# Patient Record
Sex: FEMALE | Race: WHITE | NOT HISPANIC OR LATINO | URBAN - METROPOLITAN AREA
[De-identification: names, ages, dates, MRNs, and addresses within clinical notes are randomized per-mention and may not be internally consistent; named-entity substitution may affect disease eponyms.]

---

## 2017-08-17 PROBLEM — Z00.00 ENCOUNTER FOR PREVENTIVE HEALTH EXAMINATION: Noted: 2017-08-17

## 2017-08-23 ENCOUNTER — OUTPATIENT (OUTPATIENT)
Dept: OUTPATIENT SERVICES | Facility: HOSPITAL | Age: 82
LOS: 1 days | Discharge: HOME | End: 2017-08-23

## 2017-08-23 DIAGNOSIS — D75.81 MYELOFIBROSIS: ICD-10-CM

## 2017-08-29 ENCOUNTER — APPOINTMENT (OUTPATIENT)
Dept: HEMATOLOGY ONCOLOGY | Facility: CLINIC | Age: 82
End: 2017-08-29

## 2017-09-05 ENCOUNTER — APPOINTMENT (OUTPATIENT)
Dept: HEMATOLOGY ONCOLOGY | Facility: CLINIC | Age: 82
End: 2017-09-05
Payer: MEDICARE

## 2017-09-05 ENCOUNTER — OUTPATIENT (OUTPATIENT)
Dept: OUTPATIENT SERVICES | Facility: HOSPITAL | Age: 82
LOS: 1 days | End: 2017-09-05
Payer: MEDICARE

## 2017-09-05 ENCOUNTER — RESULT REVIEW (OUTPATIENT)
Age: 82
End: 2017-09-05

## 2017-09-05 VITALS
OXYGEN SATURATION: 99 % | BODY MASS INDEX: 20.33 KG/M2 | HEART RATE: 110 BPM | RESPIRATION RATE: 14 BRPM | WEIGHT: 114.75 LBS | DIASTOLIC BLOOD PRESSURE: 77 MMHG | HEIGHT: 62.8 IN | SYSTOLIC BLOOD PRESSURE: 152 MMHG | TEMPERATURE: 99.1 F

## 2017-09-05 DIAGNOSIS — D64.9 ANEMIA, UNSPECIFIED: ICD-10-CM

## 2017-09-05 DIAGNOSIS — K58.9 IRRITABLE BOWEL SYNDROME W/OUT DIARRHEA: ICD-10-CM

## 2017-09-05 DIAGNOSIS — Z87.891 PERSONAL HISTORY OF NICOTINE DEPENDENCE: ICD-10-CM

## 2017-09-05 DIAGNOSIS — E03.9 HYPOTHYROIDISM, UNSPECIFIED: ICD-10-CM

## 2017-09-05 LAB
ALBUMIN SERPL ELPH-MCNC: 3 G/DL
ALP BLD-CCNC: 234 U/L
ALT SERPL-CCNC: 32 U/L
ANION GAP SERPL CALC-SCNC: 17 MMOL/L
ANISOCYTOSIS BLD QL: SLIGHT — SIGNIFICANT CHANGE UP
AST SERPL-CCNC: 19 U/L
BASO STIPL BLD QL SMEAR: PRESENT — SIGNIFICANT CHANGE UP
BASOPHILS # BLD AUTO: 0.2 K/UL — SIGNIFICANT CHANGE UP (ref 0–0.2)
BILIRUB SERPL-MCNC: 0.7 MG/DL
BLD GP AB SCN SERPL QL: SIGNIFICANT CHANGE UP
BUN SERPL-MCNC: 16 MG/DL
CALCIUM SERPL-MCNC: 8.4 MG/DL
CHLORIDE SERPL-SCNC: 91 MMOL/L
CO2 SERPL-SCNC: 24 MMOL/L
CREAT SERPL-MCNC: 0.6 MG/DL
ELLIPTOCYTES BLD QL SMEAR: SLIGHT — SIGNIFICANT CHANGE UP
EOSINOPHIL # BLD AUTO: 0 K/UL — SIGNIFICANT CHANGE UP (ref 0–0.5)
GLUCOSE SERPL-MCNC: 154 MG/DL
HCT VFR BLD CALC: 23 % — LOW (ref 34.5–45)
HGB BLD-MCNC: 8 G/DL — LOW (ref 11.5–15.5)
HYPOCHROMIA BLD QL: SLIGHT — SIGNIFICANT CHANGE UP
LDH SERPL-CCNC: 225 U/L
LYMPHOCYTES # BLD AUTO: 1.5 K/UL — SIGNIFICANT CHANGE UP (ref 1–3.3)
LYMPHOCYTES # BLD AUTO: 20 % — SIGNIFICANT CHANGE UP (ref 13–44)
MCHC RBC-ENTMCNC: 33.6 PG — SIGNIFICANT CHANGE UP (ref 27–34)
MCHC RBC-ENTMCNC: 35 GM/DL — SIGNIFICANT CHANGE UP (ref 32–36)
MCV RBC AUTO: 96.1 FL — SIGNIFICANT CHANGE UP (ref 80–100)
MICROCYTES BLD QL: SLIGHT — SIGNIFICANT CHANGE UP
MONOCYTES # BLD AUTO: 7.8 K/UL — HIGH (ref 0–0.9)
MONOCYTES NFR BLD AUTO: 37 % — HIGH (ref 2–14)
NEUTROPHILS # BLD AUTO: 8.4 K/UL — HIGH (ref 1.8–7.4)
NEUTROPHILS NFR BLD AUTO: 43 % — SIGNIFICANT CHANGE UP (ref 43–77)
PLAT MORPH BLD: NORMAL — SIGNIFICANT CHANGE UP
PLATELET # BLD AUTO: 368 K/UL — SIGNIFICANT CHANGE UP (ref 150–400)
POIKILOCYTOSIS BLD QL AUTO: SLIGHT — SIGNIFICANT CHANGE UP
POLYCHROMASIA BLD QL SMEAR: SLIGHT — SIGNIFICANT CHANGE UP
POTASSIUM SERPL-SCNC: 3.4 MMOL/L
PROT SERPL-MCNC: 6.5 G/DL
RBC # BLD: 2.39 M/UL — LOW (ref 3.8–5.2)
RBC # FLD: 18.2 % — HIGH (ref 10.3–14.5)
RBC BLD AUTO: ABNORMAL
SODIUM SERPL-SCNC: 132 MMOL/L
STOMATOCYTES BLD QL SMEAR: PRESENT — SIGNIFICANT CHANGE UP
TYPE + AB SCN PNL BLD: SIGNIFICANT CHANGE UP
WBC # BLD: 17.6 K/UL — HIGH (ref 3.8–10.5)
WBC # FLD AUTO: 17.6 K/UL — HIGH (ref 3.8–10.5)

## 2017-09-05 PROCEDURE — 99204 OFFICE O/P NEW MOD 45 MIN: CPT

## 2017-09-05 RX ORDER — ACETAMINOPHEN 500 MG
650 TABLET ORAL ONCE
Qty: 0 | Refills: 0 | Status: COMPLETED | OUTPATIENT
Start: 2017-09-06 | End: 2017-09-06

## 2017-09-05 RX ORDER — DIPHENHYDRAMINE HCL 50 MG
25 CAPSULE ORAL ONCE
Qty: 0 | Refills: 0 | Status: COMPLETED | OUTPATIENT
Start: 2017-09-06 | End: 2017-09-06

## 2017-09-06 ENCOUNTER — OUTPATIENT (OUTPATIENT)
Dept: OUTPATIENT SERVICES | Facility: HOSPITAL | Age: 82
LOS: 1 days | End: 2017-09-06
Payer: MEDICARE

## 2017-09-06 ENCOUNTER — APPOINTMENT (OUTPATIENT)
Dept: HEMATOLOGY ONCOLOGY | Facility: CLINIC | Age: 82
End: 2017-09-06

## 2017-09-06 VITALS
TEMPERATURE: 100 F | OXYGEN SATURATION: 96 % | RESPIRATION RATE: 16 BRPM | SYSTOLIC BLOOD PRESSURE: 135 MMHG | HEART RATE: 96 BPM | DIASTOLIC BLOOD PRESSURE: 60 MMHG

## 2017-09-06 VITALS
HEART RATE: 87 BPM | SYSTOLIC BLOOD PRESSURE: 147 MMHG | RESPIRATION RATE: 16 BRPM | OXYGEN SATURATION: 98 % | DIASTOLIC BLOOD PRESSURE: 69 MMHG | TEMPERATURE: 99 F

## 2017-09-06 DIAGNOSIS — D64.9 ANEMIA, UNSPECIFIED: ICD-10-CM

## 2017-09-06 PROBLEM — K58.9 IRRITABLE BOWEL SYNDROME: Status: ACTIVE | Noted: 2017-09-06

## 2017-09-06 PROBLEM — Z87.891 FORMER SMOKER: Status: ACTIVE | Noted: 2017-09-06

## 2017-09-06 PROBLEM — E03.9 HYPOTHYROIDISM: Status: ACTIVE | Noted: 2017-09-06

## 2017-09-06 LAB — ABO RH CONFIRMATION: SIGNIFICANT CHANGE UP

## 2017-09-06 PROCEDURE — 86901 BLOOD TYPING SEROLOGIC RH(D): CPT

## 2017-09-06 PROCEDURE — 96374 THER/PROPH/DIAG INJ IV PUSH: CPT

## 2017-09-06 PROCEDURE — P9040: CPT

## 2017-09-06 PROCEDURE — 86850 RBC ANTIBODY SCREEN: CPT

## 2017-09-06 PROCEDURE — 36415 COLL VENOUS BLD VENIPUNCTURE: CPT

## 2017-09-06 PROCEDURE — 36430 TRANSFUSION BLD/BLD COMPNT: CPT

## 2017-09-06 PROCEDURE — 86920 COMPATIBILITY TEST SPIN: CPT

## 2017-09-06 PROCEDURE — 86900 BLOOD TYPING SEROLOGIC ABO: CPT

## 2017-09-06 PROCEDURE — P9016: CPT

## 2017-09-06 RX ORDER — KETOROLAC TROMETHAMINE 30 MG/ML
15 SYRINGE (ML) INJECTION ONCE
Qty: 0 | Refills: 0 | Status: DISCONTINUED | OUTPATIENT
Start: 2017-09-06 | End: 2017-09-06

## 2017-09-06 RX ORDER — LEVOTHYROXINE SODIUM 0.17 MG/1
TABLET ORAL
Refills: 0 | Status: ACTIVE | COMMUNITY

## 2017-09-06 RX ADMIN — Medication 15 MILLIGRAM(S): at 17:11

## 2017-09-06 RX ADMIN — Medication 650 MILLIGRAM(S): at 10:46

## 2017-09-06 RX ADMIN — Medication 25 MILLIGRAM(S): at 10:46

## 2017-09-06 NOTE — CONSULT NOTE ADULT - SUBJECTIVE AND OBJECTIVE BOX
KEVIN JENN  --------------------------------------------------  HPI:  87F referred to the hospital by her hematologist for an elective blood transfusion. The patient is reported to have myelodysplastic syndrome and was being transfused for symptomatic anemia. The patient was noted to have complaints of right hand pain and medical evaluation was requested. The patient first developed the pain in her hand last night and the symptoms have been continuous until the time of evaluation. The pain is located on the dorsum of her wrist and is worsened with movement and palpation. There is no radiation. There was associated redness but no evidence of injury to the area. She denied any recent or past trauma to the hand or wrist. There were no insect bites or instrumentation to the area. Currently, the blood transfusion had continued without any significant worsening of the pain. Her only other recent complaint was that of neck and shoulder pain for which she had seen an Orthopedic Surgeon and treated with a Medrol DosePak. The last dose was yesterday. She is unaware of any relieving factors except for the acetaminophen and Benadryl which was administered prior to the transfusion which offered mild relief.    PAST MEDICAL & SURGICAL HISTORY:  MDS  Hypothyroidism    FAMILY HISTORY:  Non-contributory    REVIEW OF SYSTEMS:  ----------------------------------  General: (-)Weight loss, (-)Weight gain, (+)Fatigue/Malaise/Lethargy, (-)Fever/Chills  Eyes: (-)Change in vision, (-)Blurriness, (-)Diplopia, (-)Pain  Cardiovascular: (-)Chest pain, (-)Palpitations, (-)Dyspnea on exertion, (-)Claudication, (-)Orthopnoea, (-)Paroxysmal nocturnal dyspnea, (-)Edema, (-)Syncope  Respiratory: (-)Dyspnea, (-)Wheezing, (-)Cough, (-)Hemoptysis  Gastrointestinal: (-)Abdominal pain, (-)Nausea/Vomiting, (-)Diarrhea/Constipation  Musculoskeletal: (+)Shoulder Pain, (-)Stiffness, (-)Joint swelling  Integumentary: (-)Pruritus, (-)Rash, (-)Discoloration  Neurological: (-)Headache, (-)Numbness, (-)Seizure, (-)Paralysis  Psychiatric: (-)Anxiety, (-)Depression, (-)Sleep disturbance  Endocrine: (-)Polyuria, (-)Polydypsia, (-)Hot/Cold intolerance  Hematologic: (+)Anemia, (-)Excessive bleeding, (-)Bruising    MEDICATIONS:  Synthroid    Allergies    No Known Allergies    Vital Signs Last 24 Hrs  T(C): 37.9 (06 Sep 2017 15:15), Max: 37.9 (06 Sep 2017 15:15)  T(F): 100.2 (06 Sep 2017 15:15), Max: 100.2 (06 Sep 2017 15:15)  HR: 88 (06 Sep 2017 15:15) (87 - 96)  BP: 138/55 (06 Sep 2017 15:15) (135/60 - 148/73)  BP(mean): --  RR: 16 (06 Sep 2017 15:15) (14 - 16)  SpO2: 98% (06 Sep 2017 15:15) (96% - 98%)    PHYSICAL EXAMINATION:  ---------------------------------------  General appearance: NAD, Awake, Alert  HEENT: NCAT, Conjunctiva clear, EOMI  Neck: Supple, No JVD, No tenderness  Lungs: Clear to auscultation, Breath sound equal bilaterally, No wheezes, No rales  Cardiovascular: S1S2, Regular rhythm  Abdomen: Soft, Nontender, Nondistended, No guarding/rebound, Positive bowel sounds  Extremities: No clubbing, No cyanosis, No edema, No calf tenderness, Right dorsum of the with an area of tenderness and mild erythema  Neuro: Strength equal bilaterally, No tremors  Psychiatric: Appropriate mood, Normal affect    LABS:  --------             8.0    17.6  )-----------( 368      ( 05 Sep 2017 08:36 )             23.0     ASSESSMENT:  ------------------------  Right hand pain - Unclear etiology. Physical examination was without evidence of obvious injury to the area. The symptoms were noted to be present prior to her presentation and are not related to the transfusion. Appears localized and without evidence of finger involvement or similar findings in other joints. Ketoralac requested for pain relief. To complete the transfusion of packed red blood cells as previously ordered. No significant signs to suggest cellulitis. The patient is afebrile and non-toxic appearing. The laboratory results from yesterday is notable for leukocytosis without bandemia in the setting of steroid use. This was discussed with the patient's daughter at the bedside with recommendations for symptomatic treatment with acetaminophen and cool compress as needed. She was also to seek further evaluation with her primary care physician or orthopedic surgeon if the symptoms persisted or worsened.

## 2017-09-07 DIAGNOSIS — D47.3 ESSENTIAL (HEMORRHAGIC) THROMBOCYTHEMIA: ICD-10-CM

## 2017-09-08 ENCOUNTER — EMERGENCY (EMERGENCY)
Facility: HOSPITAL | Age: 82
LOS: 1 days | Discharge: HOME | End: 2017-09-08
Attending: EMERGENCY MEDICINE
Payer: MEDICARE

## 2017-09-08 VITALS
HEIGHT: 63 IN | SYSTOLIC BLOOD PRESSURE: 130 MMHG | RESPIRATION RATE: 20 BRPM | DIASTOLIC BLOOD PRESSURE: 73 MMHG | TEMPERATURE: 98 F | OXYGEN SATURATION: 98 % | WEIGHT: 113.98 LBS | HEART RATE: 99 BPM

## 2017-09-08 VITALS
DIASTOLIC BLOOD PRESSURE: 71 MMHG | HEART RATE: 86 BPM | RESPIRATION RATE: 18 BRPM | SYSTOLIC BLOOD PRESSURE: 126 MMHG | OXYGEN SATURATION: 98 % | TEMPERATURE: 100 F

## 2017-09-08 LAB
ALBUMIN SERPL ELPH-MCNC: 2.7 G/DL — LOW (ref 3.3–5.2)
ALP SERPL-CCNC: 263 U/L — HIGH (ref 40–120)
ALT FLD-CCNC: 28 U/L — SIGNIFICANT CHANGE UP
ANION GAP SERPL CALC-SCNC: 14 MMOL/L — SIGNIFICANT CHANGE UP (ref 5–17)
ANISOCYTOSIS BLD QL: SLIGHT — SIGNIFICANT CHANGE UP
APPEARANCE UR: CLEAR — SIGNIFICANT CHANGE UP
AST SERPL-CCNC: 22 U/L — SIGNIFICANT CHANGE UP
BACTERIA # UR AUTO: ABNORMAL
BILIRUB SERPL-MCNC: 1.1 MG/DL — SIGNIFICANT CHANGE UP (ref 0.4–2)
BILIRUB UR-MCNC: NEGATIVE — SIGNIFICANT CHANGE UP
BUN SERPL-MCNC: 16 MG/DL — SIGNIFICANT CHANGE UP (ref 8–20)
CALCIUM SERPL-MCNC: 8.8 MG/DL — SIGNIFICANT CHANGE UP (ref 8.6–10.2)
CHLORIDE SERPL-SCNC: 92 MMOL/L — LOW (ref 98–107)
CO2 SERPL-SCNC: 25 MMOL/L — SIGNIFICANT CHANGE UP (ref 22–29)
COLOR SPEC: YELLOW — SIGNIFICANT CHANGE UP
CREAT SERPL-MCNC: 0.43 MG/DL — LOW (ref 0.5–1.3)
CRP SERPL-MCNC: 19.1 MG/DL — HIGH (ref 0–0.4)
DIFF PNL FLD: ABNORMAL
EPI CELLS # UR: SIGNIFICANT CHANGE UP
ERYTHROCYTE [SEDIMENTATION RATE] IN BLOOD: 61 MM/HR — HIGH (ref 0–20)
GLUCOSE SERPL-MCNC: 154 MG/DL — HIGH (ref 70–115)
GLUCOSE UR QL: NEGATIVE MG/DL — SIGNIFICANT CHANGE UP
HCT VFR BLD CALC: 27 % — LOW (ref 37–47)
HGB BLD-MCNC: 9.5 G/DL — LOW (ref 12–16)
KETONES UR-MCNC: NEGATIVE — SIGNIFICANT CHANGE UP
LEUKOCYTE ESTERASE UR-ACNC: ABNORMAL
LYMPHOCYTES # BLD AUTO: 11 % — LOW (ref 20–55)
LYMPHOCYTES # BLD AUTO: 2.3 K/UL — SIGNIFICANT CHANGE UP (ref 1–4.8)
MCHC RBC-ENTMCNC: 31.6 PG — HIGH (ref 27–31)
MCHC RBC-ENTMCNC: 35.2 G/DL — SIGNIFICANT CHANGE UP (ref 32–36)
MCV RBC AUTO: 89.7 FL — SIGNIFICANT CHANGE UP (ref 81–99)
METAMYELOCYTES # FLD: 1 % — HIGH (ref 0–0)
MONOCYTES # BLD AUTO: 6.9 K/UL — HIGH (ref 0–0.8)
MONOCYTES NFR BLD AUTO: 36 % — HIGH (ref 3–10)
MYELOCYTES NFR BLD: 3 % — HIGH (ref 0–0)
NEUTROPHILS # BLD AUTO: 8.8 K/UL — HIGH (ref 1.8–8)
NEUTROPHILS NFR BLD AUTO: 42 % — SIGNIFICANT CHANGE UP (ref 37–73)
NEUTS BAND # BLD: 6 % — SIGNIFICANT CHANGE UP (ref 0–8)
NITRITE UR-MCNC: NEGATIVE — SIGNIFICANT CHANGE UP
PH UR: 7 — SIGNIFICANT CHANGE UP (ref 5–8)
PLAT MORPH BLD: NORMAL — SIGNIFICANT CHANGE UP
PLATELET # BLD AUTO: 330 K/UL — SIGNIFICANT CHANGE UP (ref 150–400)
POIKILOCYTOSIS BLD QL AUTO: SLIGHT — SIGNIFICANT CHANGE UP
POTASSIUM SERPL-MCNC: 3.5 MMOL/L — SIGNIFICANT CHANGE UP (ref 3.5–5.3)
POTASSIUM SERPL-SCNC: 3.5 MMOL/L — SIGNIFICANT CHANGE UP (ref 3.5–5.3)
PROT SERPL-MCNC: 7.3 G/DL — SIGNIFICANT CHANGE UP (ref 6.6–8.7)
PROT UR-MCNC: 30 MG/DL
RBC # BLD: 3.01 M/UL — LOW (ref 4.4–5.2)
RBC # FLD: 19.7 % — HIGH (ref 11–15.6)
RBC BLD AUTO: ABNORMAL
RBC CASTS # UR COMP ASSIST: ABNORMAL /HPF (ref 0–4)
SODIUM SERPL-SCNC: 131 MMOL/L — LOW (ref 135–145)
SP GR SPEC: 1.01 — SIGNIFICANT CHANGE UP (ref 1.01–1.02)
URATE SERPL-MCNC: 2.9 MG/DL — SIGNIFICANT CHANGE UP (ref 2.4–5.7)
UROBILINOGEN FLD QL: NEGATIVE MG/DL — SIGNIFICANT CHANGE UP
VARIANT LYMPHS # BLD: 1 % — SIGNIFICANT CHANGE UP (ref 0–6)
WBC # BLD: 19.3 K/UL — HIGH (ref 4.8–10.8)
WBC # FLD AUTO: 19.3 K/UL — HIGH (ref 4.8–10.8)
WBC UR QL: SIGNIFICANT CHANGE UP

## 2017-09-08 PROCEDURE — 93971 EXTREMITY STUDY: CPT | Mod: 26,RT

## 2017-09-08 PROCEDURE — 73130 X-RAY EXAM OF HAND: CPT | Mod: 26,RT

## 2017-09-08 PROCEDURE — 99284 EMERGENCY DEPT VISIT MOD MDM: CPT

## 2017-09-08 RX ORDER — ACETAMINOPHEN 500 MG
1000 TABLET ORAL ONCE
Qty: 0 | Refills: 0 | Status: COMPLETED | OUTPATIENT
Start: 2017-09-08 | End: 2017-09-08

## 2017-09-08 RX ORDER — IBUPROFEN 200 MG
200 TABLET ORAL ONCE
Qty: 0 | Refills: 0 | Status: COMPLETED | OUTPATIENT
Start: 2017-09-08 | End: 2017-09-08

## 2017-09-08 RX ADMIN — Medication 1000 MILLIGRAM(S): at 09:30

## 2017-09-08 RX ADMIN — Medication 5 MILLIGRAM(S): at 13:52

## 2017-09-08 RX ADMIN — Medication 200 MILLIGRAM(S): at 11:47

## 2017-09-08 RX ADMIN — Medication 400 MILLIGRAM(S): at 08:35

## 2017-09-08 NOTE — ED PROVIDER NOTE - MEDICAL DECISION MAKING DETAILS
PT WITH MYELODYSPLASTIC SYNDROME PRESENTS WITH SWELLING R HAND AND RLE. LABS ORDERED, US ORDERED TO R/O DVT. XRAY  NO FX. WILL TREAT PAIN AND FOLLOW

## 2017-09-08 NOTE — PHYSICAL THERAPY INITIAL EVALUATION ADULT - GAIT DEVIATIONS NOTED, PT EVAL
increased time in double stance/decreased zenon/decreased step length/decreased stride length/decreased weight-shifting ability

## 2017-09-08 NOTE — ED ADULT NURSE REASSESSMENT NOTE - NS ED NURSE REASSESS COMMENT FT1
LAte entry : Pt was seen and eval by PT and SW , pt was provided with the quad cane, discharge instructions given and understood , all questions answered , family at the bedside to take pt home.

## 2017-09-08 NOTE — PROGRESS NOTE ADULT - SUBJECTIVE AND OBJECTIVE BOX
REASON FOR CONSULTATION: myelofibrosis, joint pain    HPI:  Mrs. Chen is a 86 yo WF with a history of Reji 2+ thrombocytosis, dating back to . She had been on Hydrea since Olya 10, 2013. Most recently she was found to be pancytopenic and the Hydrea was held as of 2017. A bone marrow was done on 2017, which a normocellular to mildly hypercellular marrow with mild megakaryocytic hyperplasia and moderate reticulin fibrosis and focal areas of increased blasts (5% ).     She recently established care with Dr. Montes.  Now presents with bilateral ankle pain and swelling as well as wrist pain and swelling.  She recently completed a medrol dose pack.  She presented to ED with worsening pain of ankles/wrist.  She's s/p 2 units RBC transfusion on 17.    REVIEW OF SYSTEMS:  Constitutional, Eyes, ENT, Cardiovascular, Respiratory, Gastrointestinal, Genitourinary, Musculoskeletal, Integumentary, Neurological, Psychiatric, Endocrine, Heme/Lymph, and Allergic/Immunologic review of systems are otherwise negative except as noted in the HPI.    PAST MEDICAL & SURGICAL HISTORY:  Hypothyroidism (acquired)  Myelodysplastic syndrome      FAMILY HISTORY:  No pertinent family history in first degree relatives      SOCIAL HISTORY:    Allergies    No Known Allergies    Intolerances        MEDICATIONS  (STANDING):    MEDICATIONS  (PRN):      Vital Signs Last 24 Hrs  T(C): 37.7 (08 Sep 2017 11:20), Max: 37.7 (08 Sep 2017 11:20)  T(F): 99.9 (08 Sep 2017 11:20), Max: 99.9 (08 Sep 2017 11:20)  HR: 86 (08 Sep 2017 11:20) (86 - 99)  BP: 126/71 (08 Sep 2017 11:20) (126/71 - 130/73)  BP(mean): --  RR: 18 (08 Sep 2017 11:20) (18 - 20)  SpO2: 98% (08 Sep 2017 11:20) (98% - 98%)    PHYSICAL EXAM:    GENERAL:elderly female, comfortable  NERVOUS SYSTEM:  Alert & Oriented X3,  EXTREMITIES: bilateral ankle swelling, and wrist swelling Rt>left  SKIN: No rashes or lesions      LABS:                        9.5    19.3  )-----------( 330      ( 08 Sep 2017 08:30 )             27.0     09-08    131<L>  |  92<L>  |  16.0  ----------------------------<  154<H>  3.5   |  25.0  |  0.43<L>    Ca    8.8      08 Sep 2017 08:30    TPro  7.3  /  Alb  2.7<L>  /  TBili  1.1  /  DBili  x   /  AST  22  /  ALT  28  /  AlkPhos  263<H>        Urinalysis Basic - ( 08 Sep 2017 13:00 )    Color: Yellow / Appearance: Clear / S.010 / pH: x  Gluc: x / Ketone: Negative  / Bili: Negative / Urobili: Negative mg/dL   Blood: x / Protein: 30 mg/dL / Nitrite: Negative   Leuk Esterase: Trace / RBC: 6-10 /HPF / WBC 3-5   Sq Epi: x / Non Sq Epi: Few / Bacteria: Few

## 2017-09-08 NOTE — ED ADULT NURSE NOTE - OBJECTIVE STATEMENT
LAte entry : Pt presented to ED c/o R upper hand and lower extrmity pain/ swelling for few weeks , generalized weakness and low energy   denies any injury , HX of MDS , recent blood transfusion on Wednesday .

## 2017-09-08 NOTE — ED ADULT TRIAGE NOTE - CHIEF COMPLAINT QUOTE
on wed she was admitted received a blood transfusion she had hand pain prior now it is worse and her feet hurt yesterday saw pmd got mediction but worse

## 2017-09-08 NOTE — PROGRESS NOTE ADULT - ASSESSMENT
88 yo WF with a known history of Reji 2+ thrombocytosis, treated since 2013 with Hydrea, which was discontinued recently due to pancytopenia. recent bone marrow showed 5% blasts withmoderate reticulin fibrosis. Now with myelofibrosis awaiting to begin Jakafi. She's in ED for joint pain and swelling of ankles and wrists.  Recommend rheumatology evaluation and pain control for inflammatory athropathy.  follow up with Dr. Montes as an outpatient.

## 2017-09-08 NOTE — ED STATDOCS - PROGRESS NOTE DETAILS
88 y/o F pt with hx of MDS presents to ED c/o worsening burning sensation and swelling to right hand since Monday. Pt had blood transfusion on Tuesday. Per family last week she had neck and shoulder spams; placed on medrol dose pack.   PMD: Earl Montes -Hematologist 88 y/o F pt with hx of MDS presents to ED c/o neck swelling and shoulder spasms for the past 4-5 weeks; initially treated with a medrol dose pack.  developed burning sensation and swelling to right hand on Monday. Pt had blood transfusion on Tuesday. denies fever, sob, chest pain.  PMD: Earl Montes -Hematologist

## 2017-09-08 NOTE — ED PROVIDER NOTE - OBJECTIVE STATEMENT
86 YO FEMALE WITH MYELODYSPLASIA . FOLLOWED INITIALLY BY DR SALGADO, NOW DR BLANKENSHIP AT [facility]. PMD ZAHIRA. RECENT TRANSFUSION FOR ANEMIA. OVER PAST 4 WEEKS GETTING MUSCULOSKELETAL PAIN TO NECK, BACK. SAW ORTHO AND DX WITH OSTEOARTHRITIS. GIVEN MEDROL DOSE PACK. JUST FINISHED IT. PRIOR TO TRANSFUSION HAD SOME SWELLING R HAND BUT DURING TRANSFUSION SWELLING  INCREASED. RECEIVED TORADOL. SAW PMD YESTERDAY ALSO GIVEN ANTIINFLAMMATORY SHOT. RLE NOW SWOLLEN. NO FEVER OR SOB. PAIN DESCRIBED AS BURNING

## 2017-09-08 NOTE — PHYSICAL THERAPY INITIAL EVALUATION ADULT - RANGE OF MOTION EXAMINATION, REHAB EVAL
bilateral lower extremity ROM was WFL (within functional limits)/bilateral upper extremity ROM was WFL (within functional limits)/difficulty moving the right hand due to swelling and pain

## 2017-09-10 ENCOUNTER — INPATIENT (INPATIENT)
Facility: HOSPITAL | Age: 82
LOS: 4 days | Discharge: NURSING, REHAB OR LONG-TERM CARE | DRG: 558 | End: 2017-09-15
Attending: HOSPITALIST | Admitting: HOSPITALIST
Payer: MEDICARE

## 2017-09-10 VITALS — WEIGHT: 113.98 LBS | HEIGHT: 63 IN

## 2017-09-10 DIAGNOSIS — M13.0 POLYARTHRITIS, UNSPECIFIED: ICD-10-CM

## 2017-09-10 LAB
ALBUMIN SERPL ELPH-MCNC: 2.8 G/DL — LOW (ref 3.3–5.2)
ALP SERPL-CCNC: 280 U/L — HIGH (ref 40–120)
ALT FLD-CCNC: 52 U/L — HIGH
ANION GAP SERPL CALC-SCNC: 14 MMOL/L — SIGNIFICANT CHANGE UP (ref 5–17)
ANISOCYTOSIS BLD QL: SLIGHT — SIGNIFICANT CHANGE UP
APTT BLD: 26.6 SEC — LOW (ref 27.5–37.4)
AST SERPL-CCNC: 36 U/L — HIGH
BASOPHILS # BLD AUTO: 0 K/UL — SIGNIFICANT CHANGE UP (ref 0–0.2)
BASOPHILS NFR BLD AUTO: 0 % — SIGNIFICANT CHANGE UP (ref 0–2)
BILIRUB SERPL-MCNC: 1 MG/DL — SIGNIFICANT CHANGE UP (ref 0.4–2)
BLASTS # FLD: 1 % — HIGH (ref 0–0)
BUN SERPL-MCNC: 20 MG/DL — SIGNIFICANT CHANGE UP (ref 8–20)
CALCIUM SERPL-MCNC: 8.6 MG/DL — SIGNIFICANT CHANGE UP (ref 8.6–10.2)
CHLORIDE SERPL-SCNC: 92 MMOL/L — LOW (ref 98–107)
CO2 SERPL-SCNC: 27 MMOL/L — SIGNIFICANT CHANGE UP (ref 22–29)
CREAT SERPL-MCNC: 0.45 MG/DL — LOW (ref 0.5–1.3)
CRP SERPL-MCNC: 15.8 MG/DL — HIGH (ref 0–0.4)
EOSINOPHIL # BLD AUTO: 0 K/UL — SIGNIFICANT CHANGE UP (ref 0–0.5)
EOSINOPHIL NFR BLD AUTO: 0 % — SIGNIFICANT CHANGE UP (ref 0–6)
ERYTHROCYTE [SEDIMENTATION RATE] IN BLOOD: 59 MM/HR — HIGH (ref 0–20)
GLUCOSE SERPL-MCNC: 117 MG/DL — HIGH (ref 70–115)
HCT VFR BLD CALC: 27.4 % — LOW (ref 37–47)
HGB BLD-MCNC: 9.4 G/DL — LOW (ref 12–16)
HYPOCHROMIA BLD QL: SLIGHT — SIGNIFICANT CHANGE UP
INR BLD: 1.38 RATIO — HIGH (ref 0.88–1.16)
LYMPHOCYTES # BLD AUTO: 7 % — LOW (ref 20–55)
MACROCYTES BLD QL: SLIGHT — SIGNIFICANT CHANGE UP
MANUAL DIF COMMENT BLD-IMP: SIGNIFICANT CHANGE UP
MCHC RBC-ENTMCNC: 31.5 PG — HIGH (ref 27–31)
MCHC RBC-ENTMCNC: 34.3 G/DL — SIGNIFICANT CHANGE UP (ref 32–36)
MCV RBC AUTO: 91.9 FL — SIGNIFICANT CHANGE UP (ref 81–99)
METAMYELOCYTES # FLD: 2 % — HIGH (ref 0–0)
MONOCYTES NFR BLD AUTO: 44 % — HIGH (ref 3–10)
MYELOCYTES NFR BLD: 1 % — HIGH (ref 0–0)
NEUTROPHILS NFR BLD AUTO: 38 % — SIGNIFICANT CHANGE UP (ref 37–73)
NEUTS BAND # BLD: 4 % — SIGNIFICANT CHANGE UP (ref 0–8)
NT-PROBNP SERPL-SCNC: 1090 PG/ML — HIGH (ref 0–300)
PLAT MORPH BLD: NORMAL — SIGNIFICANT CHANGE UP
PLATELET # BLD AUTO: 257 K/UL — SIGNIFICANT CHANGE UP (ref 150–400)
POTASSIUM SERPL-MCNC: 3.4 MMOL/L — LOW (ref 3.5–5.3)
POTASSIUM SERPL-SCNC: 3.4 MMOL/L — LOW (ref 3.5–5.3)
PROT SERPL-MCNC: 7 G/DL — SIGNIFICANT CHANGE UP (ref 6.6–8.7)
PROTHROM AB SERPL-ACNC: 15.3 SEC — HIGH (ref 9.8–12.7)
RBC # BLD: 2.98 M/UL — LOW (ref 4.4–5.2)
RBC # FLD: 18.8 % — HIGH (ref 11–15.6)
RBC BLD AUTO: ABNORMAL
SODIUM SERPL-SCNC: 133 MMOL/L — LOW (ref 135–145)
VARIANT LYMPHS # BLD: 3 % — SIGNIFICANT CHANGE UP (ref 0–6)
WBC # BLD: 14.5 K/UL — HIGH (ref 4.8–10.8)
WBC # FLD AUTO: 14.5 K/UL — HIGH (ref 4.8–10.8)

## 2017-09-10 PROCEDURE — 73110 X-RAY EXAM OF WRIST: CPT | Mod: 26,50

## 2017-09-10 PROCEDURE — 73610 X-RAY EXAM OF ANKLE: CPT | Mod: 26,RT

## 2017-09-10 PROCEDURE — 99285 EMERGENCY DEPT VISIT HI MDM: CPT

## 2017-09-10 PROCEDURE — 93970 EXTREMITY STUDY: CPT | Mod: 26

## 2017-09-10 PROCEDURE — 99223 1ST HOSP IP/OBS HIGH 75: CPT

## 2017-09-10 RX ORDER — PANTOPRAZOLE SODIUM 20 MG/1
40 TABLET, DELAYED RELEASE ORAL
Qty: 0 | Refills: 0 | Status: DISCONTINUED | OUTPATIENT
Start: 2017-09-10 | End: 2017-09-15

## 2017-09-10 RX ORDER — KETOROLAC TROMETHAMINE 30 MG/ML
15 SYRINGE (ML) INJECTION ONCE
Qty: 0 | Refills: 0 | Status: DISCONTINUED | OUTPATIENT
Start: 2017-09-10 | End: 2017-09-10

## 2017-09-10 RX ORDER — INSULIN LISPRO 100/ML
VIAL (ML) SUBCUTANEOUS
Qty: 0 | Refills: 0 | Status: DISCONTINUED | OUTPATIENT
Start: 2017-09-10 | End: 2017-09-14

## 2017-09-10 RX ORDER — HEPARIN SODIUM 5000 [USP'U]/ML
5000 INJECTION INTRAVENOUS; SUBCUTANEOUS EVERY 12 HOURS
Qty: 0 | Refills: 0 | Status: DISCONTINUED | OUTPATIENT
Start: 2017-09-10 | End: 2017-09-15

## 2017-09-10 RX ORDER — LEVOTHYROXINE SODIUM 125 MCG
75 TABLET ORAL DAILY
Qty: 0 | Refills: 0 | Status: DISCONTINUED | OUTPATIENT
Start: 2017-09-10 | End: 2017-09-15

## 2017-09-10 RX ORDER — DEXTROSE 50 % IN WATER 50 %
25 SYRINGE (ML) INTRAVENOUS ONCE
Qty: 0 | Refills: 0 | Status: DISCONTINUED | OUTPATIENT
Start: 2017-09-10 | End: 2017-09-15

## 2017-09-10 RX ORDER — DEXTROSE 50 % IN WATER 50 %
12.5 SYRINGE (ML) INTRAVENOUS ONCE
Qty: 0 | Refills: 0 | Status: DISCONTINUED | OUTPATIENT
Start: 2017-09-10 | End: 2017-09-15

## 2017-09-10 RX ORDER — DEXTROSE 50 % IN WATER 50 %
1 SYRINGE (ML) INTRAVENOUS ONCE
Qty: 0 | Refills: 0 | Status: DISCONTINUED | OUTPATIENT
Start: 2017-09-10 | End: 2017-09-15

## 2017-09-10 RX ORDER — SODIUM CHLORIDE 9 MG/ML
1000 INJECTION, SOLUTION INTRAVENOUS
Qty: 0 | Refills: 0 | Status: DISCONTINUED | OUTPATIENT
Start: 2017-09-10 | End: 2017-09-15

## 2017-09-10 RX ORDER — GLUCAGON INJECTION, SOLUTION 0.5 MG/.1ML
1 INJECTION, SOLUTION SUBCUTANEOUS ONCE
Qty: 0 | Refills: 0 | Status: DISCONTINUED | OUTPATIENT
Start: 2017-09-10 | End: 2017-09-15

## 2017-09-10 RX ORDER — IBUPROFEN 200 MG
400 TABLET ORAL EVERY 6 HOURS
Qty: 0 | Refills: 0 | Status: COMPLETED | OUTPATIENT
Start: 2017-09-10 | End: 2017-09-11

## 2017-09-10 RX ORDER — ACETAMINOPHEN 500 MG
1000 TABLET ORAL ONCE
Qty: 0 | Refills: 0 | Status: COMPLETED | OUTPATIENT
Start: 2017-09-10 | End: 2017-09-10

## 2017-09-10 RX ORDER — POTASSIUM CHLORIDE 20 MEQ
40 PACKET (EA) ORAL ONCE
Qty: 0 | Refills: 0 | Status: COMPLETED | OUTPATIENT
Start: 2017-09-10 | End: 2017-09-10

## 2017-09-10 RX ADMIN — Medication 15 MILLIGRAM(S): at 12:14

## 2017-09-10 RX ADMIN — Medication 40 MILLIEQUIVALENT(S): at 20:11

## 2017-09-10 RX ADMIN — Medication 15 MILLIGRAM(S): at 12:23

## 2017-09-10 RX ADMIN — Medication 400 MILLIGRAM(S): at 15:17

## 2017-09-10 RX ADMIN — Medication 1000 MILLIGRAM(S): at 18:06

## 2017-09-10 RX ADMIN — Medication 400 MILLIGRAM(S): at 22:47

## 2017-09-10 RX ADMIN — Medication 400 MILLIGRAM(S): at 23:47

## 2017-09-10 RX ADMIN — Medication 50 MILLIGRAM(S): at 18:12

## 2017-09-10 RX ADMIN — Medication 1: at 22:00

## 2017-09-10 NOTE — ED PROVIDER NOTE - EXTREMITY EXAM
right lower extremity findings/right upper extremity findings/left lower extremity findings/left upper extremity findings

## 2017-09-10 NOTE — ED PROVIDER NOTE - OBJECTIVE STATEMENT
87 year old female with PMH myelodysplasia and hypothyroid presents with joint swelling. Pt states that Sx started 5 days ago shortly after receiving a blood transfusion. She has swelling and ache sin her b/l ankles and wrists. Sx constant, no radiation, no alleviating/exacerbation. No fever, chills, cough, chest pain, SOB, trauma.

## 2017-09-10 NOTE — ED ADULT TRIAGE NOTE - CHIEF COMPLAINT QUOTE
patient reports return from ER visit on Friday for "same thing." Patient reports pain and swelling to right lower leg and right arm. Patient took Prednisone, advil and Tylenol this AM.  Unable to get respite care. Requesting rheumatologist.

## 2017-09-10 NOTE — PROVIDER CONTACT NOTE (OTHER) - SITUATION
Dr. Olson requesting PT eval in ED. PT spoke with Dr. Olson re: pending bilateral UE and LE dopplers. MD reporting to r/o DVT. PT held pending doppler results.

## 2017-09-10 NOTE — PROVIDER CONTACT NOTE (OTHER) - SITUATION
Chart reviewed, contents noted. MD order for PT consult received. Pt. still awaiting b/l UE and LE dopplers to be performed to r/o DVT.

## 2017-09-10 NOTE — H&P ADULT - ASSESSMENT
87 years old female with PMH of Reji 2 thrombocytosis, Myelofibrosis and Hypothyroidism comes with pain and swelling in hands and ankles. As per patient, her symptoms started on Tuesday and are getting worse. She was seen by Ortho who prescribed Medrol Dose Pack which she finished last week. She was also seen here 2 days ago and was evaluated by Hem/Onc as well. She was recommended to continue steroids and follow up with Rheumatologist which she still has to get an appointment.   Pain is dull and aching 9-10/10 in intensity in both hands and feet. Swelling is getting worse and now she is unable to ambulate and do her activities of daily living. This morning she also noticed redness in her feet. Denies any rash anywhere in body. Denies stiffness in joints. Denies history of immunologic disorder in past. She has chronic pain in her right shoulder but there is no change in it.  Denies fever, night sweats, weight change, abdominal/chest pain, nausea, vomiting, cough, shortness of breath or headache. No recent travel, sick contacts or tick bites.   She was transfused on 9/6/17 for symptomatic anemia. Her symptoms started prior to transfusion.    1) Polyarthralgia  - No signs of infection  - Continue Prednisone 40 mg daily  - Motrin 400 mg q 6 hours  - Rheumatology Consult  - PT Eval  - GI Prophylaxis  - Accu checks   2) Hypokalemia  - KCl 40 mEq  3) Myelofibrosis  - Patient is waiting to be started on Jakafi  - Outpatient follow up with Dr. Montes  4) Hypothyroidism  - TSH  - Levothyroxine 75 mcg  DVT Prophylaxis -- IPC 87 years old female with PMH of Reji 2 thrombocytosis, Myelofibrosis and Hypothyroidism comes with pain and swelling in hands and ankles. As per patient, her symptoms started on Tuesday and are getting worse. She was seen by Ortho who prescribed Medrol Dose Pack which she finished last week. She was also seen here 2 days ago and was evaluated by Hem/Onc as well. She was recommended to continue steroids and follow up with Rheumatologist which she still has to get an appointment.   Pain is dull and aching 9-10/10 in intensity in both hands and feet. Swelling is getting worse and now she is unable to ambulate and do her activities of daily living. This morning she also noticed redness in her feet. Denies any rash anywhere in body. Denies stiffness in joints. Denies history of immunologic disorder in past. She has chronic pain in her right shoulder but there is no change in it.  Denies fever, night sweats, weight change, abdominal/chest pain, nausea, vomiting, cough, shortness of breath or headache. No recent travel, sick contacts or tick bites.   She was transfused on 9/6/17 for symptomatic anemia. Her symptoms started prior to transfusion.    1) Polyarthralgia  - No signs of infection  - Continue Prednisone 40 mg daily  - Motrin 400 mg q 6 hours  - Rheumatology Consult  - PT Eval  - GI Prophylaxis  - Accu checks   2) Hypokalemia  - KCl 40 mEq  3) Myelofibrosis  - Patient is waiting to be started on Jakafi  - Outpatient follow up with Dr. Montes  4) Hypothyroidism  - TSH  - Levothyroxine 75 mcg  DVT Prophylaxis -- Heparin 5000 Units 87 years old female with PMH of Reji 2 thrombocytosis, Myelofibrosis and Hypothyroidism comes with pain and swelling in hands and ankles. As per patient, her symptoms started on Tuesday and are getting worse. She was seen by Ortho who prescribed Medrol Dose Pack which she finished last week. She was also seen here 2 days ago and was evaluated by Hem/Onc as well. She was recommended to continue steroids and follow up with Rheumatologist which she still has to get an appointment.   Pain is dull and aching 9-10/10 in intensity in both hands and feet. Swelling is getting worse and now she is unable to ambulate and do her activities of daily living. This morning she also noticed redness in her feet. Denies any rash anywhere in body. Denies stiffness in joints. Denies history of immunologic disorder in past. She has chronic pain in her right shoulder but there is no change in it.  Denies fever, night sweats, weight change, abdominal/chest pain, nausea, vomiting, cough, shortness of breath or headache. No recent travel, sick contacts or tick bites.   She was transfused on 9/6/17 for symptomatic anemia. Her symptoms started prior to transfusion.    1) Polyarthralgia  - No signs of infection  - Continue Prednisone 40 mg daily  - Motrin 400 mg q 6 hours  - Hepatitis Panel, Serology for Lyme and Parvovirus  - Rheumatology Consult  - PT Eval  - GI Prophylaxis  - Accu checks   2) Hypokalemia  - KCl 40 mEq  3) Myelofibrosis  - Patient is waiting to be started on Jakafi  - Outpatient follow up with Dr. Montes  4) Hypothyroidism  - TSH  - Levothyroxine 75 mcg  DVT Prophylaxis -- Heparin 5000 Units 87 years old female with PMH of Reji 2 thrombocytosis, Myelofibrosis and Hypothyroidism comes with pain and swelling in hands and ankles. As per patient, her symptoms started on Tuesday and are getting worse. She was seen by Ortho who prescribed Medrol Dose Pack which she finished last week. She was also seen here 2 days ago and was evaluated by Hem/Onc as well. She was recommended to continue steroids and follow up with Rheumatologist which she still has to get an appointment.   Pain is dull and aching 9-10/10 in intensity in both hands and feet. Swelling is getting worse and now she is unable to ambulate and do her activities of daily living. This morning she also noticed redness in her feet. Denies any rash anywhere in body. Denies stiffness in joints. Denies history of immunologic disorder in past. She has chronic pain in her right shoulder but there is no change in it.  Denies fever, night sweats, weight change, abdominal/chest pain, nausea, vomiting, cough, shortness of breath or headache. No recent travel, sick contacts or tick bites.   She was transfused on 9/6/17 for symptomatic anemia. Her symptoms started prior to transfusion.    1) Polyarthralgia  - No signs of infection  - Continue Prednisone 40 mg daily  - Motrin 400 mg q 6 hours  - Hepatitis Panel, Serology for Lyme and Parvovirus  - Rheumatology Consult  - PT Eval  - GI Prophylaxis  - Accu checks   2) Hypokalemia  - KCl 40 mEq  3) Myelofibrosis  - Patient is waiting to be started on Jakafi  - Outpatient follow up with Dr. Montes  4) Hypothyroidism  - TSH  - Levothyroxine 75 mcg  5) Elevated BP without history of HTN  - Monitor BP. If persistently elevated will start antihypertensive.   DVT Prophylaxis -- Heparin 5000 Units

## 2017-09-10 NOTE — ED ADULT NURSE NOTE - OBJECTIVE STATEMENT
Assumed patient care at 1130.  Pt reports increased swelling of right lower leg and ankle as well as swelling to left ankle and both hands.  discharged with cane with friday, but she cannot use it because of the hand swelling and pain.  Had blood transfusion on Wednesday.  She denies any recent trauma.  Pitting edema present to bilateral ankles and hands. Mild redness present to rop of left foot and right lower leg above ankle.

## 2017-09-10 NOTE — H&P ADULT - PMH
Hypothyroidism (acquired)    Myelodysplastic syndrome    Myelofibrosis    Thrombocytosis  ANNMARIE 2 thrombocytosis

## 2017-09-10 NOTE — PROVIDER CONTACT NOTE (OTHER) - BACKGROUND
PT spoke with Dr. Olson - Pt to be held today due to pending dopplers. PT will follow up tomorrow. MD aware and in agreement.

## 2017-09-10 NOTE — ED PROVIDER NOTE - MEDICAL DECISION MAKING DETAILS
Pt with polyarthropathy, likely rheumatologic, however failing outpatient PO steroid therapy, and now in severe pain and inability to ambulate. Will require admission for pain control, steroids/nsaids, rheum work up. Mild leukocytosis, but pt has been on steroids and joints e not clinically infected.

## 2017-09-10 NOTE — H&P ADULT - NSHPPHYSICALEXAM_GEN_ALL_CORE
Vital Signs   T(C): 36.9 (10 Sep 2017 21:26), Max: 37.1 (10 Sep 2017 10:08)  T(F): 98.5 (10 Sep 2017 21:26), Max: 98.7 (10 Sep 2017 10:08)  HR: 99 (10 Sep 2017 21:26) (88 - 100)  BP: 154/84 (10 Sep 2017 21:26) (131/79 - 160/80)  RR: 18 (10 Sep 2017 21:26) (18 - 18)  SpO2: 97% (10 Sep 2017 21:26) (97% - 99%)  General: Elderly female sitting in bed comfortably. No acute distress. Looks weak.   HEENT: PERRLA. EOMI. Clear conjunctivae. Moist mucus membrane  Neck: Supple. No JVD. No Thyromegaly   Chest: CTA bilaterally - no wheezing, rales or rhonchi.   Heart: Normal S1 & S2 with RRR. No murmur.   Abdomen: Soft. Non-tender. Non-distended. + BS  Ext: 2 + pedal edema on right side and 1+ on left side. No calf tenderness. Hands swollen and warm. Decreased ROM. Feet/Ankles swollen, warm with areas of erythema on dorsum. Decreased ROM due to pain and swelling.  Neuro: AAO x 3, No focal deficit, CN II-XII grossly WNL and no speech disorder  Skin: Warm and Dry  Psychiatry: Normal mood and affect

## 2017-09-10 NOTE — ED STATDOCS - MEDICAL DECISION MAKING DETAILS
Protocol orders have been entered following a focused evaluation in intake. Pt to be placed in the main ED for further evaluation by another provider.

## 2017-09-10 NOTE — ED STATDOCS - PROGRESS NOTE DETAILS
86 y/o female with PMHx Myelodysplastic syndrome presents to the ED with c/o swelling to the bilateral hands, onset 2 days ago. Pt had blood transfusion 5 days ago at [facility]. Pt was evaluated 2 days ago for similar complaint, presents today with UE and LE pain, swelling, and redness. Protocol orders have been entered following a focused evaluation in intake. Pt to be placed in the main ED for further evaluation by another provider.

## 2017-09-10 NOTE — PROVIDER CONTACT NOTE (OTHER) - BACKGROUND
MD in agreement, reporting they will be performed urgently. PT to follow up later today pending results. MD aware and in agreement.

## 2017-09-10 NOTE — H&P ADULT - HISTORY OF PRESENT ILLNESS
87 years old female with PMH of Reji 2 thrombocytosis, Myelofibrosis and Hypothyroidism comes with pain and swelling in hands and ankles. As per patient, her symptoms started on Tuesday and are getting worse. She was seen by Ortho who prescribed Medrol Dose Pack which she finished last week. She was also seen here 2 days ago and was evaluated by Hem/Onc as well. She was recommended to continue steroids and follow up with Rheumatologist which she still has to get an appointment.   Pain is dull and aching 9-10/10 in intensity in both hands and feet. Swelling is getting worse and now she is unable to ambulate and do her activities of daily living. This morning she also noticed redness in her feet. Denies any rash anywhere in body. Denies stiffness in joints. Denies history of immunologic disorder in past. She has chronic pain in her right shoulder but there is no change in it.  Denies fever, night sweats, weight change, abdominal/chest pain, nausea, vomiting, cough, shortness of breath or headache. No recent travel, sick contacts or tick bites.   She was transfused on 9/6/17 for symptomatic anemia. Her symptoms started prior to transfusion.

## 2017-09-11 ENCOUNTER — APPOINTMENT (OUTPATIENT)
Dept: HEMATOLOGY ONCOLOGY | Facility: CLINIC | Age: 82
End: 2017-09-11

## 2017-09-11 LAB
ANION GAP SERPL CALC-SCNC: 12 MMOL/L — SIGNIFICANT CHANGE UP (ref 5–17)
ANISOCYTOSIS BLD QL: SLIGHT — SIGNIFICANT CHANGE UP
BUN SERPL-MCNC: 19 MG/DL — SIGNIFICANT CHANGE UP (ref 8–20)
CALCIUM SERPL-MCNC: 8.5 MG/DL — LOW (ref 8.6–10.2)
CHLORIDE SERPL-SCNC: 98 MMOL/L — SIGNIFICANT CHANGE UP (ref 98–107)
CO2 SERPL-SCNC: 27 MMOL/L — SIGNIFICANT CHANGE UP (ref 22–29)
CREAT SERPL-MCNC: 0.45 MG/DL — LOW (ref 0.5–1.3)
ELLIPTOCYTES BLD QL SMEAR: SLIGHT — SIGNIFICANT CHANGE UP
GLUCOSE SERPL-MCNC: 139 MG/DL — HIGH (ref 70–115)
HAV IGM SER-ACNC: SIGNIFICANT CHANGE UP
HBV CORE IGM SER-ACNC: SIGNIFICANT CHANGE UP
HBV SURFACE AG SER-ACNC: SIGNIFICANT CHANGE UP
HCT VFR BLD CALC: 26.8 % — LOW (ref 37–47)
HCV AB S/CO SERPL IA: 0.15 S/CO — SIGNIFICANT CHANGE UP
HCV AB SERPL-IMP: SIGNIFICANT CHANGE UP
HGB BLD-MCNC: 9 G/DL — LOW (ref 12–16)
HYPOCHROMIA BLD QL: SLIGHT — SIGNIFICANT CHANGE UP
LYMPHOCYTES # BLD AUTO: 10 % — LOW (ref 20–55)
MACROCYTES BLD QL: SLIGHT — SIGNIFICANT CHANGE UP
MAGNESIUM SERPL-MCNC: 2 MG/DL — SIGNIFICANT CHANGE UP (ref 1.6–2.6)
MCHC RBC-ENTMCNC: 31.6 PG — HIGH (ref 27–31)
MCHC RBC-ENTMCNC: 33.6 G/DL — SIGNIFICANT CHANGE UP (ref 32–36)
MCV RBC AUTO: 94 FL — SIGNIFICANT CHANGE UP (ref 81–99)
MONOCYTES NFR BLD AUTO: 45 % — HIGH (ref 3–10)
MYELOCYTES NFR BLD: 2 % — HIGH (ref 0–0)
NEUTROPHILS NFR BLD AUTO: 43 % — SIGNIFICANT CHANGE UP (ref 37–73)
PHOSPHATE SERPL-MCNC: 3.5 MG/DL — SIGNIFICANT CHANGE UP (ref 2.4–4.7)
PLAT MORPH BLD: NORMAL — SIGNIFICANT CHANGE UP
PLATELET # BLD AUTO: 270 K/UL — SIGNIFICANT CHANGE UP (ref 150–400)
POIKILOCYTOSIS BLD QL AUTO: SLIGHT — SIGNIFICANT CHANGE UP
POLYCHROMASIA BLD QL SMEAR: SLIGHT — SIGNIFICANT CHANGE UP
POTASSIUM SERPL-MCNC: 4.3 MMOL/L — SIGNIFICANT CHANGE UP (ref 3.5–5.3)
POTASSIUM SERPL-SCNC: 4.3 MMOL/L — SIGNIFICANT CHANGE UP (ref 3.5–5.3)
RBC # BLD: 2.85 M/UL — LOW (ref 4.4–5.2)
RBC # FLD: 18.8 % — HIGH (ref 11–15.6)
RBC BLD AUTO: ABNORMAL
SODIUM SERPL-SCNC: 137 MMOL/L — SIGNIFICANT CHANGE UP (ref 135–145)
TSH SERPL-MCNC: 2.45 UIU/ML — SIGNIFICANT CHANGE UP (ref 0.27–4.2)
WBC # BLD: 10.6 K/UL — SIGNIFICANT CHANGE UP (ref 4.8–10.8)
WBC # FLD AUTO: 10.6 K/UL — SIGNIFICANT CHANGE UP (ref 4.8–10.8)

## 2017-09-11 PROCEDURE — 99223 1ST HOSP IP/OBS HIGH 75: CPT

## 2017-09-11 PROCEDURE — 99233 SBSQ HOSP IP/OBS HIGH 50: CPT

## 2017-09-11 RX ORDER — OXYCODONE AND ACETAMINOPHEN 5; 325 MG/1; MG/1
1 TABLET ORAL EVERY 6 HOURS
Qty: 0 | Refills: 0 | Status: DISCONTINUED | OUTPATIENT
Start: 2017-09-11 | End: 2017-09-15

## 2017-09-11 RX ORDER — LISINOPRIL 2.5 MG/1
5 TABLET ORAL DAILY
Qty: 0 | Refills: 0 | Status: DISCONTINUED | OUTPATIENT
Start: 2017-09-11 | End: 2017-09-15

## 2017-09-11 RX ADMIN — Medication 400 MILLIGRAM(S): at 13:34

## 2017-09-11 RX ADMIN — LISINOPRIL 5 MILLIGRAM(S): 2.5 TABLET ORAL at 06:25

## 2017-09-11 RX ADMIN — OXYCODONE AND ACETAMINOPHEN 1 TABLET(S): 5; 325 TABLET ORAL at 18:05

## 2017-09-11 RX ADMIN — Medication 400 MILLIGRAM(S): at 12:34

## 2017-09-11 RX ADMIN — PANTOPRAZOLE SODIUM 40 MILLIGRAM(S): 20 TABLET, DELAYED RELEASE ORAL at 06:25

## 2017-09-11 RX ADMIN — HEPARIN SODIUM 5000 UNIT(S): 5000 INJECTION INTRAVENOUS; SUBCUTANEOUS at 06:24

## 2017-09-11 RX ADMIN — Medication 75 MICROGRAM(S): at 06:25

## 2017-09-11 RX ADMIN — Medication 400 MILLIGRAM(S): at 06:25

## 2017-09-11 RX ADMIN — OXYCODONE AND ACETAMINOPHEN 1 TABLET(S): 5; 325 TABLET ORAL at 19:05

## 2017-09-11 RX ADMIN — HEPARIN SODIUM 5000 UNIT(S): 5000 INJECTION INTRAVENOUS; SUBCUTANEOUS at 18:03

## 2017-09-11 NOTE — PHYSICAL THERAPY INITIAL EVALUATION ADULT - PERTINENT HX OF CURRENT PROBLEM, REHAB EVAL
Pt is an 86 y/o female who presented from home with c/o pain and swelling in hands and feet, inability to ambulate.

## 2017-09-11 NOTE — PROGRESS NOTE ADULT - PROBLEM SELECTOR PLAN 2
-acute inflammation and pain of joints in the hands and feet.  -Recommend rheumatology evaluation and pain control for inflammatory athropathy. Elevated CRP  -check uric acid level

## 2017-09-11 NOTE — PHYSICAL THERAPY INITIAL EVALUATION ADULT - ADDITIONAL COMMENTS
Pt lives in a house with 2 steps to enter, no railing, 5 steps to main level and 5 steps to bedroom with HR.

## 2017-09-11 NOTE — CONSULT NOTE ADULT - ASSESSMENT
87 year old female presents with severe pain and swelling in her B/L hands and feet.  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      - Agree w/ prednisone 40mg daily for now.  Will plan to taper once symptoms improve further.    - Check RF, CCP, FLAVIO.

## 2017-09-11 NOTE — PROGRESS NOTE ADULT - ASSESSMENT
87 years old female with PMH of Reji 2 thrombocytosis, Myelofibrosis and Hypothyroidism comes with pain and swelling in hands and ankles. As per patient, her symptoms started on Tuesday and are getting worse. She was seen by Ortho who prescribed Medrol Dose Pack which she finished last week. She was also seen here 2 days ago and was evaluated by Hem/Onc as well. She was recommended to continue steroids and follow up with Rheumatologist which she still has to get an appointment.   Pain is dull and aching 9-10/10 in intensity in both hands and feet. Swelling is getting worse and now she is unable to ambulate and do her activities of daily living. This morning she also noticed redness in her feet. Denies any rash anywhere in body. Denies stiffness in joints. Denies history of immunologic disorder in past. She has chronic pain in her right shoulder but there is no change in it.  Denies fever, night sweats, weight change, abdominal/chest pain, nausea, vomiting, cough, shortness of breath or headache. No recent travel, sick contacts or tick bites.   She was transfused on 9/6/17 for symptomatic anemia. Her symptoms started prior to transfusion.    1) Polyarthralgia  - No signs of infection  - Continue Prednisone 40 mg daily  - Motrin 400 mg q 6 hours  - Hepatitis Panel, Serology for Lyme and Parvovirus pending collection: lab called  - Rheumatology Consult called: Dr. Kleiner  - PT Carina-recommend RONAK  - GI Prophylaxis  - Accu checks   2) Hypokalemia  - replaced, monitor lytes  3) Myelofibrosis  - Patient is waiting to be started on Jakafi  - Outpatient follow up with Dr. Montes  4) Hypothyroidism  - Levothyroxine 75 mcg  5) Elevated BP without history of HTN  - Monitor BP.  started on Lisinopril  DVT Prophylaxis -- Heparin 5000 Units 87 years old female with PMH of Reji 2 thrombocytosis, Myelofibrosis and Hypothyroidism comes with pain and swelling in hands and ankles. As per patient, her symptoms started on Tuesday and are getting worse. She was seen by Ortho who prescribed Medrol Dose Pack which she finished last week. She was also seen here 2 days ago and was evaluated by Hem/Onc as well. She was recommended to continue steroids and follow up with Rheumatologist which she still has to get an appointment.   Pain is dull and aching 9-10/10 in intensity in both hands and feet. Swelling is getting worse and now she is unable to ambulate and do her activities of daily living. This morning she also noticed redness in her feet. Denies any rash anywhere in body. Denies stiffness in joints. Denies history of immunologic disorder in past. She has chronic pain in her right shoulder but there is no change in it.  Denies fever, night sweats, weight change, abdominal/chest pain, nausea, vomiting, cough, shortness of breath or headache. No recent travel, sick contacts or tick bites.   She was transfused on 9/6/17 for symptomatic anemia. Her symptoms started prior to transfusion.    1) Polyarthralgia  - No signs of infection  - Continue Prednisone 40 mg daily  - Motrin 400 mg q 6 hours  - Hepatitis Panel, Serology for Lyme and Parvovirus pending collection: lab called  - Rheumatology Consult called: Dr. Kleiner  - ARMANDO Lim-recommend RONAK  - GI Prophylaxis  - Accu checks   2) Hypokalemia  - replaced, monitor lytes  3) Myelofibrosis  - s/p transfusion   - monitor labs  - Patient is waiting to be started on Jakafi  - Outpatient follow up with Dr. Montes  4) Hypothyroidism  - Levothyroxine 75 mcg  5) Elevated BP without history of HTN  - Monitor BP.  started on Lisinopril  DVT Prophylaxis -- Heparin 5000 Units

## 2017-09-11 NOTE — PHYSICAL THERAPY INITIAL EVALUATION ADULT - ACTIVE RANGE OF MOTION EXAMINATION, REHAB EVAL
bilateral  lower extremity Active ROM was WFL (within functional limits)/deficits as listed below/unable to flex/extend R shoulder to 45deg flexion

## 2017-09-11 NOTE — CONSULT NOTE ADULT - SUBJECTIVE AND OBJECTIVE BOX
HISTORY OF PRESENT ILLNESS:    Patient is a 87y Female    HPI:  87 year old female with PMHx as listed below was in her USOH until about 1 week ago, when she began to experience pain in her neck and shoulders.  She saw ortho, who started her on a Medrol Dose pack, upon which her symptoms resolved.  However, upon completing the pack, she began to experience pan and swelling in her right hand.  She then began to experience similar symptoms in her other hand and in both feet.  The pain continued to worsen, so that she became unable to ambulate on her own.  She currently c/o pain and swelling diffusely throughout both of her hands and feet.  The pain is constant, though worse with activity.  She describes the pain as achy, 9 or 10 out of 10.  (+)AM stiffness, lasting several hours.  Pt was started on prednisone 40mg daily, now s/p first dose.  She reports she is beginning to feel better - she was able to ambulate with PT earlier today.    PAST MEDICAL & SURGICAL HISTORY:  Myelofibrosis  Thrombocytosis: ANNMARIE 2 thrombocytosis  Hypothyroidism (acquired)  Myelodysplastic syndrome  No significant past surgical history      ROS: No fever/chills, wt loss, night sweats, chest pain/dyspnea/cough, oral ulcers, rashes, alopecia, photosensitivity, dry eyes/dry mouth, Raynaud's, dysphagia, focal weakness, or eye symptoms.    MEDICATIONS  (STANDING):  pantoprazole    Tablet 40 milliGRAM(s) Oral before breakfast  levothyroxine 75 MICROGram(s) Oral daily  predniSONE   Tablet 40 milliGRAM(s) Oral daily  insulin lispro (HumaLOG) corrective regimen sliding scale   SubCutaneous Before meals and at bedtime  dextrose 5%. 1000 milliLiter(s) (50 mL/Hr) IV Continuous <Continuous>  dextrose 50% Injectable 12.5 Gram(s) IV Push once  dextrose 50% Injectable 25 Gram(s) IV Push once  dextrose 50% Injectable 25 Gram(s) IV Push once  heparin  Injectable 5000 Unit(s) SubCutaneous every 12 hours  lisinopril 5 milliGRAM(s) Oral daily    MEDICATIONS  (PRN):  dextrose Gel 1 Dose(s) Oral once PRN Blood Glucose LESS THAN 70 milliGRAM(s)/deciLiter  glucagon  Injectable 1 milliGRAM(s) IntraMuscular once PRN Glucose <70 milliGRAM(s)/deciLiter      Allergies    No Known Allergies      FAMILY HISTORY:  No pertinent family history in first degree relatives      SOCIAL HISTORY:  Tobacco--   none            Vital Signs Last 24 Hrs  T(C): 36.8 (11 Sep 2017 08:18), Max: 37 (10 Sep 2017 18:10)  T(F): 98.3 (11 Sep 2017 08:18), Max: 98.6 (10 Sep 2017 18:10)  HR: 99 (11 Sep 2017 08:18) (88 - 100)  BP: 171/97 (11 Sep 2017 08:18) (154/84 - 171/97)  BP(mean): --  RR: 18 (11 Sep 2017 08:18) (17 - 18)  SpO2: 98% (11 Sep 2017 08:18) (97% - 99%)    PHYSICAL EXAM:  General :  NAD  HEENT--  no oral ulcers  Nodes--   LAD  Lungs--  CTA B/L  Heart--  RRR, nlS1 &S2 normal;   Abdomen--  soft, NT, ND +BS  Skin:  no rashes  Musculoskeletal exam:  B/L hands and feet w/ diffuse pitting edema, warmth and areas of erythema                                     B/L shoulders w/ decreased ROM, marcelina abduction      LABS:                        9.0    10.6  )-----------( 270      ( 11 Sep 2017 06:09 )             26.8     09-11    137  |  98  |  19.0  ----------------------------<  139<H>  4.3   |  27.0  |  0.45<L>    Ca    8.5<L>      11 Sep 2017 06:07  Phos  3.5     09-11  Mg     2.0     09-11    TPro  7.0  /  Alb  2.8<L>  /  TBili  1.0  /  DBili  x   /  AST  36<H>  /  ALT  52<H>  /  AlkPhos  280<H>  09-10    PT/INR - ( 10 Sep 2017 12:21 )   PT: 15.3 sec;   INR: 1.38 ratio         PTT - ( 10 Sep 2017 12:21 )  PTT:26.6 sec    Sedimentation Rate, Erythrocyte (09.10.17 @ 12:21)    Sedimentation Rate, Erythrocyte: 59 mm/hr    C-Reactive Protein, Serum (09.10.17 @ 12:21)    C-Reactive Protein, Serum: 15.80 mg/dL          RADIOLOGY & ADDITIONAL STUDIES:  < from: Xray Wrist 3 Views, Bilateral (09.10.17 @ 12:09) >  EXAM:  WRIST-BILATERAL                          PROCEDURE DATE:  09/10/2017          INTERPRETATION:  Radiographs of the right wrist     CPT 72499    CLINICAL INFORMATION:        Right wrist pain of unknown severity x1 week.    TECHNIQUE:  Frontal, oblique and lateral views of the wrist were obtained.    FINDINGS:   No prior examinations are available for review.    No gross fracture or dislocation is seen. Diffuse osteopenia of the   visualized osseous structures is noted. There is no evidence for carpal   fusion. Ligamentous calcifications are seen in the carpus.      No opaque foreign body is seen.     IMPRESSION:   Osteopenia. No gross fracture or dislocation is seen.                Radiographs of the left wrist     CPT 62679    CLINICAL INFORMATION:        Left wrist pain of unknown severity x1 week.    TECHNIQUE:  Frontal, oblique and lateral views of the wrist were obtained.    FINDINGS:   No prior examinations are available for review.    No acute fracture or dislocation is seen. Diffuse osteopenia of the   visualized osseous structures is noted. There is no evidence for carpal   fusion. Ligamentous calcifications are seen within the carpus. No   radiopaque foreign body is seen.      IMPRESSION:   No acute fracture or dislocation is seen. Osteopenia.     < end of copied text >    < from: Xray Ankle Complete 3 Views, Right (09.10.17 @ 12:09) >  EXAM:  ANKLE-RIGHT                          PROCEDURE DATE:  09/10/2017          INTERPRETATION:  Radiographs of the right ankle     CPT 33375    CLINICAL INFORMATION:  Right ankle pain of unknown severity x1 week      TECHNIQUE:   Frontal, oblique and lateral views of the ankle were   obtained.    FINDINGS:   No prior examinations are available for review.    No gross fracture or dislocation is seen. Well-corticated 8 mm calcific   density is seen medial to and slightly inferior to the medialmalleolus   which may represent old avulsion fracture versus ligamentous   calcification. Mild bimalleolar soft tissue swelling is noted. The ankle   mortise appears intact. Superior calcaneal spurring is noted.        IMPRESSION:   No gross fractureor dislocation is seen. Mild bimalleolar   soft tissue swelling noted. Calcaneal spurring noted.       < end of copied text >    < from: Xray Hand 3 Views, Right (09.08.17 @ 07:50) >  EXAM:  HAND-RIGHT                          PROCEDURE DATE:  09/08/2017          INTERPRETATION:  Radiographs of the right hand         CLINICAL INFORMATION: H matter can swelling. TECHNIQUE:  Frontal, oblique   and lateral views of the hand were obtained.    FINDINGS:   No prior examinations are available for review.  There is osteopenia with a mild osteophytic narrowing of the distal   interphalangeal joints and the middle phalangeal joints without acute   fracture. There are periarticular calcifications of the second and third   metacarpophalangeal joints which may indicate CPPD disease. Carpal bones   and distal radius ulnar intact. There is calcification of the triangle   fibrocartilage. I there is diffuse soft tissue swelling of the right   hand.     IMPRESSION:   Osteoarthritis versus CPPD disease. Diffuse soft tissue   swelling. No fracture. No radiopaque foreign body.     < end of copied text >

## 2017-09-12 DIAGNOSIS — M13.0 POLYARTHRITIS, UNSPECIFIED: ICD-10-CM

## 2017-09-12 DIAGNOSIS — D75.81 MYELOFIBROSIS: ICD-10-CM

## 2017-09-12 LAB
ANION GAP SERPL CALC-SCNC: 13 MMOL/L — SIGNIFICANT CHANGE UP (ref 5–17)
BUN SERPL-MCNC: 20 MG/DL — SIGNIFICANT CHANGE UP (ref 8–20)
CALCIUM SERPL-MCNC: 8.2 MG/DL — LOW (ref 8.6–10.2)
CHLORIDE SERPL-SCNC: 92 MMOL/L — LOW (ref 98–107)
CO2 SERPL-SCNC: 26 MMOL/L — SIGNIFICANT CHANGE UP (ref 22–29)
CREAT SERPL-MCNC: 0.48 MG/DL — LOW (ref 0.5–1.3)
GLUCOSE SERPL-MCNC: 99 MG/DL — SIGNIFICANT CHANGE UP (ref 70–115)
HCT VFR BLD CALC: 25.4 % — LOW (ref 37–47)
HGB BLD-MCNC: 8.4 G/DL — LOW (ref 12–16)
INR BLD: 1.22 RATIO — HIGH (ref 0.88–1.16)
MAGNESIUM SERPL-MCNC: 1.8 MG/DL — SIGNIFICANT CHANGE UP (ref 1.6–2.6)
MCHC RBC-ENTMCNC: 31.9 PG — HIGH (ref 27–31)
MCHC RBC-ENTMCNC: 33.1 G/DL — SIGNIFICANT CHANGE UP (ref 32–36)
MCV RBC AUTO: 96.6 FL — SIGNIFICANT CHANGE UP (ref 81–99)
PHOSPHATE SERPL-MCNC: 3.1 MG/DL — SIGNIFICANT CHANGE UP (ref 2.4–4.7)
PLATELET # BLD AUTO: 303 K/UL — SIGNIFICANT CHANGE UP (ref 150–400)
POTASSIUM SERPL-MCNC: 3.8 MMOL/L — SIGNIFICANT CHANGE UP (ref 3.5–5.3)
POTASSIUM SERPL-SCNC: 3.8 MMOL/L — SIGNIFICANT CHANGE UP (ref 3.5–5.3)
PROTHROM AB SERPL-ACNC: 13.5 SEC — HIGH (ref 9.8–12.7)
RBC # BLD: 2.63 M/UL — LOW (ref 4.4–5.2)
RBC # FLD: 18.6 % — HIGH (ref 11–15.6)
RHEUMATOID FACT SERPL-ACNC: 18 IU/ML — HIGH (ref 0–13.9)
SODIUM SERPL-SCNC: 131 MMOL/L — LOW (ref 135–145)
WBC # BLD: 14 K/UL — HIGH (ref 4.8–10.8)
WBC # FLD AUTO: 14 K/UL — HIGH (ref 4.8–10.8)

## 2017-09-12 PROCEDURE — 99232 SBSQ HOSP IP/OBS MODERATE 35: CPT

## 2017-09-12 PROCEDURE — 99233 SBSQ HOSP IP/OBS HIGH 50: CPT

## 2017-09-12 RX ORDER — POLYETHYLENE GLYCOL 3350 17 G/17G
17 POWDER, FOR SOLUTION ORAL AT BEDTIME
Qty: 0 | Refills: 0 | Status: DISCONTINUED | OUTPATIENT
Start: 2017-09-12 | End: 2017-09-15

## 2017-09-12 RX ORDER — KETOROLAC TROMETHAMINE 30 MG/ML
15 SYRINGE (ML) INJECTION EVERY 6 HOURS
Qty: 0 | Refills: 0 | Status: DISCONTINUED | OUTPATIENT
Start: 2017-09-12 | End: 2017-09-13

## 2017-09-12 RX ORDER — MAGNESIUM HYDROXIDE 400 MG/1
30 TABLET, CHEWABLE ORAL DAILY
Qty: 0 | Refills: 0 | Status: DISCONTINUED | OUTPATIENT
Start: 2017-09-12 | End: 2017-09-15

## 2017-09-12 RX ORDER — DOCUSATE SODIUM 100 MG
100 CAPSULE ORAL THREE TIMES A DAY
Qty: 0 | Refills: 0 | Status: DISCONTINUED | OUTPATIENT
Start: 2017-09-12 | End: 2017-09-15

## 2017-09-12 RX ADMIN — LISINOPRIL 5 MILLIGRAM(S): 2.5 TABLET ORAL at 07:01

## 2017-09-12 RX ADMIN — Medication 15 MILLIGRAM(S): at 12:40

## 2017-09-12 RX ADMIN — Medication 100 MILLIGRAM(S): at 13:16

## 2017-09-12 RX ADMIN — OXYCODONE AND ACETAMINOPHEN 1 TABLET(S): 5; 325 TABLET ORAL at 02:57

## 2017-09-12 RX ADMIN — Medication 40 MILLIGRAM(S): at 05:35

## 2017-09-12 RX ADMIN — Medication 15 MILLIGRAM(S): at 11:49

## 2017-09-12 RX ADMIN — Medication 100 MILLIGRAM(S): at 21:51

## 2017-09-12 RX ADMIN — Medication 1: at 11:45

## 2017-09-12 RX ADMIN — Medication 15 MILLIGRAM(S): at 17:51

## 2017-09-12 RX ADMIN — POLYETHYLENE GLYCOL 3350 17 GRAM(S): 17 POWDER, FOR SOLUTION ORAL at 21:52

## 2017-09-12 RX ADMIN — HEPARIN SODIUM 5000 UNIT(S): 5000 INJECTION INTRAVENOUS; SUBCUTANEOUS at 05:36

## 2017-09-12 RX ADMIN — Medication 15 MILLIGRAM(S): at 08:58

## 2017-09-12 RX ADMIN — Medication 15 MILLIGRAM(S): at 18:06

## 2017-09-12 RX ADMIN — Medication 1: at 17:45

## 2017-09-12 RX ADMIN — Medication 15 MILLIGRAM(S): at 23:43

## 2017-09-12 RX ADMIN — HEPARIN SODIUM 5000 UNIT(S): 5000 INJECTION INTRAVENOUS; SUBCUTANEOUS at 17:46

## 2017-09-12 RX ADMIN — Medication 15 MILLIGRAM(S): at 09:46

## 2017-09-12 RX ADMIN — PANTOPRAZOLE SODIUM 40 MILLIGRAM(S): 20 TABLET, DELAYED RELEASE ORAL at 07:01

## 2017-09-12 RX ADMIN — OXYCODONE AND ACETAMINOPHEN 1 TABLET(S): 5; 325 TABLET ORAL at 03:57

## 2017-09-12 RX ADMIN — Medication 75 MICROGRAM(S): at 05:35

## 2017-09-12 NOTE — PROGRESS NOTE ADULT - ASSESSMENT
87 years old female with PMH of Reji 2 thrombocytosis, Myelofibrosis and Hypothyroidism comes with pain and swelling in hands and ankles. As per patient, her symptoms started on Tuesday and are getting worse. She was seen by Ortho who prescribed Medrol Dose Pack which she finished last week. She was also seen here 2 days ago and was evaluated by Hem/Onc as well. She was recommended to continue steroids and follow up with Rheumatologist which she still has to get an appointment.   Pain is dull and aching 9-10/10 in intensity in both hands and feet. Swelling is getting worse and now she is unable to ambulate and do her activities of daily living. This morning she also noticed redness in her feet. Denies any rash anywhere in body. Denies stiffness in joints. Denies history of immunologic disorder in past. She has chronic pain in her right shoulder but there is no change in it.  Denies fever, night sweats, weight change, abdominal/chest pain, nausea, vomiting, cough, shortness of breath or headache. No recent travel, sick contacts or tick bites.   She was transfused on 9/6/17 for symptomatic anemia. Her symptoms started prior to transfusion.    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 Kleiner, appreciate input, continue steroids  - PT Eval-recommend RONAK  - GI Prophylaxis  - Accu checks   2) Hypokalemia  - replaced, monitor lytes  3) Myelofibrosis  - s/p transfusion   - monitor labs  - Patient is waiting to be started on Jakafi  - Outpatient follow up with Dr. Montes  4) Hypothyroidism  - Levothyroxine 75 mcg  5) Elevated BP without history of HTN  - Monitor BP.  started on Lisinopril. BP improved  DVT Prophylaxis -- Heparin 5000 Units  6) Constipation- Bowel RX

## 2017-09-13 LAB
ANION GAP SERPL CALC-SCNC: 12 MMOL/L — SIGNIFICANT CHANGE UP (ref 5–17)
B BURGDOR C6 AB SER-ACNC: NEGATIVE — SIGNIFICANT CHANGE UP
B BURGDOR IGG+IGM SER-ACNC: 0.12 INDEX — SIGNIFICANT CHANGE UP (ref 0.01–0.89)
B19V IGG SER-ACNC: 7.3 INDEX — HIGH (ref 0–0.8)
B19V IGG+IGM SER-IMP: POSITIVE
B19V IGG+IGM SER-IMP: SIGNIFICANT CHANGE UP
B19V IGM FLD-ACNC: 0.3 INDEX — SIGNIFICANT CHANGE UP (ref 0–0.8)
B19V IGM SER-ACNC: NEGATIVE — SIGNIFICANT CHANGE UP
BUN SERPL-MCNC: 24 MG/DL — HIGH (ref 8–20)
CALCIUM SERPL-MCNC: 8.3 MG/DL — LOW (ref 8.6–10.2)
CCP IGG SERPL-ACNC: <8 UNITS — SIGNIFICANT CHANGE UP (ref 0–19)
CHLORIDE SERPL-SCNC: 96 MMOL/L — LOW (ref 98–107)
CO2 SERPL-SCNC: 27 MMOL/L — SIGNIFICANT CHANGE UP (ref 22–29)
CREAT SERPL-MCNC: 0.52 MG/DL — SIGNIFICANT CHANGE UP (ref 0.5–1.3)
GLUCOSE SERPL-MCNC: 95 MG/DL — SIGNIFICANT CHANGE UP (ref 70–115)
HCT VFR BLD CALC: 24.6 % — LOW (ref 37–47)
HGB BLD-MCNC: 8.1 G/DL — LOW (ref 12–16)
MAGNESIUM SERPL-MCNC: 1.9 MG/DL — SIGNIFICANT CHANGE UP (ref 1.6–2.6)
MCHC RBC-ENTMCNC: 31.5 PG — HIGH (ref 27–31)
MCHC RBC-ENTMCNC: 32.9 G/DL — SIGNIFICANT CHANGE UP (ref 32–36)
MCV RBC AUTO: 95.7 FL — SIGNIFICANT CHANGE UP (ref 81–99)
PHOSPHATE SERPL-MCNC: 3.4 MG/DL — SIGNIFICANT CHANGE UP (ref 2.4–4.7)
PLATELET # BLD AUTO: 302 K/UL — SIGNIFICANT CHANGE UP (ref 150–400)
POTASSIUM SERPL-MCNC: 3.8 MMOL/L — SIGNIFICANT CHANGE UP (ref 3.5–5.3)
POTASSIUM SERPL-SCNC: 3.8 MMOL/L — SIGNIFICANT CHANGE UP (ref 3.5–5.3)
RBC # BLD: 2.57 M/UL — LOW (ref 4.4–5.2)
RBC # FLD: 18.4 % — HIGH (ref 11–15.6)
RF+CCP IGG SER-IMP: NEGATIVE — SIGNIFICANT CHANGE UP
SODIUM SERPL-SCNC: 135 MMOL/L — SIGNIFICANT CHANGE UP (ref 135–145)
WBC # BLD: 11.1 K/UL — HIGH (ref 4.8–10.8)
WBC # FLD AUTO: 11.1 K/UL — HIGH (ref 4.8–10.8)

## 2017-09-13 PROCEDURE — 99233 SBSQ HOSP IP/OBS HIGH 50: CPT

## 2017-09-13 RX ORDER — WARFARIN SODIUM 2.5 MG/1
15 TABLET ORAL ONCE
Qty: 0 | Refills: 0 | Status: DISCONTINUED | OUTPATIENT
Start: 2017-09-13 | End: 2017-09-13

## 2017-09-13 RX ORDER — HYDROCORTISONE 1 %
1 OINTMENT (GRAM) TOPICAL DAILY
Qty: 0 | Refills: 0 | Status: DISCONTINUED | OUTPATIENT
Start: 2017-09-13 | End: 2017-09-15

## 2017-09-13 RX ADMIN — HEPARIN SODIUM 5000 UNIT(S): 5000 INJECTION INTRAVENOUS; SUBCUTANEOUS at 05:45

## 2017-09-13 RX ADMIN — Medication 1: at 11:31

## 2017-09-13 RX ADMIN — Medication 1 APPLICATION(S): at 17:35

## 2017-09-13 RX ADMIN — Medication 15 MILLIGRAM(S): at 05:45

## 2017-09-13 RX ADMIN — Medication 75 MICROGRAM(S): at 05:45

## 2017-09-13 RX ADMIN — Medication 100 MILLIGRAM(S): at 05:45

## 2017-09-13 RX ADMIN — Medication 40 MILLIGRAM(S): at 05:45

## 2017-09-13 RX ADMIN — LISINOPRIL 5 MILLIGRAM(S): 2.5 TABLET ORAL at 05:45

## 2017-09-13 RX ADMIN — PANTOPRAZOLE SODIUM 40 MILLIGRAM(S): 20 TABLET, DELAYED RELEASE ORAL at 05:45

## 2017-09-13 RX ADMIN — HEPARIN SODIUM 5000 UNIT(S): 5000 INJECTION INTRAVENOUS; SUBCUTANEOUS at 17:35

## 2017-09-13 NOTE — PROGRESS NOTE ADULT - ASSESSMENT
87 years old female with PMH of Reji 2 thrombocytosis, Myelofibrosis and Hypothyroidism comes with pain and swelling in hands and ankles. As per patient, her symptoms started on Tuesday and are getting worse. She was seen by Ortho who prescribed Medrol Dose Pack which she finished last week. She was also seen here 2 days ago and was evaluated by Hem/Onc as well. She was recommended to continue steroids and follow up with Rheumatologist which she still has to get an appointment.   Pain is dull and aching 9-10/10 in intensity in both hands and feet. Swelling is getting worse and now she is unable to ambulate and do her activities of daily living. This morning she also noticed redness in her feet. Denies any rash anywhere in body. Denies stiffness in joints. Denies history of immunologic disorder in past. She has chronic pain in her right shoulder but there is no change in it.  Denies fever, night sweats, weight change, abdominal/chest pain, nausea, vomiting, cough, shortness of breath or headache. No recent travel, sick contacts or tick bites.   She was transfused on 9/6/17 for symptomatic anemia. Her symptoms started prior to transfusion.    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 Rod,  appreciate input, continue steroids  - PT daily, patient ambulating in [place], wants to go home  - GI Prophylaxis  - Accu checks   2) Hypokalemia  - replaced, monitor lytes  3) Myelofibrosis  - Heme/Onc consult Dr. Mosqueda, input appreciated  - s/p transfusion   - monitor labs  - Patient is waiting to be started on Jakafi  - Outpatient follow up with Dr. Montes  4) Hypothyroidism  - Levothyroxine 75 mcg  5) Elevated BP without history of HTN  - Monitor BP.  started on Lisinopril. BP improved  DVT Prophylaxis -- Heparin 5000 Units  6) Constipation- Bowel RX, had BM yesterday

## 2017-09-14 ENCOUNTER — TRANSCRIPTION ENCOUNTER (OUTPATIENT)
Age: 82
End: 2017-09-14

## 2017-09-14 LAB
ANA PAT FLD IF-IMP: ABNORMAL
ANA TITR SER: ABNORMAL

## 2017-09-14 PROCEDURE — 99232 SBSQ HOSP IP/OBS MODERATE 35: CPT

## 2017-09-14 PROCEDURE — 99233 SBSQ HOSP IP/OBS HIGH 50: CPT

## 2017-09-14 PROCEDURE — 99222 1ST HOSP IP/OBS MODERATE 55: CPT | Mod: GC

## 2017-09-14 RX ORDER — LISINOPRIL 2.5 MG/1
1 TABLET ORAL
Qty: 0 | Refills: 0 | COMMUNITY
Start: 2017-09-14

## 2017-09-14 RX ORDER — LEVOTHYROXINE SODIUM 125 MCG
0.5 TABLET ORAL
Qty: 0 | Refills: 0 | COMMUNITY

## 2017-09-14 RX ORDER — HEPARIN SODIUM 5000 [USP'U]/ML
5000 INJECTION INTRAVENOUS; SUBCUTANEOUS
Qty: 0 | Refills: 0 | COMMUNITY
Start: 2017-09-14

## 2017-09-14 RX ORDER — LEVOTHYROXINE SODIUM 125 MCG
1 TABLET ORAL
Qty: 0 | Refills: 0 | COMMUNITY
Start: 2017-09-14

## 2017-09-14 RX ORDER — GABAPENTIN 400 MG/1
100 CAPSULE ORAL THREE TIMES A DAY
Qty: 0 | Refills: 0 | Status: DISCONTINUED | OUTPATIENT
Start: 2017-09-14 | End: 2017-09-14

## 2017-09-14 RX ORDER — IBUPROFEN 200 MG
1 TABLET ORAL
Qty: 0 | Refills: 0 | COMMUNITY

## 2017-09-14 RX ORDER — LEVOTHYROXINE SODIUM 125 MCG
1 TABLET ORAL
Qty: 15 | Refills: 0 | OUTPATIENT
Start: 2017-09-14 | End: 2017-10-14

## 2017-09-14 RX ORDER — HYDROCORTISONE 1 %
1 OINTMENT (GRAM) TOPICAL
Qty: 0 | Refills: 0 | COMMUNITY
Start: 2017-09-14

## 2017-09-14 RX ORDER — ACETAMINOPHEN 500 MG
2 TABLET ORAL
Qty: 0 | Refills: 0 | COMMUNITY
Start: 2017-09-14

## 2017-09-14 RX ORDER — LEVOTHYROXINE SODIUM 125 MCG
0.75 TABLET ORAL
Qty: 0 | Refills: 0 | COMMUNITY

## 2017-09-14 RX ORDER — ACETAMINOPHEN 500 MG
2 TABLET ORAL
Qty: 0 | Refills: 0 | COMMUNITY

## 2017-09-14 RX ORDER — GABAPENTIN 400 MG/1
100 CAPSULE ORAL ONCE
Qty: 0 | Refills: 0 | Status: COMPLETED | OUTPATIENT
Start: 2017-09-14 | End: 2017-09-14

## 2017-09-14 RX ORDER — PANTOPRAZOLE SODIUM 20 MG/1
1 TABLET, DELAYED RELEASE ORAL
Qty: 0 | Refills: 0 | COMMUNITY
Start: 2017-09-14

## 2017-09-14 RX ORDER — GABAPENTIN 400 MG/1
1 CAPSULE ORAL
Qty: 0 | Refills: 0 | COMMUNITY
Start: 2017-09-14

## 2017-09-14 RX ORDER — MAGNESIUM HYDROXIDE 400 MG/1
30 TABLET, CHEWABLE ORAL
Qty: 0 | Refills: 0 | COMMUNITY
Start: 2017-09-14

## 2017-09-14 RX ORDER — ACETAMINOPHEN 500 MG
650 TABLET ORAL EVERY 6 HOURS
Qty: 0 | Refills: 0 | Status: DISCONTINUED | OUTPATIENT
Start: 2017-09-14 | End: 2017-09-15

## 2017-09-14 RX ORDER — GABAPENTIN 400 MG/1
100 CAPSULE ORAL DAILY
Qty: 0 | Refills: 0 | Status: DISCONTINUED | OUTPATIENT
Start: 2017-09-15 | End: 2017-09-15

## 2017-09-14 RX ORDER — DOCUSATE SODIUM 100 MG
1 CAPSULE ORAL
Qty: 0 | Refills: 0 | COMMUNITY
Start: 2017-09-14

## 2017-09-14 RX ORDER — POLYETHYLENE GLYCOL 3350 17 G/17G
17 POWDER, FOR SOLUTION ORAL
Qty: 0 | Refills: 0 | COMMUNITY
Start: 2017-09-14

## 2017-09-14 RX ADMIN — LISINOPRIL 5 MILLIGRAM(S): 2.5 TABLET ORAL at 04:23

## 2017-09-14 RX ADMIN — Medication 650 MILLIGRAM(S): at 04:22

## 2017-09-14 RX ADMIN — HEPARIN SODIUM 5000 UNIT(S): 5000 INJECTION INTRAVENOUS; SUBCUTANEOUS at 17:37

## 2017-09-14 RX ADMIN — Medication 100 MILLIGRAM(S): at 22:14

## 2017-09-14 RX ADMIN — Medication 650 MILLIGRAM(S): at 22:15

## 2017-09-14 RX ADMIN — Medication 100 MILLIGRAM(S): at 15:24

## 2017-09-14 RX ADMIN — GABAPENTIN 100 MILLIGRAM(S): 400 CAPSULE ORAL at 09:13

## 2017-09-14 RX ADMIN — POLYETHYLENE GLYCOL 3350 17 GRAM(S): 17 POWDER, FOR SOLUTION ORAL at 22:14

## 2017-09-14 RX ADMIN — Medication 650 MILLIGRAM(S): at 05:00

## 2017-09-14 RX ADMIN — HEPARIN SODIUM 5000 UNIT(S): 5000 INJECTION INTRAVENOUS; SUBCUTANEOUS at 06:16

## 2017-09-14 RX ADMIN — PANTOPRAZOLE SODIUM 40 MILLIGRAM(S): 20 TABLET, DELAYED RELEASE ORAL at 06:16

## 2017-09-14 RX ADMIN — Medication 40 MILLIGRAM(S): at 04:26

## 2017-09-14 RX ADMIN — Medication 75 MICROGRAM(S): at 04:24

## 2017-09-14 RX ADMIN — Medication 100 MILLIGRAM(S): at 04:24

## 2017-09-14 RX ADMIN — Medication 650 MILLIGRAM(S): at 22:16

## 2017-09-14 NOTE — PROGRESS NOTE ADULT - ASSESSMENT
86 yo WF with a known history of Reji 2+ thrombocytosis, treated since 2013 with Hydrea, which was discontinued recently due to pancytopenia. recent bone marrow showed 5% blasts withmoderate reticulin fibrosis. Now with myelofibrosis awaiting to begin Jakafi. She's in ED for joint pain and swelling of ankles and wrists.  Recommend rheumatology evaluation and pain control for inflammatory athropathy.  follow up with Dr. Montes as an outpatient.

## 2017-09-14 NOTE — DISCHARGE NOTE ADULT - PROVIDER TOKENS
TOKEN:'[id number]' TOKEN:'[id number]',FREE:[LAST:[Dr. Elliott PMD],PHONE:[(   )    -],FAX:[(   )    -]],TOKEN:'[id number]'

## 2017-09-14 NOTE — PROGRESS NOTE ADULT - ASSESSMENT
87 years old female with PMH of Reji 2 thrombocytosis, Myelofibrosis and Hypothyroidism comes with pain and swelling in hands and ankles. As per patient, her symptoms started on Tuesday and are getting worse. She was seen by Ortho who prescribed Medrol Dose Pack which she finished last week. She was also seen here 2 days ago and was evaluated by Hem/Onc as well. She was recommended to continue steroids and follow up with Rheumatologist which she still has to get an appointment.   Pain is dull and aching 9-10/10 in intensity in both hands and feet. Swelling is getting worse and now she is unable to ambulate and do her activities of daily living. This morning she also noticed redness in her feet. Denies any rash anywhere in body. Denies stiffness in joints. Denies history of immunologic disorder in past. She has chronic pain in her right shoulder but there is no change in it.  Denies fever, night sweats, weight change, abdominal/chest pain, nausea, vomiting, cough, shortness of breath or headache. No recent travel, sick contacts or tick bites.   She was transfused on 9/6/17 for symptomatic anemia. Her symptoms started prior to transfusion.    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 Rod,  appreciate input, continue steroids  - PT daily, PM&R eval requested  - GI Prophylaxis  - Accu checks dc, patient BG <150  2) Hypokalemia  - replaced, monitor lytes  3) Myelofibrosis  - Heme/Onc consult Dr. Mosqeuda, input appreciated  - s/p transfusion   - monitor labs  - Patient is waiting to be started on Jakafi  - Outpatient follow up with Dr. Montes  4) Hypothyroidism  - Levothyroxine 75 mcg  5) Elevated BP without history of HTN  - Monitor BP.  started on Lisinopril. BP improved  DVT Prophylaxis -- Heparin 5000 Units  6) Constipation- Bowel RX

## 2017-09-14 NOTE — CONSULT NOTE ADULT - SUBJECTIVE AND OBJECTIVE BOX
Patient is a 87y old  Female who presents with a chief complaint of pain and swelling in wrists and ankles. Patient was seen and examined at bedside. Pt c/o nerve pain B/L hands last night causing problem sleeping. Walked well yesterday with PT, however is hampered by pain this am.     HPI: 88 y/o F with PMH of Reji 2 thrombocytosis, Myelofibrosis, Seronegative Symmetrical Synovitis and Hypothyroidism presented to [facility] with pain and swelling in hands and ankles. As per patient, her symptoms started on Tuesday and were getting worse. She was seen by Ortho who prescribed a medrol dose pack which she finished last week. She was also seen and evaluated by Hem/Onc as well this week. Pt was to f/u with Rheum.   On hospital admission, her pain is dull and aching 9-10/10 in intensity in both hands and feet. Denies any rash anywhere in body. Denies stiffness in joints. Denies history of immunologic disorder in past. She has chronic pain in her right shoulder. She was transfused on 9/6/17 for symptomatic anemia. Her symptoms started prior to transfusion. Pt is currently on steroids and PMR was consulted.      REVIEW OF SYSTEMS  Constitutional - No fever, No weight loss, +fatigue  HEENT - No eye pain, No visual disturbances, No difficulty hearing  Respiratory - No cough, No wheezing, No shortness of breath  Cardiovascular - No chest pain, No palpitations  Gastrointestinal - No abdominal pain, No nausea, No vomiting, No diarrhea, No constipation  Genitourinary - No dysuria, No frequency, No hematuria, No incontinence  Neurological - No headaches, No memory loss, No loss of strength, No numbness, No tremors  Skin - No itching, No rashes, No lesions   Musculoskeletal - +joint pain, +joint swelling, +muscle pain  Psychiatric - No depression, No anxiety    PAST MEDICAL & SURGICAL HISTORY  Myelofibrosis  Thrombocytosis  Hypothyroidism  Myelodysplastic syndrome    SOCIAL HISTORY  Smoking - Former  EtOH - Rarely wine  Drugs - Denied    FUNCTIONAL HISTORY  Lives alone in a house with 2 steps to enter, no railing, 5 steps to main level and 5 steps to bedroom with HR,   Independent    CURRENT FUNCTIONAL STATUS  Min assist, RW 20ft (9/11)     FAMILY HISTORY   No pertinent family history in first degree relatives      RECENT LABS/IMAGING  CBC Full  -  ( 13 Sep 2017 06:42 )  WBC Count : 11.1 K/uL  Hemoglobin : 8.1 g/dL  Hematocrit : 24.6 %  Platelet Count - Automated : 302 K/uL  Mean Cell Volume : 95.7 fl  Mean Cell Hemoglobin : 31.5 pg  Mean Cell Hemoglobin Concentration : 32.9 g/dL  Auto Neutrophil # : x  Auto Lymphocyte # : x  Auto Monocyte # : x  Auto Eosinophil # : x  Auto Basophil # : x  Auto Neutrophil % : x  Auto Lymphocyte % : x  Auto Monocyte % : x  Auto Eosinophil % : x  Auto Basophil % : x    09-13    135  |  96<L>  |  24.0<H>  ----------------------------<  95  3.8   |  27.0  |  0.52    Ca    8.3<L>      13 Sep 2017 06:42  Phos  3.4     09-13  Mg     1.9     09-13          VITALS  T(C): 37.1 (09-14-17 @ 07:48), Max: 37.3 (09-13-17 @ 23:59)  HR: 97 (09-14-17 @ 07:48) (97 - 106)  BP: 148/84 (09-14-17 @ 07:48) (109/57 - 157/83)  RR: 18 (09-14-17 @ 07:48) (18 - 18)  SpO2: 97% (09-14-17 @ 07:48) (96% - 97%)  Wt(kg): --    ALLERGIES  No Known Allergies      MEDICATIONS   pantoprazole    Tablet 40 milliGRAM(s) Oral before breakfast  levothyroxine 75 MICROGram(s) Oral daily  predniSONE   Tablet 40 milliGRAM(s) Oral daily  dextrose 5%. 1000 milliLiter(s) IV Continuous <Continuous>  dextrose Gel 1 Dose(s) Oral once PRN  dextrose 50% Injectable 12.5 Gram(s) IV Push once  dextrose 50% Injectable 25 Gram(s) IV Push once  glucagon  Injectable 1 milliGRAM(s) IntraMuscular once PRN  heparin  Injectable 5000 Unit(s) SubCutaneous every 12 hours  lisinopril 5 milliGRAM(s) Oral daily  oxyCODONE    5 mG/acetaminophen 325 mG 1 Tablet(s) Oral every 6 hours PRN  docusate sodium 100 milliGRAM(s) Oral three times a day  polyethylene glycol 3350 17 Gram(s) Oral at bedtime  magnesium hydroxide Suspension 30 milliLiter(s) Oral daily PRN  hydrocortisone 2.5% Rectal Cream 1 Application(s) Rectal daily  acetaminophen   Tablet. 650 milliGRAM(s) Oral every 6 hours PRN Patient is a 87y old  Female who presents with a chief complaint of pain and swelling in wrists and ankles. Patient was seen and examined at bedside. Pt c/o nerve pain  in b/l hands last night causing pt to not sleep much. Pt walked well yesterday with PT, however declined PT today due to tiredness and lack of sleep. Currently, pt has no pain in her hands at this moment. Pt hemodynamically stable and has no other complaints. Pt would like to be discharged to a rehab facility because "she does not feel safe home alone at her current condition."    HPI: 88 y/o F with PMH of Reji 2 thrombocytosis, Myelofibrosis, Seronegative Symmetrical Synovitis and Hypothyroidism presented to [facility] with pain and swelling in hands and ankles. As per patient, her symptoms started on Tuesday and were getting worse. She was seen by Ortho who prescribed a medrol dose pack which she finished last week. She was also seen and evaluated by Hem/Onc as well this week. Pt was to f/u with Rheum.   On hospital admission, her pain is dull and aching 9-10/10 in intensity in both hands and feet. Denies any rash anywhere in body. Denies stiffness in joints. Denies history of immunologic disorder in past. She has chronic pain in her right shoulder. She was transfused on 9/6/17 for symptomatic anemia. Her symptoms started prior to transfusion. BP was elevated and patient was started on Lisinopril. Pt is currently on steroids and PMR was consulted.      REVIEW OF SYSTEMS  Constitutional - No fever, No weight loss, +fatigue  HEENT - No eye pain, No visual disturbances, No difficulty hearing  Respiratory - No cough, No wheezing, No shortness of breath  Cardiovascular - No chest pain, No palpitations  Gastrointestinal - No abdominal pain, No nausea, No vomiting, No diarrhea, No constipation  Genitourinary - No dysuria, No frequency, No hematuria, No incontinence  Neurological - No headaches, No memory loss, No loss of strength, No numbness, No tremors  Skin - No itching, No rashes, No lesions   Musculoskeletal - +joint pain, +joint swelling, +muscle pain  Psychiatric - No depression, No anxiety    PAST MEDICAL & SURGICAL HISTORY  Myelofibrosis  Thrombocytosis  Hypothyroidism  Myelodysplastic syndrome    SOCIAL HISTORY  Smoking - Former  EtOH - Rarely wine  Drugs - Denied    FUNCTIONAL HISTORY  Lives alone in a house with 2 steps to enter, no railing, 5 steps to main level and 5 steps to bedroom with HR,   Independent    CURRENT FUNCTIONAL STATUS  Min assist, RW 20ft (9/11)     FAMILY HISTORY   No pertinent family history in first degree relatives      RECENT LABS/IMAGING  CBC Full  -  ( 13 Sep 2017 06:42 )  WBC Count : 11.1 K/uL  Hemoglobin : 8.1 g/dL  Hematocrit : 24.6 %  Platelet Count - Automated : 302 K/uL  Mean Cell Volume : 95.7 fl  Mean Cell Hemoglobin : 31.5 pg  Mean Cell Hemoglobin Concentration : 32.9 g/dL  Auto Neutrophil # : x  Auto Lymphocyte # : x  Auto Monocyte # : x  Auto Eosinophil # : x  Auto Basophil # : x  Auto Neutrophil % : x  Auto Lymphocyte % : x  Auto Monocyte % : x  Auto Eosinophil % : x  Auto Basophil % : x    09-13    135  |  96<L>  |  24.0<H>  ----------------------------<  95  3.8   |  27.0  |  0.52    Ca    8.3<L>      13 Sep 2017 06:42  Phos  3.4     09-13  Mg     1.9     09-13          VITALS  T(C): 37.1 (09-14-17 @ 07:48), Max: 37.3 (09-13-17 @ 23:59)  HR: 97 (09-14-17 @ 07:48) (97 - 106)  BP: 148/84 (09-14-17 @ 07:48) (109/57 - 157/83)  RR: 18 (09-14-17 @ 07:48) (18 - 18)  SpO2: 97% (09-14-17 @ 07:48) (96% - 97%)  Wt(kg): --    ALLERGIES  No Known Allergies      MEDICATIONS   pantoprazole    Tablet 40 milliGRAM(s) Oral before breakfast  levothyroxine 75 MICROGram(s) Oral daily  predniSONE   Tablet 40 milliGRAM(s) Oral daily  dextrose 5%. 1000 milliLiter(s) IV Continuous <Continuous>  dextrose Gel 1 Dose(s) Oral once PRN  dextrose 50% Injectable 12.5 Gram(s) IV Push once  dextrose 50% Injectable 25 Gram(s) IV Push once  glucagon  Injectable 1 milliGRAM(s) IntraMuscular once PRN  heparin  Injectable 5000 Unit(s) SubCutaneous every 12 hours  lisinopril 5 milliGRAM(s) Oral daily  oxyCODONE    5 mG/acetaminophen 325 mG 1 Tablet(s) Oral every 6 hours PRN  docusate sodium 100 milliGRAM(s) Oral three times a day  polyethylene glycol 3350 17 Gram(s) Oral at bedtime  magnesium hydroxide Suspension 30 milliLiter(s) Oral daily PRN  hydrocortisone 2.5% Rectal Cream 1 Application(s) Rectal daily  acetaminophen   Tablet. 650 milliGRAM(s) Oral every 6 hours PRN  ----------------------------------------------------------------------------------------  PHYSICAL EXAM  Constitutional - NAD, Comfortable, lethargic  HEENT - NCAT, EOMI  Neck - Supple, No limited ROM  Chest - CTA bilaterally, No wheeze, No rhonchi, No crackles  Cardiovascular - RRR, S1S2, No murmurs  Abdomen - BS+, Soft, NTND  Extremities - No C/C/E, No calf tenderness, trace edema b/l hands   Neurologic Exam -                    Cognitive - Awake, Alert, AAO to self, place, date, year, situation     Communication - Fluent, No dysarthria     Cranial Nerves - CN 2-12 intact     Motor - No focal deficits                    LEFT    UE - ShAB 4/5, EF 5/5, EE 5/5, WE 4/5,  3/5                    RIGHT UE - ShAB 4/5, EF 5/5, EE 5/5, WE 4/5,  3/5                    LEFT    LE - HF 5/5, KE 5/5, DF 5/5, PF 5/5                    RIGHT LE - HF 5/5, KE 5/5, DF 5/5, PF 5/5        Sensory - Intact to LT     Reflexes - DTR Intact, No primitive reflexive     OculoVestibular - No saccades, No nystagmus  Psychiatric - Mood stable, Affect WNL  ----------------------------------------------------------------------------------------  ASSESSMENT/PLAN  87 years old female with PMHx of Reji 2 thrombocytosis, Myelofibrosis and Hypothyroidism admitted to [facility] for Polyarthralgia 2/2 to Remitting Seronegative Symmetrical Synovitis.  Rehab -Pt would benefit from ACUTE inpatient rehabilitation vs [facility] for the functional deficits consisting of 3 hours of therapy/day & 24 hour RN/daily PMR physician for comorbid medical management. Pt would like to be d/c to a rehab facility once medically stable because pt does not feel safe at home alone in her current medical condition. Pt continues to have intermittent severe pain in b/l hands/ Will continue to follow for ongoing rehab needs and recommendations. Patient is a 87y old  Female who presents with a chief complaint of pain and swelling in wrists and ankles. Patient was seen and examined at bedside. Pt c/o nerve pain  in b/l hands last night causing pt to not sleep much. Pt walked well yesterday with PT, however declined PT today due to tiredness and lack of sleep. Currently, pt has no pain in her hands at this moment. Pt hemodynamically stable and has no other complaints. Pt would like to be discharged to a rehab facility because "she does not feel safe home alone at her current condition."    HPI: 88 y/o F with PMH of Reji 2 thrombocytosis, Myelofibrosis, Seronegative Symmetrical Synovitis and Hypothyroidism presented to [facility] with pain and swelling in hands and ankles. As per patient, her symptoms started on Tuesday and were getting worse. She was seen by Ortho who prescribed a medrol dose pack which she finished last week. She was also seen and evaluated by Hem/Onc as well this week. Pt was to f/u with Rheum. On hospital admission, her pain is dull and aching 9-10/10 in intensity in both hands and feet. Denies any rash anywhere in body. Denies stiffness in joints. Denies history of immunologic disorder in past. She has chronic pain in her right shoulder that was examined by a private ortho prior to hospital admission. She was transfused on 9/6/17 for symptomatic anemia. Her symptoms started prior to transfusion. BP was elevated and patient was started on Lisinopril. Pt is currently on steroids and PMR was consulted.      REVIEW OF SYSTEMS  Constitutional - No fever, No weight loss, +fatigue  HEENT - No eye pain, No visual disturbances, No difficulty hearing  Respiratory - No cough, No wheezing, No shortness of breath  Cardiovascular - No chest pain, No palpitations  Gastrointestinal - No abdominal pain, No nausea, No vomiting, No diarrhea, No constipation  Genitourinary - No dysuria, No frequency, No hematuria, No incontinence  Neurological - No headaches, No memory loss, No loss of strength, No numbness, No tremors  Skin - No itching, No rashes, No lesions   Musculoskeletal - +joint pain, +joint swelling, +muscle pain  Psychiatric - + depression, No anxiety    PAST MEDICAL & SURGICAL HISTORY  Myelofibrosis  Thrombocytosis  Hypothyroidism  Myelodysplastic syndrome    SOCIAL HISTORY  Smoking - Former  EtOH - Rarely wine  Drugs - Denied    FUNCTIONAL HISTORY  Lives alone in a house with 2 steps to enter, no railing, 5 steps to main level and 5 steps to bedroom with HR,   Independent    CURRENT FUNCTIONAL STATUS  Min assist, RW 20ft (9/11)     FAMILY HISTORY   No pertinent family history in first degree relatives      RECENT LABS/IMAGING  CBC Full  -  ( 13 Sep 2017 06:42 )  WBC Count : 11.1 K/uL  Hemoglobin : 8.1 g/dL  Hematocrit : 24.6 %  Platelet Count - Automated : 302 K/uL  Mean Cell Volume : 95.7 fl  Mean Cell Hemoglobin : 31.5 pg  Mean Cell Hemoglobin Concentration : 32.9 g/dL  Auto Neutrophil # : x  Auto Lymphocyte # : x  Auto Monocyte # : x  Auto Eosinophil # : x  Auto Basophil # : x  Auto Neutrophil % : x  Auto Lymphocyte % : x  Auto Monocyte % : x  Auto Eosinophil % : x  Auto Basophil % : x    09-13    135  |  96<L>  |  24.0<H>  ----------------------------<  95  3.8   |  27.0  |  0.52    Ca    8.3<L>      13 Sep 2017 06:42  Phos  3.4     09-13  Mg     1.9     09-13          VITALS  T(C): 37.1 (09-14-17 @ 07:48), Max: 37.3 (09-13-17 @ 23:59)  HR: 97 (09-14-17 @ 07:48) (97 - 106)  BP: 148/84 (09-14-17 @ 07:48) (109/57 - 157/83)  RR: 18 (09-14-17 @ 07:48) (18 - 18)  SpO2: 97% (09-14-17 @ 07:48) (96% - 97%)  Wt(kg): --    ALLERGIES  No Known Allergies      MEDICATIONS   pantoprazole    Tablet 40 milliGRAM(s) Oral before breakfast  levothyroxine 75 MICROGram(s) Oral daily  predniSONE   Tablet 40 milliGRAM(s) Oral daily  dextrose 5%. 1000 milliLiter(s) IV Continuous <Continuous>  dextrose Gel 1 Dose(s) Oral once PRN  dextrose 50% Injectable 12.5 Gram(s) IV Push once  dextrose 50% Injectable 25 Gram(s) IV Push once  glucagon  Injectable 1 milliGRAM(s) IntraMuscular once PRN  heparin  Injectable 5000 Unit(s) SubCutaneous every 12 hours  lisinopril 5 milliGRAM(s) Oral daily  oxyCODONE    5 mG/acetaminophen 325 mG 1 Tablet(s) Oral every 6 hours PRN  docusate sodium 100 milliGRAM(s) Oral three times a day  polyethylene glycol 3350 17 Gram(s) Oral at bedtime  magnesium hydroxide Suspension 30 milliLiter(s) Oral daily PRN  hydrocortisone 2.5% Rectal Cream 1 Application(s) Rectal daily  acetaminophen   Tablet. 650 milliGRAM(s) Oral every 6 hours PRN  ----------------------------------------------------------------------------------------  PHYSICAL EXAM  Constitutional - NAD, Comfortable, lethargic  HEENT - NCAT, EOMI  Neck - Supple, No limited ROM  Chest - CTA bilaterally, No wheeze, No rhonchi, No crackles  Cardiovascular - RRR, S1S2, No murmurs  Abdomen - BS+, Soft, NTND  Extremities - No C/C/E, No calf tenderness, trace edema b/l hands   Neurologic Exam -                    Cognitive - Awake, Alert, AAO to self, place, date, year, situation     Communication - Fluent, No dysarthria     Cranial Nerves - CN 2-12 intact     Motor - No focal deficits                    LEFT    UE - ShAB 3/5 (delayed shoulder movement), EF 4/5, EE 4/5, WE 4/5,  3/5                    RIGHT UE - ShAB 3/5  (delayed shoulder movement), EF 4/5, EE 4/5, WE 4/5,  3/5                    LEFT    LE - HF 5/5, KE 5/5, DF 5/5, PF 5/5                    RIGHT LE - HF 5/5, KE 5/5, DF 5/5, PF 5/5        Sensory - Intact to LT     Reflexes - DTR Intact, No primitive reflexive     OculoVestibular - No saccades, No nystagmus  Psychiatric - Mood stable  ----------------------------------------------------------------------------------------  ASSESSMENT/PLAN  87 years old female with PMHx of Reji 2 thrombocytosis, Myelofibrosis and Hypothyroidism admitted to [facility] for Polyarthralgia 2/2 to Remitting Seronegative Symmetrical Synovitis vs Polymyalgia rheumatica.  Rehab -Pt would benefit from ACUTE inpatient rehabilitation once medically stable for functional deficits consisting of 3 hours of therapy/day & 24 hour RN/daily PMR physician for comorbid medical management. Pt would like to be d/c to a rehab facility once medically stable because pt does not feel safe at home alone in her current medical condition. Pt continues to have intermittent severe pain in b/l hands. Will continue to follow for ongoing rehab needs due to polyarthralgia flare-up 2/2 rheumatological etiology. CC: Rehab evaluation for this 87 year old female with arthralgia limiting mobility    Patient is a 87y old  Female who presents with a chief complaint of pain and swelling in wrists and ankles. Patient was seen and examined at bedside. Pt c/o nerve pain  in b/l hands last night causing pt to not sleep much. Pt walked well yesterday with PT, however declined PT today due to tiredness and lack of sleep. Currently, pt has no pain in her hands at this moment. Pt hemodynamically stable and has no other complaints.    HPI: 88 y/o F with PMH of Reji 2 thrombocytosis, Myelofibrosis, and Hypothyroidism presented to [facility] with pain and swelling in hands and ankles. As per patient, her symptoms started on Tuesday and were getting worse. She was seen by Ortho who prescribed a medrol dose pack which she finished last week. She was also seen and evaluated by Hem/Onc as well this week. Pt was to f/u with Rheum. She has chronic pain in her right shoulder that was examined by a private ortho prior to hospital admission. She was transfused on 9/6/17 for symptomatic anemia.  Presented to the hospital with c/o acute joint pain in her hands and feet and swelling and redness. Her symptoms started after transfusion. BP was elevated and patient was started on Lisinopril. Pt is currently on steroids and PMR was consulted.      REVIEW OF SYSTEMS  Constitutional - , +fatigue  HEENT - No eye pain,  Respiratory - No cough,   Cardiovascular - No chest pain, No palpitations  Gastrointestinal - No abdominal pain,   Genitourinary - No dysuria,  Neurological - No headaches,   Skin - No itching,   Musculoskeletal - +joint pain, +joint swelling, +muscle pain  Psychiatric -no anxiety    PAST MEDICAL & SURGICAL HISTORY  Myelofibrosis  Thrombocytosis  Hypothyroidism  Myelodysplastic syndrome    SOCIAL HISTORY  Smoking - Former  EtOH - Rarely wine  Drugs - Denied    FUNCTIONAL HISTORY  Lives alone in a house with 2 steps to enter, no railing, 5 steps to main level and 5 steps to bedroom with HR,   Independent    CURRENT FUNCTIONAL STATUS  Min assist, RW 20ft (9/11)     FAMILY HISTORY   No pertinent family history in first degree relatives      RECENT LABS/IMAGING  CBC Full  -  ( 13 Sep 2017 06:42 )  WBC Count : 11.1 K/uL  Hemoglobin : 8.1 g/dL  Hematocrit : 24.6 %  Platelet Count - Automated : 302 K/uL  Mean Cell Volume : 95.7 fl  Mean Cell Hemoglobin : 31.5 pg  Mean Cell Hemoglobin Concentration : 32.9 g/dL  Auto Neutrophil # : x  Auto Lymphocyte # : x  Auto Monocyte # : x  Auto Eosinophil # : x  Auto Basophil # : x  Auto Neutrophil % : x  Auto Lymphocyte % : x  Auto Monocyte % : x  Auto Eosinophil % : x  Auto Basophil % : x    09-13    135  |  96<L>  |  24.0<H>  ----------------------------<  95  3.8   |  27.0  |  0.52    Ca    8.3<L>      13 Sep 2017 06:42  Phos  3.4     09-13  Mg     1.9     09-13      ALLERGIES  No Known Allergies      MEDICATIONS   pantoprazole    Tablet 40 milliGRAM(s) Oral before breakfast  levothyroxine 75 MICROGram(s) Oral daily  predniSONE   Tablet 40 milliGRAM(s) Oral daily  dextrose 5%. 1000 milliLiter(s) IV Continuous <Continuous>  dextrose Gel 1 Dose(s) Oral once PRN  dextrose 50% Injectable 12.5 Gram(s) IV Push once  dextrose 50% Injectable 25 Gram(s) IV Push once  glucagon  Injectable 1 milliGRAM(s) IntraMuscular once PRN  heparin  Injectable 5000 Unit(s) SubCutaneous every 12 hours  lisinopril 5 milliGRAM(s) Oral daily  oxyCODONE    5 mG/acetaminophen 325 mG 1 Tablet(s) Oral every 6 hours PRN  docusate sodium 100 milliGRAM(s) Oral three times a day  polyethylene glycol 3350 17 Gram(s) Oral at bedtime  magnesium hydroxide Suspension 30 milliLiter(s) Oral daily PRN  hydrocortisone 2.5% Rectal Cream 1 Application(s) Rectal daily  acetaminophen   Tablet. 650 milliGRAM(s) Oral every 6 hours PRN  ----------------------------------------------------------------------------------------  Vital Signs Last 24 Hrs  T(C): 37.1 (14 Sep 2017 07:48), Max: 37.3 (13 Sep 2017 23:59)  T(F): 98.8 (14 Sep 2017 07:48), Max: 99.1 (13 Sep 2017 23:59)  HR: 97 (14 Sep 2017 07:48) (97 - 106)  BP: 148/84 (14 Sep 2017 07:48) (109/57 - 157/83)  BP(mean): --  RR: 18 (14 Sep 2017 07:48) (18 - 18)  SpO2: 97% (14 Sep 2017 07:48) (96% - 97%)    PHYSICAL EXAM  Constitutional - NAD, Comfortable, lethargic  HEENT - NCAT, EOMI  Chest - CTA bilaterally,  Cardiovascular - RRR, S1S2, No murmurs  Abdomen - BS+, Soft, NTND  Extremities - No C/C/E, No calf tenderness, trace edema b/l hands   Neurologic Exam -                    Cognitive - Awake, Alert, AAO to self, place, date, year, situation     Communication - Fluent, No dysarthria     Cranial Nerves - CN 2-12 intact     Motor - No focal deficits                    LEFT    UE - ShAB 3/5 (delayed shoulder movement), EF 4/5, EE 4/5, WE 4/5,  3/5                    RIGHT UE - ShAB 3/5  (delayed shoulder movement), EF 4/5, EE 4/5, WE 4/5,  3/5                    LEFT    LE - HF 5/5, KE 5/5, DF 5/5, PF 5/5                    RIGHT LE - HF 5/5, KE 5/5, DF 5/5, PF 5/5        Sensory - Intact to LT     Psychiatric - anxious    < from: Xray Wrist 3 Views, Bilateral (09.10.17 @ 12:09) >  XAM:  WRIST-BILATERAL                          PROCEDURE DATE:  09/10/2017          INTERPRETATION:  Radiographs of the right wrist     CPT 07854    CLINICAL INFORMATION:        Right wrist pain of unknown severity x1 week.    TECHNIQUE:  Frontal, oblique and lateral views of the wrist were obtained.    FINDINGS:   No prior examinations are available for review.    No gross fracture or dislocation is seen. Diffuse osteopenia of the   visualized osseous structures is noted. There is no evidence for carpal   fusion. Ligamentous calcifications are seen in the carpus.      No opaque foreign body is seen.     IMPRESSION:   Osteopenia. No gross fracture or dislocation is seen.            < from: Xray Ankle Complete 3 Views, Right (09.10.17 @ 12:09) >  XAM:  ANKLE-RIGHT                          PROCEDURE DATE:  09/10/2017          INTERPRETATION:  Radiographs of the right ankle     CPT 05726    CLINICAL INFORMATION:  Right ankle pain of unknown severity x1 week      TECHNIQUE:   Frontal, oblique and lateral views of the ankle were   obtained.    FINDINGS:   No prior examinations are available for review.    No gross fracture or dislocation is seen. Well-corticated 8 mm calcific   density is seen medial to and slightly inferior to the medialmalleolus   which may represent old avulsion fracture versus ligamentous   calcification. Mild bimalleolar soft tissue swelling is noted. The ankle   mortise appears intact. Superior calcaneal spurring is noted.        IMPRESSION:   No gross fractureor dislocation is seen. Mild bimalleolar   soft tissue swelling noted. Calcaneal spurring noted.          ----------------------------------------------------------------------------------------  ASSESSMENT/PLAN  87 years old female with PMHx of Reji 2 thrombocytosis, Myelofibrosis and Hypothyroidism admitted to [facility] for Polyarthralgia 2/2 to Remitting Seronegative Symmetrical Synovitis vs/and Polymyalgia rheumatica.   Rehab -Pt would benefit from ACUTE inpatient rehabilitation once medically stable for functional deficits consisting of 3 hours of therapy/day & 24 hour RN/daily PMR physician for comorbid medical management. May be transferred to rehab when medically stable and bed available. Pt continues to have intermittent severe pain in b/l hands.   Thank you.

## 2017-09-14 NOTE — DISCHARGE NOTE ADULT - MEDICATION SUMMARY - MEDICATIONS TO CHANGE
I will SWITCH the dose or number of times a day I take the medications listed below when I get home from the hospital:    predniSONE 5 mg oral tablet  -- 1 tab(s) by mouth once a day MDD:1  -- It is very important that you take or use this exactly as directed.  Do not skip doses or discontinue unless directed by your doctor.  Obtain medical advice before taking any non-prescription drugs as some may affect the action of this medication.  Take with food or milk.

## 2017-09-14 NOTE — DISCHARGE NOTE ADULT - PATIENT PORTAL LINK FT
“You can access the Follow[facility] Patient Portal, offered by [facility], by registering with the following website: [email or web address]”

## 2017-09-14 NOTE — DISCHARGE NOTE ADULT - MEDICATION SUMMARY - MEDICATIONS TO TAKE
I will START or STAY ON the medications listed below when I get home from the hospital:    predniSONE 20 mg oral tablet  -- 2 tab(s) by mouth once a day  -- Indication: For Polyarthritis    acetaminophen 325 mg oral tablet  -- 2 tab(s) by mouth every 6 hours, As needed, Mild Pain (1 - 3)  -- Indication: For Pain    oxyCODONE-acetaminophen 5 mg-325 mg oral tablet  -- 1 tab(s) by mouth every 6 hours, As needed, Moderate Pain (4 - 6)  -- Indication: For Pain    lisinopril 5 mg oral tablet  -- 1 tab(s) by mouth once a day  -- Indication: For HTN    magnesium hydroxide 8% oral suspension  -- 30 milliliter(s) by mouth once a day, As needed, Constipation  -- Indication: For Constipation    heparin  -- 5000 unit(s) subcutaneous 2 times a day  -- Indication: For DVT ppx    gabapentin 100 mg oral capsule  -- 1 cap(s) by mouth once a day  -- Indication: For Pain    hydrocortisone 2.5% rectal cream with applicator  -- 1 application rectally once a day  -- Indication: For Hemorrhoids    docusate sodium 100 mg oral capsule  -- 1 cap(s) by mouth 3 times a day  -- Indication: For Constipation    polyethylene glycol 3350 oral powder for reconstitution  -- 17 gram(s) by mouth once a day (at bedtime)  -- Indication: For Constipation    pantoprazole 40 mg oral delayed release tablet  -- 1 tab(s) by mouth once a day (before a meal)  -- Indication: For GI ppx    levothyroxine 50 mcg (0.05 mg) oral capsule  -- 1 cap(s) by mouth every other day  -- Indication: For Hypothyroid    levothyroxine 75 mcg (0.075 mg) oral capsule  -- 1 cap(s) by mouth every other day   -- Indication: For Hypothyroid I will START or STAY ON the medications listed below when I get home from the hospital:    predniSONE 10 mg oral tablet  -- take 4 tabs today then 3 tabs x 5 days, then 2 tabs x 5 days then 1 tab daily  -- Indication: For Polyarthritis    acetaminophen 325 mg oral tablet  -- 2 tab(s) by mouth every 6 hours  -- Indication: For Pain    acetaminophen 325 mg oral tablet  -- 2 tab(s) by mouth every 6 hours, As needed, Mild Pain (1 - 3)  -- Indication: For duplicate    lisinopril 5 mg oral tablet  -- 1 tab(s) by mouth once a day  -- Indication: For HTN    magnesium hydroxide 8% oral suspension  -- 30 milliliter(s) by mouth once a day, As needed, Constipation  -- Indication: For Constipation    heparin  -- 5000 unit(s) subcutaneous 2 times a day  -- Indication: For DVT ppx    gabapentin 100 mg oral capsule  -- 1 cap(s) by mouth once a day  -- Indication: For Pain    hydrocortisone 2.5% rectal cream with applicator  -- 1 application rectally once a day  -- Indication: For Hemorrhoids    docusate sodium 100 mg oral capsule  -- 1 cap(s) by mouth 3 times a day  -- Indication: For Constipation    polyethylene glycol 3350 oral powder for reconstitution  -- 17 gram(s) by mouth once a day (at bedtime)  -- Indication: For Constipation    pantoprazole 40 mg oral delayed release tablet  -- 1 tab(s) by mouth once a day (before a meal)  -- Indication: For GI ppx    levothyroxine 50 mcg (0.05 mg) oral capsule  -- 1 cap(s) by mouth every other day  -- Indication: For Hypothyroid    levothyroxine 75 mcg (0.075 mg) oral capsule  -- 1 cap(s) by mouth every other day   -- Indication: For Hypothyroid

## 2017-09-14 NOTE — DISCHARGE NOTE ADULT - PLAN OF CARE
resolution of symptoms Continue steroid as per Rheum  Follow up with Dr. 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 recent bone marrow showed 5% blasts with moderate reticulin fibrosis. Now with myelofibrosis awaiting to begin Jakafi outpatient with Dr. Montes. continue home RX bowel RX Lisinopril  low sodium diet

## 2017-09-14 NOTE — PROGRESS NOTE ADULT - ASSESSMENT
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  DDx also includes RA (pt w/ mildly elevated RF), though acute onset is atypical.  Also w/ borderline FLAVIO (1:80), though no other obvious signs/symptoms of connective tissue disease.    - Cont prednisone 40mg daily x 1 more day, then decrease to 30mg daily, then decrease by 10mg/day q5 days until 10mg daily.    - Pt should f/u with me after D/C for further tapering.    - Will order further serologies.

## 2017-09-14 NOTE — PROGRESS NOTE ADULT - PROBLEM SELECTOR PLAN 2
-acute inflammation and pain of joints in the hands and feet.  -rheumatology following.  improved pain control for inflammatory athropathy. Elevated CRP  -check uric acid level

## 2017-09-14 NOTE — DISCHARGE NOTE ADULT - CARE PLAN
Principal Discharge DX:	Polyarthropathy  Goal:	resolution of symptoms  Instructions for follow-up, activity and diet:	Continue steroid as per Rheum  Follow up with Dr. 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 recent bone marrow showed 5% blasts with moderate reticulin fibrosis. Now with myelofibrosis awaiting to begin Jakafi outpatient with Dr. Montes.  Secondary Diagnosis:	Hypothyroidism (acquired)  Instructions for follow-up, activity and diet:	continue home RX  Secondary Diagnosis:	Constipation  Instructions for follow-up, activity and diet:	bowel RX  Secondary Diagnosis:	HTN (hypertension)  Instructions for follow-up, activity and diet:	Lisinopril  low sodium diet

## 2017-09-14 NOTE — DISCHARGE NOTE ADULT - HOSPITAL COURSE
87 year old female with PMHx  Reji 2+ thrombocytosis, dating back to 2013. She had been on Hydrea since Loya 10, 2013. Most recently she was found to be pancytopenic and the Hydrea was held as of July 12, 2017. she was pn her USOH until about 1 week ago, when she began to experience pain in her neck and shoulders.  She saw ortho, who started her on a Medrol Dose pack, upon which her symptoms resolved.  However, upon completing the pack, she began to experience pain and swelling in her right hand.  She then began to experience similar symptoms in her other hand and in both feet.  The pain continued to worsen, so that she became unable to ambulate on her own.  (+)AM stiffness, lasting several hours. Patient came to the ER.  Pt was started on prednisone 40mg daily. Seen by Rheum and Heme/Onc, Slowly progressing. Seen by PT and recommend rehab. BP was elevated , patient started on Lisinopril. On GI and DVT ppx. Stable for discharge. 87 year old female with PMHx  Reji 2+ thrombocytosis, dating back to 2013. She had been on Hydrea since Olya 10, 2013. Most recently she was found to be pancytopenic and the Hydrea was held as of July 12, 2017. she was in her USOH until about 1 week ago, when she began to experience pain in her neck and shoulders.  She saw ortho, who started her on a Medrol Dose pack, upon which her symptoms resolved.  However, upon completing the pack, she began to experience pain and swelling in her right hand.  She then began to experience similar symptoms in her other hand and in both feet.  The pain continued to worsen, so that she became unable to ambulate on her own.  (+)AM stiffness, lasting several hours. Patient came to the ER. 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 Pt was started on prednisone 40mg daily. followed  by Rheum and Heme/Onc, Slowly progressing. Seen by PT and recommend rehab. BP was elevated , patient started on Lisinopril. On GI and DVT ppx. Stable for discharge. 87 year old female with PMHx  Reji 2+ thrombocytosis, dating back to 2013. She had been on Hydrea since Olya 10, 2013. Most recently she was found to be pancytopenic and the Hydrea was held as of July 12, 2017. she was in her USOH until about 1 week ago, when she began to experience pain in her neck and shoulders.  She saw ortho, who started her on a Medrol Dose pack, upon which her symptoms resolved.  However, upon completing the pack, she began to experience pain and swelling in her right hand.  She then began to experience similar symptoms in her other hand and in both feet.  The pain continued to worsen, so that she became unable to ambulate on her own.  (+)AM stiffness, lasting several hours. Patient came to the ER. 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 Pt was started on prednisone 40mg daily. Followed  by Rheum and Heme/Onc, Slowly progressing. As per Rheum will taper steroids to 10 mg daily, will maintain until follow up as outpatient.  Seen by PT and recommend rehab. BP was elevated , patient started on Lisinopril. On GI and DVT ppx. Stable for discharge.    Vital Signs Last 24 Hrs  T(C): 37.1 (15 Sep 2017 07:49), Max: 37.1 (15 Sep 2017 07:49)  T(F): 98.8 (15 Sep 2017 07:49), Max: 98.8 (15 Sep 2017 07:49)  HR: 102 (15 Sep 2017 07:49) (87 - 102)  BP: 120/76 (15 Sep 2017 07:49) (120/76 - 152/85)  BP(mean): --  RR: 18 (15 Sep 2017 07:49) (18 - 18)  SpO2: 95% (15 Sep 2017 07:49) (95% - 98%)                            8.3    12.4  )-----------( 312      ( 15 Sep 2017 06:28 )             25.5     15 Sep 2017 06:28    131    |  95     |  21.0   ----------------------------<  115    3.7     |  25.0   |  0.42     Ca    8.5        15 Sep 2017 06:28  Mg     1.9       15 Sep 2017 06:28        CAPILLARY BLOOD GLUCOSE        PHYSICAL EXAM:  General:  No acute distress.   	HEENT: PERRLA. EOMI. Clear conjunctivae. Moist mucus membrane  	Neck: Supple. No JVD.   	Chest: CTA bilaterally - no wheezing, rales or rhonchi.   	Heart: Normal S1 & S2 with RRR. No murmur.   	Abdomen: Soft. Non-tender. Non-distended. + BS  	Ext: trace edema B/L hands /feet  	Neuro: AAO x 3, No focal deficit  	Skin: Warm and Dry

## 2017-09-14 NOTE — CONSULT NOTE ADULT - ATTENDING COMMENTS
87 year old female with prior h/o thrombocytosis( on hydrea, but dc recently due to pancytopenia) admitted to the hospital several days ago after acute episode of hand/wrist and feet pain along with swelling that started after blood transfusion- per patient's report.   Prior to this patient had seen orthopedics for shoulder pain. 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  Seen by rheumatology and symptoms thought to be due to Remitting symmetrical seronegative synovitis versus RA.  Patient on steroids and with some improvement in symptoms. However overall function and mobility affected.  Recommend course of acute rehab:PT/OT 3 hrs/day 5-6 days/week. She will be able to tolerate acute rehab program and may be transferred to rehab when medically stable and bed available. D/W NPLeticia- Requested rheum fu in view positive tests.  Thank you

## 2017-09-14 NOTE — DISCHARGE NOTE ADULT - CARE PROVIDER_API CALL
Oliver Montes), Hematology; Internal Medicine; Medical Oncology  [facility]  Dept of Medicine  [address] [address] [place] [address]  Phone: [phone]  Fax: [phone] Oliver Montes), Hematology; Internal Medicine; Medical Oncology  [facility]  Dept of Medicine  [address] [place] [address]  Phone: [phone]  Fax: [phone]    Dr. Elliott PMD,   Phone: (   )    -  Fax: (   )    -    Kleiner, Myron I (MD), Internal Medicine; Rheumatology  [address] [place] [id number]  Phone: [phone]  Fax: [phone]

## 2017-09-14 NOTE — DISCHARGE NOTE ADULT - CARE PROVIDERS DIRECT ADDRESSES
[email or web address]

## 2017-09-15 ENCOUNTER — INPATIENT (INPATIENT)
Facility: HOSPITAL | Age: 82
LOS: 17 days | Discharge: HOME | DRG: 557 | End: 2017-10-03
Attending: PHYSICAL MEDICINE & REHABILITATION | Admitting: PHYSICAL MEDICINE & REHABILITATION
Payer: MEDICARE

## 2017-09-15 VITALS
RESPIRATION RATE: 18 BRPM | SYSTOLIC BLOOD PRESSURE: 112 MMHG | OXYGEN SATURATION: 99 % | HEART RATE: 96 BPM | DIASTOLIC BLOOD PRESSURE: 63 MMHG | TEMPERATURE: 98 F

## 2017-09-15 VITALS
OXYGEN SATURATION: 97 % | WEIGHT: 111.55 LBS | DIASTOLIC BLOOD PRESSURE: 71 MMHG | HEART RATE: 98 BPM | SYSTOLIC BLOOD PRESSURE: 129 MMHG | TEMPERATURE: 98 F | RESPIRATION RATE: 18 BRPM | HEIGHT: 63 IN

## 2017-09-15 DIAGNOSIS — Z51.89 ENCOUNTER FOR OTHER SPECIFIED AFTERCARE: ICD-10-CM

## 2017-09-15 LAB
ANION GAP SERPL CALC-SCNC: 11 MMOL/L — SIGNIFICANT CHANGE UP (ref 5–17)
BUN SERPL-MCNC: 21 MG/DL — HIGH (ref 8–20)
CALCIUM SERPL-MCNC: 8.5 MG/DL — LOW (ref 8.6–10.2)
CHLORIDE SERPL-SCNC: 95 MMOL/L — LOW (ref 98–107)
CO2 SERPL-SCNC: 25 MMOL/L — SIGNIFICANT CHANGE UP (ref 22–29)
CREAT SERPL-MCNC: 0.42 MG/DL — LOW (ref 0.5–1.3)
CULTURE RESULTS: SIGNIFICANT CHANGE UP
CULTURE RESULTS: SIGNIFICANT CHANGE UP
GLUCOSE SERPL-MCNC: 115 MG/DL — SIGNIFICANT CHANGE UP (ref 70–115)
HCT VFR BLD CALC: 25.5 % — LOW (ref 37–47)
HGB BLD-MCNC: 8.3 G/DL — LOW (ref 12–16)
MAGNESIUM SERPL-MCNC: 1.9 MG/DL — SIGNIFICANT CHANGE UP (ref 1.6–2.6)
MCHC RBC-ENTMCNC: 31.4 PG — HIGH (ref 27–31)
MCHC RBC-ENTMCNC: 32.5 G/DL — SIGNIFICANT CHANGE UP (ref 32–36)
MCV RBC AUTO: 96.6 FL — SIGNIFICANT CHANGE UP (ref 81–99)
PLATELET # BLD AUTO: 312 K/UL — SIGNIFICANT CHANGE UP (ref 150–400)
POTASSIUM SERPL-MCNC: 3.7 MMOL/L — SIGNIFICANT CHANGE UP (ref 3.5–5.3)
POTASSIUM SERPL-SCNC: 3.7 MMOL/L — SIGNIFICANT CHANGE UP (ref 3.5–5.3)
RBC # BLD: 2.64 M/UL — LOW (ref 4.4–5.2)
RBC # FLD: 18.4 % — HIGH (ref 11–15.6)
SODIUM SERPL-SCNC: 131 MMOL/L — LOW (ref 135–145)
SPECIMEN SOURCE: SIGNIFICANT CHANGE UP
SPECIMEN SOURCE: SIGNIFICANT CHANGE UP
WBC # BLD: 12.4 K/UL — HIGH (ref 4.8–10.8)
WBC # FLD AUTO: 12.4 K/UL — HIGH (ref 4.8–10.8)

## 2017-09-15 PROCEDURE — 99232 SBSQ HOSP IP/OBS MODERATE 35: CPT

## 2017-09-15 PROCEDURE — 99239 HOSP IP/OBS DSCHRG MGMT >30: CPT

## 2017-09-15 RX ORDER — POLYETHYLENE GLYCOL 3350 17 G/17G
17 POWDER, FOR SOLUTION ORAL AT BEDTIME
Qty: 0 | Refills: 0 | Status: DISCONTINUED | OUTPATIENT
Start: 2017-09-15 | End: 2017-09-26

## 2017-09-15 RX ORDER — ACETAMINOPHEN 500 MG
650 TABLET ORAL EVERY 6 HOURS
Qty: 0 | Refills: 0 | Status: DISCONTINUED | OUTPATIENT
Start: 2017-09-15 | End: 2017-09-17

## 2017-09-15 RX ORDER — MAGNESIUM HYDROXIDE 400 MG/1
30 TABLET, CHEWABLE ORAL DAILY
Qty: 0 | Refills: 0 | Status: DISCONTINUED | OUTPATIENT
Start: 2017-09-15 | End: 2017-10-03

## 2017-09-15 RX ORDER — DOCUSATE SODIUM 100 MG
100 CAPSULE ORAL THREE TIMES A DAY
Qty: 0 | Refills: 0 | Status: DISCONTINUED | OUTPATIENT
Start: 2017-09-15 | End: 2017-09-26

## 2017-09-15 RX ORDER — HYDROCORTISONE 1 %
1 OINTMENT (GRAM) TOPICAL DAILY
Qty: 0 | Refills: 0 | Status: DISCONTINUED | OUTPATIENT
Start: 2017-09-15 | End: 2017-09-18

## 2017-09-15 RX ORDER — LISINOPRIL 2.5 MG/1
5 TABLET ORAL DAILY
Qty: 0 | Refills: 0 | Status: DISCONTINUED | OUTPATIENT
Start: 2017-09-15 | End: 2017-10-03

## 2017-09-15 RX ORDER — ACETAMINOPHEN 500 MG
650 TABLET ORAL EVERY 6 HOURS
Qty: 0 | Refills: 0 | Status: DISCONTINUED | OUTPATIENT
Start: 2017-09-15 | End: 2017-09-15

## 2017-09-15 RX ORDER — GABAPENTIN 400 MG/1
100 CAPSULE ORAL DAILY
Qty: 0 | Refills: 0 | Status: DISCONTINUED | OUTPATIENT
Start: 2017-09-15 | End: 2017-09-17

## 2017-09-15 RX ORDER — HEPARIN SODIUM 5000 [USP'U]/ML
5000 INJECTION INTRAVENOUS; SUBCUTANEOUS EVERY 12 HOURS
Qty: 0 | Refills: 0 | Status: DISCONTINUED | OUTPATIENT
Start: 2017-09-15 | End: 2017-10-03

## 2017-09-15 RX ORDER — ACETAMINOPHEN 500 MG
2 TABLET ORAL
Qty: 0 | Refills: 0 | COMMUNITY
Start: 2017-09-15

## 2017-09-15 RX ORDER — LEVOTHYROXINE SODIUM 125 MCG
75 TABLET ORAL DAILY
Qty: 0 | Refills: 0 | Status: DISCONTINUED | OUTPATIENT
Start: 2017-09-15 | End: 2017-10-03

## 2017-09-15 RX ORDER — PANTOPRAZOLE SODIUM 20 MG/1
40 TABLET, DELAYED RELEASE ORAL
Qty: 0 | Refills: 0 | Status: DISCONTINUED | OUTPATIENT
Start: 2017-09-15 | End: 2017-10-03

## 2017-09-15 RX ADMIN — HEPARIN SODIUM 5000 UNIT(S): 5000 INJECTION INTRAVENOUS; SUBCUTANEOUS at 18:27

## 2017-09-15 RX ADMIN — Medication 75 MICROGRAM(S): at 05:19

## 2017-09-15 RX ADMIN — Medication 40 MILLIGRAM(S): at 05:19

## 2017-09-15 RX ADMIN — HEPARIN SODIUM 5000 UNIT(S): 5000 INJECTION INTRAVENOUS; SUBCUTANEOUS at 05:19

## 2017-09-15 RX ADMIN — LISINOPRIL 5 MILLIGRAM(S): 2.5 TABLET ORAL at 05:19

## 2017-09-15 RX ADMIN — PANTOPRAZOLE SODIUM 40 MILLIGRAM(S): 20 TABLET, DELAYED RELEASE ORAL at 05:19

## 2017-09-15 RX ADMIN — Medication 650 MILLIGRAM(S): at 22:36

## 2017-09-15 RX ADMIN — Medication 650 MILLIGRAM(S): at 18:27

## 2017-09-15 RX ADMIN — Medication 100 MILLIGRAM(S): at 05:19

## 2017-09-15 RX ADMIN — GABAPENTIN 100 MILLIGRAM(S): 400 CAPSULE ORAL at 17:19

## 2017-09-15 NOTE — PROGRESS NOTE ADULT - ASSESSMENT
87 years old female with PMH of Reji 2 thrombocytosis, Myelofibrosis and Hypothyroidism comes with pain and swelling in hands and ankles. As per patient, her symptoms started on Tuesday and are getting worse. She was seen by Ortho who prescribed Medrol Dose Pack which she finished last week. She was also seen here 2 days ago and was evaluated by Hem/Onc as well. She was recommended to continue steroids and follow up with Rheumatologist which she still has to get an appointment.   Pain is dull and aching 9-10/10 in intensity in both hands and feet. Swelling is getting worse and now she is unable to ambulate and do her activities of daily living. This morning she also noticed redness in her feet. Denies any rash anywhere in body. Denies stiffness in joints. Denies history of immunologic disorder in past. She has chronic pain in her right shoulder but there is no change in it.  Denies fever, night sweats, weight change, abdominal/chest pain, nausea, vomiting, cough, shortness of breath or headache. No recent travel, sick contacts or tick bites.   She was transfused on 9/6/17 for symptomatic anemia. Her symptoms started prior to transfusion.    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 Rod,  appreciate input, continue steroids  - PT daily, PM&R eval requested  - GI Prophylaxis  - Accu checks dc, patient BG <150  2) Hypokalemia  - replaced, monitor lytes  3) Myelofibrosis  - Heme/Onc consult Dr. Mosqueda, input appreciated  - s/p transfusion   - monitor labs  - Patient is waiting to be started on Jakafi  - Outpatient follow up with Dr. Montes  4) Hypothyroidism  - Levothyroxine 75 mcg  5) Elevated BP without history of HTN  - Monitor BP.  started on Lisinopril. BP improved  DVT Prophylaxis -- Heparin 5000 Units  6) Constipation- Bowel RX

## 2017-09-15 NOTE — PROGRESS NOTE ADULT - PROBLEM SELECTOR PLAN 2
-acute inflammation and pain of joints in the hands and feet. suspect Remitting Seronegative Symmetrical Synovitis as per Rheum  -rheumatology following.  improved pain control for inflammatory athropathy. Elevated CRP  -check uric acid level -acute inflammation and pain of joints in the hands and feet. Suspect Remitting Seronegative Symmetrical Synovitis as per Rheum.  On prednisone and gabapentin with improved pain control for inflammatory athropathy. Elevated CRP  -uric acid level NL.

## 2017-09-15 NOTE — PROGRESS NOTE ADULT - PROBLEM SELECTOR PLAN 1
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 recent bone marrow showed 5% blasts with moderate reticulin fibrosis. Now with myelofibrosis awaiting to begin Jakafi outpatient with Dr. Montes.
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 recent bone marrow showed 5% blasts withmoderate reticulin fibrosis. Now with myelofibrosis awaiting to begin Jakafi outpatient with Dr. Montes.

## 2017-09-15 NOTE — PROGRESS NOTE ADULT - SUBJECTIVE AND OBJECTIVE BOX
CC: Pain and swelling in wrists and ankles (10 Sep 2017 19:11)    HPI: 87 years old female with PMH of Reji 2 thrombocytosis, Myelofibrosis and Hypothyroidism comes with pain and swelling in hands and ankles. As per patient, her symptoms started on Tuesday and are getting worse. She was seen by Ortho who prescribed Medrol Dose Pack which she finished last week. She was also seen here 2 days ago and was evaluated by Hem/Onc as well. She was recommended to continue steroids and follow up with Rheumatologist which she still has to get an appointment. returned to ER with worsening pain/swelling. She was transfused on 9/6/17 for symptomatic anemia. Her symptoms started prior to transfusion.    INTERVAL HPI/OVERNIGHT EVENTS: Swelling somewhat improved, able to feed self, ambulate with PT    Vital Signs Last 24 Hrs  T(C): 36.8 (11 Sep 2017 08:18), Max: 37 (10 Sep 2017 18:10)  T(F): 98.3 (11 Sep 2017 08:18), Max: 98.6 (10 Sep 2017 18:10)  HR: 99 (11 Sep 2017 08:18) (88 - 100)  BP: 171/97 (11 Sep 2017 08:18) (154/84 - 171/97)  BP(mean): --  RR: 18 (11 Sep 2017 08:18) (17 - 18)  SpO2: 98% (11 Sep 2017 08:18) (97% - 99%)  I&O's Detail                                9.0    10.6  )-----------( 270      ( 11 Sep 2017 06:09 )             26.8     11 Sep 2017 06:07    137    |  98     |  19.0   ----------------------------<  139    4.3     |  27.0   |  0.45     Ca    8.5        11 Sep 2017 06:07  Phos  3.5       11 Sep 2017 06:07  Mg     2.0       11 Sep 2017 06:07    TPro  7.0    /  Alb  2.8    /  TBili  1.0    /  DBili  x      /  AST  36     /  ALT  52     /  AlkPhos  280    10 Sep 2017 12:21    PT/INR - ( 10 Sep 2017 12:21 )   PT: 15.3 sec;   INR: 1.38 ratio         PTT - ( 10 Sep 2017 12:21 )  PTT:26.6 sec  CAPILLARY BLOOD GLUCOSE  195 (10 Sep 2017 22:20)        LIVER FUNCTIONS - ( 10 Sep 2017 12:21 )  Alb: 2.8 g/dL / Pro: 7.0 g/dL / ALK PHOS: 280 U/L / ALT: 52 U/L / AST: 36 U/L / GGT: x               MEDICATIONS  (STANDING):  pantoprazole    Tablet 40 milliGRAM(s) Oral before breakfast  levothyroxine 75 MICROGram(s) Oral daily  predniSONE   Tablet 40 milliGRAM(s) Oral daily  insulin lispro (HumaLOG) corrective regimen sliding scale   SubCutaneous Before meals and at bedtime  dextrose 5%. 1000 milliLiter(s) (50 mL/Hr) IV Continuous <Continuous>  dextrose 50% Injectable 12.5 Gram(s) IV Push once  dextrose 50% Injectable 25 Gram(s) IV Push once  dextrose 50% Injectable 25 Gram(s) IV Push once  heparin  Injectable 5000 Unit(s) SubCutaneous every 12 hours  lisinopril 5 milliGRAM(s) Oral daily    MEDICATIONS  (PRN):  dextrose Gel 1 Dose(s) Oral once PRN Blood Glucose LESS THAN 70 milliGRAM(s)/deciLiter  glucagon  Injectable 1 milliGRAM(s) IntraMuscular once PRN Glucose <70 milliGRAM(s)/deciLiter      RADIOLOGY & ADDITIONAL TESTS:    ROS: Denies chest pain, SOB, dizziness, lightheadedness, nausea, vomiting, fever, chills    PHYSICAL EXAM:  General: Elderly female sitting in bed comfortably. No acute distress.   	HEENT: PERRLA. EOMI. Clear conjunctivae. Moist mucus membrane  	Neck: Supple. No JVD.   	Chest: CTA bilaterally - no wheezing, rales or rhonchi.   	Heart: Normal S1 & S2 with RRR. No murmur.   	Abdomen: Soft. Non-tender. Non-distended. + BS  	Ext: 2 + pedal edema on right side and 1+ on left side. No calf tenderness. Hands swollen and warm. Decreased ROM. Feet/Ankles swollen, warm with areas of erythema on dorsum. Decreased ROM due to pain and swelling.  	Neuro: AAO x 3, No focal deficit  	Skin: Warm and Dry  Psychiatry: Normal mood and affect
CC: Pain and swelling in wrists and ankles (10 Sep 2017 19:11)    INTERVAL HPI/OVERNIGHT EVENTS: Swelling improved, pain less, able to ambulate in [place] with PT. Had BM yesterday.     Vital Signs Last 24 Hrs  T(C): 36.8 (13 Sep 2017 01:37), Max: 36.8 (13 Sep 2017 01:37)  T(F): 98.2 (13 Sep 2017 01:37), Max: 98.2 (13 Sep 2017 01:37)  HR: 96 (13 Sep 2017 05:42) (94 - 96)  BP: 142/79 (13 Sep 2017 05:42) (124/68 - 148/83)  BP(mean): --  RR: 18 (13 Sep 2017 01:37) (17 - 18)  SpO2: 91% (13 Sep 2017 05:42) (91% - 97%)  I&O's Detail    12 Sep 2017 07:01  -  13 Sep 2017 07:00  --------------------------------------------------------  IN:  Total IN: 0 mL    OUT:    Voided: 1050 mL  Total OUT: 1050 mL    Total NET: -1050 mL                                    8.1    11.1  )-----------( 302      ( 13 Sep 2017 06:42 )             24.6     13 Sep 2017 06:42    135    |  96     |  24.0   ----------------------------<  95     3.8     |  27.0   |  0.52     Ca    8.3        13 Sep 2017 06:42  Phos  3.4       13 Sep 2017 06:42  Mg     1.9       13 Sep 2017 06:42      PT/INR - ( 12 Sep 2017 07:19 )   PT: 13.5 sec;   INR: 1.22 ratio           CAPILLARY BLOOD GLUCOSE              MEDICATIONS  (STANDING):  pantoprazole    Tablet 40 milliGRAM(s) Oral before breakfast  levothyroxine 75 MICROGram(s) Oral daily  predniSONE   Tablet 40 milliGRAM(s) Oral daily  insulin lispro (HumaLOG) corrective regimen sliding scale   SubCutaneous Before meals and at bedtime  dextrose 5%. 1000 milliLiter(s) (50 mL/Hr) IV Continuous <Continuous>  dextrose 50% Injectable 12.5 Gram(s) IV Push once  dextrose 50% Injectable 25 Gram(s) IV Push once  dextrose 50% Injectable 25 Gram(s) IV Push once  heparin  Injectable 5000 Unit(s) SubCutaneous every 12 hours  lisinopril 5 milliGRAM(s) Oral daily  docusate sodium 100 milliGRAM(s) Oral three times a day  polyethylene glycol 3350 17 Gram(s) Oral at bedtime    MEDICATIONS  (PRN):  dextrose Gel 1 Dose(s) Oral once PRN Blood Glucose LESS THAN 70 milliGRAM(s)/deciLiter  glucagon  Injectable 1 milliGRAM(s) IntraMuscular once PRN Glucose <70 milliGRAM(s)/deciLiter  oxyCODONE    5 mG/acetaminophen 325 mG 1 Tablet(s) Oral every 6 hours PRN Moderate Pain (4 - 6)  magnesium hydroxide Suspension 30 milliLiter(s) Oral daily PRN Constipation      RADIOLOGY & ADDITIONAL TESTS:    ROS:  Constitutional, Eyes, ENT, Cardiovascular, Respiratory,  Gastrointestinal, Genitourinary, Musculoskeletal, Neurological,  Integumentary, Psychiatric, Endocrine, Heme/Lymph are otherwise negative.      PHYSICAL EXAM:  General:  No acute distress.   	HEENT: PERRLA. EOMI. Clear conjunctivae. Moist mucus membrane  	Neck: Supple. No JVD.   	Chest: CTA bilaterally - no wheezing, rales or rhonchi.   	Heart: Normal S1 & S2 with RRR. No murmur.   	Abdomen: Soft. Non-tender. Non-distended. + BS  	Ext: trace edema B/L hands /feet  	Neuro: AAO x 3, No focal deficit  	Skin: Warm and Dry  Psychiatry: Normal mood and affect
REASON FOR CONSULTATION: myelofibrosis, joint pain    HPI:  Mrs. Chen is a 86 yo WF with a history of Reji 2+ thrombocytosis, dating back to . She had been on Hydrea since Olya 10, 2013. Most recently she was found to be pancytopenic and the Hydrea was held as of 2017. A bone marrow was done on 2017, which a normocellular to mildly hypercellular marrow with mild megakaryocytic hyperplasia and moderate reticulin fibrosis and focal areas of increased blasts (5% ).     She recently established care with Dr. Montes.  Now presents with bilateral ankle pain and swelling as well as wrist pain and swelling.  She   recently completed a medrol dose pack.  She presented to ED with worsening pain of ankles/wrist.  She's s/p 2 units RBC transfusion on 17.  Presents with acute inflammation and pain of joints in the hands and feet.    REVIEW OF SYSTEMS:  Constitutional, Eyes, ENT, Cardiovascular, Respiratory, Gastrointestinal, Genitourinary, Musculoskeletal, Integumentary, Neurological, Psychiatric, Endocrine, Heme/Lymph, and Allergic/Immunologic review of systems are otherwise negative except as noted in the HPI.    PAST MEDICAL & SURGICAL HISTORY:  Hypothyroidism (acquired)  Myelodysplastic syndrome      FAMILY HISTORY:  No pertinent family history in first degree relatives      SOCIAL HISTORY:    Allergies    No Known Allergies    Intolerances        MEDICATIONS  (STANDING):    MEDICATIONS  (PRN):      Vital Signs Last 24 Hrs  T(C): 37.7 (08 Sep 2017 11:20), Max: 37.7 (08 Sep 2017 11:20)  T(F): 99.9 (08 Sep 2017 11:20), Max: 99.9 (08 Sep 2017 11:20)  HR: 86 (08 Sep 2017 11:20) (86 - 99)  BP: 126/71 (08 Sep 2017 11:20) (126/71 - 130/73)  BP(mean): --  RR: 18 (08 Sep 2017 11:20) (18 - 20)  SpO2: 98% (08 Sep 2017 11:20) (98% - 98%)    PHYSICAL EXAM:    GENERAL:elderly female, comfortable  NERVOUS SYSTEM:  Alert & Oriented X3,  EXTREMITIES: bilateral ankle swelling, and wrist swelling Rt>left  SKIN: No rashes or lesions      LABS:                        9.5    19.3  )-----------( 330      ( 08 Sep 2017 08:30 )             27.0         131<L>  |  92<L>  |  16.0  ----------------------------<  154<H>  3.5   |  25.0  |  0.43<L>    Ca    8.8      08 Sep 2017 08:30    TPro  7.3  /  Alb  2.7<L>  /  TBili  1.1  /  DBili  x   /  AST  22  /  ALT  28  /  AlkPhos  263<H>        Urinalysis Basic - ( 08 Sep 2017 13:00 )    Color: Yellow / Appearance: Clear / S.010 / pH: x  Gluc: x / Ketone: Negative  / Bili: Negative / Urobili: Negative mg/dL   Blood: x / Protein: 30 mg/dL / Nitrite: Negative   Leuk Esterase: Trace / RBC: 6-10 /HPF / WBC 3-5   Sq Epi: x / Non Sq Epi: Few / Bacteria: Few
CC: Pain and swelling in wrists and ankles     INTERVAL HPI/OVERNIGHT EVENTS: Still having pain, improved with Tylenol and gabapentin. Denies chest pain, SOB, dizziness, lightheadedness, nausea, vomiting, fever, chills    Vital Signs Last 24 Hrs  T(C): 37.1 (15 Sep 2017 07:49), Max: 37.1 (15 Sep 2017 07:49)  T(F): 98.8 (15 Sep 2017 07:49), Max: 98.8 (15 Sep 2017 07:49)  HR: 102 (15 Sep 2017 07:49) (87 - 102)  BP: 120/76 (15 Sep 2017 07:49) (120/76 - 152/85)  BP(mean): --  RR: 18 (15 Sep 2017 07:49) (18 - 18)  SpO2: 95% (15 Sep 2017 07:49) (95% - 98%)  I&O's Detail                                8.3    12.4  )-----------( 312      ( 15 Sep 2017 06:28 )             25.5     15 Sep 2017 06:28    131    |  95     |  21.0   ----------------------------<  115    3.7     |  25.0   |  0.42     Ca    8.5        15 Sep 2017 06:28  Mg     1.9       15 Sep 2017 06:28        CAPILLARY BLOOD GLUCOSE              MEDICATIONS  (STANDING):  pantoprazole    Tablet 40 milliGRAM(s) Oral before breakfast  levothyroxine 75 MICROGram(s) Oral daily  predniSONE   Tablet 40 milliGRAM(s) Oral daily  dextrose 5%. 1000 milliLiter(s) (50 mL/Hr) IV Continuous <Continuous>  dextrose 50% Injectable 12.5 Gram(s) IV Push once  dextrose 50% Injectable 25 Gram(s) IV Push once  dextrose 50% Injectable 25 Gram(s) IV Push once  heparin  Injectable 5000 Unit(s) SubCutaneous every 12 hours  lisinopril 5 milliGRAM(s) Oral daily  docusate sodium 100 milliGRAM(s) Oral three times a day  polyethylene glycol 3350 17 Gram(s) Oral at bedtime  hydrocortisone 2.5% Rectal Cream 1 Application(s) Rectal daily  gabapentin 100 milliGRAM(s) Oral daily  acetaminophen   Tablet. 650 milliGRAM(s) Oral every 6 hours    MEDICATIONS  (PRN):  dextrose Gel 1 Dose(s) Oral once PRN Blood Glucose LESS THAN 70 milliGRAM(s)/deciLiter  glucagon  Injectable 1 milliGRAM(s) IntraMuscular once PRN Glucose <70 milliGRAM(s)/deciLiter  magnesium hydroxide Suspension 30 milliLiter(s) Oral daily PRN Constipation      RADIOLOGY & ADDITIONAL TESTS:    PHYSICAL EXAM:  General:  No acute distress.   	HEENT: PERRLA. EOMI. Clear conjunctivae. Moist mucus membrane  	Neck: Supple. No JVD.   	Chest: CTA bilaterally - no wheezing, rales or rhonchi.   	Heart: Normal S1 & S2 with RRR. No murmur.   	Abdomen: Soft. Non-tender. Non-distended. + BS  	Ext: trace edema B/L hands /feet  	Neuro: AAO x 3, No focal deficit  	Skin: Warm and Dry
CC: Pain and swelling in wrists and ankles (10 Sep 2017 19:11)    INTERVAL HPI/OVERNIGHT EVENTS: C/O nerve pain B/L hands last night causing problem sleeping. Walked well yesterday with PT, however is hampered by pain this am.     Vital Signs Last 24 Hrs  T(C): 37.1 (14 Sep 2017 07:48), Max: 37.3 (13 Sep 2017 23:59)  T(F): 98.8 (14 Sep 2017 07:48), Max: 99.1 (13 Sep 2017 23:59)  HR: 97 (14 Sep 2017 07:48) (97 - 106)  BP: 148/84 (14 Sep 2017 07:48) (109/57 - 157/83)  BP(mean): --  RR: 18 (14 Sep 2017 07:48) (18 - 18)  SpO2: 97% (14 Sep 2017 07:48) (96% - 97%)  I&O's Detail                                8.1    11.1  )-----------( 302      ( 13 Sep 2017 06:42 )             24.6     13 Sep 2017 06:42    135    |  96     |  24.0   ----------------------------<  95     3.8     |  27.0   |  0.52     Ca    8.3        13 Sep 2017 06:42  Phos  3.4       13 Sep 2017 06:42  Mg     1.9       13 Sep 2017 06:42        CAPILLARY BLOOD GLUCOSE  123 (13 Sep 2017 23:20)  136 (13 Sep 2017 17:47)  151 (13 Sep 2017 11:55)              MEDICATIONS  (STANDING):  pantoprazole    Tablet 40 milliGRAM(s) Oral before breakfast  levothyroxine 75 MICROGram(s) Oral daily  predniSONE   Tablet 40 milliGRAM(s) Oral daily  dextrose 5%. 1000 milliLiter(s) (50 mL/Hr) IV Continuous <Continuous>  dextrose 50% Injectable 12.5 Gram(s) IV Push once  dextrose 50% Injectable 25 Gram(s) IV Push once  dextrose 50% Injectable 25 Gram(s) IV Push once  heparin  Injectable 5000 Unit(s) SubCutaneous every 12 hours  lisinopril 5 milliGRAM(s) Oral daily  docusate sodium 100 milliGRAM(s) Oral three times a day  polyethylene glycol 3350 17 Gram(s) Oral at bedtime  hydrocortisone 2.5% Rectal Cream 1 Application(s) Rectal daily    MEDICATIONS  (PRN):  dextrose Gel 1 Dose(s) Oral once PRN Blood Glucose LESS THAN 70 milliGRAM(s)/deciLiter  glucagon  Injectable 1 milliGRAM(s) IntraMuscular once PRN Glucose <70 milliGRAM(s)/deciLiter  oxyCODONE    5 mG/acetaminophen 325 mG 1 Tablet(s) Oral every 6 hours PRN Moderate Pain (4 - 6)  magnesium hydroxide Suspension 30 milliLiter(s) Oral daily PRN Constipation  acetaminophen   Tablet. 650 milliGRAM(s) Oral every 6 hours PRN Mild Pain (1 - 3)      RADIOLOGY & ADDITIONAL TESTS:    ROS:  Constitutional, Eyes, ENT, Cardiovascular, Respiratory,  Gastrointestinal, Genitourinary, Musculoskeletal,  Integumentary, Psychiatric, Endocrine, Heme/Lymph are otherwise negative.    PHYSICAL EXAM:  General:  No acute distress.   	HEENT: PERRLA. EOMI. Clear conjunctivae. Moist mucus membrane  	Neck: Supple. No JVD.   	Chest: CTA bilaterally - no wheezing, rales or rhonchi.   	Heart: Normal S1 & S2 with RRR. No murmur.   	Abdomen: Soft. Non-tender. Non-distended. + BS  	Ext: trace edema B/L hands /feet  	Neuro: AAO x 3, No focal deficit  	Skin: Warm and Dry  Psychiatry: Normal mood and
INTERVAL HPI/OVERNIGHT EVENTS:  Feeling better overall.  Swelling in hands and feet much improved.  Pt now c/o burning pain in hands.    MEDICATIONS  (STANDING):  pantoprazole    Tablet 40 milliGRAM(s) Oral before breakfast  levothyroxine 75 MICROGram(s) Oral daily  predniSONE   Tablet 40 milliGRAM(s) Oral daily  dextrose 5%. 1000 milliLiter(s) (50 mL/Hr) IV Continuous <Continuous>  dextrose 50% Injectable 12.5 Gram(s) IV Push once  dextrose 50% Injectable 25 Gram(s) IV Push once  dextrose 50% Injectable 25 Gram(s) IV Push once  heparin  Injectable 5000 Unit(s) SubCutaneous every 12 hours  lisinopril 5 milliGRAM(s) Oral daily  docusate sodium 100 milliGRAM(s) Oral three times a day  polyethylene glycol 3350 17 Gram(s) Oral at bedtime  hydrocortisone 2.5% Rectal Cream 1 Application(s) Rectal daily    MEDICATIONS  (PRN):  dextrose Gel 1 Dose(s) Oral once PRN Blood Glucose LESS THAN 70 milliGRAM(s)/deciLiter  glucagon  Injectable 1 milliGRAM(s) IntraMuscular once PRN Glucose <70 milliGRAM(s)/deciLiter  oxyCODONE    5 mG/acetaminophen 325 mG 1 Tablet(s) Oral every 6 hours PRN Moderate Pain (4 - 6)  magnesium hydroxide Suspension 30 milliLiter(s) Oral daily PRN Constipation  acetaminophen   Tablet. 650 milliGRAM(s) Oral every 6 hours PRN Mild Pain (1 - 3)      Allergies    No Known Allergies    Vital Signs Last 24 Hrs  T(C): 36.4 (14 Sep 2017 15:37), Max: 37.3 (13 Sep 2017 23:59)  T(F): 97.6 (14 Sep 2017 15:37), Max: 99.1 (13 Sep 2017 23:59)  HR: 87 (14 Sep 2017 15:37) (87 - 97)  BP: 152/85 (14 Sep 2017 15:37) (148/84 - 157/83)  BP(mean): --  RR: 18 (14 Sep 2017 15:37) (18 - 18)  SpO2: 98% (14 Sep 2017 15:37) (97% - 98%)    PHYSICAL EXAM:      general:  NAD  skin: no rash  MSK:  swelling in hands and feet much improved;  no joint tenderness        LABS:                        8.1    11.1  )-----------( 302      ( 13 Sep 2017 06:42 )             24.6     09-13    135  |  96<L>  |  24.0<H>  ----------------------------<  95  3.8   |  27.0  |  0.52    Ca    8.3<L>      13 Sep 2017 06:42  Phos  3.4     09-13  Mg     1.9     09-13    Anti-Nuclear Antibody (09.12.17 @ 18:57)    Anti Nuclear Factor Titer: 1:80    FLAVIO Pattern: Homogeneous    Rheumatoid Factor Quant, Serum or Plasma (09.12.17 @ 18:57)    Rheumatoid Factor Quant, Serum or Plasma: 18.0 IU/mL    Cyclic Citrullinated Peptide AB (09.12.17 @ 18:57)    Cyclic Cit Pep Result: <8 Units    CCP Antibody IgG Interpretation: Negative: Method:  EIA  Cyclic Citrullinated Peptide Ab, IGG Reference Range:                 <20U          Negative                  20 - 39U  Weak Positive                   40 - 59U  Moderate Positive                  >60U         Strong Positive
REASON FOR CONSULTATION: myelofibrosis, joint pain    HPI:  Mrs. Chen is a 86 yo WF with a history of Reji 2+ thrombocytosis, dating back to . She had been on Hydrea since Olya 10, 2013. Most recently she was found to be pancytopenic and the Hydrea was held as of 2017. A bone marrow was done on 2017, which a normocellular to mildly hypercellular marrow with mild megakaryocytic hyperplasia and moderate reticulin fibrosis and focal areas of increased blasts (5% ).     She recently established care with Dr. Montes.  Now presents with bilateral ankle pain and swelling as well as wrist pain and swelling.  She   recently completed a medrol dose pack.  She presented to ED with worsening pain of ankles/wrist.  She's s/p 2 units RBC transfusion on 17.  Presents with acute inflammation and pain of joints in the hands and feet.    REVIEW OF SYSTEMS:  Constitutional, Eyes, ENT, Cardiovascular, Respiratory, Gastrointestinal, Genitourinary, Musculoskeletal, Integumentary, Neurological, Psychiatric, Endocrine, Heme/Lymph, and Allergic/Immunologic review of systems are otherwise negative except as noted in the HPI.    PAST MEDICAL & SURGICAL HISTORY:  Hypothyroidism (acquired)  Myelodysplastic syndrome      FAMILY HISTORY:  No pertinent family history in first degree relatives      SOCIAL HISTORY:    Allergies    No Known Allergies    Intolerances        MEDICATIONS  (STANDING):    MEDICATIONS  (PRN):      Vital Signs Last 24 Hrs  T(C): 37.7 (08 Sep 2017 11:20), Max: 37.7 (08 Sep 2017 11:20)  T(F): 99.9 (08 Sep 2017 11:20), Max: 99.9 (08 Sep 2017 11:20)  HR: 86 (08 Sep 2017 11:20) (86 - 99)  BP: 126/71 (08 Sep 2017 11:20) (126/71 - 130/73)  BP(mean): --  RR: 18 (08 Sep 2017 11:20) (18 - 20)  SpO2: 98% (08 Sep 2017 11:20) (98% - 98%)    PHYSICAL EXAM:    GENERAL:elderly female, comfortable  NERVOUS SYSTEM:  Alert & Oriented X3,  EXTREMITIES: bilateral ankle swelling, and wrist swelling Rt>left  SKIN: No rashes or lesions      LABS:                        9.5    19.3  )-----------( 330      ( 08 Sep 2017 08:30 )             27.0         131<L>  |  92<L>  |  16.0  ----------------------------<  154<H>  3.5   |  25.0  |  0.43<L>    Ca    8.8      08 Sep 2017 08:30    TPro  7.3  /  Alb  2.7<L>  /  TBili  1.1  /  DBili  x   /  AST  22  /  ALT  28  /  AlkPhos  263<H>        Urinalysis Basic - ( 08 Sep 2017 13:00 )    Color: Yellow / Appearance: Clear / S.010 / pH: x  Gluc: x / Ketone: Negative  / Bili: Negative / Urobili: Negative mg/dL   Blood: x / Protein: 30 mg/dL / Nitrite: Negative   Leuk Esterase: Trace / RBC: 6-10 /HPF / WBC 3-5   Sq Epi: x / Non Sq Epi: Few / Bacteria: Few
REASON FOR CONSULTATION: myelofibrosis, joint pain    HPI:  Mrs. Chen is a 88 yo WF with a history of Reji 2+ thrombocytosis, dating back to . She had been on Hydrea since Olya 10, 2013. Most recently she was found to be pancytopenic and the Hydrea was held as of 2017. A bone marrow was done on 2017, which a normocellular to mildly hypercellular marrow with mild megakaryocytic hyperplasia and moderate reticulin fibrosis and focal areas of increased blasts (5% ).     She recently established care with Dr. Montes.  Now presents with bilateral ankle pain and swelling as well as wrist pain and swelling.  She   recently completed a medrol dose pack.  She presented to ED with worsening pain of ankles/wrist.  She's s/p 2 units RBC transfusion on 17.  Presents with acute inflammation and pain of joints in the hands and feet.    REVIEW OF SYSTEMS:  Constitutional, Eyes, ENT, Cardiovascular, Respiratory, Gastrointestinal, Genitourinary, Musculoskeletal, Integumentary, Neurological, Psychiatric, Endocrine, Heme/Lymph, and Allergic/Immunologic review of systems are otherwise negative except as noted in the HPI.    PAST MEDICAL & SURGICAL HISTORY:  Hypothyroidism (acquired)  Myelodysplastic syndrome      FAMILY HISTORY:  No pertinent family history in first degree relatives      SOCIAL HISTORY:    Allergies    No Known Allergies    Intolerances        MEDICATIONS  (STANDING):    MEDICATIONS  (PRN):      Vital Signs Last 24 Hrs  T(C): 37.7 (08 Sep 2017 11:20), Max: 37.7 (08 Sep 2017 11:20)  T(F): 99.9 (08 Sep 2017 11:20), Max: 99.9 (08 Sep 2017 11:20)  HR: 86 (08 Sep 2017 11:20) (86 - 99)  BP: 126/71 (08 Sep 2017 11:20) (126/71 - 130/73)  BP(mean): --  RR: 18 (08 Sep 2017 11:20) (18 - 20)  SpO2: 98% (08 Sep 2017 11:20) (98% - 98%)    PHYSICAL EXAM:    GENERAL:elderly female, comfortable  NERVOUS SYSTEM:  Alert & Oriented X3,  EXTREMITIES: bilateral ankle swelling, and wrist swelling Rt>left  SKIN: No rashes or lesions      LABS:                        9.5    19.3  )-----------( 330      ( 08 Sep 2017 08:30 )             27.0         131<L>  |  92<L>  |  16.0  ----------------------------<  154<H>  3.5   |  25.0  |  0.43<L>    Ca    8.8      08 Sep 2017 08:30    TPro  7.3  /  Alb  2.7<L>  /  TBili  1.1  /  DBili  x   /  AST  22  /  ALT  28  /  AlkPhos  263<H>        Urinalysis Basic - ( 08 Sep 2017 13:00 )    Color: Yellow / Appearance: Clear / S.010 / pH: x  Gluc: x / Ketone: Negative  / Bili: Negative / Urobili: Negative mg/dL   Blood: x / Protein: 30 mg/dL / Nitrite: Negative   Leuk Esterase: Trace / RBC: 6-10 /HPF / WBC 3-5   Sq Epi: x / Non Sq Epi: Few / Bacteria: Few
REASON FOR CONSULTATION: myelofibrosis, joint pain    HPI:  Mrs. Chen is a 88 yo WF with a history of Reji 2+ thrombocytosis, dating back to . She had been on Hydrea since Olya 10, 2013. Most recently she was found to be pancytopenic and the Hydrea was held as of 2017. A bone marrow was done on 2017, which a normocellular to mildly hypercellular marrow with mild megakaryocytic hyperplasia and moderate reticulin fibrosis and focal areas of increased blasts (5% ).     She recently established care with Dr. Montes.  Now presents with bilateral ankle pain and swelling as well as wrist pain and swelling.  She   recently completed a medrol dose pack.  She presented to ED with worsening pain of ankles/wrist.  She's s/p 2 units RBC transfusion on 17.  Presents with acute inflammation and pain of joints in the hands and feet.    REVIEW OF SYSTEMS:  Constitutional, Eyes, ENT, Cardiovascular, Respiratory, Gastrointestinal, Genitourinary, Musculoskeletal, Integumentary, Neurological, Psychiatric, Endocrine, Heme/Lymph, and Allergic/Immunologic review of systems are otherwise negative except as noted in the HPI.    PAST MEDICAL & SURGICAL HISTORY:  Hypothyroidism (acquired)  Myelodysplastic syndrome      FAMILY HISTORY:  No pertinent family history in first degree relatives      SOCIAL HISTORY:    Allergies    No Known Allergies    Intolerances        MEDICATIONS  (STANDING):    MEDICATIONS  (PRN):      Vital Signs Last 24 Hrs  T(C): 37.7 (08 Sep 2017 11:20), Max: 37.7 (08 Sep 2017 11:20)  T(F): 99.9 (08 Sep 2017 11:20), Max: 99.9 (08 Sep 2017 11:20)  HR: 86 (08 Sep 2017 11:20) (86 - 99)  BP: 126/71 (08 Sep 2017 11:20) (126/71 - 130/73)  BP(mean): --  RR: 18 (08 Sep 2017 11:20) (18 - 20)  SpO2: 98% (08 Sep 2017 11:20) (98% - 98%)    PHYSICAL EXAM:    GENERAL:elderly female, comfortable  NERVOUS SYSTEM:  Alert & Oriented X3,  EXTREMITIES: bilateral ankle swelling, and wrist swelling Rt>left  SKIN: No rashes or lesions      LABS:                        9.5    19.3  )-----------( 330      ( 08 Sep 2017 08:30 )             27.0         131<L>  |  92<L>  |  16.0  ----------------------------<  154<H>  3.5   |  25.0  |  0.43<L>    Ca    8.8      08 Sep 2017 08:30    TPro  7.3  /  Alb  2.7<L>  /  TBili  1.1  /  DBili  x   /  AST  22  /  ALT  28  /  AlkPhos  263<H>        Urinalysis Basic - ( 08 Sep 2017 13:00 )    Color: Yellow / Appearance: Clear / S.010 / pH: x  Gluc: x / Ketone: Negative  / Bili: Negative / Urobili: Negative mg/dL   Blood: x / Protein: 30 mg/dL / Nitrite: Negative   Leuk Esterase: Trace / RBC: 6-10 /HPF / WBC 3-5   Sq Epi: x / Non Sq Epi: Few / Bacteria: Few
CC: Pain and swelling in wrists and ankles (10 Sep 2017 19:11)    HPI:  87 years old female with PMH of Reji 2 thrombocytosis, Myelofibrosis and Hypothyroidism comes with pain and swelling in hands and ankles. As per patient, her symptoms started on Tuesday and are getting worse. She was seen by Ortho who prescribed Medrol Dose Pack which she finished last week. She was also seen here 2 days ago and was evaluated by Hem/Onc as well. She was recommended to continue steroids and follow up with Rheumatologist which she still has to get an appointment.   Pain is dull and aching 9-10/10 in intensity in both hands and feet. Swelling is getting worse and now she is unable to ambulate and do her activities of daily living. This morning she also noticed redness in her feet. Denies any rash anywhere in body. Denies stiffness in joints. Denies history of immunologic disorder in past. She has chronic pain in her right shoulder but there is no change in it.  Denies fever, night sweats, weight change, abdominal/chest pain, nausea, vomiting, cough, shortness of breath or headache. No recent travel, sick contacts or tick bites.   She was transfused on 9/6/17 for symptomatic anemia. Her symptoms started prior to transfusion. (10 Sep 2017 19:11)    INTERVAL HPI/OVERNIGHT EVENTS: Swelling improved but still has a lot  of pain. Difficulty standing. Also has not had BM since Sunday    Vital Signs Last 24 Hrs  T(C): 36.7 (12 Sep 2017 08:20), Max: 36.9 (11 Sep 2017 23:35)  T(F): 98.1 (12 Sep 2017 08:20), Max: 98.4 (11 Sep 2017 23:35)  HR: 100 (12 Sep 2017 08:20) (89 - 100)  BP: 141/85 (12 Sep 2017 08:20) (131/79 - 141/85)  BP(mean): --  RR: 18 (12 Sep 2017 08:20) (17 - 18)  SpO2: 94% (12 Sep 2017 08:20) (94% - 97%)  I&O's Detail                                8.4    14.0  )-----------( 303      ( 12 Sep 2017 07:19 )             25.4     12 Sep 2017 07:19    131    |  92     |  20.0   ----------------------------<  99     3.8     |  26.0   |  0.48     Ca    8.2        12 Sep 2017 07:19  Phos  3.1       12 Sep 2017 07:19  Mg     1.8       12 Sep 2017 07:19      PT/INR - ( 12 Sep 2017 07:19 )   PT: 13.5 sec;   INR: 1.22 ratio           CAPILLARY BLOOD GLUCOSE  117 (12 Sep 2017 08:07)  130 (11 Sep 2017 22:00)  111 (11 Sep 2017 17:45)              MEDICATIONS  (STANDING):  pantoprazole    Tablet 40 milliGRAM(s) Oral before breakfast  levothyroxine 75 MICROGram(s) Oral daily  predniSONE   Tablet 40 milliGRAM(s) Oral daily  insulin lispro (HumaLOG) corrective regimen sliding scale   SubCutaneous Before meals and at bedtime  dextrose 5%. 1000 milliLiter(s) (50 mL/Hr) IV Continuous <Continuous>  dextrose 50% Injectable 12.5 Gram(s) IV Push once  dextrose 50% Injectable 25 Gram(s) IV Push once  dextrose 50% Injectable 25 Gram(s) IV Push once  heparin  Injectable 5000 Unit(s) SubCutaneous every 12 hours  lisinopril 5 milliGRAM(s) Oral daily  ketorolac   Injectable 15 milliGRAM(s) IV Push every 6 hours  docusate sodium 100 milliGRAM(s) Oral three times a day  polyethylene glycol 3350 17 Gram(s) Oral at bedtime    MEDICATIONS  (PRN):  dextrose Gel 1 Dose(s) Oral once PRN Blood Glucose LESS THAN 70 milliGRAM(s)/deciLiter  glucagon  Injectable 1 milliGRAM(s) IntraMuscular once PRN Glucose <70 milliGRAM(s)/deciLiter  oxyCODONE    5 mG/acetaminophen 325 mG 1 Tablet(s) Oral every 6 hours PRN Moderate Pain (4 - 6)  magnesium hydroxide Suspension 30 milliLiter(s) Oral daily PRN Constipation      RADIOLOGY & ADDITIONAL TESTS:    PHYSICAL EXAM:  General:  No acute distress.   	HEENT: PERRLA. EOMI. Clear conjunctivae. Moist mucus membrane  	Neck: Supple. No JVD.   	Chest: CTA bilaterally - no wheezing, rales or rhonchi.   	Heart: Normal S1 & S2 with RRR. No murmur.   	Abdomen: Soft. Non-tender. Non-distended. + BS  	Ext: 1+ pedal edema on right side and 1+ on left side. No calf tenderness. Hands swollen and warm. Decreased ROM. Feet/Ankles swollen, warm with areas of erythema on dorsum. Decreased ROM due to pain and swelling.  	Neuro: AAO x 3, No focal deficit  	Skin: Warm and Dry  Psychiatry: Normal mood and affect

## 2017-09-15 NOTE — PROGRESS NOTE ADULT - PROVIDER SPECIALTY LIST ADULT
Heme/Onc
Hospitalist
Hospitalist
Rheumatology
Hospitalist
Heme/Onc

## 2017-09-16 LAB
ALBUMIN SERPL ELPH-MCNC: 2.5 G/DL — LOW (ref 3.3–5.2)
ALP SERPL-CCNC: 200 U/L — HIGH (ref 40–120)
ALT FLD-CCNC: 29 U/L — SIGNIFICANT CHANGE UP
ANION GAP SERPL CALC-SCNC: 11 MMOL/L — SIGNIFICANT CHANGE UP (ref 5–17)
ANTI-RIBONUCLEAR PROTEIN: <0.2 AI — SIGNIFICANT CHANGE UP
AST SERPL-CCNC: 15 U/L — SIGNIFICANT CHANGE UP
BILIRUB SERPL-MCNC: 0.5 MG/DL — SIGNIFICANT CHANGE UP (ref 0.4–2)
BUN SERPL-MCNC: 19 MG/DL — SIGNIFICANT CHANGE UP (ref 8–20)
C3 SERPL-MCNC: 149 MG/DL — SIGNIFICANT CHANGE UP (ref 80–180)
C4 SERPL-MCNC: 29 MG/DL — SIGNIFICANT CHANGE UP (ref 10–45)
CALCIUM SERPL-MCNC: 8.3 MG/DL — LOW (ref 8.6–10.2)
CHLORIDE SERPL-SCNC: 96 MMOL/L — LOW (ref 98–107)
CO2 SERPL-SCNC: 26 MMOL/L — SIGNIFICANT CHANGE UP (ref 22–29)
CREAT SERPL-MCNC: 0.47 MG/DL — LOW (ref 0.5–1.3)
DSDNA AB FLD-ACNC: <0.2 AI — SIGNIFICANT CHANGE UP
DSDNA AB SER-ACNC: <12 IU/ML — SIGNIFICANT CHANGE UP
ENA SM AB FLD QL: <0.2 AI — SIGNIFICANT CHANGE UP
ENA SS-A AB FLD IA-ACNC: <0.2 AI — SIGNIFICANT CHANGE UP
GLUCOSE SERPL-MCNC: 111 MG/DL — SIGNIFICANT CHANGE UP (ref 70–115)
HCT VFR BLD CALC: 22.9 % — LOW (ref 37–47)
HGB BLD-MCNC: 7.8 G/DL — LOW (ref 12–16)
LYMPHOCYTES # BLD AUTO: 18 % — LOW (ref 20–55)
MCHC RBC-ENTMCNC: 31.3 PG — HIGH (ref 27–31)
MCHC RBC-ENTMCNC: 34.1 G/DL — SIGNIFICANT CHANGE UP (ref 32–36)
MCV RBC AUTO: 92 FL — SIGNIFICANT CHANGE UP (ref 81–99)
MONOCYTES NFR BLD AUTO: 51 % — HIGH (ref 3–10)
NEUTROPHILS NFR BLD AUTO: 28 % — LOW (ref 37–73)
PLATELET # BLD AUTO: 271 K/UL — SIGNIFICANT CHANGE UP (ref 150–400)
POTASSIUM SERPL-MCNC: 3.9 MMOL/L — SIGNIFICANT CHANGE UP (ref 3.5–5.3)
POTASSIUM SERPL-SCNC: 3.9 MMOL/L — SIGNIFICANT CHANGE UP (ref 3.5–5.3)
PREALB SERPL-MCNC: 15 MG/DL — LOW (ref 18–38)
PROT SERPL-MCNC: 6.1 G/DL — LOW (ref 6.6–8.7)
RBC # BLD: 2.49 M/UL — LOW (ref 4.4–5.2)
RBC # FLD: 18.3 % — HIGH (ref 11–15.6)
SODIUM SERPL-SCNC: 133 MMOL/L — LOW (ref 135–145)
THYROGLOB AB FLD IA-ACNC: <20 IU/ML — SIGNIFICANT CHANGE UP (ref 0–40)
THYROGLOB AB SERPL-ACNC: <20 IU/ML — SIGNIFICANT CHANGE UP (ref 0–40)
THYROPEROXIDASE AB SERPL-ACNC: <10 IU/ML — SIGNIFICANT CHANGE UP (ref 0–34)
WBC # BLD: 11.9 K/UL — HIGH (ref 4.8–10.8)
WBC # FLD AUTO: 11.9 K/UL — HIGH (ref 4.8–10.8)

## 2017-09-16 PROCEDURE — 99222 1ST HOSP IP/OBS MODERATE 55: CPT | Mod: AI,GC

## 2017-09-16 RX ORDER — MENTHOL AND METHYL SALICYLATE 10; 30 G/100G; G/100G
1 STICK TOPICAL
Qty: 0 | Refills: 0 | Status: DISCONTINUED | OUTPATIENT
Start: 2017-09-16 | End: 2017-09-16

## 2017-09-16 RX ORDER — MENTHOL AND METHYL SALICYLATE 10; 30 G/100G; G/100G
1 STICK TOPICAL
Qty: 0 | Refills: 0 | Status: DISCONTINUED | OUTPATIENT
Start: 2017-09-16 | End: 2017-09-17

## 2017-09-16 RX ADMIN — MENTHOL AND METHYL SALICYLATE 1 APPLICATION(S): 10; 30 STICK TOPICAL at 23:13

## 2017-09-16 RX ADMIN — POLYETHYLENE GLYCOL 3350 17 GRAM(S): 17 POWDER, FOR SOLUTION ORAL at 23:13

## 2017-09-16 RX ADMIN — Medication 30 MILLIGRAM(S): at 05:01

## 2017-09-16 RX ADMIN — HEPARIN SODIUM 5000 UNIT(S): 5000 INJECTION INTRAVENOUS; SUBCUTANEOUS at 18:01

## 2017-09-16 RX ADMIN — Medication 650 MILLIGRAM(S): at 10:34

## 2017-09-16 RX ADMIN — GABAPENTIN 100 MILLIGRAM(S): 400 CAPSULE ORAL at 14:31

## 2017-09-16 RX ADMIN — MENTHOL AND METHYL SALICYLATE 1 APPLICATION(S): 10; 30 STICK TOPICAL at 18:01

## 2017-09-16 RX ADMIN — LISINOPRIL 5 MILLIGRAM(S): 2.5 TABLET ORAL at 05:01

## 2017-09-16 RX ADMIN — Medication 100 MILLIGRAM(S): at 14:32

## 2017-09-16 RX ADMIN — Medication 650 MILLIGRAM(S): at 23:50

## 2017-09-16 RX ADMIN — HEPARIN SODIUM 5000 UNIT(S): 5000 INJECTION INTRAVENOUS; SUBCUTANEOUS at 05:01

## 2017-09-16 RX ADMIN — Medication 75 MICROGRAM(S): at 05:01

## 2017-09-16 RX ADMIN — Medication 650 MILLIGRAM(S): at 04:47

## 2017-09-16 NOTE — PROGRESS NOTE ADULT - SUBJECTIVE AND OBJECTIVE BOX
HPI:    87 y.o. F w/ PMHx Reji 2 thrombocytosis and Myelofibrosis presented to [facility] on 09/10/17 with diffuse joint pains.  Pt reports pain started in her neck and shoulders the week before admission.  Pt seen by outpt orthopedics, and started on Medrol Dose pack.  Symptoms initially improved, but then pt began to experience pain and swelling in both hands and feet.  Pain worsened to the point that pt was having difficulty with walking.  On admission, Right hand Xray revealed periarticular changes consistent with Osteoarthritis versus CPPD disease; diffuse soft tissue; no fracture. Bilateral wrist Xray showed no fracture or dislocation.  Right ankle Xray showed soft tissue swelling, no fracture or dislocation.  Rheumatology consulted.  Serological studies performed.  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 less likely RA vs. less likely polymyalgia rheumatica. Pt was started on prednisone 40mg daily. As per Rheum will taper steroids by 10mg every 5 days to 10 mg daily, will maintain until follow up as outpatient.  Pt seen by hematology, transfused on 09/06 for symptomatic anemia.  Hospital course complicated by elevated BP, pt started by lisinopril.  Pt also had marked extremities swelling.  Doppler for both UEs and LEs performed, which were negative for DVTs.    INTERVAL SUBJECTIVE & REVIEW OF SYMPTOMS: Patient seen at bedside. Feels ok, except pain in hands still + and affecting sleep and activity      REVIEW OF SYSTEMS  [   ] Constitutional WNL  [ x  ] Cardio WNL  [  x ] Resp WNL  [  x ] GI WNL  [  x ]  WNL  [   ] Heme WNL  [   ] Endo WNL  [   ] Skin WNL  [   ] MSK WNL  [  x ] Neuro WNL  [  x ] Cognitive WNL  [   ] Psych WNL    VITAL SIGNS  Vital Signs Last 24 Hrs  T(C): 36.6 (16 Sep 2017 11:08), Max: 36.7 (15 Sep 2017 15:39)  T(F): 97.8 (16 Sep 2017 11:08), Max: 98 (15 Sep 2017 15:39)  HR: 96 (16 Sep 2017 11:08) (96 - 98)  BP: 120/66 (16 Sep 2017 11:08) (112/63 - 129/71)  BP(mean): --  RR: 18 (16 Sep 2017 11:08) (18 - 18)  SpO2: 98% (16 Sep 2017 11:08) (97% - 99%)    PHYSICAL EXAM  General: NAD  Cardio: S1S2+  Resp: clear  Abdomen: soft, NT  Extrem: No edema, no calf tenderness. Motor 4/5   Hands: no edema or redness.      Functional Exam: Eval today    RECENT LABS:                        7.8    11.9  )-----------( 271      ( 16 Sep 2017 05:54 )             22.9     09-16    133<L>  |  96<L>  |  19.0  ----------------------------<  111  3.9   |  26.0  |  0.47<L>    Ca    8.3<L>      16 Sep 2017 05:54  Mg     1.9     09-15    TPro  6.1<L>  /  Alb  2.5<L>  /  TBili  0.5  /  DBili  x   /  AST  15  /  ALT  29  /  AlkPhos  200<H>  09-16    MEDICATIONS  (STANDING):  heparin  Injectable 5000 Unit(s) SubCutaneous every 12 hours  docusate sodium 100 milliGRAM(s) Oral three times a day  polyethylene glycol 3350 17 Gram(s) Oral at bedtime  pantoprazole    Tablet 40 milliGRAM(s) Oral before breakfast  hydrocortisone 2.5% Rectal Cream 1 Application(s) Rectal daily  gabapentin 100 milliGRAM(s) Oral daily  lisinopril 5 milliGRAM(s) Oral daily  levothyroxine 75 MICROGram(s) Oral daily  predniSONE   Tablet 30 milliGRAM(s) Oral daily    MEDICATIONS  (PRN):  magnesium hydroxide Suspension 30 milliLiter(s) Oral daily PRN Constipation  acetaminophen   Tablet 650 milliGRAM(s) Oral every 6 hours PRN Mild Pain (1 - 3)  methyl salicylate 14%/menthol 6% Topical Ointment 1 Application(s) Topical two times a day PRN b/l hand pain      A/P:    87 year old female with functional deficits from 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 Tylenol prn, neurontin    h/o Myelofibrosis/MDS with thrombocytosis: Hem onc fu as needed    DVT prophylaxis: on heparin sq    HTN: on lisinopril    Hypothyroism: on synthroid    GI prophylaxis: on protonix    Diet: regular withthin    Bowel program: Toilet schedule prn    Bowel program: colace, miralax HPI:    87 y.o. F w/ PMHx Reji 2 thrombocytosis and Myelofibrosis presented to [facility] on 09/10/17 with diffuse joint pains.  Pt reports pain started in her neck and shoulders the week before admission.  Pt seen by outpt orthopedics, and started on Medrol Dose pack.  Symptoms initially improved, but then pt began to experience pain and swelling in both hands and feet.  Pain worsened to the point that pt was having difficulty with walking.  On admission, Right hand Xray revealed periarticular changes consistent with Osteoarthritis versus CPPD disease; diffuse soft tissue; no fracture. Bilateral wrist Xray showed no fracture or dislocation.  Right ankle Xray showed soft tissue swelling, no fracture or dislocation.  Rheumatology consulted.  Serological studies performed.  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 less likely RA vs. less likely polymyalgia rheumatica. Pt was started on prednisone 40mg daily. As per Rheum will taper steroids by 10mg every 5 days to 10 mg daily, will maintain until follow up as outpatient.  Pt seen by hematology, transfused on 09/06 for symptomatic anemia.  Hospital course complicated by elevated BP, pt started by lisinopril.  Pt also had marked extremities swelling.  Doppler for both UEs and LEs performed, which were negative for DVTs.    INTERVAL SUBJECTIVE & REVIEW OF SYMPTOMS: Patient seen at bedside. Feels ok, except pain in hands still + and affecting sleep and activity      REVIEW OF SYSTEMS  [   ] Constitutional WNL  [ x  ] Cardio WNL  [  x ] Resp WNL  [  x ] GI WNL  [  x ]  WNL  [   ] Heme WNL  [   ] Endo WNL  [   ] Skin WNL  [   ] MSK WNL  [  x ] Neuro WNL  [  x ] Cognitive WNL  [   ] Psych WNL    VITAL SIGNS  Vital Signs Last 24 Hrs  T(C): 36.6 (16 Sep 2017 11:08), Max: 36.7 (15 Sep 2017 15:39)  T(F): 97.8 (16 Sep 2017 11:08), Max: 98 (15 Sep 2017 15:39)  HR: 96 (16 Sep 2017 11:08) (96 - 98)  BP: 120/66 (16 Sep 2017 11:08) (112/63 - 129/71)  BP(mean): --  RR: 18 (16 Sep 2017 11:08) (18 - 18)  SpO2: 98% (16 Sep 2017 11:08) (97% - 99%)    PHYSICAL EXAM  General: NAD  Cardio: S1S2+  Resp: clear  Abdomen: soft, NT  Extrem: No edema, no calf tenderness. Motor 4/5   Hands: no edema or redness.      Functional Exam: Eval today    RECENT LABS:                        7.8    11.9  )-----------( 271      ( 16 Sep 2017 05:54 )             22.9     09-16    133<L>  |  96<L>  |  19.0  ----------------------------<  111  3.9   |  26.0  |  0.47<L>    Ca    8.3<L>      16 Sep 2017 05:54  Mg     1.9     09-15    TPro  6.1<L>  /  Alb  2.5<L>  /  TBili  0.5  /  DBili  x   /  AST  15  /  ALT  29  /  AlkPhos  200<H>  09-16    MEDICATIONS  (STANDING):  heparin  Injectable 5000 Unit(s) SubCutaneous every 12 hours  docusate sodium 100 milliGRAM(s) Oral three times a day  polyethylene glycol 3350 17 Gram(s) Oral at bedtime  pantoprazole    Tablet 40 milliGRAM(s) Oral before breakfast  hydrocortisone 2.5% Rectal Cream 1 Application(s) Rectal daily  gabapentin 100 milliGRAM(s) Oral daily  lisinopril 5 milliGRAM(s) Oral daily  levothyroxine 75 MICROGram(s) Oral daily  predniSONE   Tablet 30 milliGRAM(s) Oral daily    MEDICATIONS  (PRN):  magnesium hydroxide Suspension 30 milliLiter(s) Oral daily PRN Constipation  acetaminophen   Tablet 650 milliGRAM(s) Oral every 6 hours PRN Mild Pain (1 - 3)  methyl salicylate 14%/menthol 6% Topical Ointment 1 Application(s) Topical two times a day PRN b/l hand pain      A/P:    87 year old female with functional deficits from 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 Tylenol prn, neurontin    h/o Myelofibrosis/MDS with thrombocytosis: Hem onc fu as needed. f/u CBC in am as serial decline in H/Hct noted    DVT prophylaxis: on heparin sq    HTN: on lisinopril    Hypothyroism: on synthroid    GI prophylaxis: on protonix    Diet: regular withthin    Bowel program: Toilet schedule prn    Bowel program: colace, miralax

## 2017-09-17 LAB
HCT VFR BLD CALC: 22.1 % — LOW (ref 37–47)
HGB BLD-MCNC: 7.6 G/DL — LOW (ref 12–16)
MCHC RBC-ENTMCNC: 31.8 PG — HIGH (ref 27–31)
MCHC RBC-ENTMCNC: 34.4 G/DL — SIGNIFICANT CHANGE UP (ref 32–36)
MCV RBC AUTO: 92.5 FL — SIGNIFICANT CHANGE UP (ref 81–99)
PLATELET # BLD AUTO: 284 K/UL — SIGNIFICANT CHANGE UP (ref 150–400)
RBC # BLD: 2.39 M/UL — LOW (ref 4.4–5.2)
RBC # FLD: 18.5 % — HIGH (ref 11–15.6)
WBC # BLD: 11.6 K/UL — HIGH (ref 4.8–10.8)
WBC # FLD AUTO: 11.6 K/UL — HIGH (ref 4.8–10.8)

## 2017-09-17 PROCEDURE — 99232 SBSQ HOSP IP/OBS MODERATE 35: CPT | Mod: GC

## 2017-09-17 PROCEDURE — 99233 SBSQ HOSP IP/OBS HIGH 50: CPT

## 2017-09-17 RX ORDER — ACETAMINOPHEN 500 MG
650 TABLET ORAL EVERY 6 HOURS
Qty: 0 | Refills: 0 | Status: COMPLETED | OUTPATIENT
Start: 2017-09-17 | End: 2017-09-19

## 2017-09-17 RX ORDER — GABAPENTIN 400 MG/1
100 CAPSULE ORAL
Qty: 0 | Refills: 0 | Status: DISCONTINUED | OUTPATIENT
Start: 2017-09-17 | End: 2017-09-18

## 2017-09-17 RX ORDER — MENTHOL AND METHYL SALICYLATE 10; 30 G/100G; G/100G
1 STICK TOPICAL
Qty: 0 | Refills: 0 | Status: DISCONTINUED | OUTPATIENT
Start: 2017-09-17 | End: 2017-09-29

## 2017-09-17 RX ORDER — GABAPENTIN 400 MG/1
100 CAPSULE ORAL ONCE
Qty: 0 | Refills: 0 | Status: COMPLETED | OUTPATIENT
Start: 2017-09-17 | End: 2017-09-17

## 2017-09-17 RX ADMIN — Medication 75 MICROGRAM(S): at 05:25

## 2017-09-17 RX ADMIN — PANTOPRAZOLE SODIUM 40 MILLIGRAM(S): 20 TABLET, DELAYED RELEASE ORAL at 05:25

## 2017-09-17 RX ADMIN — Medication 650 MILLIGRAM(S): at 17:35

## 2017-09-17 RX ADMIN — GABAPENTIN 100 MILLIGRAM(S): 400 CAPSULE ORAL at 17:36

## 2017-09-17 RX ADMIN — Medication 650 MILLIGRAM(S): at 23:44

## 2017-09-17 RX ADMIN — HEPARIN SODIUM 5000 UNIT(S): 5000 INJECTION INTRAVENOUS; SUBCUTANEOUS at 05:25

## 2017-09-17 RX ADMIN — MENTHOL AND METHYL SALICYLATE 1 APPLICATION(S): 10; 30 STICK TOPICAL at 05:26

## 2017-09-17 RX ADMIN — Medication 30 MILLIGRAM(S): at 05:25

## 2017-09-17 RX ADMIN — Medication 650 MILLIGRAM(S): at 11:18

## 2017-09-17 RX ADMIN — LISINOPRIL 5 MILLIGRAM(S): 2.5 TABLET ORAL at 05:25

## 2017-09-17 RX ADMIN — MENTHOL AND METHYL SALICYLATE 1 APPLICATION(S): 10; 30 STICK TOPICAL at 23:22

## 2017-09-17 RX ADMIN — Medication 100 MILLIGRAM(S): at 05:25

## 2017-09-17 RX ADMIN — Medication 650 MILLIGRAM(S): at 05:34

## 2017-09-17 RX ADMIN — HEPARIN SODIUM 5000 UNIT(S): 5000 INJECTION INTRAVENOUS; SUBCUTANEOUS at 17:36

## 2017-09-17 RX ADMIN — GABAPENTIN 100 MILLIGRAM(S): 400 CAPSULE ORAL at 11:18

## 2017-09-17 NOTE — PROGRESS NOTE ADULT - ASSESSMENT
87 years old female with PMH of Reji 2 thrombocytosis, Myelofibrosis and Hypothyroidism comes with pain and swelling in hands and ankles. As per patient, her symptoms started on Tuesday and are getting worse. She was seen by Ortho who prescribed Medrol Dose Pack which she finished last week. She was also seen here 2 days ago and was evaluated by Hem/Onc as well. She was recommended to continue steroids and follow up with Rheumatologist which she still has to get an appointment.   Pain is dull and aching 9-10/10 in intensity in both hands and feet. Swelling is getting worse and now she is unable to ambulate and do her activities of daily living. This morning she also noticed redness in her feet. Denies any rash anywhere in body. Denies stiffness in joints. Denies history of immunologic disorder in past. She has chronic pain in her right shoulder but there is no change in it.  Denies fever, night sweats, weight change, abdominal/chest pain, nausea, vomiting, cough, shortness of breath or headache. No recent travel, sick contacts or tick bites.   She was transfused on 9/6/17 for symptomatic anemia. Her symptoms started prior to transfusion.    1) Polyarthralgia- suspect Remitting Seronegative Symmetrical Synovitis as per Rheum  - No signs of infection  - Continue Prednisone 40 mg daily  - trial of Gabapentin , agree with increased dose   - Hepatitis Panel negative, Lyme  serology negative ,  Parvovirus IgM - negative , IgG- elevated - past exposure to parvovirus +  - Rheumatology following Dr. Rod,  appreciate input, continue steroids  - PT daily, PM&R eval requested  - GI Prophylaxis  - Accu checks dc, patient BG <150  2) Hypokalemia  - replaced, monitor lytes  3) Myelofibrosis  - Heme/Onc consult Dr. Mosqueda, input appreciated  - s/p transfusion   - monitor labs  - Patient is waiting to be started on Jakafi  - Outpatient follow up with Dr. Montes  4) Hypothyroidism  - Levothyroxine 75 mcg  5) Elevated BP without history of HTN  - Monitor BP.  started on Lisinopril. BP improved  DVT Prophylaxis -- Heparin 5000 Units  6) Constipation- Bowel RX    7) Problem / Plan - Anemia - chronic - s/p blood transfusion . Patient is asymptomatic , follow CBC in am and transfuse PRN . 87 years old female with PMH of Reji 2 thrombocytosis, Myelofibrosis and Hypothyroidism comes with pain and swelling in hands and ankles. As per patient, her symptoms started on Tuesday and are getting worse. She was seen by Ortho who prescribed Medrol Dose Pack which she finished last week. She was also seen here 2 days ago and was evaluated by Hem/Onc as well. She was recommended to continue steroids and follow up with Rheumatologist which she still has to get an appointment.   Pain is dull and aching 9-10/10 in intensity in both hands and feet. Swelling is getting worse and now she is unable to ambulate and do her activities of daily living. This morning she also noticed redness in her feet. Denies any rash anywhere in body. Denies stiffness in joints. Denies history of immunologic disorder in past. She has chronic pain in her right shoulder but there is no change in it.  Denies fever, night sweats, weight change, abdominal/chest pain, nausea, vomiting, cough, shortness of breath or headache. No recent travel, sick contacts or tick bites.   She was transfused on 9/6/17 for symptomatic anemia. Her symptoms started prior to transfusion.    1) Polyarthralgia- suspect Remitting Seronegative Symmetrical Synovitis as per Rheum  - No signs of infection  - Continue Prednisone 40 mg daily  - trial of Gabapentin , agree with increased dose   - Hepatitis Panel negative, Lyme  serology negative ,  Parvovirus IgM - negative , IgG- elevated - past exposure to parvovirus +  - Rheumatology  input appreciated , continue steroids . If pain persist re call reumathology  - PT daily, PM&R eval requested  - GI Prophylaxis  - Accu checks dc, patient BG <150  2) Hypokalemia  - replaced, monitor lytes  3) Myelofibrosis  - Heme/Onc consult Dr. Mosqueda, input appreciated  - s/p transfusion   - monitor labs  - Patient is waiting to be started on Jakafi  - Outpatient follow up with Dr. Montes  4) Hypothyroidism  - Levothyroxine 75 mcg  5) Elevated BP without history of HTN  - Monitor BP.  started on Lisinopril. BP improved  DVT Prophylaxis -- Heparin 5000 Units  6) Constipation- Bowel RX    7) Problem / Plan - Anemia - chronic - s/p blood transfusion . Patient is asymptomatic , follow CBC in am and transfuse PRN .

## 2017-09-17 NOTE — PROGRESS NOTE ADULT - SUBJECTIVE AND OBJECTIVE BOX
HPI:    87 y.o. F w/ PMHx Reji 2 thrombocytosis and Myelofibrosis presented to [facility] on 09/10/17 with diffuse joint pains.  Pt reports pain started in her neck and shoulders the week before admission.  Pt seen by outpt orthopedics, and started on Medrol Dose pack.  Symptoms initially improved, but then pt began to experience pain and swelling in both hands and feet.  Pain worsened to the point that pt was having difficulty with walking.  On admission, Right hand Xray revealed periarticular changes consistent with Osteoarthritis versus CPPD disease; diffuse soft tissue; no fracture. Bilateral wrist Xray showed no fracture or dislocation.  Right ankle Xray showed soft tissue swelling, no fracture or dislocation.  Rheumatology consulted.  Serological studies performed.  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 less likely RA vs. less likely polymyalgia rheumatica. Pt was started on prednisone 40mg daily. As per Rheum will taper steroids by 10mg every 5 days to 10 mg daily, will maintain until follow up as outpatient.  Pt seen by hematology, transfused on 09/06 for symptomatic anemia.  Hospital course complicated by elevated BP, pt started by lisinopril.  Pt also had marked extremities swelling.  Doppler for both UEs and LEs performed, which were negative for DVTs.    INTERVAL SUBJECTIVE & REVIEW OF SYMPTOMS: Continues to have pain in hands and this am states that her bones hurt and " everything hurts". Somewhat vague with complaints of pain. States that the analgesic cream helped yesterday, but was burning this am when nurse reapplied yet.    Vital Signs Last 24 Hrs  T(C): 37 (17 Sep 2017 04:55), Max: 37.1 (16 Sep 2017 20:35)  T(F): 98.6 (17 Sep 2017 04:55), Max: 98.8 (16 Sep 2017 20:35)  HR: 103 (17 Sep 2017 04:55) (100 - 103)  BP: 133/70 (17 Sep 2017 04:55) (112/70 - 133/70)  BP(mean): --  RR: 16 (17 Sep 2017 04:55) (16 - 18)  SpO2: 97% (17 Sep 2017 04:55) (95% - 97%)      REVIEW OF SYSTEMS  [   ] Constitutional WNL  [ x  ] Cardio WNL  [  x ] Resp WNL  [  x ] GI WNL  [  x ]  WNL  [   ] Heme WNL  [   ] Endo WNL  [   ] Skin WNL  [   ] MSK WNL  [  x ] Neuro WNL  [  x ] Cognitive WNL  [   ] Psych WNL    Vital Signs Last 24 Hrs  T(C): 37 (17 Sep 2017 04:55), Max: 37.1 (16 Sep 2017 20:35)  T(F): 98.6 (17 Sep 2017 04:55), Max: 98.8 (16 Sep 2017 20:35)  HR: 103 (17 Sep 2017 04:55) (100 - 103)  BP: 133/70 (17 Sep 2017 04:55) (112/70 - 133/70)  BP(mean): --  RR: 16 (17 Sep 2017 04:55) (16 - 18)  SpO2: 97% (17 Sep 2017 04:55) (95% - 97%)      PHYSICAL EXAM  General: NAD  Cardio: S1S2+  Resp: clear  Abdomen: soft, NT  Extrem: No edema, no calf tenderness. Motor 4/5   Hands: left hand fingers - some swelling noted today. no edema or redness.  No tenderness on palpation of digits    Functional Exam: Total to mod assist with ADLs.     RECENT LABS:                          7.6    11.6  )-----------( 284      ( 17 Sep 2017 07:28 )             22.1                         7.8    11.9  )-----------( 271      ( 16 Sep 2017 05:54 )             22.9     09-16    133<L>  |  96<L>  |  19.0  ----------------------------<  111  3.9   |  26.0  |  0.47<L>    Ca    8.3<L>      16 Sep 2017 05:54  Mg     1.9     09-15    TPro  6.1<L>  /  Alb  2.5<L>  /  TBili  0.5  /  DBili  x   /  AST  15  /  ALT  29  /  AlkPhos  200<H>  09-16    MEDICATIONS  (STANDING):  heparin  Injectable 5000 Unit(s) SubCutaneous every 12 hours  docusate sodium 100 milliGRAM(s) Oral three times a day  polyethylene glycol 3350 17 Gram(s) Oral at bedtime  pantoprazole    Tablet 40 milliGRAM(s) Oral before breakfast  hydrocortisone 2.5% Rectal Cream 1 Application(s) Rectal daily  lisinopril 5 milliGRAM(s) Oral daily  levothyroxine 75 MICROGram(s) Oral daily  predniSONE   Tablet 30 milliGRAM(s) Oral daily  acetaminophen   Tablet 650 milliGRAM(s) Oral every 6 hours  gabapentin 100 milliGRAM(s) Oral two times a day  gabapentin 100 milliGRAM(s) Oral once    MEDICATIONS  (PRN):  magnesium hydroxide Suspension 30 milliLiter(s) Oral daily PRN Constipation  methyl salicylate 14%/menthol 6% Topical Ointment 1 Application(s) Topical four times a day PRN pain      A/P:    87 year old female with functional deficits from 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 Tylenol changed to q6h for 2 days. Neurontin increased to BID. If pain/symptoms persists recall rheumatology.     h/o Myelofibrosis/MDS with thrombocytosis: Hem onc fu as needed. s serial decline in H/Hct noted. Will     DVT prophylaxis: on heparin sq    HTN: on lisinopril    Hypothyrodism: on synthroid    GI prophylaxis: on protonix    Diet: regular with thin    Bowel program: Toilet schedule prn    Bowel program: colace, miralax    Medical fu today

## 2017-09-17 NOTE — PROGRESS NOTE ADULT - SUBJECTIVE AND OBJECTIVE BOX
CC: Pain and swelling in wrists and ankles     INTERVAL HPI/OVERNIGHT EVENTS: Still having pain, improved with Tylenol and gabapentin. Denies chest pain, SOB, dizziness, lightheadedness, nausea, vomiting, fever, chills    PAST MEDICAL & SURGICAL HISTORY:  Myelofibrosis  Thrombocytosis: ANNMARIE 2 thrombocytosis  Hypothyroidism (acquired)  Myelodysplastic syndrome  No significant past surgical history      MEDICATIONS  (STANDING):  heparin  Injectable 5000 Unit(s) SubCutaneous every 12 hours  docusate sodium 100 milliGRAM(s) Oral three times a day  polyethylene glycol 3350 17 Gram(s) Oral at bedtime  pantoprazole    Tablet 40 milliGRAM(s) Oral before breakfast  hydrocortisone 2.5% Rectal Cream 1 Application(s) Rectal daily  lisinopril 5 milliGRAM(s) Oral daily  levothyroxine 75 MICROGram(s) Oral daily  predniSONE   Tablet 30 milliGRAM(s) Oral daily  acetaminophen   Tablet 650 milliGRAM(s) Oral every 6 hours  gabapentin 100 milliGRAM(s) Oral two times a day    MEDICATIONS  (PRN):  magnesium hydroxide Suspension 30 milliLiter(s) Oral daily PRN Constipation  methyl salicylate 14%/menthol 6% Topical Ointment 1 Application(s) Topical four times a day PRN pain      LABS:                          7.6    11.6  )-----------( 284      ( 17 Sep 2017 07:28 )             22.1     09-16    133<L>  |  96<L>  |  19.0  ----------------------------<  111  3.9   |  26.0  |  0.47<L>    Ca    8.3<L>      16 Sep 2017 05:54    TPro  6.1<L>  /  Alb  2.5<L>  /  TBili  0.5  /  DBili  x   /  AST  15  /  ALT  29  /  AlkPhos  200<H>  09-16          RADIOLOGY & ADDITIONAL TESTS:      REVIEW OF SYSTEMS:    CONSTITUTIONAL: No fever, weight loss, or fatigue  EYES: No eye pain, visual disturbances, or discharge  ENMT:  No difficulty hearing, tinnitus, vertigo; No sinus or throat pain  NECK: No pain or stiffness  RESPIRATORY: No cough, wheezing, chills or hemoptysis; No shortness of breath  CARDIOVASCULAR: No chest pain, palpitations, dizziness, or leg swelling  GASTROINTESTINAL: No abdominal or epigastric pain. No nausea, vomiting, or hematemesis; No diarrhea or constipation. No melena or hematochezia.  GENITOURINARY: No dysuria, frequency, hematuria, or incontinence  NEUROLOGICAL: No headaches, memory loss, loss of strength, numbness, or tremors  SKIN: No itching, burning, rashes, or lesions   LYMPH NODES: No enlarged glands  ENDOCRINE: No heat or cold intolerance; No hair loss  MUSCULOSKELETAL:   PSYCHIATRIC: No depression, anxiety, mood swings, or difficulty sleeping  HEME/LYMPH: No easy bruising, or bleeding gums  ALLERGY AND IMMUNOLOGIC: No hives or eczema    Vital Signs Last 24 Hrs  T(C): 37 (17 Sep 2017 04:55), Max: 37.1 (16 Sep 2017 20:35)  T(F): 98.6 (17 Sep 2017 04:55), Max: 98.8 (16 Sep 2017 20:35)  HR: 103 (17 Sep 2017 04:55) (100 - 103)  BP: 133/70 (17 Sep 2017 04:55) (112/70 - 133/70)  BP(mean): --  RR: 16 (17 Sep 2017 04:55) (16 - 18)  SpO2: 97% (17 Sep 2017 04:55) (95% - 97%)  PHYSICAL EXAM:    GENERAL: NAD, well-groomed, well-developed  HEAD:  Atraumatic, Normocephalic  EYES: EOMI, PERRLA, conjunctiva and sclera clear  NECK: Supple, No JVD, Normal thyroid  NERVOUS SYSTEM:  Alert & Oriented X3, no focal deficit  CHEST/LUNG: CTA b/l ,  no  rales, rhonchi, wheezing, or rubs  HEART: Regular rate and rhythm; No murmurs, rubs, or gallops  ABDOMEN: Soft, Nontender, Nondistended; Bowel sounds present  EXTREMITIES:  2+ Peripheral Pulses, No clubbing, cyanosis, or edema  LYMPH: No lymphadenopathy noted  SKIN: No rashes or lesions CC: Pain and swelling in wrists and ankles     INTERVAL HPI/OVERNIGHT EVENTS: Still having pain in hands and feet, body pain . Denies chest pain, SOB, dizziness, lightheadedness, nausea, vomiting, fever, chills    PAST MEDICAL & SURGICAL HISTORY:  Myelofibrosis  Thrombocytosis: ANNMARIE 2 thrombocytosis  Hypothyroidism (acquired)  Myelodysplastic syndrome  No significant past surgical history      MEDICATIONS  (STANDING):  heparin  Injectable 5000 Unit(s) SubCutaneous every 12 hours  docusate sodium 100 milliGRAM(s) Oral three times a day  polyethylene glycol 3350 17 Gram(s) Oral at bedtime  pantoprazole    Tablet 40 milliGRAM(s) Oral before breakfast  hydrocortisone 2.5% Rectal Cream 1 Application(s) Rectal daily  lisinopril 5 milliGRAM(s) Oral daily  levothyroxine 75 MICROGram(s) Oral daily  predniSONE   Tablet 30 milliGRAM(s) Oral daily  acetaminophen   Tablet 650 milliGRAM(s) Oral every 6 hours  gabapentin 100 milliGRAM(s) Oral two times a day    MEDICATIONS  (PRN):  magnesium hydroxide Suspension 30 milliLiter(s) Oral daily PRN Constipation  methyl salicylate 14%/menthol 6% Topical Ointment 1 Application(s) Topical four times a day PRN pain      LABS:                          7.6    11.6  )-----------( 284      ( 17 Sep 2017 07:28 )             22.1     09-16    133<L>  |  96<L>  |  19.0  ----------------------------<  111  3.9   |  26.0  |  0.47<L>    Ca    8.3<L>      16 Sep 2017 05:54    TPro  6.1<L>  /  Alb  2.5<L>  /  TBili  0.5  /  DBili  x   /  AST  15  /  ALT  29  /  AlkPhos  200<H>  09-16          RADIOLOGY & ADDITIONAL TESTS:      REVIEW OF SYSTEMS:    b/l hand and foot pain , body pain , all other systems are reviewed and are negative     Vital Signs Last 24 Hrs  T(C): 37 (17 Sep 2017 04:55), Max: 37.1 (16 Sep 2017 20:35)  T(F): 98.6 (17 Sep 2017 04:55), Max: 98.8 (16 Sep 2017 20:35)  HR: 103 (17 Sep 2017 04:55) (100 - 103)  BP: 133/70 (17 Sep 2017 04:55) (112/70 - 133/70)  BP(mean): --  RR: 16 (17 Sep 2017 04:55) (16 - 18)  SpO2: 97% (17 Sep 2017 04:55) (95% - 97%)    PHYSICAL EXAM:    GENERAL: NAD, well-groomed, well-developed  HEAD:  Atraumatic, Normocephalic  EYES: EOMI, PERRLA, conjunctiva and sclera clear  NECK: Supple, No JVD, Normal thyroid  NERVOUS SYSTEM:  Alert & Oriented X3, no focal deficit  CHEST/LUNG: CTA b/l ,  no  rales, rhonchi, wheezing, or rubs  HEART: Regular rate and rhythm; No murmurs, rubs, or gallops  ABDOMEN: Soft, Nontender, Nondistended; Bowel sounds present  EXTREMITIES:  2+ Peripheral Pulses, No clubbing, cyanosis, or edema  LYMPH: No lymphadenopathy noted  SKIN: No rashes or lesions

## 2017-09-18 ENCOUNTER — APPOINTMENT (OUTPATIENT)
Dept: HEMATOLOGY ONCOLOGY | Facility: CLINIC | Age: 82
End: 2017-09-18

## 2017-09-18 PROCEDURE — 99233 SBSQ HOSP IP/OBS HIGH 50: CPT

## 2017-09-18 PROCEDURE — 99232 SBSQ HOSP IP/OBS MODERATE 35: CPT | Mod: AI

## 2017-09-18 RX ORDER — ASCORBIC ACID 60 MG
500 TABLET,CHEWABLE ORAL DAILY
Qty: 0 | Refills: 0 | Status: DISCONTINUED | OUTPATIENT
Start: 2017-09-18 | End: 2017-10-03

## 2017-09-18 RX ORDER — GABAPENTIN 400 MG/1
100 CAPSULE ORAL THREE TIMES A DAY
Qty: 0 | Refills: 0 | Status: DISCONTINUED | OUTPATIENT
Start: 2017-09-18 | End: 2017-09-27

## 2017-09-18 RX ORDER — LIDOCAINE AND PRILOCAINE CREAM 25; 25 MG/G; MG/G
1 CREAM TOPICAL EVERY 4 HOURS
Qty: 0 | Refills: 0 | Status: DISCONTINUED | OUTPATIENT
Start: 2017-09-18 | End: 2017-09-29

## 2017-09-18 RX ORDER — TRAMADOL HYDROCHLORIDE 50 MG/1
25 TABLET ORAL EVERY 4 HOURS
Qty: 0 | Refills: 0 | Status: DISCONTINUED | OUTPATIENT
Start: 2017-09-18 | End: 2017-09-24

## 2017-09-18 RX ORDER — TRAMADOL HYDROCHLORIDE 50 MG/1
50 TABLET ORAL EVERY 4 HOURS
Qty: 0 | Refills: 0 | Status: DISCONTINUED | OUTPATIENT
Start: 2017-09-18 | End: 2017-09-19

## 2017-09-18 RX ORDER — HYDROCORTISONE 1 %
1 OINTMENT (GRAM) TOPICAL DAILY
Qty: 0 | Refills: 0 | Status: DISCONTINUED | OUTPATIENT
Start: 2017-09-18 | End: 2017-10-03

## 2017-09-18 RX ADMIN — HEPARIN SODIUM 5000 UNIT(S): 5000 INJECTION INTRAVENOUS; SUBCUTANEOUS at 05:31

## 2017-09-18 RX ADMIN — Medication 650 MILLIGRAM(S): at 12:37

## 2017-09-18 RX ADMIN — Medication 500 MILLIGRAM(S): at 12:38

## 2017-09-18 RX ADMIN — Medication 100 MILLIGRAM(S): at 22:12

## 2017-09-18 RX ADMIN — Medication 75 MICROGRAM(S): at 05:30

## 2017-09-18 RX ADMIN — GABAPENTIN 100 MILLIGRAM(S): 400 CAPSULE ORAL at 13:09

## 2017-09-18 RX ADMIN — Medication 100 MILLIGRAM(S): at 05:30

## 2017-09-18 RX ADMIN — LISINOPRIL 5 MILLIGRAM(S): 2.5 TABLET ORAL at 05:30

## 2017-09-18 RX ADMIN — POLYETHYLENE GLYCOL 3350 17 GRAM(S): 17 POWDER, FOR SOLUTION ORAL at 22:12

## 2017-09-18 RX ADMIN — HEPARIN SODIUM 5000 UNIT(S): 5000 INJECTION INTRAVENOUS; SUBCUTANEOUS at 18:15

## 2017-09-18 RX ADMIN — PANTOPRAZOLE SODIUM 40 MILLIGRAM(S): 20 TABLET, DELAYED RELEASE ORAL at 05:30

## 2017-09-18 RX ADMIN — GABAPENTIN 100 MILLIGRAM(S): 400 CAPSULE ORAL at 22:12

## 2017-09-18 RX ADMIN — Medication 30 MILLIGRAM(S): at 05:30

## 2017-09-18 RX ADMIN — Medication 650 MILLIGRAM(S): at 18:15

## 2017-09-18 RX ADMIN — MENTHOL AND METHYL SALICYLATE 1 APPLICATION(S): 10; 30 STICK TOPICAL at 10:39

## 2017-09-18 RX ADMIN — Medication 650 MILLIGRAM(S): at 05:31

## 2017-09-18 RX ADMIN — Medication 1 TABLET(S): at 12:38

## 2017-09-18 RX ADMIN — GABAPENTIN 100 MILLIGRAM(S): 400 CAPSULE ORAL at 05:30

## 2017-09-18 NOTE — PROGRESS NOTE ADULT - SUBJECTIVE AND OBJECTIVE BOX
HPI:    87 y.o. F w/ PMHx Reji 2 thrombocytosis and Myelofibrosis presented to [facility] on 09/10/17 with diffuse joint pains.  Pt reports pain started in her neck and shoulders the week before admission.  Pt seen by outpt orthopedics, and started on Medrol Dose pack.  Symptoms initially improved, but then pt began to experience pain and swelling in both hands and feet.  Pain worsened to the point that pt was having difficulty with walking.  On admission, Right hand Xray revealed periarticular changes consistent with Osteoarthritis versus CPPD disease; diffuse soft tissue; no fracture. Bilateral wrist Xray showed no fracture or dislocation.  Right ankle Xray showed soft tissue swelling, no fracture or dislocation.  Rheumatology consulted.  Serological studies performed.  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 less likely RA vs. less likely polymyalgia rheumatica. Pt was started on prednisone 40mg daily. As per Rheum will taper steroids by 10mg every 5 days to 10 mg daily, will maintain until follow up as outpatient.  Pt seen by hematology, transfused on 09/06 for symptomatic anemia.  Hospital course complicated by elevated BP, pt started by lisinopril.  Pt also had marked extremities swelling.  Doppler for both UEs and LEs performed, which were negative for DVTs.    INTERVAL SUBJECTIVE & REVIEW OF SYMPTOMS:   Pt presents with a c/c bilateral hand pain more so than in other joints and swelling with difficulty using them.  The pain is mildly improved. Pt offered no other complaints       REVIEW OF SYSTEMS  [   ] Constitutional WNL  [ x  ] Cardio WNL  [  x ] Resp WNL  [  x ] GI WNL  [  x ]  WNL  [   ] Heme WNL  [   ] Endo WNL  [ x  ] Skin WNL  [   ] MSK WNL  [  x ] Neuro WNL  [  x ] Cognitive WNL  [   ] Psych WNL    ICU Vital Signs Last 24 Hrs  T(C): 36.8 (18 Sep 2017 04:56), Max: 37.1 (17 Sep 2017 20:15)  T(F): 98.2 (18 Sep 2017 04:56), Max: 98.8 (17 Sep 2017 20:15)  HR: 93 (18 Sep 2017 04:56) (91 - 93)  BP: 136/78 (18 Sep 2017 04:56) (106/61 - 136/78)  BP(mean): --  ABP: --  ABP(mean): --  RR: 16 (18 Sep 2017 04:56) (16 - 18)  SpO2: 97% (18 Sep 2017 04:56) (96% - 98%)    PHYSICAL EXAM  General: NAD  Cardio: S1S2+  Resp: clear  Abdomen: soft, NTND  Extrem: + edema digits, no calf tenderness. No erythema  Motor 4/5     Functional Exam:   Bed mobility: Min assist  Transfers Min assist  Gait: Pt ambulated 20' RW min assist  ADLs:  Total to mod assist    RECENT LABS:                          7.6    11.6  )-----------( 284      ( 17 Sep 2017 07:28 )             22.1                         7.8    11.9  )-----------( 271      ( 16 Sep 2017 05:54 )             22.9     09-16    133<L>  |  96<L>  |  19.0  ----------------------------<  111  3.9   |  26.0  |  0.47<L>    Ca    8.3<L>      16 Sep 2017 05:54  Mg     1.9     09-15    TPro  6.1<L>  /  Alb  2.5<L>  /  TBili  0.5  /  DBili  x   /  AST  15  /  ALT  29  /  AlkPhos  200<H>  09-16    MEDICATIONS  (STANDING):  heparin  Injectable 5000 Unit(s) SubCutaneous every 12 hours  docusate sodium 100 milliGRAM(s) Oral three times a day  polyethylene glycol 3350 17 Gram(s) Oral at bedtime  pantoprazole    Tablet 40 milliGRAM(s) Oral before breakfast  hydrocortisone 2.5% Rectal Cream 1 Application(s) Rectal daily  lisinopril 5 milliGRAM(s) Oral daily  levothyroxine 75 MICROGram(s) Oral daily  predniSONE   Tablet 30 milliGRAM(s) Oral daily  acetaminophen   Tablet 650 milliGRAM(s) Oral every 6 hours  gabapentin 100 milliGRAM(s) Oral two times a day  gabapentin 100 milliGRAM(s) Oral once    MEDICATIONS  (PRN):  magnesium hydroxide Suspension 30 milliLiter(s) Oral daily PRN Constipation  methyl salicylate 14%/menthol 6% Topical Ointment 1 Application(s) Topical four times a day PRN pain      A/P:    87 year old female with functional deficits from 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  Pt to f/u with rheumatology after d/c for further tapering.  Serologies were ordered.    Continue local analgesic cream.  Lidocaine cream prn   Tylenol changed to q6h for 2 days. Increase neurontin to TID.  Add Tramadol 25-50mg q4hrs prn.  If pain/symptoms persists recall rheumatology.     h/o Myelofibrosis/MDS with thrombocytosis: Hem onc fu as needed. 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 recent bone marrow showed 5% blasts with moderate reticulin fibrosis. Now with myelofibrosis awaiting to begin Jakafi outpatient with Dr. Montes.     DVT prophylaxis: on heparin sq    Elevated blood pressure without h/o HTN: on lisinopril    Hypothyrodism: on synthroid    GI prophylaxis: on protonix    Diet: regular with thin    Bowel program: Toilet schedule prn    Bowel program: colace, miralax    Medical fu

## 2017-09-18 NOTE — PROGRESS NOTE ADULT - ASSESSMENT
87 year old female with PMHx  Reji 2+ thrombocytosis, dating back to 2013. She had been on Hydrea since Olya 10, 2013. Most recently she was found to be pancytopenic and the Hydrea was held as of July 12, 2017. She was in her USOH until about 1 week prior to admission, when she began to experience pain in her neck and shoulders.  She saw ortho, who started her on a Medrol Dose pack, upon which her symptoms resolved.  However, upon completing the pack, she began to experience pain and swelling in her right hand.  She then began to experience similar symptoms in her other hand and in both feet.  The pain continued to worsen, so that she became unable to ambulate on her own.  (+)AM stiffness, lasting several hours. Patient came to the ER. 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 Pt was started on prednisone 40mg daily. Followed  by Rheum and Heme/Onc, Slowly progressing. As per Rheum will taper steroids to 10 mg daily, will maintain until follow up as outpatient.  Seen by PT and recommend rehab. BP was elevated , patient started on Lisinopril. Patient was transferred to acute rehab.    1) Polyarthralgia- suspect Remitting Seronegative Symmetrical Synovitis as per Rheum  - No signs of infection  - Continue Prednisone taper.  - Continue Gabapentin - changed to TID   - Hepatitis Panel negative, Lyme  serology negative ,  Parvovirus IgM - negative , IgG- elevated - past exposure to parvovirus +  - Rheumatology  input appreciated , continue steroids . If pain persist re call rheumatology.  - Tramadol prn added.  - Continue rehab.  - GI Prophylaxis  - Accu checks dc, patient BG <150    2) Myelofibrosis  - Heme/Onc consult Dr. Mosqueda, input appreciated  - s/p transfusion   - monitor labs  - Patient is waiting to be started on Jakafi  - Outpatient follow up with Dr. Montes    3) Hypothyroidism  - Levothyroxine 75 mcg    4) Elevated BP without history of HTN  - BP improved with Lisinopril    5) Constipation- Bowel RX    7) Problem / Plan - Anemia - chronic - s/p blood transfusion . Patient is asymptomatic , follow CBC in am and transfuse PRN . 87 year old female with PMHx  Reji 2+ thrombocytosis, dating back to 2013. She had been on Hydrea since Olya 10, 2013. Most recently she was found to be pancytopenic and the Hydrea was held as of July 12, 2017. She was in her USOH until about 1 week prior to admission, when she began to experience pain in her neck and shoulders.  She saw ortho, who started her on a Medrol Dose pack, upon which her symptoms resolved.  However, upon completing the pack, she began to experience pain and swelling in her right hand.  She then began to experience similar symptoms in her other hand and in both feet.  The pain continued to worsen, so that she became unable to ambulate on her own.  (+)AM stiffness, lasting several hours. Patient came to the ER. 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 Pt was started on prednisone 40mg daily. Followed  by Rheum and Heme/Onc, Slowly progressing. As per Rheum will taper steroids to 10 mg daily, will maintain until follow up as outpatient.  Seen by PT and recommend rehab. BP was elevated , patient started on Lisinopril. Patient was transferred to acute rehab.    1) Polyarthralgia- suspect Remitting Seronegative Symmetrical Synovitis as per Rheum  - No signs of infection  - Continue Prednisone taper.  - Continue Gabapentin - changed to TID   - Hepatitis Panel negative, Lyme  serology negative ,  Parvovirus IgM - negative , IgG- elevated - past exposure to parvovirus +  - Rheumatology  input appreciated , continue steroids . If pain persist re call rheumatology.  - Tramadol prn added.  - Continue rehab.  - GI Prophylaxis  - Accu checks dc, patient BG <150    2) Myelofibrosis  - Heme/Onc consult Dr. Mosqueda, input appreciated  - s/p transfusion   - monitor labs   - Patient is waiting to be started on Jakafi  - Outpatient follow up with Dr. Montes    3) Hypothyroidism  - Levothyroxine 75 mcg    4) Elevated BP without history of HTN  - BP improved with Lisinopril    5) Constipation- Bowel RX    7) Problem / Plan - Anemia - chronic - s/p blood transfusion . Patient is asymptomatic , follow CBC in am and transfuse PRN. discussed with pt, lance need transfusion in am if Hgb <7.5

## 2017-09-18 NOTE — PROGRESS NOTE ADULT - SUBJECTIVE AND OBJECTIVE BOX
CC:  Pain and swelling in hands and feet    HPI: 87 year old female with PMHx  Reji 2+ thrombocytosis, dating back to 2013. She had been on Hydrea since Olya 10, 2013. Most recently she was found to be pancytopenic and the Hydrea was held as of July 12, 2017. She was in her USOH until about 1 week prior to admission, when she began to experience pain in her neck and shoulders.  She saw ortho, who started her on a Medrol Dose pack, upon which her symptoms resolved.  However, upon completing the pack, she began to experience pain and swelling in her right hand.  She then began to experience similar symptoms in her other hand and in both feet.  The pain continued to worsen, so that she became unable to ambulate on her own.  (+)AM stiffness, lasting several hours. Patient came to the ER. 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 Pt was started on prednisone 40mg daily. Followed  by Rheum and Heme/Onc, Slowly progressing. As per Rheum will taper steroids to 10 mg daily, will maintain until follow up as outpatient.  Seen by PT and recommend rehab. BP was elevated , patient started on Lisinopril. Patient was transferred to acute rehab.      INTERVAL HPI/OVERNIGHT EVENTS: Patient seen and examined during PT.  Patient complaining of worsening pain in hands and mild pain in feet.  Patient denies any headache, dizziness, SOB, CP, abdominal pain, nausea, vomiting, dysuria.  Other ROS reviewed and are negative.    Vital Signs Last 24 Hrs  T(C): 36.8 (18 Sep 2017 04:56), Max: 37.1 (17 Sep 2017 20:15)  T(F): 98.2 (18 Sep 2017 04:56), Max: 98.8 (17 Sep 2017 20:15)  HR: 93 (18 Sep 2017 04:56) (91 - 93)  BP: 136/78 (18 Sep 2017 04:56) (106/61 - 136/78)  BP(mean): --  RR: 16 (18 Sep 2017 04:56) (16 - 18)  SpO2: 97% (18 Sep 2017 04:56) (96% - 98%)  I&O's Detail        PHYSICAL EXAM:  GENERAL: NAD, well-groomed, well-developed  NERVOUS SYSTEM:  Alert & Oriented X3, Good concentration; Motor Strength 5/5 B/L upper and lower extremities  CHEST/LUNG: Clear to auscultation bilaterally; No rales, rhonchi, wheezing, or rubs  HEART: Regular rate and rhythm; No murmurs, rubs, or gallops  ABDOMEN: Soft, Nontender, Nondistended; Bowel sounds present  EXTREMITIES:  2+ Peripheral Pulses, No clubbing or cyanosis, Right hand edema and bilateral hand tenderness                          7.6    11.6  )-----------( 284      ( 17 Sep 2017 07:28 )             22.1         MEDICATIONS  (STANDING):  heparin  Injectable 5000 Unit(s) SubCutaneous every 12 hours  docusate sodium 100 milliGRAM(s) Oral three times a day  polyethylene glycol 3350 17 Gram(s) Oral at bedtime  pantoprazole    Tablet 40 milliGRAM(s) Oral before breakfast  lisinopril 5 milliGRAM(s) Oral daily  levothyroxine 75 MICROGram(s) Oral daily  predniSONE   Tablet 30 milliGRAM(s) Oral daily  acetaminophen   Tablet 650 milliGRAM(s) Oral every 6 hours  gabapentin 100 milliGRAM(s) Oral three times a day  multivitamin 1 Tablet(s) Oral daily  ascorbic acid 500 milliGRAM(s) Oral daily    MEDICATIONS  (PRN):  magnesium hydroxide Suspension 30 milliLiter(s) Oral daily PRN Constipation  methyl salicylate 14%/menthol 6% Topical Ointment 1 Application(s) Topical four times a day PRN pain  hydrocortisone 2.5% Rectal Cream 1 Application(s) Rectal daily PRN pain  traMADol 25 milliGRAM(s) Oral every 4 hours PRN Moderate Pain (4 - 6)  traMADol 50 milliGRAM(s) Oral every 4 hours PRN Severe Pain (7 - 10)  lidocaine/prilocaine Cream 1 Application(s) Topical every 4 hours PRN pain CC:  Pain and swelling in hands and feet    HPI: 87 year old female with PMHx  Reji 2+ thrombocytosis, dating back to 2013. She had been on Hydrea since Olya 10, 2013. Most recently she was found to be pancytopenic and the Hydrea was held as of July 12, 2017. She was in her USOH until about 1 week prior to admission, when she began to experience pain in her neck and shoulders.  She saw ortho, who started her on a Medrol Dose pack, upon which her symptoms resolved.  However, upon completing the pack, she began to experience pain and swelling in her right hand.  She then began to experience similar symptoms in her other hand and in both feet.  The pain continued to worsen, so that she became unable to ambulate on her own.  (+)AM stiffness, lasting several hours. Patient came to the ER. 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 Pt was started on prednisone 40mg daily. Followed  by Rheum and Heme/Onc, Slowly progressing. As per Rheum will taper steroids to 10 mg daily, will maintain until follow up as outpatient.  Seen by PT and recommend rehab. BP was elevated , patient started on Lisinopril. Patient was transferred to acute rehab.      INTERVAL HPI/OVERNIGHT EVENTS: Patient seen and examined during PT.  Patient complaining of worsening pain in hands and mild pain in feet.  Patient denies any headache, dizziness, SOB, CP, abdominal pain, nausea, vomiting, dysuria.  Other ROS reviewed and are negative.    Vital Signs Last 24 Hrs  T(C): 36.8 (18 Sep 2017 04:56), Max: 37.1 (17 Sep 2017 20:15)  T(F): 98.2 (18 Sep 2017 04:56), Max: 98.8 (17 Sep 2017 20:15)  HR: 93 (18 Sep 2017 04:56) (91 - 93)  BP: 136/78 (18 Sep 2017 04:56) (106/61 - 136/78)  BP(mean): --  RR: 16 (18 Sep 2017 04:56) (16 - 18)  SpO2: 97% (18 Sep 2017 04:56) (96% - 98%)  I&O's Detail        PHYSICAL EXAM:  GENERAL: NAD, well-groomed, well-developed  NERVOUS SYSTEM:  Alert & Oriented X3, Good concentration; Motor Strength 5/5 B/L upper and lower extremities  CHEST/LUNG: Clear to auscultation bilaterally; No rales, rhonchi, wheezing, or rubs  HEART: Regular rate and rhythm; No murmurs, rubs, or gallops  ABDOMEN: Soft, Nontender, Nondistended; Bowel sounds present  EXTREMITIES:  2+ Peripheral Pulses, No clubbing or cyanosis, Right hand edema and bilateral hand tenderness, b/l ankle swelling+                          7.6    11.6  )-----------( 284      ( 17 Sep 2017 07:28 )             22.1         MEDICATIONS  (STANDING):  heparin  Injectable 5000 Unit(s) SubCutaneous every 12 hours  docusate sodium 100 milliGRAM(s) Oral three times a day  polyethylene glycol 3350 17 Gram(s) Oral at bedtime  pantoprazole    Tablet 40 milliGRAM(s) Oral before breakfast  lisinopril 5 milliGRAM(s) Oral daily  levothyroxine 75 MICROGram(s) Oral daily  predniSONE   Tablet 30 milliGRAM(s) Oral daily  acetaminophen   Tablet 650 milliGRAM(s) Oral every 6 hours  gabapentin 100 milliGRAM(s) Oral three times a day  multivitamin 1 Tablet(s) Oral daily  ascorbic acid 500 milliGRAM(s) Oral daily    MEDICATIONS  (PRN):  magnesium hydroxide Suspension 30 milliLiter(s) Oral daily PRN Constipation  methyl salicylate 14%/menthol 6% Topical Ointment 1 Application(s) Topical four times a day PRN pain  hydrocortisone 2.5% Rectal Cream 1 Application(s) Rectal daily PRN pain  traMADol 25 milliGRAM(s) Oral every 4 hours PRN Moderate Pain (4 - 6)  traMADol 50 milliGRAM(s) Oral every 4 hours PRN Severe Pain (7 - 10)  lidocaine/prilocaine Cream 1 Application(s) Topical every 4 hours PRN pain

## 2017-09-19 LAB
ANION GAP SERPL CALC-SCNC: 11 MMOL/L — SIGNIFICANT CHANGE UP (ref 5–17)
BLD GP AB SCN SERPL QL: SIGNIFICANT CHANGE UP
BUN SERPL-MCNC: 23 MG/DL — HIGH (ref 8–20)
CALCIUM SERPL-MCNC: 8.3 MG/DL — LOW (ref 8.6–10.2)
CHLORIDE SERPL-SCNC: 95 MMOL/L — LOW (ref 98–107)
CO2 SERPL-SCNC: 25 MMOL/L — SIGNIFICANT CHANGE UP (ref 22–29)
CREAT SERPL-MCNC: 0.45 MG/DL — LOW (ref 0.5–1.3)
GLUCOSE SERPL-MCNC: 117 MG/DL — HIGH (ref 70–115)
HCT VFR BLD CALC: 22.1 % — LOW (ref 37–47)
HCT VFR BLD CALC: 23.6 % — LOW (ref 37–47)
HGB BLD-MCNC: 7.1 G/DL — LOW (ref 12–16)
HGB BLD-MCNC: 8.1 G/DL — LOW (ref 12–16)
IRON SATN MFR SERPL: 26 % — SIGNIFICANT CHANGE UP (ref 14–50)
IRON SATN MFR SERPL: 54 UG/DL — SIGNIFICANT CHANGE UP (ref 37–145)
LACTATE BLDV-MCNC: 1 MMOL/L — SIGNIFICANT CHANGE UP (ref 0.5–2)
MCHC RBC-ENTMCNC: 30.9 PG — SIGNIFICANT CHANGE UP (ref 27–31)
MCHC RBC-ENTMCNC: 31 PG — SIGNIFICANT CHANGE UP (ref 27–31)
MCHC RBC-ENTMCNC: 32.1 G/DL — SIGNIFICANT CHANGE UP (ref 32–36)
MCHC RBC-ENTMCNC: 34.3 G/DL — SIGNIFICANT CHANGE UP (ref 32–36)
MCV RBC AUTO: 90.4 FL — SIGNIFICANT CHANGE UP (ref 81–99)
MCV RBC AUTO: 96.1 FL — SIGNIFICANT CHANGE UP (ref 81–99)
PLATELET # BLD AUTO: 265 K/UL — SIGNIFICANT CHANGE UP (ref 150–400)
PLATELET # BLD AUTO: 330 K/UL — SIGNIFICANT CHANGE UP (ref 150–400)
POTASSIUM SERPL-MCNC: 4.2 MMOL/L — SIGNIFICANT CHANGE UP (ref 3.5–5.3)
POTASSIUM SERPL-SCNC: 4.2 MMOL/L — SIGNIFICANT CHANGE UP (ref 3.5–5.3)
RBC # BLD: 2.3 M/UL — LOW (ref 4.4–5.2)
RBC # BLD: 2.61 M/UL — LOW (ref 4.4–5.2)
RBC # FLD: 18.2 % — HIGH (ref 11–15.6)
RBC # FLD: 18.7 % — HIGH (ref 11–15.6)
SODIUM SERPL-SCNC: 131 MMOL/L — LOW (ref 135–145)
TIBC SERPL-MCNC: 206 UG/DL — LOW (ref 220–430)
TRANSFERRIN SERPL-MCNC: 144 MG/DL — LOW (ref 192–382)
TYPE + AB SCN PNL BLD: SIGNIFICANT CHANGE UP
WBC # BLD: 11.7 K/UL — HIGH (ref 4.8–10.8)
WBC # BLD: 12.4 K/UL — HIGH (ref 4.8–10.8)
WBC # FLD AUTO: 11.7 K/UL — HIGH (ref 4.8–10.8)
WBC # FLD AUTO: 12.4 K/UL — HIGH (ref 4.8–10.8)

## 2017-09-19 PROCEDURE — 99233 SBSQ HOSP IP/OBS HIGH 50: CPT

## 2017-09-19 PROCEDURE — 71010: CPT | Mod: 26

## 2017-09-19 PROCEDURE — 99232 SBSQ HOSP IP/OBS MODERATE 35: CPT

## 2017-09-19 RX ORDER — ACETAMINOPHEN 500 MG
650 TABLET ORAL EVERY 6 HOURS
Qty: 0 | Refills: 0 | Status: DISCONTINUED | OUTPATIENT
Start: 2017-09-19 | End: 2017-10-03

## 2017-09-19 RX ORDER — TRAMADOL HYDROCHLORIDE 50 MG/1
50 TABLET ORAL
Qty: 0 | Refills: 0 | Status: DISCONTINUED | OUTPATIENT
Start: 2017-09-19 | End: 2017-09-19

## 2017-09-19 RX ORDER — TRAMADOL HYDROCHLORIDE 50 MG/1
25 TABLET ORAL
Qty: 0 | Refills: 0 | Status: DISCONTINUED | OUTPATIENT
Start: 2017-09-19 | End: 2017-09-24

## 2017-09-19 RX ADMIN — Medication 75 MICROGRAM(S): at 06:22

## 2017-09-19 RX ADMIN — TRAMADOL HYDROCHLORIDE 25 MILLIGRAM(S): 50 TABLET ORAL at 10:14

## 2017-09-19 RX ADMIN — GABAPENTIN 100 MILLIGRAM(S): 400 CAPSULE ORAL at 21:04

## 2017-09-19 RX ADMIN — Medication 650 MILLIGRAM(S): at 06:23

## 2017-09-19 RX ADMIN — PANTOPRAZOLE SODIUM 40 MILLIGRAM(S): 20 TABLET, DELAYED RELEASE ORAL at 06:25

## 2017-09-19 RX ADMIN — TRAMADOL HYDROCHLORIDE 25 MILLIGRAM(S): 50 TABLET ORAL at 14:15

## 2017-09-19 RX ADMIN — Medication 500 MILLIGRAM(S): at 11:36

## 2017-09-19 RX ADMIN — TRAMADOL HYDROCHLORIDE 25 MILLIGRAM(S): 50 TABLET ORAL at 09:33

## 2017-09-19 RX ADMIN — Medication 1 TABLET(S): at 11:36

## 2017-09-19 RX ADMIN — Medication 100 MILLIGRAM(S): at 13:22

## 2017-09-19 RX ADMIN — HEPARIN SODIUM 5000 UNIT(S): 5000 INJECTION INTRAVENOUS; SUBCUTANEOUS at 17:00

## 2017-09-19 RX ADMIN — Medication 100 MILLIGRAM(S): at 21:04

## 2017-09-19 RX ADMIN — LISINOPRIL 5 MILLIGRAM(S): 2.5 TABLET ORAL at 06:22

## 2017-09-19 RX ADMIN — TRAMADOL HYDROCHLORIDE 25 MILLIGRAM(S): 50 TABLET ORAL at 13:22

## 2017-09-19 RX ADMIN — TRAMADOL HYDROCHLORIDE 25 MILLIGRAM(S): 50 TABLET ORAL at 21:04

## 2017-09-19 RX ADMIN — TRAMADOL HYDROCHLORIDE 25 MILLIGRAM(S): 50 TABLET ORAL at 22:02

## 2017-09-19 RX ADMIN — Medication 30 MILLIGRAM(S): at 06:23

## 2017-09-19 RX ADMIN — Medication 100 MILLIGRAM(S): at 06:22

## 2017-09-19 RX ADMIN — HEPARIN SODIUM 5000 UNIT(S): 5000 INJECTION INTRAVENOUS; SUBCUTANEOUS at 06:23

## 2017-09-19 RX ADMIN — GABAPENTIN 100 MILLIGRAM(S): 400 CAPSULE ORAL at 13:21

## 2017-09-19 RX ADMIN — Medication 650 MILLIGRAM(S): at 00:00

## 2017-09-19 RX ADMIN — GABAPENTIN 100 MILLIGRAM(S): 400 CAPSULE ORAL at 06:22

## 2017-09-19 RX ADMIN — Medication 650 MILLIGRAM(S): at 22:50

## 2017-09-19 NOTE — PROGRESS NOTE ADULT - SUBJECTIVE AND OBJECTIVE BOX
INTERVAL HPI/OVERNIGHT EVENTS:  Still w/ pain in hands - slightly improved from yesterday.  Now described as "regular pain" rather than burning.  She also c/o pain in her right groin.    MEDICATIONS  (STANDING):  heparin  Injectable 5000 Unit(s) SubCutaneous every 12 hours  docusate sodium 100 milliGRAM(s) Oral three times a day  polyethylene glycol 3350 17 Gram(s) Oral at bedtime  pantoprazole    Tablet 40 milliGRAM(s) Oral before breakfast  lisinopril 5 milliGRAM(s) Oral daily  levothyroxine 75 MICROGram(s) Oral daily  predniSONE   Tablet 30 milliGRAM(s) Oral daily  gabapentin 100 milliGRAM(s) Oral three times a day  multivitamin 1 Tablet(s) Oral daily  ascorbic acid 500 milliGRAM(s) Oral daily  traMADol 25 milliGRAM(s) Oral <User Schedule>    MEDICATIONS  (PRN):  magnesium hydroxide Suspension 30 milliLiter(s) Oral daily PRN Constipation  methyl salicylate 14%/menthol 6% Topical Ointment 1 Application(s) Topical four times a day PRN pain  hydrocortisone 2.5% Rectal Cream 1 Application(s) Rectal daily PRN pain  traMADol 25 milliGRAM(s) Oral every 4 hours PRN Moderate Pain (4 - 6)  lidocaine/prilocaine Cream 1 Application(s) Topical every 4 hours PRN pain      Allergies    No Known Allergies      Vital Signs Last 24 Hrs  T(C): 37.7 (19 Sep 2017 18:47), Max: 37.7 (19 Sep 2017 18:47)  T(F): 99.9 (19 Sep 2017 18:47), Max: 99.9 (19 Sep 2017 18:47)  HR: 100 (19 Sep 2017 18:47) (90 - 103)  BP: 111/60 (19 Sep 2017 18:47) (111/60 - 128/70)  BP(mean): --  RR: 16 (19 Sep 2017 18:47) (16 - 17)  SpO2: 95% (19 Sep 2017 14:07) (95% - 97%)    PHYSICAL EXAM:      Constitutional: NAD, A&O x 3    ENMT: no oral ulcers    Neck: no LAD    Respiratory: CTA B/L    Cardiovascular: RRR, nl S1, S2    Gastrointestinal: soft, NT, ND, +BS    Skin: no rash    Musculoskeletal: (+)tenderness in B/L hands between wrists and MCP's          LABS:                        7.1    11.7  )-----------( 330      ( 19 Sep 2017 08:00 )             22.1       Double Stranded DNA Antibody (09.15.17 @ 20:38)    Double Stranded DNA Antibody: <12: Method: EIA            Reference Ranges            Interpretation            < 30      IU/mL     Negative            30 - 75  IU/mL     Borderline            > 75      IU/mL     Positive IU/mL    C3 Complement, Serum (09.15.17 @ 20:34)    C3 Complement, Serum: 149 mg/dL    C4 Complement, Serum (09.15.17 @ 20:34)    C4 Complement, Serum: 29 mg/dL    JOSTIN Antibody Screening Test (09.15.17 @ 20:38)    SM ([place]) Ab FBIA: <0.2 AI    JOSTIN Antibody Screening Test (09.15.17 @ 20:38)    Anti-Ribonuclear Protein: <0.2: Fluorescent Bead Immunoassy                      Reference Ranges for RNP and SM:                      <1.0 AI (negative)                      > or =1.0 AI (positive)                      Reference values apply to all ages AI    Sjogren&#x27;s Syndrome Antibodies (09.15.17 @ 20:38)    Anti SS-A Antibody: <0.2 AI    Anti SS-B Antibody: <0.2: Fluorescent Bead Immunoassay   Reference Ranges for SS-A AND SS-B:   <1.0 AI (negative)   > or =1.0 AI (positive)   Reference values apply  to all ages. AI

## 2017-09-19 NOTE — PROGRESS NOTE ADULT - SUBJECTIVE AND OBJECTIVE BOX
HPI:    87 y.o. F w/ PMHx Reji 2 thrombocytosis and Myelofibrosis presented to [facility] on 09/10/17 with diffuse joint pains.  Pt reports pain started in her neck and shoulders the week before admission.  Pt seen by outpt orthopedics, and started on Medrol Dose pack.  Symptoms initially improved, but then pt began to experience pain and swelling in both hands and feet.  Pain worsened to the point that pt was having difficulty with walking.  On admission, Right hand Xray revealed periarticular changes consistent with Osteoarthritis versus CPPD disease; diffuse soft tissue; no fracture. Bilateral wrist Xray showed no fracture or dislocation.  Right ankle Xray showed soft tissue swelling, no fracture or dislocation.  Rheumatology consulted.  Serological studies performed.  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 less likely RA vs. less likely polymyalgia rheumatica. Pt was started on prednisone 40mg daily. As per Rheum will taper steroids by 10mg every 5 days to 10 mg daily, will maintain until follow up as outpatient.  Pt seen by hematology, transfused on 09/06 for symptomatic anemia.  Hospital course complicated by elevated BP, pt started by lisinopril.  Pt also had marked extremities swelling.  Doppler for both UEs and LEs performed, which were negative for DVTs.    INTERVAL SUBJECTIVE & REVIEW OF SYMPTOMS:   Pt presents with a c/c diffuse pain.  Pt c/o bilateral hand pain and now right groin more so than in other joints and swelling with difficulty performing ADLs.   Pt and her daughter requesting rheumatology f/o.  Pt c/o lack of appetite due to the pain.   Pt offered no other complaints       REVIEW OF SYSTEMS  [   ] Constitutional WNL  [ x  ] Cardio WNL  [  x ] Resp WNL  [  x ] GI WNL  [  x ]  WNL  [   ] Heme WNL  [   ] Endo WNL  [ x  ] Skin WNL  [   ] MSK WNL  [  x ] Neuro WNL  [  x ] Cognitive WNL  [   ] Psych WNL    Vital Signs Last 24 Hrs  T(C): 36.8 (19 Sep 2017 05:22), Max: 36.8 (18 Sep 2017 10:00)  T(F): 98.3 (19 Sep 2017 05:22), Max: 98.3 (18 Sep 2017 20:45)  HR: 95 (19 Sep 2017 05:22) (95 - 103)  BP: 119/68 (19 Sep 2017 05:22) (105/56 - 125/69)  BP(mean): --  RR: 17 (19 Sep 2017 05:22) (16 - 17)  SpO2: 97% (19 Sep 2017 05:22) (96% - 98%)    PHYSICAL EXAM  General:  Mild distress due to pain  Cardio: S1S2+  Resp: CTAB  Abdomen: soft, NTND  Extrem: + edema digits, no calf tenderness. No erythema  Motor 4/5     Functional Exam:   Bed mobility: Min assist  Transfers Min assist  Gait: Pt ambulated 20' RW min assist  ADLs:  Total to mod assist    RECENT LABS:                              7.1    11.7  )-----------( 330      ( 19 Sep 2017 08:00 )             22.1   Iron 54                    7.6    11.6  )-----------( 284      ( 17 Sep 2017 07:28 )             22.1                     09-16    133<L>  |  96<L>  |  19.0  ----------------------------<  111  3.9   |  26.0  |  0.47<L>    Ca    8.3<L>      16 Sep 2017 05:54  Mg     1.9     09-15    TPro  6.1<L>  /  Alb  2.5<L>  /  TBili  0.5  /  DBili  x   /  AST  15  /  ALT  29  /  AlkPhos  200<H>  09-16    MEDICATIONS  (STANDING):  heparin  Injectable 5000 Unit(s) SubCutaneous every 12 hours  docusate sodium 100 milliGRAM(s) Oral three times a day  polyethylene glycol 3350 17 Gram(s) Oral at bedtime  pantoprazole    Tablet 40 milliGRAM(s) Oral before breakfast  hydrocortisone 2.5% Rectal Cream 1 Application(s) Rectal daily  lisinopril 5 milliGRAM(s) Oral daily  levothyroxine 75 MICROGram(s) Oral daily  predniSONE   Tablet 30 milliGRAM(s) Oral daily  acetaminophen   Tablet 650 milliGRAM(s) Oral every 6 hours  gabapentin 100 milliGRAM(s) Oral two times a day  gabapentin 100 milliGRAM(s) Oral once    MEDICATIONS  (PRN):  magnesium hydroxide Suspension 30 milliLiter(s) Oral daily PRN Constipation  methyl salicylate 14%/menthol 6% Topical Ointment 1 Application(s) Topical four times a day PRN pain      A/P:  Pt is an 87 year old female with functional deficits from 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  Pt to f/u with rheumatology after d/c for further tapering.  Serologies were ordered.    Continue local analgesic cream.  Lidocaine cream prn   Tylenol changed to q6h for 2 days. Increased neurontin to TID.  Added Tramadol 25-50mg q4hrs prn-changed to 25mg standing bid prior to rehab sessions.   Recalled rheumatology due to persistent significant pain; will f/u later today .     h/o Myelofibrosis/MDS with thrombocytosis: Hem onc fu as needed. 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 recent bone marrow showed 5% blasts with moderate reticulin fibrosis. Now with myelofibrosis awaiting to begin Jakafi outpatient with Dr. Montes.     DVT prophylaxis: on heparin sq    Elevated blood pressure without h/o HTN: Improved on lisinopril    Hypothyroidism: on synthroid    GI prophylaxis: on protonix    Diet: regular with thin    Bowel program: Toilet schedule prn    Bowel program: colace, miralax    Medical fu HPI:    87 y.o. F w/ PMHx Reji 2 thrombocytosis and Myelofibrosis presented to [facility] on 09/10/17 with diffuse joint pains.  Pt reports pain started in her neck and shoulders the week before admission.  Pt seen by outpt orthopedics, and started on Medrol Dose pack.  Symptoms initially improved, but then pt began to experience pain and swelling in both hands and feet.  Pain worsened to the point that pt was having difficulty with walking.  On admission, Right hand Xray revealed periarticular changes consistent with Osteoarthritis versus CPPD disease; diffuse soft tissue; no fracture. Bilateral wrist Xray showed no fracture or dislocation.  Right ankle Xray showed soft tissue swelling, no fracture or dislocation.  Rheumatology consulted.  Serological studies performed.  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 less likely RA vs. less likely polymyalgia rheumatica. Pt was started on prednisone 40mg daily. As per Rheum will taper steroids by 10mg every 5 days to 10 mg daily, will maintain until follow up as outpatient.  Pt seen by hematology, transfused on 09/06 for symptomatic anemia.  Hospital course complicated by elevated BP, pt started by lisinopril.  Pt also had marked extremities swelling.  Doppler for both UEs and LEs performed, which were negative for DVTs.    INTERVAL SUBJECTIVE & REVIEW OF SYMPTOMS:   Pt presents with a c/c diffuse pain.  Pt c/o bilateral hand pain and now right groin more so than in other joints and swelling with difficulty performing ADLs.   Pt and her daughter requesting rheumatology f/o.  Pt c/o lack of appetite due to the pain.   Pt offered no other complaints       REVIEW OF SYSTEMS  [   ] Constitutional WNL  [ x  ] Cardio WNL  [  x ] Resp WNL  [  x ] GI WNL  [  x ]  WNL  [   ] Heme WNL  [   ] Endo WNL  [ x  ] Skin WNL  [   ] MSK WNL  [  x ] Neuro WNL  [  x ] Cognitive WNL  [   ] Psych WNL    Vital Signs Last 24 Hrs  T(C): 36.8 (19 Sep 2017 05:22), Max: 36.8 (18 Sep 2017 10:00)  T(F): 98.3 (19 Sep 2017 05:22), Max: 98.3 (18 Sep 2017 20:45)  HR: 95 (19 Sep 2017 05:22) (95 - 103)  BP: 119/68 (19 Sep 2017 05:22) (105/56 - 125/69)  BP(mean): --  RR: 17 (19 Sep 2017 05:22) (16 - 17)  SpO2: 97% (19 Sep 2017 05:22) (96% - 98%)    PHYSICAL EXAM  General:  Mild distress due to pain  Cardio: S1S2+  Resp: CTAB  Abdomen: soft, NTND  Extrem: + edema digits, no calf tenderness. No erythema  Motor 4/5     Functional Exam:   Bed mobility: Min assist  Transfers Min assist  Gait: Pt ambulated 20' RW min assist  ADLs:  Total to mod assist    RECENT LABS:                              7.1    11.7  )-----------( 330      ( 19 Sep 2017 08:00 )             22.1   Iron 54                    7.6    11.6  )-----------( 284      ( 17 Sep 2017 07:28 )             22.1                     09-16    133<L>  |  96<L>  |  19.0  ----------------------------<  111  3.9   |  26.0  |  0.47<L>    Ca    8.3<L>      16 Sep 2017 05:54  Mg     1.9     09-15    TPro  6.1<L>  /  Alb  2.5<L>  /  TBili  0.5  /  DBili  x   /  AST  15  /  ALT  29  /  AlkPhos  200<H>  09-16    MEDICATIONS  (STANDING):  heparin  Injectable 5000 Unit(s) SubCutaneous every 12 hours  docusate sodium 100 milliGRAM(s) Oral three times a day  polyethylene glycol 3350 17 Gram(s) Oral at bedtime  pantoprazole    Tablet 40 milliGRAM(s) Oral before breakfast  hydrocortisone 2.5% Rectal Cream 1 Application(s) Rectal daily  lisinopril 5 milliGRAM(s) Oral daily  levothyroxine 75 MICROGram(s) Oral daily  predniSONE   Tablet 30 milliGRAM(s) Oral daily  acetaminophen   Tablet 650 milliGRAM(s) Oral every 6 hours  gabapentin 100 milliGRAM(s) Oral two times a day  gabapentin 100 milliGRAM(s) Oral once    MEDICATIONS  (PRN):  magnesium hydroxide Suspension 30 milliLiter(s) Oral daily PRN Constipation  methyl salicylate 14%/menthol 6% Topical Ointment 1 Application(s) Topical four times a day PRN pain      A/P:  Pt is an 87 year old female with functional deficits from 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  Pt to f/u with rheumatology after d/c for further tapering.  Serologies were ordered.    Continue local analgesic cream.  Lidocaine cream prn   Tylenol changed to q6h for 2 days. Increased neurontin to TID.  Added Tramadol 25-50mg q4hrs prn-changed to 25mg standing bid prior to rehab sessions.   Recalled rheumatology due to persistent significant pain; will f/u later today .     h/o Myelofibrosis/MDS with thrombocytosis: Hem onc fu as needed. 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 recent bone marrow showed 5% blasts with moderate reticulin fibrosis. Now with myelofibrosis awaiting to begin Jakafi outpatient with Dr. Montes.   HB 7.1.  Iron WNL.  May need transfusion in am if Hgb <7.5  Repeat CBC and order T&S    DVT prophylaxis: on heparin sq    Elevated blood pressure without h/o HTN: Improved on lisinopril    Hypothyroidism: on synthroid    GI prophylaxis: on protonix    Diet: regular with thin    Bowel program: Toilet schedule prn    Bowel program: colace, miralax    Medical fu HPI:    87 y.o. F w/ PMHx Reji 2 thrombocytosis and Myelofibrosis presented to [facility] on 09/10/17 with diffuse joint pains.  Pt reports pain started in her neck and shoulders the week before admission.  Pt seen by outpt orthopedics, and started on Medrol Dose pack.  Symptoms initially improved, but then pt began to experience pain and swelling in both hands and feet.  Pain worsened to the point that pt was having difficulty with walking.  On admission, Right hand Xray revealed periarticular changes consistent with Osteoarthritis versus CPPD disease; diffuse soft tissue; no fracture. Bilateral wrist Xray showed no fracture or dislocation.  Right ankle Xray showed soft tissue swelling, no fracture or dislocation.  Rheumatology consulted.  Serological studies performed.  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 less likely RA vs. less likely polymyalgia rheumatica. Pt was started on prednisone 40mg daily. As per Rheum will taper steroids by 10mg every 5 days to 10 mg daily, will maintain until follow up as outpatient.  Pt seen by hematology, transfused on 09/06 for symptomatic anemia.  Hospital course complicated by elevated BP, pt started by lisinopril.  Pt also had marked extremities swelling.  Doppler for both UEs and LEs performed, which were negative for DVTs.    INTERVAL SUBJECTIVE & REVIEW OF SYMPTOMS:   Pt presents with a c/c diffuse pain.  Pt c/o bilateral hand pain and now right groin more so than in other joints and swelling with difficulty performing ADLs.   Pt and her daughter requesting rheumatology f/o.  Pt c/o lack of appetite due to the pain.   Pt offered no other complaints       REVIEW OF SYSTEMS  [   ] Constitutional WNL  [ x  ] Cardio WNL  [  x ] Resp WNL  [  x ] GI WNL  [  x ]  WNL  [   ] Heme WNL  [   ] Endo WNL  [ x  ] Skin WNL  [   ] MSK WNL  [  x ] Neuro WNL  [  x ] Cognitive WNL  [   ] Psych WNL    Vital Signs Last 24 Hrs  T(C): 36.8 (19 Sep 2017 05:22), Max: 36.8 (18 Sep 2017 10:00)  T(F): 98.3 (19 Sep 2017 05:22), Max: 98.3 (18 Sep 2017 20:45)  HR: 95 (19 Sep 2017 05:22) (95 - 103)  BP: 119/68 (19 Sep 2017 05:22) (105/56 - 125/69)  BP(mean): --  RR: 17 (19 Sep 2017 05:22) (16 - 17)  SpO2: 97% (19 Sep 2017 05:22) (96% - 98%)    PHYSICAL EXAM  General:  Mild distress due to pain  Cardio: S1S2+  Resp: CTAB  Abdomen: soft, NTND  Extrem: + edema digits, no calf tenderness. No erythema  Motor 4/5     Functional Exam:   Bed mobility: Min assist  Transfers Min assist  Gait: Pt ambulated 20' RW min assist  ADLs:  Max assist toileting and LE dressing, Sup grooming and eating    RECENT LABS:                              7.1    11.7  )-----------( 330      ( 19 Sep 2017 08:00 )             22.1   Iron 54                    7.6    11.6  )-----------( 284      ( 17 Sep 2017 07:28 )             22.1                     09-16    133<L>  |  96<L>  |  19.0  ----------------------------<  111  3.9   |  26.0  |  0.47<L>    Ca    8.3<L>      16 Sep 2017 05:54  Mg     1.9     09-15    TPro  6.1<L>  /  Alb  2.5<L>  /  TBili  0.5  /  DBili  x   /  AST  15  /  ALT  29  /  AlkPhos  200<H>  09-16    MEDICATIONS  (STANDING):  heparin  Injectable 5000 Unit(s) SubCutaneous every 12 hours  docusate sodium 100 milliGRAM(s) Oral three times a day  polyethylene glycol 3350 17 Gram(s) Oral at bedtime  pantoprazole    Tablet 40 milliGRAM(s) Oral before breakfast  hydrocortisone 2.5% Rectal Cream 1 Application(s) Rectal daily  lisinopril 5 milliGRAM(s) Oral daily  levothyroxine 75 MICROGram(s) Oral daily  predniSONE   Tablet 30 milliGRAM(s) Oral daily  acetaminophen   Tablet 650 milliGRAM(s) Oral every 6 hours  gabapentin 100 milliGRAM(s) Oral two times a day  gabapentin 100 milliGRAM(s) Oral once    MEDICATIONS  (PRN):  magnesium hydroxide Suspension 30 milliLiter(s) Oral daily PRN Constipation  methyl salicylate 14%/menthol 6% Topical Ointment 1 Application(s) Topical four times a day PRN pain      A/P:  Pt is an 87 year old female with functional deficits from 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  Pt to f/u with rheumatology after d/c for further tapering.  Serologies were ordered.    Continue local analgesic cream.  Lidocaine cream prn   Tylenol changed to q6h for 2 days. Increased neurontin to TID.  Added Tramadol 25-50mg q4hrs prn-changed to 25mg standing bid prior to rehab sessions.   Recalled rheumatology due to persistent significant pain; will f/u later today .     h/o Myelofibrosis/MDS with thrombocytosis: Hem onc fu as needed. 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 recent bone marrow showed 5% blasts with moderate reticulin fibrosis. Now with myelofibrosis awaiting to begin Jakafi outpatient with Dr. Montes.   HB 7.1.  Iron WNL.  May need transfusion in am if Hgb <7.5  Repeat CBC and order T&S    DVT prophylaxis: on heparin sq    Elevated blood pressure without h/o HTN: Improved on lisinopril    Hypothyroidism: on synthroid    GI prophylaxis: on protonix    Diet: regular with thin    Bowel program: Toilet schedule prn    Bowel program: colace, miralax    Medical fu

## 2017-09-19 NOTE — PROGRESS NOTE ADULT - ASSESSMENT
86 y/o F w/ persistent pain in B/L hands of unclear etiology.  RS3PE much improved, as evidenced by resolution of swelling, though still w/ significant pain in B/L hands.  DDx includes neurologic etiology vs. bone pain from myelofibrosis vs. CRPS (Complex Regional Pain Syndrome - less likely)    - Will try switching from oral prednisone to IV Medrol.    - If no further improvement in next 1-2 days, would consider bone scan.

## 2017-09-19 NOTE — PROGRESS NOTE ADULT - SUBJECTIVE AND OBJECTIVE BOX
CC: Pain and swelling in hands and feet    HPI: 87 year old female with PMHx  Reji 2+ thrombocytosis, dating back to 2013. She had been on Hydrea since Olya 10, 2013. Most recently she was found to be pancytopenic and the Hydrea was held as of July 12, 2017. She was in her USOH until about 1 week prior to admission, when she began to experience pain in her neck and shoulders.  She saw ortho, who started her on a Medrol Dose pack, upon which her symptoms resolved.  However, upon completing the pack, she began to experience pain and swelling in her right hand.  She then began to experience similar symptoms in her other hand and in both feet.  The pain continued to worsen, so that she became unable to ambulate on her own.  (+)AM stiffness, lasting several hours. Patient came to the ER. 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 Pt was started on prednisone 40mg daily. Followed  by Rheum and Heme/Onc, Slowly progressing. As per Rheum will taper steroids to 10 mg daily, will maintain until follow up as outpatient.  Seen by PT and recommend rehab. BP was elevated , patient started on Lisinopril. Patient was transferred to acute rehab.    INTERVAL HPI/OVERNIGHT EVENTS: Patient seen and examined lying in bed.  Patient complaining of fatigue and bilateral hand pain.  Patient denies any headache, dizziness, SOB, CP, abdominal pain, nausea, vomiting, dysuria.  Other ROS reviewed and are negative.    Vital Signs Last 24 Hrs  T(C): 36.8 (19 Sep 2017 14:07), Max: 36.8 (18 Sep 2017 18:00)  T(F): 98.2 (19 Sep 2017 14:07), Max: 98.3 (18 Sep 2017 20:45)  HR: 90 (19 Sep 2017 14:07) (90 - 103)  BP: 128/70 (19 Sep 2017 14:07) (119/68 - 128/70)  BP(mean): --  RR: 16 (19 Sep 2017 14:07) (16 - 17)  SpO2: 95% (19 Sep 2017 14:07) (95% - 97%)  I&O's Detail    19 Sep 2017 07:01  -  19 Sep 2017 14:34  --------------------------------------------------------  IN:  Total IN: 0 mL    OUT:    Stool: 1 mL  Total OUT: 1 mL    Total NET: -1 mL        PHYSICAL EXAM:  GENERAL: NAD, well-groomed, well-developed  NERVOUS SYSTEM:  Alert & Oriented X3, Good concentration; Motor Strength 5/5 B/L upper and lower extremities  CHEST/LUNG: Clear to auscultation bilaterally; No rales, rhonchi, wheezing, or rubs  HEART: Regular rate and rhythm; No murmurs, rubs, or gallops  ABDOMEN: Soft, Nontender, Nondistended; Bowel sounds present  EXTREMITIES:  2+ Peripheral Pulses, No clubbing or cyanosis; Hand swelling improving.                            7.1    11.7  )-----------( 330      ( 19 Sep 2017 08:00 )             22.1         MEDICATIONS  (STANDING):  heparin  Injectable 5000 Unit(s) SubCutaneous every 12 hours  docusate sodium 100 milliGRAM(s) Oral three times a day  polyethylene glycol 3350 17 Gram(s) Oral at bedtime  pantoprazole    Tablet 40 milliGRAM(s) Oral before breakfast  lisinopril 5 milliGRAM(s) Oral daily  levothyroxine 75 MICROGram(s) Oral daily  predniSONE   Tablet 30 milliGRAM(s) Oral daily  gabapentin 100 milliGRAM(s) Oral three times a day  multivitamin 1 Tablet(s) Oral daily  ascorbic acid 500 milliGRAM(s) Oral daily  traMADol 25 milliGRAM(s) Oral <User Schedule>    MEDICATIONS  (PRN):  magnesium hydroxide Suspension 30 milliLiter(s) Oral daily PRN Constipation  methyl salicylate 14%/menthol 6% Topical Ointment 1 Application(s) Topical four times a day PRN pain  hydrocortisone 2.5% Rectal Cream 1 Application(s) Rectal daily PRN pain  traMADol 25 milliGRAM(s) Oral every 4 hours PRN Moderate Pain (4 - 6)  lidocaine/prilocaine Cream 1 Application(s) Topical every 4 hours PRN pain

## 2017-09-19 NOTE — PROGRESS NOTE ADULT - ASSESSMENT
87 year old female with PMHx  Reji 2+ thrombocytosis, dating back to 2013. She had been on Hydrea since Olya 10, 2013. Most recently she was found to be pancytopenic and the Hydrea was held as of July 12, 2017. She was in her USOH until about 1 week prior to admission, when she began to experience pain in her neck and shoulders.  She saw ortho, who started her on a Medrol Dose pack, upon which her symptoms resolved.  However, upon completing the pack, she began to experience pain and swelling in her right hand.  She then began to experience similar symptoms in her other hand and in both feet.  The pain continued to worsen, so that she became unable to ambulate on her own.  (+)AM stiffness, lasting several hours. Patient came to the ER. 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 Pt was started on prednisone 40mg daily. Followed  by Rheum and Heme/Onc, Slowly progressing. As per Rheum will taper steroids to 10 mg daily, will maintain until follow up as outpatient.  Seen by PT and recommend rehab. BP was elevated , patient started on Lisinopril. Patient was transferred to acute rehab.    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  - Continue Gabapentin and Tramadol.   - Hepatitis Panel negative, Lyme  serology negative ,  Parvovirus IgM - negative , IgG- elevated - past exposure to parvovirus +  - Rheumatology  input appreciated , continue steroids .  Rheumatology re-called secondary to persistent bilateral hand pain.  - Continue rehab.  - GI Prophylaxis  - Accu checks dc, patient BG <150    2) Myelofibrosis  - Heme/Onc consult Dr. Mosqueda, input appreciated  - s/p transfusion; Hgb 7.1 today - will transfuse 2units PRBC  - monitor labs   - Patient is waiting to be started on Jakafi  - Outpatient follow up with Dr. Montes    3) Hypothyroidism  - Levothyroxine 75 mcg    4) Elevated BP without history of HTN  - BP improved with Lisinopril    5) Constipation- Bowel RX    7) Problem / Plan - Anemia - chronic - s/p blood transfusion . Now fatigued - Hgb 7.1 today - will transfuse 2units PRBC. 87 year old female with PMHx  Reji 2+ thrombocytosis, dating back to 2013. She had been on Hydrea since Olya 10, 2013. Most recently she was found to be pancytopenic and the Hydrea was held as of July 12, 2017. She was in her USOH until about 1 week prior to admission, when she began to experience pain in her neck and shoulders.  She saw ortho, who started her on a Medrol Dose pack, upon which her symptoms resolved.  However, upon completing the pack, she began to experience pain and swelling in her right hand.  She then began to experience similar symptoms in her other hand and in both feet.  The pain continued to worsen, so that she became unable to ambulate on her own.  (+)AM stiffness, lasting several hours. Patient came to the ER. 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 Pt was started on prednisone 40mg daily. Followed  by Rheum and Heme/Onc, Slowly progressing. As per Rheum will taper steroids to 10 mg daily, will maintain until follow up as outpatient.  Seen by PT and recommend rehab. BP was elevated , patient started on Lisinopril. Patient was transferred to acute rehab.    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  - Continue Gabapentin and Tramadol.   - Hepatitis Panel negative, Lyme  serology negative ,  Parvovirus IgM - negative , IgG- elevated - past exposure to parvovirus +  - Rheumatology  input appreciated , continue steroids .  Rheumatology re-called secondary to persistent bilateral hand pain.  - Continue rehab.  - GI Prophylaxis  - Accu checks dc, patient BG <150    2) Myelofibrosis  - Heme/Onc consult Dr. Mosqueda, input appreciated  - s/p transfusion; Hgb 7.1 today - will transfuse 2units PRBC  - monitor labs in am   - Patient is waiting to be started on Jakafi  - Outpatient follow up with Dr. Montes    3) Hypothyroidism  - Levothyroxine 75 mcg    4) Elevated BP without history of HTN  - BP improved with Lisinopril    5) Constipation- Bowel RX    7) Problem / Plan - Anemia - chronic - s/p blood transfusion . Now fatigued - Hgb 7.1 today - will transfuse 2units PRBC.

## 2017-09-20 LAB
APPEARANCE UR: CLEAR — SIGNIFICANT CHANGE UP
BACTERIA # UR AUTO: ABNORMAL
BILIRUB UR-MCNC: NEGATIVE — SIGNIFICANT CHANGE UP
COLOR SPEC: YELLOW — SIGNIFICANT CHANGE UP
DIFF PNL FLD: ABNORMAL
EPI CELLS # UR: SIGNIFICANT CHANGE UP
GLUCOSE UR QL: NEGATIVE MG/DL — SIGNIFICANT CHANGE UP
KETONES UR-MCNC: NEGATIVE — SIGNIFICANT CHANGE UP
LEUKOCYTE ESTERASE UR-ACNC: ABNORMAL
NITRITE UR-MCNC: NEGATIVE — SIGNIFICANT CHANGE UP
PH UR: 7 — SIGNIFICANT CHANGE UP (ref 5–8)
PROT UR-MCNC: 15 MG/DL
RBC CASTS # UR COMP ASSIST: SIGNIFICANT CHANGE UP /HPF (ref 0–4)
SP GR SPEC: 1.01 — SIGNIFICANT CHANGE UP (ref 1.01–1.02)
UROBILINOGEN FLD QL: NEGATIVE MG/DL — SIGNIFICANT CHANGE UP
WBC UR QL: SIGNIFICANT CHANGE UP

## 2017-09-20 PROCEDURE — 99233 SBSQ HOSP IP/OBS HIGH 50: CPT

## 2017-09-20 RX ORDER — ESCITALOPRAM OXALATE 10 MG/1
10 TABLET, FILM COATED ORAL DAILY
Qty: 0 | Refills: 0 | Status: DISCONTINUED | OUTPATIENT
Start: 2017-09-20 | End: 2017-10-03

## 2017-09-20 RX ADMIN — Medication 500 MILLIGRAM(S): at 11:31

## 2017-09-20 RX ADMIN — HEPARIN SODIUM 5000 UNIT(S): 5000 INJECTION INTRAVENOUS; SUBCUTANEOUS at 17:09

## 2017-09-20 RX ADMIN — Medication 1 TABLET(S): at 11:31

## 2017-09-20 RX ADMIN — Medication 100 MILLIGRAM(S): at 13:27

## 2017-09-20 RX ADMIN — LISINOPRIL 5 MILLIGRAM(S): 2.5 TABLET ORAL at 05:58

## 2017-09-20 RX ADMIN — HEPARIN SODIUM 5000 UNIT(S): 5000 INJECTION INTRAVENOUS; SUBCUTANEOUS at 05:58

## 2017-09-20 RX ADMIN — TRAMADOL HYDROCHLORIDE 25 MILLIGRAM(S): 50 TABLET ORAL at 13:00

## 2017-09-20 RX ADMIN — GABAPENTIN 100 MILLIGRAM(S): 400 CAPSULE ORAL at 13:27

## 2017-09-20 RX ADMIN — TRAMADOL HYDROCHLORIDE 25 MILLIGRAM(S): 50 TABLET ORAL at 06:00

## 2017-09-20 RX ADMIN — TRAMADOL HYDROCHLORIDE 25 MILLIGRAM(S): 50 TABLET ORAL at 06:51

## 2017-09-20 RX ADMIN — GABAPENTIN 100 MILLIGRAM(S): 400 CAPSULE ORAL at 21:33

## 2017-09-20 RX ADMIN — TRAMADOL HYDROCHLORIDE 25 MILLIGRAM(S): 50 TABLET ORAL at 11:31

## 2017-09-20 RX ADMIN — Medication 30 MILLIGRAM(S): at 11:32

## 2017-09-20 RX ADMIN — PANTOPRAZOLE SODIUM 40 MILLIGRAM(S): 20 TABLET, DELAYED RELEASE ORAL at 05:57

## 2017-09-20 RX ADMIN — Medication 75 MICROGRAM(S): at 05:58

## 2017-09-20 RX ADMIN — Medication 100 MILLIGRAM(S): at 21:33

## 2017-09-20 RX ADMIN — Medication 100 MILLIGRAM(S): at 05:57

## 2017-09-20 RX ADMIN — GABAPENTIN 100 MILLIGRAM(S): 400 CAPSULE ORAL at 05:57

## 2017-09-20 RX ADMIN — ESCITALOPRAM OXALATE 10 MILLIGRAM(S): 10 TABLET, FILM COATED ORAL at 17:53

## 2017-09-20 NOTE — PROGRESS NOTE ADULT - ASSESSMENT
87 year old female with PMHx  Reji 2+ thrombocytosis, dating back to 2013. She had been on Hydrea since Olya 10, 2013. Most recently she was found to be pancytopenic and the Hydrea was held as of July 12, 2017. She was in her USOH until about 1 week prior to admission, when she began to experience pain in her neck and shoulders.  She saw ortho, who started her on a Medrol Dose pack, upon which her symptoms resolved.  However, upon completing the pack, she began to experience pain and swelling in her right hand.  She then began to experience similar symptoms in her other hand and in both feet.  The pain continued to worsen, so that she became unable to ambulate on her own.  (+)AM stiffness, lasting several hours. Patient came to the ER. 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 Pt was started on prednisone 40mg daily. Followed  by Rheum and Heme/Onc, Slowly progressing. As per Rheum will taper steroids to 10 mg daily, will maintain until follow up as outpatient.  Seen by PT and recommend rehab. BP was elevated , patient started on Lisinopril. Patient was transferred to acute rehab.    1) Polyarthralgia- neurologic etiology vs bone pain from myelofibrosis vs Complex regional pain syndrome  - No signs of infection  - Prednisone changed to IV Solumedrol.  - Continue Gabapentin and Tramadol.   - Hepatitis Panel negative, Lyme  serology negative ,  Parvovirus IgM - negative , IgG- elevated - past exposure to parvovirus +  - Rheumatology  input appreciated - recommend bone scan.  - Continue rehab.  - GI Prophylaxis  - Accu checks dc, patient BG <150    2) Myelofibrosis  - Heme/Onc consult Dr. Mosqueda, input appreciated  - 1unit PRBC received last night (2nd unit held last night secondary to fever)  - monitor labs in am   - Patient is waiting to be started on Jakafi  - Outpatient follow up with Dr. Montes    3) Hypothyroidism  - Levothyroxine 75 mcg    4) Elevated BP without history of HTN  - BP improved with Lisinopril    5) Constipation- Bowel RX    6) Anemia - chronic - 1unit PRBC received last night (2nd unit held last night secondary to fever)    7) Fever - unclear etiology.  CXR with LLL infiltrate - patient asymptomatic (no cough, SOB, CP); UA negative.  Follow up Urine culture and Blood cultures.  Lactate 1.  Will monitor off antibiotics. 87 year old female with PMHx  Reji 2+ thrombocytosis, dating back to 2013. She had been on Hydrea since Olya 10, 2013. Most recently she was found to be pancytopenic and the Hydrea was held as of July 12, 2017. She was in her USOH until about 1 week prior to admission, when she began to experience pain in her neck and shoulders.  She saw ortho, who started her on a Medrol Dose pack, upon which her symptoms resolved.  However, upon completing the pack, she began to experience pain and swelling in her right hand.  She then began to experience similar symptoms in her other hand and in both feet.  The pain continued to worsen, so that she became unable to ambulate on her own.  (+)AM stiffness, lasting several hours. Patient came to the ER. 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 Pt was started on prednisone 40mg daily. Followed  by Rheum and Heme/Onc, Slowly progressing. As per Rheum will taper steroids to 10 mg daily, will maintain until follow up as outpatient.  Seen by PT and recommend rehab. BP was elevated , patient started on Lisinopril. Patient was transferred to acute rehab.    1) Polyarthralgia- neurologic etiology vs bone pain from myelofibrosis vs Complex regional pain syndrome  - No signs of infection  - Prednisone changed to IV Solumedrol needs q8 or Q6 solumedrol dosing. await rheum recs.   - Continue Gabapentin and Tramadol.   - Hepatitis Panel negative, Lyme  serology negative ,  Parvovirus IgM - negative , IgG- elevated - past exposure to parvovirus +  - Rheumatology  input appreciated - recommend bone scan.  - Continue rehab.  - GI Prophylaxis  - Accu checks dc, patient BG <150    2) Myelofibrosis  - Heme/Onc consult Dr. Mosqueda, input appreciated  - 1unit PRBC received last night (2nd unit held last night secondary to fever)  - monitor labs in am   - Patient is waiting to be started on Jakafi  - Outpatient follow up with Dr. Montes    3) Hypothyroidism  - Levothyroxine 75 mcg    4) Elevated BP without history of HTN  - BP improved with Lisinopril    5) Constipation- Bowel RX    6) Anemia - chronic - 1unit PRBC received last night (2nd unit held last night secondary to fever)    7) Fever - unclear etiology.  CXR with LLL infiltrate - patient asymptomatic (no cough, SOB, CP); UA negative.  Follow up Urine culture and Blood cultures.  Lactate 1.  Will monitor off antibiotics. 87 year old female with PMHx  Reji 2+ thrombocytosis, dating back to 2013. She had been on Hydrea since Olya 10, 2013. Most recently she was found to be pancytopenic and the Hydrea was held as of July 12, 2017. She was in her USOH until about 1 week prior to admission, when she began to experience pain in her neck and shoulders.  She saw ortho, who started her on a Medrol Dose pack, upon which her symptoms resolved.  However, upon completing the pack, she began to experience pain and swelling in her right hand.  She then began to experience similar symptoms in her other hand and in both feet.  The pain continued to worsen, so that she became unable to ambulate on her own.  (+)AM stiffness, lasting several hours. Patient came to the ER. 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 Pt was started on prednisone 40mg daily. Followed  by Rheum and Heme/Onc, Slowly progressing. As per Rheum will taper steroids to 10 mg daily, will maintain until follow up as outpatient.  Seen by PT and recommend rehab. BP was elevated , patient started on Lisinopril. Patient was transferred to acute rehab.    1) Polyarthralgia- neurologic etiology vs bone pain from myelofibrosis vs Complex regional pain syndrome  - No signs of infection  - Prednisone changed to IV Solumedrol per rheum recs.   - Continue Gabapentin and Tramadol.   - Hepatitis Panel negative, Lyme  serology negative ,  Parvovirus IgM - negative , IgG- elevated - past exposure to parvovirus +  - Rheumatology  input appreciated - recommend bone scan.  - Continue rehab.  - GI Prophylaxis  - Accu checks dc, patient BG <150    2) Myelofibrosis  - Heme/Onc consult Dr. Mosqueda, input appreciated  - 1unit PRBC received last night (2nd unit held last night secondary to fever)  - monitor labs in am   - Patient is waiting to be started on Jakafi  - Outpatient follow up with Dr. Montes    3) Hypothyroidism  - Levothyroxine 75 mcg    4) Elevated BP without history of HTN  - BP improved with Lisinopril    5) Constipation- Bowel RX    6) Anemia - chronic - 1unit PRBC received last night (2nd unit held last night secondary to fever)    7) Fever - unclear etiology.  CXR with LLL infiltrate - patient asymptomatic (no cough, SOB, CP); UA negative.  Follow up Urine culture and Blood cultures.  Lactate 1.  Will monitor off antibiotics.

## 2017-09-20 NOTE — PROGRESS NOTE ADULT - SUBJECTIVE AND OBJECTIVE BOX
CC: Pain and swelling in hands and feet    HPI: 87 year old female with PMHx  Reji 2+ thrombocytosis, dating back to . She had been on Hydrea since Olya 10, 2013. Most recently she was found to be pancytopenic and the Hydrea was held as of 2017. She was in her USOH until about 1 week prior to admission, when she began to experience pain in her neck and shoulders.  She saw ortho, who started her on a Medrol Dose pack, upon which her symptoms resolved.  However, upon completing the pack, she began to experience pain and swelling in her right hand.  She then began to experience similar symptoms in her other hand and in both feet.  The pain continued to worsen, so that she became unable to ambulate on her own.  (+)AM stiffness, lasting several hours. Patient came to the ER. 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 Pt was started on prednisone 40mg daily. Followed  by Rheum and Heme/Onc, Slowly progressing. As per Rheum will taper steroids to 10 mg daily, will maintain until follow up as outpatient.  Seen by PT and recommend rehab. BP was elevated , patient started on Lisinopril. Patient was transferred to acute rehab.    INTERVAL HPI/OVERNIGHT EVENTS: Patient seen and examined lying in bed.  Patient states that the pain in her hands is a little better.  Patient complaining of bone pain in bilateral legs.  Patient also complaining of fatigue, unable to get out of bed.  Patient denies any headache, dizziness, SOB, cough, CP, abdominal pain, nausea, vomiting, dysuria, diarrhea.  Other ROS reviewed and are negative.    Vital Signs Last 24 Hrs  T(C): 36.2 (20 Sep 2017 05:50), Max: 38.1 (19 Sep 2017 22:07)  T(F): 97.1 (20 Sep 2017 05:50), Max: 100.5 (19 Sep 2017 22:07)  HR: 96 (20 Sep 2017 05:50) (90 - 100)  BP: 144/78 (20 Sep 2017 05:50) (111/60 - 144/78)  BP(mean): --  RR: 18 (20 Sep 2017 05:50) (16 - 18)  SpO2: 97% (20 Sep 2017 05:50) (95% - 99%)  I&O's Detail    19 Sep 2017 07:01  -  20 Sep 2017 07:00  --------------------------------------------------------  IN:  Total IN: 0 mL    OUT:    Stool: 1 mL  Total OUT: 1 mL    Total NET: -1 mL      PHYSICAL EXAM:  GENERAL: NAD, fatigued  NERVOUS SYSTEM:  Alert & Oriented X3, Good concentration; generalized weakness  CHEST/LUNG: Clear to auscultation bilaterally; No rales, rhonchi, wheezing, or rubs  HEART: Regular rate and rhythm; No murmurs, rubs, or gallops  ABDOMEN: Soft, Nontender, Nondistended; Bowel sounds present  EXTREMITIES:  2+ Peripheral Pulses, No clubbing, cyanosis, or edema                                8.1    12.4  )-----------( 265      ( 19 Sep 2017 22:52 )             23.6     19 Sep 2017 22:52    131    |  95     |  23.0   ----------------------------<  117    4.2     |  25.0   |  0.45     Ca    8.3        19 Sep 2017 22:52        Urinalysis Basic - ( 20 Sep 2017 06:19 )    Color: Yellow / Appearance: Clear / S.010 / pH: x  Gluc: x / Ketone: Negative  / Bili: Negative / Urobili: Negative mg/dL   Blood: x / Protein: 15 mg/dL / Nitrite: Negative   Leuk Esterase: Trace / RBC: 0-2 /HPF / WBC 0-2   Sq Epi: x / Non Sq Epi: x / Bacteria: x        MEDICATIONS  (STANDING):  heparin  Injectable 5000 Unit(s) SubCutaneous every 12 hours  docusate sodium 100 milliGRAM(s) Oral three times a day  polyethylene glycol 3350 17 Gram(s) Oral at bedtime  pantoprazole    Tablet 40 milliGRAM(s) Oral before breakfast  lisinopril 5 milliGRAM(s) Oral daily  levothyroxine 75 MICROGram(s) Oral daily  gabapentin 100 milliGRAM(s) Oral three times a day  multivitamin 1 Tablet(s) Oral daily  ascorbic acid 500 milliGRAM(s) Oral daily  traMADol 25 milliGRAM(s) Oral <User Schedule>  methylPREDNISolone sodium succinate Injectable 30 milliGRAM(s) IV Push daily    MEDICATIONS  (PRN):  magnesium hydroxide Suspension 30 milliLiter(s) Oral daily PRN Constipation  methyl salicylate 14%/menthol 6% Topical Ointment 1 Application(s) Topical four times a day PRN pain  hydrocortisone 2.5% Rectal Cream 1 Application(s) Rectal daily PRN pain  traMADol 25 milliGRAM(s) Oral every 4 hours PRN Moderate Pain (4 - 6)  lidocaine/prilocaine Cream 1 Application(s) Topical every 4 hours PRN pain  acetaminophen   Tablet 650 milliGRAM(s) Oral every 6 hours PRN For Temp greater than 38 C (100.4 F)      RADIOLOGY & ADDITIONAL TESTS:  < from: Xray Chest 1 View AP/PA. (17 @ 22:50) >   EXAM:  CHEST SINGLE VIEW FRONTAL                          PROCEDURE DATE:  2017          INTERPRETATION:  HISTORY: Fever.  TECHNIQUE: Portable frontal view of the chest, 1 view.  COMPARISON: none.  FINDINGS:     HEART: normal in size   LUNGS: There is an infiltrate in the left lower lobe. The right lung is   clear. Lungs are hyperlucent compatible with COPD    OSSEOUS STRUCTURES:: degenerative changes. The bones are osteopenic with   a bell shaped thorax, compatible with osteomalacia.        Calcifications in the patient's left upper quadrant may be within the   spleen or related to vascular structures.    IMPRESSION:   Left lower lobe infiltrate.                  ALICJA GUEVARA M.D., ATTENDING RADIOLOGIST  This document has been electronically signed. Sep 20 2017 10:04AM        < end of copied text >

## 2017-09-21 ENCOUNTER — OUTPATIENT (OUTPATIENT)
Dept: OUTPATIENT SERVICES | Facility: HOSPITAL | Age: 82
LOS: 1 days | Discharge: HOME | End: 2017-09-21

## 2017-09-21 DIAGNOSIS — D47.3 ESSENTIAL (HEMORRHAGIC) THROMBOCYTHEMIA: ICD-10-CM

## 2017-09-21 LAB
ALBUMIN SERPL ELPH-MCNC: 2.6 G/DL — LOW (ref 3.3–5.2)
ALP SERPL-CCNC: 294 U/L — HIGH (ref 40–120)
ALT FLD-CCNC: 31 U/L — SIGNIFICANT CHANGE UP
ANION GAP SERPL CALC-SCNC: 14 MMOL/L — SIGNIFICANT CHANGE UP (ref 5–17)
AST SERPL-CCNC: 18 U/L — SIGNIFICANT CHANGE UP
BILIRUB SERPL-MCNC: 0.8 MG/DL — SIGNIFICANT CHANGE UP (ref 0.4–2)
BUN SERPL-MCNC: 23 MG/DL — HIGH (ref 8–20)
CALCIUM SERPL-MCNC: 8.3 MG/DL — LOW (ref 8.6–10.2)
CHLORIDE SERPL-SCNC: 90 MMOL/L — LOW (ref 98–107)
CO2 SERPL-SCNC: 26 MMOL/L — SIGNIFICANT CHANGE UP (ref 22–29)
CREAT SERPL-MCNC: 0.35 MG/DL — LOW (ref 0.5–1.3)
CRP SERPL-MCNC: 17.8 MG/DL — HIGH (ref 0–0.4)
CULTURE RESULTS: SIGNIFICANT CHANGE UP
ERYTHROCYTE [SEDIMENTATION RATE] IN BLOOD: 67 MM/HR — HIGH (ref 0–20)
GLUCOSE SERPL-MCNC: 118 MG/DL — HIGH (ref 70–115)
HCT VFR BLD CALC: 22.8 % — LOW (ref 37–47)
HGB BLD-MCNC: 7.8 G/DL — LOW (ref 12–16)
MCHC RBC-ENTMCNC: 31.2 PG — HIGH (ref 27–31)
MCHC RBC-ENTMCNC: 34.2 G/DL — SIGNIFICANT CHANGE UP (ref 32–36)
MCV RBC AUTO: 91.2 FL — SIGNIFICANT CHANGE UP (ref 81–99)
PLATELET # BLD AUTO: 274 K/UL — SIGNIFICANT CHANGE UP (ref 150–400)
POTASSIUM SERPL-MCNC: 4 MMOL/L — SIGNIFICANT CHANGE UP (ref 3.5–5.3)
POTASSIUM SERPL-SCNC: 4 MMOL/L — SIGNIFICANT CHANGE UP (ref 3.5–5.3)
PROCALCITONIN SERPL-MCNC: 0.2 NG/ML — HIGH (ref 0–0.04)
PROT SERPL-MCNC: 6.2 G/DL — LOW (ref 6.6–8.7)
RBC # BLD: 2.5 M/UL — LOW (ref 4.4–5.2)
RBC # FLD: 18.3 % — HIGH (ref 11–15.6)
SODIUM SERPL-SCNC: 130 MMOL/L — LOW (ref 135–145)
SPECIMEN SOURCE: SIGNIFICANT CHANGE UP
WBC # BLD: 12 K/UL — HIGH (ref 4.8–10.8)
WBC # FLD AUTO: 12 K/UL — HIGH (ref 4.8–10.8)

## 2017-09-21 PROCEDURE — 99233 SBSQ HOSP IP/OBS HIGH 50: CPT

## 2017-09-21 PROCEDURE — 78306 BONE IMAGING WHOLE BODY: CPT | Mod: 26

## 2017-09-21 PROCEDURE — 99232 SBSQ HOSP IP/OBS MODERATE 35: CPT

## 2017-09-21 RX ORDER — FENTANYL CITRATE 50 UG/ML
1 INJECTION INTRAVENOUS
Qty: 0 | Refills: 0 | Status: DISCONTINUED | OUTPATIENT
Start: 2017-09-21 | End: 2017-09-27

## 2017-09-21 RX ADMIN — TRAMADOL HYDROCHLORIDE 25 MILLIGRAM(S): 50 TABLET ORAL at 14:00

## 2017-09-21 RX ADMIN — Medication 1 TABLET(S): at 13:36

## 2017-09-21 RX ADMIN — TRAMADOL HYDROCHLORIDE 25 MILLIGRAM(S): 50 TABLET ORAL at 08:22

## 2017-09-21 RX ADMIN — Medication 500 MILLIGRAM(S): at 13:36

## 2017-09-21 RX ADMIN — HEPARIN SODIUM 5000 UNIT(S): 5000 INJECTION INTRAVENOUS; SUBCUTANEOUS at 06:31

## 2017-09-21 RX ADMIN — FENTANYL CITRATE 1 PATCH: 50 INJECTION INTRAVENOUS at 13:36

## 2017-09-21 RX ADMIN — TRAMADOL HYDROCHLORIDE 25 MILLIGRAM(S): 50 TABLET ORAL at 13:36

## 2017-09-21 RX ADMIN — GABAPENTIN 100 MILLIGRAM(S): 400 CAPSULE ORAL at 06:31

## 2017-09-21 RX ADMIN — GABAPENTIN 100 MILLIGRAM(S): 400 CAPSULE ORAL at 21:12

## 2017-09-21 RX ADMIN — HEPARIN SODIUM 5000 UNIT(S): 5000 INJECTION INTRAVENOUS; SUBCUTANEOUS at 17:13

## 2017-09-21 RX ADMIN — Medication 100 MILLIGRAM(S): at 21:12

## 2017-09-21 RX ADMIN — Medication 30 MILLIGRAM(S): at 06:31

## 2017-09-21 RX ADMIN — Medication 100 MILLIGRAM(S): at 06:30

## 2017-09-21 RX ADMIN — POLYETHYLENE GLYCOL 3350 17 GRAM(S): 17 POWDER, FOR SOLUTION ORAL at 21:12

## 2017-09-21 RX ADMIN — GABAPENTIN 100 MILLIGRAM(S): 400 CAPSULE ORAL at 13:37

## 2017-09-21 RX ADMIN — LISINOPRIL 5 MILLIGRAM(S): 2.5 TABLET ORAL at 06:31

## 2017-09-21 RX ADMIN — PANTOPRAZOLE SODIUM 40 MILLIGRAM(S): 20 TABLET, DELAYED RELEASE ORAL at 06:31

## 2017-09-21 RX ADMIN — Medication 75 MICROGRAM(S): at 06:31

## 2017-09-21 RX ADMIN — TRAMADOL HYDROCHLORIDE 25 MILLIGRAM(S): 50 TABLET ORAL at 09:30

## 2017-09-21 RX ADMIN — Medication 100 MILLIGRAM(S): at 13:37

## 2017-09-21 RX ADMIN — TRAMADOL HYDROCHLORIDE 25 MILLIGRAM(S): 50 TABLET ORAL at 06:32

## 2017-09-21 RX ADMIN — ESCITALOPRAM OXALATE 10 MILLIGRAM(S): 10 TABLET, FILM COATED ORAL at 13:37

## 2017-09-21 NOTE — PROGRESS NOTE ADULT - SUBJECTIVE AND OBJECTIVE BOX
CC: Swelling of hands/feet/joint pain    INTERVAL HPI/OVERNIGHT EVENTS: Patient seen and examined lying in bed.  Patient states that the pain in her hands is  better.  Patient complaining of bone pain in right hip. Patient states has had various  joint pain which changes each day Patient also complaining of fatigue, unable to get out of bed.  Patient denies any headache, dizziness, SOB, cough, CP, abdominal pain, nausea, vomiting, dysuria, diarrhea.  Other ROS reviewed and are negative.    Vital Signs Last 24 Hrs  T(C): 36.4 (21 Sep 2017 05:21), Max: 37.9 (20 Sep 2017 20:40)  T(F): 97.6 (21 Sep 2017 05:21), Max: 100.3 (20 Sep 2017 20:40)  HR: 93 (21 Sep 2017 05:21) (93 - 96)  BP: 126/63 (21 Sep 2017 05:21) (116/67 - 126/63)  BP(mean): --  RR: 17 (21 Sep 2017 05:21) (16 - 17)  SpO2: 96% (21 Sep 2017 05:21) (94% - 96%)  I&O's Detail                                7.8    12.0  )-----------( 274      ( 21 Sep 2017 08:06 )             22.8     21 Sep 2017 08:08    130    |  90     |  23.0   ----------------------------<  118    4.0     |  26.0   |  0.35     Ca    8.3        21 Sep 2017 08:08    TPro  6.2    /  Alb  2.6    /  TBili  0.8    /  DBili  x      /  AST  18     /  ALT  31     /  AlkPhos  294    21 Sep 2017 08:08      CAPILLARY BLOOD GLUCOSE        LIVER FUNCTIONS - ( 21 Sep 2017 08:08 )  Alb: 2.6 g/dL / Pro: 6.2 g/dL / ALK PHOS: 294 U/L / ALT: 31 U/L / AST: 18 U/L / GGT: x           Urinalysis Basic - ( 20 Sep 2017 06:19 )    Color: Yellow / Appearance: Clear / S.010 / pH: x  Gluc: x / Ketone: Negative  / Bili: Negative / Urobili: Negative mg/dL   Blood: x / Protein: 15 mg/dL / Nitrite: Negative   Leuk Esterase: Trace / RBC: 0-2 /HPF / WBC 0-2   Sq Epi: x / Non Sq Epi: x / Bacteria: x        MEDICATIONS  (STANDING):  heparin  Injectable 5000 Unit(s) SubCutaneous every 12 hours  docusate sodium 100 milliGRAM(s) Oral three times a day  polyethylene glycol 3350 17 Gram(s) Oral at bedtime  pantoprazole    Tablet 40 milliGRAM(s) Oral before breakfast  lisinopril 5 milliGRAM(s) Oral daily  levothyroxine 75 MICROGram(s) Oral daily  gabapentin 100 milliGRAM(s) Oral three times a day  multivitamin 1 Tablet(s) Oral daily  ascorbic acid 500 milliGRAM(s) Oral daily  traMADol 25 milliGRAM(s) Oral <User Schedule>  methylPREDNISolone sodium succinate Injectable 30 milliGRAM(s) IV Push daily  escitalopram 10 milliGRAM(s) Oral daily  fentaNYL   Patch  12 MICROgram(s)/Hr 1 Patch Transdermal every 72 hours    MEDICATIONS  (PRN):  magnesium hydroxide Suspension 30 milliLiter(s) Oral daily PRN Constipation  methyl salicylate 14%/menthol 6% Topical Ointment 1 Application(s) Topical four times a day PRN pain  hydrocortisone 2.5% Rectal Cream 1 Application(s) Rectal daily PRN pain  traMADol 25 milliGRAM(s) Oral every 4 hours PRN Moderate Pain (4 - 6)  lidocaine/prilocaine Cream 1 Application(s) Topical every 4 hours PRN pain  acetaminophen   Tablet 650 milliGRAM(s) Oral every 6 hours PRN For Temp greater than 38 C (100.4 F)      RADIOLOGY & ADDITIONAL TESTS:       EXAM:  NM BONE IMG WHOLE BODY                          PROCEDURE DATE:  2017          INTERPRETATION:  RADIOPHARMACEUTICAL: 21.5 mCi Tc-99m HDP, I.V.     CLINICAL INFORMATION: 87-year-old female with bone pain for 2 weeks.   Evaluate for bony abnormality.    TECHNIQUE:  Whole-body and static images of the pelvis were obtained in   the anterior and posterior projections approximately 2-3 hours following   administration of radiotracer. Static images of the chest were also   obtained in all 4 oblique projections.    COMPARISON: No prior bone scan available for comparison.     FINDINGS: There are several round foci of increased radiotracer   accumulation of similar intensity in 2 contiguous left mid ribs,   approximately fifth and sixthribs anterolaterally. Round foci of   minimally increased activity are also noted in a few right anterior ribs.   These are probably due to trauma. There is diffuse, asymmetrically   increased activity in the right shoulder which may be due to trauma  and/or degenerative change. Degenerative changes are also noted in the   left shoulder, left sternoclavicular joint region, bilateral knees and   bilateral ankles/feet. There is physiologic distribution of radiotracer   in the remainder of the visualized osseous structures. Evaluation of the   pelvic bones is limited due to physiologic activity in the distended   urinary bladder.    Both kidneys are visualized.    IMPRESSION: Bone scan demonstrates:    1. Multiple round bilateral rib foci, which are probably related to   trauma. Please correlate clinically.  2. Diffuse asymmetrically increased right shoulder activity, which may be   related to trauma and/or degenerative change. Please correlate clinically   and with x-ray/CT.  3. Degenerative changes in the left shoulder, left sternoclavicular joint   region, bilateral knees and ankles/feet.  4. Limited evaluation of pelvic bones due to distended urinary bladder.    PHYSICAL EXAM:  GENERAL: NAD  NERVOUS SYSTEM:  Alert & Oriented X3, Good concentration; generalized weakness  CHEST/LUNG: Clear to auscultation bilaterally; No rales, rhonchi, wheezing, or rubs  HEART: Regular rate and rhythm; No murmurs, rubs, or gallops  ABDOMEN: Soft, Nontender, Nondistended; Bowel sounds present  EXTREMITIES:  2+ Peripheral Pulses, No clubbing, cyanosis, or edema

## 2017-09-21 NOTE — PROGRESS NOTE ADULT - SUBJECTIVE AND OBJECTIVE BOX
HPI:  Pt is an 87 y.o. F w/ PMHx Reji 2 thrombocytosis and Myelofibrosis presented to [facility] on 09/10/17 with diffuse joint pains.  Pt reports pain started in her neck and shoulders the week before admission.  Pt seen by outpt orthopedics, and started on Medrol Dose pack.  Symptoms initially improved, but then pt began to experience pain and swelling in both hands and feet.  Pain worsened to the point that pt was having difficulty with walking.  On admission, Right hand Xray revealed periarticular changes consistent with Osteoarthritis versus CPPD disease; diffuse soft tissue; no fracture. Bilateral wrist Xray showed no fracture or dislocation.  Right ankle Xray showed soft tissue swelling, no fracture or dislocation.  Rheumatology consulted.  Serological studies performed.  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 less likely RA vs. less likely polymyalgia rheumatica. Pt was started on prednisone 40mg daily. As per Rheum will taper steroids by 10mg every 5 days to 10 mg daily, will maintain until follow up as outpatient.  Pt seen by hematology, transfused on 09/06 for symptomatic anemia.  Hospital course complicated by elevated BP, pt started by lisinopril.  Pt also had marked extremities swelling.  Doppler for both UEs and LEs performed, which were negative for DVTs.    INTERVAL SUBJECTIVE & REVIEW OF SYMPTOMS:   Pt presents with a persistent diffuse severe pain and fatigue with difficulty performing ADLs.  Pt c/o lack of appetite as well as depression due to the pain.   Pt offered no other complaints; denies SOB, dyspnea, cough      REVIEW OF SYSTEMS  [   ] Constitutional WNL  [ x  ] Cardio WNL  [  x ] Resp WNL  [  x ] GI WNL  [  x ]  WNL  [   ] Heme WNL  [   ] Endo WNL  [ x  ] Skin WNL  [   ] MSK WNL  [  x ] Neuro WNL  [  x ] Cognitive WNL  [   ] Psych WNL    Vital Signs Last 24 Hrs  T(C): 36.4 (21 Sep 2017 05:21), Max: 37.9 (20 Sep 2017 20:40)  T(F): 97.6 (21 Sep 2017 05:21), Max: 100.3 (20 Sep 2017 20:40)  HR: 93 (21 Sep 2017 05:21) (90 - 96)  BP: 126/63 (21 Sep 2017 05:21) (116/67 - 126/63)  BP(mean): --  RR: 17 (21 Sep 2017 05:21) (16 - 17)  SpO2: 96% (21 Sep 2017 05:21) (94% - 98%)    PHYSICAL EXAM  General:  Mild distress due to pain  Cardio: RRR  Resp: CTAB  Abdomen: soft, NTND  Extrem: + mild edema digits, no calf tenderness. No erythema  Motor 4/5     Functional Exam:   Bed mobility: Min assist  Transfers Min assist  Gait: Pt ambulated 20' RW min assist  ADLs:  Max assist toileting and LE dressing, Sup grooming and eating    RECENT LABS:                            8.1    12.4  )-----------( 265      ( 19 Sep 2017 22:52 )             23.6                           7.1    11.7  )-----------( 330      ( 19 Sep 2017 08:00 )             22.1   Iron 54                    7.6    11.6  )-----------( 284      ( 17 Sep 2017 07:28 )             22.1              09-21    130<L>  |  90<L>  |  23.0<H>  ----------------------------<  118<H>  4.0   |  26.0  |  0.35<L>    Ca    8.3<L>      21 Sep 2017 08:08    TPro  6.2<L>  /  Alb  2.6<L>  /  TBili  0.8  /  DBili  x   /  AST  18  /  ALT  31  /  AlkPhos  294<H>  09-21             MEDICATIONS  (STANDING):  heparin  Injectable 5000 Unit(s) SubCutaneous every 12 hours  docusate sodium 100 milliGRAM(s) Oral three times a day  polyethylene glycol 3350 17 Gram(s) Oral at bedtime  pantoprazole    Tablet 40 milliGRAM(s) Oral before breakfast  hydrocortisone 2.5% Rectal Cream 1 Application(s) Rectal daily  lisinopril 5 milliGRAM(s) Oral daily  levothyroxine 75 MICROGram(s) Oral daily  predniSONE   Tablet 30 milliGRAM(s) Oral daily  acetaminophen   Tablet 650 milliGRAM(s) Oral every 6 hours  gabapentin 100 milliGRAM(s) Oral two times a day  gabapentin 100 milliGRAM(s) Oral once    MEDICATIONS  (PRN):  magnesium hydroxide Suspension 30 milliLiter(s) Oral daily PRN Constipation  methyl salicylate 14%/menthol 6% Topical Ointment 1 Application(s) Topical four times a day PRN pain      A/P:  Pt is an 87 year old female with functional deficits from 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  Pt to f/u with rheumatology after d/c for further tapering.  Serologies were ordered.    Continue local analgesic cream.  Lidocaine cream prn   Tylenol changed to q6h for 2 days. Increased neurontin to TID.  Added Tramadol 25-50mg q4hrs prn-changed to 25mg standing bid prior to rehab sessions.   Recalled rheumatology due to persistent significant pain; f/u 9/19 appreciated; Prednisone changed to Solumedrol daily.  Consider bone scan if not improved; ordered for today-d/w radiology.      h/o Myelofibrosis/MDS with thrombocytosis: Hem onc fu as needed. 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 recent bone marrow showed 5% blasts with moderate reticulin fibrosis. Now with myelofibrosis awaiting to begin Jakafi outpatient with Dr. Montes.   HB 7.1.  Iron WNL.  Pt transfused 1U PRBCs 9/19/17; developed temp of 100.5  Discussed above with Dr Montes; pre-medicate with Tylenol if another transfusion warranted    Tmax 100.5 9/19  Afebrile now  CXR with LLL.  WBC 12.4; elevated probably due to steroids  Appreciate hospitalist f/u; will follow off abx.  Repeat cbc today stable Add Procalcitonin 9/21 elevated at 0.2    DVT prophylaxis: on heparin sq    Elevated blood pressure without h/o HTN: Improved on lisinopril    Hypothyroidism: on synthroid    Hyponatremia:  Monitor BMP.      GI prophylaxis: on protonix    Diet: regular with thin    Bowel program: Toilet schedule prn    Bowel program: colace, miralax    Medical fu     Discussed above extensively with patients daughters-pt noted to be depressed and requesting antidepressant  Added Lexapro 10mg daily 9/20

## 2017-09-21 NOTE — CHART NOTE - NSCHARTNOTEFT_GEN_A_CORE
Upon Nutritional Assessment by the Registered Dietitian your patient was determined to meet criteria / has evidence of the following diagnosis/diagnoses:          [ ]  Mild Protein Calorie Malnutrition        [ ]  Moderate Protein Calorie Malnutrition        [X ] Severe Protein Calorie Malnutrition        [ ] Unspecified Protein Calorie Malnutrition        [ ] Underweight / BMI <19        [ ] Morbid Obesity / BMI > 40      Findings as based on:  •  Comprehensive nutrition assessment and consultation  •  Calorie counts (nutrient intake analysis)  •  Food acceptance and intake status from observations by staff  •  Follow up  •  Patient education  •  Intervention secondary to interdisciplinary rounds  •   concerns      Treatment:    The following diet has been recommended: Continue regular diet with Ensure Enlive TID.    Pt has lost 13lb (10%) x 2 week since hospital admission, PO intake suboptimal.       PROVIDER Section:     By signing this assessment you are acknowledging and agree with the diagnosis/diagnoses assigned by the Registered Dietitian    Comments:

## 2017-09-21 NOTE — PROGRESS NOTE ADULT - ASSESSMENT
87 year old female with PMHx  Reji 2+ thrombocytosis, dating back to 2013. She had been on Hydrea since Olya 10, 2013. Most recently she was found to be pancytopenic and the Hydrea was held as of July 12, 2017. She was in her USOH until about 1 week prior to admission, when she began to experience pain in her neck and shoulders.  She saw ortho, who started her on a Medrol Dose pack, upon which her symptoms resolved.  However, upon completing the pack, she began to experience pain and swelling in her right hand.  She then began to experience similar symptoms in her other hand and in both feet.  The pain continued to worsen, so that she became unable to ambulate on her own.  (+)AM stiffness, lasting several hours. Patient came to the ER. 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 Pt was started on prednisone 40mg daily. Followed  by Rheum and Heme/Onc, Slowly progressing. As per Rheum will taper steroids to 10 mg daily, will maintain until follow up as outpatient.  Seen by PT and recommend rehab. BP was elevated , patient started on Lisinopril. Patient was transferred to acute rehab.    1) Polyarthralgia- neurologic etiology vs bone pain from myelofibrosis vs Complex regional pain syndrome  - No signs of infection  - Prednisone changed to IV Solumedrol per rheum recs.   - Continue Gabapentin and Tramadol.   - Hepatitis Panel negative, Lyme  serology negative ,  Parvovirus IgM - negative , IgG- elevated - past exposure to parvovirus +  - Suggest calling Rheum for further input as patient symptoms do not seem to be improving   - Continue rehab.  - GI Prophylaxis    2) Myelofibrosis  - Seen by Dr. Salinas on consult while inpatient medicine, however given patient's continued complaints and symptoms would recall Dr. Montes for further input  - 1unit PRBC received, continue to monitor H&H  - monitor labs in am   - Patient is waiting to be started on Jakafi    3) Hypothyroidism  - Levothyroxine     4) Elevated BP without history of HTN  - BP improved with Lisinopril    5) Constipation- Bowel RX    6) Anemia - chronic - Would recall Heme/Onc for input    7) Fever - unclear etiology.  CXR with LLL infiltrate - patient asymptomatic (no cough, SOB, CP); UA negative.  Follow up Urine culture (probable contaminant) and Blood cultures (pending results).   Will monitor off antibiotics.

## 2017-09-22 LAB
ANION GAP SERPL CALC-SCNC: 12 MMOL/L — SIGNIFICANT CHANGE UP (ref 5–17)
BUN SERPL-MCNC: 18 MG/DL — SIGNIFICANT CHANGE UP (ref 8–20)
CALCIUM SERPL-MCNC: 8.1 MG/DL — LOW (ref 8.6–10.2)
CHLORIDE SERPL-SCNC: 87 MMOL/L — LOW (ref 98–107)
CO2 SERPL-SCNC: 26 MMOL/L — SIGNIFICANT CHANGE UP (ref 22–29)
CREAT SERPL-MCNC: 0.32 MG/DL — LOW (ref 0.5–1.3)
GLUCOSE SERPL-MCNC: 114 MG/DL — SIGNIFICANT CHANGE UP (ref 70–115)
HCT VFR BLD CALC: 22 % — LOW (ref 37–47)
HGB BLD-MCNC: 7.6 G/DL — LOW (ref 12–16)
MCHC RBC-ENTMCNC: 31.3 PG — HIGH (ref 27–31)
MCHC RBC-ENTMCNC: 34.5 G/DL — SIGNIFICANT CHANGE UP (ref 32–36)
MCV RBC AUTO: 90.5 FL — SIGNIFICANT CHANGE UP (ref 81–99)
PLATELET # BLD AUTO: 267 K/UL — SIGNIFICANT CHANGE UP (ref 150–400)
POTASSIUM SERPL-MCNC: 3.7 MMOL/L — SIGNIFICANT CHANGE UP (ref 3.5–5.3)
POTASSIUM SERPL-SCNC: 3.7 MMOL/L — SIGNIFICANT CHANGE UP (ref 3.5–5.3)
RBC # BLD: 2.43 M/UL — LOW (ref 4.4–5.2)
RBC # FLD: 17.7 % — HIGH (ref 11–15.6)
SODIUM SERPL-SCNC: 125 MMOL/L — LOW (ref 135–145)
WBC # BLD: 11.2 K/UL — HIGH (ref 4.8–10.8)
WBC # FLD AUTO: 11.2 K/UL — HIGH (ref 4.8–10.8)

## 2017-09-22 PROCEDURE — 99232 SBSQ HOSP IP/OBS MODERATE 35: CPT

## 2017-09-22 PROCEDURE — 99233 SBSQ HOSP IP/OBS HIGH 50: CPT

## 2017-09-22 RX ADMIN — LISINOPRIL 5 MILLIGRAM(S): 2.5 TABLET ORAL at 06:06

## 2017-09-22 RX ADMIN — GABAPENTIN 100 MILLIGRAM(S): 400 CAPSULE ORAL at 15:53

## 2017-09-22 RX ADMIN — TRAMADOL HYDROCHLORIDE 25 MILLIGRAM(S): 50 TABLET ORAL at 12:00

## 2017-09-22 RX ADMIN — TRAMADOL HYDROCHLORIDE 25 MILLIGRAM(S): 50 TABLET ORAL at 11:34

## 2017-09-22 RX ADMIN — Medication 100 MILLIGRAM(S): at 15:23

## 2017-09-22 RX ADMIN — GABAPENTIN 100 MILLIGRAM(S): 400 CAPSULE ORAL at 06:06

## 2017-09-22 RX ADMIN — Medication 1 TABLET(S): at 11:34

## 2017-09-22 RX ADMIN — Medication 75 MICROGRAM(S): at 06:06

## 2017-09-22 RX ADMIN — ESCITALOPRAM OXALATE 10 MILLIGRAM(S): 10 TABLET, FILM COATED ORAL at 11:34

## 2017-09-22 RX ADMIN — Medication 30 MILLIGRAM(S): at 06:06

## 2017-09-22 RX ADMIN — Medication 500 MILLIGRAM(S): at 11:34

## 2017-09-22 RX ADMIN — GABAPENTIN 100 MILLIGRAM(S): 400 CAPSULE ORAL at 21:24

## 2017-09-22 RX ADMIN — TRAMADOL HYDROCHLORIDE 25 MILLIGRAM(S): 50 TABLET ORAL at 06:06

## 2017-09-22 RX ADMIN — HEPARIN SODIUM 5000 UNIT(S): 5000 INJECTION INTRAVENOUS; SUBCUTANEOUS at 06:05

## 2017-09-22 RX ADMIN — HEPARIN SODIUM 5000 UNIT(S): 5000 INJECTION INTRAVENOUS; SUBCUTANEOUS at 17:06

## 2017-09-22 RX ADMIN — Medication 650 MILLIGRAM(S): at 15:53

## 2017-09-22 RX ADMIN — Medication 20 MILLIGRAM(S): at 17:06

## 2017-09-22 RX ADMIN — PANTOPRAZOLE SODIUM 40 MILLIGRAM(S): 20 TABLET, DELAYED RELEASE ORAL at 06:06

## 2017-09-22 RX ADMIN — Medication 100 MILLIGRAM(S): at 21:24

## 2017-09-22 RX ADMIN — TRAMADOL HYDROCHLORIDE 25 MILLIGRAM(S): 50 TABLET ORAL at 06:54

## 2017-09-22 RX ADMIN — Medication 100 MILLIGRAM(S): at 06:06

## 2017-09-22 NOTE — PROGRESS NOTE ADULT - SUBJECTIVE AND OBJECTIVE BOX
HPI:  Pt is an 87 y.o. F w/ PMHx Reji 2 thrombocytosis and Myelofibrosis presented to [facility] on 09/10/17 with diffuse joint pains.  Pt reports pain started in her neck and shoulders the week before admission.  Pt seen by outpt orthopedics, and started on Medrol Dose pack.  Symptoms initially improved, but then pt began to experience pain and swelling in both hands and feet.  Pain worsened to the point that pt was having difficulty with walking.  On admission, Right hand Xray revealed periarticular changes consistent with Osteoarthritis versus CPPD disease; diffuse soft tissue; no fracture. Bilateral wrist Xray showed no fracture or dislocation.  Right ankle Xray showed soft tissue swelling, no fracture or dislocation.  Rheumatology consulted.  Serological studies performed.  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 less likely RA vs. less likely polymyalgia rheumatica. Pt was started on prednisone 40mg daily. As per Rheum will taper steroids by 10mg every 5 days to 10 mg daily, will maintain until follow up as outpatient.  Pt seen by hematology, transfused on 09/06 for symptomatic anemia.  Hospital course complicated by elevated BP, pt started by lisinopril.  Pt also had marked extremities swelling.  Doppler for both UEs and LEs performed, which were negative for DVTs.    INTERVAL SUBJECTIVE & REVIEW OF SYMPTOMS:   Pt presents with a persistent diffuse severe pain and fatigue with difficulty performing ADLs although slightly improved with Fentanyl patch.  Pt c/o increased swelling of her right knee.  Pt c/o lack of appetite as well as depression due to the pain.   Pt offered no other complaints; denies SOB, dyspnea, cough      REVIEW OF SYSTEMS  [   ] Constitutional WNL  [ x  ] Cardio WNL  [  x ] Resp WNL  [  x ] GI WNL  [  x ]  WNL  [   ] Heme WNL  [   ] Endo WNL  [ x  ] Skin WNL  [   ] MSK WNL  [  x ] Neuro WNL  [  x ] Cognitive WNL  [   ] Psych WNL    Vital Signs Last 24 Hrs  T(C): 36.1 (22 Sep 2017 05:18), Max: 37.2 (21 Sep 2017 20:12)  T(F): 97 (22 Sep 2017 05:18), Max: 99 (21 Sep 2017 20:12)  HR: 97 (22 Sep 2017 05:18) (97 - 100)  BP: 124/68 (22 Sep 2017 05:18) (124/68 - 131/69)  BP(mean): --  RR: 17 (22 Sep 2017 05:18) (17 - 18)  SpO2: 95% (22 Sep 2017 05:18) (95% - 96%)  	  PHYSICAL EXAM  General:  Mild distress due to pain  Cardio: RRR  Resp: CTAB  Abdomen: soft, NTND  Extrem: + mild edema digits and right knee with calor, no calf tenderness. No erythema  Motor 4/5     Functional Exam:   Bed mobility: Min assist  Transfers Mod assist  Gait: Pt ambulated 20' RW mod assist  ADLs:  Max assist toileting and UE/LE dressing, Sup grooming and eating    RECENT LABS:                           7.6    11.2  )-----------( 267      ( 22 Sep 2017 07:48 )             22.0                          8.1    12.4  )-----------( 265      ( 19 Sep 2017 22:52 )             23.6        09-22    125<L>  |  87<L>  |  18.0  ----------------------------<  114  3.7   |  26.0  |  0.32<L>    Ca    8.1<L>      22 Sep 2017 07:48    TPro  6.2<L>  /  Alb  2.6<L>  /  TBili  0.8  /  DBili  x   /  AST  18  /  ALT  31  /  AlkPhos  294<H>  09-21              MEDICATIONS  (STANDING):  heparin  Injectable 5000 Unit(s) SubCutaneous every 12 hours  docusate sodium 100 milliGRAM(s) Oral three times a day  polyethylene glycol 3350 17 Gram(s) Oral at bedtime  pantoprazole    Tablet 40 milliGRAM(s) Oral before breakfast  hydrocortisone 2.5% Rectal Cream 1 Application(s) Rectal daily  lisinopril 5 milliGRAM(s) Oral daily  levothyroxine 75 MICROGram(s) Oral daily  predniSONE   Tablet 30 milliGRAM(s) Oral daily  acetaminophen   Tablet 650 milliGRAM(s) Oral every 6 hours  gabapentin 100 milliGRAM(s) Oral two times a day  gabapentin 100 milliGRAM(s) Oral once    MEDICATIONS  (PRN):  magnesium hydroxide Suspension 30 milliLiter(s) Oral daily PRN Constipation  methyl salicylate 14%/menthol 6% Topical Ointment 1 Application(s) Topical four times a day PRN pain      A/P:  Pt is an 87 year old female with functional deficits from 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  Pt to f/u with rheumatology after d/c for further tapering.  Serologies were ordered.    Continue local analgesic cream.  Lidocaine cream prn   Tylenol changed to q6h for 2 days. Increased neurontin to TID.  Added Tramadol 25-50mg q4hrs prn-changed to 25mg standing bid prior to rehab sessions.   Recalled rheumatology due to persistent significant pain; f/u 9/19 appreciated; Prednisone changed to Solumedrol daily.  Consider bone scan if not improved; performed 9/21/17:  Multi bilateral rib foci, right shoulder activity possibly related to trauma, degenerative changes left shoulder, left SC joint, bilateral knees, ankle and feet.  Contacted rheum and heme to eval  Added Fentanyl patch 12mcg 9/21 with slight improvement.  Increase solumedrol to 20 bid for now due to persistent pain limited progress in rehab.  consider right knee aspiration/cortisone injection    h/o Myelofibrosis/MDS with thrombocytosis: Hem onc fu as needed. 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 recent bone marrow showed 5% blasts with moderate reticulin fibrosis. Now with myelofibrosis awaiting to begin Jakafi outpatient with Dr. Montes.   HB 7.1.  Iron WNL.  Pt transfused 1U PRBCs 9/19/17; developed temp of 100.5  Discussed above with Dr Montes; pre-medicate with Tylenol if another transfusion warranted    Tmax 100.5 9/19  Afebrile now  CXR with LLL.  WBC 12.4; elevated probably due to steroids  Appreciate hospitalist f/u; will follow off abx.  Repeat cbc today stable Add Procalcitonin 9/21 elevated at 0.2    DVT prophylaxis: on heparin sq    Elevated blood pressure without h/o HTN: Improved on lisinopril    Hypothyroidism: on synthroid    Hyponatremia:  Monitor BMP.      GI prophylaxis: on protonix    Diet: regular with thin    Bowel program: Toilet schedule prn    Bowel program: colace, miralax    Medical fu     Discussed above extensively with patients daughters-pt noted to be depressed and requesting antidepressant  Added Lexapro 10mg daily 9/20 HPI:  Pt is an 87 y.o. F w/ PMHx Reji 2 thrombocytosis and Myelofibrosis presented to [facility] on 09/10/17 with diffuse joint pains.  Pt reports pain started in her neck and shoulders the week before admission.  Pt seen by outpt orthopedics, and started on Medrol Dose pack.  Symptoms initially improved, but then pt began to experience pain and swelling in both hands and feet.  Pain worsened to the point that pt was having difficulty with walking.  On admission, Right hand Xray revealed periarticular changes consistent with Osteoarthritis versus CPPD disease; diffuse soft tissue; no fracture. Bilateral wrist Xray showed no fracture or dislocation.  Right ankle Xray showed soft tissue swelling, no fracture or dislocation.  Rheumatology consulted.  Serological studies performed.  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 less likely RA vs. less likely polymyalgia rheumatica. Pt was started on prednisone 40mg daily. As per Rheum will taper steroids by 10mg every 5 days to 10 mg daily, will maintain until follow up as outpatient.  Pt seen by hematology, transfused on 09/06 for symptomatic anemia.  Hospital course complicated by elevated BP, pt started by lisinopril.  Pt also had marked extremities swelling.  Doppler for both UEs and LEs performed, which were negative for DVTs.    INTERVAL SUBJECTIVE & REVIEW OF SYMPTOMS:   Pt presents with a persistent diffuse severe pain and fatigue with difficulty performing ADLs although slightly improved with Fentanyl patch.  Pt c/o increased swelling of her right knee.  Pt c/o lack of appetite as well as depression due to the pain.   Pt offered no other complaints; denies SOB, dyspnea, cough      REVIEW OF SYSTEMS  [   ] Constitutional WNL  [ x  ] Cardio WNL  [  x ] Resp WNL  [  x ] GI WNL  [  x ]  WNL  [   ] Heme WNL  [   ] Endo WNL  [ x  ] Skin WNL  [   ] MSK WNL  [  x ] Neuro WNL  [  x ] Cognitive WNL  [   ] Psych WNL    Vital Signs Last 24 Hrs  T(C): 36.1 (22 Sep 2017 05:18), Max: 37.2 (21 Sep 2017 20:12)  T(F): 97 (22 Sep 2017 05:18), Max: 99 (21 Sep 2017 20:12)  HR: 97 (22 Sep 2017 05:18) (97 - 100)  BP: 124/68 (22 Sep 2017 05:18) (124/68 - 131/69)  BP(mean): --  RR: 17 (22 Sep 2017 05:18) (17 - 18)  SpO2: 95% (22 Sep 2017 05:18) (95% - 96%)  	  PHYSICAL EXAM  General:  Mild distress due to pain  Cardio: RRR  Resp: CTAB  Abdomen: soft, NTND  Extrem: + mild edema digits and right knee with calor, no calf tenderness. No erythema  Motor 4/5     Functional Exam:   Bed mobility: Min assist  Transfers Mod assist  Gait: Pt ambulated 20' RW mod assist  ADLs:  Max assist toileting and UE/LE dressing, Sup grooming and eating    RECENT LABS:                           7.6    11.2  )-----------( 267      ( 22 Sep 2017 07:48 )             22.0                          8.1    12.4  )-----------( 265      ( 19 Sep 2017 22:52 )             23.6        09-22    125<L>  |  87<L>  |  18.0  ----------------------------<  114  3.7   |  26.0  |  0.32<L>    Ca    8.1<L>      22 Sep 2017 07:48    TPro  6.2<L>  /  Alb  2.6<L>  /  TBili  0.8  /  DBili  x   /  AST  18  /  ALT  31  /  AlkPhos  294<H>  09-21              MEDICATIONS  (STANDING):  heparin  Injectable 5000 Unit(s) SubCutaneous every 12 hours  docusate sodium 100 milliGRAM(s) Oral three times a day  polyethylene glycol 3350 17 Gram(s) Oral at bedtime  pantoprazole    Tablet 40 milliGRAM(s) Oral before breakfast  hydrocortisone 2.5% Rectal Cream 1 Application(s) Rectal daily  lisinopril 5 milliGRAM(s) Oral daily  levothyroxine 75 MICROGram(s) Oral daily  predniSONE   Tablet 30 milliGRAM(s) Oral daily  acetaminophen   Tablet 650 milliGRAM(s) Oral every 6 hours  gabapentin 100 milliGRAM(s) Oral two times a day  gabapentin 100 milliGRAM(s) Oral once    MEDICATIONS  (PRN):  magnesium hydroxide Suspension 30 milliLiter(s) Oral daily PRN Constipation  methyl salicylate 14%/menthol 6% Topical Ointment 1 Application(s) Topical four times a day PRN pain      A/P:  Pt is an 87 year old female with functional deficits from 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  Pt to f/u with rheumatology after d/c for further tapering.  Serologies were ordered.    Continue local analgesic cream.  Lidocaine cream prn   Tylenol changed to q6h for 2 days. Increased neurontin to TID.  Added Tramadol 25-50mg q4hrs prn-changed to 25mg standing bid prior to rehab sessions.   Recalled rheumatology due to persistent significant pain; f/u 9/19 appreciated; Prednisone changed to Solumedrol daily.  Consider bone scan if not improved; performed 9/21/17:  Multi bilateral rib foci, right shoulder activity possibly related to trauma, degenerative changes left shoulder, left SC joint, bilateral knees, ankle and feet.  Contacted rheum and heme to eval  Added Fentanyl patch 12mcg 9/21 with slight improvement.  Increase solumedrol to 20 bid for now due to persistent pain limited progress in rehab.  consider right knee aspiration/cortisone injection    h/o Myelofibrosis/MDS with thrombocytosis: Hem onc fu as needed. 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 recent bone marrow showed 5% blasts with moderate reticulin fibrosis. Now with myelofibrosis awaiting to begin Jakafi outpatient with Dr. Montes.   HB 7.1.  Iron WNL.  Pt transfused 1U PRBCs 9/19/17; developed temp of 100.5  Discussed above with Dr Montes; pre-medicate with Tylenol if another transfusion warranted  Addendum:  Discussed case with Dr Coronado office-tranfuse PRBCs Hb<8.  Pt agrees to transfusion 1U due to c/o fatigue  F/U CBC on Monday    Tmax 100.5 9/19  Afebrile now  CXR with LLL.  WBC 12.4; elevated probably due to steroids  Appreciate hospitalist f/u; will follow off abx.  Repeat cbc today stable Add Procalcitonin 9/21 elevated at 0.2    DVT prophylaxis: on heparin sq    Elevated blood pressure without h/o HTN: Improved on lisinopril    Hypothyroidism: on synthroid    Hyponatremia:  Monitor BMP.      GI prophylaxis: on protonix    Diet: regular with thin    Bowel program: Toilet schedule prn    Bowel program: colace, miralax    Medical fu     Discussed above extensively with patients daughters-pt noted to be depressed and requesting antidepressant  Added Lexapro 10mg daily 9/20

## 2017-09-22 NOTE — PROGRESS NOTE ADULT - ASSESSMENT
87 year old female with PMHx  Reji 2+ thrombocytosis, dating back to 2013. She had been on Hydrea since Olya 10, 2013. Most recently she was found to be pancytopenic and the Hydrea was held as of July 12, 2017. She was in her USOH until about 1 week prior to admission, when she began to experience pain in her neck and shoulders.  She saw ortho, who started her on a Medrol Dose pack, upon which her symptoms resolved.  However, upon completing the pack, she began to experience pain and swelling in her right hand.  She then began to experience similar symptoms in her other hand and in both feet.  The pain continued to worsen, so that she became unable to ambulate on her own.  (+)AM stiffness, lasting several hours. Patient came to the ER. 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 Pt was started on prednisone 40mg daily. Followed  by Rheum and Heme/Onc, Slowly progressing. As per Rheum will taper steroids to 10 mg daily, will maintain until follow up as outpatient.  Seen by PT and recommend rehab. BP was elevated , patient started on Lisinopril. Patient was transferred to acute rehab.    1) Polyarthralgia- neurologic etiology vs bone pain from myelofibrosis vs Complex regional pain syndrome  - No signs of infection  - Prednisone changed to IV Solumedrol per rheum recs. needs increased doses - may need to consider steroid sparing agents.   - Continue Gabapentin and Tramadol.   - Hepatitis Panel negative, Lyme  serology negative ,  Parvovirus IgM - negative , IgG- elevated - past exposure to parvovirus +  - Suggest calling Rheum for further input as patient symptoms do not seem to be improving   - Continue rehab.  - GI Prophylaxis    2) Myelofibrosis  - 1unit PRBC received, continue to monitor H&H - Advise 2 units PRBC.   - Patient is waiting to be started on Jakafi    3) Hypothyroidism  - Levothyroxine     4) Elevated BP without history of HTN  - BP improved with Lisinopril    5) Constipation- Bowel RX    6) Anemia - chronic - Would recall Heme/Onc for input.     7) Fever - unclear etiology.  CXR with LLL infiltrate - patient asymptomatic (no cough, SOB, CP); UA negative.  Follow up Urine culture (probable contaminant) and Blood cultures- NG  Will monitor off antibiotics.

## 2017-09-23 LAB
HCT VFR BLD CALC: 28.4 % — LOW (ref 37–47)
HGB BLD-MCNC: 9.7 G/DL — LOW (ref 12–16)
MCHC RBC-ENTMCNC: 30.6 PG — SIGNIFICANT CHANGE UP (ref 27–31)
MCHC RBC-ENTMCNC: 34.2 G/DL — SIGNIFICANT CHANGE UP (ref 32–36)
MCV RBC AUTO: 89.6 FL — SIGNIFICANT CHANGE UP (ref 81–99)
PLATELET # BLD AUTO: 292 K/UL — SIGNIFICANT CHANGE UP (ref 150–400)
RBC # BLD: 3.17 M/UL — LOW (ref 4.4–5.2)
RBC # FLD: 18 % — HIGH (ref 11–15.6)
WBC # BLD: 8.4 K/UL — SIGNIFICANT CHANGE UP (ref 4.8–10.8)
WBC # FLD AUTO: 8.4 K/UL — SIGNIFICANT CHANGE UP (ref 4.8–10.8)

## 2017-09-23 PROCEDURE — 99232 SBSQ HOSP IP/OBS MODERATE 35: CPT | Mod: GC

## 2017-09-23 RX ADMIN — TRAMADOL HYDROCHLORIDE 25 MILLIGRAM(S): 50 TABLET ORAL at 13:40

## 2017-09-23 RX ADMIN — Medication 75 MICROGRAM(S): at 05:55

## 2017-09-23 RX ADMIN — GABAPENTIN 100 MILLIGRAM(S): 400 CAPSULE ORAL at 21:02

## 2017-09-23 RX ADMIN — TRAMADOL HYDROCHLORIDE 25 MILLIGRAM(S): 50 TABLET ORAL at 18:47

## 2017-09-23 RX ADMIN — GABAPENTIN 100 MILLIGRAM(S): 400 CAPSULE ORAL at 13:29

## 2017-09-23 RX ADMIN — Medication 20 MILLIGRAM(S): at 05:55

## 2017-09-23 RX ADMIN — LISINOPRIL 5 MILLIGRAM(S): 2.5 TABLET ORAL at 05:55

## 2017-09-23 RX ADMIN — Medication 1 APPLICATION(S): at 18:43

## 2017-09-23 RX ADMIN — TRAMADOL HYDROCHLORIDE 25 MILLIGRAM(S): 50 TABLET ORAL at 06:02

## 2017-09-23 RX ADMIN — TRAMADOL HYDROCHLORIDE 25 MILLIGRAM(S): 50 TABLET ORAL at 13:39

## 2017-09-23 RX ADMIN — Medication 20 MILLIGRAM(S): at 17:35

## 2017-09-23 RX ADMIN — Medication 500 MILLIGRAM(S): at 13:29

## 2017-09-23 RX ADMIN — ESCITALOPRAM OXALATE 10 MILLIGRAM(S): 10 TABLET, FILM COATED ORAL at 13:29

## 2017-09-23 RX ADMIN — HEPARIN SODIUM 5000 UNIT(S): 5000 INJECTION INTRAVENOUS; SUBCUTANEOUS at 05:55

## 2017-09-23 RX ADMIN — TRAMADOL HYDROCHLORIDE 25 MILLIGRAM(S): 50 TABLET ORAL at 06:33

## 2017-09-23 RX ADMIN — Medication 100 MILLIGRAM(S): at 13:29

## 2017-09-23 RX ADMIN — Medication 1 TABLET(S): at 13:30

## 2017-09-23 RX ADMIN — PANTOPRAZOLE SODIUM 40 MILLIGRAM(S): 20 TABLET, DELAYED RELEASE ORAL at 05:55

## 2017-09-23 RX ADMIN — TRAMADOL HYDROCHLORIDE 25 MILLIGRAM(S): 50 TABLET ORAL at 17:48

## 2017-09-23 RX ADMIN — GABAPENTIN 100 MILLIGRAM(S): 400 CAPSULE ORAL at 05:55

## 2017-09-23 RX ADMIN — Medication 100 MILLIGRAM(S): at 05:55

## 2017-09-23 RX ADMIN — HEPARIN SODIUM 5000 UNIT(S): 5000 INJECTION INTRAVENOUS; SUBCUTANEOUS at 17:36

## 2017-09-23 RX ADMIN — Medication 100 MILLIGRAM(S): at 21:02

## 2017-09-23 NOTE — PROGRESS NOTE ADULT - SUBJECTIVE AND OBJECTIVE BOX
HPI:  Pt is an 87 y.o. F w/ PMHx Reji 2 thrombocytosis and Myelofibrosis presented to [facility] on 09/10/17 with diffuse joint pains.  Pt reports pain started in her neck and shoulders the week before admission.  Pt seen by outpt orthopedics, and started on Medrol Dose pack.  Symptoms initially improved, but then pt began to experience pain and swelling in both hands and feet.  Pain worsened to the point that pt was having difficulty with walking.  On admission, Right hand Xray revealed periarticular changes consistent with Osteoarthritis versus CPPD disease; diffuse soft tissue; no fracture. Bilateral wrist Xray showed no fracture or dislocation.  Right ankle Xray showed soft tissue swelling, no fracture or dislocation.  Rheumatology consulted.  Serological studies performed.  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 less likely RA vs. less likely polymyalgia rheumatica. Pt was started on prednisone 40mg daily. As per Rheum will taper steroids by 10mg every 5 days to 10 mg daily, will maintain until follow up as outpatient.  Pt seen by hematology, transfused on 09/06 for symptomatic anemia.  Hospital course complicated by elevated BP, pt started by lisinopril.  Pt also had marked extremities swelling.  Doppler for both UEs and LEs performed, which were negative for DVTs.    INTERVAL SUBJECTIVE & REVIEW OF SYMPTOMS:  Patient seen and examined. Had transfusion last night. Less fatigued. No CP, No SOB.       REVIEW OF SYSTEMS  [   ] Constitutional WNL  [ x  ] Cardio WNL  [  x ] Resp WNL  [  x ] GI WNL  [  x ]  WNL  [   ] Heme WNL  [   ] Endo WNL  [ x  ] Skin WNL  [   ] MSK WNL  [  x ] Neuro WNL  [  x ] Cognitive WNL  [   ] Psych WNL    Vital Signs Last 24 Hrs  T(C): 36.3 (23 Sep 2017 05:00), Max: 37.2 (22 Sep 2017 16:10)  T(F): 97.4 (23 Sep 2017 05:00), Max: 979 (22 Sep 2017 20:53)  HR: 95 (23 Sep 2017 05:00) (86 - 97)  BP: 128/71 (23 Sep 2017 05:00) (118/72 - 128/71)  BP(mean): --  RR: 16 (23 Sep 2017 05:00) (16 - 18)  SpO2: 98% (23 Sep 2017 05:00) (98% - 99%)  	  PHYSICAL EXAM  General:  NAD  Cardio: RRR  Resp: CTAB  Abdomen: soft, NTND  Extrem: + mild edema digits and right knee with calor, no calf tenderness. No erythema  Motor 4/5     RECENT LABS:                           7.6    11.2  )-----------( 267      ( 22 Sep 2017 07:48 )             22.0                          8.1    12.4  )-----------( 265      ( 19 Sep 2017 22:52 )             23.6        09-22    125<L>  |  87<L>  |  18.0  ----------------------------<  114  3.7   |  26.0  |  0.32<L>    Ca    8.1<L>      22 Sep 2017 07:48    TPro  6.2<L>  /  Alb  2.6<L>  /  TBili  0.8  /  DBili  x   /  AST  18  /  ALT  31  /  AlkPhos  294<H>  09-21            MEDICATIONS  (STANDING):  heparin  Injectable 5000 Unit(s) SubCutaneous every 12 hours  docusate sodium 100 milliGRAM(s) Oral three times a day  polyethylene glycol 3350 17 Gram(s) Oral at bedtime  pantoprazole    Tablet 40 milliGRAM(s) Oral before breakfast  lisinopril 5 milliGRAM(s) Oral daily  levothyroxine 75 MICROGram(s) Oral daily  gabapentin 100 milliGRAM(s) Oral three times a day  multivitamin 1 Tablet(s) Oral daily  ascorbic acid 500 milliGRAM(s) Oral daily  traMADol 25 milliGRAM(s) Oral <User Schedule>  escitalopram 10 milliGRAM(s) Oral daily  fentaNYL   Patch  12 MICROgram(s)/Hr 1 Patch Transdermal every 72 hours  methylPREDNISolone sodium succinate Injectable 20 milliGRAM(s) IV Push every 12 hours    MEDICATIONS  (PRN):  magnesium hydroxide Suspension 30 milliLiter(s) Oral daily PRN Constipation  methyl salicylate 14%/menthol 6% Topical Ointment 1 Application(s) Topical four times a day PRN pain  hydrocortisone 2.5% Rectal Cream 1 Application(s) Rectal daily PRN pain  traMADol 25 milliGRAM(s) Oral every 4 hours PRN Moderate Pain (4 - 6)  lidocaine/prilocaine Cream 1 Application(s) Topical every 4 hours PRN pain  acetaminophen   Tablet 650 milliGRAM(s) Oral every 6 hours PRN For Temp greater than 38 C (100.4 F)    A/P:  Pt is an 87 year old female with functional deficits from 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  Pt to f/u with rheumatology after d/c for further tapering.  Serologies were ordered.    Continue local analgesic cream.  Lidocaine cream prn   Tylenol changed to q6h for 2 days. Increased neurontin to TID.  Added Tramadol 25-50mg q4hrs prn-changed to 25mg standing bid prior to rehab sessions.   Recalled rheumatology due to persistent significant pain; f/u 9/19 appreciated; Prednisone changed to Solumedrol daily.  Consider bone scan if not improved; performed 9/21/17:  Multi bilateral rib foci, right shoulder activity possibly related to trauma, degenerative changes left shoulder, left SC joint, bilateral knees, ankle and feet.  Contacted rheum and heme to eval  Added Fentanyl patch 12mcg 9/21 with slight improvement.  Increase solumedrol to 20 bid for now due to persistent pain limited progress in rehab.  consider right knee aspiration/cortisone injection    h/o Myelofibrosis/MDS with thrombocytosis: Hem onc fu as needed. 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 recent bone marrow showed 5% blasts with moderate reticulin fibrosis. Now with myelofibrosis awaiting to begin Jakafi outpatient with Dr. Montes.   HB 7.1.  Iron WNL.  Pt transfused 1U PRBCs 9/19/17; developed temp of 100.5  Discussed above with Dr Montes; pre-medicate with Tylenol if another transfusion warranted  Patient transfused yesterday. Will f/u CBC this morning.       Tmax 100.5 9/19  Afebrile now  CXR with LLL.  WBC 11.2.  Appreciate hospitalist f/u; will follow off abx.  Repeat cbc today stable Add Procalcitonin 9/21 elevated at 0.2    DVT prophylaxis: on heparin sq    Elevated blood pressure without h/o HTN: Improved on lisinopril    Hypothyroidism: on synthroid    Hyponatremia:  Monitor BMP.      GI prophylaxis: on protonix    Diet: regular with thin    Bowel program: Toilet schedule prn    Bowel program: colace, miralax    Medical fu     Discussed above extensively with patients daughters-pt noted to be depressed and requesting antidepressant  Added Lexapro 10mg daily 9/20

## 2017-09-24 LAB
CULTURE RESULTS: SIGNIFICANT CHANGE UP
CULTURE RESULTS: SIGNIFICANT CHANGE UP
SPECIMEN SOURCE: SIGNIFICANT CHANGE UP
SPECIMEN SOURCE: SIGNIFICANT CHANGE UP

## 2017-09-24 PROCEDURE — 99232 SBSQ HOSP IP/OBS MODERATE 35: CPT

## 2017-09-24 PROCEDURE — 99232 SBSQ HOSP IP/OBS MODERATE 35: CPT | Mod: GC

## 2017-09-24 RX ORDER — TRAMADOL HYDROCHLORIDE 50 MG/1
25 TABLET ORAL EVERY 4 HOURS
Qty: 0 | Refills: 0 | Status: DISCONTINUED | OUTPATIENT
Start: 2017-09-24 | End: 2017-09-28

## 2017-09-24 RX ADMIN — Medication 500 MILLIGRAM(S): at 11:50

## 2017-09-24 RX ADMIN — TRAMADOL HYDROCHLORIDE 25 MILLIGRAM(S): 50 TABLET ORAL at 06:00

## 2017-09-24 RX ADMIN — POLYETHYLENE GLYCOL 3350 17 GRAM(S): 17 POWDER, FOR SOLUTION ORAL at 21:27

## 2017-09-24 RX ADMIN — FENTANYL CITRATE 1 PATCH: 50 INJECTION INTRAVENOUS at 13:58

## 2017-09-24 RX ADMIN — Medication 100 MILLIGRAM(S): at 13:31

## 2017-09-24 RX ADMIN — Medication 20 MILLIGRAM(S): at 17:15

## 2017-09-24 RX ADMIN — TRAMADOL HYDROCHLORIDE 25 MILLIGRAM(S): 50 TABLET ORAL at 13:00

## 2017-09-24 RX ADMIN — LISINOPRIL 5 MILLIGRAM(S): 2.5 TABLET ORAL at 05:40

## 2017-09-24 RX ADMIN — GABAPENTIN 100 MILLIGRAM(S): 400 CAPSULE ORAL at 13:31

## 2017-09-24 RX ADMIN — Medication 100 MILLIGRAM(S): at 21:27

## 2017-09-24 RX ADMIN — HEPARIN SODIUM 5000 UNIT(S): 5000 INJECTION INTRAVENOUS; SUBCUTANEOUS at 05:40

## 2017-09-24 RX ADMIN — Medication 1 TABLET(S): at 11:50

## 2017-09-24 RX ADMIN — Medication 20 MILLIGRAM(S): at 05:40

## 2017-09-24 RX ADMIN — TRAMADOL HYDROCHLORIDE 25 MILLIGRAM(S): 50 TABLET ORAL at 06:34

## 2017-09-24 RX ADMIN — HEPARIN SODIUM 5000 UNIT(S): 5000 INJECTION INTRAVENOUS; SUBCUTANEOUS at 17:16

## 2017-09-24 RX ADMIN — PANTOPRAZOLE SODIUM 40 MILLIGRAM(S): 20 TABLET, DELAYED RELEASE ORAL at 05:40

## 2017-09-24 RX ADMIN — FENTANYL CITRATE 1 PATCH: 50 INJECTION INTRAVENOUS at 13:30

## 2017-09-24 RX ADMIN — LIDOCAINE AND PRILOCAINE CREAM 1 APPLICATION(S): 25; 25 CREAM TOPICAL at 16:22

## 2017-09-24 RX ADMIN — Medication 75 MICROGRAM(S): at 05:40

## 2017-09-24 RX ADMIN — Medication 100 MILLIGRAM(S): at 05:40

## 2017-09-24 RX ADMIN — ESCITALOPRAM OXALATE 10 MILLIGRAM(S): 10 TABLET, FILM COATED ORAL at 11:50

## 2017-09-24 RX ADMIN — GABAPENTIN 100 MILLIGRAM(S): 400 CAPSULE ORAL at 05:40

## 2017-09-24 RX ADMIN — GABAPENTIN 100 MILLIGRAM(S): 400 CAPSULE ORAL at 21:27

## 2017-09-24 RX ADMIN — TRAMADOL HYDROCHLORIDE 25 MILLIGRAM(S): 50 TABLET ORAL at 11:57

## 2017-09-24 NOTE — PROGRESS NOTE ADULT - SUBJECTIVE AND OBJECTIVE BOX
KEVIN RIVERAJORGITO    [id number]    87y      Female      CC: Pain and swelling in hands and feet    HPI: 87 year old female with PMHx  Reji 2+ thrombocytosis, dating back to 2013. She had been on Hydrea since Olya 10, 2013. Most recently she was found to be pancytopenic and the Hydrea was held as of July 12, 2017. She was in her USOH until about 1 week prior to admission, when she began to experience pain in her neck and shoulders.  She saw ortho, who started her on a Medrol Dose pack, upon which her symptoms resolved.  However, upon completing the pack, she began to experience pain and swelling in her right hand.  She then began to experience similar symptoms in her other hand and in both feet.  The pain continued to worsen, so that she became unable to ambulate on her own.  (+)AM stiffness, lasting several hours. Patient came to the ER. 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 Pt was started on prednisone 40mg daily. Followed  by Rheum and Heme/Onc, Slowly progressing. As per Rheum will taper steroids to 10 mg daily, will maintain until follow up as outpatient.  Seen by PT and recommend rehab. BP was elevated , patient started on Lisinopril. Patient was transferred to acute rehab.      INTERVAL HPI/OVERNIGHT EVENTS: Patient seen and examined lying in bed.    feels some better, hands still hurt      Vital Signs Last 24 Hrs  T(C): 36.3 (24 Sep 2017 12:08), Max: 37.4 (23 Sep 2017 20:48)  T(F): 97.3 (24 Sep 2017 12:08), Max: 99.3 (23 Sep 2017 20:48)  HR: 84 (24 Sep 2017 12:08) (69 - 100)  BP: 124/- (24 Sep 2017 12:08) (122/70 - 128/69)  BP(mean): 71 (24 Sep 2017 12:08) (71 - 71)  RR: 18 (24 Sep 2017 12:08) (16 - 18)  SpO2: 96% (24 Sep 2017 12:08) (95% - 97%)      PHYSICAL EXAM:  GENERAL: NAD, fatigued  EXTREMITIES: mild fullness on Lt hand, No clubbing, cyanosis, or edema                    LABS:                        9.7    8.4   )-----------( 292      ( 23 Sep 2017 10:05 )             28.4         MEDICATIONS  (STANDING):  heparin  Injectable 5000 Unit(s) SubCutaneous every 12 hours  docusate sodium 100 milliGRAM(s) Oral three times a day  polyethylene glycol 3350 17 Gram(s) Oral at bedtime  pantoprazole    Tablet 40 milliGRAM(s) Oral before breakfast  lisinopril 5 milliGRAM(s) Oral daily  levothyroxine 75 MICROGram(s) Oral daily  gabapentin 100 milliGRAM(s) Oral three times a day  multivitamin 1 Tablet(s) Oral daily  ascorbic acid 500 milliGRAM(s) Oral daily  traMADol 25 milliGRAM(s) Oral <User Schedule>  escitalopram 10 milliGRAM(s) Oral daily  fentaNYL   Patch  12 MICROgram(s)/Hr 1 Patch Transdermal every 72 hours  methylPREDNISolone sodium succinate Injectable 20 milliGRAM(s) IV Push every 12 hours    MEDICATIONS  (PRN):  magnesium hydroxide Suspension 30 milliLiter(s) Oral daily PRN Constipation  methyl salicylate 14%/menthol 6% Topical Ointment 1 Application(s) Topical four times a day PRN pain  hydrocortisone 2.5% Rectal Cream 1 Application(s) Rectal daily PRN pain  traMADol 25 milliGRAM(s) Oral every 4 hours PRN Moderate Pain (4 - 6)  lidocaine/prilocaine Cream 1 Application(s) Topical every 4 hours PRN pain  acetaminophen   Tablet 650 milliGRAM(s) Oral every 6 hours PRN For Temp greater than 38 C (100.4 F)      RADIOLOGY & ADDITIONAL TESTS:

## 2017-09-24 NOTE — PROGRESS NOTE ADULT - ASSESSMENT
87 year old female with PMHx  Reji 2+ thrombocytosis, dating back to 2013. She had been on Hydrea since Olya 10, 2013. Most recently she was found to be pancytopenic and the Hydrea was held as of July 12, 2017. NOw presenting with multiple joint pain and swelling. diagnosed with Seronegative Symmetrical Synovitis . Pt was started on prednisone 40mg daily. BP was elevated , patient started on Lisinopril. Patient was transferred to acute rehab. SHe still continues to have joint pain and swelling. received 2units PRBCs in reb for anemia.     1) Polyarthralgia- neurologic etiology vs bone pain from myelofibrosis vs Complex regional pain syndrome  - No signs of infection  - Prednisone changed to IV Solumedrol per rheum recs. needs increased doses - may need to consider steroid sparing agents.   - Continue Gabapentin and Tramadol.   - Hepatitis Panel negative, Lyme  serology negative ,  Parvovirus IgM - negative , IgG- elevated - past exposure to parvovirus +  - need Rheum input as patient symptoms do not seem to be improving   - Continue rehab.  - GI Prophylaxis    2) Myelofibrosis  - 1unit PRBC received, continue to monitor H&H - Advise 2 units PRBC.   - Patient is waiting to be started on Jakafi    3) Hyponatremia - suspect poor po intake  repeat bmp in am     4) Hypothyroidism  - Levothyroxine     5) Elevated BP without history of HTN  - BP improved with Lisinopril    6) Anemia - chronic - Would recall Heme/Onc for input.     7) Fever - 2/2 post transfusion - resolved. w/u was negative for any infection.

## 2017-09-24 NOTE — PROGRESS NOTE ADULT - SUBJECTIVE AND OBJECTIVE BOX
HPI:  Pt is an 87 y.o. F w/ PMHx Reji 2 thrombocytosis and Myelofibrosis presented to [facility] on 09/10/17 with diffuse joint pains.  Pt reports pain started in her neck and shoulders the week before admission.  Pt seen by outpt orthopedics, and started on Medrol Dose pack.  Symptoms initially improved, but then pt began to experience pain and swelling in both hands and feet.  Pain worsened to the point that pt was having difficulty with walking.  On admission, Right hand Xray revealed periarticular changes consistent with Osteoarthritis versus CPPD disease; diffuse soft tissue; no fracture. Bilateral wrist Xray showed no fracture or dislocation.  Right ankle Xray showed soft tissue swelling, no fracture or dislocation.  Rheumatology consulted.  Serological studies performed.  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 less likely RA vs. less likely polymyalgia rheumatica. Pt was started on prednisone 40mg daily. As per Rheum will taper steroids by 10mg every 5 days to 10 mg daily, will maintain until follow up as outpatient.  Pt seen by hematology, transfused on 09/06 for symptomatic anemia.  Hospital course complicated by elevated BP, pt started by lisinopril.  Pt also had marked extremities swelling.  Doppler for both UEs and LEs performed, which were negative for DVTs.    INTERVAL SUBJECTIVE & REVIEW OF SYMPTOMS:  Patient feeling well. Less fatigue. Hand pain controlled with medications. H/H improved post-transfusion.    REVIEW OF SYSTEMS  [   ] Constitutional WNL  [ x  ] Cardio WNL  [  x ] Resp WNL  [  x ] GI WNL  [  x ]  WNL  [   ] Heme WNL  [   ] Endo WNL  [ x  ] Skin WNL  [   ] MSK WNL  [  x ] Neuro WNL  [  x ] Cognitive WNL  [   ] Psych WNL    Vital Signs Last 24 Hrs  T(C): 36.7 (24 Sep 2017 04:58), Max: 37.4 (23 Sep 2017 20:48)  T(F): 98.1 (24 Sep 2017 04:58), Max: 99.3 (23 Sep 2017 20:48)  HR: 69 (24 Sep 2017 04:58) (69 - 100)  BP: 128/69 (24 Sep 2017 04:58) (122/70 - 128/69)  BP(mean): --  RR: 16 (24 Sep 2017 04:58) (16 - 18)  SpO2: 97% (24 Sep 2017 04:58) (95% - 97%)  	  PHYSICAL EXAM  General:  NAD  Cardio: RRR  Resp: CTAB  Abdomen: soft, NTND  Extrem: + mild edema digits and right knee with calor, no calf tenderness. No erythema  Motor 4/5     RECENT LABS:                            9.7    8.4   )-----------( 292      ( 23 Sep 2017 10:05 )             28.4                        7.6    11.2  )-----------( 267      ( 22 Sep 2017 07:48 )             22.0                          8.1    12.4  )-----------( 265      ( 19 Sep 2017 22:52 )             23.6        09-22    125<L>  |  87<L>  |  18.0  ----------------------------<  114  3.7   |  26.0  |  0.32<L>    Ca    8.1<L>      22 Sep 2017 07:48    TPro  6.2<L>  /  Alb  2.6<L>  /  TBili  0.8  /  DBili  x   /  AST  18  /  ALT  31  /  AlkPhos  294<H>  09-21            MEDICATIONS  (STANDING):  heparin  Injectable 5000 Unit(s) SubCutaneous every 12 hours  docusate sodium 100 milliGRAM(s) Oral three times a day  polyethylene glycol 3350 17 Gram(s) Oral at bedtime  pantoprazole    Tablet 40 milliGRAM(s) Oral before breakfast  lisinopril 5 milliGRAM(s) Oral daily  levothyroxine 75 MICROGram(s) Oral daily  gabapentin 100 milliGRAM(s) Oral three times a day  multivitamin 1 Tablet(s) Oral daily  ascorbic acid 500 milliGRAM(s) Oral daily  traMADol 25 milliGRAM(s) Oral <User Schedule>  escitalopram 10 milliGRAM(s) Oral daily  fentaNYL   Patch  12 MICROgram(s)/Hr 1 Patch Transdermal every 72 hours  methylPREDNISolone sodium succinate Injectable 20 milliGRAM(s) IV Push every 12 hours    MEDICATIONS  (PRN):  magnesium hydroxide Suspension 30 milliLiter(s) Oral daily PRN Constipation  methyl salicylate 14%/menthol 6% Topical Ointment 1 Application(s) Topical four times a day PRN pain  hydrocortisone 2.5% Rectal Cream 1 Application(s) Rectal daily PRN pain  traMADol 25 milliGRAM(s) Oral every 4 hours PRN Moderate Pain (4 - 6)  lidocaine/prilocaine Cream 1 Application(s) Topical every 4 hours PRN pain  acetaminophen   Tablet 650 milliGRAM(s) Oral every 6 hours PRN For Temp greater than 38 C (100.4 F)        A/P:  Pt is an 87 year old female with functional deficits from 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  Pt to f/u with rheumatology after d/c for further tapering.  Serologies were ordered.    Continue local analgesic cream.  Lidocaine cream prn   Tylenol changed to q6h for 2 days. Increased neurontin to TID.  Added Tramadol 25-50mg q4hrs prn-changed to 25mg standing bid prior to rehab sessions.   Recalled rheumatology due to persistent significant pain; f/u 9/19 appreciated; Prednisone changed to Solumedrol daily.  Consider bone scan if not improved; performed 9/21/17:  Multi bilateral rib foci, right shoulder activity possibly related to trauma, degenerative changes left shoulder, left SC joint, bilateral knees, ankle and feet.  Contacted rheum and heme to eval  Added Fentanyl patch 12mcg 9/21 with slight improvement.  Increase solumedrol to 20 bid for now due to persistent pain limited progress in rehab.  consider right knee aspiration/cortisone injection    h/o Myelofibrosis/MDS with thrombocytosis: Hem onc fu as needed. 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 recent bone marrow showed 5% blasts with moderate reticulin fibrosis. Now with myelofibrosis awaiting to begin Jakafi outpatient with Dr. Montes.   HB 7.1.  Iron WNL.  Pt transfused 1U PRBCs 9/19/17; developed temp of 100.5  Discussed above with Dr Montes; pre-medicate with Tylenol if another transfusion warranted  Patient transfused yesterday. CBC improved on 9/23.       DVT prophylaxis: on heparin sq    Elevated blood pressure without h/o HTN: Improved on lisinopril    Hypothyroidism: on synthroid    Hyponatremia:  Monitor BMP.      GI prophylaxis: on protonix    Diet: regular with thin    Bowel program: Toilet schedule prn    Bowel program: colace, miralax    Medical fu     Discussed above extensively with patients daughters-pt noted to be depressed and requesting antidepressant  Added Lexapro 10mg daily 9/20

## 2017-09-25 ENCOUNTER — APPOINTMENT (OUTPATIENT)
Dept: HEMATOLOGY ONCOLOGY | Facility: CLINIC | Age: 82
End: 2017-09-25

## 2017-09-25 LAB
ALBUMIN SERPL ELPH-MCNC: 2.6 G/DL — LOW (ref 3.3–5.2)
ALP SERPL-CCNC: 240 U/L — HIGH (ref 40–120)
ALT FLD-CCNC: 27 U/L — SIGNIFICANT CHANGE UP
ANION GAP SERPL CALC-SCNC: 12 MMOL/L — SIGNIFICANT CHANGE UP (ref 5–17)
AST SERPL-CCNC: 16 U/L — SIGNIFICANT CHANGE UP
BILIRUB SERPL-MCNC: 0.7 MG/DL — SIGNIFICANT CHANGE UP (ref 0.4–2)
BLD GP AB SCN SERPL QL: SIGNIFICANT CHANGE UP
BUN SERPL-MCNC: 18 MG/DL — SIGNIFICANT CHANGE UP (ref 8–20)
CALCIUM SERPL-MCNC: 8.5 MG/DL — LOW (ref 8.6–10.2)
CHLORIDE SERPL-SCNC: 92 MMOL/L — LOW (ref 98–107)
CO2 SERPL-SCNC: 27 MMOL/L — SIGNIFICANT CHANGE UP (ref 22–29)
CREAT SERPL-MCNC: 0.34 MG/DL — LOW (ref 0.5–1.3)
GLUCOSE SERPL-MCNC: 87 MG/DL — SIGNIFICANT CHANGE UP (ref 70–115)
HCT VFR BLD CALC: 27.2 % — LOW (ref 37–47)
HGB BLD-MCNC: 9 G/DL — LOW (ref 12–16)
MCHC RBC-ENTMCNC: 29.9 PG — SIGNIFICANT CHANGE UP (ref 27–31)
MCHC RBC-ENTMCNC: 33.1 G/DL — SIGNIFICANT CHANGE UP (ref 32–36)
MCV RBC AUTO: 90.4 FL — SIGNIFICANT CHANGE UP (ref 81–99)
OSMOLALITY SERPL: 286 MOS/KG — SIGNIFICANT CHANGE UP (ref 275–300)
PLATELET # BLD AUTO: 255 K/UL — SIGNIFICANT CHANGE UP (ref 150–400)
POTASSIUM SERPL-MCNC: 4.3 MMOL/L — SIGNIFICANT CHANGE UP (ref 3.5–5.3)
POTASSIUM SERPL-SCNC: 4.3 MMOL/L — SIGNIFICANT CHANGE UP (ref 3.5–5.3)
PREALB SERPL-MCNC: 15 MG/DL — LOW (ref 18–38)
PROT SERPL-MCNC: 6.4 G/DL — LOW (ref 6.6–8.7)
RBC # BLD: 3.01 M/UL — LOW (ref 4.4–5.2)
RBC # FLD: 17.6 % — HIGH (ref 11–15.6)
SODIUM SERPL-SCNC: 131 MMOL/L — LOW (ref 135–145)
TYPE + AB SCN PNL BLD: SIGNIFICANT CHANGE UP
WBC # BLD: 8.3 K/UL — SIGNIFICANT CHANGE UP (ref 4.8–10.8)
WBC # FLD AUTO: 8.3 K/UL — SIGNIFICANT CHANGE UP (ref 4.8–10.8)

## 2017-09-25 PROCEDURE — 99232 SBSQ HOSP IP/OBS MODERATE 35: CPT

## 2017-09-25 RX ORDER — LACTOBACILLUS ACIDOPHILUS 100MM CELL
1 CAPSULE ORAL
Qty: 0 | Refills: 0 | Status: DISCONTINUED | OUTPATIENT
Start: 2017-09-25 | End: 2017-09-29

## 2017-09-25 RX ORDER — ACETAMINOPHEN 500 MG
650 TABLET ORAL EVERY 6 HOURS
Qty: 0 | Refills: 0 | Status: DISCONTINUED | OUTPATIENT
Start: 2017-09-25 | End: 2017-10-03

## 2017-09-25 RX ADMIN — Medication 500 MILLIGRAM(S): at 11:58

## 2017-09-25 RX ADMIN — ESCITALOPRAM OXALATE 10 MILLIGRAM(S): 10 TABLET, FILM COATED ORAL at 11:58

## 2017-09-25 RX ADMIN — HEPARIN SODIUM 5000 UNIT(S): 5000 INJECTION INTRAVENOUS; SUBCUTANEOUS at 17:23

## 2017-09-25 RX ADMIN — Medication 75 MICROGRAM(S): at 05:44

## 2017-09-25 RX ADMIN — Medication 1 TABLET(S): at 17:22

## 2017-09-25 RX ADMIN — HEPARIN SODIUM 5000 UNIT(S): 5000 INJECTION INTRAVENOUS; SUBCUTANEOUS at 05:43

## 2017-09-25 RX ADMIN — Medication 20 MILLIGRAM(S): at 17:22

## 2017-09-25 RX ADMIN — PANTOPRAZOLE SODIUM 40 MILLIGRAM(S): 20 TABLET, DELAYED RELEASE ORAL at 05:44

## 2017-09-25 RX ADMIN — Medication 100 MILLIGRAM(S): at 22:02

## 2017-09-25 RX ADMIN — POLYETHYLENE GLYCOL 3350 17 GRAM(S): 17 POWDER, FOR SOLUTION ORAL at 22:01

## 2017-09-25 RX ADMIN — Medication 1 TABLET(S): at 11:59

## 2017-09-25 RX ADMIN — Medication 20 MILLIGRAM(S): at 05:43

## 2017-09-25 RX ADMIN — GABAPENTIN 100 MILLIGRAM(S): 400 CAPSULE ORAL at 05:44

## 2017-09-25 RX ADMIN — GABAPENTIN 100 MILLIGRAM(S): 400 CAPSULE ORAL at 22:02

## 2017-09-25 RX ADMIN — Medication 650 MILLIGRAM(S): at 11:59

## 2017-09-25 RX ADMIN — Medication 100 MILLIGRAM(S): at 05:44

## 2017-09-25 RX ADMIN — LISINOPRIL 5 MILLIGRAM(S): 2.5 TABLET ORAL at 05:44

## 2017-09-25 RX ADMIN — GABAPENTIN 100 MILLIGRAM(S): 400 CAPSULE ORAL at 13:08

## 2017-09-25 NOTE — PROGRESS NOTE ADULT - SUBJECTIVE AND OBJECTIVE BOX
CC: Pain and swelling in hands and feet    HPI: 87 year old female with PMHx  Reji 2+ thrombocytosis, dating back to 2013. She had been on Hydrea since Olya 10, 2013. Most recently she was found to be pancytopenic and the Hydrea was held as of July 12, 2017. She was in her USOH until about 1 week prior to admission, when she began to experience pain in her neck and shoulders.  She saw ortho, who started her on a Medrol Dose pack, upon which her symptoms resolved.  However, upon completing the pack, she began to experience pain and swelling in her right hand.  She then began to experience similar symptoms in her other hand and in both feet.  The pain continued to worsen, so that she became unable to ambulate on her own.  (+)AM stiffness, lasting several hours. Patient came to the ER. 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 Pt was started on prednisone 40mg daily. Followed  by Rheum and Heme/Onc, Slowly progressing. As per Rheum will taper steroids to 10 mg daily, will maintain until follow up as outpatient.  Seen by PT and recommend rehab. BP was elevated , patient started on Lisinopril. Patient was transferred to acute rehab.      INTERVAL HPI/OVERNIGHT EVENTS: Patient seen and examined lying in bed.    feels some better, hands still hurt      PAST MEDICAL & SURGICAL HISTORY:  Myelofibrosis  Thrombocytosis: REJI 2 thrombocytosis  Hypothyroidism (acquired)  Myelodysplastic syndrome  No significant past surgical history      MEDICATIONS  (STANDING):  heparin  Injectable 5000 Unit(s) SubCutaneous every 12 hours  docusate sodium 100 milliGRAM(s) Oral three times a day  polyethylene glycol 3350 17 Gram(s) Oral at bedtime  pantoprazole    Tablet 40 milliGRAM(s) Oral before breakfast  lisinopril 5 milliGRAM(s) Oral daily  levothyroxine 75 MICROGram(s) Oral daily  gabapentin 100 milliGRAM(s) Oral three times a day  multivitamin 1 Tablet(s) Oral daily  ascorbic acid 500 milliGRAM(s) Oral daily  escitalopram 10 milliGRAM(s) Oral daily  fentaNYL   Patch  12 MICROgram(s)/Hr 1 Patch Transdermal every 72 hours  lactobacillus acidophilus 1 Tablet(s) Oral two times a day with meals  methylPREDNISolone sodium succinate Injectable 20 milliGRAM(s) IV Push every 12 hours    MEDICATIONS  (PRN):  magnesium hydroxide Suspension 30 milliLiter(s) Oral daily PRN Constipation  methyl salicylate 14%/menthol 6% Topical Ointment 1 Application(s) Topical four times a day PRN pain  hydrocortisone 2.5% Rectal Cream 1 Application(s) Rectal daily PRN pain  lidocaine/prilocaine Cream 1 Application(s) Topical every 4 hours PRN pain  acetaminophen   Tablet 650 milliGRAM(s) Oral every 6 hours PRN For Temp greater than 38 C (100.4 F)  traMADol 25 milliGRAM(s) Oral every 4 hours PRN Moderate Pain (4 - 6)  acetaminophen   Tablet. 650 milliGRAM(s) Oral every 6 hours PRN Mild Pain to moderate pain      LABS:                          9.0    8.3   )-----------( 255      ( 25 Sep 2017 07:11 )             27.2     09-25    131<L>  |  92<L>  |  18.0  ----------------------------<  87  4.3   |  27.0  |  0.34<L>    Ca    8.5<L>      25 Sep 2017 07:11    TPro  6.4<L>  /  Alb  2.6<L>  /  TBili  0.7  /  DBili  x   /  AST  16  /  ALT  27  /  AlkPhos  240<H>  09-25          RADIOLOGY & ADDITIONAL TESTS:      REVIEW OF SYSTEMS:    CONSTITUTIONAL: No fever, weight loss, or fatigue  EYES: No eye pain, visual disturbances, or discharge  ENMT:  No difficulty hearing, tinnitus, vertigo; No sinus or throat pain  NECK: No pain or stiffness  RESPIRATORY: No cough, wheezing, chills or hemoptysis; No shortness of breath  CARDIOVASCULAR: No chest pain, palpitations, dizziness, or leg swelling  GASTROINTESTINAL: No abdominal or epigastric pain. No nausea, vomiting, or hematemesis; No diarrhea or constipation. No melena or hematochezia.  GENITOURINARY: No dysuria, frequency, hematuria, or incontinence  NEUROLOGICAL: No headaches, memory loss, loss of strength, numbness, or tremors  SKIN: No itching, burning, rashes, or lesions   LYMPH NODES: No enlarged glands  ENDOCRINE: No heat or cold intolerance; No hair loss  MUSCULOSKELETAL:   PSYCHIATRIC: No depression, anxiety, mood swings, or difficulty sleeping  HEME/LYMPH: No easy bruising, or bleeding gums  ALLERGY AND IMMUNOLOGIC: No hives or eczema    Vital Signs Last 24 Hrs  T(C): 37.1 (25 Sep 2017 12:01), Max: 37.8 (24 Sep 2017 20:06)  T(F): 98.8 (25 Sep 2017 12:01), Max: 100.1 (24 Sep 2017 20:06)  HR: 78 (25 Sep 2017 12:01) (78 - 95)  BP: 110/68 (25 Sep 2017 12:01) (110/68 - 136/72)  BP(mean): --  RR: 16 (25 Sep 2017 12:01) (16 - 18)  SpO2: 95% (25 Sep 2017 12:01) (94% - 96%)  PHYSICAL EXAM:    GENERAL: NAD, well-groomed, well-developed  HEAD:  Atraumatic, Normocephalic  EYES: EOMI, PERRLA, conjunctiva and sclera clear  NECK: Supple, No JVD, Normal thyroid  NERVOUS SYSTEM:  Alert & Oriented X3, no focal deficit  CHEST/LUNG: CTA b/l ,  no  rales, rhonchi, wheezing, or rubs  HEART: Regular rate and rhythm; No murmurs, rubs, or gallops  ABDOMEN: Soft, Nontender, Nondistended; Bowel sounds present  EXTREMITIES:  2+ Peripheral Pulses, No clubbing, cyanosis, or edema  LYMPH: No lymphadenopathy noted  SKIN: No rashes or lesions Patient see and examined . NAD . Pain of b/l foot resolved . Pain in both hands still present but much better , no other complaints .  CC: Pain and swelling in hands  better controlled , feet pain resolved     HPI: 87 year old female with PMHx  Reji 2+ thrombocytosis, dating back to 2013. She had been on Hydrea since Olya 10, 2013. Most recently she was found to be pancytopenic and the Hydrea was held as of July 12, 2017. She was in her USOH until about 1 week prior to admission, when she began to experience pain in her neck and shoulders.  She saw ortho, who started her on a Medrol Dose pack, upon which her symptoms resolved.  However, upon completing the pack, she began to experience pain and swelling in her right hand.  She then began to experience similar symptoms in her other hand and in both feet.  The pain continued to worsen, so that she became unable to ambulate on her own.  (+)AM stiffness, lasting several hours. Patient came to the ER. 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 Pt was started on prednisone 40mg daily. Followed  by Rheum and Heme/Onc, Slowly progressing. As per Rheum will taper steroids to 10 mg daily, will maintain until follow up as outpatient.  Seen by PT and recommend rehab. BP was elevated , patient started on Lisinopril. Patient was transferred to acute rehab.      INTERVAL HPI/OVERNIGHT EVENTS: Patient seen and examined lying in bed.    feels some better, hands still hurt      PAST MEDICAL & SURGICAL HISTORY:  Myelofibrosis  Thrombocytosis: REJI 2 thrombocytosis  Hypothyroidism (acquired)  Myelodysplastic syndrome  No significant past surgical history      MEDICATIONS  (STANDING):  heparin  Injectable 5000 Unit(s) SubCutaneous every 12 hours  docusate sodium 100 milliGRAM(s) Oral three times a day  polyethylene glycol 3350 17 Gram(s) Oral at bedtime  pantoprazole    Tablet 40 milliGRAM(s) Oral before breakfast  lisinopril 5 milliGRAM(s) Oral daily  levothyroxine 75 MICROGram(s) Oral daily  gabapentin 100 milliGRAM(s) Oral three times a day  multivitamin 1 Tablet(s) Oral daily  ascorbic acid 500 milliGRAM(s) Oral daily  escitalopram 10 milliGRAM(s) Oral daily  fentaNYL   Patch  12 MICROgram(s)/Hr 1 Patch Transdermal every 72 hours  lactobacillus acidophilus 1 Tablet(s) Oral two times a day with meals  methylPREDNISolone sodium succinate Injectable 20 milliGRAM(s) IV Push every 12 hours    MEDICATIONS  (PRN):  magnesium hydroxide Suspension 30 milliLiter(s) Oral daily PRN Constipation  methyl salicylate 14%/menthol 6% Topical Ointment 1 Application(s) Topical four times a day PRN pain  hydrocortisone 2.5% Rectal Cream 1 Application(s) Rectal daily PRN pain  lidocaine/prilocaine Cream 1 Application(s) Topical every 4 hours PRN pain  acetaminophen   Tablet 650 milliGRAM(s) Oral every 6 hours PRN For Temp greater than 38 C (100.4 F)  traMADol 25 milliGRAM(s) Oral every 4 hours PRN Moderate Pain (4 - 6)  acetaminophen   Tablet. 650 milliGRAM(s) Oral every 6 hours PRN Mild Pain to moderate pain      LABS:                          9.0    8.3   )-----------( 255      ( 25 Sep 2017 07:11 )             27.2     09-25    131<L>  |  92<L>  |  18.0  ----------------------------<  87  4.3   |  27.0  |  0.34<L>    Ca    8.5<L>      25 Sep 2017 07:11    TPro  6.4<L>  /  Alb  2.6<L>  /  TBili  0.7  /  DBili  x   /  AST  16  /  ALT  27  /  AlkPhos  240<H>  09-25          RADIOLOGY & ADDITIONAL TESTS:      REVIEW OF SYSTEMS:    b/l hand pain - better , all other systems reviewed and are negative .    Vital Signs Last 24 Hrs  T(C): 37.1 (25 Sep 2017 12:01), Max: 37.8 (24 Sep 2017 20:06)  T(F): 98.8 (25 Sep 2017 12:01), Max: 100.1 (24 Sep 2017 20:06)  HR: 78 (25 Sep 2017 12:01) (78 - 95)  BP: 110/68 (25 Sep 2017 12:01) (110/68 - 136/72)  BP(mean): --  RR: 16 (25 Sep 2017 12:01) (16 - 18)  SpO2: 95% (25 Sep 2017 12:01) (94% - 96%)    PHYSICAL EXAM:    GENERAL: NAD, well-groomed, well-developed  HEAD:  Atraumatic, Normocephalic  EYES: EOMI, PERRLA, conjunctiva and sclera clear  NECK: Supple, No JVD, Normal thyroid  NERVOUS SYSTEM:  Alert & Oriented X3, no focal deficit  CHEST/LUNG: CTA b/l ,  no  rales, rhonchi, wheezing, or rubs  HEART: Regular rate and rhythm; No murmurs, rubs, or gallops  ABDOMEN: Soft, Nontender, Nondistended; Bowel sounds present  EXTREMITIES:  2+ Peripheral Pulses, No clubbing, cyanosis, or edema  LYMPH: No lymphadenopathy noted  SKIN: No rashes or lesions

## 2017-09-25 NOTE — PROGRESS NOTE ADULT - SUBJECTIVE AND OBJECTIVE BOX
HPI:  Pt is an 87 y.o. F w/ PMHx Reji 2 thrombocytosis and Myelofibrosis presented to [facility] on 09/10/17 with diffuse joint pains.  Pt reports pain started in her neck and shoulders the week before admission.  Pt seen by outpt orthopedics, and started on Medrol Dose pack.  Symptoms initially improved, but then pt began to experience pain and swelling in both hands and feet.  Pain worsened to the point that pt was having difficulty with walking.  On admission, Right hand Xray revealed periarticular changes consistent with Osteoarthritis versus CPPD disease; diffuse soft tissue; no fracture. Bilateral wrist Xray showed no fracture or dislocation.  Right ankle Xray showed soft tissue swelling, no fracture or dislocation.  Rheumatology consulted.  Serological studies performed.  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 less likely RA vs. less likely polymyalgia rheumatica. Pt was started on prednisone 40mg daily. As per Rheum will taper steroids by 10mg every 5 days to 10 mg daily, will maintain until follow up as outpatient.  Pt seen by hematology, transfused on 09/06 for symptomatic anemia.  Hospital course complicated by elevated BP, pt started by lisinopril.  Pt also had marked extremities swelling.  Doppler for both UEs and LEs performed, which were negative for DVTs.    INTERVAL SUBJECTIVE & REVIEW OF SYMPTOMS:  Patient feeling better.  Pt reports mild improvement in pain.  Denies fatigue, SOB, B&B complaints.    REVIEW OF SYSTEMS  [   ] Constitutional WNL  [ x  ] Cardio WNL  [  x ] Resp WNL  [  x ] GI WNL  [  x ]  WNL  [   ] Heme WNL  [   ] Endo WNL  [ x  ] Skin WNL  [   ] MSK WNL  [  x ] Neuro WNL  [  x ] Cognitive WNL  [   ] Psych WNL    Vital Signs Last 24 Hrs  T(C): 35.8 (25 Sep 2017 05:51), Max: 37.8 (24 Sep 2017 20:06)  T(F): 96.5 (25 Sep 2017 05:51), Max: 100.1 (24 Sep 2017 20:06)  HR: 88 (25 Sep 2017 05:51) (84 - 95)  BP: 136/72 (25 Sep 2017 05:51) (114/69 - 136/72)  BP(mean): 71 (24 Sep 2017 12:08) (71 - 71)  RR: 18 (25 Sep 2017 05:51) (18 - 18)  SpO2: 94% (25 Sep 2017 05:51) (94% - 96%)    PHYSICAL EXAM  General:  NAD  Cardio: RRR  Resp: CTAB  Abdomen: soft, NTND  Extrem: + mild edema digits and right knee with calor, no calf tenderness. No erythema  Motor 4/5     Functional status:  Bed mobility: Max assist  Transfers: Min/mod assist  Gait: Pt ambulated 10'RW mod assist  ADLs: Dressing mod assist, toileting total assist    RECENT LABS:                        9.0    8.3   )-----------( 255      ( 25 Sep 2017 07:11 )             27.2                             7.6    11.2  )-----------( 267      ( 22 Sep 2017 07:48 )             22.0      09-25    131<L>  |  92<L>  |  18.0  ----------------------------<  87  4.3   |  27.0  |  0.34<L>    Ca    8.5<L>      25 Sep 2017 07:11    TPro  6.4<L>  /  Alb  2.6<L>  /  TBili  0.7  /  DBili  x   /  AST  16  /  ALT  27  /  AlkPhos  240<H>  09-25     MEDICATIONS  (STANDING):  heparin  Injectable 5000 Unit(s) SubCutaneous every 12 hours  docusate sodium 100 milliGRAM(s) Oral three times a day  polyethylene glycol 3350 17 Gram(s) Oral at bedtime  pantoprazole    Tablet 40 milliGRAM(s) Oral before breakfast  lisinopril 5 milliGRAM(s) Oral daily  levothyroxine 75 MICROGram(s) Oral daily  gabapentin 100 milliGRAM(s) Oral three times a day  multivitamin 1 Tablet(s) Oral daily  ascorbic acid 500 milliGRAM(s) Oral daily  traMADol 25 milliGRAM(s) Oral <User Schedule>  escitalopram 10 milliGRAM(s) Oral daily  fentaNYL   Patch  12 MICROgram(s)/Hr 1 Patch Transdermal every 72 hours  methylPREDNISolone sodium succinate Injectable 20 milliGRAM(s) IV Push every 12 hours    MEDICATIONS  (PRN):  magnesium hydroxide Suspension 30 milliLiter(s) Oral daily PRN Constipation  methyl salicylate 14%/menthol 6% Topical Ointment 1 Application(s) Topical four times a day PRN pain  hydrocortisone 2.5% Rectal Cream 1 Application(s) Rectal daily PRN pain  traMADol 25 milliGRAM(s) Oral every 4 hours PRN Moderate Pain (4 - 6)  lidocaine/prilocaine Cream 1 Application(s) Topical every 4 hours PRN pain  acetaminophen   Tablet 650 milliGRAM(s) Oral every 6 hours PRN For Temp greater than 38 C (100.4 F)        A/P:  Pt is an 87 year old female with functional deficits from 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  Pt to f/u with rheumatology after d/c for further tapering.  Serologies were ordered.    Continue local analgesic cream.  Lidocaine cream prn   Tylenol changed to q6h for 2 days. Increased neurontin to TID.  Added Tramadol 25-50mg q4hrs prn-changed to 25mg standing bid prior to rehab sessions.   Recalled rheumatology due to persistent significant pain; f/u 9/19 appreciated; Prednisone changed to Solumedrol daily.  Consider bone scan if not improved; performed 9/21/17:  Multi bilateral rib foci, right shoulder activity possibly related to trauma, degenerative changes left shoulder, left SC joint, bilateral knees, ankle and feet.  Contacted rheum and heme to eval  Added Fentanyl patch 12mcg 9/21 with slight improvement.  Increased solumedrol to 20 bid due to persistent pain limited progress in rehab; mildly improved-decrease to 30mg daily beginning 9/26.  Consider right knee aspiration/cortisone injection; pt declines at this time    h/o Myelofibrosis/MDS with thrombocytosis: Hem onc fu as needed. 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 recent bone marrow showed 5% blasts with moderate reticulin fibrosis. Now with myelofibrosis awaiting to begin Jakafi outpatient with Dr. Montes.   HB 7.1.  Iron WNL.  Pt transfused 1U PRBCs 9/19/17; developed temp of 100.5  Discussed above with Dr Montes; pre-medicate with Tylenol if another transfusion warranted  Patient transfused 9/23 with improvement in H/H    DVT prophylaxis: on heparin sq    Elevated blood pressure without h/o HTN: Improved on lisinopril    Hypothyroidism: on synthroid    Hyponatremia:  Monitor BMP.      GI prophylaxis: on protonix    Diet: regular with thin    Bowel program: Toilet schedule prn    Bowel program: colace, miralax    Medical fu     Discussed above extensively with patients daughters-pt noted to be depressed and requesting antidepressant  Added Lexapro 10mg daily 9/20

## 2017-09-25 NOTE — PROGRESS NOTE ADULT - SUBJECTIVE AND OBJECTIVE BOX
INTERVAL HPI/OVERNIGHT EVENTS:  Pt still with persistent b/l hand synovitis/swelling, although improved after switch back to IV steroids.  Has had some improvement of burning pain of her hands.  Limited ROM of upper shoulders/girdle have improved but persistent.  Otherwise currently denying ankle, knee involvement.  No new rash, fever/chills.  Recent bone scan not revealing of bony involvement    MEDICATIONS  (STANDING):  heparin  Injectable 5000 Unit(s) SubCutaneous every 12 hours  docusate sodium 100 milliGRAM(s) Oral three times a day  polyethylene glycol 3350 17 Gram(s) Oral at bedtime  pantoprazole    Tablet 40 milliGRAM(s) Oral before breakfast  lisinopril 5 milliGRAM(s) Oral daily  levothyroxine 75 MICROGram(s) Oral daily  gabapentin 100 milliGRAM(s) Oral three times a day  multivitamin 1 Tablet(s) Oral daily  ascorbic acid 500 milliGRAM(s) Oral daily  escitalopram 10 milliGRAM(s) Oral daily  fentaNYL   Patch  12 MICROgram(s)/Hr 1 Patch Transdermal every 72 hours  lactobacillus acidophilus 1 Tablet(s) Oral two times a day with meals    MEDICATIONS  (PRN):  magnesium hydroxide Suspension 30 milliLiter(s) Oral daily PRN Constipation  methyl salicylate 14%/menthol 6% Topical Ointment 1 Application(s) Topical four times a day PRN pain  hydrocortisone 2.5% Rectal Cream 1 Application(s) Rectal daily PRN pain  lidocaine/prilocaine Cream 1 Application(s) Topical every 4 hours PRN pain  acetaminophen   Tablet 650 milliGRAM(s) Oral every 6 hours PRN For Temp greater than 38 C (100.4 F)  traMADol 25 milliGRAM(s) Oral every 4 hours PRN Moderate Pain (4 - 6)  acetaminophen   Tablet. 650 milliGRAM(s) Oral every 6 hours PRN Mild Pain to moderate pain      Allergies    No Known Allergies    Intolerances        REVIEW OF SYSTEMS      General:	no fever/chills, no wt loss    Skin/Breast: no rash    ENMT:	no oral ulcers/nasal ulcers    Respiratory and Thorax: No SOB, cough, PAUL  	  Cardiovascular:	no CP, positional CP    Gastrointestinal:	no N/V/D, dysphagia    Genitourinary:	no dysuria, hematuria    Musculoskeletal:	 as above, persistent hand pain/swelling, reduced shoulder ROM/strength    Neurological:	no FND    Hematology/Lymphatics:	 no new bony pain    Endocrine:	negative    Allergic/Immunologic:	negative    Vital Signs Last 24 Hrs  T(C): 37.1 (25 Sep 2017 12:01), Max: 37.8 (24 Sep 2017 20:06)  T(F): 98.8 (25 Sep 2017 12:), Max: 100.1 (24 Sep 2017 20:06)  HR: 78 (25 Sep 2017 12:) (78 - 95)  BP: 110/68 (25 Sep 2017 12:) (110/68 - 136/72)  BP(mean): --  RR: 16 (25 Sep 2017 12:01) (16 - 18)  SpO2: 95% (25 Sep 2017 12:) (94% - 96%)    PHYSICAL EXAM:      Constitutional: NAD    Eyes: EOMI    ENMT: no oral/nasal ulcers,    Neck: supple    Respiratory: CTA    Cardiovascular: nlS1S2 RRR    Gastrointestinal: soft, NT +BS    Vascular: intact distal pulses, +radial pulses b/l.  trace LE edema    Neurological: no FND    Skin: no acute rash    Lymph Nodes: no cervical LAD, no supraclav LAD    Musculoskeletal:  diffuse hand synovitis, swelling. Reduced wrist flex/extension to 50 degreees.  [place] ROM restricted FF, abduction to <90 degrees, reduced ext/int rotation.  Mild tenderness in shoulders.      Psychiatric: appropriate        LABS:                        9.0    8.3   )-----------( 255      ( 25 Sep 2017 07:11 )             27.2     -    131<L>  |  92<L>  |  18.0  ----------------------------<  87  4.3   |  27.0  |  0.34<L>    Ca    8.5<L>      25 Sep 2017 07:11    TPro  6.4<L>  /  Alb  2.6<L>  /  TBili  0.7  /  DBili  x   /  AST  16  /  ALT  27  /  AlkPhos  240<H>      C-Reactive Protein, Serum: 17.80 mg/dL <H> [0.00 - 0.40] (17 @ 08:08)  Sedimentation Rate, Erythrocyte: 67 mm/hr <H> [0 - 20] (17 @ 08:06)          RADIOLOGY & ADDITIONAL TESTS:  NM Bone imagin. Multiple round bilateral rib foci, which are probably related to   trauma. Please correlate clinically.  2. Diffuse asymmetrically increased right shoulder activity, which may be   related to trauma and/or degenerative change. Please correlate clinically   and with x-ray/CT.  3. Degenerative changes in the left shoulder, left sternoclavicular joint   region, bilateral knees and ankles/feet.  4. Limited evaluation of pelvic bones due to distended urinary bladder.

## 2017-09-25 NOTE — PROGRESS NOTE ADULT - ASSESSMENT
87 year old female with PMHx  Reji 2+ thrombocytosis, dating back to 2013. She had been on Hydrea since Olya 10, 2013. Most recently she was found to be pancytopenic and the Hydrea was held as of July 12, 2017. NOw presenting with multiple joint pain and swelling. diagnosed with Seronegative Symmetrical Synovitis . Pt was started on prednisone 40mg daily. BP was elevated , patient started on Lisinopril. Patient was transferred to acute rehab. SHe still continues to have joint pain and swelling. received 2units PRBCs in reb for anemia.     1) Polyarthralgia- neurologic etiology vs bone pain from myelofibrosis vs Complex regional pain syndrome  - No signs of infection  - Prednisone changed to IV Solumedrol per rheum recs. needs increased doses - may need to consider steroid sparing agents.   - Continue Gabapentin and Tramadol.   - Hepatitis Panel negative, Lyme  serology negative ,  Parvovirus IgM - negative , IgG- elevated - past exposure to parvovirus +  - need Rheum input as patient symptoms do not seem to be improving   - Continue rehab.  - GI Prophylaxis    2) Myelofibrosis  - 1unit PRBC received, continue to monitor H&H - Advise 2 units PRBC.   - Patient is waiting to be started on Jakafi    3) Hyponatremia - suspect poor po intake  repeat bmp in am     4) Hypothyroidism  - Levothyroxine     5) Elevated BP without history of HTN  - BP improved with Lisinopril    6) Anemia - chronic - Would recall Heme/Onc for input.     7) Fever - 2/2 post transfusion - resolved. w/u was negative for any infection. 87 year old female with PMHx  Reji 2+ thrombocytosis, dating back to 2013. She had been on Hydrea since Olya 10, 2013. Most recently she was found to be pancytopenic and the Hydrea was held as of July 12, 2017. NOw presenting with multiple joint pain and swelling. diagnosed with Seronegative Symmetrical Synovitis . Pt was started on prednisone 40mg daily. BP was elevated , patient started on Lisinopril. Patient was transferred to acute rehab. SHe still continues to have joint pain and swelling. received 2units [facility]BCs in [facility] for anemia.     1) Polyarthralgia- neurologic etiology vs bone pain from myelofibrosis vs Complex regional pain syndrome  - No signs of infection  - Prednisone changed to IV Solumedrol per rheum recs. needs. On protonix for GI prophylaxis .  - Continue Gabapentin and Tramadol.   - Hepatitis Panel negative, Lyme  serology negative ,  Parvovirus IgM - negative , IgG- elevated - past exposure to parvovirus +  - Continue rehab    2) Myelofibrosis  - 1unit PRBC received, continue to monitor H&H  - Patient is waiting to be started on Jakafi    3) Hyponatremia - will check UA / urine osm/ lytes , serum osm / bmp in am     4) Hypothyroidism  - Levothyroxine     5) Elevated BP without history of HTN- likely secondary to steroids   - BP improved with Lisinopril    6) Anemia - chronic -s/p PRBC , follow CBC     7) Fever - 2/2 post transfusion - resolved. w/u was negative for any infection. Will observe .

## 2017-09-25 NOTE — PROGRESS NOTE ADULT - ASSESSMENT
87y F with acute onset b/l hand synovitis, tenosynovitis and swelling in the absence of positive serologies (RF, Lyme, Parvovirus, hepatitis) seemingly c/w PMR subset RS3PE initially improved on steroids with worsening last week requiring return to IV steroid.  No evidence of myelofibrotic involvement on bone scan.  However, RS3PE can be seen in some cases of hematologic malignancy. Uncertain etiology to worsening of her symptoms, however no improvemnt.  Burning pain in hands may be neuropathic in nature and due to her previous diffuse swelling of her distal upper extremities.  Persistent shoulder ROM remains reduced.    - would continue with solumedrol 30mg/daily for now and reassess for taper by tomorrow (prednisone 30mg x 5 days, then taper 5mg q5 days as tolerated)  - uptitrate gabapentin as tolerate.  Would increase evening dose gabapentin 200mg QHS and attempt to 200mg TID as tolerate  - will follow and reassess.  Will f/u outpt Dr. Rod at [facility] when improved.  Please call [phone] for any questions. 87y F with acute onset b/l hand synovitis, tenosynovitis and swelling in the absence of positive serologies (RF, Lyme, Parvovirus, hepatitis) seemingly c/w PMR subset RS3PE initially improved on steroids with worsening last week requiring return to IV steroid.  No evidence of myelofibrotic involvement on bone scan.  However, RS3PE can be seen in some cases of hematologic malignancy. Uncertain etiology to worsening of her symptoms, however no improvemnt.  Burning pain in hands may be neuropathic in nature and due to her previous diffuse swelling of her distal upper extremities.  Persistent shoulder ROM remains reduced.    - would continue with solumedrol 30mg/daily for now and reassess for taper by tomorrow (prednisone 30mg x 5 days, then taper 5mg q5 days as tolerated)  - uptitrate gabapentin as tolerate.  Would increase evening dose gabapentin 200mg QHS and attempt to 200mg TID as tolerate  - will follow and reassess.  Will f/u outpt Dr. Rod at [facility] when improved.  Please call [phone] for any questions.  - recheck ESR/CRP tomorrow with labs

## 2017-09-26 LAB
APPEARANCE UR: CLEAR — SIGNIFICANT CHANGE UP
BILIRUB UR-MCNC: NEGATIVE — SIGNIFICANT CHANGE UP
CHLORIDE UR-SCNC: 32 MMOL/L — SIGNIFICANT CHANGE UP
COLOR SPEC: YELLOW — SIGNIFICANT CHANGE UP
CRP SERPL-MCNC: 6.2 MG/DL — HIGH (ref 0–0.4)
DIFF PNL FLD: ABNORMAL
EPI CELLS # UR: SIGNIFICANT CHANGE UP
ERYTHROCYTE [SEDIMENTATION RATE] IN BLOOD: 62 MM/HR — HIGH (ref 0–20)
GLUCOSE UR QL: NEGATIVE MG/DL — SIGNIFICANT CHANGE UP
KETONES UR-MCNC: NEGATIVE — SIGNIFICANT CHANGE UP
LEUKOCYTE ESTERASE UR-ACNC: ABNORMAL
NITRITE UR-MCNC: NEGATIVE — SIGNIFICANT CHANGE UP
OSMOLALITY UR: 359 MOSM/KG — SIGNIFICANT CHANGE UP (ref 300–1000)
PH UR: 7 — SIGNIFICANT CHANGE UP (ref 5–8)
PROT UR-MCNC: NEGATIVE MG/DL — SIGNIFICANT CHANGE UP
RBC CASTS # UR COMP ASSIST: SIGNIFICANT CHANGE UP /HPF (ref 0–4)
SODIUM UR-SCNC: 42 MMOL/L — SIGNIFICANT CHANGE UP
SP GR SPEC: 1 — LOW (ref 1.01–1.02)
UROBILINOGEN FLD QL: NEGATIVE MG/DL — SIGNIFICANT CHANGE UP
WBC UR QL: SIGNIFICANT CHANGE UP

## 2017-09-26 PROCEDURE — 99232 SBSQ HOSP IP/OBS MODERATE 35: CPT

## 2017-09-26 PROCEDURE — 99233 SBSQ HOSP IP/OBS HIGH 50: CPT

## 2017-09-26 RX ORDER — CALCIUM CARBONATE 500(1250)
2 TABLET ORAL DAILY
Qty: 0 | Refills: 0 | Status: DISCONTINUED | OUTPATIENT
Start: 2017-09-26 | End: 2017-10-03

## 2017-09-26 RX ORDER — CHOLECALCIFEROL (VITAMIN D3) 125 MCG
2000 CAPSULE ORAL DAILY
Qty: 0 | Refills: 0 | Status: DISCONTINUED | OUTPATIENT
Start: 2017-09-26 | End: 2017-10-03

## 2017-09-26 RX ORDER — DOCUSATE SODIUM 100 MG
100 CAPSULE ORAL THREE TIMES A DAY
Qty: 0 | Refills: 0 | Status: DISCONTINUED | OUTPATIENT
Start: 2017-09-26 | End: 2017-09-26

## 2017-09-26 RX ADMIN — GABAPENTIN 100 MILLIGRAM(S): 400 CAPSULE ORAL at 21:27

## 2017-09-26 RX ADMIN — Medication 1 TABLET(S): at 12:25

## 2017-09-26 RX ADMIN — PANTOPRAZOLE SODIUM 40 MILLIGRAM(S): 20 TABLET, DELAYED RELEASE ORAL at 06:17

## 2017-09-26 RX ADMIN — HEPARIN SODIUM 5000 UNIT(S): 5000 INJECTION INTRAVENOUS; SUBCUTANEOUS at 06:17

## 2017-09-26 RX ADMIN — Medication 650 MILLIGRAM(S): at 08:17

## 2017-09-26 RX ADMIN — Medication 75 MICROGRAM(S): at 06:17

## 2017-09-26 RX ADMIN — Medication 1 TABLET(S): at 17:22

## 2017-09-26 RX ADMIN — Medication 30 MILLIGRAM(S): at 06:18

## 2017-09-26 RX ADMIN — Medication 650 MILLIGRAM(S): at 09:17

## 2017-09-26 RX ADMIN — Medication 100 MILLIGRAM(S): at 06:16

## 2017-09-26 RX ADMIN — HEPARIN SODIUM 5000 UNIT(S): 5000 INJECTION INTRAVENOUS; SUBCUTANEOUS at 17:23

## 2017-09-26 RX ADMIN — GABAPENTIN 100 MILLIGRAM(S): 400 CAPSULE ORAL at 16:06

## 2017-09-26 RX ADMIN — Medication 1 TABLET(S): at 08:18

## 2017-09-26 RX ADMIN — ESCITALOPRAM OXALATE 10 MILLIGRAM(S): 10 TABLET, FILM COATED ORAL at 12:25

## 2017-09-26 RX ADMIN — GABAPENTIN 100 MILLIGRAM(S): 400 CAPSULE ORAL at 06:17

## 2017-09-26 RX ADMIN — Medication 500 MILLIGRAM(S): at 12:25

## 2017-09-26 RX ADMIN — LISINOPRIL 5 MILLIGRAM(S): 2.5 TABLET ORAL at 06:16

## 2017-09-26 NOTE — PROGRESS NOTE ADULT - SUBJECTIVE AND OBJECTIVE BOX
INTERVAL HPI/OVERNIGHT EVENTS:  Pt with improving b/l hand pain/swelling since restarted back on IV solumedrol.  Shoulder ROM improving as well, participating with PT actively. Otherwise no new joint complaints, no fever/chills. +episode of loose BM/diarrhea today as per pt 2/2 laxatives.    MEDICATIONS  (STANDING):  heparin  Injectable 5000 Unit(s) SubCutaneous every 12 hours  pantoprazole    Tablet 40 milliGRAM(s) Oral before breakfast  lisinopril 5 milliGRAM(s) Oral daily  levothyroxine 75 MICROGram(s) Oral daily  gabapentin 100 milliGRAM(s) Oral three times a day  multivitamin 1 Tablet(s) Oral daily  ascorbic acid 500 milliGRAM(s) Oral daily  escitalopram 10 milliGRAM(s) Oral daily  fentaNYL   Patch  12 MICROgram(s)/Hr 1 Patch Transdermal every 72 hours  methylPREDNISolone sodium succinate Injectable 30 milliGRAM(s) IV Push daily  lactobacillus acidophilus 1 Tablet(s) Oral two times a day with meals    MEDICATIONS  (PRN):  magnesium hydroxide Suspension 30 milliLiter(s) Oral daily PRN Constipation  methyl salicylate 14%/menthol 6% Topical Ointment 1 Application(s) Topical four times a day PRN pain  hydrocortisone 2.5% Rectal Cream 1 Application(s) Rectal daily PRN pain  lidocaine/prilocaine Cream 1 Application(s) Topical every 4 hours PRN pain  acetaminophen   Tablet 650 milliGRAM(s) Oral every 6 hours PRN For Temp greater than 38 C (100.4 F)  traMADol 25 milliGRAM(s) Oral every 4 hours PRN Moderate Pain (4 - 6)  acetaminophen   Tablet. 650 milliGRAM(s) Oral every 6 hours PRN Mild Pain to moderate pain      Allergies    No Known Allergies    Intolerances        REVIEW OF SYSTEMS      General:	no fever/chills, +weakness    Skin/Breast: no rash  	  Ophthalmologic: neg  	  ENMT:	neg    Respiratory and Thorax: neg  	  Cardiovascular:	neg    Gastrointestinal:	neg    Genitourinary:	neg    Musculoskeletal:	 as above    Neurological:	neg    Psychiatric:	neg    Hematology/Lymphatics:	neg    Endocrine:neg    Allergic/Immunologic:	neg    Vital Signs Last 24 Hrs  T(C): 36.7 (26 Sep 2017 12:41), Max: 37.4 (25 Sep 2017 20:40)  T(F): 98 (26 Sep 2017 12:41), Max: 99.4 (25 Sep 2017 20:40)  HR: 80 (26 Sep 2017 12:41) (80 - 96)  BP: 114/74 (26 Sep 2017 12:41) (114/74 - 125/70)  BP(mean): --  RR: 16 (26 Sep 2017 12:41) (16 - 18)  SpO2: 96% (26 Sep 2017 12:41) (95% - 97%)    PHYSICAL EXAM:      Constitutional:  NAD    Eyes: EOMI    ENMT: no ulcers    Neck: supple    Respiratory: CTABL    Cardiovascular: NL s1S2    Gastrointestinal: soft NT    Extremities:  WWP    Vascular: intact radial pulses b/l    Neurological: no FND    Skin: no rash    Lymph Nodes: no cervical LAD    Musculoskeletal:  diffuse hand synovitis, swelling. Reduced wrist flex/extension to 50 degreees.  [place] ROM restricted FF, abduction to <90 degrees, reduced ext/int rotation.  Mild tenderness in shoulders.      Psychiatric: appropriate        LABS:                        9.0    8.3   )-----------( 255      ( 25 Sep 2017 07:11 )             27.2         131<L>  |  92<L>  |  18.0  ----------------------------<  87  4.3   |  27.0  |  0.34<L>    Ca    8.5<L>      25 Sep 2017 07:11    TPro  6.4<L>  /  Alb  2.6<L>  /  TBili  0.7  /  DBili  x   /  AST  16  /  ALT  27  /  AlkPhos  240<H>        Urinalysis Basic - ( 26 Sep 2017 00:22 )    Color: Yellow / Appearance: Clear / S.005 / pH: x  Gluc: x / Ketone: Negative  / Bili: Negative / Urobili: Negative mg/dL   Blood: x / Protein: Negative mg/dL / Nitrite: Negative   Leuk Esterase: Small / RBC: 0-2 /HPF / WBC 3-5   Sq Epi: x / Non Sq Epi: Few / Bacteria: x    Sedimentation Rate, Erythrocyte: 62 mm/hr <H> [0 - 20] (17 @ 06:36)  C-Reactive Protein, Serum: 6.20 mg/dL <H> [0.00 - 0.40] (17 @ 06:36)  Protein, Urine: Negative mg/dL (17 @ 00:22)        RADIOLOGY & ADDITIONAL TESTS:

## 2017-09-26 NOTE — PROGRESS NOTE ADULT - SUBJECTIVE AND OBJECTIVE BOX
HPI:  Pt is an 87 y.o. F w/ PMHx Reji 2 thrombocytosis and Myelofibrosis presented to [facility] on 09/10/17 with diffuse joint pains.  Pt reports pain started in her neck and shoulders the week before admission.  Pt seen by outpt orthopedics, and started on Medrol Dose pack.  Symptoms initially improved, but then pt began to experience pain and swelling in both hands and feet.  Pain worsened to the point that pt was having difficulty with walking.  On admission, Right hand Xray revealed periarticular changes consistent with Osteoarthritis versus CPPD disease; diffuse soft tissue; no fracture. Bilateral wrist Xray showed no fracture or dislocation.  Right ankle Xray showed soft tissue swelling, no fracture or dislocation.  Rheumatology consulted.  Serological studies performed.  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 less likely RA vs. less likely polymyalgia rheumatica. Pt was started on prednisone 40mg daily. As per Rheum will taper steroids by 10mg every 5 days to 10 mg daily, will maintain until follow up as outpatient.  Pt seen by hematology, transfused on 09/06 for symptomatic anemia.  Hospital course complicated by elevated BP, pt started by lisinopril.  Pt also had marked extremities swelling.  Doppler for both UEs and LEs performed, which were negative for DVTs.    INTERVAL SUBJECTIVE & REVIEW OF SYMPTOMS:  Patient reports that pain has lessened.  Pt no longer c/o significant pain in her hands and right knee.    Pt reports mild pain in her back.  Denies fatigue, SOB, bladder complaints.  Pt reports having had one episode of diarrhea this morning    REVIEW OF SYSTEMS  [   ] Constitutional WNL  [ x  ] Cardio WNL  [  x ] Resp WNL  [  x ] GI WNL  [  x ]  WNL  [   ] Heme WNL  [   ] Endo WNL  [ x  ] Skin WNL  [   ] MSK WNL  [  x ] Neuro WNL  [  x ] Cognitive WNL  [   ] Psych WNL    Vital Signs Last 24 Hrs  T(C): 37.3 (26 Sep 2017 05:26), Max: 37.4 (25 Sep 2017 20:40)  T(F): 99.2 (26 Sep 2017 05:26), Max: 99.4 (25 Sep 2017 20:40)  HR: 96 (26 Sep 2017 05:26) (78 - 96)  BP: 125/70 (26 Sep 2017 05:26) (110/68 - 125/70)  BP(mean): --  RR: 18 (26 Sep 2017 05:26) (16 - 18)  SpO2: 95% (26 Sep 2017 05:26) (95% - 97%)    PHYSICAL EXAM  General:  NAD  Cardio: RRR  Resp: CTAB  Abdomen: soft, NTND  Extrem: + mild edema digits and right knee with calor, no calf tenderness. No erythema  Motor 4/5     Functional status:  Bed mobility: Max assist  Transfers: Min/mod assist  Gait: Pt ambulated 10'RW mod assist  ADLs: Dressing mod assist, toileting total assist    RECENT LABS:                        9.0    8.3   )-----------( 255      ( 25 Sep 2017 07:11 )             27.2                             7.6    11.2  )-----------( 267      ( 22 Sep 2017 07:48 )             22.0      09-25    131<L>  |  92<L>  |  18.0  ----------------------------<  87  4.3   |  27.0  |  0.34<L>    Ca    8.5<L>      25 Sep 2017 07:11    TPro  6.4<L>  /  Alb  2.6<L>  /  TBili  0.7  /  DBili  x   /  AST  16  /  ALT  27  /  AlkPhos  240<H>  09-25     MEDICATIONS  (STANDING):  heparin  Injectable 5000 Unit(s) SubCutaneous every 12 hours  docusate sodium 100 milliGRAM(s) Oral three times a day  polyethylene glycol 3350 17 Gram(s) Oral at bedtime  pantoprazole    Tablet 40 milliGRAM(s) Oral before breakfast  lisinopril 5 milliGRAM(s) Oral daily  levothyroxine 75 MICROGram(s) Oral daily  gabapentin 100 milliGRAM(s) Oral three times a day  multivitamin 1 Tablet(s) Oral daily  ascorbic acid 500 milliGRAM(s) Oral daily  traMADol 25 milliGRAM(s) Oral <User Schedule>  escitalopram 10 milliGRAM(s) Oral daily  fentaNYL   Patch  12 MICROgram(s)/Hr 1 Patch Transdermal every 72 hours  methylPREDNISolone sodium succinate Injectable 20 milliGRAM(s) IV Push every 12 hours    MEDICATIONS  (PRN):  magnesium hydroxide Suspension 30 milliLiter(s) Oral daily PRN Constipation  methyl salicylate 14%/menthol 6% Topical Ointment 1 Application(s) Topical four times a day PRN pain  hydrocortisone 2.5% Rectal Cream 1 Application(s) Rectal daily PRN pain  traMADol 25 milliGRAM(s) Oral every 4 hours PRN Moderate Pain (4 - 6)  lidocaine/prilocaine Cream 1 Application(s) Topical every 4 hours PRN pain  acetaminophen   Tablet 650 milliGRAM(s) Oral every 6 hours PRN For Temp greater than 38 C (100.4 F)        A/P:  Pt is an 87 year old female with functional deficits from 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  Pt to f/u with rheumatology after d/c for further tapering.  Serologies were ordered.    Continue local analgesic cream.  Lidocaine cream prn   Tylenol changed to q6h for 2 days. Increased neurontin to TID.  Added Tramadol 25-50mg q4hrs prn-changed to 25mg standing bid prior to rehab sessions.   Recalled rheumatology due to persistent significant pain; f/u 9/19 appreciated; Prednisone changed to Solumedrol daily.  Consider bone scan if not improved; performed 9/21/17:  Multi bilateral rib foci, right shoulder activity possibly related to trauma, degenerative changes left shoulder, left SC joint, bilateral knees, ankle and feet.  Contacted rheum and heme to eval  Added Fentanyl patch 12mcg 9/21 with improvement.    Increased solumedrol to 20 bid due to persistent pain limited progress in rehab; mildly improved-decreased to 30mg daily beginning 9/26.  Appreciate rheum f/u.  CRP and ESR improved to 6.2 and 62 respectively. Pt overall much improved; D/C standing Tramadol.      h/o Myelofibrosis/MDS with thrombocytosis: Hem onc fu as needed. 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 recent bone marrow showed 5% blasts with moderate reticulin fibrosis. Now with myelofibrosis awaiting to begin Jakafi outpatient with Dr. Montes.   HB 7.1.  Iron WNL.  Pt transfused 1U PRBCs 9/19/17; developed temp of 100.5  Discussed above with Dr Montes; pre-medicate with Tylenol if another transfusion warranted  Patient transfused 9/23 with improvement in H/H    DVT prophylaxis: on heparin sq    Elevated blood pressure without h/o HTN: Improved on lisinopril    Hypothyroidism: on synthroid    Hyponatremia:  Monitor BMP.      GI prophylaxis: on protonix    Diet: regular with thin.  Ensure TID due to decreased appetite and protein calorie malnutrtion    Bowel program: Toilet schedule prn  D/C miralax daily and change colace to prn due to loose BM today    Medical fu     Depression:  Added Lexapro 10mg daily 9/20 with improved mood HPI:  Pt is an 87 y.o. F w/ PMHx Reji 2 thrombocytosis and Myelofibrosis presented to [facility] on 09/10/17 with diffuse joint pains.  Pt reports pain started in her neck and shoulders the week before admission.  Pt seen by outpt orthopedics, and started on Medrol Dose pack.  Symptoms initially improved, but then pt began to experience pain and swelling in both hands and feet.  Pain worsened to the point that pt was having difficulty with walking.  On admission, Right hand Xray revealed periarticular changes consistent with Osteoarthritis versus CPPD disease; diffuse soft tissue; no fracture. Bilateral wrist Xray showed no fracture or dislocation.  Right ankle Xray showed soft tissue swelling, no fracture or dislocation.  Rheumatology consulted.  Serological studies performed.  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 less likely RA vs. less likely polymyalgia rheumatica. Pt was started on prednisone 40mg daily. As per Rheum will taper steroids by 10mg every 5 days to 10 mg daily, will maintain until follow up as outpatient.  Pt seen by hematology, transfused on 09/06 for symptomatic anemia.  Hospital course complicated by elevated BP, pt started by lisinopril.  Pt also had marked extremities swelling.  Doppler for both UEs and LEs performed, which were negative for DVTs.    INTERVAL SUBJECTIVE & REVIEW OF SYMPTOMS:  Patient reports that pain has lessened.  Pt no longer c/o significant pain in her hands and right knee.    Pt reports mild pain in her back.  Denies fatigue, SOB, bladder complaints.  Pt reports having had one episode of diarrhea this morning    REVIEW OF SYSTEMS  [   ] Constitutional WNL  [ x  ] Cardio WNL  [  x ] Resp WNL  [  x ] GI WNL  [  x ]  WNL  [   ] Heme WNL  [   ] Endo WNL  [ x  ] Skin WNL  [   ] MSK WNL  [  x ] Neuro WNL  [  x ] Cognitive WNL  [   ] Psych WNL    Vital Signs Last 24 Hrs  T(C): 37.3 (26 Sep 2017 05:26), Max: 37.4 (25 Sep 2017 20:40)  T(F): 99.2 (26 Sep 2017 05:26), Max: 99.4 (25 Sep 2017 20:40)  HR: 96 (26 Sep 2017 05:26) (78 - 96)  BP: 125/70 (26 Sep 2017 05:26) (110/68 - 125/70)  BP(mean): --  RR: 18 (26 Sep 2017 05:26) (16 - 18)  SpO2: 95% (26 Sep 2017 05:26) (95% - 97%)    PHYSICAL EXAM  General:  NAD  Cardio: RRR  Resp: CTAB  Abdomen: soft, NTND  Extrem: + trace edema digits and right knee with calor, no calf tenderness. No erythema  Motor 4/5     Functional status:  Bed mobility: Max assist  Transfers: Min/mod assist  Gait: Pt ambulated 10'RW mod assist  ADLs: Dressing mod assist, toileting total assist    RECENT LABS:                        9.0    8.3   )-----------( 255      ( 25 Sep 2017 07:11 )             27.2                             7.6    11.2  )-----------( 267      ( 22 Sep 2017 07:48 )             22.0      09-25    131<L>  |  92<L>  |  18.0  ----------------------------<  87  4.3   |  27.0  |  0.34<L>    Ca    8.5<L>      25 Sep 2017 07:11    TPro  6.4<L>  /  Alb  2.6<L>  /  TBili  0.7  /  DBili  x   /  AST  16  /  ALT  27  /  AlkPhos  240<H>  09-25     MEDICATIONS  (STANDING):  heparin  Injectable 5000 Unit(s) SubCutaneous every 12 hours  docusate sodium 100 milliGRAM(s) Oral three times a day  polyethylene glycol 3350 17 Gram(s) Oral at bedtime  pantoprazole    Tablet 40 milliGRAM(s) Oral before breakfast  lisinopril 5 milliGRAM(s) Oral daily  levothyroxine 75 MICROGram(s) Oral daily  gabapentin 100 milliGRAM(s) Oral three times a day  multivitamin 1 Tablet(s) Oral daily  ascorbic acid 500 milliGRAM(s) Oral daily  traMADol 25 milliGRAM(s) Oral <User Schedule>  escitalopram 10 milliGRAM(s) Oral daily  fentaNYL   Patch  12 MICROgram(s)/Hr 1 Patch Transdermal every 72 hours  methylPREDNISolone sodium succinate Injectable 20 milliGRAM(s) IV Push every 12 hours    MEDICATIONS  (PRN):  magnesium hydroxide Suspension 30 milliLiter(s) Oral daily PRN Constipation  methyl salicylate 14%/menthol 6% Topical Ointment 1 Application(s) Topical four times a day PRN pain  hydrocortisone 2.5% Rectal Cream 1 Application(s) Rectal daily PRN pain  traMADol 25 milliGRAM(s) Oral every 4 hours PRN Moderate Pain (4 - 6)  lidocaine/prilocaine Cream 1 Application(s) Topical every 4 hours PRN pain  acetaminophen   Tablet 650 milliGRAM(s) Oral every 6 hours PRN For Temp greater than 38 C (100.4 F)        A/P:  Pt is an 87 year old female with functional deficits from 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  Pt to f/u with rheumatology after d/c for further tapering.  Serologies were ordered.    Continue local analgesic cream.  Lidocaine cream prn   Tylenol changed to q6h for 2 days. Increased neurontin to TID.  Added Tramadol 25-50mg q4hrs prn-changed to 25mg standing bid prior to rehab sessions.   Recalled rheumatology due to persistent significant pain; f/u 9/19 appreciated; Prednisone changed to Solumedrol daily.  Consider bone scan if not improved; performed 9/21/17:  Multi bilateral rib foci, right shoulder activity possibly related to trauma, degenerative changes left shoulder, left SC joint, bilateral knees, ankle and feet.  Contacted rheum and heme to eval  Added Fentanyl patch 12mcg 9/21 with improvement.    Increased solumedrol to 20 bid due to persistent pain limited progress in rehab; mildly improved-decreased to 30mg daily beginning 9/26.  Appreciate rheum f/u.  CRP and ESR improved to 6.2 and 62 respectively. Pt overall much improved; D/C standing Tramadol.      h/o Myelofibrosis/MDS with thrombocytosis: Hem onc fu as needed. 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 recent bone marrow showed 5% blasts with moderate reticulin fibrosis. Now with myelofibrosis awaiting to begin Jakafi outpatient with Dr. Montes.   HB 7.1.  Iron WNL.  Pt transfused 1U PRBCs 9/19/17; developed temp of 100.5  Discussed above with Dr Montes; pre-medicate with Tylenol if another transfusion warranted  Patient transfused 9/23 with improvement in H/H    DVT prophylaxis: on heparin sq    Elevated blood pressure without h/o HTN: Improved on lisinopril    Hypothyroidism: on synthroid    Hyponatremia:  Monitor BMP.      GI prophylaxis: on protonix    Diet: regular with thin.  Ensure TID due to decreased appetite and protein calorie malnutrtion    Bowel program: Toilet schedule prn  D/C miralax daily and change colace to prn due to loose BM today    Medical fu     Depression:  Added Lexapro 10mg daily 9/20 with improved mood

## 2017-09-26 NOTE — PROGRESS NOTE ADULT - ASSESSMENT
87y F with acute onset b/l hand synovitis, tenosynovitis and swelling in the absence of positive serologies (RF, Lyme, Parvovirus, hepatitis) seemingly c/w PMR subset RS3PE initially improved on steroids with worsening last week requiring return to IV steroid.  Now currently improving and more active with PT.  Likely needs slower steroid taper.  RS3PE can be seen in some cases of hematologic malignancy.    - change to prednisone 30mg/d x5 days then taper 5mg q5 days as tolerated  - c/w gabapentin as tolerated.  Would increase evening dose gabapentin 200mg QHS and attempt to 200mg TID as tolerated  - c/w PT  - will f/u outpt Dr. Rod at [facility] - [address] [place] [address]. Phone: [phone]  - Please call [phone] for any questions if needed 87y F with acute onset b/l hand synovitis, tenosynovitis and swelling in the absence of positive serologies (RF, Lyme, Parvovirus, hepatitis) seemingly c/w PMR subset RS3PE initially improved on steroids with worsening last week requiring return to IV steroid.  Now currently improving and more active with PT.  Likely needs slower steroid taper.  RS3PE can be seen in some cases of hematologic malignancy.    - change to prednisone 30mg/d x5 days then taper 5mg q5 days as tolerated  - start Calcium 1000mg/d and vitamin D 2000 IU daily  - c/w gabapentin as tolerated.  Would increase evening dose gabapentin 200mg QHS and attempt to 200mg TID as tolerated  - c/w PT  - will f/u outpt Dr. Rod at [facility] - [address] [place] [address]. Phone: [phone]  - Please call [phone] for any questions if needed

## 2017-09-27 PROCEDURE — 99232 SBSQ HOSP IP/OBS MODERATE 35: CPT

## 2017-09-27 PROCEDURE — 99233 SBSQ HOSP IP/OBS HIGH 50: CPT

## 2017-09-27 RX ORDER — GABAPENTIN 400 MG/1
200 CAPSULE ORAL AT BEDTIME
Qty: 0 | Refills: 0 | Status: DISCONTINUED | OUTPATIENT
Start: 2017-09-27 | End: 2017-09-29

## 2017-09-27 RX ORDER — GABAPENTIN 400 MG/1
100 CAPSULE ORAL
Qty: 0 | Refills: 0 | Status: DISCONTINUED | OUTPATIENT
Start: 2017-09-27 | End: 2017-09-29

## 2017-09-27 RX ADMIN — GABAPENTIN 200 MILLIGRAM(S): 400 CAPSULE ORAL at 21:13

## 2017-09-27 RX ADMIN — PANTOPRAZOLE SODIUM 40 MILLIGRAM(S): 20 TABLET, DELAYED RELEASE ORAL at 05:36

## 2017-09-27 RX ADMIN — FENTANYL CITRATE 1 PATCH: 50 INJECTION INTRAVENOUS at 14:12

## 2017-09-27 RX ADMIN — Medication 650 MILLIGRAM(S): at 10:43

## 2017-09-27 RX ADMIN — Medication 75 MICROGRAM(S): at 05:36

## 2017-09-27 RX ADMIN — GABAPENTIN 100 MILLIGRAM(S): 400 CAPSULE ORAL at 17:14

## 2017-09-27 RX ADMIN — Medication 2 TABLET(S): at 12:21

## 2017-09-27 RX ADMIN — HEPARIN SODIUM 5000 UNIT(S): 5000 INJECTION INTRAVENOUS; SUBCUTANEOUS at 05:36

## 2017-09-27 RX ADMIN — LISINOPRIL 5 MILLIGRAM(S): 2.5 TABLET ORAL at 05:36

## 2017-09-27 RX ADMIN — Medication 1 TABLET(S): at 12:18

## 2017-09-27 RX ADMIN — Medication 1 TABLET(S): at 17:14

## 2017-09-27 RX ADMIN — Medication 650 MILLIGRAM(S): at 17:26

## 2017-09-27 RX ADMIN — ESCITALOPRAM OXALATE 10 MILLIGRAM(S): 10 TABLET, FILM COATED ORAL at 12:18

## 2017-09-27 RX ADMIN — FENTANYL CITRATE 1 PATCH: 50 INJECTION INTRAVENOUS at 13:08

## 2017-09-27 RX ADMIN — GABAPENTIN 100 MILLIGRAM(S): 400 CAPSULE ORAL at 05:36

## 2017-09-27 RX ADMIN — Medication 650 MILLIGRAM(S): at 00:00

## 2017-09-27 RX ADMIN — Medication 30 MILLIGRAM(S): at 05:36

## 2017-09-27 RX ADMIN — Medication 2000 UNIT(S): at 12:18

## 2017-09-27 RX ADMIN — Medication 650 MILLIGRAM(S): at 18:03

## 2017-09-27 RX ADMIN — Medication 500 MILLIGRAM(S): at 12:17

## 2017-09-27 RX ADMIN — HEPARIN SODIUM 5000 UNIT(S): 5000 INJECTION INTRAVENOUS; SUBCUTANEOUS at 17:14

## 2017-09-27 RX ADMIN — Medication 1 TABLET(S): at 08:42

## 2017-09-27 NOTE — PROGRESS NOTE ADULT - ATTENDING COMMENTS
agree with transfusion for acute on chronic symptomatic anemia. discussed with pt and daughter at length
D/W Dr Epstein .  Dr Robles will take over in am .
Dr Robles will take over on 9/ 29

## 2017-09-27 NOTE — PROGRESS NOTE ADULT - ASSESSMENT
87y F with acute onset b/l hand synovitis, tenosynovitis and swelling in the absence of positive serologies (RF, Lyme, Parvovirus, hepatitis) seemingly c/w PMR subset RS3PE initially improved on steroids with worsening last week requiring return to IV steroid.  Now currently improving and more active with PT.  Likely needs slower steroid taper.  RS3PE can be seen in some cases of hematologic malignancy.      87 year old female with PMHx  Reji 2+ thrombocytosis, dating back to 2013. She had been on Hydrea since Olya 10, 2013. Most recently she was found to be pancytopenic and the Hydrea was held as of July 12, 2017. NOw presenting with multiple joint pain and swelling. diagnosed with Seronegative Symmetrical Synovitis . Pt was started on prednisone 40mg daily. BP was elevated , patient started on Lisinopril. Patient was transferred to acute rehab. SHe still continues to have joint pain and swelling. received 2units PRBCs in [place] for anemia.     1) Polyarthralgia- rheumatology noted -   b/l hand synovitis, tenosynovitis and swelling in the absence of positive serologies (RF, Lyme, Parvovirus, hepatitis) seemingly c/w PMR subset RS3PE initially improved on steroids with worsening last week requiring return to IV steroid.  Now currently improving and more active with PT.  Likely needs slower steroid taper.  RS3PE can be seen in some cases of hematologic malignancy. Continue prednisone as per rheumatology , follow up as out patient - continue Protonix / vit D / calcium    2) Myelofibrosis  - 1unit PRBC received, continue to monitor H&H  - Patient is waiting to be started on Jakafi    3) Hyponatremia - will check UA / urine osm/ lytes , serum osm / bmp in am     4) Hypothyroidism  - Levothyroxine     5) Elevated BP without history of HTN- likely secondary to steroids   - BP improved with Lisinopril    6) Anemia - chronic -s/p PRBC , follow CBC     7) Fever - 2/2 post transfusion - resolved. w/u was negative for any infection. Will observe . 87 yrs old female with PMHx  Reji 2+ thrombocytosis, dating back to 2013. She had been on Hydrea since Olya 10, 2013. Most recently she was found to be pancytopenic and the Hydrea was held as of July 12, 2017. Now presenting with multiple joint pain and swelling. diagnosed with Seronegative Symmetrical Synovitis . Pt was started on prednisone 40mg daily. BP was elevated , patient started on Lisinopril. Patient was transferred to acute rehab.     1) Polyarthralgia- rheumatology noted -   b/l hand synovitis, tenosynovitis and swelling in the absence of positive serologies (RF, Lyme, Parvovirus, hepatitis) seemingly c/w PMR subset RS3PE initially improved on steroids with worsening last week requiring return to IV steroid.  Now currently improving and more active with PT.  Likely needs slower steroid taper.  RS3PE can be seen in some cases of hematologic malignancy. Continue prednisone as per rheumatology , follow up as out patient - continue Protonix / vit D / calcium      2) Myelofibrosis  - 1unit PRBC received, continue to monitor H&H  - Patient is waiting to be started on Jakafi    3) Hyponatremia - likely SIDH , will add serum osmolality , fluid restriction , repeat BMP in am     4) Hypothyroidism  - Levothyroxine     5) Elevated BP without history of HTN- likely secondary to steroids   - BP improved with Lisinopril    6) Anemia - chronic -s/p PRBC , follow CBC     7) Fever - resolved . CXR( 9/21 )  reviewed - reported as LLL infiltrate - patient asymptomatic - will get 2 views XR   8) Osteopenic bones noted on CXR - will continue vit D / Calcium -  check vit D kathy need bone density as outpatient .

## 2017-09-27 NOTE — PROGRESS NOTE ADULT - SUBJECTIVE AND OBJECTIVE BOX
HPI:  Pt is an 87 y.o. F w/ PMHx Reij 2 thrombocytosis and Myelofibrosis presented to [facility] on 09/10/17 with diffuse joint pains.  Pt reports pain started in her neck and shoulders the week before admission.  Pt seen by outpt orthopedics, and started on Medrol Dose pack.  Symptoms initially improved, but then pt began to experience pain and swelling in both hands and feet.  Pain worsened to the point that pt was having difficulty with walking.  On admission, Right hand Xray revealed periarticular changes consistent with Osteoarthritis versus CPPD disease; diffuse soft tissue; no fracture. Bilateral wrist Xray showed no fracture or dislocation.  Right ankle Xray showed soft tissue swelling, no fracture or dislocation.  Rheumatology consulted.  Serological studies performed.  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 less likely RA vs. less likely polymyalgia rheumatica. Pt was started on prednisone 40mg daily. As per Rheum will taper steroids by 10mg every 5 days to 10 mg daily, will maintain until follow up as outpatient.  Pt seen by hematology, transfused on 09/06 for symptomatic anemia.  Hospital course complicated by elevated BP, pt started by lisinopril.  Pt also had marked extremities swelling.  Doppler for both UEs and LEs performed, which were negative for DVTs.    INTERVAL SUBJECTIVE & REVIEW OF SYMPTOMS:  Patient reports that she is much improved and no longer has any significant pain or swelling.  Pt c/o diarrhea yesterday which has resolved with discontinuance of bowel meds.  Denies fatigue, SOB, bladder complaints.      REVIEW OF SYSTEMS  [   ] Constitutional WNL  [ x  ] Cardio WNL  [  x ] Resp WNL  [  x ] GI WNL  [  x ]  WNL  [   ] Heme WNL  [   ] Endo WNL  [ x  ] Skin WNL  [   ] MSK WNL  [  x ] Neuro WNL  [  x ] Cognitive WNL  [   ] Psych WNL    Vital Signs Last 24 Hrs  T(C): 36.3 (27 Sep 2017 04:50), Max: 37.4 (26 Sep 2017 20:25)  T(F): 97.4 (27 Sep 2017 04:50), Max: 99.3 (26 Sep 2017 20:25)  HR: 91 (27 Sep 2017 04:50) (80 - 99)  BP: 123/69 (27 Sep 2017 04:50) (114/74 - 138/71)  BP(mean): --  RR: 16 (27 Sep 2017 04:50) (16 - 18)  SpO2: 95% (27 Sep 2017 04:50) (94% - 96%)    PHYSICAL EXAM  General:  NAD  Cardio: RRR  Resp: CTAB  Abdomen: soft, NTND  Extrem: + trace edema digits and right knee, no calf tenderness. No erythema  Motor 4/5     Functional status:  Bed mobility: Supervision  Transfers: Min assist  Gait: Pt ambulated 60'RW min assist  ADLs: UE dressing min assist, LE dressing supervision, toileting total assist    RECENT LABS:                        9.0    8.3   )-----------( 255      ( 25 Sep 2017 07:11 )             27.2                             7.6    11.2  )-----------( 267      ( 22 Sep 2017 07:48 )             22.0      09-25    131<L>  |  92<L>  |  18.0  ----------------------------<  87  4.3   |  27.0  |  0.34<L>    Ca    8.5<L>      25 Sep 2017 07:11    TPro  6.4<L>  /  Alb  2.6<L>  /  TBili  0.7  /  DBili  x   /  AST  16  /  ALT  27  /  AlkPhos  240<H>  09-25     MEDICATIONS  (STANDING):  heparin  Injectable 5000 Unit(s) SubCutaneous every 12 hours  docusate sodium 100 milliGRAM(s) Oral three times a day  polyethylene glycol 3350 17 Gram(s) Oral at bedtime  pantoprazole    Tablet 40 milliGRAM(s) Oral before breakfast  lisinopril 5 milliGRAM(s) Oral daily  levothyroxine 75 MICROGram(s) Oral daily  gabapentin 100 milliGRAM(s) Oral three times a day  multivitamin 1 Tablet(s) Oral daily  ascorbic acid 500 milliGRAM(s) Oral daily  traMADol 25 milliGRAM(s) Oral <User Schedule>  escitalopram 10 milliGRAM(s) Oral daily  fentaNYL   Patch  12 MICROgram(s)/Hr 1 Patch Transdermal every 72 hours  methylPREDNISolone sodium succinate Injectable 20 milliGRAM(s) IV Push every 12 hours    MEDICATIONS  (PRN):  magnesium hydroxide Suspension 30 milliLiter(s) Oral daily PRN Constipation  methyl salicylate 14%/menthol 6% Topical Ointment 1 Application(s) Topical four times a day PRN pain  hydrocortisone 2.5% Rectal Cream 1 Application(s) Rectal daily PRN pain  traMADol 25 milliGRAM(s) Oral every 4 hours PRN Moderate Pain (4 - 6)  lidocaine/prilocaine Cream 1 Application(s) Topical every 4 hours PRN pain  acetaminophen   Tablet 650 milliGRAM(s) Oral every 6 hours PRN For Temp greater than 38 C (100.4 F)        A/P:  Pt is an 87 year old female with functional deficits from 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  Improved functional status noted    Pain: on prednisone daily, - Was on prednisone 30mg daily, then decrease by 10mg/day q5 days until 10mg daily.  Pt to f/u with rheumatology after d/c for further tapering.  Serologies were ordered.    Continue local analgesic cream.  Lidocaine cream prn   Tylenol changed to q6h for 2 days. Increased neurontin to TID.  Added Tramadol 25-50mg q4hrs prn-changed to 25mg standing bid prior to rehab sessions.   Recalled rheumatology due to persistent significant pain; f/u 9/19 appreciated; Prednisone changed to Solumedrol daily.  Consider bone scan if not improved; performed 9/21/17:  Multi bilateral rib foci, right shoulder activity possibly related to trauma, degenerative changes left shoulder, left SC joint, bilateral knees, ankle and feet.  Contacted rheum and heme to eval  Added Fentanyl patch 12mcg 9/21 with improvement.    Increased solumedrol to 20 bid due to persistent pain limited progress in rehab; mildly improved-decreased to 30mg daily beginning 9/26.  CRP and ESR improved to 6.2 and 62 respectively. Pt overall much improved; D/C standing Tramadol.  Appreciate rheum f/u; changed to prednisone 30mg/d x5 days then taper 5mg q5 days as tolerated.  Calcium 1000mg/d and vitamin D 2000 IU daily prescribed.  Cont gabapentin as tolerated.  Would increase evening dose gabapentin 200mg QHS and attempt to 200mg TID as tolerated      h/o Myelofibrosis/MDS with thrombocytosis: Hem onc fu as needed. 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 recent bone marrow showed 5% blasts with moderate reticulin fibrosis. Now with myelofibrosis awaiting to begin Jakafi outpatient with Dr. Montes.   HB 7.1.  Iron WNL.  Pt transfused 1U PRBCs 9/19/17; developed temp of 100.5  Discussed above with Dr Montes; pre-medicate with Tylenol if another transfusion warranted  Patient transfused 9/23 with improvement in H/H    DVT prophylaxis: on heparin sq    Elevated blood pressure without h/o HTN: Improved on lisinopril    Hypothyroidism: on synthroid    Hyponatremia:  Monitor BMP.      GI prophylaxis: on protonix    Diet: regular with thin.  Ensure TID due to decreased appetite and protein calorie malnutrition    Bowel program: Toilet schedule prn  D/Cd miralax daily and changed colace to prn due to loose BM 9/26    Depression:  Added Lexapro 10mg daily 9/20 with improved mood

## 2017-09-27 NOTE — PROGRESS NOTE ADULT - SUBJECTIVE AND OBJECTIVE BOX
Patient seen and examined . S/p  , POD #     CC :     HPI:      PAST MEDICAL & SURGICAL HISTORY:  Myelofibrosis  Thrombocytosis: ANNMARIE 2 thrombocytosis  Hypothyroidism (acquired)  Myelodysplastic syndrome  No significant past surgical history      MEDICATIONS  (STANDING):  heparin  Injectable 5000 Unit(s) SubCutaneous every 12 hours  pantoprazole    Tablet 40 milliGRAM(s) Oral before breakfast  lisinopril 5 milliGRAM(s) Oral daily  levothyroxine 75 MICROGram(s) Oral daily  multivitamin 1 Tablet(s) Oral daily  ascorbic acid 500 milliGRAM(s) Oral daily  escitalopram 10 milliGRAM(s) Oral daily  lactobacillus acidophilus 1 Tablet(s) Oral two times a day with meals  predniSONE   Tablet 30 milliGRAM(s) Oral daily  predniSONE   Tablet   Oral   calcium carbonate 500 mG (Tums) Chewable 2 Tablet(s) Chew daily  cholecalciferol 2000 Unit(s) Oral daily  gabapentin 100 milliGRAM(s) Oral two times a day  gabapentin 200 milliGRAM(s) Oral at bedtime    MEDICATIONS  (PRN):  magnesium hydroxide Suspension 30 milliLiter(s) Oral daily PRN Constipation  methyl salicylate 14%/menthol 6% Topical Ointment 1 Application(s) Topical four times a day PRN pain  hydrocortisone 2.5% Rectal Cream 1 Application(s) Rectal daily PRN pain  lidocaine/prilocaine Cream 1 Application(s) Topical every 4 hours PRN pain  acetaminophen   Tablet 650 milliGRAM(s) Oral every 6 hours PRN For Temp greater than 38 C (100.4 F)  traMADol 25 milliGRAM(s) Oral every 4 hours PRN Moderate Pain (4 - 6)  acetaminophen   Tablet. 650 milliGRAM(s) Oral every 6 hours PRN Mild Pain to moderate pain      LABS:              Urinalysis Basic - ( 26 Sep 2017 00:22 )    Color: Yellow / Appearance: Clear / S.005 / pH: x  Gluc: x / Ketone: Negative  / Bili: Negative / Urobili: Negative mg/dL   Blood: x / Protein: Negative mg/dL / Nitrite: Negative   Leuk Esterase: Small / RBC: 0-2 /HPF / WBC 3-5   Sq Epi: x / Non Sq Epi: Few / Bacteria: x        RADIOLOGY & ADDITIONAL TESTS:      REVIEW OF SYSTEMS:    CONSTITUTIONAL: No fever, weight loss, or fatigue  EYES: No eye pain, visual disturbances, or discharge  ENMT:  No difficulty hearing, tinnitus, vertigo; No sinus or throat pain  NECK: No pain or stiffness  RESPIRATORY: No cough, wheezing, chills or hemoptysis; No shortness of breath  CARDIOVASCULAR: No chest pain, palpitations, dizziness, or leg swelling  GASTROINTESTINAL: No abdominal or epigastric pain. No nausea, vomiting, or hematemesis; No diarrhea or constipation. No melena or hematochezia.  GENITOURINARY: No dysuria, frequency, hematuria, or incontinence  NEUROLOGICAL: No headaches, memory loss, loss of strength, numbness, or tremors  SKIN: No itching, burning, rashes, or lesions   LYMPH NODES: No enlarged glands  ENDOCRINE: No heat or cold intolerance; No hair loss  MUSCULOSKELETAL:   PSYCHIATRIC: No depression, anxiety, mood swings, or difficulty sleeping  HEME/LYMPH: No easy bruising, or bleeding gums  ALLERGY AND IMMUNOLOGIC: No hives or eczema    Vital Signs Last 24 Hrs  T(C): 36.7 (27 Sep 2017 12:00), Max: 37.4 (26 Sep 2017 20:25)  T(F): 98 (27 Sep 2017 12:00), Max: 99.3 (26 Sep 2017 20:25)  HR: 95 (27 Sep 2017 12:00) (91 - 99)  BP: 103/65 (27 Sep 2017 12:00) (103/65 - 138/71)  BP(mean): --  RR: 18 (27 Sep 2017 12:00) (16 - 18)  SpO2: 96% (27 Sep 2017 12:00) (94% - 96%)  PHYSICAL EXAM:    GENERAL: NAD, well-groomed, well-developed  HEAD:  Atraumatic, Normocephalic  EYES: EOMI, PERRLA, conjunctiva and sclera clear  NECK: Supple, No JVD, Normal thyroid  NERVOUS SYSTEM:  Alert & Oriented X3, no focal deficit  CHEST/LUNG: CTA b/l ,  no  rales, rhonchi, wheezing, or rubs  HEART: Regular rate and rhythm; No murmurs, rubs, or gallops  ABDOMEN: Soft, Nontender, Nondistended; Bowel sounds present  EXTREMITIES:  2+ Peripheral Pulses, No clubbing, cyanosis, or edema  LYMPH: No lymphadenopathy noted  SKIN: No rashes or lesions Patient see and examined . NAD . Pain of b/l foot resolved . Pain in both hands still present but much better , no other complaints .  CC: Pain and swelling in hands  better controlled , feet pain resolved     HPI: 87 year old female with PMHx  Reji 2+ thrombocytosis, dating back to . She had been on Hydrea since Olya 10, 2013. Most recently she was found to be pancytopenic and the Hydrea was held as of 2017. She was in her USOH until about 1 week prior to admission, when she began to experience pain in her neck and shoulders.  She saw ortho, who started her on a Medrol Dose pack, upon which her symptoms resolved.  However, upon completing the pack, she began to experience pain and swelling in her right hand.  She then began to experience similar symptoms in her other hand and in both feet.  The pain continued to worsen, so that she became unable to ambulate on her own.  (+)AM stiffness, lasting several hours. Patient came to the ER. 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 Pt was started on prednisone 40mg daily. Followed  by Rheum and Heme/Onc, Slowly progressing. As per Rheum will taper steroids to 10 mg daily, will maintain until follow up as outpatient.  Seen by PT and recommend rehab. BP was elevated , patient started on Lisinopril. Patient was transferred to acute rehab.    PAST MEDICAL & SURGICAL HISTORY:  Myelofibrosis  Thrombocytosis: REJI 2 thrombocytosis  Hypothyroidism (acquired)  Myelodysplastic syndrome  No significant past surgical history      MEDICATIONS  (STANDING):  heparin  Injectable 5000 Unit(s) SubCutaneous every 12 hours  pantoprazole    Tablet 40 milliGRAM(s) Oral before breakfast  lisinopril 5 milliGRAM(s) Oral daily  levothyroxine 75 MICROGram(s) Oral daily  multivitamin 1 Tablet(s) Oral daily  ascorbic acid 500 milliGRAM(s) Oral daily  escitalopram 10 milliGRAM(s) Oral daily  lactobacillus acidophilus 1 Tablet(s) Oral two times a day with meals  predniSONE   Tablet 30 milliGRAM(s) Oral daily  predniSONE   Tablet   Oral   calcium carbonate 500 mG (Tums) Chewable 2 Tablet(s) Chew daily  cholecalciferol 2000 Unit(s) Oral daily  gabapentin 100 milliGRAM(s) Oral two times a day  gabapentin 200 milliGRAM(s) Oral at bedtime    MEDICATIONS  (PRN):  magnesium hydroxide Suspension 30 milliLiter(s) Oral daily PRN Constipation  methyl salicylate 14%/menthol 6% Topical Ointment 1 Application(s) Topical four times a day PRN pain  hydrocortisone 2.5% Rectal Cream 1 Application(s) Rectal daily PRN pain  lidocaine/prilocaine Cream 1 Application(s) Topical every 4 hours PRN pain  acetaminophen   Tablet 650 milliGRAM(s) Oral every 6 hours PRN For Temp greater than 38 C (100.4 F)  traMADol 25 milliGRAM(s) Oral every 4 hours PRN Moderate Pain (4 - 6)  acetaminophen   Tablet. 650 milliGRAM(s) Oral every 6 hours PRN Mild Pain to moderate pain        Urinalysis Basic - ( 26 Sep 2017 00:22 )    Color: Yellow / Appearance: Clear / S.005 / pH: x  Gluc: x / Ketone: Negative  / Bili: Negative / Urobili: Negative mg/dL   Blood: x / Protein: Negative mg/dL / Nitrite: Negative   Leuk Esterase: Small / RBC: 0-2 /HPF / WBC 3-5   Sq Epi: x / Non Sq Epi: Few / Bacteria: x      RADIOLOGY & ADDITIONAL TESTS:    < from: Xray Chest 1 View AP/PA. (17 @ 22:50) >     EXAM:  CHEST SINGLE VIEW FRONTAL                          PROCEDURE DATE:  2017          INTERPRETATION:  HISTORY: Fever.  TECHNIQUE: Portable frontal view of the chest, 1 view.  COMPARISON: none.  FINDINGS:     HEART: normal in size   LUNGS: There is an infiltrate in the left lower lobe. The right lung is   clear. Lungs are hyperlucent compatible with COPD    OSSEOUS STRUCTURES:: degenerative changes. The bones are osteopenic with   a bell shaped thorax, compatible with osteomalacia.        Calcifications in the patient's left upper quadrant may be within the   spleen or related to vascular structures.    IMPRESSION:   Left lower lobe infiltrate.    < end of copied text >  < from: NM Bone Imaging Total (17 @ 12:04) >     EXAM:  NM BONE IMG WHOLE BODY                          PROCEDURE DATE:  2017          INTERPRETATION:  RADIOPHARMACEUTICAL: 21.5 mCi Tc-99m HDP, I.V.     CLINICAL INFORMATION: 87-year-old female with bone pain for 2 weeks.   Evaluate for bony abnormality.    TECHNIQUE:  Whole-body and static images of the pelvis were obtained in   the anterior and posterior projections approximately 2-3 hours following   administration of radiotracer. Static images of the chest were also   obtained in all 4 oblique projections.    COMPARISON: No prior bone scan available for comparison.     FINDINGS: There are several round foci of increased radiotracer   accumulation of similar intensity in 2 contiguous left mid ribs,   approximately fifth and sixthribs anterolaterally. Round foci of   minimally increased activity are also noted in a few right anterior ribs.   These are probably due to trauma. There is diffuse, asymmetrically   increased activity in the right shoulder which may be due to trauma  and/or degenerative change. Degenerative changes are also noted in the   left shoulder, left sternoclavicular joint region, bilateral knees and   bilateral ankles/feet. There is physiologic distribution of radiotracer   in the remainder of the visualized osseous structures. Evaluation of the   pelvic bones is limited due to physiologic activity in the distended   urinary bladder.    Both kidneys are visualized.    IMPRESSION: Bone scan demonstrates:    1. Multiple round bilateral rib foci, which are probably related to   trauma. Please correlate clinically.  2. Diffuse asymmetrically increased right shoulder activity, which may be   related to trauma and/or degenerative change. Please correlate clinically   and with x-ray/CT.  3. Degenerative changes in the left shoulder, left sternoclavicular joint   region, bilateral knees and ankles/feet.  4. Limited evaluation of pelvic bones due to distended urinary bladder.      ANTOLIN PERAZA M.D., NUCLEAR MEDICINE ATTENDING  This document has been electronically signed. Sep 21 2017 12:36PM    < end of copied text >        REVIEW OF SYSTEMS:    CONSTITUTIONAL: No fever, weight loss, or fatigue  EYES: No eye pain, visual disturbances, or discharge  ENMT:  No difficulty hearing, tinnitus, vertigo; No sinus or throat pain  NECK: No pain or stiffness  RESPIRATORY: No cough, wheezing, chills or hemoptysis; No shortness of breath  CARDIOVASCULAR: No chest pain, palpitations, dizziness, or leg swelling  GASTROINTESTINAL: No abdominal or epigastric pain. No nausea, vomiting, or hematemesis; No diarrhea or constipation. No melena or hematochezia.  GENITOURINARY: No dysuria, frequency, hematuria, or incontinence  NEUROLOGICAL: No headaches, memory loss, loss of strength, numbness, or tremors  SKIN: No itching, burning, rashes, or lesions   LYMPH NODES: No enlarged glands  ENDOCRINE: No heat or cold intolerance; No hair loss  MUSCULOSKELETAL: b/l hand pain - resolving   PSYCHIATRIC: No depression, anxiety, mood swings, or difficulty sleeping  HEME/LYMPH: No easy bruising, or bleeding gums  ALLERGY AND IMMUNOLOGIC: No hives or eczema    Vital Signs Last 24 Hrs  T(C): 36.7 (27 Sep 2017 12:00), Max: 37.4 (26 Sep 2017 20:25)  T(F): 98 (27 Sep 2017 12:00), Max: 99.3 (26 Sep 2017 20:25)  HR: 95 (27 Sep 2017 12:00) (91 - 99)  BP: 103/65 (27 Sep 2017 12:00) (103/65 - 138/71)  BP(mean): --  RR: 18 (27 Sep 2017 12:00) (16 - 18)  SpO2: 96% (27 Sep 2017 12:00) (94% - 96%)  PHYSICAL EXAM:    GENERAL: NAD, well-groomed, well-developed  HEAD:  Atraumatic, Normocephalic  EYES: EOMI, PERRLA, conjunctiva and sclera clear  NECK: Supple, No JVD, Normal thyroid  NERVOUS SYSTEM:  Alert & Oriented X3, no focal deficit  CHEST/LUNG: CTA b/l ,  no  rales, rhonchi, wheezing, or rubs  HEART: Regular rate and rhythm; No murmurs, rubs, or gallops  ABDOMEN: Soft, Nontender, Nondistended; Bowel sounds present  EXTREMITIES:  2+ Peripheral Pulses, No clubbing, cyanosis, or edema , mild tenderness of both hands   LYMPH: No lymphadenopathy noted  SKIN: No rashes or lesions

## 2017-09-28 LAB
ANION GAP SERPL CALC-SCNC: 12 MMOL/L — SIGNIFICANT CHANGE UP (ref 5–17)
BUN SERPL-MCNC: 21 MG/DL — HIGH (ref 8–20)
CALCIUM SERPL-MCNC: 8.3 MG/DL — LOW (ref 8.6–10.2)
CHLORIDE SERPL-SCNC: 90 MMOL/L — LOW (ref 98–107)
CO2 SERPL-SCNC: 27 MMOL/L — SIGNIFICANT CHANGE UP (ref 22–29)
CREAT SERPL-MCNC: 0.36 MG/DL — LOW (ref 0.5–1.3)
GLUCOSE SERPL-MCNC: 108 MG/DL — SIGNIFICANT CHANGE UP (ref 70–115)
HCT VFR BLD CALC: 25.3 % — LOW (ref 37–47)
HGB BLD-MCNC: 8.4 G/DL — LOW (ref 12–16)
MCHC RBC-ENTMCNC: 30.1 PG — SIGNIFICANT CHANGE UP (ref 27–31)
MCHC RBC-ENTMCNC: 33.2 G/DL — SIGNIFICANT CHANGE UP (ref 32–36)
MCV RBC AUTO: 90.7 FL — SIGNIFICANT CHANGE UP (ref 81–99)
OSMOLALITY SERPL: 285 MOS/KG — SIGNIFICANT CHANGE UP (ref 275–300)
PLATELET # BLD AUTO: 241 K/UL — SIGNIFICANT CHANGE UP (ref 150–400)
POTASSIUM SERPL-MCNC: 4.1 MMOL/L — SIGNIFICANT CHANGE UP (ref 3.5–5.3)
POTASSIUM SERPL-SCNC: 4.1 MMOL/L — SIGNIFICANT CHANGE UP (ref 3.5–5.3)
RBC # BLD: 2.79 M/UL — LOW (ref 4.4–5.2)
RBC # FLD: 17.9 % — HIGH (ref 11–15.6)
SODIUM SERPL-SCNC: 129 MMOL/L — LOW (ref 135–145)
WBC # BLD: 7.7 K/UL — SIGNIFICANT CHANGE UP (ref 4.8–10.8)
WBC # FLD AUTO: 7.7 K/UL — SIGNIFICANT CHANGE UP (ref 4.8–10.8)

## 2017-09-28 PROCEDURE — 99233 SBSQ HOSP IP/OBS HIGH 50: CPT

## 2017-09-28 PROCEDURE — 99232 SBSQ HOSP IP/OBS MODERATE 35: CPT

## 2017-09-28 PROCEDURE — 71010: CPT | Mod: 26

## 2017-09-28 RX ORDER — TUBERCULIN PURIFIED PROTEIN DERIVATIVE 5 [IU]/.1ML
5 INJECTION, SOLUTION INTRADERMAL ONCE
Qty: 0 | Refills: 0 | Status: COMPLETED | OUTPATIENT
Start: 2017-09-29 | End: 2017-09-29

## 2017-09-28 RX ADMIN — HEPARIN SODIUM 5000 UNIT(S): 5000 INJECTION INTRAVENOUS; SUBCUTANEOUS at 05:10

## 2017-09-28 RX ADMIN — Medication 650 MILLIGRAM(S): at 09:20

## 2017-09-28 RX ADMIN — GABAPENTIN 200 MILLIGRAM(S): 400 CAPSULE ORAL at 21:47

## 2017-09-28 RX ADMIN — Medication 650 MILLIGRAM(S): at 08:47

## 2017-09-28 RX ADMIN — TRAMADOL HYDROCHLORIDE 25 MILLIGRAM(S): 50 TABLET ORAL at 13:18

## 2017-09-28 RX ADMIN — PANTOPRAZOLE SODIUM 40 MILLIGRAM(S): 20 TABLET, DELAYED RELEASE ORAL at 05:10

## 2017-09-28 RX ADMIN — Medication 1 TABLET(S): at 17:44

## 2017-09-28 RX ADMIN — Medication 2000 UNIT(S): at 11:50

## 2017-09-28 RX ADMIN — GABAPENTIN 100 MILLIGRAM(S): 400 CAPSULE ORAL at 17:44

## 2017-09-28 RX ADMIN — HEPARIN SODIUM 5000 UNIT(S): 5000 INJECTION INTRAVENOUS; SUBCUTANEOUS at 17:45

## 2017-09-28 RX ADMIN — Medication 500 MILLIGRAM(S): at 11:50

## 2017-09-28 RX ADMIN — Medication 2 TABLET(S): at 11:50

## 2017-09-28 RX ADMIN — Medication 30 MILLIGRAM(S): at 05:10

## 2017-09-28 RX ADMIN — TRAMADOL HYDROCHLORIDE 25 MILLIGRAM(S): 50 TABLET ORAL at 13:50

## 2017-09-28 RX ADMIN — GABAPENTIN 100 MILLIGRAM(S): 400 CAPSULE ORAL at 05:10

## 2017-09-28 RX ADMIN — ESCITALOPRAM OXALATE 10 MILLIGRAM(S): 10 TABLET, FILM COATED ORAL at 11:50

## 2017-09-28 RX ADMIN — Medication 1 TABLET(S): at 08:47

## 2017-09-28 RX ADMIN — Medication 75 MICROGRAM(S): at 05:10

## 2017-09-28 RX ADMIN — Medication 1 TABLET(S): at 11:49

## 2017-09-28 RX ADMIN — LISINOPRIL 5 MILLIGRAM(S): 2.5 TABLET ORAL at 05:10

## 2017-09-28 NOTE — PROGRESS NOTE ADULT - SUBJECTIVE AND OBJECTIVE BOX
HPI:  Pt is an 87 y.o. F w/ PMHx Reji 2 thrombocytosis and Myelofibrosis presented to [facility] on 09/10/17 with diffuse joint pains.  Pt reports pain started in her neck and shoulders the week before admission.  Pt seen by outpt orthopedics, and started on Medrol Dose pack.  Symptoms initially improved, but then pt began to experience pain and swelling in both hands and feet.  Pain worsened to the point that pt was having difficulty with walking.  On admission, Right hand Xray revealed periarticular changes consistent with Osteoarthritis versus CPPD disease; diffuse soft tissue; no fracture. Bilateral wrist Xray showed no fracture or dislocation.  Right ankle Xray showed soft tissue swelling, no fracture or dislocation.  Rheumatology consulted.  Serological studies performed.  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 less likely RA vs. less likely polymyalgia rheumatica. Pt was started on prednisone 40mg daily. As per Rheum will taper steroids by 10mg every 5 days to 10 mg daily, will maintain until follow up as outpatient.  Pt seen by hematology, transfused on 09/06 for symptomatic anemia.  Hospital course complicated by elevated BP, pt started by lisinopril.  Pt also had marked extremities swelling.  Doppler for both UEs and LEs performed, which were negative for DVTs.    INTERVAL SUBJECTIVE & REVIEW OF SYMPTOMS:  Patient denies any significant pain or swelling.  Pt no longer c/o diarrhea.  +BM formed today.  Denies fatigue, SOB, bladder complaints.      REVIEW OF SYSTEMS  [   ] Constitutional WNL  [ x  ] Cardio WNL  [  x ] Resp WNL  [  x ] GI WNL  [  x ]  WNL  [   ] Heme WNL  [   ] Endo WNL  [ x  ] Skin WNL  [   ] MSK WNL  [  x ] Neuro WNL  [  x ] Cognitive WNL  [   ] Psych WNL    Vital Signs Last 24 Hrs  T(C): 36.3 (27 Sep 2017 04:50), Max: 37.4 (26 Sep 2017 20:25)  T(F): 97.4 (27 Sep 2017 04:50), Max: 99.3 (26 Sep 2017 20:25)  HR: 91 (27 Sep 2017 04:50) (80 - 99)  BP: 123/69 (27 Sep 2017 04:50) (114/74 - 138/71)  BP(mean): --  RR: 16 (27 Sep 2017 04:50) (16 - 18)  SpO2: 95% (27 Sep 2017 04:50) (94% - 96%)    PHYSICAL EXAM  General:  NAD  Cardio: RRR  Resp: CTAB  Abdomen: soft, NTND  Extrem: + trace edema digits and right knee, no calf tenderness. No erythema  Motor 4/5     Functional status:  Bed mobility: Supervision  Transfers: Min assist  Gait: Pt ambulated 60'RW supervision arms length  ADLs: UE dressing min assist, LE dressing supervision, toileting total assist    RECENT LABS:                        8.4    7.7   )-----------( 241      ( 28 Sep 2017 06:22 )             25.3                           9.0    8.3   )-----------( 255      ( 25 Sep 2017 07:11 )             27.2     09-28    129<L>  |  90<L>  |  21.0<H>  ----------------------------<  108  4.1   |  27.0  |  0.36<L>    Ca    8.3<L>      28 Sep 2017 06:22           09-25    131<L>  |  92<L>  |  18.0  ----------------------------<  87  4.3   |  27.0  |  0.34<L>    Ca    8.5<L>      25 Sep 2017 07:11    TPro  6.4<L>  /  Alb  2.6<L>  /  TBili  0.7  /  DBili  x   /  AST  16  /  ALT  27  /  AlkPhos  240<H>  09-25     MEDICATIONS  (STANDING):  heparin  Injectable 5000 Unit(s) SubCutaneous every 12 hours  docusate sodium 100 milliGRAM(s) Oral three times a day  polyethylene glycol 3350 17 Gram(s) Oral at bedtime  pantoprazole    Tablet 40 milliGRAM(s) Oral before breakfast  lisinopril 5 milliGRAM(s) Oral daily  levothyroxine 75 MICROGram(s) Oral daily  gabapentin 100 milliGRAM(s) Oral three times a day  multivitamin 1 Tablet(s) Oral daily  ascorbic acid 500 milliGRAM(s) Oral daily  traMADol 25 milliGRAM(s) Oral <User Schedule>  escitalopram 10 milliGRAM(s) Oral daily  fentaNYL   Patch  12 MICROgram(s)/Hr 1 Patch Transdermal every 72 hours  methylPREDNISolone sodium succinate Injectable 20 milliGRAM(s) IV Push every 12 hours    MEDICATIONS  (PRN):  magnesium hydroxide Suspension 30 milliLiter(s) Oral daily PRN Constipation  methyl salicylate 14%/menthol 6% Topical Ointment 1 Application(s) Topical four times a day PRN pain  hydrocortisone 2.5% Rectal Cream 1 Application(s) Rectal daily PRN pain  traMADol 25 milliGRAM(s) Oral every 4 hours PRN Moderate Pain (4 - 6)  lidocaine/prilocaine Cream 1 Application(s) Topical every 4 hours PRN pain  acetaminophen   Tablet 650 milliGRAM(s) Oral every 6 hours PRN For Temp greater than 38 C (100.4 F)        A/P:  Pt is an 87 year old female with functional deficits from 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  Improved functional status noted    Pain:  Bone scan  performed 9/21/17:  Multi bilateral rib foci, right shoulder activity possibly related to trauma, degenerative changes left shoulder, left SC joint, bilateral knees, ankle and feet.  Contacted rheum and heme to eval  Added Fentanyl patch 12mcg 9/21 with improvement.    Increased solumedrol to 20 bid due to persistent pain limited progress in rehab; mildly improved-decreased to 30mg daily beginning 9/26.  CRP and ESR improved to 6.2 and 62 respectively. Appreciate rheum f/u; changed to prednisone 30mg/d x5 days then taper 5mg q5 days as tolerated.  Calcium 1000mg/d and vitamin D 2000 IU daily prescribed.  Cont gabapentin as tolerated.  Would increase evening dose gabapentin 200mg QHS and attempt to 200mg TID as tolerated.  Pt much improved overall      h/o Myelofibrosis/MDS with thrombocytosis: Hem onc fu as needed. 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 recent bone marrow showed 5% blasts with moderate reticulin fibrosis. Now with myelofibrosis awaiting to begin Jakafi outpatient with Dr. Montes.   HB 7.1.  Iron WNL.  Pt transfused 1U PRBCs 9/19/17; developed temp of 100.5  Discussed above with Dr Montes; pre-medicate with Tylenol if another transfusion warranted  Patient transfused 9/23 with improvement in H/H    DVT prophylaxis: on heparin sq    Elevated blood pressure without h/o HTN: Improved on lisinopril    Hypothyroidism: on synthroid    Hyponatremia:  Monitor BMP.      GI prophylaxis: on protonix    Diet: regular with thin.  Ensure TID due to decreased appetite and protein calorie malnutrition    Bowel program: Toilet schedule prn  D/Cd miralax daily and changed colace to prn due to loose BM 9/26    Depression:  Added Lexapro 10mg daily 9/20 with improved mood    D/C planning to assisted living facility 10/3

## 2017-09-28 NOTE — PROGRESS NOTE ADULT - ASSESSMENT
87y F with acute onset b/l hand synovitis, tenosynovitis and swelling in the absence of positive serologies (RF, Lyme, Parvovirus, hepatitis) seemingly c/w PMR subset RS3PE initially improved on steroids with worsening last week requiring return to IV steroid.  Now currently improving and more active with PT.  Likely needs slower steroid taper.  RS3PE can be seen in some cases of hematologic malignancy.      87 year old female with PMHx  Reji 2+ thrombocytosis, dating back to 2013. She had been on Hydrea since Olya 10, 2013. Most recently she was found to be pancytopenic and the Hydrea was held as of July 12, 2017. NOw presenting with multiple joint pain and swelling. diagnosed with Seronegative Symmetrical Synovitis . Pt was started on prednisone 40mg daily. BP was elevated , patient started on Lisinopril. Patient was transferred to acute rehab. SHe still continues to have joint pain and swelling. received 2units PRBCs in [facility] for anemia.     1) Polyarthralgia- rheumatology noted -   b/l hand synovitis, tenosynovitis and swelling in the absence of positive serologies (RF, Lyme, Parvovirus, hepatitis) seemingly c/w PMR subset RS3PE initially improved on steroids with worsening last week requiring return to IV steroid.  Now currently improving and more active with PT.  Likely needs slower steroid taper.  RS3PE can be seen in some cases of hematologic malignancy. Continue prednisone as per rheumatology , follow up as out patient - continue Protonix / vit D / calcium    2) Myelofibrosis  - 1unit PRBC received, continue to monitor H&H  - Patient is waiting to be started on Jakafi    3) Hyponatremia - will check UA / urine osm/ lytes , serum osm / bmp in am     4) Hypothyroidism  - Levothyroxine     5) Elevated BP without history of HTN- likely secondary to steroids   - BP improved with Lisinopril    6) Anemia - chronic -s/p PRBC , follow CBC     7) Fever - 2/2 post transfusion - resolved. w/u was negative for any infection. Will observe .

## 2017-09-28 NOTE — PROGRESS NOTE ADULT - SUBJECTIVE AND OBJECTIVE BOX
Patient see and examined . NAD . Pain of b/l foot resolved . Pain in both hands still present but much better , no other complaints .  CC: Pain and swelling in hands  better controlled , feet pain resolved     HPI: 87 year old female with PMHx  Reji 2+ thrombocytosis, dating back to 2013. She had been on Hydrea since Olya 10, 2013. Most recently she was found to be pancytopenic and the Hydrea was held as of July 12, 2017. She was in her USOH until about 1 week prior to admission, when she began to experience pain in her neck and shoulders.  She saw ortho, who started her on a Medrol Dose pack, upon which her symptoms resolved.  However, upon completing the pack, she began to experience pain and swelling in her right hand.  She then began to experience similar symptoms in her other hand and in both feet.  The pain continued to worsen, so that she became unable to ambulate on her own.  (+)AM stiffness, lasting several hours. Patient came to the ER. 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 Pt was started on prednisone 40mg daily. Followed  by Rheum and Heme/Onc, Slowly progressing. As per Rheum will taper steroids to 10 mg daily, will maintain until follow up as outpatient.  Seen by PT and recommend rehab. BP was elevated , patient started on Lisinopril. Patient was transferred to acute rehab.    PAST MEDICAL & SURGICAL HISTORY:  Myelofibrosis  Thrombocytosis: REJI 2 thrombocytosis  Hypothyroidism (acquired)  Myelodysplastic syndrome  No significant past surgical history      MEDICATIONS  (STANDING):  heparin  Injectable 5000 Unit(s) SubCutaneous every 12 hours  pantoprazole    Tablet 40 milliGRAM(s) Oral before breakfast  lisinopril 5 milliGRAM(s) Oral daily  levothyroxine 75 MICROGram(s) Oral daily  multivitamin 1 Tablet(s) Oral daily  ascorbic acid 500 milliGRAM(s) Oral daily  escitalopram 10 milliGRAM(s) Oral daily  lactobacillus acidophilus 1 Tablet(s) Oral two times a day with meals  predniSONE   Tablet 30 milliGRAM(s) Oral daily  predniSONE   Tablet   Oral   calcium carbonate 500 mG (Tums) Chewable 2 Tablet(s) Chew daily  cholecalciferol 2000 Unit(s) Oral daily  gabapentin 100 milliGRAM(s) Oral two times a day  gabapentin 200 milliGRAM(s) Oral at bedtime    MEDICATIONS  (PRN):  magnesium hydroxide Suspension 30 milliLiter(s) Oral daily PRN Constipation  methyl salicylate 14%/menthol 6% Topical Ointment 1 Application(s) Topical four times a day PRN pain  hydrocortisone 2.5% Rectal Cream 1 Application(s) Rectal daily PRN pain  lidocaine/prilocaine Cream 1 Application(s) Topical every 4 hours PRN pain  acetaminophen   Tablet 650 milliGRAM(s) Oral every 6 hours PRN For Temp greater than 38 C (100.4 F)  traMADol 25 milliGRAM(s) Oral every 4 hours PRN Moderate Pain (4 - 6)  acetaminophen   Tablet. 650 milliGRAM(s) Oral every 6 hours PRN Mild Pain to moderate pain      LABS:                          8.4    7.7   )-----------( 241      ( 28 Sep 2017 06:22 )             25.3     09-28    129<L>  |  90<L>  |  21.0<H>  ----------------------------<  108  4.1   |  27.0  |  0.36<L>    Ca    8.3<L>      28 Sep 2017 06:22            RADIOLOGY & ADDITIONAL TESTS:      REVIEW OF SYSTEMS:    CONSTITUTIONAL: No fever, weight loss, or fatigue  EYES: No eye pain, visual disturbances, or discharge  ENMT:  No difficulty hearing, tinnitus, vertigo; No sinus or throat pain  NECK: No pain or stiffness  RESPIRATORY: No cough, wheezing, chills or hemoptysis; No shortness of breath  CARDIOVASCULAR: No chest pain, palpitations, dizziness, or leg swelling  GASTROINTESTINAL: No abdominal or epigastric pain. No nausea, vomiting, or hematemesis; No diarrhea or constipation. No melena or hematochezia.  GENITOURINARY: No dysuria, frequency, hematuria, or incontinence  NEUROLOGICAL: No headaches, memory loss, loss of strength, numbness, or tremors  SKIN: No itching, burning, rashes, or lesions   LYMPH NODES: No enlarged glands  ENDOCRINE: No heat or cold intolerance; No hair loss  MUSCULOSKELETAL:   PSYCHIATRIC: No depression, anxiety, mood swings, or difficulty sleeping  HEME/LYMPH: No easy bruising, or bleeding gums  ALLERGY AND IMMUNOLOGIC: No hives or eczema    Vital Signs Last 24 Hrs  T(C): 36.1 (28 Sep 2017 08:49), Max: 36.9 (28 Sep 2017 05:19)  T(F): 97 (28 Sep 2017 08:49), Max: 98.5 (28 Sep 2017 05:19)  HR: 103 (28 Sep 2017 08:49) (84 - 103)  BP: 106/- (28 Sep 2017 08:49) (99/59 - 106/61)  BP(mean): --  RR: 18 (28 Sep 2017 08:49) (18 - 18)  SpO2: 97% (28 Sep 2017 08:49) (95% - 97%)  PHYSICAL EXAM:    GENERAL: NAD, well-groomed, well-developed  HEAD:  Atraumatic, Normocephalic  EYES: EOMI, PERRLA, conjunctiva and sclera clear  NECK: Supple, No JVD, Normal thyroid  NERVOUS SYSTEM:  Alert & Oriented X3, no focal deficit  CHEST/LUNG: CTA b/l ,  no  rales, rhonchi, wheezing, or rubs  HEART: Regular rate and rhythm; No murmurs, rubs, or gallops  ABDOMEN: Soft, Nontender, Nondistended; Bowel sounds present  EXTREMITIES:  2+ Peripheral Pulses, No clubbing, cyanosis, or edema  LYMPH: No lymphadenopathy noted  SKIN: No rashes or lesions

## 2017-09-29 LAB
ANION GAP SERPL CALC-SCNC: 15 MMOL/L — SIGNIFICANT CHANGE UP (ref 5–17)
BLD GP AB SCN SERPL QL: SIGNIFICANT CHANGE UP
BUN SERPL-MCNC: 24 MG/DL — HIGH (ref 8–20)
CALCIUM SERPL-MCNC: 8.4 MG/DL — LOW (ref 8.6–10.2)
CHLORIDE SERPL-SCNC: 88 MMOL/L — LOW (ref 98–107)
CO2 SERPL-SCNC: 22 MMOL/L — SIGNIFICANT CHANGE UP (ref 22–29)
CREAT SERPL-MCNC: 0.41 MG/DL — LOW (ref 0.5–1.3)
GLUCOSE SERPL-MCNC: 188 MG/DL — HIGH (ref 70–115)
HCT VFR BLD CALC: 25.7 % — LOW (ref 37–47)
HGB BLD-MCNC: 8.5 G/DL — LOW (ref 12–16)
MCHC RBC-ENTMCNC: 30.1 PG — SIGNIFICANT CHANGE UP (ref 27–31)
MCHC RBC-ENTMCNC: 33.1 G/DL — SIGNIFICANT CHANGE UP (ref 32–36)
MCV RBC AUTO: 91.1 FL — SIGNIFICANT CHANGE UP (ref 81–99)
PLATELET # BLD AUTO: 255 K/UL — SIGNIFICANT CHANGE UP (ref 150–400)
POTASSIUM SERPL-MCNC: 3.9 MMOL/L — SIGNIFICANT CHANGE UP (ref 3.5–5.3)
POTASSIUM SERPL-SCNC: 3.9 MMOL/L — SIGNIFICANT CHANGE UP (ref 3.5–5.3)
RBC # BLD: 2.82 M/UL — LOW (ref 4.4–5.2)
RBC # FLD: 17.9 % — HIGH (ref 11–15.6)
SODIUM SERPL-SCNC: 125 MMOL/L — LOW (ref 135–145)
TYPE + AB SCN PNL BLD: SIGNIFICANT CHANGE UP
WBC # BLD: 8.5 K/UL — SIGNIFICANT CHANGE UP (ref 4.8–10.8)
WBC # FLD AUTO: 8.5 K/UL — SIGNIFICANT CHANGE UP (ref 4.8–10.8)

## 2017-09-29 PROCEDURE — 99232 SBSQ HOSP IP/OBS MODERATE 35: CPT

## 2017-09-29 PROCEDURE — 99233 SBSQ HOSP IP/OBS HIGH 50: CPT

## 2017-09-29 PROCEDURE — 71250 CT THORAX DX C-: CPT | Mod: 26

## 2017-09-29 RX ORDER — SODIUM CHLORIDE 9 MG/ML
1 INJECTION INTRAMUSCULAR; INTRAVENOUS; SUBCUTANEOUS THREE TIMES A DAY
Qty: 0 | Refills: 0 | Status: DISCONTINUED | OUTPATIENT
Start: 2017-09-29 | End: 2017-09-30

## 2017-09-29 RX ADMIN — Medication 1 TABLET(S): at 11:24

## 2017-09-29 RX ADMIN — LISINOPRIL 5 MILLIGRAM(S): 2.5 TABLET ORAL at 05:46

## 2017-09-29 RX ADMIN — Medication 2 TABLET(S): at 11:30

## 2017-09-29 RX ADMIN — ESCITALOPRAM OXALATE 10 MILLIGRAM(S): 10 TABLET, FILM COATED ORAL at 11:23

## 2017-09-29 RX ADMIN — Medication 1 TABLET(S): at 08:03

## 2017-09-29 RX ADMIN — SODIUM CHLORIDE 1 GRAM(S): 9 INJECTION INTRAMUSCULAR; INTRAVENOUS; SUBCUTANEOUS at 21:54

## 2017-09-29 RX ADMIN — Medication 2000 UNIT(S): at 11:24

## 2017-09-29 RX ADMIN — HEPARIN SODIUM 5000 UNIT(S): 5000 INJECTION INTRAVENOUS; SUBCUTANEOUS at 05:47

## 2017-09-29 RX ADMIN — GABAPENTIN 100 MILLIGRAM(S): 400 CAPSULE ORAL at 05:46

## 2017-09-29 RX ADMIN — Medication 30 MILLIGRAM(S): at 05:47

## 2017-09-29 RX ADMIN — PANTOPRAZOLE SODIUM 40 MILLIGRAM(S): 20 TABLET, DELAYED RELEASE ORAL at 05:46

## 2017-09-29 RX ADMIN — Medication 75 MICROGRAM(S): at 05:47

## 2017-09-29 RX ADMIN — TUBERCULIN PURIFIED PROTEIN DERIVATIVE 5 UNIT(S): 5 INJECTION, SOLUTION INTRADERMAL at 11:20

## 2017-09-29 RX ADMIN — Medication 500 MILLIGRAM(S): at 11:23

## 2017-09-29 RX ADMIN — HEPARIN SODIUM 5000 UNIT(S): 5000 INJECTION INTRAVENOUS; SUBCUTANEOUS at 17:36

## 2017-09-29 NOTE — CONSULT NOTE ADULT - ASSESSMENT
Hyponatremia: r/o SIADH vs other  - oral fluid restrict  - cont NaCl for now  - check urine studies  - check TSH; cortisol  - monitor labs

## 2017-09-29 NOTE — CONSULT NOTE ADULT - SUBJECTIVE AND OBJECTIVE BOX
Patient is a 87y old  Female who presents with a chief complaint of hyponatremia    HPI: The pt is an 88 yo W female PMH myelofibrosis, hypothyroidism, polyarthritis; we are called regarding hyponatremia.  Pt denies sob, cp, n/v/d.  No significant pain noted      PAST MEDICAL & SURGICAL HISTORY:  Myelofibrosis  Thrombocytosis: ANNMARIE 2 thrombocytosis  Hypothyroidism (acquired)  Myelodysplastic syndrome  No significant past surgical history      FAMILY HISTORY:  No pertinent family history in first degree relatives      Social History:    MEDICATIONS  (STANDING):  heparin  Injectable 5000 Unit(s) SubCutaneous every 12 hours  pantoprazole    Tablet 40 milliGRAM(s) Oral before breakfast  lisinopril 5 milliGRAM(s) Oral daily  levothyroxine 75 MICROGram(s) Oral daily  multivitamin 1 Tablet(s) Oral daily  ascorbic acid 500 milliGRAM(s) Oral daily  escitalopram 10 milliGRAM(s) Oral daily  predniSONE   Tablet 30 milliGRAM(s) Oral daily  predniSONE   Tablet   Oral   calcium carbonate 500 mG (Tums) Chewable 2 Tablet(s) Chew daily  cholecalciferol 2000 Unit(s) Oral daily  sodium chloride 1 Gram(s) Oral three times a day    MEDICATIONS  (PRN):  magnesium hydroxide Suspension 30 milliLiter(s) Oral daily PRN Constipation  hydrocortisone 2.5% Rectal Cream 1 Application(s) Rectal daily PRN pain  acetaminophen   Tablet 650 milliGRAM(s) Oral every 6 hours PRN For Temp greater than 38 C (100.4 F)  traMADol 25 milliGRAM(s) Oral every 4 hours PRN Moderate Pain (4 - 6)  acetaminophen   Tablet. 650 milliGRAM(s) Oral every 6 hours PRN Mild Pain to moderate pain      Allergies    No Known Allergies    Intolerances        REVIEW OF SYSTEMS:    CONSTITUTIONAL: + fatigue  EYES: No eye pain, visual disturbances, or discharge  ENMT:  No difficulty hearing, tinnitus, vertigo; No sinus or throat pain  NECK: No pain or stiffness  RESPIRATORY: No cough, wheezing, chills or hemoptysis; No shortness of breath  CARDIOVASCULAR: No chest pain, palpitations, dizziness, or leg swelling  GASTROINTESTINAL: No abdominal or epigastric pain. No nausea, vomiting, or hematemesis; No diarrhea or constipation. No melena or hematochezia.  GENITOURINARY: No dysuria, frequency, hematuria, or incontinence  NEUROLOGICAL: No headaches, memory loss, loss of strength, numbness, or tremors  SKIN: No itching, burning, rashes, or lesions   LYMPH NODES: No enlarged glands  MUSCULOSKELETAL: + joint pain and swelling; No muscle, back, or extremity pain    ALLERY AND IMMUNOLOGIC: No hives or eczema      Vital Signs Last 24 Hrs  T(C): 37.3 (29 Sep 2017 11:16), Max: 37.3 (29 Sep 2017 11:16)  T(F): 99.2 (29 Sep 2017 11:16), Max: 99.2 (29 Sep 2017 11:16)  HR: 93 (29 Sep 2017 11:16) (86 - 96)  BP: 111/66 (29 Sep 2017 11:16) (96/56 - 122/66)  BP(mean): --  RR: 18 (29 Sep 2017 11:16) (16 - 18)  SpO2: 97% (29 Sep 2017 11:16) (97% - 97%)    PHYSICAL EXAM:    GENERAL: NAD, + generalized weakness  HEAD:  Atraumatic, Normocephalic  EYES: EOMI, PERRLA, conjunctiva and sclera clear  ENMT: No tonsillar erythema, exudates, or enlargement; Moist mucous membranes, Good dentition, No lesions  NECK: Supple, No JVD, Normal thyroid  NERVOUS SYSTEM:  Alert & Oriented X3, non focal  CHEST/LUNG: Clear to percussion bilaterally; No rales, rhonchi, wheezing, or rubs  HEART: Regular rate and rhythm; No murmurs, rubs, or gallops  ABDOMEN: Soft, Nontender, Nondistended; Bowel sounds present  EXTREMITIES:  2+ Peripheral Pulses, + ankle edema  SKIN: No rashes or lesions      LABS:                        8.5    8.5   )-----------( 255      ( 29 Sep 2017 13:57 )             25.7     09-29    125<L>  |  88<L>  |  24.0<H>  ----------------------------<  188<H>  3.9   |  22.0  |  0.41<L>    Ca    8.4<L>      29 Sep 2017 13:57              RADIOLOGY & ADDITIONAL TESTS:  < from: CT Chest No Cont (09.29.17 @ 09:50) >   EXAM:  CT CHEST                          PROCEDURE DATE:  09/29/2017          INTERPRETATION:      CT tomographic sections of the chest were performed from the thoracic   inlet to the upper abdomen. Axial sections were performed followed by   reformatted parasagittal and coronal MIP reconstructions. No intravenous   contrast.      History:  Dyspnea    Comparisons: Chest x-ray dated 9/28/2017    Findings: Small bulla left upper lobe. Coarse linear infiltrates are   present in the lower lobes. Atelectasis left lower lobe. No pleural   effusions. The pulmonary vasculature and aorta are normal in caliber and   contour. The heart size is normal.     There is no evidence of   lymphadenopathy in the hilum, mediastinum or subcarinal area. The axilla   appears normal.    Bone window examination is normal for age.    Limited views of the upper abdomen demonstrate a normal size liver  .   Spleen demonstrates a lobulated calcified lesion measuring 3.8 x 3.3 x   2.7 cm. The adrenal glands are unremarkable.    Impression: Atelectasis left lower lobe. Coarse linear infiltrates in   lower lobes of both the right and left lungs. No significant pleural   fluid. Bulla left upper lobe    Benign-appearing lobulated calcified splenic lesion measuring 3.8 cm    < end of copied text >

## 2017-09-29 NOTE — PROGRESS NOTE ADULT - SUBJECTIVE AND OBJECTIVE BOX
KEVIN RIVERAJORGITO    [id number]    87y      Female    CC: Pain and swelling in hands  better controlled , feet pain resolved     HPI: 87 year old female with PMHx  Reji 2+ thrombocytosis, dating back to 2013. She had been on Hydrea since Olya 10, 2013. Most recently she was found to be pancytopenic and the Hydrea was held as of July 12, 2017. She was in her USOH until about 1 week prior to admission, when she began to experience pain in her neck and shoulders.  She saw ortho, who started her on a Medrol Dose pack, upon which her symptoms resolved.  However, upon completing the pack, she began to experience pain and swelling in her right hand.  She then began to experience similar symptoms in her other hand and in both feet.  The pain continued to worsen, so that she became unable to ambulate on her own.  (+)AM stiffness, lasting several hours. Patient came to the ER. 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 Pt was started on prednisone 40mg daily. Followed  by Rheum and Heme/Onc, Slowly progressing. As per Rheum will taper steroids to 10 mg daily, will maintain until follow up as outpatient.  Seen by PT and recommend rehab. BP was elevated , patient started on Lisinopril. Patient was transferred to acute rehab. Needed to be restarted on iv steroids for worsening symptoms.     C/o:  watery diarrhea 1 episode - no abdominal pain.  INTERVAL HPI/OVERNIGHT EVENTS: no acute events    REVIEW OF SYSTEMS:    CONSTITUTIONAL: No fever. + fatigue  RESPIRATORY: No cough,; No shortness of breath  GASTROINTESTINAL: No abdominal or epigastric pain. No nausea, vomiting        Vital Signs Last 24 Hrs  T(C): 37.3 (29 Sep 2017 11:16), Max: 37.3 (29 Sep 2017 11:16)  T(F): 99.2 (29 Sep 2017 11:16), Max: 99.2 (29 Sep 2017 11:16)  HR: 93 (29 Sep 2017 11:16) (86 - 99)  BP: 111/66 (29 Sep 2017 11:16) (96/56 - 122/66)  BP(mean): --  RR: 18 (29 Sep 2017 11:16) (16 - 18)  SpO2: 97% (29 Sep 2017 11:16) (97% - 98%)    PHYSICAL EXAM:    GENERAL: NAD, well-groomed  HEENT: PERRL, +EOMI  NECK: soft, Supple, No JVD,   CHEST/LUNG: breathing comfortable  EXTREMITIES: , No clubbing, cyanosis, or edema  SKIN: No rashes or lesions  NEURO: AAOX3        LABS:                        8.4    7.7   )-----------( 241      ( 28 Sep 2017 06:22 )             25.3     09-28    129<L>  |  90<L>  |  21.0<H>  ----------------------------<  108  4.1   |  27.0  |  0.36<L>    Ca    8.3<L>      28 Sep 2017 06:22              MEDICATIONS  (STANDING):  heparin  Injectable 5000 Unit(s) SubCutaneous every 12 hours  pantoprazole    Tablet 40 milliGRAM(s) Oral before breakfast  lisinopril 5 milliGRAM(s) Oral daily  levothyroxine 75 MICROGram(s) Oral daily  multivitamin 1 Tablet(s) Oral daily  ascorbic acid 500 milliGRAM(s) Oral daily  escitalopram 10 milliGRAM(s) Oral daily  lactobacillus acidophilus 1 Tablet(s) Oral two times a day with meals  predniSONE   Tablet 30 milliGRAM(s) Oral daily  predniSONE   Tablet   Oral   calcium carbonate 500 mG (Tums) Chewable 2 Tablet(s) Chew daily  cholecalciferol 2000 Unit(s) Oral daily  gabapentin 100 milliGRAM(s) Oral two times a day  gabapentin 200 milliGRAM(s) Oral at bedtime    MEDICATIONS  (PRN):  magnesium hydroxide Suspension 30 milliLiter(s) Oral daily PRN Constipation  methyl salicylate 14%/menthol 6% Topical Ointment 1 Application(s) Topical four times a day PRN pain  hydrocortisone 2.5% Rectal Cream 1 Application(s) Rectal daily PRN pain  lidocaine/prilocaine Cream 1 Application(s) Topical every 4 hours PRN pain  acetaminophen   Tablet 650 milliGRAM(s) Oral every 6 hours PRN For Temp greater than 38 C (100.4 F)  traMADol 25 milliGRAM(s) Oral every 4 hours PRN Moderate Pain (4 - 6)  acetaminophen   Tablet. 650 milliGRAM(s) Oral every 6 hours PRN Mild Pain to moderate pain      RADIOLOGY & ADDITIONAL TESTS:

## 2017-09-29 NOTE — PROGRESS NOTE ADULT - SUBJECTIVE AND OBJECTIVE BOX
HPI:  Pt is an 87 y.o. F w/ PMHx Reji 2 thrombocytosis and Myelofibrosis presented to [facility] on 09/10/17 with diffuse joint pains.  Pt reports pain started in her neck and shoulders the week before admission.  Pt seen by outpt orthopedics, and started on Medrol Dose pack.  Symptoms initially improved, but then pt began to experience pain and swelling in both hands and feet.  Pain worsened to the point that pt was having difficulty with walking.  On admission, Right hand Xray revealed periarticular changes consistent with Osteoarthritis versus CPPD disease; diffuse soft tissue; no fracture. Bilateral wrist Xray showed no fracture or dislocation.  Right ankle Xray showed soft tissue swelling, no fracture or dislocation.  Rheumatology consulted.  Serological studies performed.  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 less likely RA vs. less likely polymyalgia rheumatica. Pt was started on prednisone 40mg daily. As per Rheum will taper steroids by 10mg every 5 days to 10 mg daily, will maintain until follow up as outpatient.  Pt seen by hematology, transfused on 09/06 for symptomatic anemia.  Hospital course complicated by elevated BP, pt started by lisinopril.  Pt also had marked extremities swelling.  Doppler for both UEs and LEs performed, which were negative for DVTs.    INTERVAL SUBJECTIVE & REVIEW OF SYMPTOMS:  Patient no longer c/o any significant pain or swelling.  Slept well.  Pt c/o one loose BM today.   Pt c/o fatigue.  Denies SOB, bladder complaints.      REVIEW OF SYSTEMS  [   ] Constitutional WNL  [ x  ] Cardio WNL  [  x ] Resp WNL  [  x ] GI WNL  [  x ]  WNL  [   ] Heme WNL  [   ] Endo WNL  [ x  ] Skin WNL  [   ] MSK WNL  [  x ] Neuro WNL  [  x ] Cognitive WNL  [   ] Psych WNL    Vital Signs Last 24 Hrs  T(C): 37.3 (29 Sep 2017 11:16), Max: 37.3 (29 Sep 2017 11:16)  T(F): 99.2 (29 Sep 2017 11:16), Max: 99.2 (29 Sep 2017 11:16)  HR: 93 (29 Sep 2017 11:16) (86 - 99)  BP: 111/66 (29 Sep 2017 11:16) (96/56 - 122/66)  BP(mean): --  RR: 18 (29 Sep 2017 11:16) (16 - 18)  SpO2: 97% (29 Sep 2017 11:16)     PHYSICAL EXAM  General:  NAD  Cardio: RRR  Resp: CTAB  Abdomen: soft, NTND  Extrem: + trace edema digits and right knee, no calf tenderness. No erythema  Motor 4/5     Functional status:  Bed mobility: Supervision  Transfers:  Supervision  Gait: Pt ambulated 100'RW supervision   ADLs: UE dressing min assist, LE dressing, grooming and toileting supervision    RECENT LABS:                        8.4    7.7   )-----------( 241      ( 28 Sep 2017 06:22 )             25.3                           9.0    8.3   )-----------( 255      ( 25 Sep 2017 07:11 )             27.2     09-28    129<L>  |  90<L>  |  21.0<H>  ----------------------------<  108  4.1   |  27.0  |  0.36<L>    Ca    8.3<L>      28 Sep 2017 06:22                    09-25    131<L>  |  92<L>  |  18.0  ----------------------------<  87  4.3   |  27.0  |  0.34<L>    Ca    8.5<L>      25 Sep 2017 07:11    TPro  6.4<L>  /  Alb  2.6<L>  /  TBili  0.7  /  DBili  x   /  AST  16  /  ALT  27  /  AlkPhos  240<H>  09-25     MEDICATIONS  (STANDING):  heparin  Injectable 5000 Unit(s) SubCutaneous every 12 hours  docusate sodium 100 milliGRAM(s) Oral three times a day  polyethylene glycol 3350 17 Gram(s) Oral at bedtime  pantoprazole    Tablet 40 milliGRAM(s) Oral before breakfast  lisinopril 5 milliGRAM(s) Oral daily  levothyroxine 75 MICROGram(s) Oral daily  gabapentin 100 milliGRAM(s) Oral three times a day  multivitamin 1 Tablet(s) Oral daily  ascorbic acid 500 milliGRAM(s) Oral daily  traMADol 25 milliGRAM(s) Oral <User Schedule>  escitalopram 10 milliGRAM(s) Oral daily  fentaNYL   Patch  12 MICROgram(s)/Hr 1 Patch Transdermal every 72 hours  methylPREDNISolone sodium succinate Injectable 20 milliGRAM(s) IV Push every 12 hours    MEDICATIONS  (PRN):  magnesium hydroxide Suspension 30 milliLiter(s) Oral daily PRN Constipation  methyl salicylate 14%/menthol 6% Topical Ointment 1 Application(s) Topical four times a day PRN pain  hydrocortisone 2.5% Rectal Cream 1 Application(s) Rectal daily PRN pain  traMADol 25 milliGRAM(s) Oral every 4 hours PRN Moderate Pain (4 - 6)  lidocaine/prilocaine Cream 1 Application(s) Topical every 4 hours PRN pain  acetaminophen   Tablet 650 milliGRAM(s) Oral every 6 hours PRN For Temp greater than 38 C (100.4 F)        A/P:  Pt is an 87 year old female with functional deficits from 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  Improved functional status noted    Pain:  Bone scan  performed 9/21/17:  Multi bilateral rib foci, right shoulder activity possibly related to trauma, degenerative changes left shoulder, left SC joint, bilateral knees, ankle and feet.  Contacted rheum and heme to eval  Added Fentanyl patch 12mcg 9/21 x 1 wk with improvement.    Increased solumedrol to 20 bid due to persistent pain limited progress in rehab; mildly improved-decreased to 30mg daily beginning 9/26.  CRP and ESR improved to 6.2 and 62 respectively. Appreciate rheum f/u; changed to prednisone 30mg/d x5 days then taper 5mg q5 days as tolerated.  Calcium 1000mg/d and vitamin D 2000 IU daily prescribed.  Pt much improved overall with c/o fatigue.  D/C Gabapentin due to improvement and c/o fatigue.   Cont Tramadol prn    h/o Myelofibrosis/MDS with thrombocytosis: Hem onc fu as needed. 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 recent bone marrow showed 5% blasts with moderate reticulin fibrosis. Now with myelofibrosis awaiting to begin Jakafi outpatient with Dr. Montes.   HB 7.1.  Iron WNL.  Pt transfused 1U PRBCs 9/19/17; developed temp of 100.5  Discussed above with Dr Montes; pre-medicate with Tylenol if another transfusion warranted  Patient transfused 9/23 with improvement in H/H.  Repeat H/H today due to c/o fatigue    DVT prophylaxis: on heparin sq    Elevated blood pressure without h/o HTN: Improved on lisinopril    Hypothyroidism: on synthroid    Hyponatremia:  Monitor BMP.      GI prophylaxis: on protonix    Diet: regular with thin.  Ensure TID due to decreased appetite and protein calorie malnutrition    Bowel program: Toilet schedule prn  D/Cd miralax daily and changed colace to prn due to loose BM 9/26    Depression:  Added Lexapro 10mg daily 9/20 with improved mood    D/C planning to assisted living facility 10/3.  TST placed 9/29/17 HPI:  Pt is an 87 y.o. F w/ PMHx Reji 2 thrombocytosis and Myelofibrosis presented to [facility] on 09/10/17 with diffuse joint pains.  Pt reports pain started in her neck and shoulders the week before admission.  Pt seen by outpt orthopedics, and started on Medrol Dose pack.  Symptoms initially improved, but then pt began to experience pain and swelling in both hands and feet.  Pain worsened to the point that pt was having difficulty with walking.  On admission, Right hand Xray revealed periarticular changes consistent with Osteoarthritis versus CPPD disease; diffuse soft tissue; no fracture. Bilateral wrist Xray showed no fracture or dislocation.  Right ankle Xray showed soft tissue swelling, no fracture or dislocation.  Rheumatology consulted.  Serological studies performed.  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 less likely RA vs. less likely polymyalgia rheumatica. Pt was started on prednisone 40mg daily. As per Rheum will taper steroids by 10mg every 5 days to 10 mg daily, will maintain until follow up as outpatient.  Pt seen by hematology, transfused on 09/06 for symptomatic anemia.  Hospital course complicated by elevated BP, pt started by lisinopril.  Pt also had marked extremities swelling.  Doppler for both UEs and LEs performed, which were negative for DVTs.    INTERVAL SUBJECTIVE & REVIEW OF SYMPTOMS:  Patient no longer c/o any significant pain or swelling.  Slept well.  Pt c/o one loose BM today.   Pt c/o fatigue.  Denies SOB, bladder complaints.      REVIEW OF SYSTEMS  [   ] Constitutional WNL  [ x  ] Cardio WNL  [  x ] Resp WNL  [  x ] GI WNL  [  x ]  WNL  [   ] Heme WNL  [   ] Endo WNL  [ x  ] Skin WNL  [   ] MSK WNL  [  x ] Neuro WNL  [  x ] Cognitive WNL  [   ] Psych WNL    Vital Signs Last 24 Hrs  T(C): 37.3 (29 Sep 2017 11:16), Max: 37.3 (29 Sep 2017 11:16)  T(F): 99.2 (29 Sep 2017 11:16), Max: 99.2 (29 Sep 2017 11:16)  HR: 93 (29 Sep 2017 11:16) (86 - 99)  BP: 111/66 (29 Sep 2017 11:16) (96/56 - 122/66)  BP(mean): --  RR: 18 (29 Sep 2017 11:16) (16 - 18)  SpO2: 97% (29 Sep 2017 11:16)     PHYSICAL EXAM  General:  NAD  Cardio: RRR  Resp: CTAB  Abdomen: soft, NTND  Extrem: + trace edema digits and right knee, no calf tenderness. No erythema  Motor 4/5     Functional status:  Bed mobility: Supervision  Transfers:  Supervision  Gait: Pt ambulated 100'RW supervision   ADLs: UE dressing min assist, LE dressing, grooming and toileting supervision    RECENT LABS:                        8.4    7.7   )-----------( 241      ( 28 Sep 2017 06:22 )             25.3                           9.0    8.3   )-----------( 255      ( 25 Sep 2017 07:11 )             27.2     09-28    129<L>  |  90<L>  |  21.0<H>  ----------------------------<  108  4.1   |  27.0  |  0.36<L>    Ca    8.3<L>      28 Sep 2017 06:22                    09-25    131<L>  |  92<L>  |  18.0  ----------------------------<  87  4.3   |  27.0  |  0.34<L>    Ca    8.5<L>      25 Sep 2017 07:11    TPro  6.4<L>  /  Alb  2.6<L>  /  TBili  0.7  /  DBili  x   /  AST  16  /  ALT  27  /  AlkPhos  240<H>  09-25     MEDICATIONS  (STANDING):  heparin  Injectable 5000 Unit(s) SubCutaneous every 12 hours  docusate sodium 100 milliGRAM(s) Oral three times a day  polyethylene glycol 3350 17 Gram(s) Oral at bedtime  pantoprazole    Tablet 40 milliGRAM(s) Oral before breakfast  lisinopril 5 milliGRAM(s) Oral daily  levothyroxine 75 MICROGram(s) Oral daily  gabapentin 100 milliGRAM(s) Oral three times a day  multivitamin 1 Tablet(s) Oral daily  ascorbic acid 500 milliGRAM(s) Oral daily  traMADol 25 milliGRAM(s) Oral <User Schedule>  escitalopram 10 milliGRAM(s) Oral daily  fentaNYL   Patch  12 MICROgram(s)/Hr 1 Patch Transdermal every 72 hours  methylPREDNISolone sodium succinate Injectable 20 milliGRAM(s) IV Push every 12 hours    MEDICATIONS  (PRN):  magnesium hydroxide Suspension 30 milliLiter(s) Oral daily PRN Constipation  methyl salicylate 14%/menthol 6% Topical Ointment 1 Application(s) Topical four times a day PRN pain  hydrocortisone 2.5% Rectal Cream 1 Application(s) Rectal daily PRN pain  traMADol 25 milliGRAM(s) Oral every 4 hours PRN Moderate Pain (4 - 6)  lidocaine/prilocaine Cream 1 Application(s) Topical every 4 hours PRN pain  acetaminophen   Tablet 650 milliGRAM(s) Oral every 6 hours PRN For Temp greater than 38 C (100.4 F)        A/P:  Pt is an 87 year old female with functional deficits from 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  Improved functional status noted    Pain:  Bone scan  performed 9/21/17:  Multi bilateral rib foci, right shoulder activity possibly related to trauma, degenerative changes left shoulder, left SC joint, bilateral knees, ankle and feet.  Contacted rheum and heme to eval  Added Fentanyl patch 12mcg 9/21 x 1 wk with improvement.    Increased solumedrol to 20 bid due to persistent pain limited progress in rehab; mildly improved-decreased to 30mg daily beginning 9/26.  CRP and ESR improved to 6.2 and 62 respectively. Appreciate rheum f/u; changed to prednisone 30mg/d x5 days then taper 5mg q5 days as tolerated.  Calcium 1000mg/d and vitamin D 2000 IU daily prescribed.  Pt much improved overall with c/o fatigue.  D/C Gabapentin due to improvement and c/o fatigue.   Cont Tramadol prn    h/o Myelofibrosis/MDS with thrombocytosis: Hem onc fu as needed. 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 recent bone marrow showed 5% blasts with moderate reticulin fibrosis. Now with myelofibrosis awaiting to begin Jakafi outpatient with Dr. Montes.   HB 7.1.  Iron WNL.  Pt transfused 1U PRBCs 9/19/17; developed temp of 100.5  Discussed above with Dr Montes; pre-medicate with Tylenol if another transfusion warranted  Patient transfused 9/23 with improvement in H/H.  Repeat H/H today due to c/o fatigue    DVT prophylaxis: on heparin sq    Elevated blood pressure without h/o HTN: Improved on lisinopril    Hypothyroidism: on synthroid    Hyponatremia:  On fluid restriction.  F/U BMP.      GI prophylaxis: on protonix    Diet: regular with thin.  Ensure TID due to decreased appetite and protein calorie malnutrition    Bowel program: Toilet schedule prn  D/Cd miralax daily and changed colace to prn due to loose BM 9/26    Depression:  Added Lexapro 10mg daily 9/20 with improved mood    H/O fever:  Afebrile.  WBC WNL.  CXR 9/28 with COPD, pulmonary infiltrate LLL with small left pleural effusion.  F/U Chest CT 9/29 revealed atelectasis LLL with coarse linear infiltrates lower lobes bilaterally and no significant pleural effusion.    D/C planning to assisted living facility 10/3.  TST placed 9/29/17 HPI:  Pt is an 87 y.o. F w/ PMHx Reji 2 thrombocytosis and Myelofibrosis presented to [facility] on 09/10/17 with diffuse joint pains.  Pt reports pain started in her neck and shoulders the week before admission.  Pt seen by outpt orthopedics, and started on Medrol Dose pack.  Symptoms initially improved, but then pt began to experience pain and swelling in both hands and feet.  Pain worsened to the point that pt was having difficulty with walking.  On admission, Right hand Xray revealed periarticular changes consistent with Osteoarthritis versus CPPD disease; diffuse soft tissue; no fracture. Bilateral wrist Xray showed no fracture or dislocation.  Right ankle Xray showed soft tissue swelling, no fracture or dislocation.  Rheumatology consulted.  Serological studies performed.  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 less likely RA vs. less likely polymyalgia rheumatica. Pt was started on prednisone 40mg daily. As per Rheum will taper steroids by 10mg every 5 days to 10 mg daily, will maintain until follow up as outpatient.  Pt seen by hematology, transfused on 09/06 for symptomatic anemia.  Hospital course complicated by elevated BP, pt started by lisinopril.  Pt also had marked extremities swelling.  Doppler for both UEs and LEs performed, which were negative for DVTs.    INTERVAL SUBJECTIVE & REVIEW OF SYMPTOMS:  Patient no longer c/o any significant pain or swelling.  Slept well.  Pt c/o one loose BM today.   Pt c/o fatigue.  Denies SOB, bladder complaints.      REVIEW OF SYSTEMS  [   ] Constitutional WNL  [ x  ] Cardio WNL  [  x ] Resp WNL  [  x ] GI WNL  [  x ]  WNL  [   ] Heme WNL  [   ] Endo WNL  [ x  ] Skin WNL  [   ] MSK WNL  [  x ] Neuro WNL  [  x ] Cognitive WNL  [   ] Psych WNL    Vital Signs Last 24 Hrs  T(C): 37.3 (29 Sep 2017 11:16), Max: 37.3 (29 Sep 2017 11:16)  T(F): 99.2 (29 Sep 2017 11:16), Max: 99.2 (29 Sep 2017 11:16)  HR: 93 (29 Sep 2017 11:16) (86 - 99)  BP: 111/66 (29 Sep 2017 11:16) (96/56 - 122/66)  BP(mean): --  RR: 18 (29 Sep 2017 11:16) (16 - 18)  SpO2: 97% (29 Sep 2017 11:16)     PHYSICAL EXAM  General:  NAD  Cardio: RRR  Resp: CTAB  Abdomen: soft, NTND  Extrem: + trace edema digits and right knee, no calf tenderness. No erythema  Motor 4/5     Functional status:  Bed mobility: Supervision  Transfers:  Supervision  Gait: Pt ambulated 100'RW supervision   ADLs: UE dressing min assist, LE dressing, grooming and toileting supervision    RECENT LABS:                        8.4    7.7   )-----------( 241      ( 28 Sep 2017 06:22 )             25.3                           9.0    8.3   )-----------( 255      ( 25 Sep 2017 07:11 )             27.2     09-28    129<L>  |  90<L>  |  21.0<H>  ----------------------------<  108  4.1   |  27.0  |  0.36<L>    Ca    8.3<L>      28 Sep 2017 06:22                    09-25    131<L>  |  92<L>  |  18.0  ----------------------------<  87  4.3   |  27.0  |  0.34<L>    Ca    8.5<L>      25 Sep 2017 07:11    TPro  6.4<L>  /  Alb  2.6<L>  /  TBili  0.7  /  DBili  x   /  AST  16  /  ALT  27  /  AlkPhos  240<H>  09-25     MEDICATIONS  (STANDING):  heparin  Injectable 5000 Unit(s) SubCutaneous every 12 hours  docusate sodium 100 milliGRAM(s) Oral three times a day  polyethylene glycol 3350 17 Gram(s) Oral at bedtime  pantoprazole    Tablet 40 milliGRAM(s) Oral before breakfast  lisinopril 5 milliGRAM(s) Oral daily  levothyroxine 75 MICROGram(s) Oral daily  gabapentin 100 milliGRAM(s) Oral three times a day  multivitamin 1 Tablet(s) Oral daily  ascorbic acid 500 milliGRAM(s) Oral daily  traMADol 25 milliGRAM(s) Oral <User Schedule>  escitalopram 10 milliGRAM(s) Oral daily  fentaNYL   Patch  12 MICROgram(s)/Hr 1 Patch Transdermal every 72 hours  methylPREDNISolone sodium succinate Injectable 20 milliGRAM(s) IV Push every 12 hours    MEDICATIONS  (PRN):  magnesium hydroxide Suspension 30 milliLiter(s) Oral daily PRN Constipation  methyl salicylate 14%/menthol 6% Topical Ointment 1 Application(s) Topical four times a day PRN pain  hydrocortisone 2.5% Rectal Cream 1 Application(s) Rectal daily PRN pain  traMADol 25 milliGRAM(s) Oral every 4 hours PRN Moderate Pain (4 - 6)  lidocaine/prilocaine Cream 1 Application(s) Topical every 4 hours PRN pain  acetaminophen   Tablet 650 milliGRAM(s) Oral every 6 hours PRN For Temp greater than 38 C (100.4 F)        A/P:  Pt is an 87 year old female with functional deficits from 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  Improved functional status noted    Pain:  Bone scan  performed 9/21/17:  Multi bilateral rib foci, right shoulder activity possibly related to trauma, degenerative changes left shoulder, left SC joint, bilateral knees, ankle and feet.  Contacted rheum and heme to eval  Added Fentanyl patch 12mcg 9/21 x 1 wk with improvement.    Increased solumedrol to 20 bid due to persistent pain limited progress in rehab; mildly improved-decreased to 30mg daily beginning 9/26.  CRP and ESR improved to 6.2 and 62 respectively. Appreciate rheum f/u; changed to prednisone 30mg/d x5 days then taper 5mg q5 days as tolerated.  Calcium 1000mg/d and vitamin D 2000 IU daily prescribed.  Pt much improved overall with c/o fatigue.  D/C Gabapentin due to improvement and c/o fatigue.   Cont Tramadol prn    h/o Myelofibrosis/MDS with thrombocytosis: Hem onc fu as needed. 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 recent bone marrow showed 5% blasts with moderate reticulin fibrosis. Now with myelofibrosis awaiting to begin Jakafi outpatient with Dr. Montes.   HB 7.1.  Iron WNL.  Pt transfused 1U PRBCs 9/19/17; developed temp of 100.5  Discussed above with Dr Montes; pre-medicate with Tylenol if another transfusion warranted  Patient transfused 9/23 with improvement in H/H.  Repeat H/H today due to c/o fatigue-Improved H/H at 8.5/25.7    DVT prophylaxis: on heparin sq    Elevated blood pressure without h/o HTN: Improved on lisinopril    Hypothyroidism: on synthroid    Hyponatremia:  On fluid restriction.  F/U BMP.      GI prophylaxis: on protonix    Diet: regular with thin.  Ensure TID due to decreased appetite and protein calorie malnutrition    Bowel program: Toilet schedule prn  D/Cd miralax daily and changed colace to prn due to loose BM 9/26    Depression:  Added Lexapro 10mg daily 9/20 with improved mood    H/O fever:  Afebrile.  WBC WNL.  CXR 9/28 with COPD, pulmonary infiltrate LLL with small left pleural effusion.  F/U Chest CT 9/29 revealed atelectasis LLL with coarse linear infiltrates lower lobes bilaterally and no significant pleural effusion.    D/C planning to assisted living facility 10/3.  TST placed 9/29/17 HPI:  Pt is an 87 y.o. F w/ PMHx Reji 2 thrombocytosis and Myelofibrosis presented to [facility] on 09/10/17 with diffuse joint pains.  Pt reports pain started in her neck and shoulders the week before admission.  Pt seen by outpt orthopedics, and started on Medrol Dose pack.  Symptoms initially improved, but then pt began to experience pain and swelling in both hands and feet.  Pain worsened to the point that pt was having difficulty with walking.  On admission, Right hand Xray revealed periarticular changes consistent with Osteoarthritis versus CPPD disease; diffuse soft tissue; no fracture. Bilateral wrist Xray showed no fracture or dislocation.  Right ankle Xray showed soft tissue swelling, no fracture or dislocation.  Rheumatology consulted.  Serological studies performed.  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 less likely RA vs. less likely polymyalgia rheumatica. Pt was started on prednisone 40mg daily. As per Rheum will taper steroids by 10mg every 5 days to 10 mg daily, will maintain until follow up as outpatient.  Pt seen by hematology, transfused on 09/06 for symptomatic anemia.  Hospital course complicated by elevated BP, pt started by lisinopril.  Pt also had marked extremities swelling.  Doppler for both UEs and LEs performed, which were negative for DVTs.    INTERVAL SUBJECTIVE & REVIEW OF SYMPTOMS:  Patient no longer c/o any significant pain or swelling.  Slept well.  Pt c/o one loose BM today.   Pt c/o fatigue.  Denies SOB, bladder complaints.      REVIEW OF SYSTEMS  [   ] Constitutional WNL  [ x  ] Cardio WNL  [  x ] Resp WNL  [  x ] GI WNL  [  x ]  WNL  [   ] Heme WNL  [   ] Endo WNL  [ x  ] Skin WNL  [   ] MSK WNL  [  x ] Neuro WNL  [  x ] Cognitive WNL  [   ] Psych WNL    Vital Signs Last 24 Hrs  T(C): 37.3 (29 Sep 2017 11:16), Max: 37.3 (29 Sep 2017 11:16)  T(F): 99.2 (29 Sep 2017 11:16), Max: 99.2 (29 Sep 2017 11:16)  HR: 93 (29 Sep 2017 11:16) (86 - 99)  BP: 111/66 (29 Sep 2017 11:16) (96/56 - 122/66)  BP(mean): --  RR: 18 (29 Sep 2017 11:16) (16 - 18)  SpO2: 97% (29 Sep 2017 11:16)     PHYSICAL EXAM  General:  NAD  Cardio: RRR  Resp: CTAB  Abdomen: soft, NTND  Extrem: + trace edema digits and right knee, no calf tenderness. No erythema  Motor 4/5     Functional status:  Bed mobility: Supervision  Transfers:  Supervision  Gait: Pt ambulated 100'RW supervision   ADLs: UE dressing min assist, LE dressing, grooming and toileting supervision    RECENT LABS:                        8.4    7.7   )-----------( 241      ( 28 Sep 2017 06:22 )             25.3                           9.0    8.3   )-----------( 255      ( 25 Sep 2017 07:11 )             27.2     09-28    129<L>  |  90<L>  |  21.0<H>  ----------------------------<  108  4.1   |  27.0  |  0.36<L>    Ca    8.3<L>      28 Sep 2017 06:22                    09-25    131<L>  |  92<L>  |  18.0  ----------------------------<  87  4.3   |  27.0  |  0.34<L>    Ca    8.5<L>      25 Sep 2017 07:11    TPro  6.4<L>  /  Alb  2.6<L>  /  TBili  0.7  /  DBili  x   /  AST  16  /  ALT  27  /  AlkPhos  240<H>  09-25     MEDICATIONS  (STANDING):  heparin  Injectable 5000 Unit(s) SubCutaneous every 12 hours  docusate sodium 100 milliGRAM(s) Oral three times a day  polyethylene glycol 3350 17 Gram(s) Oral at bedtime  pantoprazole    Tablet 40 milliGRAM(s) Oral before breakfast  lisinopril 5 milliGRAM(s) Oral daily  levothyroxine 75 MICROGram(s) Oral daily  gabapentin 100 milliGRAM(s) Oral three times a day  multivitamin 1 Tablet(s) Oral daily  ascorbic acid 500 milliGRAM(s) Oral daily  traMADol 25 milliGRAM(s) Oral <User Schedule>  escitalopram 10 milliGRAM(s) Oral daily  fentaNYL   Patch  12 MICROgram(s)/Hr 1 Patch Transdermal every 72 hours  methylPREDNISolone sodium succinate Injectable 20 milliGRAM(s) IV Push every 12 hours    MEDICATIONS  (PRN):  magnesium hydroxide Suspension 30 milliLiter(s) Oral daily PRN Constipation  methyl salicylate 14%/menthol 6% Topical Ointment 1 Application(s) Topical four times a day PRN pain  hydrocortisone 2.5% Rectal Cream 1 Application(s) Rectal daily PRN pain  traMADol 25 milliGRAM(s) Oral every 4 hours PRN Moderate Pain (4 - 6)  lidocaine/prilocaine Cream 1 Application(s) Topical every 4 hours PRN pain  acetaminophen   Tablet 650 milliGRAM(s) Oral every 6 hours PRN For Temp greater than 38 C (100.4 F)        A/P:  Pt is an 87 year old female with functional deficits from 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  Improved functional status noted    Pain:  Bone scan  performed 9/21/17:  Multi bilateral rib foci, right shoulder activity possibly related to trauma, degenerative changes left shoulder, left SC joint, bilateral knees, ankle and feet.  Contacted rheum and heme to eval  Added Fentanyl patch 12mcg 9/21 x 1 wk with improvement.    Increased solumedrol to 20 bid due to persistent pain limited progress in rehab; mildly improved-decreased to 30mg daily beginning 9/26.  CRP and ESR improved to 6.2 and 62 respectively. Appreciate rheum f/u; changed to prednisone 30mg/d x5 days then taper 5mg q5 days as tolerated.  Calcium 1000mg/d and vitamin D 2000 IU daily prescribed.  Pt much improved overall with c/o fatigue.  D/C Gabapentin due to improvement and c/o fatigue.   Cont Tramadol prn    h/o Myelofibrosis/MDS with thrombocytosis: Hem onc fu as needed. 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 recent bone marrow showed 5% blasts with moderate reticulin fibrosis. Now with myelofibrosis awaiting to begin Jakafi outpatient with Dr. Montes.   HB 7.1.  Iron WNL.  Pt transfused 1U PRBCs 9/19/17; developed temp of 100.5  Discussed above with Dr Montes; pre-medicate with Tylenol if another transfusion warranted  Patient transfused 9/23 with improvement in H/H.  Repeat H/H today due to c/o fatigue-Improved H/H at 8.5/25.7    DVT prophylaxis: on heparin sq    Elevated blood pressure without h/o HTN: Improved on lisinopril    Hypothyroidism: on synthroid    Hyponatremia:  On fluid restriction.  F/U BMP today with Na 125.  D/W hospitalist-add salt tabs  Requested renal consult    GI prophylaxis: on protonix    Diet: regular with thin.  Ensure TID due to decreased appetite and protein calorie malnutrition    Bowel program: Toilet schedule prn  D/Cd miralax daily and changed colace to prn due to loose BM 9/26    Depression:  Added Lexapro 10mg daily 9/20 with improved mood    H/O fever:  Afebrile.  WBC WNL.  CXR 9/28 with COPD, pulmonary infiltrate LLL with small left pleural effusion.  F/U Chest CT 9/29 revealed atelectasis LLL with coarse linear infiltrates lower lobes bilaterally and no significant pleural effusion.    D/C planning to assisted living facility 10/3.  TST placed 9/29/17

## 2017-09-29 NOTE — PROGRESS NOTE ADULT - ASSESSMENT
87y F with acute onset b/l hand synovitis, tenosynovitis and swelling in the absence of positive serologies (RF, Lyme, Parvovirus, hepatitis) seemingly c/w PMR subset RS3PE initially improved on steroids with worsening last week requiring return to IV steroid.  Now currently improving and more active with PT.  Likely needs slower steroid taper.  RS3PE can be seen in some cases of hematologic malignancy.  she has PMHx  Reji 2+ thrombocytosis, dating back to 2013. She had been on Hydrea since Olya 10, 2013. Most recently she was found to be pancytopenic and the Hydrea was held as of July 12, 2017.    1) Polyarthralgia- rheumatology noted -   b/l hand synovitis, tenosynovitis and swelling in the absence of positive serologies (RF, Lyme, Parvovirus, hepatitis) seemingly c/w PMR subset RS3PE initially improved on steroids with worsening last week requiring return to IV steroid.  Now currently improving and more active with PT.  Likely needs slower steroid taper.  RS3PE can be seen in some cases of hematologic malignancy. 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     4) Hypothyroidism  - Levothyroxine     5) Elevated BP without history of HTN- likely secondary to steroids   - BP improved with Lisinopril    6) Anemia - chronic -s/p PRBC , follow CBC     7) Fever - 2/2 post transfusion - resolved. w/u was negative for any infection. Will observe .

## 2017-09-30 LAB
24R-OH-CALCIDIOL SERPL-MCNC: 30.9 NG/ML — SIGNIFICANT CHANGE UP (ref 30–80)
ANION GAP SERPL CALC-SCNC: 13 MMOL/L — SIGNIFICANT CHANGE UP (ref 5–17)
BUN SERPL-MCNC: 19 MG/DL — SIGNIFICANT CHANGE UP (ref 8–20)
CALCIUM SERPL-MCNC: 8.3 MG/DL — LOW (ref 8.6–10.2)
CHLORIDE SERPL-SCNC: 93 MMOL/L — LOW (ref 98–107)
CO2 SERPL-SCNC: 25 MMOL/L — SIGNIFICANT CHANGE UP (ref 22–29)
CREAT SERPL-MCNC: 0.36 MG/DL — LOW (ref 0.5–1.3)
GLUCOSE SERPL-MCNC: 126 MG/DL — HIGH (ref 70–115)
POTASSIUM SERPL-MCNC: 3.6 MMOL/L — SIGNIFICANT CHANGE UP (ref 3.5–5.3)
POTASSIUM SERPL-SCNC: 3.6 MMOL/L — SIGNIFICANT CHANGE UP (ref 3.5–5.3)
SODIUM SERPL-SCNC: 131 MMOL/L — LOW (ref 135–145)
TSH SERPL-MCNC: 2.55 UIU/ML — SIGNIFICANT CHANGE UP (ref 0.27–4.2)

## 2017-09-30 PROCEDURE — 99233 SBSQ HOSP IP/OBS HIGH 50: CPT

## 2017-09-30 RX ADMIN — Medication 2 TABLET(S): at 11:58

## 2017-09-30 RX ADMIN — HEPARIN SODIUM 5000 UNIT(S): 5000 INJECTION INTRAVENOUS; SUBCUTANEOUS at 17:00

## 2017-09-30 RX ADMIN — Medication 75 MICROGRAM(S): at 05:46

## 2017-09-30 RX ADMIN — Medication 650 MILLIGRAM(S): at 16:59

## 2017-09-30 RX ADMIN — LISINOPRIL 5 MILLIGRAM(S): 2.5 TABLET ORAL at 05:46

## 2017-09-30 RX ADMIN — ESCITALOPRAM OXALATE 10 MILLIGRAM(S): 10 TABLET, FILM COATED ORAL at 11:58

## 2017-09-30 RX ADMIN — FENTANYL CITRATE 1 PATCH: 50 INJECTION INTRAVENOUS at 11:44

## 2017-09-30 RX ADMIN — HEPARIN SODIUM 5000 UNIT(S): 5000 INJECTION INTRAVENOUS; SUBCUTANEOUS at 05:45

## 2017-09-30 RX ADMIN — Medication 1 TABLET(S): at 11:58

## 2017-09-30 RX ADMIN — SODIUM CHLORIDE 1 GRAM(S): 9 INJECTION INTRAMUSCULAR; INTRAVENOUS; SUBCUTANEOUS at 05:45

## 2017-09-30 RX ADMIN — Medication 650 MILLIGRAM(S): at 15:56

## 2017-09-30 RX ADMIN — PANTOPRAZOLE SODIUM 40 MILLIGRAM(S): 20 TABLET, DELAYED RELEASE ORAL at 05:46

## 2017-09-30 RX ADMIN — Medication 30 MILLIGRAM(S): at 05:45

## 2017-09-30 RX ADMIN — Medication 2000 UNIT(S): at 11:58

## 2017-09-30 RX ADMIN — Medication 500 MILLIGRAM(S): at 11:58

## 2017-09-30 NOTE — PROGRESS NOTE ADULT - SUBJECTIVE AND OBJECTIVE BOX
Patient seen and examined    Feels fine, stronger  no c/o CP SOB NV   No swelling feet    Vital Signs Last 24 Hrs  T(C): 37.2 (09-30-17 @ 09:36), Max: 37.2 (09-30-17 @ 09:36)  T(F): 98.9 (09-30-17 @ 09:36), Max: 98.9 (09-30-17 @ 09:36)  HR: 100 (09-30-17 @ 09:36) (90 - 100)  BP: 106/67 (09-30-17 @ 09:36) (105/52 - 135/78)  BP(mean): --  RR: 17 (09-30-17 @ 09:36) (16 - 18)  SpO2: 99% (09-30-17 @ 09:36) (94% - 99%)    Chest   clear  CV   no murmur  Abd   soft, NT BS+  Extr   No edema  Neuro  grossly iintact motor        30 Sep 2017 06:48    131    |  93     |  19.0   ----------------------------<  126    3.6     |  25.0   |  0.36     Ca    8.3        30 Sep 2017 06:48                            8.5    8.5   )-----------( 255      ( 29 Sep 2017 13:57 )             25.7         Patient overall stable  Labs and Meds reviewed- on Lexapro and ACEI    Continue same treatment, keep POF 1000cc/d   d/w pt/daughter

## 2017-09-30 NOTE — PROGRESS NOTE ADULT - SUBJECTIVE AND OBJECTIVE BOX
No overnight events. Slept well.  Denies pain.   Will DC fentanyl.  Making progress for DC to assisted living.     REVIEW OF SYSTEMS  Constitutional - No fever,  +fatigue  HEENT - No vertigo, No neck pain  Neurological - No headaches, No memory loss, +loss of strength, No numbness, No tremors  Skin - No rashes, No lesions   Musculoskeletal - No joint pain, No joint swelling, No muscle pain    VITALS  T(C): 36.4 (09-30-17 @ 06:02), Max: 37.3 (09-29-17 @ 11:16)  HR: 97 (09-30-17 @ 06:02) (90 - 97)  BP: 135/78 (09-30-17 @ 06:02) (105/52 - 135/78)  RR: 18 (09-30-17 @ 06:02) (16 - 18)  SpO2: 94% (09-30-17 @ 06:02) (94% - 97%)  Wt(kg): --      MEDICATIONS   heparin  Injectable 5000 Unit(s) every 12 hours  pantoprazole    Tablet 40 milliGRAM(s) before breakfast  magnesium hydroxide Suspension 30 milliLiter(s) daily PRN  lisinopril 5 milliGRAM(s) daily  levothyroxine 75 MICROGram(s) daily  hydrocortisone 2.5% Rectal Cream 1 Application(s) daily PRN  multivitamin 1 Tablet(s) daily  ascorbic acid 500 milliGRAM(s) daily  acetaminophen   Tablet 650 milliGRAM(s) every 6 hours PRN  escitalopram 10 milliGRAM(s) daily  traMADol 25 milliGRAM(s) every 4 hours PRN  acetaminophen   Tablet. 650 milliGRAM(s) every 6 hours PRN  predniSONE   Tablet 30 milliGRAM(s) daily  predniSONE   Tablet     calcium carbonate 500 mG (Tums) Chewable 2 Tablet(s) daily  cholecalciferol 2000 Unit(s) daily  sodium chloride 1 Gram(s) three times a day      RECENT LABS/IMAGING  CBC Full  -  ( 29 Sep 2017 13:57 )  WBC Count : 8.5 K/uL  Hemoglobin : 8.5 g/dL  Hematocrit : 25.7 %  Platelet Count - Automated : 255 K/uL  Mean Cell Volume : 91.1 fl  Mean Cell Hemoglobin : 30.1 pg  Mean Cell Hemoglobin Concentration : 33.1 g/dL  Auto Neutrophil # : x  Auto Lymphocyte # : x  Auto Monocyte # : x  Auto Eosinophil # : x  Auto Basophil # : x  Auto Neutrophil % : x  Auto Lymphocyte % : x  Auto Monocyte % : x  Auto Eosinophil % : x  Auto Basophil % : x    09-30    131<L>  |  93<L>  |  19.0  ----------------------------<  126<H>  3.6   |  25.0  |  0.36<L>    Ca    8.3<L>      30 Sep 2017 06:48    ---------  PHYSICAL EXAM  Constitutional - NAD, Comfortable  Pulm - Breathing comfortably, No wheezing  Abd - Soft, NTND  Extremities - No C/C/E, No calf tenderness  Neurologic Exam -                    Motor - No focal deficits     Sensory - Intact to LT  Psychiatric - Mood WNL, Affect Flat    ASSESSMENT/PLAN  87y Female with functional deficits related to seronegative symmetrical synovitis with arthralgia.  Hyponatremia - Improved, 1.2L fluid restriction  Synovitis - Prednisone taper  HTN - Lisinopril  Pain - Tylenol PRN, Tramadol PRN (last 10/1)  DVT PPX - Heparin    Continue 3hrs a day of comprehensive rehab program.

## 2017-10-01 LAB
OSMOLALITY UR: 546 MOSM/KG — SIGNIFICANT CHANGE UP (ref 300–1000)
SODIUM UR-SCNC: 58 MMOL/L — SIGNIFICANT CHANGE UP

## 2017-10-01 PROCEDURE — 99233 SBSQ HOSP IP/OBS HIGH 50: CPT

## 2017-10-01 PROCEDURE — 99232 SBSQ HOSP IP/OBS MODERATE 35: CPT

## 2017-10-01 RX ADMIN — Medication 650 MILLIGRAM(S): at 22:15

## 2017-10-01 RX ADMIN — PANTOPRAZOLE SODIUM 40 MILLIGRAM(S): 20 TABLET, DELAYED RELEASE ORAL at 05:15

## 2017-10-01 RX ADMIN — ESCITALOPRAM OXALATE 10 MILLIGRAM(S): 10 TABLET, FILM COATED ORAL at 12:06

## 2017-10-01 RX ADMIN — Medication 500 MILLIGRAM(S): at 12:06

## 2017-10-01 RX ADMIN — Medication 2 TABLET(S): at 12:06

## 2017-10-01 RX ADMIN — HEPARIN SODIUM 5000 UNIT(S): 5000 INJECTION INTRAVENOUS; SUBCUTANEOUS at 17:08

## 2017-10-01 RX ADMIN — Medication 2000 UNIT(S): at 12:06

## 2017-10-01 RX ADMIN — TUBERCULIN PURIFIED PROTEIN DERIVATIVE 5 UNIT(S): 5 INJECTION, SOLUTION INTRADERMAL at 11:37

## 2017-10-01 RX ADMIN — Medication 30 MILLIGRAM(S): at 05:16

## 2017-10-01 RX ADMIN — Medication 650 MILLIGRAM(S): at 21:28

## 2017-10-01 RX ADMIN — Medication 1 TABLET(S): at 12:06

## 2017-10-01 RX ADMIN — LISINOPRIL 5 MILLIGRAM(S): 2.5 TABLET ORAL at 05:15

## 2017-10-01 RX ADMIN — Medication 75 MICROGRAM(S): at 05:15

## 2017-10-01 RX ADMIN — HEPARIN SODIUM 5000 UNIT(S): 5000 INJECTION INTRAVENOUS; SUBCUTANEOUS at 05:15

## 2017-10-01 NOTE — PROGRESS NOTE ADULT - SUBJECTIVE AND OBJECTIVE BOX
KEVIN KELLOGGLORI    [id number]    87y      Female    CC: Pain and swelling in hands  better controlled    HPI: 87 year old female with PMHx  Reji 2+ thrombocytosis, dating back to 2013. She had been on Hydrea since Olya 10, 2013. Most recently she was found to be pancytopenic and the Hydrea was held as of July 12, 2017. 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 Followed  by Rheum and Heme/Onc, Slowly progressing.  Doing ok    INTERVAL HPI/OVERNIGHT EVENTS: no acute events    REVIEW OF SYSTEMS:    CONSTITUTIONAL: No fever. + fatigue  RESPIRATORY: No cough,; No shortness of breath        Vital Signs Last 24 Hrs  T(C): 37 (01 Oct 2017 05:07), Max: 37.4 (30 Sep 2017 20:19)  T(F): 98.6 (01 Oct 2017 05:07), Max: 99.3 (30 Sep 2017 20:19)  HR: 88 (01 Oct 2017 05:07) (88 - 92)  BP: 128/70 (01 Oct 2017 05:07) (102/63 - 128/70)  BP(mean): --  RR: 16 (01 Oct 2017 05:07) (16 - 17)  SpO2: 97% (01 Oct 2017 05:07) (96% - 97%)      PHYSICAL EXAM:    GENERAL: NAD, well-groomed  HEENT: PERRL, +EOMI  NECK: soft, Supple, No JVD,   CHEST/LUNG: breathing comfortable  EXTREMITIES: , No clubbing, cyanosis, or edema  SKIN: No rashes or lesions  NEURO: AAOX3          09-30 @ 07:01  -  10-01 @ 07:00  --------------------------------------------------------  IN: 880 mL / OUT: 1 mL / NET: 879 mL    10-01 @ 07:01  -  10-01 @ 13:02  --------------------------------------------------------  IN: 320 mL / OUT: 0 mL / NET: 320 mL        LABS:                        8.5    8.5   )-----------( 255      ( 29 Sep 2017 13:57 )             25.7     09-30    131<L>  |  93<L>  |  19.0  ----------------------------<  126<H>  3.6   |  25.0  |  0.36<L>    Ca    8.3<L>      30 Sep 2017 06:48        MEDICATIONS  (STANDING):  heparin  Injectable 5000 Unit(s) SubCutaneous every 12 hours  pantoprazole    Tablet 40 milliGRAM(s) Oral before breakfast  lisinopril 5 milliGRAM(s) Oral daily  levothyroxine 75 MICROGram(s) Oral daily  multivitamin 1 Tablet(s) Oral daily  ascorbic acid 500 milliGRAM(s) Oral daily  escitalopram 10 milliGRAM(s) Oral daily  predniSONE   Tablet   Oral   calcium carbonate 500 mG (Tums) Chewable 2 Tablet(s) Chew daily  cholecalciferol 2000 Unit(s) Oral daily    MEDICATIONS  (PRN):  magnesium hydroxide Suspension 30 milliLiter(s) Oral daily PRN Constipation  hydrocortisone 2.5% Rectal Cream 1 Application(s) Rectal daily PRN pain  acetaminophen   Tablet 650 milliGRAM(s) Oral every 6 hours PRN For Temp greater than 38 C (100.4 F)  traMADol 25 milliGRAM(s) Oral every 4 hours PRN Moderate Pain (4 - 6)  acetaminophen   Tablet. 650 milliGRAM(s) Oral every 6 hours PRN Mild Pain to moderate pain      RADIOLOGY & ADDITIONAL TESTS:    CT chest -reviewed   LLL atelectasis

## 2017-10-01 NOTE — PROGRESS NOTE ADULT - ASSESSMENT
87y F with acute onset b/l hand synovitis, tenosynovitis and swelling in the absence of positive serologies (RF, Lyme, Parvovirus, hepatitis) seemingly c/w PMR subset RS3PE initially improved on steroids with worsening last week requiring return to IV steroid.  Now currently improving and more active with PT.  Likely needs slower steroid taper.  RS3PE can be seen in some cases of hematologic malignancy.  she has PMHx  Reji 2+ thrombocytosis, dating back to 2013. She had been on Hydrea since Olya 10, 2013. Most recently she was found to be pancytopenic and the Hydrea was held as of July 12, 2017.    1) Polyarthralgia- rheumatology noted -    Now currently improving and more active with PT.  Likely needs slower steroid taper.  RS3PE can be seen in some cases of hematologic malignancy. 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 renal following.    4) Hypothyroidism  - Levothyroxine     5) Elevated BP without history of HTN- likely secondary to steroids   - BP improved with Lisinopril    6) Anemia - chronic -s/p PRBC , follow CBC     7) Fever - 2/2 post transfusion - resolved

## 2017-10-01 NOTE — PROGRESS NOTE ADULT - SUBJECTIVE AND OBJECTIVE BOX
Patient seen and examined    Feels fine, stronger  no c/o CP SOB NV   No swelling feet      Vital Signs Last 24 Hrs  T(C): 37 (10-01-17 @ 05:07), Max: 37.4 (09-30-17 @ 20:19)  T(F): 98.6 (10-01-17 @ 05:07), Max: 99.3 (09-30-17 @ 20:19)  HR: 88 (10-01-17 @ 05:07) (88 - 92)  BP: 128/70 (10-01-17 @ 05:07) (102/63 - 128/70)  BP(mean): --  RR: 16 (10-01-17 @ 05:07) (16 - 17)  SpO2: 97% (10-01-17 @ 05:07) (96% - 97%)    Chest   clear  CV   no murmur  Abd   soft, NT BS+  Extr   No edema  Neuro  grossly iintact motor      30 Sep 2017 06:48    131    |  93     |  19.0   ----------------------------<  126    3.6     |  25.0   |  0.36     Ca    8.3        30 Sep 2017 06:48    No new labs , check am  To watch POFs - expld again

## 2017-10-01 NOTE — PROGRESS NOTE ADULT - SUBJECTIVE AND OBJECTIVE BOX
No overnight events.   Slept well.  Denies pain.   PPD read on left forearm, read as 0mm and is negative.     REVIEW OF SYSTEMS  Constitutional - No fever,  No fatigue  HEENT - No vertigo, No neck pain  Musculoskeletal - No joint pain, No joint swelling, No muscle pain  Psychiatric - No depression, No anxiety    VITALS  T(C): 37 (10-01-17 @ 05:07), Max: 37.4 (09-30-17 @ 20:19)  HR: 88 (10-01-17 @ 05:07) (88 - 100)  BP: 128/70 (10-01-17 @ 05:07) (102/63 - 128/70)  RR: 16 (10-01-17 @ 05:07) (16 - 17)  SpO2: 97% (10-01-17 @ 05:07) (96% - 99%)  Wt(kg): --    MEDICATIONS   heparin  Injectable 5000 Unit(s) every 12 hours  pantoprazole    Tablet 40 milliGRAM(s) before breakfast  magnesium hydroxide Suspension 30 milliLiter(s) daily PRN  lisinopril 5 milliGRAM(s) daily  levothyroxine 75 MICROGram(s) daily  hydrocortisone 2.5% Rectal Cream 1 Application(s) daily PRN  multivitamin 1 Tablet(s) daily  ascorbic acid 500 milliGRAM(s) daily  acetaminophen   Tablet 650 milliGRAM(s) every 6 hours PRN  escitalopram 10 milliGRAM(s) daily  traMADol 25 milliGRAM(s) every 4 hours PRN  acetaminophen   Tablet. 650 milliGRAM(s) every 6 hours PRN  predniSONE   Tablet     calcium carbonate 500 mG (Tums) Chewable 2 Tablet(s) daily  cholecalciferol 2000 Unit(s) daily      RECENT LABS/IMAGING  CBC Full  -  ( 29 Sep 2017 13:57 )  WBC Count : 8.5 K/uL  Hemoglobin : 8.5 g/dL  Hematocrit : 25.7 %  Platelet Count - Automated : 255 K/uL  Mean Cell Volume : 91.1 fl  Mean Cell Hemoglobin : 30.1 pg  Mean Cell Hemoglobin Concentration : 33.1 g/dL  Auto Neutrophil # : x  Auto Lymphocyte # : x  Auto Monocyte # : x  Auto Eosinophil # : x  Auto Basophil # : x  Auto Neutrophil % : x  Auto Lymphocyte % : x  Auto Monocyte % : x  Auto Eosinophil % : x  Auto Basophil % : x    09-30    131<L>  |  93<L>  |  19.0  ----------------------------<  126<H>  3.6   |  25.0  |  0.36<L>    Ca    8.3<L>      30 Sep 2017 06:48      -----  PHYSICAL EXAM  Constitutional - NAD, Comfortable  Pulm - Breathing comfortably, No wheezing  Abd - Soft, NTND  Extremities - No C/C/E, No calf tenderness  Neurologic Exam -                    Motor - No focal deficits     Sensory - Intact to LT  Psychiatric - Mood WNL, Affect Flat    ASSESSMENT/PLAN  87y Female with functional deficits related to seronegative symmetrical synovitis with arthralgia.  Hyponatremia - Improved, 1.2L fluid restriction  Synovitis - Prednisone taper  HTN - Lisinopril  Pain - Tylenol PRN, Tramadol PRN (last 10/1)  DVT PPX - Heparin    Continue 3hrs a day of comprehensive rehab program.

## 2017-10-02 ENCOUNTER — APPOINTMENT (OUTPATIENT)
Dept: HEMATOLOGY ONCOLOGY | Facility: CLINIC | Age: 82
End: 2017-10-02

## 2017-10-02 LAB
ANION GAP SERPL CALC-SCNC: 11 MMOL/L — SIGNIFICANT CHANGE UP (ref 5–17)
BUN SERPL-MCNC: 17 MG/DL — SIGNIFICANT CHANGE UP (ref 8–20)
CALCIUM SERPL-MCNC: 8.3 MG/DL — LOW (ref 8.6–10.2)
CHLORIDE SERPL-SCNC: 94 MMOL/L — LOW (ref 98–107)
CO2 SERPL-SCNC: 27 MMOL/L — SIGNIFICANT CHANGE UP (ref 22–29)
CREAT SERPL-MCNC: 0.36 MG/DL — LOW (ref 0.5–1.3)
GLUCOSE SERPL-MCNC: 98 MG/DL — SIGNIFICANT CHANGE UP (ref 70–115)
HCT VFR BLD CALC: 24.9 % — LOW (ref 37–47)
HGB BLD-MCNC: 8.3 G/DL — LOW (ref 12–16)
MCHC RBC-ENTMCNC: 30.3 PG — SIGNIFICANT CHANGE UP (ref 27–31)
MCHC RBC-ENTMCNC: 33.3 G/DL — SIGNIFICANT CHANGE UP (ref 32–36)
MCV RBC AUTO: 90.9 FL — SIGNIFICANT CHANGE UP (ref 81–99)
PLATELET # BLD AUTO: 237 K/UL — SIGNIFICANT CHANGE UP (ref 150–400)
POTASSIUM SERPL-MCNC: 3.4 MMOL/L — LOW (ref 3.5–5.3)
POTASSIUM SERPL-SCNC: 3.4 MMOL/L — LOW (ref 3.5–5.3)
PREALB SERPL-MCNC: 17 MG/DL — LOW (ref 18–38)
RBC # BLD: 2.74 M/UL — LOW (ref 4.4–5.2)
RBC # FLD: 18 % — HIGH (ref 11–15.6)
SODIUM SERPL-SCNC: 132 MMOL/L — LOW (ref 135–145)
URATE SERPL-MCNC: 3.5 MG/DL — SIGNIFICANT CHANGE UP (ref 2.4–5.7)
WBC # BLD: 6.7 K/UL — SIGNIFICANT CHANGE UP (ref 4.8–10.8)
WBC # FLD AUTO: 6.7 K/UL — SIGNIFICANT CHANGE UP (ref 4.8–10.8)

## 2017-10-02 PROCEDURE — 99233 SBSQ HOSP IP/OBS HIGH 50: CPT

## 2017-10-02 RX ORDER — POTASSIUM CHLORIDE 20 MEQ
40 PACKET (EA) ORAL ONCE
Qty: 0 | Refills: 0 | Status: COMPLETED | OUTPATIENT
Start: 2017-10-02 | End: 2017-10-02

## 2017-10-02 RX ORDER — POTASSIUM CHLORIDE 20 MEQ
40 PACKET (EA) ORAL EVERY 4 HOURS
Qty: 0 | Refills: 0 | Status: DISCONTINUED | OUTPATIENT
Start: 2017-10-02 | End: 2017-10-02

## 2017-10-02 RX ADMIN — Medication 25 MILLIGRAM(S): at 05:53

## 2017-10-02 RX ADMIN — Medication 2 TABLET(S): at 11:16

## 2017-10-02 RX ADMIN — Medication 40 MILLIEQUIVALENT(S): at 17:14

## 2017-10-02 RX ADMIN — Medication 2000 UNIT(S): at 11:17

## 2017-10-02 RX ADMIN — Medication 650 MILLIGRAM(S): at 10:43

## 2017-10-02 RX ADMIN — Medication 1 TABLET(S): at 11:16

## 2017-10-02 RX ADMIN — ESCITALOPRAM OXALATE 10 MILLIGRAM(S): 10 TABLET, FILM COATED ORAL at 11:16

## 2017-10-02 RX ADMIN — PANTOPRAZOLE SODIUM 40 MILLIGRAM(S): 20 TABLET, DELAYED RELEASE ORAL at 05:53

## 2017-10-02 RX ADMIN — HEPARIN SODIUM 5000 UNIT(S): 5000 INJECTION INTRAVENOUS; SUBCUTANEOUS at 17:14

## 2017-10-02 RX ADMIN — Medication 40 MILLIEQUIVALENT(S): at 13:06

## 2017-10-02 RX ADMIN — Medication 75 MICROGRAM(S): at 05:53

## 2017-10-02 RX ADMIN — HEPARIN SODIUM 5000 UNIT(S): 5000 INJECTION INTRAVENOUS; SUBCUTANEOUS at 05:52

## 2017-10-02 RX ADMIN — Medication 650 MILLIGRAM(S): at 11:30

## 2017-10-02 RX ADMIN — Medication 40 MILLIEQUIVALENT(S): at 10:42

## 2017-10-02 RX ADMIN — LISINOPRIL 5 MILLIGRAM(S): 2.5 TABLET ORAL at 05:53

## 2017-10-02 RX ADMIN — Medication 500 MILLIGRAM(S): at 11:16

## 2017-10-02 NOTE — PROGRESS NOTE ADULT - SUBJECTIVE AND OBJECTIVE BOX
NEPHROLOGY INTERVAL HPI/OVERNIGHT EVENTS:  pt doing well  no acute distress    MEDICATIONS  (STANDING):  heparin  Injectable 5000 Unit(s) SubCutaneous every 12 hours  pantoprazole    Tablet 40 milliGRAM(s) Oral before breakfast  lisinopril 5 milliGRAM(s) Oral daily  levothyroxine 75 MICROGram(s) Oral daily  multivitamin 1 Tablet(s) Oral daily  ascorbic acid 500 milliGRAM(s) Oral daily  escitalopram 10 milliGRAM(s) Oral daily  predniSONE   Tablet 25 milliGRAM(s) Oral daily  predniSONE   Tablet   Oral   calcium carbonate 500 mG (Tums) Chewable 2 Tablet(s) Chew daily  cholecalciferol 2000 Unit(s) Oral daily    MEDICATIONS  (PRN):  magnesium hydroxide Suspension 30 milliLiter(s) Oral daily PRN Constipation  hydrocortisone 2.5% Rectal Cream 1 Application(s) Rectal daily PRN pain  acetaminophen   Tablet 650 milliGRAM(s) Oral every 6 hours PRN For Temp greater than 38 C (100.4 F)  acetaminophen   Tablet. 650 milliGRAM(s) Oral every 6 hours PRN Mild Pain to moderate pain      Allergies    No Known Allergies    Intolerances        Vital Signs Last 24 Hrs  T(C): 36.6 (02 Oct 2017 05:26), Max: 37.1 (01 Oct 2017 12:34)  T(F): 97.8 (02 Oct 2017 05:26), Max: 98.8 (01 Oct 2017 12:34)  HR: 90 (02 Oct 2017 05:26) (88 - 90)  BP: 134/73 (02 Oct 2017 05:26) (122/75 - 134/73)  BP(mean): --  RR: 20 (02 Oct 2017 05:26) (17 - 20)  SpO2: 98% (02 Oct 2017 05:26) (96% - 98%)    PHYSICAL EXAM:  GENERAL: NAD, + generalized weakness  NECK: Supple, No JVD, Normal thyroid  NERVOUS SYSTEM:  Alert & Oriented X3, non focal  CHEST/LUNG: Clear to percussion bilaterally; No rales, rhonchi, wheezing, or rubs  HEART: Regular rate and rhythm; No murmurs, rubs, or gallops  ABDOMEN: Soft, Nontender, Nondistended; Bowel sounds present  EXTREMITIES:  2+ Peripheral Pulses, + ankle edema  SKIN: No rashes or lesions    LABS:                        8.3    6.7   )-----------( 237      ( 02 Oct 2017 06:54 )             24.9     10-02    132<L>  |  94<L>  |  17.0  ----------------------------<  98  3.4<L>   |  27.0  |  0.36<L>    Ca    8.3<L>      02 Oct 2017 06:54    Osmolality, Random Urine: 546 mosm/kg (10.01.17 @ 21:07)              RADIOLOGY & ADDITIONAL TESTS:

## 2017-10-02 NOTE — PROGRESS NOTE ADULT - SUBJECTIVE AND OBJECTIVE BOX
NO overnight events. Slept well.  PPD negative as of yesterday.  Eating her breakfast.   Joint pain are stable.   Looking to move on to the next phase of her recovery.  Dc to assisted living tomorrow.  Filling out her application form to completion.     REVIEW OF SYSTEMS  Constitutional - No fever,  +fatigue  HEENT - No vertigo, No neck pain  Neurological - No headaches, No memory loss, +loss of strength, No numbness, No tremors  Skin - No rashes, No lesions   Musculoskeletal - +joint pain, No joint swelling, No muscle pain  Psychiatric - No depression, No anxiety    VITALS  T(C): 36.6 (10-02-17 @ 05:26), Max: 37.1 (10-01-17 @ 12:34)  HR: 90 (10-02-17 @ 05:26) (88 - 90)  BP: 134/73 (10-02-17 @ 05:26) (122/75 - 134/73)  RR: 20 (10-02-17 @ 05:26) (17 - 20)  SpO2: 98% (10-02-17 @ 05:26) (96% - 98%)  Wt(kg): --    MEDICATIONS   heparin  Injectable 5000 Unit(s) every 12 hours  pantoprazole    Tablet 40 milliGRAM(s) before breakfast  magnesium hydroxide Suspension 30 milliLiter(s) daily PRN  lisinopril 5 milliGRAM(s) daily  levothyroxine 75 MICROGram(s) daily  hydrocortisone 2.5% Rectal Cream 1 Application(s) daily PRN  multivitamin 1 Tablet(s) daily  ascorbic acid 500 milliGRAM(s) daily  acetaminophen   Tablet 650 milliGRAM(s) every 6 hours PRN  escitalopram 10 milliGRAM(s) daily  acetaminophen   Tablet. 650 milliGRAM(s) every 6 hours PRN  predniSONE   Tablet 25 milliGRAM(s) daily  predniSONE   Tablet     calcium carbonate 500 mG (Tums) Chewable 2 Tablet(s) daily  cholecalciferol 2000 Unit(s) daily      RECENT LABS/IMAGING  CBC Full  -  ( 02 Oct 2017 06:54 )  WBC Count : 6.7 K/uL  Hemoglobin : 8.3 g/dL  Hematocrit : 24.9 %  Platelet Count - Automated : 237 K/uL  Mean Cell Volume : 90.9 fl  Mean Cell Hemoglobin : 30.3 pg  Mean Cell Hemoglobin Concentration : 33.3 g/dL  Auto Neutrophil # : x  Auto Lymphocyte # : x  Auto Monocyte # : x  Auto Eosinophil # : x  Auto Basophil # : x  Auto Neutrophil % : x  Auto Lymphocyte % : x  Auto Monocyte % : x  Auto Eosinophil % : x  Auto Basophil % : x    10-02    132<L>  |  94<L>  |  17.0  ----------------------------<  98  3.4<L>   |  27.0  |  0.36<L>    Ca    8.3<L>      02 Oct 2017 06:54    prealb 10/2 - 17  ---  PHYSICAL EXAM  Constitutional - NAD, Comfortable  Pulm - Breathing comfortably, No wheezing  Abd - Soft, NTND  Extremities - No C/C/E, No calf tenderness  Neurologic Exam -                    Motor - No focal deficits     Sensory - Intact to LT  Psychiatric - Mood WNL, Affect Flat    ASSESSMENT/PLAN  87y Female with functional deficits related to seronegative symmetrical synovitis with arthralgia.  Hyponatremia - Improved, Stable, Fluid restriction 1.2L/day  Synovitis - Prednisone taper  HTN - Lisinopril  Pain - Tylenol PRN, Tramadol PRN (last 10/1)  DVT PPX - Heparin  HypoK+ - KCl 40meq x3 doses (10/2)    Continue 3hrs a day of comprehensive rehab program.

## 2017-10-02 NOTE — PROGRESS NOTE ADULT - NSHPATTENDINGPLANDISCUSS_GEN_ALL_CORE
Daughter at bedside
Daughter at bedside today.
pt
pt
pt, rehab physician
pt, rehab physician
RN, rehab physician
RN, rehab
pt, NP
pt, Np

## 2017-10-02 NOTE — PROGRESS NOTE ADULT - SUBJECTIVE AND OBJECTIVE BOX
CC: Pain and swelling in hands/feet    HPI: 87 year old female with PMHx  Reji 2+ thrombocytosis, dating back to 2013. She had been on Hydrea since Olya 10, 2013. Most recently she was found to be pancytopenic and the Hydrea was held as of July 12, 2017. 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 Followed  by Rheum and Heme/Onc,    INTERVAL HPI/OVERNIGHT EVENTS: Feeling better, no acute issues overnight. "I'm going to assisted living tomorrow"    Vital Signs Last 24 Hrs  T(C): 36.6 (02 Oct 2017 05:26), Max: 37.1 (01 Oct 2017 12:34)  T(F): 97.8 (02 Oct 2017 05:26), Max: 98.8 (01 Oct 2017 12:34)  HR: 90 (02 Oct 2017 05:26) (88 - 90)  BP: 134/73 (02 Oct 2017 05:26) (122/75 - 134/73)  BP(mean): --  RR: 20 (02 Oct 2017 05:26) (17 - 20)  SpO2: 98% (02 Oct 2017 05:26) (96% - 98%)  I&O's Detail    01 Oct 2017 07:01  -  02 Oct 2017 07:00  --------------------------------------------------------  IN:    Oral Fluid: 800 mL  Total IN: 800 mL    OUT:    Incontinent per Diaper: 1 mL    Stool: 1 mL    Voided: 1 mL  Total OUT: 3 mL    Total NET: 797 mL                                    8.3    6.7   )-----------( 237      ( 02 Oct 2017 06:54 )             24.9     02 Oct 2017 06:54    132    |  94     |  17.0   ----------------------------<  98     3.4     |  27.0   |  0.36     Ca    8.3        02 Oct 2017 06:54        CAPILLARY BLOOD GLUCOSE              MEDICATIONS  (STANDING):  heparin  Injectable 5000 Unit(s) SubCutaneous every 12 hours  pantoprazole    Tablet 40 milliGRAM(s) Oral before breakfast  lisinopril 5 milliGRAM(s) Oral daily  levothyroxine 75 MICROGram(s) Oral daily  multivitamin 1 Tablet(s) Oral daily  ascorbic acid 500 milliGRAM(s) Oral daily  escitalopram 10 milliGRAM(s) Oral daily  predniSONE   Tablet 25 milliGRAM(s) Oral daily  predniSONE   Tablet   Oral   calcium carbonate 500 mG (Tums) Chewable 2 Tablet(s) Chew daily  cholecalciferol 2000 Unit(s) Oral daily  potassium chloride    Tablet ER 40 milliEquivalent(s) Oral every 4 hours    MEDICATIONS  (PRN):  magnesium hydroxide Suspension 30 milliLiter(s) Oral daily PRN Constipation  hydrocortisone 2.5% Rectal Cream 1 Application(s) Rectal daily PRN pain  acetaminophen   Tablet 650 milliGRAM(s) Oral every 6 hours PRN For Temp greater than 38 C (100.4 F)  acetaminophen   Tablet. 650 milliGRAM(s) Oral every 6 hours PRN Mild Pain to moderate pain      RADIOLOGY & ADDITIONAL TESTS:    PHYSICAL EXAM:    GENERAL: NAD, well-groomed  HEENT: PERRL, +EOMI  NECK: soft, Supple, No JVD,   CHEST/LUNG: breathing comfortable  EXTREMITIES: , No clubbing, cyanosis, or edema  SKIN: No rashes or lesions  NEURO: AAOX3 CC: Pain and swelling in hands/feet    HPI: 87 year old female with PMHx  Reji 2+ thrombocytosis, dating back to 2013. She had been on Hydrea since Olya 10, 2013. Most recently she was found to be pancytopenic and the Hydrea was held as of July 12, 2017. 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 Followed  by Rheum and Heme/Onc,    INTERVAL HPI/OVERNIGHT EVENTS: Feeling better, no acute issues overnight. "I'm going to assisted living tomorrow"    Vital Signs Last 24 Hrs  T(C): 36.6 (02 Oct 2017 05:26), Max: 37.1 (01 Oct 2017 12:34)  T(F): 97.8 (02 Oct 2017 05:26), Max: 98.8 (01 Oct 2017 12:34)  HR: 90 (02 Oct 2017 05:26) (88 - 90)  BP: 134/73 (02 Oct 2017 05:26) (122/75 - 134/73)  BP(mean): --  RR: 20 (02 Oct 2017 05:26) (17 - 20)  SpO2: 98% (02 Oct 2017 05:26) (96% - 98%)  I&O's Detail    01 Oct 2017 07:01  -  02 Oct 2017 07:00  --------------------------------------------------------  IN:    Oral Fluid: 800 mL  Total IN: 800 mL    OUT:    Incontinent per Diaper: 1 mL    Stool: 1 mL    Voided: 1 mL  Total OUT: 3 mL    Total NET: 797 mL    PHYSICAL EXAM:    GENERAL: NAD, well-groomed  HEENT: PERRL, +EOMI  NECK: soft, Supple, No JVD,   CHEST/LUNG: breathing comfortable  EXTREMITIES: , No clubbing, cyanosis, or edema  SKIN: No rashes or lesions  NEURO: AAOX3                                  8.3    6.7   )-----------( 237      ( 02 Oct 2017 06:54 )             24.9     02 Oct 2017 06:54    132    |  94     |  17.0   ----------------------------<  98     3.4     |  27.0   |  0.36     Ca    8.3        02 Oct 2017 06:54        CAPILLARY BLOOD GLUCOSE              MEDICATIONS  (STANDING):  heparin  Injectable 5000 Unit(s) SubCutaneous every 12 hours  pantoprazole    Tablet 40 milliGRAM(s) Oral before breakfast  lisinopril 5 milliGRAM(s) Oral daily  levothyroxine 75 MICROGram(s) Oral daily  multivitamin 1 Tablet(s) Oral daily  ascorbic acid 500 milliGRAM(s) Oral daily  escitalopram 10 milliGRAM(s) Oral daily  predniSONE   Tablet 25 milliGRAM(s) Oral daily  predniSONE   Tablet   Oral   calcium carbonate 500 mG (Tums) Chewable 2 Tablet(s) Chew daily  cholecalciferol 2000 Unit(s) Oral daily  potassium chloride    Tablet ER 40 milliEquivalent(s) Oral every 4 hours    MEDICATIONS  (PRN):  magnesium hydroxide Suspension 30 milliLiter(s) Oral daily PRN Constipation  hydrocortisone 2.5% Rectal Cream 1 Application(s) Rectal daily PRN pain  acetaminophen   Tablet 650 milliGRAM(s) Oral every 6 hours PRN For Temp greater than 38 C (100.4 F)  acetaminophen   Tablet. 650 milliGRAM(s) Oral every 6 hours PRN Mild Pain to moderate pain      RADIOLOGY & ADDITIONAL TESTS:

## 2017-10-02 NOTE — PROGRESS NOTE ADULT - ASSESSMENT
87y F with acute onset b/l hand synovitis, tenosynovitis and swelling in the absence of positive serologies (RF, Lyme, Parvovirus, hepatitis) seemingly c/w PMR subset RS3PE initially improved on steroids with worsening last week requiring return to IV steroid.  Now currently improving and more active with PT.  Likely needs slower steroid taper.  RS3PE can be seen in some cases of hematologic malignancy.  she has PMHx  Reji 2+ thrombocytosis, dating back to 2013. She had been on Hydrea since Olya 10, 2013. Most recently she was found to be pancytopenic and the Hydrea was held as of July 12, 2017. [facility] is discharge home tomorrow to Assisted Living.     1) Polyarthralgia- rheumatology noted -    Now currently improving and more active with PT.  Likely needs slower steroid taper.  RS3PE can be seen in some cases of hematologic malignancy. 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  free water restriction. Sodium tabs as per renal. will need outpatient follow up    4) Hypothyroidism  - Levothyroxine     5) Elevated BP without history of HTN- likely secondary to steroids   - BP improved with Lisinopril    6) Anemia - chronic -s/p PRBC , follow CBC as outpatient    7) Fever - 2/2 post transfusion - no further temperature elevations    will sign off, reconsult as needed 87y F with acute onset b/l hand synovitis, tenosynovitis and swelling in the absence of positive serologies (RF, Lyme, Parvovirus, hepatitis) seemingly c/w PMR subset RS3PE initially improved on steroids with worsening last week requiring return to IV steroid.  Now currently improving and more active with PT.  Likely needs slower steroid taper.  RS3PE can be seen in some cases of hematologic malignancy.  she has PMHx  Reji 2+ thrombocytosis, dating back to 2013. She had been on Hydrea since Olya 10, 2013. Most recently she was found to be pancytopenic and the Hydrea was held as of July 12, 2017. Plan is discharge home tomorrow to Assisted Living.     1) Polyarthralgia- rheumatology noted -    Now currently improving and more active with PT.  Likely needs slower steroid taper.  RS3PE can be seen in some cases of hematologic malignancy. 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  free water restriction. Sodium tabs as per renal. will need outpatient follow up    4) Hypothyroidism  - Levothyroxine     5) Elevated BP without history of HTN- likely secondary to steroids   - BP improved with Lisinopril    6) Anemia - chronic -s/p PRBC , follow CBC as outpatient    7) Fever - 2/2 post transfusion - no further temperature elevations    will sign off, reconsult as needed

## 2017-10-03 ENCOUNTER — APPOINTMENT (OUTPATIENT)
Dept: RHEUMATOLOGY | Facility: CLINIC | Age: 82
End: 2017-10-03

## 2017-10-03 VITALS
OXYGEN SATURATION: 96 % | HEART RATE: 100 BPM | DIASTOLIC BLOOD PRESSURE: 58 MMHG | RESPIRATION RATE: 19 BRPM | SYSTOLIC BLOOD PRESSURE: 96 MMHG

## 2017-10-03 PROCEDURE — 99238 HOSP IP/OBS DSCHRG MGMT 30/<: CPT

## 2017-10-03 RX ORDER — PANTOPRAZOLE SODIUM 20 MG/1
1 TABLET, DELAYED RELEASE ORAL
Qty: 30 | Refills: 0
Start: 2017-10-03

## 2017-10-03 RX ORDER — ESCITALOPRAM OXALATE 10 MG/1
1 TABLET, FILM COATED ORAL
Qty: 30 | Refills: 0
Start: 2017-10-03

## 2017-10-03 RX ORDER — CHOLECALCIFEROL (VITAMIN D3) 125 MCG
2000 CAPSULE ORAL
Qty: 30 | Refills: 0
Start: 2017-10-03

## 2017-10-03 RX ORDER — ACETAMINOPHEN 500 MG
2 TABLET ORAL
Qty: 30 | Refills: 0
Start: 2017-10-03

## 2017-10-03 RX ORDER — ASCORBIC ACID 60 MG
1 TABLET,CHEWABLE ORAL
Qty: 30 | Refills: 0
Start: 2017-10-03

## 2017-10-03 RX ORDER — LEVOTHYROXINE SODIUM 125 MCG
1 TABLET ORAL
Qty: 30 | Refills: 0
Start: 2017-10-03

## 2017-10-03 RX ORDER — CALCIUM CARBONATE 500(1250)
2 TABLET ORAL
Qty: 30 | Refills: 0
Start: 2017-10-03

## 2017-10-03 RX ORDER — HYDROCORTISONE 1 %
1 OINTMENT (GRAM) TOPICAL
Qty: 30 | Refills: 0 | OUTPATIENT
Start: 2017-10-03

## 2017-10-03 RX ORDER — LISINOPRIL 2.5 MG/1
1 TABLET ORAL
Qty: 30 | Refills: 0 | OUTPATIENT
Start: 2017-10-03

## 2017-10-03 RX ORDER — ACETAMINOPHEN 500 MG
2 TABLET ORAL
Qty: 30 | Refills: 0 | OUTPATIENT
Start: 2017-10-03

## 2017-10-03 RX ORDER — GABAPENTIN 400 MG/1
100 CAPSULE ORAL
Qty: 30 | Refills: 0 | OUTPATIENT
Start: 2017-10-03

## 2017-10-03 RX ORDER — MAGNESIUM HYDROXIDE 400 MG/1
30 TABLET, CHEWABLE ORAL
Qty: 30 | Refills: 0 | OUTPATIENT
Start: 2017-10-03

## 2017-10-03 RX ADMIN — LISINOPRIL 5 MILLIGRAM(S): 2.5 TABLET ORAL at 05:18

## 2017-10-03 RX ADMIN — Medication 75 MICROGRAM(S): at 05:18

## 2017-10-03 RX ADMIN — Medication 500 MILLIGRAM(S): at 14:06

## 2017-10-03 RX ADMIN — Medication 2000 UNIT(S): at 14:07

## 2017-10-03 RX ADMIN — ESCITALOPRAM OXALATE 10 MILLIGRAM(S): 10 TABLET, FILM COATED ORAL at 14:07

## 2017-10-03 RX ADMIN — Medication 25 MILLIGRAM(S): at 05:19

## 2017-10-03 RX ADMIN — Medication 650 MILLIGRAM(S): at 14:08

## 2017-10-03 RX ADMIN — HEPARIN SODIUM 5000 UNIT(S): 5000 INJECTION INTRAVENOUS; SUBCUTANEOUS at 05:18

## 2017-10-03 RX ADMIN — PANTOPRAZOLE SODIUM 40 MILLIGRAM(S): 20 TABLET, DELAYED RELEASE ORAL at 05:18

## 2017-10-03 RX ADMIN — Medication 1 TABLET(S): at 14:06

## 2017-10-03 RX ADMIN — Medication 2 TABLET(S): at 14:05

## 2017-10-03 NOTE — PROGRESS NOTE ADULT - ASSESSMENT
Hyponatremia: probable SIADH component  - oral fluid restrict to continue  - cont NaCl for now  - monitor labs periodically

## 2017-10-03 NOTE — PROGRESS NOTE ADULT - PROVIDER SPECIALTY LIST ADULT
Hospitalist
Nephrology
Rehab Medicine
Rheumatology
Rehab Medicine
Rehab Medicine

## 2017-10-03 NOTE — PROGRESS NOTE ADULT - SUBJECTIVE AND OBJECTIVE BOX
NEPHROLOGY INTERVAL HPI/OVERNIGHT EVENTS:  pt clinically stable  no acute distress appreciated    MEDICATIONS  (STANDING):  ascorbic acid 500 milliGRAM(s) Oral daily  calcium carbonate 500 mG (Tums) Chewable 2 Tablet(s) Chew daily  cholecalciferol 2000 Unit(s) Oral daily  escitalopram 10 milliGRAM(s) Oral daily  heparin  Injectable 5000 Unit(s) SubCutaneous every 12 hours  levothyroxine 75 MICROGram(s) Oral daily  lisinopril 5 milliGRAM(s) Oral daily  multivitamin 1 Tablet(s) Oral daily  pantoprazole    Tablet 40 milliGRAM(s) Oral before breakfast  predniSONE   Tablet 25 milliGRAM(s) Oral daily  predniSONE   Tablet   Oral     MEDICATIONS  (PRN):  acetaminophen   Tablet 650 milliGRAM(s) Oral every 6 hours PRN For Temp greater than 38 C (100.4 F)  acetaminophen   Tablet. 650 milliGRAM(s) Oral every 6 hours PRN Mild Pain to moderate pain  hydrocortisone 2.5% Rectal Cream 1 Application(s) Rectal daily PRN pain  magnesium hydroxide Suspension 30 milliLiter(s) Oral daily PRN Constipation      Allergies    No Known Allergies          Vital Signs Last 24 Hrs  T(C): 36.3 (03 Oct 2017 05:45), Max: 37.2 (02 Oct 2017 20:03)  T(F): 97.3 (03 Oct 2017 05:45), Max: 99 (02 Oct 2017 20:03)  HR: 95 (03 Oct 2017 05:45) (91 - 95)  BP: 130/71 (03 Oct 2017 05:45) (119/71 - 136/76)  BP(mean): --  RR: 18 (03 Oct 2017 05:45) (18 - 18)  SpO2: 96% (03 Oct 2017 05:45) (96% - 98%)    PHYSICAL EXAM:  GENERAL: NAD, + generalized weakness  NECK: Supple, No JVD, Normal thyroid  NERVOUS SYSTEM:  Alert & Oriented X3, non focal  CHEST/LUNG: Clear to percussion bilaterally; No rales, rhonchi, wheezing, or rubs  HEART: Regular rate and rhythm; No murmurs, rubs, or gallops  ABDOMEN: Soft, Nontender, Nondistended; Bowel sounds present  EXTREMITIES:  2+ Peripheral Pulses, no edema  SKIN: No rashes or lesions    LABS:                        8.3    6.7   )-----------( 237      ( 02 Oct 2017 06:54 )             24.9     10-02    132<L>  |  94<L>  |  17.0  ----------------------------<  98  3.4<L>   |  27.0  |  0.36<L>    Ca    8.3<L>      02 Oct 2017 06:54              RADIOLOGY & ADDITIONAL TESTS:

## 2017-10-03 NOTE — PROGRESS NOTE ADULT - SUBJECTIVE AND OBJECTIVE BOX
Patient dong well this AM. Sleeping well.   Denies pain.  Looking forward to going to the next step in her recovery.    REVIEW OF SYSTEMS  Constitutional - No fever,  No fatigue  HEENT - No vertigo, No neck pain  Neurological - No headaches, No memory loss, No loss of strength, No numbness, No tremors  Skin - No rashes, No lesions   Musculoskeletal - +joint pain, No joint swelling, No muscle pain  Psychiatric - No depression, No anxiety    VITALS  T(C): 36.8 (10-03-17 @ 10:30), Max: 37.2 (10-02-17 @ 20:03)  HR: 100 (10-03-17 @ 14:21) (93 - 100)  BP: 96/58 (10-03-17 @ 14:21) (96/58 - 136/76)  RR: 19 (10-03-17 @ 14:21) (18 - 19)  SpO2: 96% (10-03-17 @ 14:21) (96% - 98%)  Wt(kg): --    MEDICATIONS   acetaminophen   Tablet 650 milliGRAM(s) every 6 hours PRN  acetaminophen   Tablet. 650 milliGRAM(s) every 6 hours PRN  ascorbic acid 500 milliGRAM(s) daily  calcium carbonate 500 mG (Tums) Chewable 2 Tablet(s) daily  cholecalciferol 2000 Unit(s) daily  escitalopram 10 milliGRAM(s) daily  heparin  Injectable 5000 Unit(s) every 12 hours  hydrocortisone 2.5% Rectal Cream 1 Application(s) daily PRN  levothyroxine 75 MICROGram(s) daily  lisinopril 5 milliGRAM(s) daily  magnesium hydroxide Suspension 30 milliLiter(s) daily PRN  multivitamin 1 Tablet(s) daily  pantoprazole    Tablet 40 milliGRAM(s) before breakfast  predniSONE   Tablet 25 milliGRAM(s) daily  predniSONE   Tablet         RECENT LABS/IMAGING  CBC Full  -  ( 02 Oct 2017 06:54 )  WBC Count : 6.7 K/uL  Hemoglobin : 8.3 g/dL  Hematocrit : 24.9 %  Platelet Count - Automated : 237 K/uL  Mean Cell Volume : 90.9 fl  Mean Cell Hemoglobin : 30.3 pg  Mean Cell Hemoglobin Concentration : 33.3 g/dL  Auto Neutrophil # : x  Auto Lymphocyte # : x  Auto Monocyte # : x  Auto Eosinophil # : x  Auto Basophil # : x  Auto Neutrophil % : x  Auto Lymphocyte % : x  Auto Monocyte % : x  Auto Eosinophil % : x  Auto Basophil % : x    10-02    132<L>  |  94<L>  |  17.0  ----------------------------<  98  3.4<L>   |  27.0  |  0.36<L>    Ca    8.3<L>      02 Oct 2017 06:54      ---  PHYSICAL EXAM  Constitutional - NAD, Comfortable  Pulm - Breathing comfortably, No wheezing  Abd - Soft, NTND  Extremities - No C/C/E, No calf tenderness  Neurologic Exam -                    Motor - No focal deficits     Sensory - Intact to LT  Psychiatric - Mood WNL, Affect Flat    ASSESSMENT/PLAN  87y Female with functional deficits related to seronegative symmetrical synovitis with arthralgia.  Hyponatremia - Improved, Stable, Fluid restriction 1.2L/day  Synovitis - Prednisone taper  HTN - Lisinopril  Pain - Tylenol PRN, Tramadol PRN (last 10/1)  DVT PPX - Heparin  HypoK+ - KCl 40meq x3 doses (10/2)    DC to assisted living today.

## 2017-10-08 LAB
CORTICOSTEROID BINDING GLOBULIN RESULT: 3 MG/DL — SIGNIFICANT CHANGE UP
CORTIS F/TOTAL MFR SERPL: 4.2 % — SIGNIFICANT CHANGE UP
CORTIS SERPL-MCNC: 13 UG/DL — SIGNIFICANT CHANGE UP
CORTISOL, FREE RESULT: 0.55 UG/DL — SIGNIFICANT CHANGE UP

## 2017-10-09 ENCOUNTER — RESULT REVIEW (OUTPATIENT)
Age: 82
End: 2017-10-09

## 2017-10-09 ENCOUNTER — APPOINTMENT (OUTPATIENT)
Dept: HEMATOLOGY ONCOLOGY | Facility: CLINIC | Age: 82
End: 2017-10-09
Payer: MEDICARE

## 2017-10-09 VITALS
OXYGEN SATURATION: 99 % | SYSTOLIC BLOOD PRESSURE: 128 MMHG | WEIGHT: 111.86 LBS | TEMPERATURE: 98.1 F | DIASTOLIC BLOOD PRESSURE: 75 MMHG | HEART RATE: 101 BPM | BODY MASS INDEX: 19.95 KG/M2

## 2017-10-09 LAB
BASOPHILS # BLD AUTO: 0.2 K/UL — SIGNIFICANT CHANGE UP (ref 0–0.2)
EOSINOPHIL # BLD AUTO: 0 K/UL — SIGNIFICANT CHANGE UP (ref 0–0.5)
HCT VFR BLD CALC: 25.3 % — LOW (ref 34.5–45)
HGB BLD-MCNC: 8.5 G/DL — LOW (ref 11.5–15.5)
LYMPHOCYTES # BLD AUTO: 1.1 K/UL — SIGNIFICANT CHANGE UP (ref 1–3.3)
LYMPHOCYTES # BLD AUTO: 12 % — LOW (ref 13–44)
MCHC RBC-ENTMCNC: 30.9 PG — SIGNIFICANT CHANGE UP (ref 27–34)
MCHC RBC-ENTMCNC: 33.7 GM/DL — SIGNIFICANT CHANGE UP (ref 32–36)
MCV RBC AUTO: 91.7 FL — SIGNIFICANT CHANGE UP (ref 80–100)
MONOCYTES # BLD AUTO: 2.5 K/UL — HIGH (ref 0–0.9)
MONOCYTES NFR BLD AUTO: 39 % — HIGH (ref 2–14)
NEUTROPHILS # BLD AUTO: 4.4 K/UL — SIGNIFICANT CHANGE UP (ref 1.8–7.4)
NEUTROPHILS NFR BLD AUTO: 49 % — SIGNIFICANT CHANGE UP (ref 43–77)
PLAT MORPH BLD: NORMAL — SIGNIFICANT CHANGE UP
PLATELET # BLD AUTO: 285 K/UL — SIGNIFICANT CHANGE UP (ref 150–400)
RBC # BLD: 2.76 M/UL — LOW (ref 3.8–5.2)
RBC # FLD: 18.1 % — HIGH (ref 10.3–14.5)
RBC BLD AUTO: SIGNIFICANT CHANGE UP
WBC # BLD: 8.3 K/UL — SIGNIFICANT CHANGE UP (ref 3.8–10.5)
WBC # FLD AUTO: 8.3 K/UL — SIGNIFICANT CHANGE UP (ref 3.8–10.5)

## 2017-10-09 PROCEDURE — 99214 OFFICE O/P EST MOD 30 MIN: CPT

## 2017-10-09 RX ORDER — ACETAMINOPHEN 325 MG/1
TABLET, FILM COATED ORAL
Refills: 0 | Status: ACTIVE | COMMUNITY

## 2017-10-10 LAB
ALBUMIN SERPL ELPH-MCNC: 3.1 G/DL
ALP BLD-CCNC: 145 U/L
ALT SERPL-CCNC: 16 U/L
ANION GAP SERPL CALC-SCNC: 15 MMOL/L
AST SERPL-CCNC: 13 U/L
BILIRUB SERPL-MCNC: 0.8 MG/DL
BUN SERPL-MCNC: 15 MG/DL
CALCIUM SERPL-MCNC: 8.5 MG/DL
CHLORIDE SERPL-SCNC: 93 MMOL/L
CO2 SERPL-SCNC: 23 MMOL/L
CREAT SERPL-MCNC: 0.59 MG/DL
GLUCOSE SERPL-MCNC: 160 MG/DL
POTASSIUM SERPL-SCNC: 4.3 MMOL/L
PROT SERPL-MCNC: 6.6 G/DL
SODIUM SERPL-SCNC: 131 MMOL/L

## 2017-10-16 ENCOUNTER — RESULT REVIEW (OUTPATIENT)
Age: 82
End: 2017-10-16

## 2017-10-16 ENCOUNTER — APPOINTMENT (OUTPATIENT)
Dept: HEMATOLOGY ONCOLOGY | Facility: CLINIC | Age: 82
End: 2017-10-16

## 2017-10-16 ENCOUNTER — OUTPATIENT (OUTPATIENT)
Dept: OUTPATIENT SERVICES | Facility: HOSPITAL | Age: 82
LOS: 1 days | End: 2017-10-16
Payer: MEDICARE

## 2017-10-16 VITALS
DIASTOLIC BLOOD PRESSURE: 66 MMHG | HEART RATE: 100 BPM | RESPIRATION RATE: 14 BRPM | SYSTOLIC BLOOD PRESSURE: 108 MMHG | TEMPERATURE: 98.1 F

## 2017-10-16 DIAGNOSIS — D55.1 ANEMIA DUE TO OTHER DISORDERS OF GLUTATHIONE METABOLISM: ICD-10-CM

## 2017-10-16 LAB
BLD GP AB SCN SERPL QL: SIGNIFICANT CHANGE UP
EOSINOPHIL # BLD AUTO: 0 K/UL — SIGNIFICANT CHANGE UP (ref 0–0.5)
EOSINOPHIL NFR BLD AUTO: 2 % — SIGNIFICANT CHANGE UP (ref 0–6)
HCT VFR BLD CALC: 19.9 % — CRITICAL LOW (ref 34.5–45)
HGB BLD-MCNC: 6.8 G/DL — CRITICAL LOW (ref 11.5–15.5)
LYMPHOCYTES # BLD AUTO: 1.2 K/UL — SIGNIFICANT CHANGE UP (ref 1–3.3)
LYMPHOCYTES # BLD AUTO: 24 % — SIGNIFICANT CHANGE UP (ref 13–44)
MCHC RBC-ENTMCNC: 31.1 PG — SIGNIFICANT CHANGE UP (ref 27–34)
MCHC RBC-ENTMCNC: 34.1 GM/DL — SIGNIFICANT CHANGE UP (ref 32–36)
MCV RBC AUTO: 91.2 FL — SIGNIFICANT CHANGE UP (ref 80–100)
MONOCYTES # BLD AUTO: 4.2 K/UL — HIGH (ref 0–0.9)
MONOCYTES NFR BLD AUTO: 38 % — HIGH (ref 2–14)
NEUTROPHILS # BLD AUTO: 3 K/UL — SIGNIFICANT CHANGE UP (ref 1.8–7.4)
NEUTROPHILS NFR BLD AUTO: 36 % — LOW (ref 43–77)
PLAT MORPH BLD: NORMAL — SIGNIFICANT CHANGE UP
PLATELET # BLD AUTO: 259 K/UL — SIGNIFICANT CHANGE UP (ref 150–400)
RBC # BLD: 2.18 M/UL — LOW (ref 3.8–5.2)
RBC # FLD: 17.9 % — HIGH (ref 10.3–14.5)
RBC BLD AUTO: SIGNIFICANT CHANGE UP
TYPE + AB SCN PNL BLD: SIGNIFICANT CHANGE UP
WBC # BLD: 8.7 K/UL — SIGNIFICANT CHANGE UP (ref 3.8–10.5)
WBC # FLD AUTO: 8.7 K/UL — SIGNIFICANT CHANGE UP (ref 3.8–10.5)

## 2017-10-16 RX ORDER — ACETAMINOPHEN 500 MG
650 TABLET ORAL ONCE
Qty: 0 | Refills: 0 | Status: COMPLETED | OUTPATIENT
Start: 2017-10-17 | End: 2017-10-17

## 2017-10-16 RX ORDER — DIPHENHYDRAMINE HCL 50 MG
25 CAPSULE ORAL ONCE
Qty: 0 | Refills: 0 | Status: COMPLETED | OUTPATIENT
Start: 2017-10-17 | End: 2017-10-17

## 2017-10-17 ENCOUNTER — OUTPATIENT (OUTPATIENT)
Dept: OUTPATIENT SERVICES | Facility: HOSPITAL | Age: 82
LOS: 1 days | End: 2017-10-17
Payer: MEDICARE

## 2017-10-17 ENCOUNTER — OTHER (OUTPATIENT)
Age: 82
End: 2017-10-17

## 2017-10-17 VITALS
DIASTOLIC BLOOD PRESSURE: 58 MMHG | TEMPERATURE: 98 F | SYSTOLIC BLOOD PRESSURE: 124 MMHG | RESPIRATION RATE: 16 BRPM | HEART RATE: 97 BPM | OXYGEN SATURATION: 98 %

## 2017-10-17 VITALS
RESPIRATION RATE: 16 BRPM | SYSTOLIC BLOOD PRESSURE: 129 MMHG | OXYGEN SATURATION: 97 % | DIASTOLIC BLOOD PRESSURE: 58 MMHG | HEART RATE: 94 BPM | TEMPERATURE: 99 F

## 2017-10-17 DIAGNOSIS — D55.1 ANEMIA DUE TO OTHER DISORDERS OF GLUTATHIONE METABOLISM: ICD-10-CM

## 2017-10-17 PROCEDURE — 36415 COLL VENOUS BLD VENIPUNCTURE: CPT

## 2017-10-17 PROCEDURE — 86850 RBC ANTIBODY SCREEN: CPT

## 2017-10-17 PROCEDURE — 86901 BLOOD TYPING SEROLOGIC RH(D): CPT

## 2017-10-17 PROCEDURE — 86920 COMPATIBILITY TEST SPIN: CPT

## 2017-10-17 PROCEDURE — 36430 TRANSFUSION BLD/BLD COMPNT: CPT

## 2017-10-17 PROCEDURE — P9016: CPT

## 2017-10-17 PROCEDURE — 96374 THER/PROPH/DIAG INJ IV PUSH: CPT

## 2017-10-17 PROCEDURE — 86900 BLOOD TYPING SEROLOGIC ABO: CPT

## 2017-10-17 RX ADMIN — Medication 650 MILLIGRAM(S): at 09:56

## 2017-10-17 RX ADMIN — Medication 25 MILLIGRAM(S): at 09:56

## 2017-10-19 ENCOUNTER — APPOINTMENT (OUTPATIENT)
Dept: RHEUMATOLOGY | Facility: CLINIC | Age: 82
End: 2017-10-19
Payer: MEDICARE

## 2017-10-19 VITALS
RESPIRATION RATE: 15 BRPM | BODY MASS INDEX: 20.38 KG/M2 | WEIGHT: 115 LBS | TEMPERATURE: 98.2 F | HEIGHT: 63 IN | HEART RATE: 100 BPM | OXYGEN SATURATION: 98 %

## 2017-10-19 DIAGNOSIS — Z79.899 OTHER LONG TERM (CURRENT) DRUG THERAPY: ICD-10-CM

## 2017-10-19 DIAGNOSIS — Z87.39 PERSONAL HISTORY OF OTHER DISEASES OF THE MUSCULOSKELETAL SYSTEM AND CONNECTIVE TISSUE: ICD-10-CM

## 2017-10-19 PROCEDURE — 86618 LYME DISEASE ANTIBODY: CPT

## 2017-10-19 PROCEDURE — 86431 RHEUMATOID FACTOR QUANT: CPT

## 2017-10-19 PROCEDURE — 86200 CCP ANTIBODY: CPT

## 2017-10-19 PROCEDURE — 96374 THER/PROPH/DIAG INJ IV PUSH: CPT

## 2017-10-19 PROCEDURE — 86235 NUCLEAR ANTIGEN ANTIBODY: CPT

## 2017-10-19 PROCEDURE — 96375 TX/PRO/DX INJ NEW DRUG ADDON: CPT

## 2017-10-19 PROCEDURE — 84443 ASSAY THYROID STIM HORMONE: CPT

## 2017-10-19 PROCEDURE — 86747 PARVOVIRUS ANTIBODY: CPT

## 2017-10-19 PROCEDURE — 83880 ASSAY OF NATRIURETIC PEPTIDE: CPT

## 2017-10-19 PROCEDURE — 86038 ANTINUCLEAR ANTIBODIES: CPT

## 2017-10-19 PROCEDURE — 99285 EMERGENCY DEPT VISIT HI MDM: CPT | Mod: 25

## 2017-10-19 PROCEDURE — 85652 RBC SED RATE AUTOMATED: CPT

## 2017-10-19 PROCEDURE — 86160 COMPLEMENT ANTIGEN: CPT

## 2017-10-19 PROCEDURE — 97163 PT EVAL HIGH COMPLEX 45 MIN: CPT

## 2017-10-19 PROCEDURE — 80048 BASIC METABOLIC PNL TOTAL CA: CPT

## 2017-10-19 PROCEDURE — 93970 EXTREMITY STUDY: CPT

## 2017-10-19 PROCEDURE — 93971 EXTREMITY STUDY: CPT

## 2017-10-19 PROCEDURE — 86376 MICROSOMAL ANTIBODY EACH: CPT

## 2017-10-19 PROCEDURE — 86225 DNA ANTIBODY NATIVE: CPT

## 2017-10-19 PROCEDURE — 73610 X-RAY EXAM OF ANKLE: CPT

## 2017-10-19 PROCEDURE — 97110 THERAPEUTIC EXERCISES: CPT

## 2017-10-19 PROCEDURE — 84550 ASSAY OF BLOOD/URIC ACID: CPT

## 2017-10-19 PROCEDURE — 80074 ACUTE HEPATITIS PANEL: CPT

## 2017-10-19 PROCEDURE — 73110 X-RAY EXAM OF WRIST: CPT

## 2017-10-19 PROCEDURE — 99284 EMERGENCY DEPT VISIT MOD MDM: CPT | Mod: 25

## 2017-10-19 PROCEDURE — 36415 COLL VENOUS BLD VENIPUNCTURE: CPT

## 2017-10-19 PROCEDURE — 80053 COMPREHEN METABOLIC PANEL: CPT

## 2017-10-19 PROCEDURE — 87040 BLOOD CULTURE FOR BACTERIA: CPT

## 2017-10-19 PROCEDURE — 85730 THROMBOPLASTIN TIME PARTIAL: CPT

## 2017-10-19 PROCEDURE — 86140 C-REACTIVE PROTEIN: CPT

## 2017-10-19 PROCEDURE — 83735 ASSAY OF MAGNESIUM: CPT

## 2017-10-19 PROCEDURE — 81001 URINALYSIS AUTO W/SCOPE: CPT

## 2017-10-19 PROCEDURE — 97116 GAIT TRAINING THERAPY: CPT

## 2017-10-19 PROCEDURE — 84100 ASSAY OF PHOSPHORUS: CPT

## 2017-10-19 PROCEDURE — 85027 COMPLETE CBC AUTOMATED: CPT

## 2017-10-19 PROCEDURE — 85610 PROTHROMBIN TIME: CPT

## 2017-10-19 PROCEDURE — 99215 OFFICE O/P EST HI 40 MIN: CPT

## 2017-10-19 PROCEDURE — 73130 X-RAY EXAM OF HAND: CPT

## 2017-10-20 ENCOUNTER — OUTPATIENT (OUTPATIENT)
Dept: OUTPATIENT SERVICES | Facility: HOSPITAL | Age: 82
LOS: 1 days | Discharge: HOME | End: 2017-10-20

## 2017-10-20 DIAGNOSIS — D47.3 ESSENTIAL (HEMORRHAGIC) THROMBOCYTHEMIA: ICD-10-CM

## 2017-10-23 LAB
ALBUMIN SERPL ELPH-MCNC: 2.8 G/DL
ALP BLD-CCNC: 94 U/L
ALT SERPL-CCNC: 10 U/L
ANION GAP SERPL CALC-SCNC: 18 MMOL/L
AST SERPL-CCNC: 11 U/L
BILIRUB SERPL-MCNC: 0.6 MG/DL
BUN SERPL-MCNC: 17 MG/DL
CALCIUM SERPL-MCNC: 8.2 MG/DL
CHLORIDE SERPL-SCNC: 91 MMOL/L
CO2 SERPL-SCNC: 25 MMOL/L
CREAT SERPL-MCNC: 0.68 MG/DL
GLUCOSE SERPL-MCNC: 128 MG/DL
HBV CORE IGG+IGM SER QL: NONREACTIVE
HBV SURFACE AB SER QL: NONREACTIVE
HBV SURFACE AG SER QL: NONREACTIVE
HCV AB SER QL: NONREACTIVE
HCV S/CO RATIO: 0.12 S/CO
POTASSIUM SERPL-SCNC: 4.1 MMOL/L
PROT SERPL-MCNC: 6.2 G/DL
SODIUM SERPL-SCNC: 134 MMOL/L

## 2017-10-26 ENCOUNTER — APPOINTMENT (OUTPATIENT)
Dept: HEMATOLOGY ONCOLOGY | Facility: CLINIC | Age: 82
End: 2017-10-26

## 2017-10-26 ENCOUNTER — RESULT REVIEW (OUTPATIENT)
Age: 82
End: 2017-10-26

## 2017-10-26 LAB
ANISOCYTOSIS BLD QL: SLIGHT — SIGNIFICANT CHANGE UP
BASOPHILS # BLD AUTO: 0.1 K/UL — SIGNIFICANT CHANGE UP (ref 0–0.2)
BLASTS # FLD: 1 % — HIGH (ref 0–0)
DACRYOCYTES BLD QL SMEAR: SLIGHT — SIGNIFICANT CHANGE UP
ELLIPTOCYTES BLD QL SMEAR: SLIGHT — SIGNIFICANT CHANGE UP
EOSINOPHIL # BLD AUTO: 0 K/UL — SIGNIFICANT CHANGE UP (ref 0–0.5)
ERYTHROCYTE [SEDIMENTATION RATE] IN BLOOD: 115 MM/HR — HIGH (ref 0–20)
HCT VFR BLD CALC: 25 % — LOW (ref 34.5–45)
HGB BLD-MCNC: 8.3 G/DL — LOW (ref 11.5–15.5)
HYPOCHROMIA BLD QL: SLIGHT — SIGNIFICANT CHANGE UP
LYMPHOCYTES # BLD AUTO: 1.2 K/UL — SIGNIFICANT CHANGE UP (ref 1–3.3)
LYMPHOCYTES # BLD AUTO: 22 % — SIGNIFICANT CHANGE UP (ref 13–44)
MACROCYTES BLD QL: SLIGHT — SIGNIFICANT CHANGE UP
MCHC RBC-ENTMCNC: 29.4 PG — SIGNIFICANT CHANGE UP (ref 27–34)
MCHC RBC-ENTMCNC: 33 GM/DL — SIGNIFICANT CHANGE UP (ref 32–36)
MCV RBC AUTO: 88.8 FL — SIGNIFICANT CHANGE UP (ref 80–100)
MICROCYTES BLD QL: SLIGHT — SIGNIFICANT CHANGE UP
MONOCYTES # BLD AUTO: 1.9 K/UL — HIGH (ref 0–0.9)
MONOCYTES NFR BLD AUTO: 22 % — HIGH (ref 2–14)
NEUTROPHILS # BLD AUTO: 3.8 K/UL — SIGNIFICANT CHANGE UP (ref 1.8–7.4)
NEUTROPHILS NFR BLD AUTO: 55 % — SIGNIFICANT CHANGE UP (ref 43–77)
OVALOCYTES BLD QL SMEAR: SLIGHT — SIGNIFICANT CHANGE UP
PLAT MORPH BLD: NORMAL — SIGNIFICANT CHANGE UP
PLATELET # BLD AUTO: 294 K/UL — SIGNIFICANT CHANGE UP (ref 150–400)
POIKILOCYTOSIS BLD QL AUTO: SLIGHT — SIGNIFICANT CHANGE UP
RBC # BLD: 2.82 M/UL — LOW (ref 3.8–5.2)
RBC # FLD: 16.4 % — HIGH (ref 10.3–14.5)
RBC BLD AUTO: SIGNIFICANT CHANGE UP
TARGETS BLD QL SMEAR: SLIGHT — SIGNIFICANT CHANGE UP
WBC # BLD: 6.9 K/UL — SIGNIFICANT CHANGE UP (ref 3.8–10.5)
WBC # FLD AUTO: 6.9 K/UL — SIGNIFICANT CHANGE UP (ref 3.8–10.5)

## 2017-10-27 ENCOUNTER — RX RENEWAL (OUTPATIENT)
Age: 82
End: 2017-10-27

## 2017-10-30 LAB
ALBUMIN SERPL ELPH-MCNC: 3.2 G/DL
ALP BLD-CCNC: 89 U/L
ALT SERPL-CCNC: 11 U/L
ANA PAT FLD IF-IMP: ABNORMAL
ANA SER IF-ACNC: ABNORMAL
ANION GAP SERPL CALC-SCNC: 16 MMOL/L
AST SERPL-CCNC: 10 U/L
BILIRUB SERPL-MCNC: 0.6 MG/DL
BUN SERPL-MCNC: 22 MG/DL
CALCIUM SERPL-MCNC: 8.3 MG/DL
CHLORIDE SERPL-SCNC: 94 MMOL/L
CK SERPL-CCNC: 16 U/L
CO2 SERPL-SCNC: 23 MMOL/L
CREAT SERPL-MCNC: 0.66 MG/DL
CRP SERPL-MCNC: 3.7 MG/DL
GLUCOSE SERPL-MCNC: 129 MG/DL
POTASSIUM SERPL-SCNC: 4.3 MMOL/L
PROT SERPL-MCNC: 6.7 G/DL
SODIUM SERPL-SCNC: 133 MMOL/L

## 2017-10-31 LAB — ALDOLASE SERPL-CCNC: 5.8 U/L

## 2017-11-02 ENCOUNTER — RESULT REVIEW (OUTPATIENT)
Age: 82
End: 2017-11-02

## 2017-11-02 ENCOUNTER — RX RENEWAL (OUTPATIENT)
Age: 82
End: 2017-11-02

## 2017-11-02 ENCOUNTER — APPOINTMENT (OUTPATIENT)
Dept: HEMATOLOGY ONCOLOGY | Facility: CLINIC | Age: 82
End: 2017-11-02

## 2017-11-02 ENCOUNTER — OUTPATIENT (OUTPATIENT)
Dept: OUTPATIENT SERVICES | Facility: HOSPITAL | Age: 82
LOS: 1 days | End: 2017-11-02
Payer: MEDICARE

## 2017-11-02 DIAGNOSIS — D55.2 ANEMIA DUE TO DISORDERS OF GLYCOLYTIC ENZYMES: ICD-10-CM

## 2017-11-02 LAB
BASOPHILS # BLD AUTO: 0.1 K/UL — SIGNIFICANT CHANGE UP (ref 0–0.2)
BASOPHILS NFR BLD AUTO: 0.9 % — SIGNIFICANT CHANGE UP (ref 0–2)
BLD GP AB SCN SERPL QL: SIGNIFICANT CHANGE UP
EOSINOPHIL # BLD AUTO: 0 K/UL — SIGNIFICANT CHANGE UP (ref 0–0.5)
EOSINOPHIL NFR BLD AUTO: 0.5 % — SIGNIFICANT CHANGE UP (ref 0–6)
HCT VFR BLD CALC: 23.8 % — LOW (ref 34.5–45)
HGB BLD-MCNC: 7.7 G/DL — LOW (ref 11.5–15.5)
LYMPHOCYTES # BLD AUTO: 1.5 K/UL — SIGNIFICANT CHANGE UP (ref 1–3.3)
LYMPHOCYTES # BLD AUTO: 20.2 % — SIGNIFICANT CHANGE UP (ref 13–44)
MCHC RBC-ENTMCNC: 28.9 PG — SIGNIFICANT CHANGE UP (ref 27–34)
MCHC RBC-ENTMCNC: 32.5 GM/DL — SIGNIFICANT CHANGE UP (ref 32–36)
MCV RBC AUTO: 88.8 FL — SIGNIFICANT CHANGE UP (ref 80–100)
MONOCYTES # BLD AUTO: 1.7 K/UL — HIGH (ref 0–0.9)
MONOCYTES NFR BLD AUTO: 23.7 % — HIGH (ref 2–14)
NEUTROPHILS # BLD AUTO: 4 K/UL — SIGNIFICANT CHANGE UP (ref 1.8–7.4)
NEUTROPHILS NFR BLD AUTO: 54.7 % — SIGNIFICANT CHANGE UP (ref 43–77)
PLATELET # BLD AUTO: 353 K/UL — SIGNIFICANT CHANGE UP (ref 150–400)
RBC # BLD: 2.68 M/UL — LOW (ref 3.8–5.2)
RBC # FLD: 16.8 % — HIGH (ref 10.3–14.5)
TYPE + AB SCN PNL BLD: SIGNIFICANT CHANGE UP
WBC # BLD: 7.2 K/UL — SIGNIFICANT CHANGE UP (ref 3.8–10.5)
WBC # FLD AUTO: 7.2 K/UL — SIGNIFICANT CHANGE UP (ref 3.8–10.5)

## 2017-11-02 RX ORDER — DIPHENHYDRAMINE HCL 50 MG
25 CAPSULE ORAL ONCE
Qty: 0 | Refills: 0 | Status: COMPLETED | OUTPATIENT
Start: 2017-11-03 | End: 2017-11-03

## 2017-11-02 RX ORDER — PREDNISONE 50 MG/1
TABLET ORAL
Refills: 0 | Status: DISCONTINUED | COMMUNITY
End: 2017-11-02

## 2017-11-02 RX ORDER — ACETAMINOPHEN 500 MG
650 TABLET ORAL ONCE
Qty: 0 | Refills: 0 | Status: COMPLETED | OUTPATIENT
Start: 2017-11-03 | End: 2017-11-03

## 2017-11-03 ENCOUNTER — OUTPATIENT (OUTPATIENT)
Dept: OUTPATIENT SERVICES | Facility: HOSPITAL | Age: 82
LOS: 1 days | End: 2017-11-03
Payer: MEDICARE

## 2017-11-03 VITALS
OXYGEN SATURATION: 92 % | HEART RATE: 88 BPM | TEMPERATURE: 98 F | SYSTOLIC BLOOD PRESSURE: 143 MMHG | DIASTOLIC BLOOD PRESSURE: 86 MMHG | RESPIRATION RATE: 18 BRPM

## 2017-11-03 VITALS
HEART RATE: 85 BPM | RESPIRATION RATE: 18 BRPM | TEMPERATURE: 98 F | SYSTOLIC BLOOD PRESSURE: 141 MMHG | DIASTOLIC BLOOD PRESSURE: 74 MMHG | OXYGEN SATURATION: 91 %

## 2017-11-03 DIAGNOSIS — D55.2 ANEMIA DUE TO DISORDERS OF GLYCOLYTIC ENZYMES: ICD-10-CM

## 2017-11-03 PROCEDURE — 86920 COMPATIBILITY TEST SPIN: CPT

## 2017-11-03 PROCEDURE — 36430 TRANSFUSION BLD/BLD COMPNT: CPT

## 2017-11-03 PROCEDURE — 86900 BLOOD TYPING SEROLOGIC ABO: CPT

## 2017-11-03 PROCEDURE — P9016: CPT

## 2017-11-03 PROCEDURE — 86850 RBC ANTIBODY SCREEN: CPT

## 2017-11-03 PROCEDURE — 86901 BLOOD TYPING SEROLOGIC RH(D): CPT

## 2017-11-03 RX ADMIN — Medication 25 MILLIGRAM(S): at 11:50

## 2017-11-03 RX ADMIN — Medication 650 MILLIGRAM(S): at 11:49

## 2017-11-10 ENCOUNTER — APPOINTMENT (OUTPATIENT)
Dept: COLORECTAL SURGERY | Facility: CLINIC | Age: 82
End: 2017-11-10

## 2017-11-13 ENCOUNTER — APPOINTMENT (OUTPATIENT)
Dept: COLORECTAL SURGERY | Facility: CLINIC | Age: 82
End: 2017-11-13
Payer: MEDICARE

## 2017-11-13 VITALS
RESPIRATION RATE: 14 BRPM | SYSTOLIC BLOOD PRESSURE: 100 MMHG | BODY MASS INDEX: 19.49 KG/M2 | DIASTOLIC BLOOD PRESSURE: 68 MMHG | WEIGHT: 110 LBS | TEMPERATURE: 98.2 F | HEIGHT: 63 IN

## 2017-11-13 DIAGNOSIS — Z86.2 PERSONAL HISTORY OF DISEASES OF THE BLOOD AND BLOOD-FORMING ORGANS AND CERTAIN DISORDERS INVOLVING THE IMMUNE MECHANISM: ICD-10-CM

## 2017-11-13 DIAGNOSIS — Z86.39 PERSONAL HISTORY OF OTHER ENDOCRINE, NUTRITIONAL AND METABOLIC DISEASE: ICD-10-CM

## 2017-11-13 DIAGNOSIS — Z87.19 PERSONAL HISTORY OF OTHER DISEASES OF THE DIGESTIVE SYSTEM: ICD-10-CM

## 2017-11-13 DIAGNOSIS — Z80.0 FAMILY HISTORY OF MALIGNANT NEOPLASM OF DIGESTIVE ORGANS: ICD-10-CM

## 2017-11-13 DIAGNOSIS — Z92.89 PERSONAL HISTORY OF OTHER MEDICAL TREATMENT: ICD-10-CM

## 2017-11-13 PROCEDURE — 46600 DIAGNOSTIC ANOSCOPY SPX: CPT

## 2017-11-13 PROCEDURE — 99203 OFFICE O/P NEW LOW 30 MIN: CPT | Mod: 25

## 2017-11-14 ENCOUNTER — APPOINTMENT (OUTPATIENT)
Dept: HEMATOLOGY ONCOLOGY | Facility: CLINIC | Age: 82
End: 2017-11-14

## 2017-11-14 ENCOUNTER — RESULT REVIEW (OUTPATIENT)
Age: 82
End: 2017-11-14

## 2017-11-14 ENCOUNTER — APPOINTMENT (OUTPATIENT)
Dept: RHEUMATOLOGY | Facility: CLINIC | Age: 82
End: 2017-11-14
Payer: MEDICARE

## 2017-11-14 VITALS
OXYGEN SATURATION: 98 % | WEIGHT: 110 LBS | HEART RATE: 93 BPM | HEIGHT: 63 IN | TEMPERATURE: 97.7 F | DIASTOLIC BLOOD PRESSURE: 70 MMHG | SYSTOLIC BLOOD PRESSURE: 114 MMHG | BODY MASS INDEX: 19.49 KG/M2 | RESPIRATION RATE: 15 BRPM

## 2017-11-14 LAB
BASOPHILS # BLD AUTO: 0.2 K/UL — SIGNIFICANT CHANGE UP (ref 0–0.2)
BASOPHILS NFR BLD AUTO: 2.6 % — HIGH (ref 0–2)
EOSINOPHIL # BLD AUTO: 0 K/UL — SIGNIFICANT CHANGE UP (ref 0–0.5)
EOSINOPHIL NFR BLD AUTO: 0.2 % — SIGNIFICANT CHANGE UP (ref 0–6)
HCT VFR BLD CALC: 28.8 % — LOW (ref 34.5–45)
HGB BLD-MCNC: 9.6 G/DL — LOW (ref 11.5–15.5)
LYMPHOCYTES # BLD AUTO: 1.1 K/UL — SIGNIFICANT CHANGE UP (ref 1–3.3)
LYMPHOCYTES # BLD AUTO: 17.4 % — SIGNIFICANT CHANGE UP (ref 13–44)
MCHC RBC-ENTMCNC: 29.2 PG — SIGNIFICANT CHANGE UP (ref 27–34)
MCHC RBC-ENTMCNC: 33.4 GM/DL — SIGNIFICANT CHANGE UP (ref 32–36)
MCV RBC AUTO: 87.6 FL — SIGNIFICANT CHANGE UP (ref 80–100)
MONOCYTES # BLD AUTO: 1 K/UL — HIGH (ref 0–0.9)
MONOCYTES NFR BLD AUTO: 16.2 % — HIGH (ref 2–14)
NEUTROPHILS # BLD AUTO: 4 K/UL — SIGNIFICANT CHANGE UP (ref 1.8–7.4)
NEUTROPHILS NFR BLD AUTO: 63.6 % — SIGNIFICANT CHANGE UP (ref 43–77)
PLATELET # BLD AUTO: 222 K/UL — SIGNIFICANT CHANGE UP (ref 150–400)
RBC # BLD: 3.29 M/UL — LOW (ref 3.8–5.2)
RBC # FLD: 16.7 % — HIGH (ref 10.3–14.5)
WBC # BLD: 6.3 K/UL — SIGNIFICANT CHANGE UP (ref 3.8–10.5)
WBC # FLD AUTO: 6.3 K/UL — SIGNIFICANT CHANGE UP (ref 3.8–10.5)

## 2017-11-14 PROCEDURE — 99215 OFFICE O/P EST HI 40 MIN: CPT | Mod: 25

## 2017-11-14 PROCEDURE — 77080 DXA BONE DENSITY AXIAL: CPT

## 2017-11-14 PROCEDURE — ZZZZZ: CPT

## 2017-11-14 RX ORDER — PREDNISONE 10 MG/1
10 TABLET ORAL
Qty: 30 | Refills: 1 | Status: DISCONTINUED | COMMUNITY
Start: 2017-11-02 | End: 2017-11-14

## 2017-11-14 RX ORDER — RUXOLITINIB 5 MG/1
5 TABLET ORAL
Qty: 30 | Refills: 0 | Status: DISCONTINUED | COMMUNITY
Start: 2017-10-09 | End: 2017-11-14

## 2017-11-21 ENCOUNTER — OUTPATIENT (OUTPATIENT)
Dept: OUTPATIENT SERVICES | Facility: HOSPITAL | Age: 82
LOS: 1 days | Discharge: HOME | End: 2017-11-21
Payer: MEDICARE

## 2017-11-21 DIAGNOSIS — D47.3 ESSENTIAL (HEMORRHAGIC) THROMBOCYTHEMIA: ICD-10-CM

## 2017-11-21 LAB
MISCELLANEOUS TEST: NORMAL
PROC NAME: NORMAL

## 2017-11-27 ENCOUNTER — RESULT REVIEW (OUTPATIENT)
Age: 82
End: 2017-11-27

## 2017-11-27 ENCOUNTER — APPOINTMENT (OUTPATIENT)
Dept: HEMATOLOGY ONCOLOGY | Facility: CLINIC | Age: 82
End: 2017-11-27
Payer: MEDICARE

## 2017-11-27 VITALS
TEMPERATURE: 98.2 F | HEART RATE: 98 BPM | OXYGEN SATURATION: 98 % | SYSTOLIC BLOOD PRESSURE: 118 MMHG | BODY MASS INDEX: 19.55 KG/M2 | DIASTOLIC BLOOD PRESSURE: 69 MMHG | WEIGHT: 110.34 LBS

## 2017-11-27 LAB
ANISOCYTOSIS BLD QL: SLIGHT — SIGNIFICANT CHANGE UP
BASO STIPL BLD QL SMEAR: PRESENT — SIGNIFICANT CHANGE UP
BASOPHILS # BLD AUTO: 0.1 K/UL — SIGNIFICANT CHANGE UP (ref 0–0.2)
BLASTS # FLD: 5 % — HIGH (ref 0–0)
DACRYOCYTES BLD QL SMEAR: SLIGHT — SIGNIFICANT CHANGE UP
ELLIPTOCYTES BLD QL SMEAR: SLIGHT — SIGNIFICANT CHANGE UP
EOSINOPHIL # BLD AUTO: 0 K/UL — SIGNIFICANT CHANGE UP (ref 0–0.5)
HCT VFR BLD CALC: 24.1 % — LOW (ref 34.5–45)
HGB BLD-MCNC: 8.2 G/DL — LOW (ref 11.5–15.5)
HYPOCHROMIA BLD QL: SLIGHT — SIGNIFICANT CHANGE UP
LG PLATELETS BLD QL AUTO: SLIGHT — SIGNIFICANT CHANGE UP
LYMPHOCYTES # BLD AUTO: 1.2 K/UL — SIGNIFICANT CHANGE UP (ref 1–3.3)
LYMPHOCYTES # BLD AUTO: 21 % — SIGNIFICANT CHANGE UP (ref 13–44)
MACROCYTES BLD QL: SLIGHT — SIGNIFICANT CHANGE UP
MANUAL DIF COMMENT BLD-IMP: SIGNIFICANT CHANGE UP
MCHC RBC-ENTMCNC: 30.1 PG — SIGNIFICANT CHANGE UP (ref 27–34)
MCHC RBC-ENTMCNC: 33.9 GM/DL — SIGNIFICANT CHANGE UP (ref 32–36)
MCV RBC AUTO: 88.8 FL — SIGNIFICANT CHANGE UP (ref 80–100)
MICROCYTES BLD QL: SLIGHT — SIGNIFICANT CHANGE UP
MONOCYTES # BLD AUTO: 2.1 K/UL — HIGH (ref 0–0.9)
MONOCYTES NFR BLD AUTO: 25 % — HIGH (ref 2–14)
MYELOCYTES NFR BLD: 2 % — HIGH (ref 0–0)
NEUTROPHILS # BLD AUTO: 3.6 K/UL — SIGNIFICANT CHANGE UP (ref 1.8–7.4)
NEUTROPHILS NFR BLD AUTO: 40 % — LOW (ref 43–77)
NEUTS BAND # BLD: 6 % — SIGNIFICANT CHANGE UP (ref 0–8)
OVALOCYTES BLD QL SMEAR: SLIGHT — SIGNIFICANT CHANGE UP
PLAT MORPH BLD: NORMAL — SIGNIFICANT CHANGE UP
PLATELET # BLD AUTO: 307 K/UL — SIGNIFICANT CHANGE UP (ref 150–400)
POIKILOCYTOSIS BLD QL AUTO: SLIGHT — SIGNIFICANT CHANGE UP
POLYCHROMASIA BLD QL SMEAR: SLIGHT — SIGNIFICANT CHANGE UP
PROMYELOCYTES # FLD: 1 % — HIGH (ref 0–0)
RBC # BLD: 2.71 M/UL — LOW (ref 3.8–5.2)
RBC # FLD: 18.9 % — HIGH (ref 10.3–14.5)
RBC BLD AUTO: ABNORMAL
SCHISTOCYTES BLD QL AUTO: SLIGHT — SIGNIFICANT CHANGE UP
TOXIC GRANULES BLD QL SMEAR: PRESENT — SIGNIFICANT CHANGE UP
WBC # BLD: 7 K/UL — SIGNIFICANT CHANGE UP (ref 3.8–10.5)
WBC # FLD AUTO: 7 K/UL — SIGNIFICANT CHANGE UP (ref 3.8–10.5)

## 2017-11-27 PROCEDURE — 99213 OFFICE O/P EST LOW 20 MIN: CPT

## 2017-11-28 RX ORDER — PREDNISONE 2.5 MG/1
2.5 TABLET ORAL
Qty: 70 | Refills: 0 | Status: COMPLETED | COMMUNITY
Start: 2017-11-14 | End: 2017-11-28

## 2017-12-06 ENCOUNTER — APPOINTMENT (OUTPATIENT)
Dept: HEMATOLOGY ONCOLOGY | Facility: CLINIC | Age: 82
End: 2017-12-06

## 2017-12-11 ENCOUNTER — RESULT REVIEW (OUTPATIENT)
Age: 82
End: 2017-12-11

## 2017-12-11 ENCOUNTER — APPOINTMENT (OUTPATIENT)
Dept: HEMATOLOGY ONCOLOGY | Facility: CLINIC | Age: 82
End: 2017-12-11
Payer: MEDICARE

## 2017-12-11 ENCOUNTER — OUTPATIENT (OUTPATIENT)
Dept: OUTPATIENT SERVICES | Facility: HOSPITAL | Age: 82
LOS: 1 days | End: 2017-12-11
Payer: MEDICARE

## 2017-12-11 VITALS
TEMPERATURE: 99.2 F | HEART RATE: 110 BPM | WEIGHT: 109.24 LBS | OXYGEN SATURATION: 97 % | SYSTOLIC BLOOD PRESSURE: 118 MMHG | DIASTOLIC BLOOD PRESSURE: 68 MMHG | BODY MASS INDEX: 19.35 KG/M2

## 2017-12-11 DIAGNOSIS — D64.89 OTHER SPECIFIED ANEMIAS: ICD-10-CM

## 2017-12-11 LAB
ALBUMIN SERPL ELPH-MCNC: 3.5 G/DL
ALP BLD-CCNC: 89 U/L
ALT SERPL-CCNC: 16 U/L
ANION GAP SERPL CALC-SCNC: 12 MMOL/L
ANISOCYTOSIS BLD QL: SLIGHT — SIGNIFICANT CHANGE UP
AST SERPL-CCNC: 13 U/L
BASOPHILS # BLD AUTO: 0.3 K/UL — HIGH (ref 0–0.2)
BILIRUB SERPL-MCNC: 0.9 MG/DL
BLASTS # FLD: 1 % — HIGH (ref 0–0)
BLD GP AB SCN SERPL QL: SIGNIFICANT CHANGE UP
BUN SERPL-MCNC: 19 MG/DL
CALCIUM SERPL-MCNC: 9.1 MG/DL
CHLORIDE SERPL-SCNC: 100 MMOL/L
CO2 SERPL-SCNC: 21 MMOL/L
CREAT SERPL-MCNC: 0.75 MG/DL
DACRYOCYTES BLD QL SMEAR: SLIGHT — SIGNIFICANT CHANGE UP
ELLIPTOCYTES BLD QL SMEAR: SLIGHT — SIGNIFICANT CHANGE UP
EOSINOPHIL # BLD AUTO: 0.3 K/UL — SIGNIFICANT CHANGE UP (ref 0–0.5)
GLUCOSE SERPL-MCNC: 164 MG/DL
HCT VFR BLD CALC: 19.4 % — CRITICAL LOW (ref 34.5–45)
HGB BLD-MCNC: 6.7 G/DL — CRITICAL LOW (ref 11.5–15.5)
HYPOCHROMIA BLD QL: SLIGHT — SIGNIFICANT CHANGE UP
LYMPHOCYTES # BLD AUTO: 1.7 K/UL — SIGNIFICANT CHANGE UP (ref 1–3.3)
LYMPHOCYTES # BLD AUTO: 25 % — SIGNIFICANT CHANGE UP (ref 13–44)
LYMPHOCYTES # SPEC AUTO: 1 % — HIGH (ref 0–0)
MCHC RBC-ENTMCNC: 30 PG — SIGNIFICANT CHANGE UP (ref 27–34)
MCHC RBC-ENTMCNC: 34.5 GM/DL — SIGNIFICANT CHANGE UP (ref 32–36)
MCV RBC AUTO: 87 FL — SIGNIFICANT CHANGE UP (ref 80–100)
MONOCYTES # BLD AUTO: 5.5 K/UL — HIGH (ref 0–0.9)
MONOCYTES NFR BLD AUTO: 24 % — HIGH (ref 2–14)
NEUTROPHILS # BLD AUTO: 4.1 K/UL — SIGNIFICANT CHANGE UP (ref 1.8–7.4)
NEUTROPHILS NFR BLD AUTO: 42 % — LOW (ref 43–77)
NEUTS BAND # BLD: 4 % — SIGNIFICANT CHANGE UP (ref 0–8)
OVALOCYTES BLD QL SMEAR: SLIGHT — SIGNIFICANT CHANGE UP
PLAT MORPH BLD: NORMAL — SIGNIFICANT CHANGE UP
PLATELET # BLD AUTO: 311 K/UL — SIGNIFICANT CHANGE UP (ref 150–400)
POIKILOCYTOSIS BLD QL AUTO: SLIGHT — SIGNIFICANT CHANGE UP
POLYCHROMASIA BLD QL SMEAR: SLIGHT — SIGNIFICANT CHANGE UP
POTASSIUM SERPL-SCNC: 4.3 MMOL/L
PROT SERPL-MCNC: 6.3 G/DL
RBC # BLD: 2.23 M/UL — LOW (ref 3.8–5.2)
RBC # FLD: 18.7 % — HIGH (ref 10.3–14.5)
RBC BLD AUTO: ABNORMAL
SMUDGE CELLS # BLD: PRESENT — SIGNIFICANT CHANGE UP
SODIUM SERPL-SCNC: 133 MMOL/L
TYPE + AB SCN PNL BLD: SIGNIFICANT CHANGE UP
VARIANT LYMPHS # BLD: 3 % — SIGNIFICANT CHANGE UP (ref 0–6)
WBC # BLD: 11.9 K/UL — HIGH (ref 3.8–10.5)
WBC # FLD AUTO: 11.9 K/UL — HIGH (ref 3.8–10.5)

## 2017-12-11 PROCEDURE — 86900 BLOOD TYPING SEROLOGIC ABO: CPT

## 2017-12-11 PROCEDURE — 86920 COMPATIBILITY TEST SPIN: CPT

## 2017-12-11 PROCEDURE — 86850 RBC ANTIBODY SCREEN: CPT

## 2017-12-11 PROCEDURE — 86901 BLOOD TYPING SEROLOGIC RH(D): CPT

## 2017-12-11 PROCEDURE — 99213 OFFICE O/P EST LOW 20 MIN: CPT

## 2017-12-11 RX ORDER — DIPHENHYDRAMINE HCL 50 MG
25 CAPSULE ORAL ONCE
Qty: 0 | Refills: 0 | Status: COMPLETED | OUTPATIENT
Start: 2017-12-12 | End: 2017-12-12

## 2017-12-11 RX ORDER — ACETAMINOPHEN 500 MG
650 TABLET ORAL ONCE
Qty: 0 | Refills: 0 | Status: COMPLETED | OUTPATIENT
Start: 2017-12-12 | End: 2017-12-12

## 2017-12-12 ENCOUNTER — OUTPATIENT (OUTPATIENT)
Dept: OUTPATIENT SERVICES | Facility: HOSPITAL | Age: 82
LOS: 1 days | End: 2017-12-12
Payer: MEDICARE

## 2017-12-12 VITALS
TEMPERATURE: 99 F | RESPIRATION RATE: 18 BRPM | SYSTOLIC BLOOD PRESSURE: 150 MMHG | OXYGEN SATURATION: 96 % | DIASTOLIC BLOOD PRESSURE: 79 MMHG | HEART RATE: 93 BPM

## 2017-12-12 VITALS
DIASTOLIC BLOOD PRESSURE: 76 MMHG | SYSTOLIC BLOOD PRESSURE: 140 MMHG | HEART RATE: 97 BPM | OXYGEN SATURATION: 96 % | RESPIRATION RATE: 18 BRPM | TEMPERATURE: 99 F

## 2017-12-12 DIAGNOSIS — D64.89 OTHER SPECIFIED ANEMIAS: ICD-10-CM

## 2017-12-12 PROCEDURE — P9040: CPT

## 2017-12-12 PROCEDURE — 36430 TRANSFUSION BLD/BLD COMPNT: CPT

## 2017-12-12 RX ADMIN — Medication 650 MILLIGRAM(S): at 10:30

## 2017-12-12 RX ADMIN — Medication 25 MILLIGRAM(S): at 10:30

## 2017-12-14 ENCOUNTER — APPOINTMENT (OUTPATIENT)
Dept: RHEUMATOLOGY | Facility: CLINIC | Age: 82
End: 2017-12-14
Payer: MEDICARE

## 2017-12-14 VITALS
HEART RATE: 88 BPM | BODY MASS INDEX: 19.31 KG/M2 | DIASTOLIC BLOOD PRESSURE: 70 MMHG | HEIGHT: 63 IN | RESPIRATION RATE: 16 BRPM | WEIGHT: 109 LBS | OXYGEN SATURATION: 96 % | SYSTOLIC BLOOD PRESSURE: 120 MMHG

## 2017-12-14 PROCEDURE — 96372 THER/PROPH/DIAG INJ SC/IM: CPT

## 2017-12-14 PROCEDURE — 99214 OFFICE O/P EST MOD 30 MIN: CPT | Mod: 25

## 2017-12-14 RX ORDER — DENOSUMAB 60 MG/ML
60 INJECTION SUBCUTANEOUS
Qty: 0 | Refills: 0 | Status: COMPLETED | OUTPATIENT
Start: 2017-12-14

## 2017-12-14 RX ADMIN — DENOSUMAB 0 MG/ML: 60 INJECTION SUBCUTANEOUS at 00:00

## 2017-12-19 ENCOUNTER — LABORATORY RESULT (OUTPATIENT)
Age: 82
End: 2017-12-19

## 2017-12-19 ENCOUNTER — RESULT REVIEW (OUTPATIENT)
Age: 82
End: 2017-12-19

## 2017-12-19 ENCOUNTER — APPOINTMENT (OUTPATIENT)
Dept: HEMATOLOGY ONCOLOGY | Facility: CLINIC | Age: 82
End: 2017-12-19
Payer: MEDICARE

## 2017-12-19 VITALS
DIASTOLIC BLOOD PRESSURE: 67 MMHG | WEIGHT: 111.2 LBS | OXYGEN SATURATION: 97 % | HEART RATE: 99 BPM | BODY MASS INDEX: 19.7 KG/M2 | TEMPERATURE: 98.9 F | SYSTOLIC BLOOD PRESSURE: 124 MMHG

## 2017-12-19 LAB
ALBUMIN SERPL ELPH-MCNC: 3.3 G/DL
ALP BLD-CCNC: 254 U/L
ALT SERPL-CCNC: 13 U/L
ANION GAP SERPL CALC-SCNC: 15 MMOL/L
AST SERPL-CCNC: 14 U/L
BILIRUB SERPL-MCNC: 0.7 MG/DL
BLASTS # FLD: 4 % — HIGH (ref 0–0)
BUN SERPL-MCNC: 15 MG/DL
CALCIUM SERPL-MCNC: 8.4 MG/DL
CHLORIDE SERPL-SCNC: 91 MMOL/L
CO2 SERPL-SCNC: 22 MMOL/L
CREAT SERPL-MCNC: 0.56 MG/DL
EOSINOPHIL # BLD AUTO: 0 K/UL — SIGNIFICANT CHANGE UP (ref 0–0.5)
EOSINOPHIL NFR BLD AUTO: 1 % — SIGNIFICANT CHANGE UP (ref 0–6)
GLUCOSE SERPL-MCNC: 107 MG/DL
HCT VFR BLD CALC: 24.2 % — LOW (ref 34.5–45)
HGB BLD-MCNC: 8.4 G/DL — LOW (ref 11.5–15.5)
LYMPHOCYTES # BLD AUTO: 21 % — SIGNIFICANT CHANGE UP (ref 13–44)
LYMPHOCYTES # BLD AUTO: 6 K/UL — HIGH (ref 1–3.3)
MCHC RBC-ENTMCNC: 30.3 PG — SIGNIFICANT CHANGE UP (ref 27–34)
MCHC RBC-ENTMCNC: 34.6 GM/DL — SIGNIFICANT CHANGE UP (ref 32–36)
MCV RBC AUTO: 87.6 FL — SIGNIFICANT CHANGE UP (ref 80–100)
METAMYELOCYTES # FLD: 1 % — HIGH (ref 0–0)
MONOCYTES # BLD AUTO: 12.4 K/UL — HIGH (ref 0–0.9)
MONOCYTES NFR BLD AUTO: 41 % — HIGH (ref 2–14)
MYELOCYTES NFR BLD: 4 % — HIGH (ref 0–0)
NEUTROPHILS # BLD AUTO: 5.8 K/UL — SIGNIFICANT CHANGE UP (ref 1.8–7.4)
NEUTROPHILS NFR BLD AUTO: 28 % — LOW (ref 43–77)
PLAT MORPH BLD: NORMAL — SIGNIFICANT CHANGE UP
PLATELET # BLD AUTO: 250 K/UL — SIGNIFICANT CHANGE UP (ref 150–400)
POTASSIUM SERPL-SCNC: 4.2 MMOL/L
PROT SERPL-MCNC: 6.4 G/DL
RBC # BLD: 2.76 M/UL — LOW (ref 3.8–5.2)
RBC # FLD: 17.7 % — HIGH (ref 10.3–14.5)
RBC BLD AUTO: SIGNIFICANT CHANGE UP
SODIUM SERPL-SCNC: 128 MMOL/L
WBC # BLD: 26.4 K/UL — HIGH (ref 3.8–10.5)
WBC # FLD AUTO: 26.4 K/UL — HIGH (ref 3.8–10.5)

## 2017-12-19 PROCEDURE — 82306 VITAMIN D 25 HYDROXY: CPT

## 2017-12-19 PROCEDURE — 84145 PROCALCITONIN (PCT): CPT

## 2017-12-19 PROCEDURE — 97110 THERAPEUTIC EXERCISES: CPT

## 2017-12-19 PROCEDURE — 36415 COLL VENOUS BLD VENIPUNCTURE: CPT

## 2017-12-19 PROCEDURE — 97542 WHEELCHAIR MNGMENT TRAINING: CPT

## 2017-12-19 PROCEDURE — 84443 ASSAY THYROID STIM HORMONE: CPT

## 2017-12-19 PROCEDURE — 86900 BLOOD TYPING SEROLOGIC ABO: CPT

## 2017-12-19 PROCEDURE — 97530 THERAPEUTIC ACTIVITIES: CPT

## 2017-12-19 PROCEDURE — 83935 ASSAY OF URINE OSMOLALITY: CPT

## 2017-12-19 PROCEDURE — 97167 OT EVAL HIGH COMPLEX 60 MIN: CPT

## 2017-12-19 PROCEDURE — 78306 BONE IMAGING WHOLE BODY: CPT

## 2017-12-19 PROCEDURE — 97112 NEUROMUSCULAR REEDUCATION: CPT

## 2017-12-19 PROCEDURE — 83930 ASSAY OF BLOOD OSMOLALITY: CPT

## 2017-12-19 PROCEDURE — 97161 PT EVAL LOW COMPLEX 20 MIN: CPT

## 2017-12-19 PROCEDURE — P9016: CPT

## 2017-12-19 PROCEDURE — 87086 URINE CULTURE/COLONY COUNT: CPT

## 2017-12-19 PROCEDURE — 71250 CT THORAX DX C-: CPT

## 2017-12-19 PROCEDURE — 97535 SELF CARE MNGMENT TRAINING: CPT

## 2017-12-19 PROCEDURE — 82436 ASSAY OF URINE CHLORIDE: CPT

## 2017-12-19 PROCEDURE — 86920 COMPATIBILITY TEST SPIN: CPT

## 2017-12-19 PROCEDURE — 87040 BLOOD CULTURE FOR BACTERIA: CPT

## 2017-12-19 PROCEDURE — 80048 BASIC METABOLIC PNL TOTAL CA: CPT

## 2017-12-19 PROCEDURE — 85652 RBC SED RATE AUTOMATED: CPT

## 2017-12-19 PROCEDURE — 86850 RBC ANTIBODY SCREEN: CPT

## 2017-12-19 PROCEDURE — 84550 ASSAY OF BLOOD/URIC ACID: CPT

## 2017-12-19 PROCEDURE — 71045 X-RAY EXAM CHEST 1 VIEW: CPT

## 2017-12-19 PROCEDURE — 36430 TRANSFUSION BLD/BLD COMPNT: CPT

## 2017-12-19 PROCEDURE — 85027 COMPLETE CBC AUTOMATED: CPT

## 2017-12-19 PROCEDURE — 86901 BLOOD TYPING SEROLOGIC RH(D): CPT

## 2017-12-19 PROCEDURE — 80053 COMPREHEN METABOLIC PANEL: CPT

## 2017-12-19 PROCEDURE — A9561: CPT

## 2017-12-19 PROCEDURE — 84300 ASSAY OF URINE SODIUM: CPT

## 2017-12-19 PROCEDURE — 85060 BLOOD SMEAR INTERPRETATION: CPT

## 2017-12-19 PROCEDURE — 97116 GAIT TRAINING THERAPY: CPT

## 2017-12-19 PROCEDURE — 83550 IRON BINDING TEST: CPT

## 2017-12-19 PROCEDURE — 83605 ASSAY OF LACTIC ACID: CPT

## 2017-12-19 PROCEDURE — 84134 ASSAY OF PREALBUMIN: CPT

## 2017-12-19 PROCEDURE — 86140 C-REACTIVE PROTEIN: CPT

## 2017-12-19 PROCEDURE — 81001 URINALYSIS AUTO W/SCOPE: CPT

## 2017-12-19 PROCEDURE — 99214 OFFICE O/P EST MOD 30 MIN: CPT

## 2017-12-19 PROCEDURE — 84466 ASSAY OF TRANSFERRIN: CPT

## 2017-12-20 NOTE — ED STATDOCS - CHIEF COMPLAINT
Initial Anesthesia Post-op Note    Patient: Stephane Costa  Procedure(s) Performed: COLONOSCOPY WITH POSSIBLE BIOPSY  Anesthesia type: Monitor Anesthesia Care    Last 24 I/O:   Intake/Output Summary (Last 24 hours) at 12/20/17 0821  Last data filed at 12/20/17 0819   Gross per 24 hour   Intake              600 ml   Output                0 ml   Net              600 ml       Patient location: gi c.  POST-OP VITAL SIGNS: stable  LEVEL OF CONSCIOUSNESS: sedated  RESPIRATORY STATUS: spontaneous ventilation, unassisted and room air  CARDIOVASCULAR: blood pressure returned to baseline  HYDRATION: euvolemic    PAIN MANAGEMENT: well controlled  NAUSEA: None  AIRWAY PATENCY: patent  POST-OP ASSESSMENT: no complications, patient tolerated procedure well with no complications and no evidence of recall  COMPLICATIONS: none  HANDOFF:  Handoff to receiving nurse was performed and questions were answered      
The patient is a 87y year old Female complaining of medical evaluation.

## 2017-12-21 ENCOUNTER — APPOINTMENT (OUTPATIENT)
Dept: HEMATOLOGY ONCOLOGY | Facility: CLINIC | Age: 82
End: 2017-12-21

## 2017-12-22 ENCOUNTER — OUTPATIENT (OUTPATIENT)
Dept: OUTPATIENT SERVICES | Facility: HOSPITAL | Age: 82
LOS: 1 days | Discharge: HOME | End: 2017-12-22

## 2017-12-22 DIAGNOSIS — D47.3 ESSENTIAL (HEMORRHAGIC) THROMBOCYTHEMIA: ICD-10-CM

## 2017-12-22 LAB
BASOPHILS # BLD AUTO: 2.2 K/UL — HIGH (ref 0–0.2)
BASOPHILS NFR BLD AUTO: 8.2 % — HIGH (ref 0–2)
MANUAL DIF COMMENT BLD-IMP: SIGNIFICANT CHANGE UP

## 2017-12-28 ENCOUNTER — RESULT REVIEW (OUTPATIENT)
Age: 82
End: 2017-12-28

## 2017-12-28 ENCOUNTER — OUTPATIENT (OUTPATIENT)
Dept: OUTPATIENT SERVICES | Facility: HOSPITAL | Age: 82
LOS: 1 days | End: 2017-12-28
Payer: MEDICARE

## 2017-12-28 ENCOUNTER — RX RENEWAL (OUTPATIENT)
Age: 82
End: 2017-12-28

## 2017-12-28 ENCOUNTER — APPOINTMENT (OUTPATIENT)
Dept: HEMATOLOGY ONCOLOGY | Facility: CLINIC | Age: 82
End: 2017-12-28
Payer: MEDICARE

## 2017-12-28 VITALS
DIASTOLIC BLOOD PRESSURE: 75 MMHG | TEMPERATURE: 99.6 F | SYSTOLIC BLOOD PRESSURE: 118 MMHG | BODY MASS INDEX: 18.29 KG/M2 | WEIGHT: 103.25 LBS | HEART RATE: 121 BPM | HEIGHT: 63 IN

## 2017-12-28 DIAGNOSIS — D64.9 ANEMIA, UNSPECIFIED: ICD-10-CM

## 2017-12-28 LAB
ANISOCYTOSIS BLD QL: SIGNIFICANT CHANGE UP
BASO STIPL BLD QL SMEAR: PRESENT — SIGNIFICANT CHANGE UP
BASOPHILS # BLD AUTO: 0.3 K/UL — HIGH (ref 0–0.2)
BLD GP AB SCN SERPL QL: SIGNIFICANT CHANGE UP
EOSINOPHIL # BLD AUTO: 0.4 K/UL — SIGNIFICANT CHANGE UP (ref 0–0.5)
HCT VFR BLD CALC: 24.3 % — LOW (ref 34.5–45)
HGB BLD-MCNC: 7.7 G/DL — LOW (ref 11.5–15.5)
LG PLATELETS BLD QL AUTO: SLIGHT — SIGNIFICANT CHANGE UP
LYMPHOCYTES # BLD AUTO: 22 % — SIGNIFICANT CHANGE UP (ref 13–44)
LYMPHOCYTES # BLD AUTO: 5.5 K/UL — HIGH (ref 1–3.3)
MACROCYTES BLD QL: SLIGHT — SIGNIFICANT CHANGE UP
MCHC RBC-ENTMCNC: 28.2 PG — SIGNIFICANT CHANGE UP (ref 27–34)
MCHC RBC-ENTMCNC: 31.8 GM/DL — LOW (ref 32–36)
MCV RBC AUTO: 88.7 FL — SIGNIFICANT CHANGE UP (ref 80–100)
METAMYELOCYTES # FLD: 4 % — HIGH (ref 0–0)
MICROCYTES BLD QL: SLIGHT — SIGNIFICANT CHANGE UP
MONOCYTES # BLD AUTO: 16.6 K/UL — HIGH (ref 0–0.9)
MONOCYTES NFR BLD AUTO: 38 % — HIGH (ref 2–14)
MYELOCYTES NFR BLD: 2 % — HIGH (ref 0–0)
NEUTROPHILS # BLD AUTO: 13.2 K/UL — HIGH (ref 1.8–7.4)
NEUTROPHILS NFR BLD AUTO: 31 % — LOW (ref 43–77)
NEUTS BAND # BLD: 2 % — SIGNIFICANT CHANGE UP (ref 0–8)
PLAT MORPH BLD: NORMAL — SIGNIFICANT CHANGE UP
PLATELET # BLD AUTO: 245 K/UL — SIGNIFICANT CHANGE UP (ref 150–400)
POIKILOCYTOSIS BLD QL AUTO: SLIGHT — SIGNIFICANT CHANGE UP
POLYCHROMASIA BLD QL SMEAR: SLIGHT — SIGNIFICANT CHANGE UP
RBC # BLD: 2.74 M/UL — LOW (ref 3.8–5.2)
RBC # FLD: 18.3 % — HIGH (ref 10.3–14.5)
RBC BLD AUTO: SIGNIFICANT CHANGE UP
SMUDGE CELLS # BLD: PRESENT — SIGNIFICANT CHANGE UP
TOXIC GRANULES BLD QL SMEAR: PRESENT — SIGNIFICANT CHANGE UP
VARIANT LYMPHS # BLD: 1 % — SIGNIFICANT CHANGE UP (ref 0–6)
WBC # BLD: 35.9 K/UL — HIGH (ref 3.8–10.5)
WBC # FLD AUTO: 35.9 K/UL — HIGH (ref 3.8–10.5)

## 2017-12-28 PROCEDURE — 99213 OFFICE O/P EST LOW 20 MIN: CPT

## 2017-12-28 PROCEDURE — 36415 COLL VENOUS BLD VENIPUNCTURE: CPT

## 2017-12-28 PROCEDURE — 86920 COMPATIBILITY TEST SPIN: CPT

## 2017-12-28 PROCEDURE — 86901 BLOOD TYPING SEROLOGIC RH(D): CPT

## 2017-12-28 PROCEDURE — 86850 RBC ANTIBODY SCREEN: CPT

## 2017-12-28 PROCEDURE — 86900 BLOOD TYPING SEROLOGIC ABO: CPT

## 2017-12-28 PROCEDURE — 86880 COOMBS TEST DIRECT: CPT

## 2017-12-30 ENCOUNTER — OUTPATIENT (OUTPATIENT)
Dept: OUTPATIENT SERVICES | Facility: HOSPITAL | Age: 82
LOS: 1 days | End: 2017-12-30
Payer: MEDICARE

## 2017-12-30 VITALS
RESPIRATION RATE: 18 BRPM | SYSTOLIC BLOOD PRESSURE: 146 MMHG | DIASTOLIC BLOOD PRESSURE: 79 MMHG | TEMPERATURE: 100 F | HEART RATE: 97 BPM | OXYGEN SATURATION: 95 %

## 2017-12-30 DIAGNOSIS — D64.9 ANEMIA, UNSPECIFIED: ICD-10-CM

## 2017-12-30 LAB
APPEARANCE UR: CLEAR — SIGNIFICANT CHANGE UP
BILIRUB UR-MCNC: NEGATIVE — SIGNIFICANT CHANGE UP
BLD GP AB SCN SERPL QL: SIGNIFICANT CHANGE UP
COLOR SPEC: YELLOW — SIGNIFICANT CHANGE UP
DIFF PNL FLD: ABNORMAL
DIR ANTIGLOB POLYSPECIFIC INTERPRETATION: SIGNIFICANT CHANGE UP
DIR ANTIGLOB POLYSPECIFIC INTERPRETATION: SIGNIFICANT CHANGE UP
EPI CELLS # UR: ABNORMAL
GLUCOSE UR QL: NEGATIVE MG/DL — SIGNIFICANT CHANGE UP
KETONES UR-MCNC: NEGATIVE — SIGNIFICANT CHANGE UP
LEUKOCYTE ESTERASE UR-ACNC: ABNORMAL
NITRITE UR-MCNC: NEGATIVE — SIGNIFICANT CHANGE UP
PH UR: 6.5 — SIGNIFICANT CHANGE UP (ref 5–8)
PROT UR-MCNC: 30 MG/DL
RBC CASTS # UR COMP ASSIST: ABNORMAL /HPF (ref 0–4)
SP GR SPEC: 1.01 — SIGNIFICANT CHANGE UP (ref 1.01–1.02)
TYPE + AB SCN PNL BLD: SIGNIFICANT CHANGE UP
UROBILINOGEN FLD QL: NEGATIVE MG/DL — SIGNIFICANT CHANGE UP
WBC UR QL: SIGNIFICANT CHANGE UP

## 2017-12-30 RX ORDER — DIPHENHYDRAMINE HCL 50 MG
25 CAPSULE ORAL ONCE
Qty: 0 | Refills: 0 | Status: COMPLETED | OUTPATIENT
Start: 2017-12-30 | End: 2017-12-30

## 2017-12-30 RX ORDER — DIPHENHYDRAMINE HCL 50 MG
25 CAPSULE ORAL EVERY 4 HOURS
Qty: 0 | Refills: 0 | Status: DISCONTINUED | OUTPATIENT
Start: 2017-12-30 | End: 2018-01-14

## 2017-12-30 RX ORDER — DIPHENHYDRAMINE HCL 50 MG
25 CAPSULE ORAL ONCE
Qty: 0 | Refills: 0 | Status: DISCONTINUED | OUTPATIENT
Start: 2017-12-30 | End: 2017-12-30

## 2017-12-30 RX ORDER — ACETAMINOPHEN 500 MG
650 TABLET ORAL ONCE
Qty: 0 | Refills: 0 | Status: COMPLETED | OUTPATIENT
Start: 2017-12-30 | End: 2017-12-30

## 2017-12-30 RX ADMIN — Medication 650 MILLIGRAM(S): at 10:37

## 2017-12-30 RX ADMIN — Medication 25 MILLIGRAM(S): at 10:37

## 2017-12-30 RX ADMIN — Medication 25 MILLIGRAM(S): at 18:05

## 2017-12-30 RX ADMIN — Medication 40 MILLIGRAM(S): at 20:17

## 2017-12-31 VITALS
HEART RATE: 88 BPM | OXYGEN SATURATION: 95 % | RESPIRATION RATE: 18 BRPM | DIASTOLIC BLOOD PRESSURE: 87 MMHG | SYSTOLIC BLOOD PRESSURE: 141 MMHG | TEMPERATURE: 99 F

## 2018-01-02 LAB
ALBUMIN SERPL ELPH-MCNC: 3.2 G/DL
ALP BLD-CCNC: 331 U/L
ALT SERPL-CCNC: 20 U/L
ANION GAP SERPL CALC-SCNC: 17 MMOL/L
AST SERPL-CCNC: 25 U/L
BILIRUB SERPL-MCNC: 0.9 MG/DL
BUN SERPL-MCNC: 15 MG/DL
CALCIUM SERPL-MCNC: 8.8 MG/DL
CHLORIDE SERPL-SCNC: 88 MMOL/L
CO2 SERPL-SCNC: 26 MMOL/L
CREAT SERPL-MCNC: 0.67 MG/DL
GLUCOSE SERPL-MCNC: 143 MG/DL
POTASSIUM SERPL-SCNC: 4.1 MMOL/L
PROT SERPL-MCNC: 6.9 G/DL
SODIUM SERPL-SCNC: 131 MMOL/L

## 2018-01-08 ENCOUNTER — RESULT REVIEW (OUTPATIENT)
Age: 83
End: 2018-01-08

## 2018-01-08 ENCOUNTER — APPOINTMENT (OUTPATIENT)
Dept: HEMATOLOGY ONCOLOGY | Facility: CLINIC | Age: 83
End: 2018-01-08
Payer: MEDICARE

## 2018-01-08 VITALS
OXYGEN SATURATION: 100 % | HEART RATE: 108 BPM | WEIGHT: 109.02 LBS | DIASTOLIC BLOOD PRESSURE: 67 MMHG | TEMPERATURE: 98.3 F | BODY MASS INDEX: 19.31 KG/M2 | SYSTOLIC BLOOD PRESSURE: 108 MMHG

## 2018-01-08 LAB
ANISOCYTOSIS BLD QL: SLIGHT — SIGNIFICANT CHANGE UP
BASOPHILS # BLD AUTO: 1.9 K/UL — HIGH (ref 0–0.2)
BIZARRE PLATELETS BLD QL SMEAR: PRESENT — SIGNIFICANT CHANGE UP
BLASTS # FLD: 3 % — HIGH (ref 0–0)
EOSINOPHIL # BLD AUTO: 0.1 K/UL — SIGNIFICANT CHANGE UP (ref 0–0.5)
GIANT PLATELETS BLD QL SMEAR: PRESENT — SIGNIFICANT CHANGE UP
HCT VFR BLD CALC: 21.8 % — LOW (ref 34.5–45)
HGB BLD-MCNC: 7.4 G/DL — LOW (ref 11.5–15.5)
LG PLATELETS BLD QL AUTO: SLIGHT — SIGNIFICANT CHANGE UP
LYMPHOCYTES # BLD AUTO: 11 % — LOW (ref 13–44)
LYMPHOCYTES # BLD AUTO: 6.9 K/UL — HIGH (ref 1–3.3)
MCHC RBC-ENTMCNC: 30.6 PG — SIGNIFICANT CHANGE UP (ref 27–34)
MCHC RBC-ENTMCNC: 34.2 GM/DL — SIGNIFICANT CHANGE UP (ref 32–36)
MCV RBC AUTO: 89.5 FL — SIGNIFICANT CHANGE UP (ref 80–100)
METAMYELOCYTES # FLD: 2 % — HIGH (ref 0–0)
MONOCYTES # BLD AUTO: 9.4 K/UL — HIGH (ref 0–0.9)
MONOCYTES NFR BLD AUTO: 47 % — HIGH (ref 2–14)
MYELOCYTES NFR BLD: 3 % — HIGH (ref 0–0)
NEUTROPHILS # BLD AUTO: 8.3 K/UL — HIGH (ref 1.8–7.4)
NEUTROPHILS NFR BLD AUTO: 30 % — LOW (ref 43–77)
NRBC # BLD: 1 /100 — HIGH (ref 0–0)
PLAT MORPH BLD: NORMAL — SIGNIFICANT CHANGE UP
PLATELET # BLD AUTO: 151 K/UL — SIGNIFICANT CHANGE UP (ref 150–400)
POLYCHROMASIA BLD QL SMEAR: SLIGHT — SIGNIFICANT CHANGE UP
PROMYELOCYTES # FLD: 4 % — HIGH (ref 0–0)
RBC # BLD: 2.43 M/UL — LOW (ref 3.8–5.2)
RBC # FLD: 16.6 % — HIGH (ref 10.3–14.5)
RBC BLD AUTO: SIGNIFICANT CHANGE UP
SMUDGE CELLS # BLD: PRESENT — SIGNIFICANT CHANGE UP
SPHEROCYTES BLD QL SMEAR: SLIGHT — SIGNIFICANT CHANGE UP
TOXIC GRANULES BLD QL SMEAR: PRESENT — SIGNIFICANT CHANGE UP
WBC # BLD: 26.5 K/UL — HIGH (ref 3.8–10.5)
WBC # FLD AUTO: 26.5 K/UL — HIGH (ref 3.8–10.5)

## 2018-01-08 PROCEDURE — 99213 OFFICE O/P EST LOW 20 MIN: CPT

## 2018-01-08 RX ORDER — GABAPENTIN 100 MG
100 TABLET ORAL
Refills: 0 | Status: DISCONTINUED | COMMUNITY
End: 2018-01-08

## 2018-01-08 RX ORDER — METHYLPREDNISOLONE 4 MG/1
4 TABLET ORAL
Qty: 21 | Refills: 0 | Status: DISCONTINUED | COMMUNITY
Start: 2017-08-30

## 2018-01-08 RX ORDER — CYCLOBENZAPRINE HYDROCHLORIDE 10 MG/1
10 TABLET, FILM COATED ORAL
Qty: 30 | Refills: 0 | Status: DISCONTINUED | COMMUNITY
Start: 2017-08-29

## 2018-01-08 RX ORDER — OXYCODONE AND ACETAMINOPHEN 5; 325 MG/1; MG/1
5-325 TABLET ORAL
Qty: 36 | Refills: 0 | Status: DISCONTINUED | COMMUNITY
Start: 2017-08-29

## 2018-01-08 RX ORDER — CYCLOBENZAPRINE HYDROCHLORIDE 5 MG/1
5 TABLET, FILM COATED ORAL
Qty: 14 | Refills: 0 | Status: DISCONTINUED | COMMUNITY
Start: 2017-08-28

## 2018-01-11 ENCOUNTER — APPOINTMENT (OUTPATIENT)
Dept: COLORECTAL SURGERY | Facility: CLINIC | Age: 83
End: 2018-01-11

## 2018-01-13 PROCEDURE — 81001 URINALYSIS AUTO W/SCOPE: CPT

## 2018-01-13 PROCEDURE — 86901 BLOOD TYPING SEROLOGIC RH(D): CPT

## 2018-01-13 PROCEDURE — 86880 COOMBS TEST DIRECT: CPT

## 2018-01-13 PROCEDURE — 36430 TRANSFUSION BLD/BLD COMPNT: CPT

## 2018-01-13 PROCEDURE — 36415 COLL VENOUS BLD VENIPUNCTURE: CPT

## 2018-01-13 PROCEDURE — 86850 RBC ANTIBODY SCREEN: CPT

## 2018-01-13 PROCEDURE — P9040: CPT

## 2018-01-13 PROCEDURE — 86900 BLOOD TYPING SEROLOGIC ABO: CPT

## 2018-01-16 ENCOUNTER — RESULT REVIEW (OUTPATIENT)
Age: 83
End: 2018-01-16

## 2018-01-16 ENCOUNTER — APPOINTMENT (OUTPATIENT)
Dept: HEMATOLOGY ONCOLOGY | Facility: CLINIC | Age: 83
End: 2018-01-16

## 2018-01-16 ENCOUNTER — OUTPATIENT (OUTPATIENT)
Dept: OUTPATIENT SERVICES | Facility: HOSPITAL | Age: 83
LOS: 1 days | End: 2018-01-16
Payer: MEDICARE

## 2018-01-16 DIAGNOSIS — D64.9 ANEMIA, UNSPECIFIED: ICD-10-CM

## 2018-01-16 LAB
ANISOCYTOSIS BLD QL: SLIGHT — SIGNIFICANT CHANGE UP
BASOPHILS # BLD AUTO: SIGNIFICANT CHANGE UP (ref 0–0.2)
BLD GP AB SCN SERPL QL: SIGNIFICANT CHANGE UP
ELLIPTOCYTES BLD QL SMEAR: SLIGHT — SIGNIFICANT CHANGE UP
EOSINOPHIL # BLD AUTO: SIGNIFICANT CHANGE UP (ref 0–0.5)
HCT VFR BLD CALC: 17.5 % — CRITICAL LOW (ref 34.5–45)
HGB BLD-MCNC: 6.2 G/DL — CRITICAL LOW (ref 11.5–15.5)
LYMPHOCYTES # BLD AUTO: 39 % — SIGNIFICANT CHANGE UP (ref 13–44)
LYMPHOCYTES # BLD AUTO: SIGNIFICANT CHANGE UP (ref 1–3.3)
LYMPHOCYTES # SPEC AUTO: 2 % — HIGH (ref 0–0)
MACROCYTES BLD QL: SLIGHT — SIGNIFICANT CHANGE UP
MCHC RBC-ENTMCNC: 31.3 PG — SIGNIFICANT CHANGE UP (ref 27–34)
MCHC RBC-ENTMCNC: 35.3 GM/DL — SIGNIFICANT CHANGE UP (ref 32–36)
MCV RBC AUTO: 88.8 FL — SIGNIFICANT CHANGE UP (ref 80–100)
METAMYELOCYTES # FLD: 2 % — HIGH (ref 0–0)
MICROCYTES BLD QL: SLIGHT — SIGNIFICANT CHANGE UP
MONOCYTES # BLD AUTO: SIGNIFICANT CHANGE UP (ref 0–0.9)
MONOCYTES NFR BLD AUTO: 34 % — HIGH (ref 2–14)
MYELOCYTES NFR BLD: 1 % — HIGH (ref 0–0)
NEUTROPHILS # BLD AUTO: 7.8 K/UL — HIGH (ref 1.8–7.4)
NEUTROPHILS NFR BLD AUTO: 20 % — LOW (ref 43–77)
NEUTS BAND # BLD: 2 % — SIGNIFICANT CHANGE UP (ref 0–8)
OVALOCYTES BLD QL SMEAR: SLIGHT — SIGNIFICANT CHANGE UP
PLAT MORPH BLD: NORMAL — SIGNIFICANT CHANGE UP
PLATELET # BLD AUTO: 180 K/UL — SIGNIFICANT CHANGE UP (ref 150–400)
POIKILOCYTOSIS BLD QL AUTO: SLIGHT — SIGNIFICANT CHANGE UP
POLYCHROMASIA BLD QL SMEAR: SLIGHT — SIGNIFICANT CHANGE UP
RBC # BLD: 1.97 M/UL — LOW (ref 3.8–5.2)
RBC # FLD: 17 % — HIGH (ref 10.3–14.5)
RBC BLD AUTO: SIGNIFICANT CHANGE UP
SMUDGE CELLS # BLD: PRESENT — SIGNIFICANT CHANGE UP
SPHEROCYTES BLD QL SMEAR: SLIGHT — SIGNIFICANT CHANGE UP
TOXIC GRANULES BLD QL SMEAR: PRESENT — SIGNIFICANT CHANGE UP
TYPE + AB SCN PNL BLD: SIGNIFICANT CHANGE UP
WBC # BLD: 25.6 K/UL — HIGH (ref 3.8–10.5)
WBC # FLD AUTO: 25.6 K/UL — HIGH (ref 3.8–10.5)

## 2018-01-16 RX ORDER — HYDROCORTISONE 20 MG
50 TABLET ORAL ONCE
Qty: 0 | Refills: 0 | Status: COMPLETED | OUTPATIENT
Start: 2018-01-17 | End: 2018-01-17

## 2018-01-16 RX ORDER — DIPHENHYDRAMINE HCL 50 MG
25 CAPSULE ORAL ONCE
Qty: 0 | Refills: 0 | Status: COMPLETED | OUTPATIENT
Start: 2018-01-17 | End: 2018-01-17

## 2018-01-16 RX ORDER — ACETAMINOPHEN 500 MG
650 TABLET ORAL ONCE
Qty: 0 | Refills: 0 | Status: COMPLETED | OUTPATIENT
Start: 2018-01-17 | End: 2018-01-17

## 2018-01-17 ENCOUNTER — OUTPATIENT (OUTPATIENT)
Dept: OUTPATIENT SERVICES | Facility: HOSPITAL | Age: 83
LOS: 1 days | End: 2018-01-17
Payer: MEDICARE

## 2018-01-17 VITALS
RESPIRATION RATE: 17 BRPM | TEMPERATURE: 98 F | DIASTOLIC BLOOD PRESSURE: 66 MMHG | SYSTOLIC BLOOD PRESSURE: 115 MMHG | HEART RATE: 100 BPM | OXYGEN SATURATION: 97 %

## 2018-01-17 VITALS
HEART RATE: 89 BPM | RESPIRATION RATE: 18 BRPM | DIASTOLIC BLOOD PRESSURE: 65 MMHG | SYSTOLIC BLOOD PRESSURE: 108 MMHG | OXYGEN SATURATION: 98 % | TEMPERATURE: 98 F

## 2018-01-17 DIAGNOSIS — D64.9 ANEMIA, UNSPECIFIED: ICD-10-CM

## 2018-01-17 PROCEDURE — 86922 COMPATIBILITY TEST ANTIGLOB: CPT

## 2018-01-17 PROCEDURE — P9016: CPT

## 2018-01-17 PROCEDURE — 36430 TRANSFUSION BLD/BLD COMPNT: CPT

## 2018-01-17 PROCEDURE — 86900 BLOOD TYPING SEROLOGIC ABO: CPT

## 2018-01-17 PROCEDURE — 86901 BLOOD TYPING SEROLOGIC RH(D): CPT

## 2018-01-17 PROCEDURE — 36415 COLL VENOUS BLD VENIPUNCTURE: CPT

## 2018-01-17 PROCEDURE — 86850 RBC ANTIBODY SCREEN: CPT

## 2018-01-17 RX ADMIN — Medication 650 MILLIGRAM(S): at 11:49

## 2018-01-17 RX ADMIN — Medication 50 MILLIGRAM(S): at 11:47

## 2018-01-17 RX ADMIN — Medication 25 MILLIGRAM(S): at 11:52

## 2018-01-23 ENCOUNTER — OUTPATIENT (OUTPATIENT)
Dept: OUTPATIENT SERVICES | Facility: HOSPITAL | Age: 83
LOS: 1 days | Discharge: HOME | End: 2018-01-23

## 2018-01-23 DIAGNOSIS — D47.3 ESSENTIAL (HEMORRHAGIC) THROMBOCYTHEMIA: ICD-10-CM

## 2018-01-26 ENCOUNTER — RESULT REVIEW (OUTPATIENT)
Age: 83
End: 2018-01-26

## 2018-01-26 ENCOUNTER — APPOINTMENT (OUTPATIENT)
Dept: HEMATOLOGY ONCOLOGY | Facility: CLINIC | Age: 83
End: 2018-01-26

## 2018-01-26 LAB
ANISOCYTOSIS BLD QL: SLIGHT — SIGNIFICANT CHANGE UP
BASO STIPL BLD QL SMEAR: PRESENT — SIGNIFICANT CHANGE UP
BASOPHILS # BLD AUTO: 0.4 K/UL — HIGH (ref 0–0.2)
EOSINOPHIL # BLD AUTO: 0 K/UL — SIGNIFICANT CHANGE UP (ref 0–0.5)
HCT VFR BLD CALC: 20.6 % — CRITICAL LOW (ref 34.5–45)
HGB BLD-MCNC: 6.9 G/DL — CRITICAL LOW (ref 11.5–15.5)
LYMPHOCYTES # BLD AUTO: 18 % — SIGNIFICANT CHANGE UP (ref 13–44)
LYMPHOCYTES # BLD AUTO: 2 K/UL — SIGNIFICANT CHANGE UP (ref 1–3.3)
MACROCYTES BLD QL: SLIGHT — SIGNIFICANT CHANGE UP
MCHC RBC-ENTMCNC: 29.8 PG — SIGNIFICANT CHANGE UP (ref 27–34)
MCHC RBC-ENTMCNC: 33.4 GM/DL — SIGNIFICANT CHANGE UP (ref 32–36)
MCV RBC AUTO: 89.1 FL — SIGNIFICANT CHANGE UP (ref 80–100)
MICROCYTES BLD QL: SLIGHT — SIGNIFICANT CHANGE UP
MONOCYTES # BLD AUTO: 6.6 K/UL — HIGH (ref 0–0.9)
MONOCYTES NFR BLD AUTO: 41 % — HIGH (ref 2–14)
MYELOCYTES NFR BLD: 1 % — HIGH (ref 0–0)
NEUTROPHILS # BLD AUTO: 5.2 K/UL — SIGNIFICANT CHANGE UP (ref 1.8–7.4)
NEUTROPHILS NFR BLD AUTO: 40 % — LOW (ref 43–77)
PLAT MORPH BLD: NORMAL — SIGNIFICANT CHANGE UP
PLATELET # BLD AUTO: 171 K/UL — SIGNIFICANT CHANGE UP (ref 150–400)
POIKILOCYTOSIS BLD QL AUTO: SLIGHT — SIGNIFICANT CHANGE UP
POLYCHROMASIA BLD QL SMEAR: SLIGHT — SIGNIFICANT CHANGE UP
RBC # BLD: 2.31 M/UL — LOW (ref 3.8–5.2)
RBC # FLD: 15.3 % — HIGH (ref 10.3–14.5)
RBC BLD AUTO: SIGNIFICANT CHANGE UP
SMUDGE CELLS # BLD: PRESENT — SIGNIFICANT CHANGE UP
WBC # BLD: 14.3 K/UL — HIGH (ref 3.8–10.5)
WBC # FLD AUTO: 14.3 K/UL — HIGH (ref 3.8–10.5)

## 2018-01-29 ENCOUNTER — APPOINTMENT (OUTPATIENT)
Dept: RHEUMATOLOGY | Facility: CLINIC | Age: 83
End: 2018-01-29
Payer: MEDICARE

## 2018-01-29 VITALS
DIASTOLIC BLOOD PRESSURE: 63 MMHG | RESPIRATION RATE: 17 BRPM | OXYGEN SATURATION: 93 % | SYSTOLIC BLOOD PRESSURE: 119 MMHG | WEIGHT: 107 LBS | HEART RATE: 98 BPM | TEMPERATURE: 98.2 F | HEIGHT: 63 IN | BODY MASS INDEX: 18.96 KG/M2

## 2018-01-29 DIAGNOSIS — R53.1 WEAKNESS: ICD-10-CM

## 2018-01-29 PROCEDURE — 99214 OFFICE O/P EST MOD 30 MIN: CPT

## 2018-01-29 RX ORDER — PREDNISONE 5 MG/1
5 TABLET ORAL
Qty: 30 | Refills: 1 | Status: DISCONTINUED | OUTPATIENT
Start: 2017-11-28 | End: 2018-01-29

## 2018-02-01 ENCOUNTER — APPOINTMENT (OUTPATIENT)
Dept: HEMATOLOGY ONCOLOGY | Facility: CLINIC | Age: 83
End: 2018-02-01
Payer: MEDICARE

## 2018-02-01 ENCOUNTER — RESULT REVIEW (OUTPATIENT)
Age: 83
End: 2018-02-01

## 2018-02-01 ENCOUNTER — APPOINTMENT (OUTPATIENT)
Dept: HEMATOLOGY ONCOLOGY | Facility: CLINIC | Age: 83
End: 2018-02-01

## 2018-02-01 VITALS
SYSTOLIC BLOOD PRESSURE: 111 MMHG | WEIGHT: 104.61 LBS | DIASTOLIC BLOOD PRESSURE: 66 MMHG | HEART RATE: 107 BPM | OXYGEN SATURATION: 98 % | BODY MASS INDEX: 18.53 KG/M2 | TEMPERATURE: 98.1 F

## 2018-02-01 LAB
ANISOCYTOSIS BLD QL: SLIGHT — SIGNIFICANT CHANGE UP
BASO STIPL BLD QL SMEAR: PRESENT — SIGNIFICANT CHANGE UP
BASOPHILS # BLD AUTO: 0.2 K/UL — SIGNIFICANT CHANGE UP (ref 0–0.2)
BLASTS # FLD: 3 % — HIGH (ref 0–0)
DOHLE BOD BLD QL SMEAR: PRESENT — SIGNIFICANT CHANGE UP
ELLIPTOCYTES BLD QL SMEAR: SLIGHT — SIGNIFICANT CHANGE UP
EOSINOPHIL # BLD AUTO: 0 K/UL — SIGNIFICANT CHANGE UP (ref 0–0.5)
HCT VFR BLD CALC: 22.3 % — LOW (ref 34.5–45)
HGB BLD-MCNC: 7.3 G/DL — LOW (ref 11.5–15.5)
LYMPHOCYTES # BLD AUTO: 15 % — SIGNIFICANT CHANGE UP (ref 13–44)
LYMPHOCYTES # BLD AUTO: 2.6 K/UL — SIGNIFICANT CHANGE UP (ref 1–3.3)
MACROCYTES BLD QL: SIGNIFICANT CHANGE UP
MCHC RBC-ENTMCNC: 29.6 PG — SIGNIFICANT CHANGE UP (ref 27–34)
MCHC RBC-ENTMCNC: 32.8 GM/DL — SIGNIFICANT CHANGE UP (ref 32–36)
MCV RBC AUTO: 90.3 FL — SIGNIFICANT CHANGE UP (ref 80–100)
METAMYELOCYTES # FLD: 3 % — HIGH (ref 0–0)
MICROCYTES BLD QL: SLIGHT — SIGNIFICANT CHANGE UP
MONOCYTES # BLD AUTO: 7.7 K/UL — HIGH (ref 0–0.9)
MONOCYTES NFR BLD AUTO: 43 % — HIGH (ref 2–14)
MYELOCYTES NFR BLD: 3 % — HIGH (ref 0–0)
NEUTROPHILS # BLD AUTO: 8 K/UL — HIGH (ref 1.8–7.4)
NEUTROPHILS NFR BLD AUTO: 29 % — LOW (ref 43–77)
NEUTS BAND # BLD: 2 % — SIGNIFICANT CHANGE UP (ref 0–8)
NRBC # BLD: 2 /100 — HIGH (ref 0–0)
OVALOCYTES BLD QL SMEAR: SLIGHT — SIGNIFICANT CHANGE UP
PLAT MORPH BLD: NORMAL — SIGNIFICANT CHANGE UP
PLATELET # BLD AUTO: 223 K/UL — SIGNIFICANT CHANGE UP (ref 150–400)
POIKILOCYTOSIS BLD QL AUTO: SLIGHT — SIGNIFICANT CHANGE UP
POLYCHROMASIA BLD QL SMEAR: SLIGHT — SIGNIFICANT CHANGE UP
PROMYELOCYTES # FLD: 2 % — HIGH (ref 0–0)
RBC # BLD: 2.47 M/UL — LOW (ref 3.8–5.2)
RBC # FLD: 16.4 % — HIGH (ref 10.3–14.5)
RBC BLD AUTO: SIGNIFICANT CHANGE UP
ROULEAUX BLD QL SMEAR: PRESENT — SIGNIFICANT CHANGE UP
SPHEROCYTES BLD QL SMEAR: SLIGHT — SIGNIFICANT CHANGE UP
TOXIC GRANULES BLD QL SMEAR: PRESENT — SIGNIFICANT CHANGE UP
WBC # BLD: 18.6 K/UL — HIGH (ref 3.8–10.5)
WBC # FLD AUTO: 18.6 K/UL — HIGH (ref 3.8–10.5)

## 2018-02-01 PROCEDURE — 99213 OFFICE O/P EST LOW 20 MIN: CPT

## 2018-02-12 ENCOUNTER — APPOINTMENT (OUTPATIENT)
Dept: HEMATOLOGY ONCOLOGY | Facility: CLINIC | Age: 83
End: 2018-02-12

## 2018-02-12 ENCOUNTER — RESULT REVIEW (OUTPATIENT)
Age: 83
End: 2018-02-12

## 2018-02-12 LAB
ANISOCYTOSIS BLD QL: SLIGHT — SIGNIFICANT CHANGE UP
BASO STIPL BLD QL SMEAR: PRESENT — SIGNIFICANT CHANGE UP
BASOPHILS # BLD AUTO: 0.6 K/UL — HIGH (ref 0–0.2)
BLASTS # FLD: 1 % — HIGH (ref 0–0)
DACRYOCYTES BLD QL SMEAR: SLIGHT — SIGNIFICANT CHANGE UP
ELLIPTOCYTES BLD QL SMEAR: SLIGHT — SIGNIFICANT CHANGE UP
EOSINOPHIL # BLD AUTO: 0.1 K/UL — SIGNIFICANT CHANGE UP (ref 0–0.5)
HCT VFR BLD CALC: 17.1 % — CRITICAL LOW (ref 34.5–45)
HGB BLD-MCNC: 6 G/DL — CRITICAL LOW (ref 11.5–15.5)
HYPOCHROMIA BLD QL: SLIGHT — SIGNIFICANT CHANGE UP
LYMPHOCYTES # BLD AUTO: 2.6 K/UL — SIGNIFICANT CHANGE UP (ref 1–3.3)
LYMPHOCYTES # BLD AUTO: 20 % — SIGNIFICANT CHANGE UP (ref 13–44)
LYMPHOCYTES # SPEC AUTO: 1 % — HIGH (ref 0–0)
MCHC RBC-ENTMCNC: 31.4 PG — SIGNIFICANT CHANGE UP (ref 27–34)
MCHC RBC-ENTMCNC: 35.1 GM/DL — SIGNIFICANT CHANGE UP (ref 32–36)
MCV RBC AUTO: 89.4 FL — SIGNIFICANT CHANGE UP (ref 80–100)
METAMYELOCYTES # FLD: 1 % — HIGH (ref 0–0)
MONOCYTES # BLD AUTO: 7.3 K/UL — HIGH (ref 0–0.9)
MONOCYTES NFR BLD AUTO: 30 % — HIGH (ref 2–14)
MYELOCYTES NFR BLD: 2 % — HIGH (ref 0–0)
NEUTROPHILS # BLD AUTO: 5.8 K/UL — SIGNIFICANT CHANGE UP (ref 1.8–7.4)
NEUTROPHILS NFR BLD AUTO: 44 % — SIGNIFICANT CHANGE UP (ref 43–77)
NEUTS BAND # BLD: 1 % — SIGNIFICANT CHANGE UP (ref 0–8)
OVALOCYTES BLD QL SMEAR: SLIGHT — SIGNIFICANT CHANGE UP
PLAT MORPH BLD: NORMAL — SIGNIFICANT CHANGE UP
PLATELET # BLD AUTO: 213 K/UL — SIGNIFICANT CHANGE UP (ref 150–400)
POIKILOCYTOSIS BLD QL AUTO: SLIGHT — SIGNIFICANT CHANGE UP
RBC # BLD: 1.92 M/UL — LOW (ref 3.8–5.2)
RBC # FLD: 17.3 % — HIGH (ref 10.3–14.5)
RBC BLD AUTO: SIGNIFICANT CHANGE UP
SMUDGE CELLS # BLD: PRESENT — SIGNIFICANT CHANGE UP
SPHEROCYTES BLD QL SMEAR: SLIGHT — SIGNIFICANT CHANGE UP
TARGETS BLD QL SMEAR: SLIGHT — SIGNIFICANT CHANGE UP
TOXIC GRANULES BLD QL SMEAR: PRESENT — SIGNIFICANT CHANGE UP
WBC # BLD: 16.5 K/UL — HIGH (ref 3.8–10.5)
WBC # FLD AUTO: 16.5 K/UL — HIGH (ref 3.8–10.5)

## 2018-02-20 ENCOUNTER — RESULT REVIEW (OUTPATIENT)
Age: 83
End: 2018-02-20

## 2018-02-20 ENCOUNTER — APPOINTMENT (OUTPATIENT)
Dept: HEMATOLOGY ONCOLOGY | Facility: CLINIC | Age: 83
End: 2018-02-20

## 2018-02-20 ENCOUNTER — OUTPATIENT (OUTPATIENT)
Dept: OUTPATIENT SERVICES | Facility: HOSPITAL | Age: 83
LOS: 1 days | End: 2018-02-20
Payer: MEDICARE

## 2018-02-20 DIAGNOSIS — D64.9 ANEMIA, UNSPECIFIED: ICD-10-CM

## 2018-02-20 LAB
ALBUMIN SERPL ELPH-MCNC: 3.3 G/DL
ALP BLD-CCNC: 77 U/L
ALT SERPL-CCNC: 7 U/L
ANION GAP SERPL CALC-SCNC: 15 MMOL/L
ANISOCYTOSIS BLD QL: SIGNIFICANT CHANGE UP
AST SERPL-CCNC: 10 U/L
BASO STIPL BLD QL SMEAR: PRESENT — SIGNIFICANT CHANGE UP
BASOPHILS # BLD AUTO: 0.1 K/UL — SIGNIFICANT CHANGE UP (ref 0–0.2)
BILIRUB SERPL-MCNC: 0.4 MG/DL
BLD GP AB SCN SERPL QL: SIGNIFICANT CHANGE UP
BUN SERPL-MCNC: 15 MG/DL
CALCIUM SERPL-MCNC: 8.8 MG/DL
CHLORIDE SERPL-SCNC: 98 MMOL/L
CO2 SERPL-SCNC: 22 MMOL/L
CREAT SERPL-MCNC: 0.73 MG/DL
EOSINOPHIL # BLD AUTO: 0 K/UL — SIGNIFICANT CHANGE UP (ref 0–0.5)
GLUCOSE SERPL-MCNC: 121 MG/DL
HCT VFR BLD CALC: 15.5 % — CRITICAL LOW (ref 34.5–45)
HGB BLD-MCNC: 5.2 G/DL — CRITICAL LOW (ref 11.5–15.5)
HYPOCHROMIA BLD QL: SLIGHT — SIGNIFICANT CHANGE UP
LYMPHOCYTES # BLD AUTO: 2.1 K/UL — SIGNIFICANT CHANGE UP (ref 1–3.3)
LYMPHOCYTES # BLD AUTO: 30 % — SIGNIFICANT CHANGE UP (ref 13–44)
MACROCYTES BLD QL: SLIGHT — SIGNIFICANT CHANGE UP
MCHC RBC-ENTMCNC: 31.1 PG — SIGNIFICANT CHANGE UP (ref 27–34)
MCHC RBC-ENTMCNC: 33.7 GM/DL — SIGNIFICANT CHANGE UP (ref 32–36)
MCV RBC AUTO: 92.3 FL — SIGNIFICANT CHANGE UP (ref 80–100)
METAMYELOCYTES # FLD: 4 % — HIGH (ref 0–0)
MICROCYTES BLD QL: SLIGHT — SIGNIFICANT CHANGE UP
MONOCYTES # BLD AUTO: 5.4 K/UL — HIGH (ref 0–0.9)
MONOCYTES NFR BLD AUTO: 41 % — HIGH (ref 2–14)
MYELOCYTES NFR BLD: 1 % — HIGH (ref 0–0)
NEUTROPHILS # BLD AUTO: 3.6 K/UL — SIGNIFICANT CHANGE UP (ref 1.8–7.4)
NEUTROPHILS NFR BLD AUTO: 24 % — LOW (ref 43–77)
NRBC # BLD: 1 /100 — HIGH (ref 0–0)
PLAT MORPH BLD: NORMAL — SIGNIFICANT CHANGE UP
PLATELET # BLD AUTO: 162 K/UL — SIGNIFICANT CHANGE UP (ref 150–400)
POIKILOCYTOSIS BLD QL AUTO: SLIGHT — SIGNIFICANT CHANGE UP
POLYCHROMASIA BLD QL SMEAR: SLIGHT — SIGNIFICANT CHANGE UP
POTASSIUM SERPL-SCNC: 5 MMOL/L
PROT SERPL-MCNC: 6.8 G/DL
RBC # BLD: 1.68 M/UL — LOW (ref 3.8–5.2)
RBC # FLD: 19.7 % — HIGH (ref 10.3–14.5)
RBC BLD AUTO: ABNORMAL
SODIUM SERPL-SCNC: 135 MMOL/L
TYPE + AB SCN PNL BLD: SIGNIFICANT CHANGE UP
WBC # BLD: 10.9 K/UL — HIGH (ref 3.8–10.5)
WBC # FLD AUTO: 10.9 K/UL — HIGH (ref 3.8–10.5)

## 2018-02-21 ENCOUNTER — RX RENEWAL (OUTPATIENT)
Age: 83
End: 2018-02-21

## 2018-02-21 PROCEDURE — 86922 COMPATIBILITY TEST ANTIGLOB: CPT

## 2018-02-21 PROCEDURE — 36415 COLL VENOUS BLD VENIPUNCTURE: CPT

## 2018-02-21 PROCEDURE — 86901 BLOOD TYPING SEROLOGIC RH(D): CPT

## 2018-02-21 PROCEDURE — 86900 BLOOD TYPING SEROLOGIC ABO: CPT

## 2018-02-21 PROCEDURE — 86850 RBC ANTIBODY SCREEN: CPT

## 2018-02-21 RX ORDER — DIPHENHYDRAMINE HCL 50 MG
25 CAPSULE ORAL ONCE
Qty: 0 | Refills: 0 | Status: COMPLETED | OUTPATIENT
Start: 2018-02-22 | End: 2018-02-22

## 2018-02-21 RX ORDER — ACETAMINOPHEN 500 MG
650 TABLET ORAL ONCE
Qty: 0 | Refills: 0 | Status: COMPLETED | OUTPATIENT
Start: 2018-02-22 | End: 2018-02-22

## 2018-02-22 ENCOUNTER — OUTPATIENT (OUTPATIENT)
Dept: OUTPATIENT SERVICES | Facility: HOSPITAL | Age: 83
LOS: 1 days | End: 2018-02-22
Payer: MEDICARE

## 2018-02-22 VITALS
OXYGEN SATURATION: 94 % | HEART RATE: 77 BPM | DIASTOLIC BLOOD PRESSURE: 64 MMHG | SYSTOLIC BLOOD PRESSURE: 104 MMHG | RESPIRATION RATE: 18 BRPM | TEMPERATURE: 98 F

## 2018-02-22 VITALS
HEART RATE: 92 BPM | TEMPERATURE: 98 F | DIASTOLIC BLOOD PRESSURE: 74 MMHG | RESPIRATION RATE: 18 BRPM | OXYGEN SATURATION: 97 % | SYSTOLIC BLOOD PRESSURE: 124 MMHG

## 2018-02-22 DIAGNOSIS — D64.9 ANEMIA, UNSPECIFIED: ICD-10-CM

## 2018-02-22 PROCEDURE — 36430 TRANSFUSION BLD/BLD COMPNT: CPT

## 2018-02-22 PROCEDURE — P9040: CPT

## 2018-02-22 RX ADMIN — Medication 25 MILLIGRAM(S): at 11:10

## 2018-02-22 RX ADMIN — Medication 650 MILLIGRAM(S): at 11:10

## 2018-02-26 ENCOUNTER — APPOINTMENT (OUTPATIENT)
Dept: RHEUMATOLOGY | Facility: CLINIC | Age: 83
End: 2018-02-26
Payer: MEDICARE

## 2018-02-26 VITALS
HEIGHT: 63 IN | HEART RATE: 97 BPM | DIASTOLIC BLOOD PRESSURE: 57 MMHG | TEMPERATURE: 98.2 F | SYSTOLIC BLOOD PRESSURE: 106 MMHG | WEIGHT: 105 LBS | RESPIRATION RATE: 17 BRPM | BODY MASS INDEX: 18.61 KG/M2 | OXYGEN SATURATION: 97 %

## 2018-02-26 PROCEDURE — 99214 OFFICE O/P EST MOD 30 MIN: CPT

## 2018-02-28 ENCOUNTER — OUTPATIENT (OUTPATIENT)
Dept: OUTPATIENT SERVICES | Facility: HOSPITAL | Age: 83
LOS: 1 days | Discharge: HOME | End: 2018-02-28

## 2018-02-28 DIAGNOSIS — D47.3 ESSENTIAL (HEMORRHAGIC) THROMBOCYTHEMIA: ICD-10-CM

## 2018-03-05 ENCOUNTER — APPOINTMENT (OUTPATIENT)
Dept: HEMATOLOGY ONCOLOGY | Facility: CLINIC | Age: 83
End: 2018-03-05

## 2018-03-05 ENCOUNTER — RESULT REVIEW (OUTPATIENT)
Age: 83
End: 2018-03-05

## 2018-03-05 LAB
ANISOCYTOSIS BLD QL: SIGNIFICANT CHANGE UP
BASO STIPL BLD QL SMEAR: PRESENT — SIGNIFICANT CHANGE UP
BLASTS # FLD: 3 % — HIGH (ref 0–0)
DACRYOCYTES BLD QL SMEAR: SLIGHT — SIGNIFICANT CHANGE UP
HCT VFR BLD CALC: 22.8 % — LOW (ref 34.5–45)
HGB BLD-MCNC: 7.6 G/DL — LOW (ref 11.5–15.5)
HYPOCHROMIA BLD QL: SLIGHT — SIGNIFICANT CHANGE UP
LYMPHOCYTES # BLD AUTO: 23 % — SIGNIFICANT CHANGE UP (ref 13–44)
LYMPHOCYTES # BLD AUTO: SIGNIFICANT CHANGE UP (ref 1–3.3)
LYMPHOCYTES # SPEC AUTO: 1 % — HIGH (ref 0–0)
MACROCYTES BLD QL: SLIGHT — SIGNIFICANT CHANGE UP
MCHC RBC-ENTMCNC: 30.9 PG — SIGNIFICANT CHANGE UP (ref 27–34)
MCHC RBC-ENTMCNC: 33.5 GM/DL — SIGNIFICANT CHANGE UP (ref 32–36)
MCV RBC AUTO: 92.3 FL — SIGNIFICANT CHANGE UP (ref 80–100)
METAMYELOCYTES # FLD: 5 % — HIGH (ref 0–0)
MICROCYTES BLD QL: SLIGHT — SIGNIFICANT CHANGE UP
MONOCYTES NFR BLD AUTO: 32 % — HIGH (ref 2–14)
MYELOCYTES NFR BLD: 7 % — HIGH (ref 0–0)
NEUTROPHILS # BLD AUTO: 4.1 K/UL — SIGNIFICANT CHANGE UP (ref 1.8–7.4)
NEUTROPHILS NFR BLD AUTO: 23 % — LOW (ref 43–77)
NEUTS BAND # BLD: 2 % — SIGNIFICANT CHANGE UP (ref 0–8)
NRBC # BLD: 1 /100 — HIGH (ref 0–0)
PLAT MORPH BLD: NORMAL — SIGNIFICANT CHANGE UP
PLATELET # BLD AUTO: 187 K/UL — SIGNIFICANT CHANGE UP (ref 150–400)
POIKILOCYTOSIS BLD QL AUTO: SLIGHT — SIGNIFICANT CHANGE UP
POLYCHROMASIA BLD QL SMEAR: SLIGHT — SIGNIFICANT CHANGE UP
PROMYELOCYTES # FLD: 4 % — HIGH (ref 0–0)
RBC # BLD: 2.47 M/UL — LOW (ref 3.8–5.2)
RBC # FLD: 18.2 % — HIGH (ref 10.3–14.5)
RBC BLD AUTO: ABNORMAL
SCHISTOCYTES BLD QL AUTO: SLIGHT — SIGNIFICANT CHANGE UP
SPHEROCYTES BLD QL SMEAR: SLIGHT — SIGNIFICANT CHANGE UP
WBC # BLD: 13.5 K/UL — HIGH (ref 3.8–10.5)
WBC # FLD AUTO: 13.5 K/UL — HIGH (ref 3.8–10.5)

## 2018-03-12 ENCOUNTER — RX RENEWAL (OUTPATIENT)
Age: 83
End: 2018-03-12

## 2018-03-15 ENCOUNTER — APPOINTMENT (OUTPATIENT)
Dept: HEMATOLOGY ONCOLOGY | Facility: CLINIC | Age: 83
End: 2018-03-15

## 2018-03-19 ENCOUNTER — RESULT REVIEW (OUTPATIENT)
Age: 83
End: 2018-03-19

## 2018-03-19 ENCOUNTER — APPOINTMENT (OUTPATIENT)
Dept: HEMATOLOGY ONCOLOGY | Facility: CLINIC | Age: 83
End: 2018-03-19

## 2018-03-19 LAB
ANISOCYTOSIS BLD QL: SIGNIFICANT CHANGE UP
BASO STIPL BLD QL SMEAR: PRESENT — SIGNIFICANT CHANGE UP
BASOPHILS # BLD AUTO: SIGNIFICANT CHANGE UP (ref 0–0.2)
BASOPHILS NFR BLD AUTO: 1 % — SIGNIFICANT CHANGE UP (ref 0–2)
BLASTS # FLD: 2 % — HIGH (ref 0–0)
DACRYOCYTES BLD QL SMEAR: SLIGHT — SIGNIFICANT CHANGE UP
ELLIPTOCYTES BLD QL SMEAR: SLIGHT — SIGNIFICANT CHANGE UP
EOSINOPHIL # BLD AUTO: SIGNIFICANT CHANGE UP (ref 0–0.5)
HCT VFR BLD CALC: 20.9 % — CRITICAL LOW (ref 34.5–45)
HGB BLD-MCNC: 6.9 G/DL — CRITICAL LOW (ref 11.5–15.5)
LYMPHOCYTES # BLD AUTO: 37 % — SIGNIFICANT CHANGE UP (ref 13–44)
LYMPHOCYTES # BLD AUTO: SIGNIFICANT CHANGE UP (ref 1–3.3)
MCHC RBC-ENTMCNC: 30.8 PG — SIGNIFICANT CHANGE UP (ref 27–34)
MCHC RBC-ENTMCNC: 33 GM/DL — SIGNIFICANT CHANGE UP (ref 32–36)
MCV RBC AUTO: 93.4 FL — SIGNIFICANT CHANGE UP (ref 80–100)
METAMYELOCYTES # FLD: 1 % — HIGH (ref 0–0)
MONOCYTES # BLD AUTO: SIGNIFICANT CHANGE UP (ref 0–0.9)
MONOCYTES NFR BLD AUTO: 29 % — HIGH (ref 2–14)
NEUTROPHILS # BLD AUTO: 3.6 K/UL — SIGNIFICANT CHANGE UP (ref 1.8–7.4)
NEUTROPHILS NFR BLD AUTO: 29 % — LOW (ref 43–77)
NRBC # BLD: 1 /100 — HIGH (ref 0–0)
OVALOCYTES BLD QL SMEAR: SLIGHT — SIGNIFICANT CHANGE UP
PLAT MORPH BLD: NORMAL — SIGNIFICANT CHANGE UP
PLATELET # BLD AUTO: 208 K/UL — SIGNIFICANT CHANGE UP (ref 150–400)
POIKILOCYTOSIS BLD QL AUTO: SLIGHT — SIGNIFICANT CHANGE UP
POLYCHROMASIA BLD QL SMEAR: SLIGHT — SIGNIFICANT CHANGE UP
RBC # BLD: 2.23 M/UL — LOW (ref 3.8–5.2)
RBC # FLD: 20 % — HIGH (ref 10.3–14.5)
RBC BLD AUTO: ABNORMAL
TARGETS BLD QL SMEAR: SLIGHT — SIGNIFICANT CHANGE UP
VARIANT LYMPHS # BLD: 1 % — SIGNIFICANT CHANGE UP (ref 0–6)
WBC # BLD: 16.4 K/UL — HIGH (ref 3.8–10.5)
WBC # FLD AUTO: 16.4 K/UL — HIGH (ref 3.8–10.5)

## 2018-03-26 ENCOUNTER — APPOINTMENT (OUTPATIENT)
Dept: RHEUMATOLOGY | Facility: CLINIC | Age: 83
End: 2018-03-26
Payer: MEDICARE

## 2018-03-26 ENCOUNTER — RESULT REVIEW (OUTPATIENT)
Age: 83
End: 2018-03-26

## 2018-03-26 ENCOUNTER — APPOINTMENT (OUTPATIENT)
Dept: HEMATOLOGY ONCOLOGY | Facility: CLINIC | Age: 83
End: 2018-03-26

## 2018-03-26 VITALS
HEIGHT: 63 IN | HEART RATE: 89 BPM | OXYGEN SATURATION: 97 % | SYSTOLIC BLOOD PRESSURE: 98 MMHG | RESPIRATION RATE: 17 BRPM | TEMPERATURE: 98.4 F | DIASTOLIC BLOOD PRESSURE: 51 MMHG | WEIGHT: 105 LBS | BODY MASS INDEX: 18.61 KG/M2

## 2018-03-26 LAB
ANISOCYTOSIS BLD QL: SIGNIFICANT CHANGE UP
BASO STIPL BLD QL SMEAR: PRESENT — SIGNIFICANT CHANGE UP
BASOPHILS # BLD AUTO: 1.3 K/UL — HIGH (ref 0–0.2)
BLASTS # FLD: 8 % — HIGH (ref 0–0)
DACRYOCYTES BLD QL SMEAR: SLIGHT — SIGNIFICANT CHANGE UP
ELLIPTOCYTES BLD QL SMEAR: SLIGHT — SIGNIFICANT CHANGE UP
EOSINOPHIL # BLD AUTO: 0.1 K/UL — SIGNIFICANT CHANGE UP (ref 0–0.5)
EOSINOPHIL NFR BLD AUTO: 1 % — SIGNIFICANT CHANGE UP (ref 0–6)
HCT VFR BLD CALC: 18.5 % — CRITICAL LOW (ref 34.5–45)
HGB BLD-MCNC: 6.4 G/DL — CRITICAL LOW (ref 11.5–15.5)
HYPOCHROMIA BLD QL: SLIGHT — SIGNIFICANT CHANGE UP
LYMPHOCYTES # BLD AUTO: 17 % — SIGNIFICANT CHANGE UP (ref 13–44)
LYMPHOCYTES # BLD AUTO: 2.8 K/UL — SIGNIFICANT CHANGE UP (ref 1–3.3)
LYMPHOCYTES # SPEC AUTO: 4 % — HIGH (ref 0–0)
MACROCYTES BLD QL: SLIGHT — SIGNIFICANT CHANGE UP
MCHC RBC-ENTMCNC: 32.2 PG — SIGNIFICANT CHANGE UP (ref 27–34)
MCHC RBC-ENTMCNC: 34.7 GM/DL — SIGNIFICANT CHANGE UP (ref 32–36)
MCV RBC AUTO: 93 FL — SIGNIFICANT CHANGE UP (ref 80–100)
METAMYELOCYTES # FLD: 3 % — HIGH (ref 0–0)
MICROCYTES BLD QL: SLIGHT — SIGNIFICANT CHANGE UP
MONOCYTES # BLD AUTO: 10.5 K/UL — HIGH (ref 0–0.9)
MONOCYTES NFR BLD AUTO: 40 % — HIGH (ref 2–14)
MYELOCYTES NFR BLD: 3 % — HIGH (ref 0–0)
NEUTROPHILS # BLD AUTO: 4.4 K/UL — SIGNIFICANT CHANGE UP (ref 1.8–7.4)
NEUTROPHILS NFR BLD AUTO: 18 % — LOW (ref 43–77)
NEUTS BAND # BLD: 2 % — SIGNIFICANT CHANGE UP (ref 0–8)
NRBC # BLD: 2 /100 — HIGH (ref 0–0)
OVALOCYTES BLD QL SMEAR: SLIGHT — SIGNIFICANT CHANGE UP
PLAT MORPH BLD: NORMAL — SIGNIFICANT CHANGE UP
PLATELET # BLD AUTO: 165 K/UL — SIGNIFICANT CHANGE UP (ref 150–400)
POIKILOCYTOSIS BLD QL AUTO: SLIGHT — SIGNIFICANT CHANGE UP
POLYCHROMASIA BLD QL SMEAR: SLIGHT — SIGNIFICANT CHANGE UP
PROMYELOCYTES # FLD: 4 % — HIGH (ref 0–0)
RBC # BLD: 1.99 M/UL — LOW (ref 3.8–5.2)
RBC # FLD: 19.7 % — HIGH (ref 10.3–14.5)
RBC BLD AUTO: ABNORMAL
SCHISTOCYTES BLD QL AUTO: SLIGHT — SIGNIFICANT CHANGE UP
SPHEROCYTES BLD QL SMEAR: SLIGHT — SIGNIFICANT CHANGE UP
WBC # BLD: 19.1 K/UL — HIGH (ref 3.8–10.5)
WBC # FLD AUTO: 19.1 K/UL — HIGH (ref 3.8–10.5)

## 2018-03-26 PROCEDURE — 99214 OFFICE O/P EST MOD 30 MIN: CPT

## 2018-03-29 ENCOUNTER — OUTPATIENT (OUTPATIENT)
Dept: OUTPATIENT SERVICES | Facility: HOSPITAL | Age: 83
LOS: 1 days | Discharge: HOME | End: 2018-03-29

## 2018-03-29 DIAGNOSIS — D47.3 ESSENTIAL (HEMORRHAGIC) THROMBOCYTHEMIA: ICD-10-CM

## 2018-04-02 ENCOUNTER — RESULT REVIEW (OUTPATIENT)
Age: 83
End: 2018-04-02

## 2018-04-02 ENCOUNTER — APPOINTMENT (OUTPATIENT)
Dept: HEMATOLOGY ONCOLOGY | Facility: CLINIC | Age: 83
End: 2018-04-02

## 2018-04-02 LAB
ANISOCYTOSIS BLD QL: SIGNIFICANT CHANGE UP
BASO STIPL BLD QL SMEAR: PRESENT — SIGNIFICANT CHANGE UP
BASOPHILS # BLD AUTO: SIGNIFICANT CHANGE UP (ref 0–0.2)
BLASTS # FLD: 7 % — HIGH (ref 0–0)
DACRYOCYTES BLD QL SMEAR: SLIGHT — SIGNIFICANT CHANGE UP
ELLIPTOCYTES BLD QL SMEAR: SLIGHT — SIGNIFICANT CHANGE UP
EOSINOPHIL # BLD AUTO: SIGNIFICANT CHANGE UP (ref 0–0.5)
EOSINOPHIL NFR BLD AUTO: 3 % — SIGNIFICANT CHANGE UP (ref 0–6)
HCT VFR BLD CALC: 19.1 % — CRITICAL LOW (ref 34.5–45)
HGB BLD-MCNC: 6.4 G/DL — CRITICAL LOW (ref 11.5–15.5)
LYMPHOCYTES # BLD AUTO: 33 % — SIGNIFICANT CHANGE UP (ref 13–44)
LYMPHOCYTES # BLD AUTO: SIGNIFICANT CHANGE UP (ref 1–3.3)
MACROCYTES BLD QL: SLIGHT — SIGNIFICANT CHANGE UP
MCHC RBC-ENTMCNC: 31.4 PG — SIGNIFICANT CHANGE UP (ref 27–34)
MCHC RBC-ENTMCNC: 33.4 GM/DL — SIGNIFICANT CHANGE UP (ref 32–36)
MCV RBC AUTO: 94 FL — SIGNIFICANT CHANGE UP (ref 80–100)
METAMYELOCYTES # FLD: 1 % — HIGH (ref 0–0)
MICROCYTES BLD QL: SLIGHT — SIGNIFICANT CHANGE UP
MONOCYTES # BLD AUTO: SIGNIFICANT CHANGE UP (ref 0–0.9)
MONOCYTES NFR BLD AUTO: 38 % — HIGH (ref 2–14)
MYELOCYTES NFR BLD: 1 % — HIGH (ref 0–0)
NEUTROPHILS # BLD AUTO: 4.1 K/UL — SIGNIFICANT CHANGE UP (ref 1.8–7.4)
NEUTROPHILS NFR BLD AUTO: 14 % — LOW (ref 43–77)
NEUTS BAND # BLD: 3 % — SIGNIFICANT CHANGE UP (ref 0–8)
NRBC # BLD: 2 /100 — HIGH (ref 0–0)
PLAT MORPH BLD: NORMAL — SIGNIFICANT CHANGE UP
PLATELET # BLD AUTO: 135 K/UL — LOW (ref 150–400)
POIKILOCYTOSIS BLD QL AUTO: SLIGHT — SIGNIFICANT CHANGE UP
POLYCHROMASIA BLD QL SMEAR: SLIGHT — SIGNIFICANT CHANGE UP
RBC # BLD: 2.03 M/UL — LOW (ref 3.8–5.2)
RBC # FLD: 21 % — HIGH (ref 10.3–14.5)
RBC BLD AUTO: ABNORMAL
SPHEROCYTES BLD QL SMEAR: SLIGHT — SIGNIFICANT CHANGE UP
TARGETS BLD QL SMEAR: SLIGHT — SIGNIFICANT CHANGE UP
WBC # BLD: 19.8 K/UL — HIGH (ref 3.8–10.5)
WBC # FLD AUTO: 19.8 K/UL — HIGH (ref 3.8–10.5)

## 2018-04-03 ENCOUNTER — RX RENEWAL (OUTPATIENT)
Age: 83
End: 2018-04-03

## 2018-04-09 ENCOUNTER — APPOINTMENT (OUTPATIENT)
Dept: HEMATOLOGY ONCOLOGY | Facility: CLINIC | Age: 83
End: 2018-04-09

## 2018-04-09 ENCOUNTER — RESULT REVIEW (OUTPATIENT)
Age: 83
End: 2018-04-09

## 2018-04-09 LAB
ANISOCYTOSIS BLD QL: SIGNIFICANT CHANGE UP
BASO STIPL BLD QL SMEAR: PRESENT — SIGNIFICANT CHANGE UP
BASOPHILS # BLD AUTO: SIGNIFICANT CHANGE UP (ref 0–0.2)
BASOPHILS NFR BLD AUTO: 0 % — HIGH (ref 0–2)
BLASTS # FLD: 3 % — HIGH (ref 0–0)
DACRYOCYTES BLD QL SMEAR: SLIGHT — SIGNIFICANT CHANGE UP
EOSINOPHIL # BLD AUTO: SIGNIFICANT CHANGE UP (ref 0–0.5)
EOSINOPHIL NFR BLD AUTO: 1 % — SIGNIFICANT CHANGE UP (ref 0–6)
HCT VFR BLD CALC: 18.6 % — CRITICAL LOW (ref 34.5–45)
HGB BLD-MCNC: 6.3 G/DL — CRITICAL LOW (ref 11.5–15.5)
LYMPHOCYTES # BLD AUTO: 34 % — SIGNIFICANT CHANGE UP (ref 13–44)
LYMPHOCYTES # BLD AUTO: SIGNIFICANT CHANGE UP (ref 1–3.3)
LYMPHOCYTES # SPEC AUTO: 5 % — HIGH (ref 0–0)
MACROCYTES BLD QL: SLIGHT — SIGNIFICANT CHANGE UP
MCHC RBC-ENTMCNC: 32.8 PG — SIGNIFICANT CHANGE UP (ref 27–34)
MCHC RBC-ENTMCNC: 33.8 GM/DL — SIGNIFICANT CHANGE UP (ref 32–36)
MCV RBC AUTO: 96.8 FL — SIGNIFICANT CHANGE UP (ref 80–100)
METAMYELOCYTES # FLD: 2 % — HIGH (ref 0–0)
MICROCYTES BLD QL: SLIGHT — SIGNIFICANT CHANGE UP
MONOCYTES # BLD AUTO: SIGNIFICANT CHANGE UP (ref 0–0.9)
MONOCYTES NFR BLD AUTO: 38 % — HIGH (ref 2–14)
MYELOCYTES NFR BLD: 1 % — HIGH (ref 0–0)
NEUTROPHILS # BLD AUTO: SIGNIFICANT CHANGE UP (ref 1.8–7.4)
NEUTROPHILS NFR BLD AUTO: 10 % — LOW (ref 43–77)
NEUTS BAND # BLD: 6 % — SIGNIFICANT CHANGE UP (ref 0–8)
PLAT MORPH BLD: NORMAL — SIGNIFICANT CHANGE UP
PLATELET # BLD AUTO: 193 K/UL — SIGNIFICANT CHANGE UP (ref 150–400)
POIKILOCYTOSIS BLD QL AUTO: SLIGHT — SIGNIFICANT CHANGE UP
POLYCHROMASIA BLD QL SMEAR: SLIGHT — SIGNIFICANT CHANGE UP
RBC # BLD: 1.92 M/UL — LOW (ref 3.8–5.2)
RBC # FLD: 23.2 % — HIGH (ref 10.3–14.5)
RBC BLD AUTO: ABNORMAL
WBC # BLD: 21.5 K/UL — HIGH (ref 3.8–10.5)
WBC # FLD AUTO: 21.5 K/UL — HIGH (ref 3.8–10.5)

## 2018-04-16 ENCOUNTER — RESULT REVIEW (OUTPATIENT)
Age: 83
End: 2018-04-16

## 2018-04-16 ENCOUNTER — APPOINTMENT (OUTPATIENT)
Dept: HEMATOLOGY ONCOLOGY | Facility: CLINIC | Age: 83
End: 2018-04-16

## 2018-04-16 LAB
ANISOCYTOSIS BLD QL: SLIGHT — SIGNIFICANT CHANGE UP
BASOPHILS # BLD AUTO: 2.1 K/UL — HIGH (ref 0–0.2)
EOSINOPHIL # BLD AUTO: 0.2 K/UL — SIGNIFICANT CHANGE UP (ref 0–0.5)
HCT VFR BLD CALC: 19.9 % — CRITICAL LOW (ref 34.5–45)
HGB BLD-MCNC: 6.4 G/DL — CRITICAL LOW (ref 11.5–15.5)
LYMPHOCYTES # BLD AUTO: 3.8 K/UL — HIGH (ref 1–3.3)
LYMPHOCYTES # BLD AUTO: 36 % — SIGNIFICANT CHANGE UP (ref 13–44)
LYMPHOCYTES # SPEC AUTO: 6 % — HIGH (ref 0–0)
MCHC RBC-ENTMCNC: 32.4 GM/DL — SIGNIFICANT CHANGE UP (ref 32–36)
MCHC RBC-ENTMCNC: 32.4 PG — SIGNIFICANT CHANGE UP (ref 27–34)
MCV RBC AUTO: 100 FL — SIGNIFICANT CHANGE UP (ref 80–100)
METAMYELOCYTES # FLD: 6 % — HIGH (ref 0–0)
MONOCYTES # BLD AUTO: 12.9 K/UL — HIGH (ref 0–0.9)
MONOCYTES NFR BLD AUTO: 33 % — HIGH (ref 2–14)
MYELOCYTES NFR BLD: 5 % — HIGH (ref 0–0)
NEUTROPHILS # BLD AUTO: 6.8 K/UL — SIGNIFICANT CHANGE UP (ref 1.8–7.4)
NEUTROPHILS NFR BLD AUTO: 11 % — LOW (ref 43–77)
NEUTS BAND # BLD: 3 % — SIGNIFICANT CHANGE UP (ref 0–8)
PLAT MORPH BLD: NORMAL — SIGNIFICANT CHANGE UP
PLATELET # BLD AUTO: 149 K/UL — LOW (ref 150–400)
POLYCHROMASIA BLD QL SMEAR: SLIGHT — SIGNIFICANT CHANGE UP
RBC # BLD: 1.99 M/UL — LOW (ref 3.8–5.2)
RBC # FLD: 23.5 % — HIGH (ref 10.3–14.5)
RBC BLD AUTO: ABNORMAL
WBC # BLD: 24.8 K/UL — HIGH (ref 3.8–10.5)
WBC # FLD AUTO: 24.8 K/UL — HIGH (ref 3.8–10.5)

## 2018-04-17 LAB
ALBUMIN SERPL ELPH-MCNC: 3.9 G/DL
ALP BLD-CCNC: 113 U/L
ALT SERPL-CCNC: 11 U/L
ANION GAP SERPL CALC-SCNC: 13 MMOL/L
AST SERPL-CCNC: 17 U/L
BILIRUB SERPL-MCNC: 0.6 MG/DL
BUN SERPL-MCNC: 19 MG/DL
CALCIUM SERPL-MCNC: 8.9 MG/DL
CHLORIDE SERPL-SCNC: 98 MMOL/L
CO2 SERPL-SCNC: 25 MMOL/L
CREAT SERPL-MCNC: 0.82 MG/DL
GLUCOSE SERPL-MCNC: 91 MG/DL
POTASSIUM SERPL-SCNC: 4.6 MMOL/L
PROT SERPL-MCNC: 7.7 G/DL
SODIUM SERPL-SCNC: 136 MMOL/L

## 2018-04-23 ENCOUNTER — RESULT REVIEW (OUTPATIENT)
Age: 83
End: 2018-04-23

## 2018-04-23 ENCOUNTER — APPOINTMENT (OUTPATIENT)
Dept: HEMATOLOGY ONCOLOGY | Facility: CLINIC | Age: 83
End: 2018-04-23
Payer: MEDICARE

## 2018-04-23 VITALS
BODY MASS INDEX: 20.9 KG/M2 | DIASTOLIC BLOOD PRESSURE: 67 MMHG | SYSTOLIC BLOOD PRESSURE: 117 MMHG | OXYGEN SATURATION: 99 % | WEIGHT: 117.95 LBS | HEIGHT: 63 IN | TEMPERATURE: 98.2 F | HEART RATE: 96 BPM

## 2018-04-23 LAB
ANISOCYTOSIS BLD QL: SLIGHT — SIGNIFICANT CHANGE UP
BASOPHILS # BLD AUTO: 1.5 K/UL — HIGH (ref 0–0.2)
BLASTS # FLD: 3 % — HIGH (ref 0–0)
EOSINOPHIL # BLD AUTO: 0.2 K/UL — SIGNIFICANT CHANGE UP (ref 0–0.5)
HCT VFR BLD CALC: 19.2 % — CRITICAL LOW (ref 34.5–45)
HGB BLD-MCNC: 6.3 G/DL — CRITICAL LOW (ref 11.5–15.5)
LYMPHOCYTES # BLD AUTO: 29 % — SIGNIFICANT CHANGE UP (ref 13–44)
LYMPHOCYTES # BLD AUTO: 3.4 K/UL — HIGH (ref 1–3.3)
LYMPHOCYTES # SPEC AUTO: 11 % — HIGH (ref 0–0)
MCHC RBC-ENTMCNC: 32.5 GM/DL — SIGNIFICANT CHANGE UP (ref 32–36)
MCHC RBC-ENTMCNC: 33 PG — SIGNIFICANT CHANGE UP (ref 27–34)
MCV RBC AUTO: 101.6 FL — HIGH (ref 80–100)
METAMYELOCYTES # FLD: 2 % — HIGH (ref 0–0)
MONOCYTES # BLD AUTO: 10.1 K/UL — HIGH (ref 0–0.9)
MONOCYTES NFR BLD AUTO: 44 % — HIGH (ref 2–14)
MYELOCYTES NFR BLD: 3 % — HIGH (ref 0–0)
NEUTROPHILS # BLD AUTO: 4.6 K/UL — SIGNIFICANT CHANGE UP (ref 1.8–7.4)
NEUTROPHILS NFR BLD AUTO: 7 % — LOW (ref 43–77)
NEUTS BAND # BLD: 1 % — SIGNIFICANT CHANGE UP (ref 0–8)
PLAT MORPH BLD: NORMAL — SIGNIFICANT CHANGE UP
PLATELET # BLD AUTO: 136 K/UL — LOW (ref 150–400)
POLYCHROMASIA BLD QL SMEAR: SLIGHT — SIGNIFICANT CHANGE UP
RBC # BLD: 1.89 M/UL — LOW (ref 3.8–5.2)
RBC # FLD: 23.9 % — HIGH (ref 10.3–14.5)
RBC BLD AUTO: ABNORMAL
WBC # BLD: 20 K/UL — HIGH (ref 3.8–10.5)
WBC # FLD AUTO: 20 K/UL — HIGH (ref 3.8–10.5)

## 2018-04-23 PROCEDURE — 99214 OFFICE O/P EST MOD 30 MIN: CPT

## 2018-04-24 LAB
ALBUMIN SERPL ELPH-MCNC: 3.6 G/DL
ALP BLD-CCNC: 95 U/L
ALT SERPL-CCNC: 14 U/L
ANION GAP SERPL CALC-SCNC: 13 MMOL/L
AST SERPL-CCNC: 23 U/L
BILIRUB SERPL-MCNC: 0.4 MG/DL
BUN SERPL-MCNC: 20 MG/DL
CALCIUM SERPL-MCNC: 8.9 MG/DL
CHLORIDE SERPL-SCNC: 98 MMOL/L
CO2 SERPL-SCNC: 24 MMOL/L
CREAT SERPL-MCNC: 1.27 MG/DL
FERRITIN SERPL-MCNC: 2260 NG/ML
FOLATE SERPL-MCNC: 14.3 NG/ML
GLUCOSE SERPL-MCNC: 91 MG/DL
POTASSIUM SERPL-SCNC: 5 MMOL/L
PROT SERPL-MCNC: 7.5 G/DL
SODIUM SERPL-SCNC: 135 MMOL/L
VIT B12 SERPL-MCNC: 1036 PG/ML

## 2018-04-26 ENCOUNTER — APPOINTMENT (OUTPATIENT)
Dept: RHEUMATOLOGY | Facility: CLINIC | Age: 83
End: 2018-04-26
Payer: MEDICARE

## 2018-04-26 VITALS
TEMPERATURE: 99.2 F | WEIGHT: 116 LBS | SYSTOLIC BLOOD PRESSURE: 110 MMHG | RESPIRATION RATE: 16 BRPM | HEART RATE: 91 BPM | HEIGHT: 63 IN | OXYGEN SATURATION: 93 % | BODY MASS INDEX: 20.55 KG/M2 | DIASTOLIC BLOOD PRESSURE: 60 MMHG

## 2018-04-26 PROCEDURE — 99214 OFFICE O/P EST MOD 30 MIN: CPT

## 2018-04-26 RX ORDER — RUXOLITINIB 10 MG/1
10 TABLET ORAL
Qty: 30 | Refills: 2 | Status: COMPLETED | COMMUNITY
Start: 2018-03-12 | End: 2018-04-26

## 2018-04-26 RX ORDER — ONDANSETRON 8 MG/1
8 TABLET, ORALLY DISINTEGRATING ORAL
Qty: 9 | Refills: 0 | Status: COMPLETED | COMMUNITY
Start: 2017-08-21 | End: 2018-04-26

## 2018-04-26 RX ORDER — RUXOLITINIB 10 MG/1
10 TABLET ORAL
Qty: 30 | Refills: 2 | Status: COMPLETED | COMMUNITY
Start: 2018-01-08 | End: 2018-04-26

## 2018-04-26 RX ORDER — RUXOLITINIB 5 MG/1
5 TABLET ORAL
Qty: 30 | Refills: 1 | Status: COMPLETED | COMMUNITY
Start: 2017-10-27 | End: 2018-04-26

## 2018-05-01 ENCOUNTER — OUTPATIENT (OUTPATIENT)
Dept: OUTPATIENT SERVICES | Facility: HOSPITAL | Age: 83
LOS: 1 days | Discharge: HOME | End: 2018-05-01

## 2018-05-01 DIAGNOSIS — D47.3 ESSENTIAL (HEMORRHAGIC) THROMBOCYTHEMIA: ICD-10-CM

## 2018-05-03 ENCOUNTER — RESULT REVIEW (OUTPATIENT)
Age: 83
End: 2018-05-03

## 2018-05-03 ENCOUNTER — APPOINTMENT (OUTPATIENT)
Dept: HEMATOLOGY ONCOLOGY | Facility: CLINIC | Age: 83
End: 2018-05-03
Payer: MEDICARE

## 2018-05-03 VITALS
HEART RATE: 90 BPM | SYSTOLIC BLOOD PRESSURE: 111 MMHG | BODY MASS INDEX: 20.55 KG/M2 | WEIGHT: 116 LBS | TEMPERATURE: 98.5 F | HEIGHT: 63 IN | OXYGEN SATURATION: 97 % | DIASTOLIC BLOOD PRESSURE: 65 MMHG

## 2018-05-03 LAB
ANISOCYTOSIS BLD QL: SLIGHT — SIGNIFICANT CHANGE UP
BASO STIPL BLD QL SMEAR: PRESENT — SIGNIFICANT CHANGE UP
BASOPHILS # BLD AUTO: SIGNIFICANT CHANGE UP (ref 0–0.2)
DACRYOCYTES BLD QL SMEAR: SLIGHT — SIGNIFICANT CHANGE UP
EOSINOPHIL # BLD AUTO: SIGNIFICANT CHANGE UP (ref 0–0.5)
EOSINOPHIL NFR BLD AUTO: 1 % — SIGNIFICANT CHANGE UP (ref 0–6)
HCT VFR BLD CALC: 18.7 % — CRITICAL LOW (ref 34.5–45)
HGB BLD-MCNC: 6.3 G/DL — CRITICAL LOW (ref 11.5–15.5)
LYMPHOCYTES # BLD AUTO: 52 % — HIGH (ref 13–44)
LYMPHOCYTES # BLD AUTO: SIGNIFICANT CHANGE UP (ref 1–3.3)
LYMPHOCYTES # SPEC AUTO: 4 % — HIGH (ref 0–0)
MCHC RBC-ENTMCNC: 33.8 GM/DL — SIGNIFICANT CHANGE UP (ref 32–36)
MCHC RBC-ENTMCNC: 34.2 PG — HIGH (ref 27–34)
MCV RBC AUTO: 101.2 FL — HIGH (ref 80–100)
METAMYELOCYTES # FLD: 2 % — HIGH (ref 0–0)
MONOCYTES # BLD AUTO: SIGNIFICANT CHANGE UP (ref 0–0.9)
MONOCYTES NFR BLD AUTO: 24 % — HIGH (ref 2–14)
MYELOCYTES NFR BLD: 5 % — HIGH (ref 0–0)
NEUTROPHILS # BLD AUTO: 3.3 K/UL — SIGNIFICANT CHANGE UP (ref 1.8–7.4)
NEUTROPHILS NFR BLD AUTO: 9 % — LOW (ref 43–77)
NEUTS BAND # BLD: 2 % — SIGNIFICANT CHANGE UP (ref 0–8)
PLAT MORPH BLD: NORMAL — SIGNIFICANT CHANGE UP
PLATELET # BLD AUTO: 126 K/UL — LOW (ref 150–400)
POLYCHROMASIA BLD QL SMEAR: SLIGHT — SIGNIFICANT CHANGE UP
PROMYELOCYTES # FLD: 1 % — HIGH (ref 0–0)
RBC # BLD: 1.85 M/UL — LOW (ref 3.8–5.2)
RBC # FLD: 23.4 % — HIGH (ref 10.3–14.5)
RBC BLD AUTO: ABNORMAL
WBC # BLD: 18.7 K/UL — HIGH (ref 3.8–10.5)
WBC # FLD AUTO: 18.7 K/UL — HIGH (ref 3.8–10.5)

## 2018-05-03 PROCEDURE — 99213 OFFICE O/P EST LOW 20 MIN: CPT

## 2018-05-16 ENCOUNTER — RESULT REVIEW (OUTPATIENT)
Age: 83
End: 2018-05-16

## 2018-05-16 ENCOUNTER — APPOINTMENT (OUTPATIENT)
Dept: HEMATOLOGY ONCOLOGY | Facility: CLINIC | Age: 83
End: 2018-05-16
Payer: MEDICARE

## 2018-05-16 VITALS
OXYGEN SATURATION: 96 % | HEART RATE: 96 BPM | HEIGHT: 63 IN | DIASTOLIC BLOOD PRESSURE: 74 MMHG | SYSTOLIC BLOOD PRESSURE: 123 MMHG | TEMPERATURE: 98.2 F | BODY MASS INDEX: 21.91 KG/M2 | WEIGHT: 123.68 LBS

## 2018-05-16 LAB
ANISOCYTOSIS BLD QL: SLIGHT — SIGNIFICANT CHANGE UP
BASO STIPL BLD QL SMEAR: PRESENT — SIGNIFICANT CHANGE UP
BASOPHILS # BLD AUTO: SIGNIFICANT CHANGE UP (ref 0–0.2)
DACRYOCYTES BLD QL SMEAR: SLIGHT — SIGNIFICANT CHANGE UP
EOSINOPHIL # BLD AUTO: SIGNIFICANT CHANGE UP (ref 0–0.5)
EOSINOPHIL NFR BLD AUTO: 4 % — SIGNIFICANT CHANGE UP (ref 0–6)
HCT VFR BLD CALC: 20.1 % — CRITICAL LOW (ref 34.5–45)
HGB BLD-MCNC: 6.7 G/DL — CRITICAL LOW (ref 11.5–15.5)
HYPOCHROMIA BLD QL: SLIGHT — SIGNIFICANT CHANGE UP
LYMPHOCYTES # BLD AUTO: 43 % — SIGNIFICANT CHANGE UP (ref 13–44)
LYMPHOCYTES # BLD AUTO: SIGNIFICANT CHANGE UP (ref 1–3.3)
LYMPHOCYTES # SPEC AUTO: 1 % — HIGH (ref 0–0)
MACROCYTES BLD QL: SLIGHT — SIGNIFICANT CHANGE UP
MCHC RBC-ENTMCNC: 33.4 GM/DL — SIGNIFICANT CHANGE UP (ref 32–36)
MCHC RBC-ENTMCNC: 35.2 PG — HIGH (ref 27–34)
MCV RBC AUTO: 105.5 FL — HIGH (ref 80–100)
MICROCYTES BLD QL: SLIGHT — SIGNIFICANT CHANGE UP
MONOCYTES # BLD AUTO: SIGNIFICANT CHANGE UP (ref 0–0.9)
MONOCYTES NFR BLD AUTO: 29 % — HIGH (ref 2–14)
NEUTROPHILS # BLD AUTO: 2.8 K/UL — SIGNIFICANT CHANGE UP (ref 1.8–7.4)
NEUTROPHILS NFR BLD AUTO: 22 % — LOW (ref 43–77)
NEUTS BAND # BLD: 1 % — SIGNIFICANT CHANGE UP (ref 0–8)
PLAT MORPH BLD: NORMAL — SIGNIFICANT CHANGE UP
PLATELET # BLD AUTO: 127 K/UL — LOW (ref 150–400)
POIKILOCYTOSIS BLD QL AUTO: SLIGHT — SIGNIFICANT CHANGE UP
POLYCHROMASIA BLD QL SMEAR: SLIGHT — SIGNIFICANT CHANGE UP
RBC # BLD: 1.9 M/UL — LOW (ref 3.8–5.2)
RBC # FLD: 23.3 % — HIGH (ref 10.3–14.5)
RBC BLD AUTO: ABNORMAL
WBC # BLD: 14.2 K/UL — HIGH (ref 3.8–10.5)
WBC # FLD AUTO: 14.2 K/UL — HIGH (ref 3.8–10.5)

## 2018-05-16 PROCEDURE — 99213 OFFICE O/P EST LOW 20 MIN: CPT

## 2018-05-24 ENCOUNTER — APPOINTMENT (OUTPATIENT)
Dept: RHEUMATOLOGY | Facility: CLINIC | Age: 83
End: 2018-05-24
Payer: MEDICARE

## 2018-05-24 VITALS
HEART RATE: 101 BPM | TEMPERATURE: 100.2 F | OXYGEN SATURATION: 95 % | SYSTOLIC BLOOD PRESSURE: 122 MMHG | RESPIRATION RATE: 17 BRPM | HEIGHT: 63 IN | WEIGHT: 115 LBS | BODY MASS INDEX: 20.38 KG/M2 | DIASTOLIC BLOOD PRESSURE: 58 MMHG

## 2018-05-24 PROCEDURE — 99214 OFFICE O/P EST MOD 30 MIN: CPT

## 2018-05-30 ENCOUNTER — RESULT REVIEW (OUTPATIENT)
Age: 83
End: 2018-05-30

## 2018-05-30 ENCOUNTER — APPOINTMENT (OUTPATIENT)
Dept: HEMATOLOGY ONCOLOGY | Facility: CLINIC | Age: 83
End: 2018-05-30
Payer: MEDICARE

## 2018-05-30 VITALS
HEIGHT: 63 IN | TEMPERATURE: 99.1 F | OXYGEN SATURATION: 100 % | BODY MASS INDEX: 20.38 KG/M2 | HEART RATE: 90 BPM | WEIGHT: 115 LBS | SYSTOLIC BLOOD PRESSURE: 126 MMHG | DIASTOLIC BLOOD PRESSURE: 69 MMHG

## 2018-05-30 LAB
ANISOCYTOSIS BLD QL: SLIGHT — SIGNIFICANT CHANGE UP
BASOPHILS # BLD AUTO: 0.6 K/UL — HIGH (ref 0–0.2)
DACRYOCYTES BLD QL SMEAR: SLIGHT — SIGNIFICANT CHANGE UP
EOSINOPHIL # BLD AUTO: 0.2 K/UL — SIGNIFICANT CHANGE UP (ref 0–0.5)
EOSINOPHIL NFR BLD AUTO: 2 % — SIGNIFICANT CHANGE UP (ref 0–6)
HCT VFR BLD CALC: 20.1 % — CRITICAL LOW (ref 34.5–45)
HGB BLD-MCNC: 6.7 G/DL — CRITICAL LOW (ref 11.5–15.5)
LYMPHOCYTES # BLD AUTO: 3.3 K/UL — SIGNIFICANT CHANGE UP (ref 1–3.3)
LYMPHOCYTES # BLD AUTO: 30 % — SIGNIFICANT CHANGE UP (ref 13–44)
LYMPHOCYTES # SPEC AUTO: 1 % — HIGH (ref 0–0)
MACROCYTES BLD QL: SLIGHT — SIGNIFICANT CHANGE UP
MCHC RBC-ENTMCNC: 33.4 GM/DL — SIGNIFICANT CHANGE UP (ref 32–36)
MCHC RBC-ENTMCNC: 35.4 PG — HIGH (ref 27–34)
MCV RBC AUTO: 106.2 FL — HIGH (ref 80–100)
MONOCYTES # BLD AUTO: 5.8 K/UL — HIGH (ref 0–0.9)
MONOCYTES NFR BLD AUTO: 46 % — HIGH (ref 2–14)
MYELOCYTES NFR BLD: 1 % — HIGH (ref 0–0)
NEUTROPHILS # BLD AUTO: 1.7 K/UL — LOW (ref 1.8–7.4)
NEUTROPHILS NFR BLD AUTO: 19 % — LOW (ref 43–77)
NEUTS BAND # BLD: 1 % — SIGNIFICANT CHANGE UP (ref 0–8)
NRBC # BLD: 1 /100 — HIGH (ref 0–0)
PLAT MORPH BLD: NORMAL — SIGNIFICANT CHANGE UP
PLATELET # BLD AUTO: 136 K/UL — LOW (ref 150–400)
POIKILOCYTOSIS BLD QL AUTO: SLIGHT — SIGNIFICANT CHANGE UP
POLYCHROMASIA BLD QL SMEAR: SLIGHT — SIGNIFICANT CHANGE UP
RBC # BLD: 1.89 M/UL — LOW (ref 3.8–5.2)
RBC # FLD: 22.3 % — HIGH (ref 10.3–14.5)
RBC BLD AUTO: ABNORMAL
WBC # BLD: 11.5 K/UL — HIGH (ref 3.8–10.5)
WBC # FLD AUTO: 11.5 K/UL — HIGH (ref 3.8–10.5)

## 2018-05-30 PROCEDURE — 99212 OFFICE O/P EST SF 10 MIN: CPT

## 2018-05-31 ENCOUNTER — RX RENEWAL (OUTPATIENT)
Age: 83
End: 2018-05-31

## 2018-05-31 RX ORDER — PREDNISONE 1 MG/1
1 TABLET ORAL
Qty: 90 | Refills: 0 | Status: DISCONTINUED | COMMUNITY
Start: 2018-01-29 | End: 2018-05-31

## 2018-06-12 ENCOUNTER — OUTPATIENT (OUTPATIENT)
Dept: OUTPATIENT SERVICES | Facility: HOSPITAL | Age: 83
LOS: 1 days | Discharge: HOME | End: 2018-06-12

## 2018-06-12 DIAGNOSIS — D47.3 ESSENTIAL (HEMORRHAGIC) THROMBOCYTHEMIA: ICD-10-CM

## 2018-06-13 ENCOUNTER — RESULT REVIEW (OUTPATIENT)
Age: 83
End: 2018-06-13

## 2018-06-13 ENCOUNTER — APPOINTMENT (OUTPATIENT)
Dept: HEMATOLOGY ONCOLOGY | Facility: CLINIC | Age: 83
End: 2018-06-13
Payer: MEDICARE

## 2018-06-13 VITALS
HEIGHT: 63 IN | WEIGHT: 115 LBS | DIASTOLIC BLOOD PRESSURE: 73 MMHG | SYSTOLIC BLOOD PRESSURE: 125 MMHG | OXYGEN SATURATION: 93 % | TEMPERATURE: 98.2 F | HEART RATE: 95 BPM | BODY MASS INDEX: 20.38 KG/M2

## 2018-06-13 LAB
ANISOCYTOSIS BLD QL: SLIGHT — SIGNIFICANT CHANGE UP
BASO STIPL BLD QL SMEAR: PRESENT — SIGNIFICANT CHANGE UP
BASOPHILS # BLD AUTO: SIGNIFICANT CHANGE UP (ref 0–0.2)
BLASTS # FLD: 2 % — HIGH (ref 0–0)
DACRYOCYTES BLD QL SMEAR: SLIGHT — SIGNIFICANT CHANGE UP
EOSINOPHIL # BLD AUTO: SIGNIFICANT CHANGE UP (ref 0–0.5)
HCT VFR BLD CALC: 19.8 % — CRITICAL LOW (ref 34.5–45)
HGB BLD-MCNC: 6.7 G/DL — CRITICAL LOW (ref 11.5–15.5)
HYPOCHROMIA BLD QL: SLIGHT — SIGNIFICANT CHANGE UP
LYMPHOCYTES # BLD AUTO: 56 % — HIGH (ref 13–44)
LYMPHOCYTES # BLD AUTO: SIGNIFICANT CHANGE UP (ref 1–3.3)
MACROCYTES BLD QL: SLIGHT — SIGNIFICANT CHANGE UP
MCHC RBC-ENTMCNC: 34.1 GM/DL — SIGNIFICANT CHANGE UP (ref 32–36)
MCHC RBC-ENTMCNC: 36.8 PG — HIGH (ref 27–34)
MCV RBC AUTO: 108.1 FL — HIGH (ref 80–100)
METAMYELOCYTES # FLD: 3 % — HIGH (ref 0–0)
MONOCYTES # BLD AUTO: SIGNIFICANT CHANGE UP (ref 0–0.9)
MONOCYTES NFR BLD AUTO: 18 % — HIGH (ref 2–14)
MYELOCYTES NFR BLD: 6 % — HIGH (ref 0–0)
NEUTROPHILS # BLD AUTO: 3.8 K/UL — SIGNIFICANT CHANGE UP (ref 1.8–7.4)
NEUTROPHILS NFR BLD AUTO: 13 % — LOW (ref 43–77)
NEUTS BAND # BLD: 1 % — SIGNIFICANT CHANGE UP (ref 0–8)
PLAT MORPH BLD: NORMAL — SIGNIFICANT CHANGE UP
PLATELET # BLD AUTO: 143 K/UL — LOW (ref 150–400)
POIKILOCYTOSIS BLD QL AUTO: SLIGHT — SIGNIFICANT CHANGE UP
POLYCHROMASIA BLD QL SMEAR: SLIGHT — SIGNIFICANT CHANGE UP
PROMYELOCYTES # FLD: 1 % — HIGH (ref 0–0)
RBC # BLD: 1.83 M/UL — LOW (ref 3.8–5.2)
RBC # FLD: 19.8 % — HIGH (ref 10.3–14.5)
RBC BLD AUTO: ABNORMAL
SMUDGE CELLS # BLD: PRESENT — SIGNIFICANT CHANGE UP
WBC # BLD: 18.2 K/UL — HIGH (ref 3.8–10.5)
WBC # FLD AUTO: 18.2 K/UL — HIGH (ref 3.8–10.5)

## 2018-06-13 PROCEDURE — 99212 OFFICE O/P EST SF 10 MIN: CPT

## 2018-06-21 ENCOUNTER — APPOINTMENT (OUTPATIENT)
Dept: RHEUMATOLOGY | Facility: CLINIC | Age: 83
End: 2018-06-21
Payer: MEDICARE

## 2018-06-21 VITALS
SYSTOLIC BLOOD PRESSURE: 120 MMHG | WEIGHT: 116 LBS | HEART RATE: 101 BPM | TEMPERATURE: 100.3 F | RESPIRATION RATE: 17 BRPM | DIASTOLIC BLOOD PRESSURE: 64 MMHG | OXYGEN SATURATION: 94 % | HEIGHT: 63 IN | BODY MASS INDEX: 20.55 KG/M2

## 2018-06-21 PROCEDURE — 99214 OFFICE O/P EST MOD 30 MIN: CPT

## 2018-06-21 RX ORDER — ESCITALOPRAM OXALATE 10 MG/1
10 TABLET ORAL
Qty: 30 | Refills: 0 | Status: COMPLETED | COMMUNITY
Start: 2017-11-28

## 2018-06-21 RX ORDER — GABAPENTIN 100 MG/1
100 CAPSULE ORAL
Qty: 30 | Refills: 0 | Status: COMPLETED | COMMUNITY
Start: 2017-11-28

## 2018-06-21 RX ORDER — AMOXICILLIN 500 MG/1
500 CAPSULE ORAL
Qty: 30 | Refills: 0 | Status: COMPLETED | COMMUNITY
Start: 2018-05-04

## 2018-06-21 RX ORDER — HYDROCORTISONE 25 MG/G
2.5 CREAM TOPICAL
Qty: 28 | Refills: 0 | Status: ACTIVE | COMMUNITY
Start: 2018-04-30

## 2018-06-21 RX ORDER — ESCITALOPRAM OXALATE 5 MG/1
5 TABLET ORAL
Qty: 30 | Refills: 0 | Status: ACTIVE | COMMUNITY
Start: 2018-02-02

## 2018-06-21 RX ORDER — MIRTAZAPINE 15 MG/1
15 TABLET, FILM COATED ORAL
Qty: 30 | Refills: 0 | Status: COMPLETED | COMMUNITY
Start: 2017-12-29

## 2018-06-21 RX ORDER — CEPHALEXIN 250 MG/1
250 CAPSULE ORAL
Qty: 28 | Refills: 0 | Status: COMPLETED | COMMUNITY
Start: 2017-12-26

## 2018-06-21 RX ORDER — MIRTAZAPINE 30 MG/1
30 TABLET, FILM COATED ORAL
Qty: 30 | Refills: 0 | Status: ACTIVE | COMMUNITY
Start: 2018-04-12

## 2018-06-28 ENCOUNTER — APPOINTMENT (OUTPATIENT)
Dept: HEMATOLOGY ONCOLOGY | Facility: CLINIC | Age: 83
End: 2018-06-28
Payer: MEDICARE

## 2018-06-28 ENCOUNTER — RESULT REVIEW (OUTPATIENT)
Age: 83
End: 2018-06-28

## 2018-06-28 ENCOUNTER — APPOINTMENT (OUTPATIENT)
Dept: RHEUMATOLOGY | Facility: CLINIC | Age: 83
End: 2018-06-28
Payer: MEDICARE

## 2018-06-28 VITALS
DIASTOLIC BLOOD PRESSURE: 72 MMHG | BODY MASS INDEX: 20.55 KG/M2 | WEIGHT: 116 LBS | OXYGEN SATURATION: 96 % | HEART RATE: 95 BPM | TEMPERATURE: 98.5 F | SYSTOLIC BLOOD PRESSURE: 120 MMHG

## 2018-06-28 LAB
ANISOCYTOSIS BLD QL: SIGNIFICANT CHANGE UP
BASO STIPL BLD QL SMEAR: PRESENT — SIGNIFICANT CHANGE UP
BASOPHILS # BLD AUTO: SIGNIFICANT CHANGE UP (ref 0–0.2)
BLASTS # FLD: 3 % — HIGH (ref 0–0)
EOSINOPHIL # BLD AUTO: SIGNIFICANT CHANGE UP (ref 0–0.5)
EOSINOPHIL NFR BLD AUTO: 1 % — SIGNIFICANT CHANGE UP (ref 0–6)
HCT VFR BLD CALC: 19.6 % — CRITICAL LOW (ref 34.5–45)
HGB BLD-MCNC: 6.5 G/DL — CRITICAL LOW (ref 11.5–15.5)
HYPOCHROMIA BLD QL: SLIGHT — SIGNIFICANT CHANGE UP
LYMPHOCYTES # BLD AUTO: 44 % — SIGNIFICANT CHANGE UP (ref 13–44)
LYMPHOCYTES # BLD AUTO: SIGNIFICANT CHANGE UP (ref 1–3.3)
MACROCYTES BLD QL: SIGNIFICANT CHANGE UP
MCHC RBC-ENTMCNC: 33.4 GM/DL — SIGNIFICANT CHANGE UP (ref 32–36)
MCHC RBC-ENTMCNC: 36.4 PG — HIGH (ref 27–34)
MCV RBC AUTO: 109.2 FL — HIGH (ref 80–100)
MONOCYTES # BLD AUTO: SIGNIFICANT CHANGE UP (ref 0–0.9)
MONOCYTES NFR BLD AUTO: 30 % — HIGH (ref 2–14)
MYELOCYTES NFR BLD: 2 % — HIGH (ref 0–0)
NEUTROPHILS # BLD AUTO: 2.4 K/UL — SIGNIFICANT CHANGE UP (ref 1.8–7.4)
NEUTROPHILS NFR BLD AUTO: 19 % — LOW (ref 43–77)
NEUTS BAND # BLD: 1 % — SIGNIFICANT CHANGE UP (ref 0–8)
NRBC # BLD: 1 /100 — HIGH (ref 0–0)
PLAT MORPH BLD: NORMAL — SIGNIFICANT CHANGE UP
PLATELET # BLD AUTO: 154 K/UL — SIGNIFICANT CHANGE UP (ref 150–400)
POIKILOCYTOSIS BLD QL AUTO: SLIGHT — SIGNIFICANT CHANGE UP
POLYCHROMASIA BLD QL SMEAR: SLIGHT — SIGNIFICANT CHANGE UP
RBC # BLD: 1.8 M/UL — LOW (ref 3.8–5.2)
RBC # FLD: 18.6 % — HIGH (ref 10.3–14.5)
RBC BLD AUTO: ABNORMAL
TARGETS BLD QL SMEAR: SLIGHT — SIGNIFICANT CHANGE UP
WBC # BLD: 12.9 K/UL — HIGH (ref 3.8–10.5)
WBC # FLD AUTO: 12.9 K/UL — HIGH (ref 3.8–10.5)

## 2018-06-28 PROCEDURE — 96372 THER/PROPH/DIAG INJ SC/IM: CPT

## 2018-06-28 PROCEDURE — 99213 OFFICE O/P EST LOW 20 MIN: CPT

## 2018-07-10 ENCOUNTER — RESULT REVIEW (OUTPATIENT)
Age: 83
End: 2018-07-10

## 2018-07-10 ENCOUNTER — LABORATORY RESULT (OUTPATIENT)
Age: 83
End: 2018-07-10

## 2018-07-10 ENCOUNTER — APPOINTMENT (OUTPATIENT)
Dept: HEMATOLOGY ONCOLOGY | Facility: CLINIC | Age: 83
End: 2018-07-10
Payer: MEDICARE

## 2018-07-10 VITALS
SYSTOLIC BLOOD PRESSURE: 124 MMHG | TEMPERATURE: 98 F | BODY MASS INDEX: 21.62 KG/M2 | DIASTOLIC BLOOD PRESSURE: 74 MMHG | HEART RATE: 93 BPM | OXYGEN SATURATION: 95 % | HEIGHT: 63 IN | WEIGHT: 122.02 LBS

## 2018-07-10 DIAGNOSIS — L03.90 CELLULITIS, UNSPECIFIED: ICD-10-CM

## 2018-07-10 LAB
ANISOCYTOSIS BLD QL: SLIGHT — SIGNIFICANT CHANGE UP
BASOPHILS # BLD AUTO: SIGNIFICANT CHANGE UP (ref 0–0.2)
BLASTS # FLD: 14 % — HIGH (ref 0–0)
DACRYOCYTES BLD QL SMEAR: SLIGHT — SIGNIFICANT CHANGE UP
EOSINOPHIL # BLD AUTO: SIGNIFICANT CHANGE UP (ref 0–0.5)
EOSINOPHIL NFR BLD AUTO: 2 % — SIGNIFICANT CHANGE UP (ref 0–6)
HCT VFR BLD CALC: 18.6 % — CRITICAL LOW (ref 34.5–45)
HGB BLD-MCNC: 6.2 G/DL — CRITICAL LOW (ref 11.5–15.5)
LYMPHOCYTES # BLD AUTO: 44 % — SIGNIFICANT CHANGE UP (ref 13–44)
LYMPHOCYTES # BLD AUTO: SIGNIFICANT CHANGE UP (ref 1–3.3)
MCHC RBC-ENTMCNC: 33.3 GM/DL — SIGNIFICANT CHANGE UP (ref 32–36)
MCHC RBC-ENTMCNC: 36.8 PG — HIGH (ref 27–34)
MCV RBC AUTO: 110.5 FL — HIGH (ref 80–100)
MONOCYTES # BLD AUTO: SIGNIFICANT CHANGE UP (ref 0–0.9)
MONOCYTES NFR BLD AUTO: 19 % — HIGH (ref 2–14)
NEUTROPHILS # BLD AUTO: 1.9 K/UL — SIGNIFICANT CHANGE UP (ref 1.8–7.4)
NEUTROPHILS NFR BLD AUTO: 21 % — LOW (ref 43–77)
PLAT MORPH BLD: NORMAL — SIGNIFICANT CHANGE UP
PLATELET # BLD AUTO: 149 K/UL — LOW (ref 150–400)
POIKILOCYTOSIS BLD QL AUTO: SLIGHT — SIGNIFICANT CHANGE UP
POLYCHROMASIA BLD QL SMEAR: SLIGHT — SIGNIFICANT CHANGE UP
RBC # BLD: 1.69 M/UL — LOW (ref 3.8–5.2)
RBC # FLD: 19.1 % — HIGH (ref 10.3–14.5)
RBC BLD AUTO: ABNORMAL
WBC # BLD: 10.1 K/UL — SIGNIFICANT CHANGE UP (ref 3.8–10.5)
WBC # FLD AUTO: 10.1 K/UL — SIGNIFICANT CHANGE UP (ref 3.8–10.5)

## 2018-07-10 PROCEDURE — 99213 OFFICE O/P EST LOW 20 MIN: CPT

## 2018-07-11 LAB
ALBUMIN SERPL ELPH-MCNC: 3.8 G/DL
ALP BLD-CCNC: 70 U/L
ALT SERPL-CCNC: 8 U/L
ANION GAP SERPL CALC-SCNC: 14 MMOL/L
AST SERPL-CCNC: 10 U/L
BILIRUB SERPL-MCNC: 0.4 MG/DL
BUN SERPL-MCNC: 22 MG/DL
CALCIUM SERPL-MCNC: 8.8 MG/DL
CHLORIDE SERPL-SCNC: 98 MMOL/L
CO2 SERPL-SCNC: 23 MMOL/L
CREAT SERPL-MCNC: 0.96 MG/DL
GLUCOSE SERPL-MCNC: 109 MG/DL
POTASSIUM SERPL-SCNC: 4.6 MMOL/L
PROT SERPL-MCNC: 8 G/DL
SODIUM SERPL-SCNC: 135 MMOL/L
T3 SERPL-MCNC: 80 NG/DL
T3RU NFR SERPL: 0.91 INDEX
T4 SERPL-MCNC: 7.8 UG/DL
TSH SERPL-ACNC: 1.69 UIU/ML

## 2018-07-14 ENCOUNTER — OUTPATIENT (OUTPATIENT)
Dept: OUTPATIENT SERVICES | Facility: HOSPITAL | Age: 83
LOS: 1 days | Discharge: HOME | End: 2018-07-14

## 2018-07-14 DIAGNOSIS — D47.3 ESSENTIAL (HEMORRHAGIC) THROMBOCYTHEMIA: ICD-10-CM

## 2018-07-18 ENCOUNTER — FORM ENCOUNTER (OUTPATIENT)
Age: 83
End: 2018-07-18

## 2018-07-19 ENCOUNTER — RESULT REVIEW (OUTPATIENT)
Age: 83
End: 2018-07-19

## 2018-07-19 ENCOUNTER — APPOINTMENT (OUTPATIENT)
Dept: ULTRASOUND IMAGING | Facility: CLINIC | Age: 83
End: 2018-07-19
Payer: MEDICARE

## 2018-07-19 ENCOUNTER — APPOINTMENT (OUTPATIENT)
Dept: HEMATOLOGY ONCOLOGY | Facility: CLINIC | Age: 83
End: 2018-07-19
Payer: MEDICARE

## 2018-07-19 ENCOUNTER — APPOINTMENT (OUTPATIENT)
Dept: RHEUMATOLOGY | Facility: CLINIC | Age: 83
End: 2018-07-19
Payer: MEDICARE

## 2018-07-19 ENCOUNTER — OUTPATIENT (OUTPATIENT)
Dept: OUTPATIENT SERVICES | Facility: HOSPITAL | Age: 83
LOS: 1 days | End: 2018-07-19
Payer: MEDICARE

## 2018-07-19 VITALS
HEIGHT: 63 IN | DIASTOLIC BLOOD PRESSURE: 70 MMHG | WEIGHT: 122 LBS | OXYGEN SATURATION: 97 % | SYSTOLIC BLOOD PRESSURE: 128 MMHG | BODY MASS INDEX: 21.62 KG/M2 | HEART RATE: 96 BPM | TEMPERATURE: 98.5 F

## 2018-07-19 VITALS
BODY MASS INDEX: 21.62 KG/M2 | OXYGEN SATURATION: 94 % | DIASTOLIC BLOOD PRESSURE: 60 MMHG | SYSTOLIC BLOOD PRESSURE: 104 MMHG | TEMPERATURE: 99.6 F | HEIGHT: 63 IN | WEIGHT: 122 LBS | RESPIRATION RATE: 17 BRPM | HEART RATE: 96 BPM

## 2018-07-19 DIAGNOSIS — M79.89 OTHER SPECIFIED SOFT TISSUE DISORDERS: ICD-10-CM

## 2018-07-19 DIAGNOSIS — M79.604 PAIN IN RIGHT LEG: ICD-10-CM

## 2018-07-19 PROBLEM — E03.9 HYPOTHYROIDISM, UNSPECIFIED: Chronic | Status: ACTIVE | Noted: 2017-09-08

## 2018-07-19 PROBLEM — D75.81 MYELOFIBROSIS: Chronic | Status: ACTIVE | Noted: 2017-09-10

## 2018-07-19 PROBLEM — D46.9 MYELODYSPLASTIC SYNDROME, UNSPECIFIED: Chronic | Status: ACTIVE | Noted: 2017-09-08

## 2018-07-19 PROBLEM — D47.3 ESSENTIAL (HEMORRHAGIC) THROMBOCYTHEMIA: Chronic | Status: ACTIVE | Noted: 2017-09-10

## 2018-07-19 LAB
ANISOCYTOSIS BLD QL: SLIGHT — SIGNIFICANT CHANGE UP
BASOPHILS # BLD AUTO: SIGNIFICANT CHANGE UP (ref 0–0.2)
BLASTS # FLD: 9 % — HIGH (ref 0–0)
EOSINOPHIL # BLD AUTO: SIGNIFICANT CHANGE UP (ref 0–0.5)
EOSINOPHIL NFR BLD AUTO: 1 % — SIGNIFICANT CHANGE UP (ref 0–6)
HCT VFR BLD CALC: 19.3 % — CRITICAL LOW (ref 34.5–45)
HGB BLD-MCNC: 6.4 G/DL — CRITICAL LOW (ref 11.5–15.5)
LYMPHOCYTES # BLD AUTO: 31 % — SIGNIFICANT CHANGE UP (ref 13–44)
LYMPHOCYTES # BLD AUTO: SIGNIFICANT CHANGE UP (ref 1–3.3)
MCHC RBC-ENTMCNC: 33 GM/DL — SIGNIFICANT CHANGE UP (ref 32–36)
MCHC RBC-ENTMCNC: 35.7 PG — HIGH (ref 27–34)
MCV RBC AUTO: 108 FL — HIGH (ref 80–100)
MONOCYTES # BLD AUTO: SIGNIFICANT CHANGE UP (ref 0–0.9)
MONOCYTES NFR BLD AUTO: 22 % — HIGH (ref 2–14)
NEUTROPHILS # BLD AUTO: 1.7 K/UL — LOW (ref 1.8–7.4)
NEUTROPHILS NFR BLD AUTO: 37 % — LOW (ref 43–77)
PLAT MORPH BLD: NORMAL — SIGNIFICANT CHANGE UP
PLATELET # BLD AUTO: 138 K/UL — LOW (ref 150–400)
POIKILOCYTOSIS BLD QL AUTO: SLIGHT — SIGNIFICANT CHANGE UP
POLYCHROMASIA BLD QL SMEAR: SLIGHT — SIGNIFICANT CHANGE UP
RBC # BLD: 1.78 M/UL — LOW (ref 3.8–5.2)
RBC # FLD: 18.2 % — HIGH (ref 10.3–14.5)
RBC BLD AUTO: ABNORMAL
SMUDGE CELLS # BLD: PRESENT — SIGNIFICANT CHANGE UP
WBC # BLD: 9.6 K/UL — SIGNIFICANT CHANGE UP (ref 3.8–10.5)
WBC # FLD AUTO: 9.6 K/UL — SIGNIFICANT CHANGE UP (ref 3.8–10.5)

## 2018-07-19 PROCEDURE — 93971 EXTREMITY STUDY: CPT | Mod: 26,RT

## 2018-07-19 PROCEDURE — 99214 OFFICE O/P EST MOD 30 MIN: CPT

## 2018-07-19 PROCEDURE — 99212 OFFICE O/P EST SF 10 MIN: CPT

## 2018-07-19 PROCEDURE — 93971 EXTREMITY STUDY: CPT

## 2018-07-19 RX ORDER — CEPHALEXIN 500 MG/1
500 TABLET ORAL
Qty: 14 | Refills: 1 | Status: COMPLETED | COMMUNITY
Start: 2018-07-10 | End: 2018-07-19

## 2018-07-19 RX ORDER — PANTOPRAZOLE 40 MG/1
40 TABLET, DELAYED RELEASE ORAL
Qty: 30 | Refills: 1 | Status: COMPLETED | COMMUNITY
Start: 2018-04-26 | End: 2018-07-19

## 2018-07-31 ENCOUNTER — RESULT REVIEW (OUTPATIENT)
Age: 83
End: 2018-07-31

## 2018-07-31 ENCOUNTER — APPOINTMENT (OUTPATIENT)
Dept: HEMATOLOGY ONCOLOGY | Facility: CLINIC | Age: 83
End: 2018-07-31
Payer: MEDICARE

## 2018-07-31 VITALS
WEIGHT: 121.98 LBS | SYSTOLIC BLOOD PRESSURE: 129 MMHG | TEMPERATURE: 99.6 F | OXYGEN SATURATION: 97 % | HEART RATE: 97 BPM | HEIGHT: 63 IN | BODY MASS INDEX: 21.61 KG/M2 | DIASTOLIC BLOOD PRESSURE: 72 MMHG

## 2018-07-31 LAB
ANISOCYTOSIS BLD QL: SIGNIFICANT CHANGE UP
BASOPHILS # BLD AUTO: SIGNIFICANT CHANGE UP (ref 0–0.2)
BLASTS # FLD: 3 % — HIGH (ref 0–0)
EOSINOPHIL # BLD AUTO: SIGNIFICANT CHANGE UP (ref 0–0.5)
EOSINOPHIL NFR BLD AUTO: 1 % — SIGNIFICANT CHANGE UP (ref 0–6)
HCT VFR BLD CALC: 18.5 % — CRITICAL LOW (ref 34.5–45)
HGB BLD-MCNC: 6.2 G/DL — CRITICAL LOW (ref 11.5–15.5)
LYMPHOCYTES # BLD AUTO: 40 % — SIGNIFICANT CHANGE UP (ref 13–44)
LYMPHOCYTES # BLD AUTO: SIGNIFICANT CHANGE UP (ref 1–3.3)
MACROCYTES BLD QL: SIGNIFICANT CHANGE UP
MCHC RBC-ENTMCNC: 33.5 GM/DL — SIGNIFICANT CHANGE UP (ref 32–36)
MCHC RBC-ENTMCNC: 36.2 PG — HIGH (ref 27–34)
MCV RBC AUTO: 108 FL — HIGH (ref 80–100)
MICROCYTES BLD QL: SLIGHT — SIGNIFICANT CHANGE UP
MONOCYTES # BLD AUTO: SIGNIFICANT CHANGE UP (ref 0–0.9)
MONOCYTES NFR BLD AUTO: 36 % — HIGH (ref 2–14)
MYELOCYTES NFR BLD: 3 % — HIGH (ref 0–0)
NEUTROPHILS # BLD AUTO: 1.6 K/UL — LOW (ref 1.8–7.4)
NEUTROPHILS NFR BLD AUTO: 14 % — LOW (ref 43–77)
NEUTS BAND # BLD: 2 % — SIGNIFICANT CHANGE UP (ref 0–8)
PLAT MORPH BLD: NORMAL — SIGNIFICANT CHANGE UP
PLATELET # BLD AUTO: 150 K/UL — SIGNIFICANT CHANGE UP (ref 150–400)
POIKILOCYTOSIS BLD QL AUTO: SLIGHT — SIGNIFICANT CHANGE UP
POLYCHROMASIA BLD QL SMEAR: SLIGHT — SIGNIFICANT CHANGE UP
PROMYELOCYTES # FLD: 1 % — HIGH (ref 0–0)
RBC # BLD: 1.71 M/UL — LOW (ref 3.8–5.2)
RBC # FLD: 18.5 % — HIGH (ref 10.3–14.5)
RBC BLD AUTO: ABNORMAL
WBC # BLD: 10.4 K/UL — SIGNIFICANT CHANGE UP (ref 3.8–10.5)
WBC # FLD AUTO: 10.4 K/UL — SIGNIFICANT CHANGE UP (ref 3.8–10.5)

## 2018-07-31 PROCEDURE — 99213 OFFICE O/P EST LOW 20 MIN: CPT

## 2018-08-03 ENCOUNTER — APPOINTMENT (OUTPATIENT)
Dept: OTOLARYNGOLOGY | Facility: CLINIC | Age: 83
End: 2018-08-03

## 2018-08-08 ENCOUNTER — RESULT REVIEW (OUTPATIENT)
Age: 83
End: 2018-08-08

## 2018-08-08 ENCOUNTER — APPOINTMENT (OUTPATIENT)
Dept: HEMATOLOGY ONCOLOGY | Facility: CLINIC | Age: 83
End: 2018-08-08
Payer: MEDICARE

## 2018-08-08 VITALS
DIASTOLIC BLOOD PRESSURE: 69 MMHG | SYSTOLIC BLOOD PRESSURE: 124 MMHG | TEMPERATURE: 98.4 F | OXYGEN SATURATION: 96 % | HEART RATE: 94 BPM | BODY MASS INDEX: 21.66 KG/M2 | HEIGHT: 63 IN | WEIGHT: 122.24 LBS

## 2018-08-08 LAB
ANISOCYTOSIS BLD QL: SIGNIFICANT CHANGE UP
BASOPHILS # BLD AUTO: SIGNIFICANT CHANGE UP (ref 0–0.2)
DACRYOCYTES BLD QL SMEAR: SLIGHT — SIGNIFICANT CHANGE UP
EOSINOPHIL # BLD AUTO: SIGNIFICANT CHANGE UP (ref 0–0.5)
EOSINOPHIL NFR BLD AUTO: 1 % — SIGNIFICANT CHANGE UP (ref 0–6)
HCT VFR BLD CALC: 18.9 % — CRITICAL LOW (ref 34.5–45)
HGB BLD-MCNC: 6.5 G/DL — CRITICAL LOW (ref 11.5–15.5)
LYMPHOCYTES # BLD AUTO: 44 % — SIGNIFICANT CHANGE UP (ref 13–44)
LYMPHOCYTES # BLD AUTO: SIGNIFICANT CHANGE UP (ref 1–3.3)
MACROCYTES BLD QL: SIGNIFICANT CHANGE UP
MCHC RBC-ENTMCNC: 34.4 GM/DL — SIGNIFICANT CHANGE UP (ref 32–36)
MCHC RBC-ENTMCNC: 37 PG — HIGH (ref 27–34)
MCV RBC AUTO: 107.7 FL — HIGH (ref 80–100)
METAMYELOCYTES # FLD: 5 % — HIGH (ref 0–0)
MICROCYTES BLD QL: SLIGHT — SIGNIFICANT CHANGE UP
MONOCYTES # BLD AUTO: SIGNIFICANT CHANGE UP (ref 0–0.9)
MONOCYTES NFR BLD AUTO: 34 % — HIGH (ref 2–14)
MYELOCYTES NFR BLD: 1 % — HIGH (ref 0–0)
NEUTROPHILS # BLD AUTO: 1.4 K/UL — LOW (ref 1.8–7.4)
NEUTROPHILS NFR BLD AUTO: 12 % — LOW (ref 43–77)
NEUTS BAND # BLD: 3 % — SIGNIFICANT CHANGE UP (ref 0–8)
PLAT MORPH BLD: NORMAL — SIGNIFICANT CHANGE UP
PLATELET # BLD AUTO: 122 K/UL — LOW (ref 150–400)
POIKILOCYTOSIS BLD QL AUTO: SLIGHT — SIGNIFICANT CHANGE UP
RBC # BLD: 1.75 M/UL — LOW (ref 3.8–5.2)
RBC # FLD: 19.2 % — HIGH (ref 10.3–14.5)
RBC BLD AUTO: ABNORMAL
WBC # BLD: 11.5 K/UL — HIGH (ref 3.8–10.5)
WBC # FLD AUTO: 11.5 K/UL — HIGH (ref 3.8–10.5)

## 2018-08-08 PROCEDURE — 99212 OFFICE O/P EST SF 10 MIN: CPT

## 2018-08-10 ENCOUNTER — APPOINTMENT (OUTPATIENT)
Dept: OTOLARYNGOLOGY | Facility: CLINIC | Age: 83
End: 2018-08-10
Payer: MEDICARE

## 2018-08-10 VITALS
WEIGHT: 122 LBS | OXYGEN SATURATION: 96 % | DIASTOLIC BLOOD PRESSURE: 69 MMHG | RESPIRATION RATE: 17 BRPM | BODY MASS INDEX: 21.62 KG/M2 | SYSTOLIC BLOOD PRESSURE: 124 MMHG | HEART RATE: 94 BPM | HEIGHT: 63 IN

## 2018-08-10 DIAGNOSIS — K11.20 SIALOADENITIS, UNSPECIFIED: ICD-10-CM

## 2018-08-10 PROCEDURE — 99204 OFFICE O/P NEW MOD 45 MIN: CPT

## 2018-08-14 ENCOUNTER — OUTPATIENT (OUTPATIENT)
Dept: OUTPATIENT SERVICES | Facility: HOSPITAL | Age: 83
LOS: 1 days | Discharge: HOME | End: 2018-08-14

## 2018-08-14 DIAGNOSIS — D47.3 ESSENTIAL (HEMORRHAGIC) THROMBOCYTHEMIA: ICD-10-CM

## 2018-08-16 ENCOUNTER — APPOINTMENT (OUTPATIENT)
Dept: RHEUMATOLOGY | Facility: CLINIC | Age: 83
End: 2018-08-16
Payer: MEDICARE

## 2018-08-16 VITALS
WEIGHT: 120 LBS | SYSTOLIC BLOOD PRESSURE: 117 MMHG | TEMPERATURE: 98.5 F | DIASTOLIC BLOOD PRESSURE: 70 MMHG | HEIGHT: 63 IN | OXYGEN SATURATION: 91 % | BODY MASS INDEX: 21.26 KG/M2 | HEART RATE: 92 BPM

## 2018-08-16 DIAGNOSIS — Z79.52 LONG TERM (CURRENT) USE OF SYSTEMIC STEROIDS: ICD-10-CM

## 2018-08-16 DIAGNOSIS — M19.90 UNSPECIFIED OSTEOARTHRITIS, UNSPECIFIED SITE: ICD-10-CM

## 2018-08-16 PROCEDURE — 99214 OFFICE O/P EST MOD 30 MIN: CPT

## 2018-08-23 ENCOUNTER — APPOINTMENT (OUTPATIENT)
Dept: HEMATOLOGY ONCOLOGY | Facility: CLINIC | Age: 83
End: 2018-08-23
Payer: MEDICARE

## 2018-08-23 ENCOUNTER — RESULT REVIEW (OUTPATIENT)
Age: 83
End: 2018-08-23

## 2018-08-23 VITALS
WEIGHT: 120 LBS | SYSTOLIC BLOOD PRESSURE: 145 MMHG | BODY MASS INDEX: 21.26 KG/M2 | OXYGEN SATURATION: 100 % | HEIGHT: 63 IN | HEART RATE: 94 BPM | DIASTOLIC BLOOD PRESSURE: 79 MMHG | TEMPERATURE: 98.4 F

## 2018-08-23 LAB
ANISOCYTOSIS BLD QL: SLIGHT — SIGNIFICANT CHANGE UP
BASOPHILS # BLD AUTO: SIGNIFICANT CHANGE UP (ref 0–0.2)
BLASTS # FLD: 6 % — HIGH (ref 0–0)
EOSINOPHIL # BLD AUTO: SIGNIFICANT CHANGE UP (ref 0–0.5)
HCT VFR BLD CALC: 19.2 % — CRITICAL LOW (ref 34.5–45)
HGB BLD-MCNC: 6.5 G/DL — CRITICAL LOW (ref 11.5–15.5)
LYMPHOCYTES # BLD AUTO: 35 % — SIGNIFICANT CHANGE UP (ref 13–44)
LYMPHOCYTES # BLD AUTO: SIGNIFICANT CHANGE UP (ref 1–3.3)
MACROCYTES BLD QL: SIGNIFICANT CHANGE UP
MCHC RBC-ENTMCNC: 34.1 GM/DL — SIGNIFICANT CHANGE UP (ref 32–36)
MCHC RBC-ENTMCNC: 36.7 PG — HIGH (ref 27–34)
MCV RBC AUTO: 107.8 FL — HIGH (ref 80–100)
METAMYELOCYTES # FLD: 1 % — HIGH (ref 0–0)
MICROCYTES BLD QL: SLIGHT — SIGNIFICANT CHANGE UP
MONOCYTES # BLD AUTO: SIGNIFICANT CHANGE UP (ref 0–0.9)
MONOCYTES NFR BLD AUTO: 47 % — HIGH (ref 2–14)
NEUTROPHILS # BLD AUTO: 1.9 K/UL — SIGNIFICANT CHANGE UP (ref 1.8–7.4)
NEUTROPHILS NFR BLD AUTO: 11 % — LOW (ref 43–77)
PLAT MORPH BLD: NORMAL — SIGNIFICANT CHANGE UP
PLATELET # BLD AUTO: 164 K/UL — SIGNIFICANT CHANGE UP (ref 150–400)
RBC # BLD: 1.78 M/UL — LOW (ref 3.8–5.2)
RBC # FLD: 19.8 % — HIGH (ref 10.3–14.5)
RBC BLD AUTO: ABNORMAL
WBC # BLD: 13.2 K/UL — HIGH (ref 3.8–10.5)
WBC # FLD AUTO: 13.2 K/UL — HIGH (ref 3.8–10.5)

## 2018-08-23 PROCEDURE — 99213 OFFICE O/P EST LOW 20 MIN: CPT

## 2018-09-04 ENCOUNTER — RESULT REVIEW (OUTPATIENT)
Age: 83
End: 2018-09-04

## 2018-09-04 ENCOUNTER — APPOINTMENT (OUTPATIENT)
Dept: HEMATOLOGY ONCOLOGY | Facility: CLINIC | Age: 83
End: 2018-09-04

## 2018-09-04 ENCOUNTER — APPOINTMENT (OUTPATIENT)
Dept: HEMATOLOGY ONCOLOGY | Facility: CLINIC | Age: 83
End: 2018-09-04
Payer: MEDICARE

## 2018-09-04 ENCOUNTER — LABORATORY RESULT (OUTPATIENT)
Age: 83
End: 2018-09-04

## 2018-09-04 ENCOUNTER — RX RENEWAL (OUTPATIENT)
Age: 83
End: 2018-09-04

## 2018-09-04 VITALS
OXYGEN SATURATION: 97 % | RESPIRATION RATE: 16 BRPM | SYSTOLIC BLOOD PRESSURE: 130 MMHG | TEMPERATURE: 99.1 F | HEART RATE: 87 BPM | DIASTOLIC BLOOD PRESSURE: 70 MMHG

## 2018-09-04 LAB
ANISOCYTOSIS BLD QL: SLIGHT — SIGNIFICANT CHANGE UP
BASOPHILS # BLD AUTO: SIGNIFICANT CHANGE UP (ref 0–0.2)
BLASTS # FLD: 5 % — HIGH (ref 0–0)
EOSINOPHIL # BLD AUTO: SIGNIFICANT CHANGE UP (ref 0–0.5)
HCT VFR BLD CALC: 18.7 % — CRITICAL LOW (ref 34.5–45)
HGB BLD-MCNC: 6.1 G/DL — CRITICAL LOW (ref 11.5–15.5)
LYMPHOCYTES # BLD AUTO: 40 % — SIGNIFICANT CHANGE UP (ref 13–44)
LYMPHOCYTES # BLD AUTO: SIGNIFICANT CHANGE UP (ref 1–3.3)
MACROCYTES BLD QL: SLIGHT — SIGNIFICANT CHANGE UP
MCHC RBC-ENTMCNC: 32.5 GM/DL — SIGNIFICANT CHANGE UP (ref 32–36)
MCHC RBC-ENTMCNC: 35.6 PG — HIGH (ref 27–34)
MCV RBC AUTO: 109.4 FL — HIGH (ref 80–100)
METAMYELOCYTES # FLD: 1 % — HIGH (ref 0–0)
MICROCYTES BLD QL: SLIGHT — SIGNIFICANT CHANGE UP
MONOCYTES # BLD AUTO: SIGNIFICANT CHANGE UP (ref 0–0.9)
MONOCYTES NFR BLD AUTO: 36 % — HIGH (ref 2–14)
MYELOCYTES NFR BLD: 1 % — HIGH (ref 0–0)
NEUTROPHILS # BLD AUTO: 2.9 K/UL — SIGNIFICANT CHANGE UP (ref 1.8–7.4)
NEUTROPHILS NFR BLD AUTO: 17 % — LOW (ref 43–77)
NRBC # BLD: 2 /100 — HIGH (ref 0–0)
PLAT MORPH BLD: NORMAL — SIGNIFICANT CHANGE UP
PLATELET # BLD AUTO: 145 K/UL — LOW (ref 150–400)
RBC # BLD: 1.7 M/UL — LOW (ref 3.8–5.2)
RBC # FLD: 19.2 % — HIGH (ref 10.3–14.5)
RBC BLD AUTO: ABNORMAL
WBC # BLD: 15.2 K/UL — HIGH (ref 3.8–10.5)
WBC # FLD AUTO: 15.2 K/UL — HIGH (ref 3.8–10.5)

## 2018-09-04 PROCEDURE — 99213 OFFICE O/P EST LOW 20 MIN: CPT

## 2018-09-04 RX ORDER — RUXOLITINIB 10 MG/1
10 TABLET ORAL
Qty: 30 | Refills: 2 | Status: ACTIVE | COMMUNITY
Start: 2018-04-03 | End: 1900-01-01

## 2018-09-05 LAB
ALBUMIN SERPL ELPH-MCNC: 3.8 G/DL
ALP BLD-CCNC: 74 U/L
ALT SERPL-CCNC: 8 U/L
ANION GAP SERPL CALC-SCNC: 20 MMOL/L
AST SERPL-CCNC: 14 U/L
BILIRUB SERPL-MCNC: 0.4 MG/DL
BUN SERPL-MCNC: 24 MG/DL
CALCIUM SERPL-MCNC: 9.3 MG/DL
CHLORIDE SERPL-SCNC: 95 MMOL/L
CO2 SERPL-SCNC: 21 MMOL/L
CREAT SERPL-MCNC: 1.12 MG/DL
GLUCOSE SERPL-MCNC: 105 MG/DL
POTASSIUM SERPL-SCNC: 4.7 MMOL/L
PROT SERPL-MCNC: 8.3 G/DL
SODIUM SERPL-SCNC: 136 MMOL/L

## 2018-09-13 ENCOUNTER — APPOINTMENT (OUTPATIENT)
Dept: HEMATOLOGY ONCOLOGY | Facility: CLINIC | Age: 83
End: 2018-09-13
Payer: MEDICARE

## 2018-09-13 ENCOUNTER — RESULT REVIEW (OUTPATIENT)
Age: 83
End: 2018-09-13

## 2018-09-13 ENCOUNTER — APPOINTMENT (OUTPATIENT)
Dept: RHEUMATOLOGY | Facility: CLINIC | Age: 83
End: 2018-09-13
Payer: MEDICARE

## 2018-09-13 ENCOUNTER — OUTPATIENT (OUTPATIENT)
Dept: OUTPATIENT SERVICES | Facility: HOSPITAL | Age: 83
LOS: 1 days | End: 2018-09-13
Payer: MEDICARE

## 2018-09-13 VITALS
OXYGEN SATURATION: 97 % | SYSTOLIC BLOOD PRESSURE: 119 MMHG | DIASTOLIC BLOOD PRESSURE: 65 MMHG | HEART RATE: 85 BPM | HEIGHT: 63 IN | TEMPERATURE: 98.2 F

## 2018-09-13 DIAGNOSIS — D64.89 OTHER SPECIFIED ANEMIAS: ICD-10-CM

## 2018-09-13 LAB
ANISOCYTOSIS BLD QL: SIGNIFICANT CHANGE UP
BASOPHILS # BLD AUTO: SIGNIFICANT CHANGE UP (ref 0–0.2)
BLASTS # FLD: 9 % — HIGH (ref 0–0)
BLD GP AB SCN SERPL QL: SIGNIFICANT CHANGE UP
EOSINOPHIL # BLD AUTO: SIGNIFICANT CHANGE UP (ref 0–0.5)
HCT VFR BLD CALC: 17.8 % — CRITICAL LOW (ref 34.5–45)
HGB BLD-MCNC: 5.9 G/DL — CRITICAL LOW (ref 11.5–15.5)
HYPOCHROMIA BLD QL: SLIGHT — SIGNIFICANT CHANGE UP
LYMPHOCYTES # BLD AUTO: 38 % — SIGNIFICANT CHANGE UP (ref 13–44)
LYMPHOCYTES # BLD AUTO: SIGNIFICANT CHANGE UP (ref 1–3.3)
MACROCYTES BLD QL: SLIGHT — SIGNIFICANT CHANGE UP
MCHC RBC-ENTMCNC: 33.1 GM/DL — SIGNIFICANT CHANGE UP (ref 32–36)
MCHC RBC-ENTMCNC: 35.7 PG — HIGH (ref 27–34)
MCV RBC AUTO: 108 FL — HIGH (ref 80–100)
METAMYELOCYTES # FLD: 2 % — HIGH (ref 0–0)
MICROCYTES BLD QL: SLIGHT — SIGNIFICANT CHANGE UP
MONOCYTES # BLD AUTO: SIGNIFICANT CHANGE UP (ref 0–0.9)
MONOCYTES NFR BLD AUTO: 16 % — HIGH (ref 2–14)
MYELOCYTES NFR BLD: 1 % — HIGH (ref 0–0)
NEUTROPHILS # BLD AUTO: 3.5 K/UL — SIGNIFICANT CHANGE UP (ref 1.8–7.4)
NEUTROPHILS NFR BLD AUTO: 32 % — LOW (ref 43–77)
NEUTS BAND # BLD: 1 % — SIGNIFICANT CHANGE UP (ref 0–8)
NRBC # BLD: 1 /100 — HIGH (ref 0–0)
PLAT MORPH BLD: NORMAL — SIGNIFICANT CHANGE UP
PLATELET # BLD AUTO: 140 K/UL — LOW (ref 150–400)
POIKILOCYTOSIS BLD QL AUTO: SLIGHT — SIGNIFICANT CHANGE UP
POLYCHROMASIA BLD QL SMEAR: SLIGHT — SIGNIFICANT CHANGE UP
PROMYELOCYTES # FLD: 1 % — HIGH (ref 0–0)
RBC # BLD: 1.65 M/UL — LOW (ref 3.8–5.2)
RBC # FLD: 19.2 % — HIGH (ref 10.3–14.5)
RBC BLD AUTO: ABNORMAL
TYPE + AB SCN PNL BLD: SIGNIFICANT CHANGE UP
WBC # BLD: 12.2 K/UL — HIGH (ref 3.8–10.5)
WBC # FLD AUTO: 12.2 K/UL — HIGH (ref 3.8–10.5)

## 2018-09-13 PROCEDURE — 99214 OFFICE O/P EST MOD 30 MIN: CPT

## 2018-09-13 PROCEDURE — 99213 OFFICE O/P EST LOW 20 MIN: CPT

## 2018-09-14 ENCOUNTER — OUTPATIENT (OUTPATIENT)
Dept: OUTPATIENT SERVICES | Facility: HOSPITAL | Age: 83
LOS: 1 days | End: 2018-09-14
Payer: MEDICARE

## 2018-09-14 DIAGNOSIS — D64.9 ANEMIA, UNSPECIFIED: ICD-10-CM

## 2018-09-14 PROCEDURE — 86922 COMPATIBILITY TEST ANTIGLOB: CPT

## 2018-09-14 PROCEDURE — 86901 BLOOD TYPING SEROLOGIC RH(D): CPT

## 2018-09-14 PROCEDURE — 86900 BLOOD TYPING SEROLOGIC ABO: CPT

## 2018-09-14 PROCEDURE — 86850 RBC ANTIBODY SCREEN: CPT

## 2018-09-14 PROCEDURE — P9016: CPT

## 2018-09-14 PROCEDURE — 36430 TRANSFUSION BLD/BLD COMPNT: CPT

## 2018-09-14 PROCEDURE — 36415 COLL VENOUS BLD VENIPUNCTURE: CPT

## 2018-09-14 RX ORDER — ACETAMINOPHEN 500 MG
650 TABLET ORAL ONCE
Qty: 0 | Refills: 0 | Status: COMPLETED | OUTPATIENT
Start: 2018-09-14 | End: 2018-09-14

## 2018-09-14 RX ORDER — DIPHENHYDRAMINE HCL 50 MG
25 CAPSULE ORAL ONCE
Qty: 0 | Refills: 0 | Status: COMPLETED | OUTPATIENT
Start: 2018-09-14 | End: 2018-09-14

## 2018-09-14 RX ADMIN — Medication 650 MILLIGRAM(S): at 13:05

## 2018-09-14 RX ADMIN — Medication 25 MILLIGRAM(S): at 13:08

## 2018-09-20 ENCOUNTER — OUTPATIENT (OUTPATIENT)
Dept: OUTPATIENT SERVICES | Facility: HOSPITAL | Age: 83
LOS: 1 days | Discharge: HOME | End: 2018-09-20

## 2018-09-20 DIAGNOSIS — D47.3 ESSENTIAL (HEMORRHAGIC) THROMBOCYTHEMIA: ICD-10-CM

## 2018-09-27 ENCOUNTER — APPOINTMENT (OUTPATIENT)
Dept: HEMATOLOGY ONCOLOGY | Facility: CLINIC | Age: 83
End: 2018-09-27
Payer: MEDICARE

## 2018-09-27 ENCOUNTER — RESULT REVIEW (OUTPATIENT)
Age: 83
End: 2018-09-27

## 2018-09-27 VITALS
HEART RATE: 97 BPM | HEIGHT: 63 IN | BODY MASS INDEX: 22.17 KG/M2 | TEMPERATURE: 98.2 F | SYSTOLIC BLOOD PRESSURE: 125 MMHG | OXYGEN SATURATION: 100 % | WEIGHT: 125.11 LBS | DIASTOLIC BLOOD PRESSURE: 71 MMHG

## 2018-09-27 LAB
ANISOCYTOSIS BLD QL: SIGNIFICANT CHANGE UP
BASOPHILS # BLD AUTO: SIGNIFICANT CHANGE UP (ref 0–0.2)
BLASTS # FLD: 6 % — HIGH (ref 0–0)
EOSINOPHIL # BLD AUTO: SIGNIFICANT CHANGE UP (ref 0–0.5)
EOSINOPHIL NFR BLD AUTO: 1 % — SIGNIFICANT CHANGE UP (ref 0–6)
HCT VFR BLD CALC: 21.8 % — LOW (ref 34.5–45)
HGB BLD-MCNC: 6.9 G/DL — CRITICAL LOW (ref 11.5–15.5)
HYPOCHROMIA BLD QL: SLIGHT — SIGNIFICANT CHANGE UP
LYMPHOCYTES # BLD AUTO: 39 % — SIGNIFICANT CHANGE UP (ref 13–44)
LYMPHOCYTES # BLD AUTO: SIGNIFICANT CHANGE UP (ref 1–3.3)
MACROCYTES BLD QL: SLIGHT — SIGNIFICANT CHANGE UP
MCHC RBC-ENTMCNC: 31.8 GM/DL — LOW (ref 32–36)
MCHC RBC-ENTMCNC: 33.8 PG — SIGNIFICANT CHANGE UP (ref 27–34)
MCV RBC AUTO: 106.2 FL — HIGH (ref 80–100)
METAMYELOCYTES # FLD: 3 % — HIGH (ref 0–0)
MICROCYTES BLD QL: SIGNIFICANT CHANGE UP
MONOCYTES # BLD AUTO: SIGNIFICANT CHANGE UP (ref 0–0.9)
MONOCYTES NFR BLD AUTO: 32 % — HIGH (ref 2–14)
MYELOCYTES NFR BLD: 1 % — HIGH (ref 0–0)
NEUTROPHILS # BLD AUTO: 2.1 K/UL — SIGNIFICANT CHANGE UP (ref 1.8–7.4)
NEUTROPHILS NFR BLD AUTO: 15 % — LOW (ref 43–77)
NEUTS BAND # BLD: 3 % — SIGNIFICANT CHANGE UP (ref 0–8)
NRBC # BLD: 1 /100 — HIGH (ref 0–0)
PLAT MORPH BLD: NORMAL — SIGNIFICANT CHANGE UP
PLATELET # BLD AUTO: 112 K/UL — LOW (ref 150–400)
POIKILOCYTOSIS BLD QL AUTO: SLIGHT — SIGNIFICANT CHANGE UP
POLYCHROMASIA BLD QL SMEAR: SLIGHT — SIGNIFICANT CHANGE UP
RBC # BLD: 2.06 M/UL — LOW (ref 3.8–5.2)
RBC # FLD: 19.3 % — HIGH (ref 10.3–14.5)
RBC BLD AUTO: ABNORMAL
WBC # BLD: 11.5 K/UL — HIGH (ref 3.8–10.5)
WBC # FLD AUTO: 11.5 K/UL — HIGH (ref 3.8–10.5)

## 2018-09-27 PROCEDURE — 99213 OFFICE O/P EST LOW 20 MIN: CPT

## 2018-09-28 ENCOUNTER — RESULT REVIEW (OUTPATIENT)
Age: 83
End: 2018-09-28

## 2018-09-28 LAB
ALBUMIN SERPL ELPH-MCNC: 4.3 G/DL
ALP BLD-CCNC: 70 U/L
ALT SERPL-CCNC: 7 U/L
ANION GAP SERPL CALC-SCNC: 15 MMOL/L
AST SERPL-CCNC: 13 U/L
BILIRUB SERPL-MCNC: 0.5 MG/DL
BUN SERPL-MCNC: 25 MG/DL
CALCIUM SERPL-MCNC: 9.6 MG/DL
CHLORIDE SERPL-SCNC: 99 MMOL/L
CO2 SERPL-SCNC: 22 MMOL/L
CREAT SERPL-MCNC: 1.06 MG/DL
GLIADIN IGA SER QL: 9.3 UNITS
GLIADIN IGG SER QL: <5 UNITS
GLIADIN PEPTIDE IGA SER-ACNC: NEGATIVE
GLIADIN PEPTIDE IGG SER-ACNC: NEGATIVE
GLUCOSE SERPL-MCNC: 113 MG/DL
POTASSIUM SERPL-SCNC: 4.9 MMOL/L
PROT SERPL-MCNC: 8.5 G/DL
SODIUM SERPL-SCNC: 136 MMOL/L

## 2018-10-11 ENCOUNTER — RESULT REVIEW (OUTPATIENT)
Age: 83
End: 2018-10-11

## 2018-10-11 ENCOUNTER — APPOINTMENT (OUTPATIENT)
Dept: HEMATOLOGY ONCOLOGY | Facility: CLINIC | Age: 83
End: 2018-10-11
Payer: MEDICARE

## 2018-10-11 VITALS
WEIGHT: 125 LBS | HEART RATE: 95 BPM | OXYGEN SATURATION: 95 % | BODY MASS INDEX: 22.15 KG/M2 | DIASTOLIC BLOOD PRESSURE: 63 MMHG | HEIGHT: 63 IN | SYSTOLIC BLOOD PRESSURE: 104 MMHG | TEMPERATURE: 98.7 F

## 2018-10-11 LAB
ANISOCYTOSIS BLD QL: SIGNIFICANT CHANGE UP
BASOPHILS # BLD AUTO: SIGNIFICANT CHANGE UP (ref 0–0.2)
BLASTS # FLD: 5 % — HIGH (ref 0–0)
EOSINOPHIL # BLD AUTO: SIGNIFICANT CHANGE UP (ref 0–0.5)
EOSINOPHIL NFR BLD AUTO: 2 % — SIGNIFICANT CHANGE UP (ref 0–6)
HCT VFR BLD CALC: 20.9 % — CRITICAL LOW (ref 34.5–45)
HGB BLD-MCNC: 7 G/DL — CRITICAL LOW (ref 11.5–15.5)
HYPOCHROMIA BLD QL: SLIGHT — SIGNIFICANT CHANGE UP
LYMPHOCYTES # BLD AUTO: 46 % — HIGH (ref 13–44)
LYMPHOCYTES # BLD AUTO: SIGNIFICANT CHANGE UP (ref 1–3.3)
MACROCYTES BLD QL: SLIGHT — SIGNIFICANT CHANGE UP
MCHC RBC-ENTMCNC: 33.4 GM/DL — SIGNIFICANT CHANGE UP (ref 32–36)
MCHC RBC-ENTMCNC: 35 PG — HIGH (ref 27–34)
MCV RBC AUTO: 104.6 FL — HIGH (ref 80–100)
METAMYELOCYTES # FLD: 2 % — HIGH (ref 0–0)
MICROCYTES BLD QL: SLIGHT — SIGNIFICANT CHANGE UP
MONOCYTES # BLD AUTO: SIGNIFICANT CHANGE UP (ref 0–0.9)
MONOCYTES NFR BLD AUTO: 27 % — HIGH (ref 2–14)
NEUTROPHILS # BLD AUTO: 1.6 K/UL — LOW (ref 1.8–7.4)
NEUTROPHILS NFR BLD AUTO: 17 % — LOW (ref 43–77)
NEUTS BAND # BLD: 1 % — SIGNIFICANT CHANGE UP (ref 0–8)
PLAT MORPH BLD: NORMAL — SIGNIFICANT CHANGE UP
PLATELET # BLD AUTO: 151 K/UL — SIGNIFICANT CHANGE UP (ref 150–400)
POIKILOCYTOSIS BLD QL AUTO: SLIGHT — SIGNIFICANT CHANGE UP
POLYCHROMASIA BLD QL SMEAR: SLIGHT — SIGNIFICANT CHANGE UP
RBC # BLD: 2 M/UL — LOW (ref 3.8–5.2)
RBC # FLD: 20 % — HIGH (ref 10.3–14.5)
RBC BLD AUTO: ABNORMAL
WBC # BLD: 11.8 K/UL — HIGH (ref 3.8–10.5)
WBC # FLD AUTO: 11.8 K/UL — HIGH (ref 3.8–10.5)

## 2018-10-11 PROCEDURE — 99214 OFFICE O/P EST MOD 30 MIN: CPT

## 2018-10-18 ENCOUNTER — APPOINTMENT (OUTPATIENT)
Dept: RHEUMATOLOGY | Facility: CLINIC | Age: 83
End: 2018-10-18
Payer: MEDICARE

## 2018-10-18 ENCOUNTER — OUTPATIENT (OUTPATIENT)
Dept: OUTPATIENT SERVICES | Facility: HOSPITAL | Age: 83
LOS: 1 days | Discharge: HOME | End: 2018-10-18

## 2018-10-18 VITALS
HEART RATE: 98 BPM | DIASTOLIC BLOOD PRESSURE: 64 MMHG | HEIGHT: 63 IN | RESPIRATION RATE: 16 BRPM | TEMPERATURE: 99.7 F | WEIGHT: 125 LBS | BODY MASS INDEX: 22.15 KG/M2 | OXYGEN SATURATION: 94 % | SYSTOLIC BLOOD PRESSURE: 128 MMHG

## 2018-10-18 DIAGNOSIS — D47.3 ESSENTIAL (HEMORRHAGIC) THROMBOCYTHEMIA: ICD-10-CM

## 2018-10-18 PROCEDURE — 99214 OFFICE O/P EST MOD 30 MIN: CPT

## 2018-10-25 ENCOUNTER — RESULT REVIEW (OUTPATIENT)
Age: 83
End: 2018-10-25

## 2018-10-25 ENCOUNTER — APPOINTMENT (OUTPATIENT)
Dept: HEMATOLOGY ONCOLOGY | Facility: CLINIC | Age: 83
End: 2018-10-25
Payer: MEDICARE

## 2018-10-25 VITALS
TEMPERATURE: 98.8 F | OXYGEN SATURATION: 96 % | SYSTOLIC BLOOD PRESSURE: 119 MMHG | HEART RATE: 94 BPM | HEIGHT: 63 IN | DIASTOLIC BLOOD PRESSURE: 69 MMHG

## 2018-10-25 LAB
ANISOCYTOSIS BLD QL: SIGNIFICANT CHANGE UP
BASOPHILS # BLD AUTO: SIGNIFICANT CHANGE UP (ref 0–0.2)
BLASTS # FLD: 2 % — HIGH (ref 0–0)
EOSINOPHIL # BLD AUTO: SIGNIFICANT CHANGE UP (ref 0–0.5)
EOSINOPHIL NFR BLD AUTO: 2 % — SIGNIFICANT CHANGE UP (ref 0–6)
HCT VFR BLD CALC: 19.1 % — CRITICAL LOW (ref 34.5–45)
HGB BLD-MCNC: 6.6 G/DL — CRITICAL LOW (ref 11.5–15.5)
HYPOCHROMIA BLD QL: SLIGHT — SIGNIFICANT CHANGE UP
LYMPHOCYTES # BLD AUTO: 52 % — HIGH (ref 13–44)
LYMPHOCYTES # BLD AUTO: SIGNIFICANT CHANGE UP (ref 1–3.3)
MACROCYTES BLD QL: SLIGHT — SIGNIFICANT CHANGE UP
MCHC RBC-ENTMCNC: 34.4 GM/DL — SIGNIFICANT CHANGE UP (ref 32–36)
MCHC RBC-ENTMCNC: 35.8 PG — HIGH (ref 27–34)
MCV RBC AUTO: 104 FL — HIGH (ref 80–100)
METAMYELOCYTES # FLD: 1 % — HIGH (ref 0–0)
MICROCYTES BLD QL: SLIGHT — SIGNIFICANT CHANGE UP
MONOCYTES # BLD AUTO: SIGNIFICANT CHANGE UP (ref 0–0.9)
MONOCYTES NFR BLD AUTO: 25 % — HIGH (ref 2–14)
MYELOCYTES NFR BLD: 3 % — HIGH (ref 0–0)
NEUTROPHILS # BLD AUTO: 1.7 K/UL — LOW (ref 1.8–7.4)
NEUTROPHILS NFR BLD AUTO: 14 % — LOW (ref 43–77)
NEUTS BAND # BLD: 1 % — SIGNIFICANT CHANGE UP (ref 0–8)
PLAT MORPH BLD: NORMAL — SIGNIFICANT CHANGE UP
PLATELET # BLD AUTO: 115 K/UL — LOW (ref 150–400)
POIKILOCYTOSIS BLD QL AUTO: SLIGHT — SIGNIFICANT CHANGE UP
POLYCHROMASIA BLD QL SMEAR: SLIGHT — SIGNIFICANT CHANGE UP
RBC # BLD: 1.84 M/UL — LOW (ref 3.8–5.2)
RBC # FLD: 20.4 % — HIGH (ref 10.3–14.5)
RBC BLD AUTO: ABNORMAL
WBC # BLD: 11.9 K/UL — HIGH (ref 3.8–10.5)
WBC # FLD AUTO: 11.9 K/UL — HIGH (ref 3.8–10.5)

## 2018-10-25 PROCEDURE — 99213 OFFICE O/P EST LOW 20 MIN: CPT

## 2018-10-25 RX ORDER — PREDNISONE 10 MG/1
10 TABLET ORAL
Qty: 19 | Refills: 0 | Status: DISCONTINUED | COMMUNITY
Start: 2017-12-28 | End: 2018-10-25

## 2018-10-25 RX ORDER — MELOXICAM 7.5 MG/1
7.5 TABLET ORAL DAILY
Qty: 45 | Refills: 1 | Status: DISCONTINUED | COMMUNITY
Start: 2018-09-13 | End: 2018-10-25

## 2018-11-08 ENCOUNTER — APPOINTMENT (OUTPATIENT)
Dept: HEMATOLOGY ONCOLOGY | Facility: CLINIC | Age: 83
End: 2018-11-08
Payer: MEDICARE

## 2018-11-08 ENCOUNTER — RESULT REVIEW (OUTPATIENT)
Age: 83
End: 2018-11-08

## 2018-11-08 VITALS
HEART RATE: 100 BPM | OXYGEN SATURATION: 95 % | BODY MASS INDEX: 22.15 KG/M2 | HEIGHT: 63 IN | WEIGHT: 125 LBS | SYSTOLIC BLOOD PRESSURE: 128 MMHG | DIASTOLIC BLOOD PRESSURE: 72 MMHG | TEMPERATURE: 98.3 F

## 2018-11-08 LAB
ANISOCYTOSIS BLD QL: SIGNIFICANT CHANGE UP
BASO STIPL BLD QL SMEAR: PRESENT — SIGNIFICANT CHANGE UP
BASOPHILS # BLD AUTO: SIGNIFICANT CHANGE UP (ref 0–0.2)
BLASTS # FLD: 5 % — HIGH (ref 0–0)
DACRYOCYTES BLD QL SMEAR: SLIGHT — SIGNIFICANT CHANGE UP
EOSINOPHIL # BLD AUTO: SIGNIFICANT CHANGE UP (ref 0–0.5)
HCT VFR BLD CALC: 20.7 % — CRITICAL LOW (ref 34.5–45)
HGB BLD-MCNC: 6.9 G/DL — CRITICAL LOW (ref 11.5–15.5)
HYPOCHROMIA BLD QL: SLIGHT — SIGNIFICANT CHANGE UP
LYMPHOCYTES # BLD AUTO: 33 % — SIGNIFICANT CHANGE UP (ref 13–44)
LYMPHOCYTES # BLD AUTO: SIGNIFICANT CHANGE UP (ref 1–3.3)
MACROCYTES BLD QL: SIGNIFICANT CHANGE UP
MCHC RBC-ENTMCNC: 33.4 GM/DL — SIGNIFICANT CHANGE UP (ref 32–36)
MCHC RBC-ENTMCNC: 35.2 PG — HIGH (ref 27–34)
MCV RBC AUTO: 105.7 FL — HIGH (ref 80–100)
METAMYELOCYTES # FLD: 1 % — HIGH (ref 0–0)
MICROCYTES BLD QL: SLIGHT — SIGNIFICANT CHANGE UP
MONOCYTES # BLD AUTO: SIGNIFICANT CHANGE UP (ref 0–0.9)
MONOCYTES NFR BLD AUTO: 33 % — HIGH (ref 2–14)
MYELOCYTES NFR BLD: 6 % — HIGH (ref 0–0)
NEUTROPHILS # BLD AUTO: 1.7 K/UL — LOW (ref 1.8–7.4)
NEUTROPHILS NFR BLD AUTO: 22 % — LOW (ref 43–77)
OVALOCYTES BLD QL SMEAR: SLIGHT — SIGNIFICANT CHANGE UP
PLAT MORPH BLD: NORMAL — SIGNIFICANT CHANGE UP
PLATELET # BLD AUTO: 119 K/UL — LOW (ref 150–400)
POIKILOCYTOSIS BLD QL AUTO: SLIGHT — SIGNIFICANT CHANGE UP
POLYCHROMASIA BLD QL SMEAR: SLIGHT — SIGNIFICANT CHANGE UP
RBC # BLD: 1.96 M/UL — LOW (ref 3.8–5.2)
RBC # FLD: 21 % — HIGH (ref 10.3–14.5)
RBC BLD AUTO: ABNORMAL
WBC # BLD: 12 K/UL — HIGH (ref 3.8–10.5)
WBC # FLD AUTO: 12 K/UL — HIGH (ref 3.8–10.5)

## 2018-11-08 PROCEDURE — 99213 OFFICE O/P EST LOW 20 MIN: CPT

## 2018-11-14 ENCOUNTER — OUTPATIENT (OUTPATIENT)
Dept: OUTPATIENT SERVICES | Facility: HOSPITAL | Age: 83
LOS: 1 days | Discharge: HOME | End: 2018-11-14

## 2018-11-14 DIAGNOSIS — D47.3 ESSENTIAL (HEMORRHAGIC) THROMBOCYTHEMIA: ICD-10-CM

## 2018-11-26 ENCOUNTER — RESULT REVIEW (OUTPATIENT)
Age: 83
End: 2018-11-26

## 2018-11-26 ENCOUNTER — APPOINTMENT (OUTPATIENT)
Dept: HEMATOLOGY ONCOLOGY | Facility: CLINIC | Age: 83
End: 2018-11-26

## 2018-11-26 LAB
ANISOCYTOSIS BLD QL: SIGNIFICANT CHANGE UP
BASO STIPL BLD QL SMEAR: PRESENT — SIGNIFICANT CHANGE UP
BLASTS # FLD: 9 % — HIGH (ref 0–0)
DACRYOCYTES BLD QL SMEAR: SLIGHT — SIGNIFICANT CHANGE UP
EOSINOPHIL NFR BLD AUTO: 2 % — SIGNIFICANT CHANGE UP (ref 0–6)
HCT VFR BLD CALC: 19.2 % — CRITICAL LOW (ref 34.5–45)
HGB BLD-MCNC: 6.4 G/DL — CRITICAL LOW (ref 11.5–15.5)
HYPOCHROMIA BLD QL: SLIGHT — SIGNIFICANT CHANGE UP
LG PLATELETS BLD QL AUTO: SLIGHT — SIGNIFICANT CHANGE UP
LYMPHOCYTES # BLD AUTO: 44 % — SIGNIFICANT CHANGE UP (ref 13–44)
LYMPHOCYTES # BLD AUTO: SIGNIFICANT CHANGE UP (ref 1–3.3)
LYMPHOCYTES # SPEC AUTO: 2 % — HIGH (ref 0–0)
MACROCYTES BLD QL: SIGNIFICANT CHANGE UP
MCHC RBC-ENTMCNC: 33.3 GM/DL — SIGNIFICANT CHANGE UP (ref 32–36)
MCHC RBC-ENTMCNC: 36 PG — HIGH (ref 27–34)
MCV RBC AUTO: 108.2 FL — HIGH (ref 80–100)
METAMYELOCYTES # FLD: 2 % — HIGH (ref 0–0)
MICROCYTES BLD QL: SLIGHT — SIGNIFICANT CHANGE UP
MONOCYTES NFR BLD AUTO: 24 % — HIGH (ref 2–14)
MYELOCYTES NFR BLD: 1 % — HIGH (ref 0–0)
NEUTROPHILS # BLD AUTO: 1.8 K/UL — SIGNIFICANT CHANGE UP (ref 1.8–7.4)
NEUTROPHILS NFR BLD AUTO: 14 % — LOW (ref 43–77)
NEUTS BAND # BLD: 1 % — SIGNIFICANT CHANGE UP (ref 0–8)
NRBC # BLD: 1 /100 — HIGH (ref 0–0)
OVALOCYTES BLD QL SMEAR: SLIGHT — SIGNIFICANT CHANGE UP
PLAT MORPH BLD: NORMAL — SIGNIFICANT CHANGE UP
PLATELET # BLD AUTO: 109 K/UL — LOW (ref 150–400)
POIKILOCYTOSIS BLD QL AUTO: SLIGHT — SIGNIFICANT CHANGE UP
POLYCHROMASIA BLD QL SMEAR: SLIGHT — SIGNIFICANT CHANGE UP
PROMYELOCYTES # FLD: 1 % — HIGH (ref 0–0)
RBC # BLD: 1.78 M/UL — LOW (ref 3.8–5.2)
RBC # FLD: 21 % — HIGH (ref 10.3–14.5)
RBC BLD AUTO: ABNORMAL
TOXIC GRANULES BLD QL SMEAR: PRESENT — SIGNIFICANT CHANGE UP
WBC # BLD: 13.4 K/UL — HIGH (ref 3.8–10.5)
WBC # FLD AUTO: 13.4 K/UL — HIGH (ref 3.8–10.5)

## 2018-12-06 ENCOUNTER — RESULT REVIEW (OUTPATIENT)
Age: 83
End: 2018-12-06

## 2018-12-06 ENCOUNTER — APPOINTMENT (OUTPATIENT)
Dept: HEMATOLOGY ONCOLOGY | Facility: CLINIC | Age: 83
End: 2018-12-06
Payer: MEDICARE

## 2018-12-06 LAB
ANISOCYTOSIS BLD QL: SIGNIFICANT CHANGE UP
BASO STIPL BLD QL SMEAR: PRESENT — SIGNIFICANT CHANGE UP
BASOPHILS # BLD AUTO: 1.5 K/UL — HIGH (ref 0–0.2)
BASOPHILS NFR BLD AUTO: 1 % — SIGNIFICANT CHANGE UP (ref 0–2)
BLASTS # FLD: 9 % — HIGH (ref 0–0)
EOSINOPHIL # BLD AUTO: 0.2 K/UL — SIGNIFICANT CHANGE UP (ref 0–0.5)
EOSINOPHIL NFR BLD AUTO: 1 % — SIGNIFICANT CHANGE UP (ref 0–6)
HCT VFR BLD CALC: 19.8 % — CRITICAL LOW (ref 34.5–45)
HGB BLD-MCNC: 6.3 G/DL — CRITICAL LOW (ref 11.5–15.5)
HYPOCHROMIA BLD QL: SLIGHT — SIGNIFICANT CHANGE UP
LYMPHOCYTES # BLD AUTO: 25 % — SIGNIFICANT CHANGE UP (ref 13–44)
LYMPHOCYTES # BLD AUTO: 3.5 K/UL — HIGH (ref 1–3.3)
LYMPHOCYTES # SPEC AUTO: 4 % — HIGH (ref 0–0)
MACROCYTES BLD QL: SIGNIFICANT CHANGE UP
MCHC RBC-ENTMCNC: 31.9 GM/DL — LOW (ref 32–36)
MCHC RBC-ENTMCNC: 35.1 PG — HIGH (ref 27–34)
MCV RBC AUTO: 110.1 FL — HIGH (ref 80–100)
METAMYELOCYTES # FLD: 3 % — HIGH (ref 0–0)
MICROCYTES BLD QL: SLIGHT — SIGNIFICANT CHANGE UP
MONOCYTES # BLD AUTO: 8 K/UL — HIGH (ref 0–0.9)
MONOCYTES NFR BLD AUTO: 42 % — HIGH (ref 2–14)
MYELOCYTES NFR BLD: 6 % — HIGH (ref 0–0)
NEUTROPHILS # BLD AUTO: 1.8 K/UL — SIGNIFICANT CHANGE UP (ref 1.8–7.4)
NEUTROPHILS NFR BLD AUTO: 9 % — LOW (ref 43–77)
OVALOCYTES BLD QL SMEAR: SLIGHT — SIGNIFICANT CHANGE UP
PLAT MORPH BLD: NORMAL — SIGNIFICANT CHANGE UP
PLATELET # BLD AUTO: 109 K/UL — LOW (ref 150–400)
POIKILOCYTOSIS BLD QL AUTO: SLIGHT — SIGNIFICANT CHANGE UP
POLYCHROMASIA BLD QL SMEAR: SLIGHT — SIGNIFICANT CHANGE UP
RBC # BLD: 1.8 M/UL — LOW (ref 3.8–5.2)
RBC # FLD: 21 % — HIGH (ref 10.3–14.5)
RBC BLD AUTO: ABNORMAL
TARGETS BLD QL SMEAR: SLIGHT — SIGNIFICANT CHANGE UP
WBC # BLD: 14.9 K/UL — HIGH (ref 3.8–10.5)
WBC # FLD AUTO: 14.9 K/UL — HIGH (ref 3.8–10.5)

## 2018-12-06 PROCEDURE — 99499A: CUSTOM | Mod: NC

## 2018-12-16 ENCOUNTER — OUTPATIENT (OUTPATIENT)
Dept: OUTPATIENT SERVICES | Facility: HOSPITAL | Age: 83
LOS: 1 days | Discharge: HOME | End: 2018-12-16

## 2018-12-16 DIAGNOSIS — D47.3 ESSENTIAL (HEMORRHAGIC) THROMBOCYTHEMIA: ICD-10-CM

## 2018-12-17 ENCOUNTER — RESULT REVIEW (OUTPATIENT)
Age: 83
End: 2018-12-17

## 2018-12-17 ENCOUNTER — APPOINTMENT (OUTPATIENT)
Dept: HEMATOLOGY ONCOLOGY | Facility: CLINIC | Age: 83
End: 2018-12-17

## 2018-12-17 LAB
ANISOCYTOSIS BLD QL: SIGNIFICANT CHANGE UP
BASOPHILS # BLD AUTO: HIGH K/UL (ref 0–0.2)
BLASTS # FLD: 10 % — HIGH (ref 0–0)
ELLIPTOCYTES BLD QL SMEAR: SLIGHT — SIGNIFICANT CHANGE UP
EOSINOPHIL # BLD AUTO: SIGNIFICANT CHANGE UP K/UL (ref 0–0.5)
HCT VFR BLD CALC: 20.4 % — CRITICAL LOW (ref 34.5–45)
HGB BLD-MCNC: 6.6 G/DL — CRITICAL LOW (ref 11.5–15.5)
HYPOCHROMIA BLD QL: SLIGHT — SIGNIFICANT CHANGE UP
LYMPHOCYTES # BLD AUTO: 46 % — HIGH (ref 13–44)
LYMPHOCYTES # BLD AUTO: SIGNIFICANT CHANGE UP K/UL (ref 1–3.3)
MACROCYTES BLD QL: SLIGHT — SIGNIFICANT CHANGE UP
MCHC RBC-ENTMCNC: 32.5 GM/DL — SIGNIFICANT CHANGE UP (ref 32–36)
MCHC RBC-ENTMCNC: 36.1 PG — HIGH (ref 27–34)
MCV RBC AUTO: 111.1 FL — HIGH (ref 80–100)
METAMYELOCYTES # FLD: 16 % — HIGH (ref 0–0)
MICROCYTES BLD QL: SLIGHT — SIGNIFICANT CHANGE UP
MONOCYTES # BLD AUTO: HIGH K/UL (ref 0–0.9)
MONOCYTES NFR BLD AUTO: 4 % — SIGNIFICANT CHANGE UP (ref 2–14)
MYELOCYTES NFR BLD: 17 % — HIGH (ref 0–0)
NEUTROPHILS # BLD AUTO: SIGNIFICANT CHANGE UP K/UL (ref 1.8–7.4)
NEUTROPHILS NFR BLD AUTO: 7 % — LOW (ref 43–77)
PLAT MORPH BLD: NORMAL — SIGNIFICANT CHANGE UP
PLATELET # BLD AUTO: 97 K/UL — LOW (ref 150–400)
POIKILOCYTOSIS BLD QL AUTO: SLIGHT — SIGNIFICANT CHANGE UP
POLYCHROMASIA BLD QL SMEAR: SLIGHT — SIGNIFICANT CHANGE UP
RBC # BLD: 1.83 M/UL — LOW (ref 3.8–5.2)
RBC # FLD: 20.4 % — HIGH (ref 10.3–14.5)
RBC BLD AUTO: ABNORMAL
WBC # BLD: 13.8 K/UL — HIGH (ref 3.8–10.5)
WBC # FLD AUTO: 13.8 K/UL — HIGH (ref 3.8–10.5)

## 2018-12-27 ENCOUNTER — APPOINTMENT (OUTPATIENT)
Dept: HEMATOLOGY ONCOLOGY | Facility: CLINIC | Age: 83
End: 2018-12-27

## 2018-12-27 ENCOUNTER — APPOINTMENT (OUTPATIENT)
Dept: HEMATOLOGY ONCOLOGY | Facility: CLINIC | Age: 83
End: 2018-12-27
Payer: MEDICARE

## 2018-12-27 ENCOUNTER — RESULT REVIEW (OUTPATIENT)
Age: 83
End: 2018-12-27

## 2018-12-27 VITALS
WEIGHT: 125 LBS | HEIGHT: 63 IN | DIASTOLIC BLOOD PRESSURE: 74 MMHG | BODY MASS INDEX: 22.15 KG/M2 | HEART RATE: 90 BPM | TEMPERATURE: 98.4 F | OXYGEN SATURATION: 95 % | SYSTOLIC BLOOD PRESSURE: 128 MMHG

## 2018-12-27 DIAGNOSIS — D64.9 ANEMIA, UNSPECIFIED: ICD-10-CM

## 2018-12-27 LAB
ANISOCYTOSIS BLD QL: SIGNIFICANT CHANGE UP
BLASTS # FLD: 8 % — HIGH (ref 0–0)
DACRYOCYTES BLD QL SMEAR: SLIGHT — SIGNIFICANT CHANGE UP
ELLIPTOCYTES BLD QL SMEAR: SLIGHT — SIGNIFICANT CHANGE UP
HCT VFR BLD CALC: 20.1 % — CRITICAL LOW (ref 34.5–45)
HGB BLD-MCNC: 6.6 G/DL — CRITICAL LOW (ref 11.5–15.5)
HOWELL-JOLLY BOD BLD QL SMEAR: PRESENT — SIGNIFICANT CHANGE UP
HYPOCHROMIA BLD QL: SIGNIFICANT CHANGE UP
LYMPHOCYTES # BLD AUTO: 43 % — SIGNIFICANT CHANGE UP (ref 13–44)
MACROCYTES BLD QL: SIGNIFICANT CHANGE UP
MCHC RBC-ENTMCNC: 33.1 GM/DL — SIGNIFICANT CHANGE UP (ref 32–36)
MCHC RBC-ENTMCNC: 36.9 PG — HIGH (ref 27–34)
MCV RBC AUTO: 111 FL — HIGH (ref 80–100)
METAMYELOCYTES # FLD: 6 % — HIGH (ref 0–0)
MONOCYTES NFR BLD AUTO: 9 % — SIGNIFICANT CHANGE UP (ref 2–14)
MYELOCYTES NFR BLD: 7 % — HIGH (ref 0–0)
NEUTROPHILS # BLD AUTO: SIGNIFICANT CHANGE UP K/UL (ref 1.8–7.4)
NEUTROPHILS NFR BLD AUTO: 13 % — LOW (ref 43–77)
NEUTS BAND # BLD: 6 % — SIGNIFICANT CHANGE UP (ref 0–8)
PLAT MORPH BLD: NORMAL — SIGNIFICANT CHANGE UP
PLATELET # BLD AUTO: 97 K/UL — LOW (ref 150–400)
POIKILOCYTOSIS BLD QL AUTO: SLIGHT — SIGNIFICANT CHANGE UP
POLYCHROMASIA BLD QL SMEAR: SLIGHT — SIGNIFICANT CHANGE UP
RBC # BLD: 1.8 M/UL — LOW (ref 3.8–5.2)
RBC # FLD: 20.4 % — HIGH (ref 10.3–14.5)
RBC BLD AUTO: ABNORMAL
SCHISTOCYTES BLD QL AUTO: SLIGHT — SIGNIFICANT CHANGE UP
SMUDGE CELLS # BLD: PRESENT — SIGNIFICANT CHANGE UP
VARIANT LYMPHS # BLD: 8 % — HIGH (ref 0–6)
WBC # BLD: 12.9 K/UL — HIGH (ref 3.8–10.5)
WBC # FLD AUTO: 12.9 K/UL — HIGH (ref 3.8–10.5)

## 2018-12-27 PROCEDURE — 99213 OFFICE O/P EST LOW 20 MIN: CPT

## 2018-12-28 ENCOUNTER — INPATIENT (INPATIENT)
Facility: HOSPITAL | Age: 83
LOS: 4 days | Discharge: NURSING, REHAB OR LONG-TERM CARE | DRG: 536 | End: 2019-01-02
Attending: SURGERY | Admitting: SURGERY
Payer: MEDICARE

## 2018-12-28 VITALS
DIASTOLIC BLOOD PRESSURE: 79 MMHG | RESPIRATION RATE: 16 BRPM | OXYGEN SATURATION: 89 % | WEIGHT: 130.07 LBS | HEIGHT: 62 IN | TEMPERATURE: 98 F | HEART RATE: 95 BPM | SYSTOLIC BLOOD PRESSURE: 137 MMHG

## 2018-12-28 PROBLEM — D64.9 ANEMIA: Status: ACTIVE | Noted: 2017-09-05

## 2018-12-28 PROCEDURE — 72125 CT NECK SPINE W/O DYE: CPT | Mod: 26

## 2018-12-28 PROCEDURE — 71250 CT THORAX DX C-: CPT | Mod: 26

## 2018-12-28 PROCEDURE — 72192 CT PELVIS W/O DYE: CPT | Mod: 26

## 2018-12-28 PROCEDURE — 71045 X-RAY EXAM CHEST 1 VIEW: CPT | Mod: 26

## 2018-12-28 PROCEDURE — 76377 3D RENDER W/INTRP POSTPROCES: CPT | Mod: 26

## 2018-12-28 PROCEDURE — 70450 CT HEAD/BRAIN W/O DYE: CPT | Mod: 26

## 2018-12-28 PROCEDURE — 99285 EMERGENCY DEPT VISIT HI MDM: CPT

## 2018-12-28 PROCEDURE — 72170 X-RAY EXAM OF PELVIS: CPT | Mod: 26

## 2018-12-28 NOTE — ED ADULT TRIAGE NOTE - CHIEF COMPLAINT QUOTE
pt reports fell out of bed at approx 2am, sent from [place] for eval. a and o x3. breathing even and unlabored. pain to left axillary area, spo2 89% on RA. placed on 2l o2 via nc. small abrasion to left axillary and minor ecchymosis to forehead. otherwise no external signs of trauma noted. - loc. - blood thinners.

## 2018-12-28 NOTE — ADDENDUM
[FreeTextEntry1] : I, Jasmeet Jones, acted solely as a scribe for Dr. Oliver Monets on this date 12/27/18.

## 2018-12-28 NOTE — PHYSICAL EXAM
[Ambulatory and capable of all self care but unable to carry out any work activities] : Status 2- Ambulatory and capable of all self care but unable to carry out any work activities. Up and about more than 50% of waking hours [Thin] : thin [Normal] : normoactive bowel sounds, soft and nontender, no hepatosplenomegaly or masses appreciated [de-identified] : Pale [de-identified] : 2/6 PRESTON

## 2018-12-28 NOTE — ASSESSMENT
[FreeTextEntry1] : Mrs. 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 Recent bone marrow showed 5% blasts with moderate reticulin fibrosis.. Was started on Jakafi, and we are monitoring her counts.. Her HGb has been stable at 6-7 gms, She is tolerating this HGb. recent HGb dropped to 5.9 and she received 1 unit of packed cells and feels better. No indication to transfuse at this time. will continue to monitor her CBC every 2 weeks or sooner if she becomes symptomatic.

## 2018-12-28 NOTE — ED PROVIDER NOTE - NS_ ATTENDINGSCRIBEDETAILS _ED_A_ED_FT
I, Meet Licea, performed the initial face to face bedside interview with this patient regarding history of present illness, review of symptoms and relevant past medical, social and family history.  I completed an independent physical examination.   The history, relevant review of systems, past medical and surgical history, medical decision making, and physical examination was documented by the scribe in my presence and I attest to the accuracy of the documentation.

## 2018-12-28 NOTE — ED PROVIDER NOTE - OBJECTIVE STATEMENT
87 y/o F pt with PMHx myelofibrosis, remitting seronegative symmertical synovitis presents to the ED with daughters from [place] c/o neck pain, L rib and L hip pain s/p falling today.  Pt reports she fell out of bed earlier this morning.  Per daughter, staff at [facility] was doing rounds in the night and found pt on the floor at 2:00am today.  Pt reports pain to her neck and L hip, and reports some bruising.  She ambulates with a walker at baseline, she has not been able to ambulate s/p the fall.  Pt took tylenol today for her pain, last dosage 6+ hours ago.  Pt takes jaclofie.  Denies HA, back pain, fever, chills, CP, SOB, N/V, abdominal pain.  No further acute complaints at this time.  Hematologist: Dr. Montes (she sees him every 10 days)  PMD: Dr. Baird 87 y/o F pt with PMHx myelofibrosis, remitting seronegative symmetrical synovitis presents to the ED with daughters from [place] c/o neck pain, L rib and L hip pain s/p falling today.  Pt reports she fell out of bed earlier this morning.  Per daughter, staff at [facility] was doing rounds last night when they found pt on the floor at 2:00am today.  Pt reports pain to her neck and L hip, and reports some bruising.  She ambulates with a walker at baseline, she has not been able to ambulate s/p the fall.  Pt took Tylenol today for her pain, last dosage 6+ hours ago.  Pt takes jaclofie.  Denies HA, back pain, fever, chills, CP, SOB, N/V, abdominal pain.  No further acute complaints at this time.  Hematologist: Dr. Montes (she sees him every 10 days)  PMD: Dr. Baird 89 y/o F pt with PMHx myelofibrosis, remitting seronegative symmetrical synovitis presents to the ED with daughters from [facility] c/o neck pain, L rib and L hip pain s/p falling today.  Pt reports she fell out of bed earlier this morning.  Per daughter, staff at [facility] was doing rounds last night when they found pt on the floor at 2:00am today.  Pt reports pain to her neck and L hip, and reports some bruising.  She ambulates with a walker at baseline, she has not been able to ambulate s/p the fall.  Pt took Tylenol today for her pain, last dosage 6+ hours ago.  Pt takes jakafi.  Denies HA, back pain, fever, chills, CP, SOB, N/V, abdominal pain.  No further acute complaints at this time.  Hematologist: Dr. Montes (she sees him every 10 days)  PMD: Dr. Baird

## 2018-12-28 NOTE — ED PROVIDER NOTE - ENMT, MLM
Airway patent, Nasal mucosa clear. Mouth with normal mucosa. NC AT, neck supple, FROM Airway patent, Nasal mucosa clear. Mouth with normal mucosa. NC AT, neck supple, FROM. L para-cervical spinal tenderness, no midline tenderness.

## 2018-12-28 NOTE — REASON FOR VISIT
[Follow-Up Visit] : a follow-up visit for [Family Member] : family member [FreeTextEntry2] : myelofibrosis

## 2018-12-28 NOTE — REVIEW OF SYSTEMS
[Fatigue] : fatigue [Negative] : Allergic/Immunologic [SOB on Exertion] : shortness of breath during exertion [FreeTextEntry6] : stable

## 2018-12-28 NOTE — HISTORY OF PRESENT ILLNESS
[de-identified] : \par Mrs. Chen is a 88 yo WF with a history of Reji 2+ thrombocytosis, dating back to 2013. She had been on Hydrea since Olya 10, 2013. Most recently she was found to be pancytopenic and the Hydrea was held as of July 12, 2017. A bone marrow was done on August 21, 2017, which a normocellular to mildly hypercellular marrow with mild megakaryocytic hyperplasia and moderate reticulin fibrosis and focal areas of increased blasts (5% ). \par  She was started on Jakafi. \par  [de-identified] : The patient returns today for her follow-up.  She notes that she is doing well with some fatigue.  She admits she is able to continue activities of daily living without much difficulty. She complains of occasional  shortness of breath.  She was last transfused 1 unit of packed cells on 9/14/18.  She continues to tolerate Jakafi treatment well.  No other new complaints today.  The remainder of her ROS is negative.

## 2018-12-28 NOTE — ED PROVIDER NOTE - CARDIAC, MLM
Normal rate, regular rhythm.  Heart sounds S1, S2.  No murmurs, rubs or gallops. Normal rate, regular rhythm.  Heart sounds S1, S2.  No murmurs, rubs or gallops. L mid-axillary tenderness.

## 2018-12-29 DIAGNOSIS — S32.9XXA FRACTURE OF UNSPECIFIED PARTS OF LUMBOSACRAL SPINE AND PELVIS, INITIAL ENCOUNTER FOR CLOSED FRACTURE: ICD-10-CM

## 2018-12-29 LAB
ALBUMIN SERPL ELPH-MCNC: 3.5 G/DL — SIGNIFICANT CHANGE UP (ref 3.3–5.2)
ALP SERPL-CCNC: 56 U/L — SIGNIFICANT CHANGE UP (ref 40–120)
ALT FLD-CCNC: 14 U/L — SIGNIFICANT CHANGE UP
ANION GAP SERPL CALC-SCNC: 10 MMOL/L — SIGNIFICANT CHANGE UP (ref 5–17)
APPEARANCE UR: CLEAR — SIGNIFICANT CHANGE UP
AST SERPL-CCNC: 23 U/L — SIGNIFICANT CHANGE UP
BACTERIA # UR AUTO: ABNORMAL
BILIRUB SERPL-MCNC: 1 MG/DL — SIGNIFICANT CHANGE UP (ref 0.4–2)
BILIRUB UR-MCNC: NEGATIVE — SIGNIFICANT CHANGE UP
BUN SERPL-MCNC: 27 MG/DL — HIGH (ref 8–20)
CALCIUM SERPL-MCNC: 9 MG/DL — SIGNIFICANT CHANGE UP (ref 8.6–10.2)
CHLORIDE SERPL-SCNC: 102 MMOL/L — SIGNIFICANT CHANGE UP (ref 98–107)
CO2 SERPL-SCNC: 22 MMOL/L — SIGNIFICANT CHANGE UP (ref 22–29)
COLOR SPEC: YELLOW — SIGNIFICANT CHANGE UP
CREAT SERPL-MCNC: 0.76 MG/DL — SIGNIFICANT CHANGE UP (ref 0.5–1.3)
DIFF PNL FLD: ABNORMAL
EPI CELLS # UR: SIGNIFICANT CHANGE UP
GLUCOSE SERPL-MCNC: 130 MG/DL — HIGH (ref 70–115)
GLUCOSE UR QL: NEGATIVE MG/DL — SIGNIFICANT CHANGE UP
HCT VFR BLD CALC: 16 % — CRITICAL LOW (ref 37–47)
HCT VFR BLD CALC: 18.3 % — CRITICAL LOW (ref 37–47)
HGB BLD-MCNC: 5.2 G/DL — CRITICAL LOW (ref 12–16)
HGB BLD-MCNC: 6 G/DL — CRITICAL LOW (ref 12–16)
KETONES UR-MCNC: NEGATIVE — SIGNIFICANT CHANGE UP
LEUKOCYTE ESTERASE UR-ACNC: ABNORMAL
MCHC RBC-ENTMCNC: 32.5 G/DL — SIGNIFICANT CHANGE UP (ref 32–36)
MCHC RBC-ENTMCNC: 32.8 G/DL — SIGNIFICANT CHANGE UP (ref 32–36)
MCHC RBC-ENTMCNC: 35.9 PG — HIGH (ref 27–31)
MCHC RBC-ENTMCNC: 36.4 PG — HIGH (ref 27–31)
MCV RBC AUTO: 110.3 FL — HIGH (ref 81–99)
MCV RBC AUTO: 110.9 FL — HIGH (ref 81–99)
NITRITE UR-MCNC: POSITIVE
PH UR: 6 — SIGNIFICANT CHANGE UP (ref 5–8)
PLATELET # BLD AUTO: 86 K/UL — LOW (ref 150–400)
POTASSIUM SERPL-MCNC: 4.2 MMOL/L — SIGNIFICANT CHANGE UP (ref 3.5–5.3)
POTASSIUM SERPL-SCNC: 4.2 MMOL/L — SIGNIFICANT CHANGE UP (ref 3.5–5.3)
PROT SERPL-MCNC: 7.5 G/DL — SIGNIFICANT CHANGE UP (ref 6.6–8.7)
PROT UR-MCNC: 15 MG/DL
RBC # BLD: 1.45 M/UL — LOW (ref 4.4–5.2)
RBC # BLD: 1.65 M/UL — LOW (ref 4.4–5.2)
RBC # FLD: 20.8 % — HIGH (ref 11–15.6)
RBC # FLD: 21.3 % — HIGH (ref 11–15.6)
RBC CASTS # UR COMP ASSIST: SIGNIFICANT CHANGE UP /HPF (ref 0–4)
SODIUM SERPL-SCNC: 134 MMOL/L — LOW (ref 135–145)
SP GR SPEC: 1.01 — SIGNIFICANT CHANGE UP (ref 1.01–1.02)
UROBILINOGEN FLD QL: NEGATIVE MG/DL — SIGNIFICANT CHANGE UP
WBC # BLD: 11 K/UL — HIGH (ref 4.8–10.8)
WBC # BLD: 13.4 K/UL — HIGH (ref 4.8–10.8)
WBC # FLD AUTO: 11 K/UL — HIGH (ref 4.8–10.8)
WBC # FLD AUTO: 13.4 K/UL — HIGH (ref 4.8–10.8)
WBC UR QL: SIGNIFICANT CHANGE UP

## 2018-12-29 PROCEDURE — 99223 1ST HOSP IP/OBS HIGH 75: CPT

## 2018-12-29 PROCEDURE — 27197 CLSD TX PELVIC RING FX: CPT | Mod: RT

## 2018-12-29 PROCEDURE — 93010 ELECTROCARDIOGRAM REPORT: CPT | Mod: 76

## 2018-12-29 RX ORDER — CELECOXIB 200 MG/1
200 CAPSULE ORAL DAILY
Qty: 0 | Refills: 0 | Status: DISCONTINUED | OUTPATIENT
Start: 2018-12-29 | End: 2019-01-02

## 2018-12-29 RX ORDER — MORPHINE SULFATE 50 MG/1
1 CAPSULE, EXTENDED RELEASE ORAL EVERY 4 HOURS
Qty: 0 | Refills: 0 | Status: DISCONTINUED | OUTPATIENT
Start: 2018-12-29 | End: 2018-12-29

## 2018-12-29 RX ORDER — PANTOPRAZOLE SODIUM 20 MG/1
40 TABLET, DELAYED RELEASE ORAL
Qty: 0 | Refills: 0 | Status: DISCONTINUED | OUTPATIENT
Start: 2018-12-29 | End: 2019-01-02

## 2018-12-29 RX ORDER — SODIUM CHLORIDE 9 MG/ML
1000 INJECTION, SOLUTION INTRAVENOUS
Qty: 0 | Refills: 0 | Status: DISCONTINUED | OUTPATIENT
Start: 2018-12-29 | End: 2018-12-30

## 2018-12-29 RX ORDER — ONDANSETRON 8 MG/1
4 TABLET, FILM COATED ORAL EVERY 6 HOURS
Qty: 0 | Refills: 0 | Status: DISCONTINUED | OUTPATIENT
Start: 2018-12-29 | End: 2019-01-02

## 2018-12-29 RX ORDER — CHOLECALCIFEROL (VITAMIN D3) 125 MCG
2000 CAPSULE ORAL DAILY
Qty: 0 | Refills: 0 | Status: DISCONTINUED | OUTPATIENT
Start: 2018-12-29 | End: 2019-01-02

## 2018-12-29 RX ORDER — MIRTAZAPINE 45 MG/1
30 TABLET, ORALLY DISINTEGRATING ORAL DAILY
Qty: 0 | Refills: 0 | Status: DISCONTINUED | OUTPATIENT
Start: 2018-12-29 | End: 2019-01-02

## 2018-12-29 RX ORDER — DOCUSATE SODIUM 100 MG
100 CAPSULE ORAL THREE TIMES A DAY
Qty: 0 | Refills: 0 | Status: DISCONTINUED | OUTPATIENT
Start: 2018-12-29 | End: 2019-01-02

## 2018-12-29 RX ORDER — ACETAMINOPHEN 500 MG
650 TABLET ORAL EVERY 6 HOURS
Qty: 0 | Refills: 0 | Status: DISCONTINUED | OUTPATIENT
Start: 2018-12-29 | End: 2019-01-02

## 2018-12-29 RX ORDER — CEFTRIAXONE 500 MG/1
1 INJECTION, POWDER, FOR SOLUTION INTRAMUSCULAR; INTRAVENOUS EVERY 24 HOURS
Qty: 0 | Refills: 0 | Status: DISCONTINUED | OUTPATIENT
Start: 2018-12-29 | End: 2018-12-31

## 2018-12-29 RX ORDER — LEVOTHYROXINE SODIUM 125 MCG
75 TABLET ORAL DAILY
Qty: 0 | Refills: 0 | Status: DISCONTINUED | OUTPATIENT
Start: 2018-12-29 | End: 2019-01-02

## 2018-12-29 RX ORDER — ENOXAPARIN SODIUM 100 MG/ML
40 INJECTION SUBCUTANEOUS DAILY
Qty: 0 | Refills: 0 | Status: DISCONTINUED | OUTPATIENT
Start: 2018-12-29 | End: 2019-01-02

## 2018-12-29 RX ORDER — ESCITALOPRAM OXALATE 10 MG/1
5 TABLET, FILM COATED ORAL DAILY
Qty: 0 | Refills: 0 | Status: DISCONTINUED | OUTPATIENT
Start: 2018-12-29 | End: 2019-01-02

## 2018-12-29 RX ORDER — LISINOPRIL 2.5 MG/1
5 TABLET ORAL DAILY
Qty: 0 | Refills: 0 | Status: DISCONTINUED | OUTPATIENT
Start: 2018-12-29 | End: 2019-01-02

## 2018-12-29 RX ORDER — RUXOLITINIB 25 MG/1
10 TABLET ORAL DAILY
Qty: 0 | Refills: 0 | Status: DISCONTINUED | OUTPATIENT
Start: 2018-12-29 | End: 2019-01-02

## 2018-12-29 RX ADMIN — RUXOLITINIB 10 MILLIGRAM(S): 25 TABLET ORAL at 14:00

## 2018-12-29 RX ADMIN — CELECOXIB 200 MILLIGRAM(S): 200 CAPSULE ORAL at 12:23

## 2018-12-29 RX ADMIN — ENOXAPARIN SODIUM 40 MILLIGRAM(S): 100 INJECTION SUBCUTANEOUS at 11:24

## 2018-12-29 RX ADMIN — MIRTAZAPINE 30 MILLIGRAM(S): 45 TABLET, ORALLY DISINTEGRATING ORAL at 11:23

## 2018-12-29 RX ADMIN — PANTOPRAZOLE SODIUM 40 MILLIGRAM(S): 20 TABLET, DELAYED RELEASE ORAL at 08:54

## 2018-12-29 RX ADMIN — Medication 100 MILLIGRAM(S): at 21:38

## 2018-12-29 RX ADMIN — SODIUM CHLORIDE 100 MILLILITER(S): 9 INJECTION, SOLUTION INTRAVENOUS at 03:13

## 2018-12-29 RX ADMIN — Medication 100 MILLIGRAM(S): at 11:24

## 2018-12-29 RX ADMIN — Medication 2000 UNIT(S): at 11:25

## 2018-12-29 RX ADMIN — CELECOXIB 200 MILLIGRAM(S): 200 CAPSULE ORAL at 11:23

## 2018-12-29 RX ADMIN — ESCITALOPRAM OXALATE 5 MILLIGRAM(S): 10 TABLET, FILM COATED ORAL at 11:23

## 2018-12-29 RX ADMIN — Medication 100 MILLIGRAM(S): at 06:34

## 2018-12-29 RX ADMIN — Medication 75 MICROGRAM(S): at 06:34

## 2018-12-29 RX ADMIN — CEFTRIAXONE 100 GRAM(S): 500 INJECTION, POWDER, FOR SOLUTION INTRAMUSCULAR; INTRAVENOUS at 08:54

## 2018-12-29 NOTE — H&P ADULT - NSHPPHYSICALEXAM_GEN_ALL_CORE
HEENT: Normocephalic, atraumatic, SARA, EOM wnl, no otorrhea or hemotympanum b/l, no epistaxis or d/c b/l nares, no craniofacial bony pathology or tenderness b/l  Neck: No crepitus, no ecchymosis, no hematoma, to exam, no JVD, no tracheal deviation  Cspine/thoracolumbrosacral spine: no gross bony pathology or tenderness to exam  Cardiovascular: S1S2 Present  Chest: no gross rib pathology or tenderness to exam. No sternal pathology or tenderness to exam. No crepitus, no ecchymosis, no hematoma. No penetrating thorcoabdominal trauma  Respiratory: no wheezes, rales or rhonchi to exam  ABD: bowel sounds (+), soft, nontender, non distended, no rebound, no gaurding, no rigidity, no skin changes to exam. No pelvic instability to exam, no skin changes  Musculoskeletal: Pt has palpable b/l radial, femoral, dorsalis pedis pulses. All digits are warm and well perfused. No gross long bone pathology or tenderness to exam. Pt demonstrates grossly intact sensoromotor function. Pt has good capillary refill to digits, no calf edema or tenderness to exam.  Skin: no lesions or rashes to exam

## 2018-12-29 NOTE — CONSULT NOTE ADULT - ASSESSMENT
87 y/o woman with JAK2+ essential thrombocytosis/myelofibrosis, transfusion-dependent but stable overall on Jakafi 10 mg p.o. daily.  Now with left hip fracture following fall from bed.    PLAN  1) May continue jakafi 10 mg p.o. daily from home supply that family can bring in.  2) Transfuse prn to hgb. 10 grams if surgery is planned.

## 2018-12-29 NOTE — H&P ADULT - ASSESSMENT
87yo female presents with left superior and inferior pubic rami fracture after fall  -hemodynamically stable  -CT head and cspine negative for acute processes  -CT Pelvis-left superior and inferior pubic rami .  -Hg 6. Per family, pt has hx of myelofibrosis where her Hg averages around 6.5. She is followed by hematology where blood is drawn ever 10 days.    Plan:  -Admit to the trauma service  -Restart home medication. Home med includes Jakafi..daughter to bring tomorrow  -Will obtain PT consult (weight bearing status-WBAT with Walker)  -Appreciate ortho consult

## 2018-12-29 NOTE — ED ADULT NURSE NOTE - OBJECTIVE STATEMENT
Pt family states she fell at 0200 on 12/28 but was not found on floor at nursing home until ~ 0400, states she did not want to come to ED but daughters forced her to around 1600, denies pain at this time, AOx4, resp even and unlabored, pt daughter states she is very anemic and sees Dr Montes regularly, unknown LOC, no shortening/deformity noted, denies blood thinners

## 2018-12-29 NOTE — PROVIDER CONTACT NOTE (CRITICAL VALUE NOTIFICATION) - DATE AND TIME:
29-Dec-2018 00:02 Rehabilitation Nursing  Inpatient Rehabilitation Plan of Care Note    Plan of Care  Copy from Rufina    Pain Management (Active)  Current Status (5/5/2018 12:00:00 AM): impaired comfort r/t surgical wound  Weekly Goal: state decrease of pain  Discharge Goal: identify measures to control pain    Body Function Structure    Balance (Active)  Current Status (5/5/2018 12:00:00 AM): impaired physical mobility  Weekly Goal: increase in physical activity  Discharge Goal: demonstrate use of adaptive equipment    Safety    Potential for Injury (Active)  Current Status (4/24/2018 12:00:00 AM): risk for injury r/t falls  Weekly Goal: free from injury this shift  Discharge Goal: free from injury during hospital stay    Signed by: Edel Rodriguez Nurse

## 2018-12-29 NOTE — ED ADULT NURSE REASSESSMENT NOTE - NS ED NURSE REASSESS COMMENT FT1
Report given to receiving RN Patti, pt placed on portable monitor, awaiting transport to floor, pt aware of plan of care. Report given to receiving RN Marielle, pt placed on portable monitor, awaiting transport to floor, pt aware of plan of care.

## 2018-12-29 NOTE — H&P ADULT - HISTORY OF PRESENT ILLNESS
87yo female presents to hospital after unwitnessed fall at nursing facility. Per family members, patient was found on floor by nursing aids. Pt is unsure if she fell out of bed or fell while walking. Unsure about LOC. During day, pt reported difficulty walking (walks with walker). Pt was convinced to be evaluated in ED here left pubic rami fx were found. At time of exam patient offers no complaints. Denies pain to pelvis, chest pain, headache, nausea and vomiting.    Primary Survey:  A-Airway Intact  B-breath sounds heard bilaterally  C-Central and peripheral pulses 2+, bilaterally  D-GCS 15  E-No stepoffs or bony deformities    A-denies  M-See MAR  P-polymyalgia rheumatica, myleofibrosis, hypothyroidism  L-defer  E-Unsure how trauma occurred

## 2018-12-29 NOTE — ED ADULT NURSE NOTE - CHIEF COMPLAINT QUOTE
pt reports fell out of bed at approx 2am, sent from [place] for eval. a and o x3. breathing even and unlabored. pain to left axillary area, spo2 89% on RA. placed on 2l o2 via nc. small abrasion to left axillary and minor ecchymosis to forehead. otherwise no external signs of trauma noted. - loc. - blood thinners.

## 2018-12-29 NOTE — PHYSICAL THERAPY INITIAL EVALUATION ADULT - ADDITIONAL COMMENTS
Patient lives at [facility], independent with ambulation around facility with rollator, assist for showering, independent with all transfers prior as per patient.  2 daughter and 2 granddaughters live in [place].

## 2018-12-29 NOTE — ED ADULT NURSE NOTE - NSIMPLEMENTINTERV_GEN_ALL_ED
Implemented All Fall with Harm Risk Interventions:  [place] to call system. Call bell, personal items and telephone within reach. Instruct patient to call for assistance. Room bathroom lighting operational. Non-slip footwear when patient is off stretcher. Physically safe environment: no spills, clutter or unnecessary equipment. Stretcher in lowest position, wheels locked, appropriate side rails in place. Provide visual cue, wrist band, yellow gown, etc. Monitor gait and stability. Monitor for mental status changes and reorient to person, place, and time. Review medications for side effects contributing to fall risk. Reinforce activity limits and safety measures with patient and family. Provide visual clues: red socks.

## 2018-12-29 NOTE — H&P ADULT - ATTENDING COMMENTS
88 frail female comes from assisted living facility with baseline functional status requiring a walker presenting to ER after personnel found her on the floor and she complained of pelvic pain. She is found to have superior and inferior pubic rami fx on left, neurovascularly intact with pelvic hematoma. Known pancytopenia. Hemodynamically stable with unchanged pancytopenia from baseline. Two daughters at baseline state she has had frequent falls recently.  Discussed at length that patient is high fall risk and may ultimately need higher level of assistance. Admit, needs PT assessment, pain control and limit narcotics. Monitor anemia/thrombocytopenia.

## 2018-12-29 NOTE — PHYSICAL THERAPY INITIAL EVALUATION ADULT - PERTINENT HX OF CURRENT PROBLEM, REHAB EVAL
Patient s/p fall out of bed at assisted living, (+) fractures to left superior pubic ramus and right inferior pubic ramus.

## 2018-12-30 LAB
ANION GAP SERPL CALC-SCNC: 12 MMOL/L — SIGNIFICANT CHANGE UP (ref 5–17)
ANISOCYTOSIS BLD QL: SIGNIFICANT CHANGE UP
ANISOCYTOSIS BLD QL: SIGNIFICANT CHANGE UP
APPEARANCE UR: CLEAR — SIGNIFICANT CHANGE UP
BACTERIA # UR AUTO: ABNORMAL
BASE EXCESS BLDA CALC-SCNC: -0.1 MMOL/L — SIGNIFICANT CHANGE UP (ref -2–2)
BASOPHILS # BLD AUTO: 0 K/UL — SIGNIFICANT CHANGE UP (ref 0–0.2)
BILIRUB UR-MCNC: NEGATIVE — SIGNIFICANT CHANGE UP
BLASTS # FLD: 6 % — HIGH (ref 0–0)
BLD GP AB SCN SERPL QL: SIGNIFICANT CHANGE UP
BLD GP AB SCN SERPL QL: SIGNIFICANT CHANGE UP
BLOOD GAS COMMENTS ARTERIAL: SIGNIFICANT CHANGE UP
BUN SERPL-MCNC: 29 MG/DL — HIGH (ref 8–20)
CALCIUM SERPL-MCNC: 8.8 MG/DL — SIGNIFICANT CHANGE UP (ref 8.6–10.2)
CHLORIDE SERPL-SCNC: 98 MMOL/L — SIGNIFICANT CHANGE UP (ref 98–107)
CO2 SERPL-SCNC: 23 MMOL/L — SIGNIFICANT CHANGE UP (ref 22–29)
COLOR SPEC: YELLOW — SIGNIFICANT CHANGE UP
CREAT SERPL-MCNC: 0.65 MG/DL — SIGNIFICANT CHANGE UP (ref 0.5–1.3)
DACRYOCYTES BLD QL SMEAR: SLIGHT — SIGNIFICANT CHANGE UP
DIFF PNL FLD: ABNORMAL
DIR ANTIGLOB POLYSPECIFIC INTERPRETATION: SIGNIFICANT CHANGE UP
DIR ANTIGLOB POLYSPECIFIC INTERPRETATION: SIGNIFICANT CHANGE UP
EOSINOPHIL # BLD AUTO: 0 K/UL — SIGNIFICANT CHANGE UP (ref 0–0.5)
EPI CELLS # UR: SIGNIFICANT CHANGE UP
GAS PNL BLDA: SIGNIFICANT CHANGE UP
GLUCOSE SERPL-MCNC: 153 MG/DL — HIGH (ref 70–115)
GLUCOSE UR QL: NEGATIVE MG/DL — SIGNIFICANT CHANGE UP
HCO3 BLDA-SCNC: 24 MMOL/L — SIGNIFICANT CHANGE UP (ref 20–26)
HCT VFR BLD CALC: 19.1 % — CRITICAL LOW (ref 37–47)
HGB BLD-MCNC: 6.2 G/DL — CRITICAL LOW (ref 12–16)
HOROWITZ INDEX BLDA+IHG-RTO: SIGNIFICANT CHANGE UP
HYPOCHROMIA BLD QL: SIGNIFICANT CHANGE UP
HYPOCHROMIA BLD QL: SLIGHT — SIGNIFICANT CHANGE UP
KETONES UR-MCNC: NEGATIVE — SIGNIFICANT CHANGE UP
LEUKOCYTE ESTERASE UR-ACNC: ABNORMAL
LYMPHOCYTES # BLD AUTO: 12 % — LOW (ref 20–55)
LYMPHOCYTES # BLD AUTO: 2.8 K/UL — SIGNIFICANT CHANGE UP (ref 1–4.8)
LYMPHOCYTES # BLD AUTO: 33 % — SIGNIFICANT CHANGE UP (ref 20–55)
MACROCYTES BLD QL: SLIGHT — SIGNIFICANT CHANGE UP
MAGNESIUM SERPL-MCNC: 1.9 MG/DL — SIGNIFICANT CHANGE UP (ref 1.6–2.6)
MCHC RBC-ENTMCNC: 32.5 G/DL — SIGNIFICANT CHANGE UP (ref 32–36)
MCHC RBC-ENTMCNC: 34.1 PG — HIGH (ref 27–31)
MCV RBC AUTO: 104.9 FL — HIGH (ref 81–99)
METAMYELOCYTES # FLD: 2 % — HIGH (ref 0–0)
MONOCYTES # BLD AUTO: 11.6 K/UL — HIGH (ref 0–0.8)
MONOCYTES NFR BLD AUTO: 41 % — HIGH (ref 3–10)
MONOCYTES NFR BLD AUTO: 57 % — HIGH (ref 3–10)
MYELOCYTES NFR BLD: 4 % — HIGH (ref 0–0)
NEUTROPHILS # BLD AUTO: 1.2 K/UL — LOW (ref 1.8–8)
NEUTROPHILS NFR BLD AUTO: 11 % — LOW (ref 37–73)
NEUTROPHILS NFR BLD AUTO: 20 % — LOW (ref 37–73)
NEUTS BAND # BLD: 7 % — SIGNIFICANT CHANGE UP (ref 0–8)
NITRITE UR-MCNC: POSITIVE
NRBC # BLD: 3 /100 — HIGH (ref 0–0)
OVALOCYTES BLD QL SMEAR: SLIGHT — SIGNIFICANT CHANGE UP
PCO2 BLDA: 30 MMHG — LOW (ref 35–45)
PH BLDA: 7.49 — HIGH (ref 7.35–7.45)
PH UR: 6 — SIGNIFICANT CHANGE UP (ref 5–8)
PHOSPHATE SERPL-MCNC: 3.2 MG/DL — SIGNIFICANT CHANGE UP (ref 2.4–4.7)
PLAT MORPH BLD: NORMAL — SIGNIFICANT CHANGE UP
PLAT MORPH BLD: NORMAL — SIGNIFICANT CHANGE UP
PLATELET # BLD AUTO: 74 K/UL — LOW (ref 150–400)
PLATELET # BLD AUTO: 75 K/UL — LOW (ref 150–400)
PO2 BLDA: 66 MMHG — LOW (ref 83–108)
POIKILOCYTOSIS BLD QL AUTO: SLIGHT — SIGNIFICANT CHANGE UP
POLYCHROMASIA BLD QL SMEAR: SLIGHT — SIGNIFICANT CHANGE UP
POLYCHROMASIA BLD QL SMEAR: SLIGHT — SIGNIFICANT CHANGE UP
POTASSIUM SERPL-MCNC: 4 MMOL/L — SIGNIFICANT CHANGE UP (ref 3.5–5.3)
POTASSIUM SERPL-SCNC: 4 MMOL/L — SIGNIFICANT CHANGE UP (ref 3.5–5.3)
PROMYELOCYTES # FLD: 1 % — HIGH (ref 0–0)
PROT UR-MCNC: 30 MG/DL
RBC # BLD: 1.82 M/UL — LOW (ref 4.4–5.2)
RBC # FLD: 23.4 % — HIGH (ref 11–15.6)
RBC BLD AUTO: ABNORMAL
RBC BLD AUTO: ABNORMAL
RBC CASTS # UR COMP ASSIST: ABNORMAL /HPF (ref 0–4)
SAO2 % BLDA: 95 % — SIGNIFICANT CHANGE UP (ref 95–99)
SODIUM SERPL-SCNC: 133 MMOL/L — LOW (ref 135–145)
SP GR SPEC: 1.01 — SIGNIFICANT CHANGE UP (ref 1.01–1.02)
STOMATOCYTES BLD QL SMEAR: PRESENT — SIGNIFICANT CHANGE UP
TYPE + AB SCN PNL BLD: SIGNIFICANT CHANGE UP
TYPE + AB SCN PNL BLD: SIGNIFICANT CHANGE UP
UROBILINOGEN FLD QL: NEGATIVE MG/DL — SIGNIFICANT CHANGE UP
VARIANT LYMPHS # BLD: 6 % — SIGNIFICANT CHANGE UP (ref 0–6)
WBC # BLD: 16.1 K/UL — HIGH (ref 4.8–10.8)
WBC # FLD AUTO: 16.1 K/UL — HIGH (ref 4.8–10.8)
WBC UR QL: ABNORMAL

## 2018-12-30 PROCEDURE — 99232 SBSQ HOSP IP/OBS MODERATE 35: CPT

## 2018-12-30 PROCEDURE — 71045 X-RAY EXAM CHEST 1 VIEW: CPT | Mod: 26

## 2018-12-30 RX ORDER — DIPHENHYDRAMINE HCL 50 MG
25 CAPSULE ORAL ONCE
Qty: 0 | Refills: 0 | Status: COMPLETED | OUTPATIENT
Start: 2018-12-30 | End: 2018-12-30

## 2018-12-30 RX ORDER — ACETAMINOPHEN 500 MG
650 TABLET ORAL ONCE
Qty: 0 | Refills: 0 | Status: COMPLETED | OUTPATIENT
Start: 2018-12-30 | End: 2018-12-30

## 2018-12-30 RX ORDER — ACETAMINOPHEN 500 MG
1000 TABLET ORAL ONCE
Qty: 0 | Refills: 0 | Status: COMPLETED | OUTPATIENT
Start: 2018-12-30 | End: 2018-12-30

## 2018-12-30 RX ORDER — SODIUM CHLORIDE 9 MG/ML
1000 INJECTION INTRAMUSCULAR; INTRAVENOUS; SUBCUTANEOUS
Qty: 0 | Refills: 0 | Status: DISCONTINUED | OUTPATIENT
Start: 2018-12-30 | End: 2018-12-31

## 2018-12-30 RX ADMIN — Medication 650 MILLIGRAM(S): at 19:09

## 2018-12-30 RX ADMIN — ENOXAPARIN SODIUM 40 MILLIGRAM(S): 100 INJECTION SUBCUTANEOUS at 12:55

## 2018-12-30 RX ADMIN — Medication 650 MILLIGRAM(S): at 18:23

## 2018-12-30 RX ADMIN — Medication 100 MILLIGRAM(S): at 05:54

## 2018-12-30 RX ADMIN — CELECOXIB 200 MILLIGRAM(S): 200 CAPSULE ORAL at 12:59

## 2018-12-30 RX ADMIN — Medication 75 MICROGRAM(S): at 05:54

## 2018-12-30 RX ADMIN — Medication 2000 UNIT(S): at 12:56

## 2018-12-30 RX ADMIN — MIRTAZAPINE 30 MILLIGRAM(S): 45 TABLET, ORALLY DISINTEGRATING ORAL at 12:56

## 2018-12-30 RX ADMIN — Medication 25 MILLIGRAM(S): at 18:23

## 2018-12-30 RX ADMIN — SODIUM CHLORIDE 100 MILLILITER(S): 9 INJECTION INTRAMUSCULAR; INTRAVENOUS; SUBCUTANEOUS at 13:02

## 2018-12-30 RX ADMIN — CELECOXIB 200 MILLIGRAM(S): 200 CAPSULE ORAL at 13:50

## 2018-12-30 RX ADMIN — Medication 400 MILLIGRAM(S): at 10:01

## 2018-12-30 RX ADMIN — Medication 1000 MILLIGRAM(S): at 10:30

## 2018-12-30 RX ADMIN — PANTOPRAZOLE SODIUM 40 MILLIGRAM(S): 20 TABLET, DELAYED RELEASE ORAL at 05:54

## 2018-12-30 RX ADMIN — LISINOPRIL 5 MILLIGRAM(S): 2.5 TABLET ORAL at 05:54

## 2018-12-30 RX ADMIN — Medication 101 MILLIGRAM(S): at 09:46

## 2018-12-30 RX ADMIN — RUXOLITINIB 10 MILLIGRAM(S): 25 TABLET ORAL at 13:08

## 2018-12-30 RX ADMIN — CEFTRIAXONE 100 GRAM(S): 500 INJECTION, POWDER, FOR SOLUTION INTRAMUSCULAR; INTRAVENOUS at 09:10

## 2018-12-30 RX ADMIN — ESCITALOPRAM OXALATE 5 MILLIGRAM(S): 10 TABLET, FILM COATED ORAL at 12:56

## 2018-12-30 RX ADMIN — Medication 100 MILLIGRAM(S): at 13:06

## 2018-12-30 NOTE — PROVIDER CONTACT NOTE (OTHER) - ACTION/TREATMENT ORDERED:
Tylenol and benadryl was ordered. Will monitor.
Dr Beauchamp ordered to hold blood transfusion for now
keep NPO. Hold blood transfusion.
IV Tylenol was ordered

## 2018-12-30 NOTE — PROVIDER CONTACT NOTE (OTHER) - SITUATION
1 unit of PRBC given. Temp 101.1 post blood transfusion
Hematologist ordered 2 units of PBBC
Temp 101.2 rectally and had 6 beats of V Tach
patient wanted to eat.

## 2018-12-31 LAB
ANION GAP SERPL CALC-SCNC: 12 MMOL/L — SIGNIFICANT CHANGE UP (ref 5–17)
ANISOCYTOSIS BLD QL: SLIGHT — SIGNIFICANT CHANGE UP
BLASTS # FLD: 8 % — HIGH (ref 0–0)
BUN SERPL-MCNC: 23 MG/DL — HIGH (ref 8–20)
CALCIUM SERPL-MCNC: 8.1 MG/DL — LOW (ref 8.6–10.2)
CHLORIDE SERPL-SCNC: 104 MMOL/L — SIGNIFICANT CHANGE UP (ref 98–107)
CO2 SERPL-SCNC: 22 MMOL/L — SIGNIFICANT CHANGE UP (ref 22–29)
CREAT SERPL-MCNC: 0.57 MG/DL — SIGNIFICANT CHANGE UP (ref 0.5–1.3)
ELLIPTOCYTES BLD QL SMEAR: SLIGHT — SIGNIFICANT CHANGE UP
GLUCOSE SERPL-MCNC: 143 MG/DL — HIGH (ref 70–115)
HCT VFR BLD CALC: 26.5 % — LOW (ref 37–47)
HGB BLD-MCNC: 8.9 G/DL — LOW (ref 12–16)
HYPOCHROMIA BLD QL: SLIGHT — SIGNIFICANT CHANGE UP
LYMPHOCYTES # BLD AUTO: 2.6 K/UL — SIGNIFICANT CHANGE UP (ref 1–4.8)
LYMPHOCYTES # BLD AUTO: 23 % — SIGNIFICANT CHANGE UP (ref 20–55)
MACROCYTES BLD QL: SLIGHT — SIGNIFICANT CHANGE UP
MAGNESIUM SERPL-MCNC: 1.9 MG/DL — SIGNIFICANT CHANGE UP (ref 1.6–2.6)
MCHC RBC-ENTMCNC: 31.6 PG — HIGH (ref 27–31)
MCHC RBC-ENTMCNC: 33.6 G/DL — SIGNIFICANT CHANGE UP (ref 32–36)
MCV RBC AUTO: 94 FL — SIGNIFICANT CHANGE UP (ref 81–99)
MICROCYTES BLD QL: SLIGHT — SIGNIFICANT CHANGE UP
MONOCYTES # BLD AUTO: 13.4 K/UL — HIGH (ref 0–0.8)
MONOCYTES NFR BLD AUTO: 49 % — HIGH (ref 3–10)
MYELOCYTES NFR BLD: 2 % — HIGH (ref 0–0)
NEUTROPHILS # BLD AUTO: 1 K/UL — LOW (ref 1.8–8)
NEUTROPHILS NFR BLD AUTO: 14 % — LOW (ref 37–73)
NEUTS BAND # BLD: 3 % — SIGNIFICANT CHANGE UP (ref 0–8)
NRBC # BLD: 1 /100 — HIGH (ref 0–0)
OVALOCYTES BLD QL SMEAR: SLIGHT — SIGNIFICANT CHANGE UP
PHOSPHATE SERPL-MCNC: 2.3 MG/DL — LOW (ref 2.4–4.7)
PLAT MORPH BLD: NORMAL — SIGNIFICANT CHANGE UP
PLATELET # BLD AUTO: 57 K/UL — LOW (ref 150–400)
POIKILOCYTOSIS BLD QL AUTO: SLIGHT — SIGNIFICANT CHANGE UP
POLYCHROMASIA BLD QL SMEAR: SLIGHT — SIGNIFICANT CHANGE UP
POTASSIUM SERPL-MCNC: 4 MMOL/L — SIGNIFICANT CHANGE UP (ref 3.5–5.3)
POTASSIUM SERPL-SCNC: 4 MMOL/L — SIGNIFICANT CHANGE UP (ref 3.5–5.3)
RBC # BLD: 2.82 M/UL — LOW (ref 4.4–5.2)
RBC # FLD: 27.5 % — HIGH (ref 11–15.6)
RBC BLD AUTO: ABNORMAL
SCHISTOCYTES BLD QL AUTO: SLIGHT — SIGNIFICANT CHANGE UP
SODIUM SERPL-SCNC: 138 MMOL/L — SIGNIFICANT CHANGE UP (ref 135–145)
VARIANT LYMPHS # BLD: 1 % — SIGNIFICANT CHANGE UP (ref 0–6)
WBC # BLD: 17.7 K/UL — HIGH (ref 4.8–10.8)
WBC # FLD AUTO: 17.7 K/UL — HIGH (ref 4.8–10.8)

## 2018-12-31 PROCEDURE — 99232 SBSQ HOSP IP/OBS MODERATE 35: CPT

## 2018-12-31 PROCEDURE — 99222 1ST HOSP IP/OBS MODERATE 55: CPT

## 2018-12-31 PROCEDURE — 93970 EXTREMITY STUDY: CPT | Mod: 26

## 2018-12-31 RX ORDER — LIDOCAINE 4 G/100G
2 CREAM TOPICAL EVERY 24 HOURS
Qty: 0 | Refills: 0 | Status: DISCONTINUED | OUTPATIENT
Start: 2018-12-31 | End: 2019-01-02

## 2018-12-31 RX ORDER — POTASSIUM PHOSPHATE, MONOBASIC POTASSIUM PHOSPHATE, DIBASIC 236; 224 MG/ML; MG/ML
15 INJECTION, SOLUTION INTRAVENOUS ONCE
Qty: 0 | Refills: 0 | Status: COMPLETED | OUTPATIENT
Start: 2018-12-31 | End: 2018-12-31

## 2018-12-31 RX ADMIN — RUXOLITINIB 10 MILLIGRAM(S): 25 TABLET ORAL at 12:17

## 2018-12-31 RX ADMIN — CELECOXIB 200 MILLIGRAM(S): 200 CAPSULE ORAL at 13:10

## 2018-12-31 RX ADMIN — PANTOPRAZOLE SODIUM 40 MILLIGRAM(S): 20 TABLET, DELAYED RELEASE ORAL at 06:11

## 2018-12-31 RX ADMIN — Medication 100 MILLIGRAM(S): at 22:15

## 2018-12-31 RX ADMIN — Medication 75 MICROGRAM(S): at 06:11

## 2018-12-31 RX ADMIN — CELECOXIB 200 MILLIGRAM(S): 200 CAPSULE ORAL at 12:20

## 2018-12-31 RX ADMIN — CEFTRIAXONE 100 GRAM(S): 500 INJECTION, POWDER, FOR SOLUTION INTRAMUSCULAR; INTRAVENOUS at 09:11

## 2018-12-31 RX ADMIN — SODIUM CHLORIDE 100 MILLILITER(S): 9 INJECTION INTRAMUSCULAR; INTRAVENOUS; SUBCUTANEOUS at 06:12

## 2018-12-31 RX ADMIN — MIRTAZAPINE 30 MILLIGRAM(S): 45 TABLET, ORALLY DISINTEGRATING ORAL at 12:16

## 2018-12-31 RX ADMIN — Medication 2000 UNIT(S): at 12:16

## 2018-12-31 RX ADMIN — LIDOCAINE 2 PATCH: 4 CREAM TOPICAL at 12:15

## 2018-12-31 RX ADMIN — LIDOCAINE 2 PATCH: 4 CREAM TOPICAL at 18:12

## 2018-12-31 RX ADMIN — POTASSIUM PHOSPHATE, MONOBASIC POTASSIUM PHOSPHATE, DIBASIC 62.5 MILLIMOLE(S): 236; 224 INJECTION, SOLUTION INTRAVENOUS at 13:10

## 2018-12-31 RX ADMIN — Medication 100 MILLIGRAM(S): at 12:16

## 2018-12-31 RX ADMIN — ENOXAPARIN SODIUM 40 MILLIGRAM(S): 100 INJECTION SUBCUTANEOUS at 13:10

## 2018-12-31 RX ADMIN — Medication 100 MILLIGRAM(S): at 06:11

## 2018-12-31 RX ADMIN — ESCITALOPRAM OXALATE 5 MILLIGRAM(S): 10 TABLET, FILM COATED ORAL at 12:16

## 2018-12-31 RX ADMIN — LISINOPRIL 5 MILLIGRAM(S): 2.5 TABLET ORAL at 06:11

## 2018-12-31 NOTE — CONSULT NOTE ADULT - SUBJECTIVE AND OBJECTIVE BOX
88yF was admitted on  after an unwitnessed fall at assisted living ([facility]). ?LOC but was unable to walk thereafter. In ED, GCS=15 with no specific complaints.     Imaging showed:  HEAD CT - No acute findings  PELVIS  CT - There is a comminuted LEFT is superior pubic ramus fracture and fracture of the RIGHT inferior pubic ramus .  Remaining osseous pelvis and hips intact. There is an adjacent hematoma compressing the bladder towards LEFT . Active bleeding cannot be assessed without contrast. . Aside from hematoma visualize the pelvic viscera unremarkable.  C SPINE CT - DJD    No surgical intervention was recommended. She is WBAT.    Patient reports no pain while sitting, but when performing exam, unable to lift the left LE due to pain.    REVIEW OF SYSTEMS  Constitutional - No fever, No weight loss, +fatigue  HEENT - No eye pain, No visual disturbances, No difficulty hearing, No tinnitus, No vertigo, No neck pain  Respiratory - No cough, No wheezing, No shortness of breath  Cardiovascular - No chest pain, No palpitations  Gastrointestinal - No abdominal pain, No nausea, No vomiting, No diarrhea, No constipation  Genitourinary - No dysuria, No frequency, No hematuria, No incontinence  Neurological - No headaches, No memory loss, +loss of strength, No numbness, No tremors  Skin - No itching, No rashes, +lesions   Endocrine - No temperature intolerance  Musculoskeletal - +joint pain, No joint swelling, +muscle pain  Psychiatric - +depression, No anxiety    VITALS  T(C): 37.7 (18 @ 05:09), Max: 38.4 (18 @ 18:23)  HR: 101 (18 @ 05:09) (94 - 104)  BP: 139/67 (18 @ 05:09) (106/63 - 139/67)  RR: 20 (18 @ 05:09) (18 - 20)  SpO2: 96% (18 @ 05:09) (94% - 96%)  Wt(kg): --    PAST MEDICAL & SURGICAL HISTORY  Myelofibrosis  Thrombocytosis  Hypothyroidism (acquired)  Myelodysplastic syndrome  No significant past surgical history  No significant past surgical history      SOCIAL HISTORY  Smoking - Denied  EtOH - Denied   Drugs - Denied    FUNCTIONAL HISTORY  Lives at [facility]  Independent    CURRENT FUNCTIONAL STATUS    Bed Mobility: Rolling/Turning:     · Level of [place]	Not assessed due to patient apprehensive due to hip pain	    Bed Mobility: Sit to Supine:     · Level of [place]	dependent (less than 25% patients effort)	    Bed Mobility: Supine to Sit:     · Level of [place]	dependent (less than 25% patients effort); unable to scoot to edge due to pain while sitting	    Bed Mobility Analysis:     · Bed Mobility Limitations	decreased ability to use legs for bridging/pushing	  · Impairments Contributing to Impaired Bed Mobility	decreased strength; pain; impaired postural control; decreased flexibility	    Transfer: Bed to Chair:     Transfer Skill: Bed to Chair   · Level of [place]	TBA	    Transfer: Chair to Bed:     · Level of [place]	TBA	    Transfer: Sit to Stand:     · Level of [place]	TBA	    Transfer: Stand to Sit:     · Level of [place]	TBA	    Gait Skills:     · Level of [place]	TBA	      FAMILY HISTORY   No pertinent family history in first degree relatives      RECENT LABS/IMAGING  CBC Full  -  ( 31 Dec 2018 06:48 )  WBC Count : 17.7 K/uL  Hemoglobin : 8.9 g/dL  Hematocrit : 26.5 %  Platelet Count - Automated : x  Mean Cell Volume : 94.0 fl  Mean Cell Hemoglobin : 31.6 pg  Mean Cell Hemoglobin Concentration : 33.6 g/dL  Auto Neutrophil # : x  Auto Lymphocyte # : x  Auto Monocyte # : x  Auto Eosinophil # : x  Auto Basophil # : x  Auto Neutrophil % : x  Auto Lymphocyte % : x  Auto Monocyte % : x  Auto Eosinophil % : x  Auto Basophil % : x        138  |  104  |  23.0<H>  ----------------------------<  143<H>  4.0   |  22.0  |  0.57    Ca    8.1<L>      31 Dec 2018 06:48  Phos  2.3       Mg     1.9           Urinalysis Basic - ( 30 Dec 2018 09:06 )    Color: Yellow / Appearance: Clear / S.015 / pH: x  Gluc: x / Ketone: Negative  / Bili: Negative / Urobili: Negative mg/dL   Blood: x / Protein: 30 mg/dL / Nitrite: Positive   Leuk Esterase: Moderate / RBC: 3-5 /HPF / WBC 11-25   Sq Epi: x / Non Sq Epi: Occasional / Bacteria: Few        ALLERGIES  No Known Allergies      MEDICATIONS   acetaminophen   Tablet .. 650 milliGRAM(s) Oral every 6 hours PRN  cefTRIAXone   IVPB 1 Gram(s) IV Intermittent every 24 hours  celecoxib 200 milliGRAM(s) Oral daily  cholecalciferol 2000 Unit(s) Oral daily  docusate sodium 100 milliGRAM(s) Oral three times a day  enoxaparin Injectable 40 milliGRAM(s) SubCutaneous daily  escitalopram 5 milliGRAM(s) Oral daily  levothyroxine 75 MICROGram(s) Oral daily  lisinopril 5 milliGRAM(s) Oral daily  mirtazapine 30 milliGRAM(s) Oral daily  ondansetron Injectable 4 milliGRAM(s) IV Push every 6 hours PRN  pantoprazole   Suspension 40 milliGRAM(s) Oral before breakfast  ruxolitinib 10 milliGRAM(s) Oral daily  sodium chloride 0.9%. 1000 milliLiter(s) IV Continuous <Continuous>      ----------------------------------------------------------------------------------------  PHYSICAL EXAM  Constitutional - NAD, Comfortable  HEENT - NCAT, EOMI  Neck - Supple, No limited ROM  Chest - Breathing comfortably, No wheezing  Cardiovascular - S1S2   Abdomen - Soft   Extremities - Left knee abrasions - healed  Neurologic Exam -                    Cognitive - Awake, Alert, AAO to self, place, date, year, situation     Communication - Fluent, No dysarthria     Cranial Nerves - CN 2-12 intact     Motor -                      LEFT    UE - ShAB 3/5, EF 4/5, EE 4/5,  5/5                    RIGHT UE - ShAB 3/5, EF 4/5, EE 4/5,  5/5                    LEFT    LE - HF 1/5, KE 3/5, DF 5/5, PF 5/5                    RIGHT LE - HF 2/5, KE 1/5, DF 5/5, PF 5/5        Sensory - Intact to LT  Psychiatric - Mood depressed, Affect Flat  ----------------------------------------------------------------------------------------  ASSESSMENT/PLAN  88yFemale with functional deficits after a fall sustaining a pelvic fracture  Pain - Tylenol, Celebrex, Lidoderms added, *Recommend adding addition pain medications, pain is not controlled and was dependent on function status assessment*  DVT PPX - SCDs, Lovenox  Rehab -  Recommend [facility], patient DOES NOT meet acute inpatient rehabilitation criteria  Will sign off, please reconsult if needed for rehab dispo recommendations.
Asked by the ED attending to evaluate an 88 year old Female who fell at home earlier this evening. Brought in by EMS and now complaining of Left Groin pain with the difficulty of ambulating.  Normally she ambulate with a walker.  However, since the fall from bed she was unable to bear weight on the Left Lower Extremity.  CT scan of patient's pelvis showed patient to have left pubic Rami Fracture.  Patient will be admitted for Physical Therapy evaluation.      PMHx:  Hypothyroidism (acquired)    Myelodysplastic syndrome    Myelofibrosis    Thrombocytosis  ANNMARIE 2 thrombocytosis    PSHx:  Denies    Allergies:  NKDA    Review of Systems:  Muscular Skeletal: Left Groin Pain following fall  Remaining systems were reviewed and found to be negative from history obtained    Physical:  Vital Signs Last 24 Hrs  T(C): 37.2 (28 Dec 2018 19:25), Max: 37.2 (28 Dec 2018 19:25)  T(F): 99 (28 Dec 2018 19:25), Max: 99 (28 Dec 2018 19:25)  HR: 96 (28 Dec 2018 19:25) (95 - 96)  BP: 136/81 (28 Dec 2018 19:25) (136/81 - 137/79)  BP(mean): --  RR: 18 (28 Dec 2018 19:25) (16 - 18)  SpO2: 100% (28 Dec 2018 19:25) (89% - 100%)    Left Lower Extremity:  + ROM of toes, + Dorsal/Plantar Flexion of foot  + sensation  calf soft, non-tender  no signs of skin breakdown or ecchymosis  2+ Pedal Pulse  Deferred ROM of hip secondary to fracture    CT: Pelvis  EXAM:  CT PELVIS BONY ONLY                         [facility]  [facility]                          PROCEDURE DATE:  12/28/2018          INTERPRETATION:  Non contrast CT pelvis     COMPARISON: None.    CLINICAL HISTORY: LEFT hip pain surrounding fall. Assess fracture..    Technique: contiguous axial images were obtained with 5.0 mm slice   thickness without intravenous contrast administration.  Coronal and sagittal reformats were also submitted for interpretation.   3-D imaging was created and interpreted.      FINDINGS:   There is a comminuted LEFT is superior pubic ramus fracture and fracture   of the RIGHT inferior pubic ramus .  Remaining osseous pelvis and hips intact.   There is an adjacent hematoma compressing the bladder towards LEFT .  Active bleeding cannot be assessed without contrast.  . Aside from hematoma visualize the pelvic viscera unremarkable.  IMPRESSION:     Comminuted fractures of the LEFT superior-inferior pubic ramus as   described.                PARISA BLACK M.D., ATTENDING RADIOLOGIST  This document has been electronically signed. Dec 28 2018  9:23PM      A/P: Left Pubic Rami Fracture  - OOB, PT, WBAT with Walker  - Pain medication as needed  - DVT prophylaxis per medical team  - Discussed plan with patient and patient's family  - Continue care per medical/ACS team
REASON FOR CONSULTATION    HPI:  87yo female presents to hospital after unwitnessed fall at nursing facility. Per family members, patient was found on floor by nursing aids. Pt is unsure if she fell out of bed or fell while walking. Unsure about LOC. During day, pt reported difficulty walking (walks with walker). Pt was convinced to be evaluated in ED here left pubic rami fx were found. At time of exam patient offers no complaints. Denies pain to pelvis, chest pain, headache, nausea and vomiting.    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 Treated with hydroxyurea until 7/17 at which point cytopenias developed, and a bone marrow bx revealed myelofibrosis.  Began jakafi (ruxolitinib) 10 mg daily  at [facility] in 9/17, and has done quite well, still anemic and requiring periodic transfusion, but comfortable unless hgb. falls below 6 grams.      Primary Survey:  A-Airway Intact  B-breath sounds heard bilaterally  C-Central and peripheral pulses 2+, bilaterally  D-GCS 15  E-No stepoffs or bony deformities    A-denies  M-See MAR  P-polymyalgia rheumatica, myleofibrosis, hypothyroidism  L-defer  E-Unsure how trauma occurred (29 Dec 2018 02:52)      REVIEW OF SYSTEMS:    CONSTITUTIONAL: No fever. Chronic fatigue  RESPIRATORY: No cough, wheezing, chills or hemoptysis; No shortness of breath  CARDIOVASCULAR: No chest pain, palpitations, dizziness, or leg swelling  GASTROINTESTINAL: No abdominal or epigastric pain. No nausea, vomiting, or hematemesis; No diarrhea or constipation. No melena or hematochezia.  GENITOURINARY: No dysuria, frequency, hematuria, or incontinence  NEUROLOGICAL: No headaches, memory loss, loss of strength, numbness, or tremors  SKIN: No itching, burning, rashes, or lesions   LYMPH NODES: No enlarged glands  ENDOCRINE: No heat or cold intolerance; No hair loss  MUSCULOSKELETAL: New left hip fracture following fall from bed  PSYCHIATRIC: No depression, anxiety, mood swings, or difficulty sleeping  HEME/LYMPH: No easy bruising, or bleeding gums      PAST MEDICAL & SURGICAL HISTORY:  Myelofibrosis  Thrombocytosis: ANNMARIE 2 thrombocytosis  Hypothyroidism (acquired)  Myelodysplastic syndrome  No significant past surgical history      SOCIAL HISTORY:    FAMILY HISTORY:  No pertinent family history in first degree relatives      SOCIAL HISTORY:    Allergies    No Known Allergies    Intolerances        MEDICATIONS  (STANDING):  cefTRIAXone   IVPB 1 Gram(s) IV Intermittent every 24 hours  celecoxib 200 milliGRAM(s) Oral daily  cholecalciferol 2000 Unit(s) Oral daily  docusate sodium 100 milliGRAM(s) Oral three times a day  enoxaparin Injectable 40 milliGRAM(s) SubCutaneous daily  escitalopram 5 milliGRAM(s) Oral daily  lactated ringers. 1000 milliLiter(s) (100 mL/Hr) IV Continuous <Continuous>  levothyroxine 75 MICROGram(s) Oral daily  lisinopril 5 milliGRAM(s) Oral daily  mirtazapine 30 milliGRAM(s) Oral daily  pantoprazole   Suspension 40 milliGRAM(s) Oral before breakfast  ruxolitinib 10 milliGRAM(s) Oral daily    MEDICATIONS  (PRN):  acetaminophen   Tablet .. 650 milliGRAM(s) Oral every 6 hours PRN Mild Pain (1 - 3)  ondansetron Injectable 4 milliGRAM(s) IV Push every 6 hours PRN Nausea      Vital Signs Last 24 Hrs  T(C): 37.1 (29 Dec 2018 07:50), Max: 37.2 (28 Dec 2018 19:25)  T(F): 98.7 (29 Dec 2018 07:50), Max: 99 (28 Dec 2018 19:25)  HR: 98 (29 Dec 2018 07:50) (93 - 98)  BP: 101/60 (29 Dec 2018 07:50) (97/59 - 137/79)  BP(mean): --  RR: 18 (29 Dec 2018 07:50) (16 - 18)  SpO2: 92% (29 Dec 2018 07:50) (88% - 100%)    PHYSICAL EXAM:    GENERAL: NAD, well-groomed, well-developed. Elderly, alert. pale.  HEAD:  Atraumatic, Normocephalic  EYES: EOMI, PERRLA, conjunctiva and sclera clear  NECK: Supple, No JVD, Normal thyroid  NERVOUS SYSTEM:  Alert & Oriented X3, Good concentration; Motor Strength 5/5 B/L upper and lower extremities; DTRs 2+ intact and symmetric  CHEST/LUNG: Clear to percussion bilaterally; No rales, rhonchi, wheezing, or rubs  HEART: Regular rate and rhythm; No murmurs, rubs, or gallops  ABDOMEN: Soft, Nontender, Nondistended; Bowel sounds present  EXTREMITIES:  2+ Peripheral Pulses, No clubbing, cyanosis, or edema  LYMPH: No lymphadenopathy noted  SKIN: No rashes or lesions  MS: Bedrest following left hip fracture      LABS:                        6.0    11.0  )-----------( 86       ( 28 Dec 2018 23:50 )             18.3     12-28    134<L>  |  102  |  27.0<H>  ----------------------------<  130<H>  4.2   |  22.0  |  0.76    Ca    9.0      28 Dec 2018 23:50    TPro  7.5  /  Alb  3.5  /  TBili  1.0  /  DBili  x   /  AST  23  /  ALT  14  /  AlkPhos  56  12-28

## 2019-01-01 LAB
ANION GAP SERPL CALC-SCNC: 10 MMOL/L — SIGNIFICANT CHANGE UP (ref 5–17)
BUN SERPL-MCNC: 22 MG/DL — HIGH (ref 8–20)
CALCIUM SERPL-MCNC: 7.9 MG/DL — LOW (ref 8.6–10.2)
CHLORIDE SERPL-SCNC: 103 MMOL/L — SIGNIFICANT CHANGE UP (ref 98–107)
CO2 SERPL-SCNC: 22 MMOL/L — SIGNIFICANT CHANGE UP (ref 22–29)
CREAT SERPL-MCNC: 0.51 MG/DL — SIGNIFICANT CHANGE UP (ref 0.5–1.3)
GLUCOSE SERPL-MCNC: 122 MG/DL — HIGH (ref 70–115)
MAGNESIUM SERPL-MCNC: 2 MG/DL — SIGNIFICANT CHANGE UP (ref 1.8–2.6)
PHOSPHATE SERPL-MCNC: 2.3 MG/DL — LOW (ref 2.4–4.7)
POTASSIUM SERPL-MCNC: 3.8 MMOL/L — SIGNIFICANT CHANGE UP (ref 3.5–5.3)
POTASSIUM SERPL-SCNC: 3.8 MMOL/L — SIGNIFICANT CHANGE UP (ref 3.5–5.3)
SODIUM SERPL-SCNC: 135 MMOL/L — SIGNIFICANT CHANGE UP (ref 135–145)

## 2019-01-01 PROCEDURE — 99233 SBSQ HOSP IP/OBS HIGH 50: CPT

## 2019-01-01 RX ORDER — POTASSIUM PHOSPHATE, MONOBASIC POTASSIUM PHOSPHATE, DIBASIC 236; 224 MG/ML; MG/ML
15 INJECTION, SOLUTION INTRAVENOUS ONCE
Qty: 0 | Refills: 0 | Status: COMPLETED | OUTPATIENT
Start: 2019-01-01 | End: 2019-01-01

## 2019-01-01 RX ADMIN — MIRTAZAPINE 30 MILLIGRAM(S): 45 TABLET, ORALLY DISINTEGRATING ORAL at 12:14

## 2019-01-01 RX ADMIN — CELECOXIB 200 MILLIGRAM(S): 200 CAPSULE ORAL at 12:40

## 2019-01-01 RX ADMIN — Medication 100 MILLIGRAM(S): at 14:04

## 2019-01-01 RX ADMIN — ESCITALOPRAM OXALATE 5 MILLIGRAM(S): 10 TABLET, FILM COATED ORAL at 12:15

## 2019-01-01 RX ADMIN — Medication 650 MILLIGRAM(S): at 09:18

## 2019-01-01 RX ADMIN — POTASSIUM PHOSPHATE, MONOBASIC POTASSIUM PHOSPHATE, DIBASIC 62.5 MILLIMOLE(S): 236; 224 INJECTION, SOLUTION INTRAVENOUS at 14:04

## 2019-01-01 RX ADMIN — Medication 650 MILLIGRAM(S): at 09:33

## 2019-01-01 RX ADMIN — Medication 100 MILLIGRAM(S): at 05:13

## 2019-01-01 RX ADMIN — RUXOLITINIB 10 MILLIGRAM(S): 25 TABLET ORAL at 12:16

## 2019-01-01 RX ADMIN — Medication 2000 UNIT(S): at 12:14

## 2019-01-01 RX ADMIN — PANTOPRAZOLE SODIUM 40 MILLIGRAM(S): 20 TABLET, DELAYED RELEASE ORAL at 05:13

## 2019-01-01 RX ADMIN — ENOXAPARIN SODIUM 40 MILLIGRAM(S): 100 INJECTION SUBCUTANEOUS at 12:16

## 2019-01-01 RX ADMIN — LIDOCAINE 2 PATCH: 4 CREAM TOPICAL at 23:50

## 2019-01-01 RX ADMIN — LISINOPRIL 5 MILLIGRAM(S): 2.5 TABLET ORAL at 05:13

## 2019-01-01 RX ADMIN — Medication 100 MILLIGRAM(S): at 21:53

## 2019-01-01 RX ADMIN — CELECOXIB 200 MILLIGRAM(S): 200 CAPSULE ORAL at 12:15

## 2019-01-01 RX ADMIN — LIDOCAINE 2 PATCH: 4 CREAM TOPICAL at 01:18

## 2019-01-01 RX ADMIN — Medication 650 MILLIGRAM(S): at 23:54

## 2019-01-01 RX ADMIN — Medication 75 MICROGRAM(S): at 05:13

## 2019-01-01 RX ADMIN — LIDOCAINE 2 PATCH: 4 CREAM TOPICAL at 12:16

## 2019-01-01 RX ADMIN — LIDOCAINE 2 PATCH: 4 CREAM TOPICAL at 21:54

## 2019-01-02 ENCOUNTER — TRANSCRIPTION ENCOUNTER (OUTPATIENT)
Age: 84
End: 2019-01-02

## 2019-01-02 VITALS
DIASTOLIC BLOOD PRESSURE: 58 MMHG | OXYGEN SATURATION: 96 % | TEMPERATURE: 98 F | SYSTOLIC BLOOD PRESSURE: 112 MMHG | RESPIRATION RATE: 18 BRPM | HEART RATE: 94 BPM

## 2019-01-02 DIAGNOSIS — S32.9XXA FRACTURE OF UNSPECIFIED PARTS OF LUMBOSACRAL SPINE AND PELVIS, INITIAL ENCOUNTER FOR CLOSED FRACTURE: ICD-10-CM

## 2019-01-02 PROCEDURE — 93005 ELECTROCARDIOGRAM TRACING: CPT

## 2019-01-02 PROCEDURE — 72170 X-RAY EXAM OF PELVIS: CPT

## 2019-01-02 PROCEDURE — 83735 ASSAY OF MAGNESIUM: CPT

## 2019-01-02 PROCEDURE — 99285 EMERGENCY DEPT VISIT HI MDM: CPT

## 2019-01-02 PROCEDURE — 80048 BASIC METABOLIC PNL TOTAL CA: CPT

## 2019-01-02 PROCEDURE — 86901 BLOOD TYPING SEROLOGIC RH(D): CPT

## 2019-01-02 PROCEDURE — 97162 PT EVAL MOD COMPLEX 30 MIN: CPT

## 2019-01-02 PROCEDURE — 97110 THERAPEUTIC EXERCISES: CPT

## 2019-01-02 PROCEDURE — 84100 ASSAY OF PHOSPHORUS: CPT

## 2019-01-02 PROCEDURE — 82803 BLOOD GASES ANY COMBINATION: CPT

## 2019-01-02 PROCEDURE — 86922 COMPATIBILITY TEST ANTIGLOB: CPT

## 2019-01-02 PROCEDURE — 36415 COLL VENOUS BLD VENIPUNCTURE: CPT

## 2019-01-02 PROCEDURE — 36430 TRANSFUSION BLD/BLD COMPNT: CPT

## 2019-01-02 PROCEDURE — 86880 COOMBS TEST DIRECT: CPT

## 2019-01-02 PROCEDURE — 86900 BLOOD TYPING SEROLOGIC ABO: CPT

## 2019-01-02 PROCEDURE — P9016: CPT

## 2019-01-02 PROCEDURE — 97116 GAIT TRAINING THERAPY: CPT

## 2019-01-02 PROCEDURE — 93970 EXTREMITY STUDY: CPT

## 2019-01-02 PROCEDURE — 85027 COMPLETE CBC AUTOMATED: CPT

## 2019-01-02 PROCEDURE — 71250 CT THORAX DX C-: CPT

## 2019-01-02 PROCEDURE — 81001 URINALYSIS AUTO W/SCOPE: CPT

## 2019-01-02 PROCEDURE — 76377 3D RENDER W/INTRP POSTPROCES: CPT

## 2019-01-02 PROCEDURE — 80053 COMPREHEN METABOLIC PANEL: CPT

## 2019-01-02 PROCEDURE — 72192 CT PELVIS W/O DYE: CPT

## 2019-01-02 PROCEDURE — 70450 CT HEAD/BRAIN W/O DYE: CPT

## 2019-01-02 PROCEDURE — 71045 X-RAY EXAM CHEST 1 VIEW: CPT

## 2019-01-02 PROCEDURE — 86850 RBC ANTIBODY SCREEN: CPT

## 2019-01-02 PROCEDURE — 99232 SBSQ HOSP IP/OBS MODERATE 35: CPT

## 2019-01-02 PROCEDURE — 97530 THERAPEUTIC ACTIVITIES: CPT

## 2019-01-02 PROCEDURE — 72125 CT NECK SPINE W/O DYE: CPT

## 2019-01-02 RX ORDER — LISINOPRIL 2.5 MG/1
1 TABLET ORAL
Qty: 0 | Refills: 0 | DISCHARGE
Start: 2019-01-02

## 2019-01-02 RX ORDER — MIRTAZAPINE 45 MG/1
1 TABLET, ORALLY DISINTEGRATING ORAL
Qty: 0 | Refills: 0 | DISCHARGE
Start: 2019-01-02

## 2019-01-02 RX ORDER — CELECOXIB 200 MG/1
1 CAPSULE ORAL
Qty: 7 | Refills: 0 | OUTPATIENT
Start: 2019-01-02

## 2019-01-02 RX ORDER — DOCUSATE SODIUM 100 MG
1 CAPSULE ORAL
Qty: 0 | Refills: 0 | DISCHARGE
Start: 2019-01-02

## 2019-01-02 RX ORDER — LIDOCAINE 4 G/100G
1 CREAM TOPICAL
Qty: 0 | Refills: 0 | DISCHARGE
Start: 2019-01-02

## 2019-01-02 RX ADMIN — PANTOPRAZOLE SODIUM 40 MILLIGRAM(S): 20 TABLET, DELAYED RELEASE ORAL at 05:01

## 2019-01-02 RX ADMIN — Medication 650 MILLIGRAM(S): at 00:45

## 2019-01-02 RX ADMIN — Medication 100 MILLIGRAM(S): at 12:01

## 2019-01-02 RX ADMIN — RUXOLITINIB 10 MILLIGRAM(S): 25 TABLET ORAL at 11:58

## 2019-01-02 RX ADMIN — Medication 2000 UNIT(S): at 11:59

## 2019-01-02 RX ADMIN — Medication 650 MILLIGRAM(S): at 08:26

## 2019-01-02 RX ADMIN — Medication 650 MILLIGRAM(S): at 09:00

## 2019-01-02 RX ADMIN — MIRTAZAPINE 30 MILLIGRAM(S): 45 TABLET, ORALLY DISINTEGRATING ORAL at 11:58

## 2019-01-02 RX ADMIN — LISINOPRIL 5 MILLIGRAM(S): 2.5 TABLET ORAL at 05:02

## 2019-01-02 RX ADMIN — LIDOCAINE 2 PATCH: 4 CREAM TOPICAL at 11:58

## 2019-01-02 RX ADMIN — CELECOXIB 200 MILLIGRAM(S): 200 CAPSULE ORAL at 12:09

## 2019-01-02 RX ADMIN — CELECOXIB 200 MILLIGRAM(S): 200 CAPSULE ORAL at 11:59

## 2019-01-02 RX ADMIN — ENOXAPARIN SODIUM 40 MILLIGRAM(S): 100 INJECTION SUBCUTANEOUS at 12:00

## 2019-01-02 RX ADMIN — Medication 100 MILLIGRAM(S): at 05:00

## 2019-01-02 RX ADMIN — ESCITALOPRAM OXALATE 5 MILLIGRAM(S): 10 TABLET, FILM COATED ORAL at 11:58

## 2019-01-02 RX ADMIN — Medication 75 MICROGRAM(S): at 05:01

## 2019-01-02 NOTE — PROGRESS NOTE ADULT - PROBLEM SELECTOR PLAN 1
-WBAT with walker  -pain control  -continue jakafi  -no further CBC  -f/u SW for discharge to [facility]

## 2019-01-02 NOTE — DISCHARGE NOTE ADULT - HOSPITAL COURSE
87 yo female presents to hospital after unwitnessed fall at nursing facility. Per family members, patient was found on floor by nursing aids. Pt is unsure if she fell out of bed or fell while walking. Unsure about LOC. During day, pt reported difficulty walking (walks with walker). Pt was convinced to be evaluated in ED here left pubic rami fx were found. At time of exam patient offers no complaints.   -hemodynamically stable  -CT head and C-spine negative for acute processes  -CT A/P:  Comminuted left superior pubic rami fracture and R inferior pubic rami .  -Hg 6. Pt has hx of JAK2+ essential thrombocytosis/myelofibrosis, transfusion-dependent.  Hgb averages around 6.5 and is followed by hematology where blood is drawn ever 10 days  -UTI identified on Urinalysis  Orthopedist consulted and state non-op management at this time with pain control.  Hematology consulted and recommended to continue jakafi 10 mg p.o. daily.  Pt transfused PRBC to maintain Hgb > 7.  Pt treated for UTI while admitted with 3 days of Ceftriaxone IV.  Pt evaluated by PT and PM&R and recommended [facility] for continued rehab and post hospital care.  Pt has since has been ambulating with PT, tolerating diet, having bowel function,  voiding without discomfort.  Pt with stable vitals and remains afebrile.  Pt is stable for DC home with outpatient follow-up

## 2019-01-02 NOTE — PROGRESS NOTE ADULT - SUBJECTIVE AND OBJECTIVE BOX
INTERVAL HPI/OVERNIGHT EVENTS: Patient still mildly tachycardic.     SUBJECTIVE: Feeling well this AM. No fevers/chills, no N/V.       MEDICATIONS  (STANDING):  celecoxib 200 milliGRAM(s) Oral daily  cholecalciferol 2000 Unit(s) Oral daily  docusate sodium 100 milliGRAM(s) Oral three times a day  enoxaparin Injectable 40 milliGRAM(s) SubCutaneous daily  escitalopram 5 milliGRAM(s) Oral daily  levothyroxine 75 MICROGram(s) Oral daily  lidocaine   Patch 2 Patch Transdermal every 24 hours  lisinopril 5 milliGRAM(s) Oral daily  mirtazapine 30 milliGRAM(s) Oral daily  pantoprazole   Suspension 40 milliGRAM(s) Oral before breakfast  ruxolitinib 10 milliGRAM(s) Oral daily  sodium chloride 0.9%. 1000 milliLiter(s) (100 mL/Hr) IV Continuous <Continuous>    MEDICATIONS  (PRN):  acetaminophen   Tablet .. 650 milliGRAM(s) Oral every 6 hours PRN Mild Pain (1 - 3)  ondansetron Injectable 4 milliGRAM(s) IV Push every 6 hours PRN Nausea      Vital Signs Last 24 Hrs  T(C): 37.1 (31 Dec 2018 08:00), Max: 38.4 (30 Dec 2018 18:23)  T(F): 98.8 (31 Dec 2018 08:00), Max: 101.1 (30 Dec 2018 18:23)  HR: 107 (31 Dec 2018 08:00) (94 - 107)  BP: 139/77 (31 Dec 2018 08:00) (114/71 - 139/77)  BP(mean): --  RR: 19 (31 Dec 2018 08:00) (18 - 20)  SpO2: 97% (31 Dec 2018 08:00) (94% - 97%)    PE  HEENT: Normocephalic, atraumatic  Respiratory: no increased WOB  ABD: soft, NT, ND  Musculoskeletal: Pt has palpable b/l radial, femoral, dorsalis pedis pulses. All digits are warm and well perfused.  Skin: no lesions or rashes to exam    I&O's Detail      I&O's Detail    30 Dec 2018 07:01  -  31 Dec 2018 07:00  --------------------------------------------------------  IN:    sodium chloride 0.9%.: 1100 mL  Total IN: 1100 mL    OUT:    Intermittent Catheterization - Urethral: 800 mL    Voided: 600 mL  Total OUT: 1400 mL    Total NET: -300 mL          LABS:                        8.9    17.7  )-----------( 57       ( 31 Dec 2018 06:48 )             26.5         138  |  104  |  23.0<H>  ----------------------------<  143<H>  4.0   |  22.0  |  0.57    Ca    8.1<L>      31 Dec 2018 06:48  Phos  2.3       Mg     1.9             Urinalysis Basic - ( 30 Dec 2018 09:06 )    Color: Yellow / Appearance: Clear / S.015 / pH: x  Gluc: x / Ketone: Negative  / Bili: Negative / Urobili: Negative mg/dL   Blood: x / Protein: 30 mg/dL / Nitrite: Positive   Leuk Esterase: Moderate / RBC: 3-5 /HPF / WBC 11-25   Sq Epi: x / Non Sq Epi: Occasional / Bacteria: Few        RADIOLOGY & ADDITIONAL STUDIES:
No acute events. Patient has no complaints. tolerating diet. no fevers, chills, shortness of breath or chest pain        MEDICATIONS  (STANDING):  celecoxib 200 milliGRAM(s) Oral daily  cholecalciferol 2000 Unit(s) Oral daily  docusate sodium 100 milliGRAM(s) Oral three times a day  enoxaparin Injectable 40 milliGRAM(s) SubCutaneous daily  escitalopram 5 milliGRAM(s) Oral daily  levothyroxine 75 MICROGram(s) Oral daily  lidocaine   Patch 2 Patch Transdermal every 24 hours  lisinopril 5 milliGRAM(s) Oral daily  mirtazapine 30 milliGRAM(s) Oral daily  pantoprazole   Suspension 40 milliGRAM(s) Oral before breakfast  ruxolitinib 10 milliGRAM(s) Oral daily    MEDICATIONS  (PRN):  acetaminophen   Tablet .. 650 milliGRAM(s) Oral every 6 hours PRN Mild Pain (1 - 3)  ondansetron Injectable 4 milliGRAM(s) IV Push every 6 hours PRN Nausea      Vital Signs Last 24 Hrs  T(C): 36.7 (02 Jan 2019 04:32), Max: 37.4 (01 Jan 2019 10:24)  T(F): 98.1 (02 Jan 2019 04:32), Max: 99.4 (01 Jan 2019 10:24)  HR: 90 (02 Jan 2019 04:32) (90 - 101)  BP: 115/66 (02 Jan 2019 04:32) (114/66 - 144/76)  BP(mean): --  RR: 18 (02 Jan 2019 04:32) (17 - 18)  SpO2: 93% (02 Jan 2019 04:32) (93% - 97%)    Physical Exam:  General: no acute distress, aoX3  Respiratory: Breath Sounds equal & clear to auscultation, no accessory muscle use  Cardiovascular: Regular rate & rhythm, normal S1, S2; no murmurs, gallops or rubs  Gastrointestinal: Soft, non-tender,nondistended  Extremities: No peripheral edema, No cyanosis, clubbing   Vascular: Equal and normal pulses: 2+ peripheral pulses throughout  Musculoskeletal: No joint pain, swelling or deformity; no limitation of movement  Skin: No rashes      I&O's Detail    31 Dec 2018 07:01  -  01 Jan 2019 07:00  --------------------------------------------------------  IN:    IV PiggyBack: 260 mL    Oral Fluid: 610 mL    sodium chloride 0.9%: 600 mL  Total IN: 1470 mL    OUT:    Voided: 650 mL  Total OUT: 650 mL    Total NET: 820 mL          LABS:                        8.9    17.7  )-----------( 57       ( 31 Dec 2018 06:48 )             26.5     01-01    135  |  103  |  22.0<H>  ----------------------------<  122<H>  3.8   |  22.0  |  0.51    Ca    7.9<L>      01 Jan 2019 06:45  Phos  2.3     01-01  Mg     2.0     01-01            RADIOLOGY & ADDITIONAL STUDIES:
SUBJECTIVE: IVONNE overnight. States pain is well controlled. Tolerating a regular diet, denies nausea/vomiting. +F/+BM. Working with PT.       MEDICATIONS  (STANDING):  celecoxib 200 milliGRAM(s) Oral daily  cholecalciferol 2000 Unit(s) Oral daily  docusate sodium 100 milliGRAM(s) Oral three times a day  enoxaparin Injectable 40 milliGRAM(s) SubCutaneous daily  escitalopram 5 milliGRAM(s) Oral daily  levothyroxine 75 MICROGram(s) Oral daily  lidocaine   Patch 2 Patch Transdermal every 24 hours  lisinopril 5 milliGRAM(s) Oral daily  mirtazapine 30 milliGRAM(s) Oral daily  pantoprazole   Suspension 40 milliGRAM(s) Oral before breakfast  ruxolitinib 10 milliGRAM(s) Oral daily    MEDICATIONS  (PRN):  acetaminophen   Tablet .. 650 milliGRAM(s) Oral every 6 hours PRN Mild Pain (1 - 3)  ondansetron Injectable 4 milliGRAM(s) IV Push every 6 hours PRN Nausea      Vital Signs Last 24 Hrs  T(C): 36.8 (01 Jan 2019 05:00), Max: 37.7 (31 Dec 2018 15:50)  T(F): 98.3 (01 Jan 2019 05:00), Max: 99.8 (31 Dec 2018 15:50)  HR: 92 (01 Jan 2019 05:00) (92 - 105)  BP: 116/70 (01 Jan 2019 05:00) (111/59 - 134/76)  BP(mean): --  RR: 18 (01 Jan 2019 05:00) (18 - 18)  SpO2: 95% (01 Jan 2019 05:00) (95% - 97%)    PE  General: NAD, AOX3  HEENT: Normocephalic, atraumatic  Respiratory: no increased WOB  ABD: soft, NT, ND  Musculoskeletal: Pt has palpable b/l radial, femoral, dorsalis pedis pulses. All digits are warm and well perfused.  Skin: no lesions or rashes to exam        I&O's Detail    31 Dec 2018 07:01  -  01 Jan 2019 07:00  --------------------------------------------------------  IN:    IV PiggyBack: 260 mL    Oral Fluid: 610 mL    sodium chloride 0.9%: 600 mL  Total IN: 1470 mL    OUT:    Voided: 650 mL  Total OUT: 650 mL    Total NET: 820 mL          LABS:                        8.9    17.7  )-----------( 57       ( 31 Dec 2018 06:48 )             26.5     01-01    135  |  103  |  22.0<H>  ----------------------------<  122<H>  3.8   |  22.0  |  0.51    Ca    7.9<L>      01 Jan 2019 06:45  Phos  2.3     01-01  Mg     2.0     01-01            RADIOLOGY & ADDITIONAL STUDIES:
Will transfuse additional 2u PRBC.  Tachycardic last night.  Blood loss from hip fracture adds to baseline anemia from myelofibrosis.
Patient seen at bedside. Tachycardic to 120s over night. H/H demonstrated 5.2/16. Patient denies chest pain, shortness of breath, palpitations, changes in vision, headaches.    MEDICATIONS  (STANDING):  cefTRIAXone   IVPB 1 Gram(s) IV Intermittent every 24 hours  celecoxib 200 milliGRAM(s) Oral daily  cholecalciferol 2000 Unit(s) Oral daily  docusate sodium 100 milliGRAM(s) Oral three times a day  enoxaparin Injectable 40 milliGRAM(s) SubCutaneous daily  escitalopram 5 milliGRAM(s) Oral daily  lactated ringers. 1000 milliLiter(s) (100 mL/Hr) IV Continuous <Continuous>  levothyroxine 75 MICROGram(s) Oral daily  lisinopril 5 milliGRAM(s) Oral daily  mirtazapine 30 milliGRAM(s) Oral daily  pantoprazole   Suspension 40 milliGRAM(s) Oral before breakfast  ruxolitinib 10 milliGRAM(s) Oral daily    MEDICATIONS  (PRN):  acetaminophen   Tablet .. 650 milliGRAM(s) Oral every 6 hours PRN Mild Pain (1 - 3)  ondansetron Injectable 4 milliGRAM(s) IV Push every 6 hours PRN Nausea      Vital Signs Last 24 Hrs  T(C): 36.9 (30 Dec 2018 03:55), Max: 38.1 (29 Dec 2018 20:00)  T(F): 98.5 (30 Dec 2018 03:55), Max: 100.5 (29 Dec 2018 20:00)  HR: 108 (30 Dec 2018 05:13) (95 - 116)  BP: 115/70 (30 Dec 2018 05:13) (97/59 - 118/68)  BP(mean): --  RR: 18 (30 Dec 2018 03:55) (17 - 18)  SpO2: 94% (30 Dec 2018 03:55) (88% - 96%)    PE  HEENT: Normocephalic, atraumatic, SARA, EOM wnl, no otorrhea or hemotympanum b/l, no epistaxis or d/c b/l nares, no craniofacial bony pathology or tenderness b/l  Neck: No crepitus, no ecchymosis, no hematoma, to exam, no JVD, no tracheal deviation  	Cspine/thoracolumbrosacral spine: no gross bony pathology or tenderness to exam  	Cardiovascular: S1S2 Present  	Chest: no gross rib pathology or tenderness to exam. No sternal pathology or tenderness to exam. No crepitus, no ecchymosis, no hematoma. No penetrating thorcoabdominal trauma  	Respiratory: no wheezes, rales or rhonchi to exam  	ABD: bowel sounds (+), soft, nontender, non distended, no rebound, no gaurding, no rigidity, no skin changes to exam. No pelvic instability to exam, no skin changes  	Musculoskeletal: Pt has palpable b/l radial, femoral, dorsalis pedis pulses. All digits are warm and well perfused. No gross long bone pathology or tenderness to exam. Pt demonstrates grossly intact sensoromotor function. Pt has good capillary refill to digits, no calf edema or tenderness to exam.  Skin: no lesions or rashes to exam  I&O's Detail    28 Dec 2018 07:  -  29 Dec 2018 07:00  --------------------------------------------------------  IN:    lactated ringers.: 200 mL  Total IN: 200 mL    OUT:  Total OUT: 0 mL    Total NET: 200 mL      29 Dec 2018 07:01  -  30 Dec 2018 06:07  --------------------------------------------------------  IN:    lactated ringers.: 800 mL    Packed Red Blood Cells: 220 mL  Total IN: 1020 mL    OUT:  Total OUT: 0 mL    Total NET: 1020 mL          LABS:                        5.2    13.4  )-----------( 75       ( 29 Dec 2018 23:43 )             16.0         133<L>  |  98  |  29.0<H>  ----------------------------<  153<H>  4.0   |  23.0  |  0.65    Ca    8.8      29 Dec 2018 23:42  Phos  3.2       Mg     1.9         TPro  7.5  /  Alb  3.5  /  TBili  1.0  /  DBili  x   /  AST  23  /  ALT  14  /  AlkPhos  56  12-      Urinalysis Basic - ( 29 Dec 2018 03:47 )    Color: Yellow / Appearance: Clear / S.010 / pH: x  Gluc: x / Ketone: Negative  / Bili: Negative / Urobili: Negative mg/dL   Blood: x / Protein: 15 mg/dL / Nitrite: Positive   Leuk Esterase: Trace / RBC: 0-2 /HPF / WBC 0-2   Sq Epi: x / Non Sq Epi: Occasional / Bacteria: Many        RADIOLOGY & ADDITIONAL STUDIES:

## 2019-01-02 NOTE — DISCHARGE NOTE ADULT - CARE PROVIDER_API CALL
Miguelangel Martinez (MD), Orthopaedic Surgery  [address]  [place] [address]  Phone: [phone]  Fax: [phone]

## 2019-01-02 NOTE — DISCHARGE NOTE ADULT - PLAN OF CARE
Alleviation of pain and symptoms Continue physical therapy. Follow up with orthopedics in 2-4 weeks or as directed. Follow up with the acute care surgery clinic as needed with questions regarding your hospital stay. Continue physical therapy. Follow up with orthopedics in 2-4 weeks or as directed.  Please call and make an appointment your Hematologist for appointment in 7-10 days after discharge. Also, please call and make an appointment with your primary care physician as per your usual schedule.   Activity: Continue Rehab at [facility]  Diet: May continue Regular diet.  Medications: Please take all home medications as prescribed by your primary care doctor. Pain medication has been prescribed for you. Please, take it as it has been prescribed, do not drive or operate heavy machinery while taking narcotics.   If confusion, altered mental status, fever, chest pain, shortness of breath, severe or worsening pain, vomiting, change or worsening of medical status, please come back to the emergency room, and in case of emergency call [phone] Continued medical a management Continue physical therapy. Follow up with orthopedics in 2-4 weeks or as directed.  Please call and make an appointment your Hematologist for appointment in 7-10 days after discharge. Also, please call and make an appointment with your primary care physician as per your usual schedule.   Activity: Continue Rehab at [facility]  Diet: May continue Regular diet.  Medications: Please take all home medications as prescribed by your primary care doctor. Pain medication has been prescribed for you. Please, take it as it has been prescribed, do not drive or operate heavy machinery while taking narcotics.   If confusion, altered mental status, fever, chest pain, shortness of breath, severe or worsening pain, vomiting, change or worsening of medical status, please come back to the emergency room, and in case of emergency call [phone] Resolution of infection Continue physical therapy. Follow up with orthopedics in 2-4 weeks or as directed.  Please call and make an appointment your Hematologist for appointment in 7-10 days after discharge. Also, please call and make an appointment with your primary care physician as per your usual schedule.   Activity: Continue Rehab at [facility]  Diet: May continue Regular diet.  Medications: Please take all home medications as prescribed by your primary care doctor. Pain medication has been prescribed for you. Please, take it as it has been prescribed, do not drive or operate heavy machinery while taking narcotics.   If confusion, altered mental status, fever, chest pain, shortness of breath, severe or worsening pain, vomiting, change or worsening of medical status, please come back to the emergency room, and in case of emergency call [phone]

## 2019-01-02 NOTE — PROGRESS NOTE ADULT - REASON FOR ADMISSION
Fall, Left Pubic Ramus Fracture

## 2019-01-02 NOTE — DISCHARGE NOTE ADULT - CARE PLAN
Goal:	Alleviation of pain and symptoms  Assessment and plan of treatment:	Continue physical therapy. Follow up with orthopedics in 2-4 weeks or as directed. Follow up with the acute care surgery clinic as needed with questions regarding your hospital stay. Principal Discharge DX:	Pelvic fracture  Goal:	Alleviation of pain and symptoms  Assessment and plan of treatment:	Continue physical therapy. Follow up with orthopedics in 2-4 weeks or as directed.  Please call and make an appointment your Hematologist for appointment in 7-10 days after discharge. Also, please call and make an appointment with your primary care physician as per your usual schedule.   Activity: Continue Rehab at [facility]  Diet: May continue Regular diet.  Medications: Please take all home medications as prescribed by your primary care doctor. Pain medication has been prescribed for you. Please, take it as it has been prescribed, do not drive or operate heavy machinery while taking narcotics.   If confusion, altered mental status, fever, chest pain, shortness of breath, severe or worsening pain, vomiting, change or worsening of medical status, please come back to the emergency room, and in case of emergency call [phone]  Secondary Diagnosis:	Myelofibrosis  Goal:	Continued medical a management  Assessment and plan of treatment:	Continue physical therapy. Follow up with orthopedics in 2-4 weeks or as directed.  Please call and make an appointment your Hematologist for appointment in 7-10 days after discharge. Also, please call and make an appointment with your primary care physician as per your usual schedule.   Activity: Continue Rehab at [facility]  Diet: May continue Regular diet.  Medications: Please take all home medications as prescribed by your primary care doctor. Pain medication has been prescribed for you. Please, take it as it has been prescribed, do not drive or operate heavy machinery while taking narcotics.   If confusion, altered mental status, fever, chest pain, shortness of breath, severe or worsening pain, vomiting, change or worsening of medical status, please come back to the emergency room, and in case of emergency call [phone]  Secondary Diagnosis:	Urinary tract infection  Goal:	Resolution of infection  Assessment and plan of treatment:	Continue physical therapy. Follow up with orthopedics in 2-4 weeks or as directed.  Please call and make an appointment your Hematologist for appointment in 7-10 days after discharge. Also, please call and make an appointment with your primary care physician as per your usual schedule.   Activity: Continue Rehab at [facility]  Diet: May continue Regular diet.  Medications: Please take all home medications as prescribed by your primary care doctor. Pain medication has been prescribed for you. Please, take it as it has been prescribed, do not drive or operate heavy machinery while taking narcotics.   If confusion, altered mental status, fever, chest pain, shortness of breath, severe or worsening pain, vomiting, change or worsening of medical status, please come back to the emergency room, and in case of emergency call [phone]

## 2019-01-02 NOTE — DISCHARGE NOTE ADULT - MEDICATION SUMMARY - MEDICATIONS TO TAKE
I will START or STAY ON the medications listed below when I get home from the hospital:    acetaminophen 325 mg oral tablet  -- 2 tab(s) by mouth every 6 hours, As needed, For Temp greater than 38 C (100.4 F)  -- Indication: For Pain    celecoxib 200 mg oral capsule  -- 1 cap(s) by mouth once a day  -- Indication: For Pain    lisinopril 5 mg oral tablet  -- 1 tab(s) by mouth once a day  -- Indication: For Home med    calcium carbonate 500 mg (200 mg elemental calcium) oral tablet, chewable  -- 2 tab(s) by mouth once a day  -- Indication: For Home med    escitalopram 10 mg oral tablet  -- 1 tab(s) by mouth once a day  -- Indication: For Home med    mirtazapine 30 mg oral tablet  -- 1 tab(s) by mouth once a day  -- Indication: For Home med    lidocaine 5% topical film  -- Apply on skin to affected area once a day. 12 hours on followed by 12 hours off  -- Indication: For Pain    docusate sodium 100 mg oral capsule  -- 1 cap(s) by mouth 3 times a day  -- Indication: For Constipation    Jakafi 10 mg oral tablet  -- 1 cap(s) by mouth once a day  -- Indication: For Home med    pantoprazole 40 mg oral delayed release tablet  -- 1 tab(s) by mouth once a day (before a meal)  -- Indication: For Home med    levothyroxine 75 mcg (0.075 mg) oral tablet  -- 1 tab(s) by mouth once a day  -- Indication: For Home med    Multiple Vitamins oral tablet  -- 1 tab(s) by mouth once a day  -- Indication: For Home med    ascorbic acid 500 mg oral tablet  -- 1 tab(s) by mouth once a day  -- Indication: For Home med    cholecalciferol oral tablet  -- 2000 unit(s) by mouth once a day  -- Indication: For Home med

## 2019-01-02 NOTE — DISCHARGE NOTE ADULT - PATIENT PORTAL LINK FT
You can access the [facility] Patient Portal, offered by [facility], by registering with the following website: [email or web address]

## 2019-01-02 NOTE — PROGRESS NOTE ADULT - ASSESSMENT
88 year old female with history of myelodysplastic syndrome with known pancytopenia s/p fall from bed with left superior and inferior pubic rami fractures   - tachycardic over night  - H/H 5.2/16.0    - s/p transfusion of 1 U pRBCs  - hematology on board - transfuse prn  - cont home meds  - monitor H/H  - pain control
88 year female s/p fall with left superior/inferior pubic rami fracture and UTI being treated. nonoperative management of pelvic fracture
88 year old female with history of myelodysplastic syndrome with known pancytopenia s/p fall from bed with left superior and inferior pubic rami fractures, continues to have mild tachycardia.  US of lower extremities showed no DVT    Plan:  - f/u WBC today  - Monitor vitals  - Hgb responded appropriately to transfusion  -RD  -Pain control  -Ortho - WBAT w/ walker  -Dispo: RONAK
88 year old female with history of myelodysplastic syndrome with known pancytopenia s/p fall from bed with left superior and inferior pubic rami fractures, now with persistent mild tachycardia.  US of lower extremities showed no DVT    Plan:  - f/u ABG  - trend vitals  - last day of ceftriaxone for UTI  -Dipos: RONAK

## 2019-01-10 ENCOUNTER — APPOINTMENT (OUTPATIENT)
Dept: HEMATOLOGY ONCOLOGY | Facility: CLINIC | Age: 84
End: 2019-01-10

## 2019-01-15 ENCOUNTER — OUTPATIENT (OUTPATIENT)
Dept: OUTPATIENT SERVICES | Facility: HOSPITAL | Age: 84
LOS: 1 days | Discharge: HOME | End: 2019-01-15

## 2019-01-15 DIAGNOSIS — D47.3 ESSENTIAL (HEMORRHAGIC) THROMBOCYTHEMIA: ICD-10-CM

## 2019-01-18 ENCOUNTER — APPOINTMENT (OUTPATIENT)
Dept: HEMATOLOGY ONCOLOGY | Facility: CLINIC | Age: 84
End: 2019-01-18
Payer: MEDICARE

## 2019-01-18 ENCOUNTER — RESULT REVIEW (OUTPATIENT)
Age: 84
End: 2019-01-18

## 2019-01-18 LAB
ANISOCYTOSIS BLD QL: SLIGHT — SIGNIFICANT CHANGE UP
BASOPHILS # BLD AUTO: SIGNIFICANT CHANGE UP (ref 0–0.2)
BASOPHILS NFR BLD AUTO: 1 % — SIGNIFICANT CHANGE UP (ref 0–2)
BLASTS # FLD: 6 % — HIGH (ref 0–0)
EOSINOPHIL # BLD AUTO: 0.1 K/UL — SIGNIFICANT CHANGE UP (ref 0–0.5)
HCT VFR BLD CALC: 20.3 % — CRITICAL LOW (ref 34.5–45)
HGB BLD-MCNC: 6.7 G/DL — CRITICAL LOW (ref 11.5–15.5)
HYPOCHROMIA BLD QL: SLIGHT — SIGNIFICANT CHANGE UP
LG PLATELETS BLD QL AUTO: SLIGHT — SIGNIFICANT CHANGE UP
LYMPHOCYTES # BLD AUTO: 15 % — SIGNIFICANT CHANGE UP (ref 13–44)
LYMPHOCYTES # BLD AUTO: 2.7 K/UL — SIGNIFICANT CHANGE UP (ref 1–3.3)
LYMPHOCYTES # SPEC AUTO: 2 % — HIGH (ref 0–0)
MACROCYTES BLD QL: SLIGHT — SIGNIFICANT CHANGE UP
MCHC RBC-ENTMCNC: 31.4 PG — SIGNIFICANT CHANGE UP (ref 27–34)
MCHC RBC-ENTMCNC: 32.9 GM/DL — SIGNIFICANT CHANGE UP (ref 32–36)
MCV RBC AUTO: 95.6 FL — SIGNIFICANT CHANGE UP (ref 80–100)
METAMYELOCYTES # FLD: 1 % — HIGH (ref 0–0)
MICROCYTES BLD QL: SLIGHT — SIGNIFICANT CHANGE UP
MONOCYTES # BLD AUTO: 16.8 K/UL — HIGH (ref 0–0.9)
MONOCYTES NFR BLD AUTO: 53 % — HIGH (ref 2–14)
MYELOCYTES NFR BLD: 3 % — HIGH (ref 0–0)
NEUTROPHILS # BLD AUTO: 3.5 K/UL — SIGNIFICANT CHANGE UP (ref 1.8–7.4)
NEUTROPHILS NFR BLD AUTO: 18 % — LOW (ref 43–77)
NEUTS BAND # BLD: 1 % — SIGNIFICANT CHANGE UP (ref 0–8)
NRBC # BLD: 2 /100 — HIGH (ref 0–0)
PLAT MORPH BLD: NORMAL — SIGNIFICANT CHANGE UP
PLATELET # BLD AUTO: 64 K/UL — LOW (ref 150–400)
POIKILOCYTOSIS BLD QL AUTO: SLIGHT — SIGNIFICANT CHANGE UP
POLYCHROMASIA BLD QL SMEAR: SLIGHT — SIGNIFICANT CHANGE UP
RBC # BLD: 2.12 M/UL — LOW (ref 3.8–5.2)
RBC # FLD: 23.4 % — HIGH (ref 10.3–14.5)
RBC BLD AUTO: ABNORMAL
WBC # BLD: 25.3 K/UL — HIGH (ref 3.8–10.5)
WBC # FLD AUTO: 25.3 K/UL — HIGH (ref 3.8–10.5)

## 2019-01-18 PROCEDURE — 99214 OFFICE O/P EST MOD 30 MIN: CPT

## 2019-01-18 RX ORDER — CALCIUM CARBONATE 750 MG/1
750 TABLET, CHEWABLE ORAL
Refills: 0 | Status: ACTIVE | COMMUNITY

## 2019-01-18 RX ORDER — MULTIVIT-MIN/FOLIC/VIT K/LYCOP 400-300MCG
TABLET ORAL
Refills: 0 | Status: ACTIVE | COMMUNITY

## 2019-01-18 RX ORDER — ACETAMINOPHEN 500 MG/1
TABLET ORAL
Refills: 0 | Status: ACTIVE | COMMUNITY

## 2019-01-18 RX ORDER — MULTIVITAMIN
TABLET ORAL
Refills: 0 | Status: ACTIVE | COMMUNITY

## 2019-01-18 NOTE — REVIEW OF SYSTEMS
[Fatigue] : fatigue [Negative] : Allergic/Immunologic [FreeTextEntry5] : rught upper arm swollen [de-identified] : erythema and warmth of right upper arm [de-identified] : bruise on right elbow

## 2019-01-18 NOTE — HISTORY OF PRESENT ILLNESS
[de-identified] : \par Mrs. Chen is a 86 yo WF with a history of Reji 2+ thrombocytosis, dating back to 2013. She had been on Hydrea since Olya 10, 2013. Most recently she was found to be pancytopenic and the Hydrea was held as of July 12, 2017. A bone marrow was done on August 21, 2017, which a normocellular to mildly hypercellular marrow with mild megakaryocytic hyperplasia and moderate reticulin fibrosis and focal areas of increased blasts (5% ). \par  She was started on Jakafi.  [de-identified] : She recently fell and fractured her pelvis, she is currently in Rehab, while in  ehospital she received at least 2 units of blood. @ days ago, the daughter noticed a bruise on her right elbow, then her right arm began to swell. The Rehab ordered a Doppler which is positive for a right Brachial vein thrombosis and she was started on Lovenox yesterday.

## 2019-01-18 NOTE — ASSESSMENT
[FreeTextEntry1] : Mrs. 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 Recent bone marrow showed 5% blasts with moderate reticulin fibrosis.. Was started on Jakafi, and we are monitoring her counts.. Her HGb has been stable at 6-7 gms, She is tolerating this HGb. Recent admission for pelvic fracture, received at least 2 units of packed cells, she is not symptomatic from the anemia, and plan is to transfuse her if her hgb is below 6 gms. new RUE DVT and is on Lovenox, Erythema and warmth and low grade temp, lead to possibility of cellulitis of RUE and will be started on Keflex, Asked for CBC weekly to monitor WBC and platelet count while on Lovenox ( to call if plat count is 50,000 or less). started her on Zovirax ointment for lip lesion. Dr. Reese into see patient and agrees with the plan.

## 2019-01-18 NOTE — PHYSICAL EXAM
[Ambulatory and capable of all self care but unable to carry out any work activities] : Status 2- Ambulatory and capable of all self care but unable to carry out any work activities. Up and about more than 50% of waking hours [Thin] : thin [Normal] : affect appropriate [de-identified] : crusred lesion on right lower lip [de-identified] : swelling of right arm [de-identified] : erythema and warmth of right upper extremity, ecchymosis right elbow

## 2019-01-23 ENCOUNTER — INPATIENT (INPATIENT)
Facility: HOSPITAL | Age: 84
LOS: 1 days | Discharge: HOME | DRG: 812 | End: 2019-01-25
Attending: INTERNAL MEDICINE | Admitting: INTERNAL MEDICINE
Payer: MEDICARE

## 2019-01-23 VITALS — HEIGHT: 63 IN | WEIGHT: 125 LBS

## 2019-01-23 DIAGNOSIS — R09.89 OTHER SPECIFIED SYMPTOMS AND SIGNS INVOLVING THE CIRCULATORY AND RESPIRATORY SYSTEMS: ICD-10-CM

## 2019-01-23 DIAGNOSIS — Z90.710 ACQUIRED ABSENCE OF BOTH CERVIX AND UTERUS: Chronic | ICD-10-CM

## 2019-01-23 DIAGNOSIS — D64.9 ANEMIA, UNSPECIFIED: ICD-10-CM

## 2019-01-23 LAB
ALBUMIN SERPL ELPH-MCNC: 2.7 G/DL — LOW (ref 3.3–5.2)
ALP SERPL-CCNC: 141 U/L — HIGH (ref 40–120)
ALT FLD-CCNC: 10 U/L — SIGNIFICANT CHANGE UP
ANION GAP SERPL CALC-SCNC: 13 MMOL/L — SIGNIFICANT CHANGE UP (ref 5–17)
ANISOCYTOSIS BLD QL: SIGNIFICANT CHANGE UP
APTT BLD: 31 SEC — SIGNIFICANT CHANGE UP (ref 27.5–36.3)
AST SERPL-CCNC: 14 U/L — SIGNIFICANT CHANGE UP
BILIRUB SERPL-MCNC: 0.6 MG/DL — SIGNIFICANT CHANGE UP (ref 0.4–2)
BLASTS # FLD: 10 % — HIGH (ref 0–0)
BLD GP AB SCN SERPL QL: SIGNIFICANT CHANGE UP
BUN SERPL-MCNC: 17 MG/DL — SIGNIFICANT CHANGE UP (ref 8–20)
CALCIUM SERPL-MCNC: 8.2 MG/DL — LOW (ref 8.6–10.2)
CHLORIDE SERPL-SCNC: 96 MMOL/L — LOW (ref 98–107)
CO2 SERPL-SCNC: 23 MMOL/L — SIGNIFICANT CHANGE UP (ref 22–29)
CREAT SERPL-MCNC: 0.62 MG/DL — SIGNIFICANT CHANGE UP (ref 0.5–1.3)
DACRYOCYTES BLD QL SMEAR: SLIGHT — SIGNIFICANT CHANGE UP
ELLIPTOCYTES BLD QL SMEAR: SLIGHT — SIGNIFICANT CHANGE UP
GLUCOSE SERPL-MCNC: 113 MG/DL — SIGNIFICANT CHANGE UP (ref 70–115)
HCT VFR BLD CALC: 14.3 % — CRITICAL LOW (ref 37–47)
HGB BLD-MCNC: 4.5 G/DL — CRITICAL LOW (ref 12–16)
HYPOCHROMIA BLD QL: SLIGHT — SIGNIFICANT CHANGE UP
INR BLD: 1.35 RATIO — HIGH (ref 0.88–1.16)
LYMPHOCYTES # BLD AUTO: 14 % — LOW (ref 20–55)
MACROCYTES BLD QL: SLIGHT — SIGNIFICANT CHANGE UP
MANUAL DIF COMMENT BLD-IMP: SIGNIFICANT CHANGE UP
MCHC RBC-ENTMCNC: 31.3 PG — HIGH (ref 27–31)
MCHC RBC-ENTMCNC: 31.5 G/DL — LOW (ref 32–36)
MCV RBC AUTO: 99.3 FL — HIGH (ref 81–99)
METAMYELOCYTES # FLD: 5 % — HIGH (ref 0–0)
MICROCYTES BLD QL: SLIGHT — SIGNIFICANT CHANGE UP
MONOCYTES NFR BLD AUTO: 48 % — HIGH (ref 3–10)
MYELOCYTES NFR BLD: 3 % — HIGH (ref 0–0)
NEUTROPHILS NFR BLD AUTO: 18 % — LOW (ref 37–73)
PLAT MORPH BLD: NORMAL — SIGNIFICANT CHANGE UP
PLATELET # BLD AUTO: 56 K/UL — LOW (ref 150–400)
POIKILOCYTOSIS BLD QL AUTO: SLIGHT — SIGNIFICANT CHANGE UP
POTASSIUM SERPL-MCNC: 3.7 MMOL/L — SIGNIFICANT CHANGE UP (ref 3.5–5.3)
POTASSIUM SERPL-SCNC: 3.7 MMOL/L — SIGNIFICANT CHANGE UP (ref 3.5–5.3)
PROMYELOCYTES # FLD: 2 % — HIGH (ref 0–0)
PROT SERPL-MCNC: 7.3 G/DL — SIGNIFICANT CHANGE UP (ref 6.6–8.7)
PROTHROM AB SERPL-ACNC: 15.7 SEC — HIGH (ref 10–12.9)
RBC # BLD: 1.44 M/UL — LOW (ref 4.4–5.2)
RBC # FLD: 24.5 % — HIGH (ref 11–15.6)
RBC BLD AUTO: ABNORMAL
SODIUM SERPL-SCNC: 132 MMOL/L — LOW (ref 135–145)
STOMATOCYTES BLD QL SMEAR: PRESENT — SIGNIFICANT CHANGE UP
TYPE + AB SCN PNL BLD: SIGNIFICANT CHANGE UP
WBC # BLD: 18.6 K/UL — HIGH (ref 4.8–10.8)
WBC # FLD AUTO: 18.6 K/UL — HIGH (ref 4.8–10.8)

## 2019-01-23 PROCEDURE — 74174 CTA ABD&PLVS W/CONTRAST: CPT | Mod: 26

## 2019-01-23 PROCEDURE — 93971 EXTREMITY STUDY: CPT | Mod: 26,RT

## 2019-01-23 PROCEDURE — 99223 1ST HOSP IP/OBS HIGH 75: CPT

## 2019-01-23 PROCEDURE — 99285 EMERGENCY DEPT VISIT HI MDM: CPT

## 2019-01-23 RX ORDER — CALCIUM CARBONATE 500(1250)
2 TABLET ORAL DAILY
Qty: 0 | Refills: 0 | Status: DISCONTINUED | OUTPATIENT
Start: 2019-01-23 | End: 2019-01-25

## 2019-01-23 RX ORDER — DIPHENHYDRAMINE HCL 50 MG
25 CAPSULE ORAL ONCE
Qty: 0 | Refills: 0 | Status: COMPLETED | OUTPATIENT
Start: 2019-01-23 | End: 2019-01-23

## 2019-01-23 RX ORDER — LEVOTHYROXINE SODIUM 125 MCG
75 TABLET ORAL DAILY
Qty: 0 | Refills: 0 | Status: DISCONTINUED | OUTPATIENT
Start: 2019-01-23 | End: 2019-01-25

## 2019-01-23 RX ORDER — ACETAMINOPHEN 500 MG
650 TABLET ORAL EVERY 6 HOURS
Qty: 0 | Refills: 0 | Status: DISCONTINUED | OUTPATIENT
Start: 2019-01-23 | End: 2019-01-25

## 2019-01-23 RX ORDER — ASCORBIC ACID 60 MG
500 TABLET,CHEWABLE ORAL DAILY
Qty: 0 | Refills: 0 | Status: DISCONTINUED | OUTPATIENT
Start: 2019-01-23 | End: 2019-01-25

## 2019-01-23 RX ORDER — ACETAMINOPHEN 500 MG
650 TABLET ORAL ONCE
Qty: 0 | Refills: 0 | Status: COMPLETED | OUTPATIENT
Start: 2019-01-23 | End: 2019-01-23

## 2019-01-23 RX ORDER — DOCUSATE SODIUM 100 MG
100 CAPSULE ORAL THREE TIMES A DAY
Qty: 0 | Refills: 0 | Status: DISCONTINUED | OUTPATIENT
Start: 2019-01-23 | End: 2019-01-25

## 2019-01-23 RX ORDER — CHOLECALCIFEROL (VITAMIN D3) 125 MCG
2000 CAPSULE ORAL DAILY
Qty: 0 | Refills: 0 | Status: DISCONTINUED | OUTPATIENT
Start: 2019-01-23 | End: 2019-01-25

## 2019-01-23 RX ORDER — PANTOPRAZOLE SODIUM 20 MG/1
40 TABLET, DELAYED RELEASE ORAL
Qty: 0 | Refills: 0 | Status: DISCONTINUED | OUTPATIENT
Start: 2019-01-23 | End: 2019-01-25

## 2019-01-23 RX ORDER — SODIUM CHLORIDE 9 MG/ML
3 INJECTION INTRAMUSCULAR; INTRAVENOUS; SUBCUTANEOUS ONCE
Qty: 0 | Refills: 0 | Status: COMPLETED | OUTPATIENT
Start: 2019-01-23 | End: 2019-01-23

## 2019-01-23 RX ORDER — ESCITALOPRAM OXALATE 10 MG/1
10 TABLET, FILM COATED ORAL DAILY
Qty: 0 | Refills: 0 | Status: DISCONTINUED | OUTPATIENT
Start: 2019-01-23 | End: 2019-01-25

## 2019-01-23 RX ORDER — CELECOXIB 200 MG/1
200 CAPSULE ORAL DAILY
Qty: 0 | Refills: 0 | Status: DISCONTINUED | OUTPATIENT
Start: 2019-01-23 | End: 2019-01-25

## 2019-01-23 RX ORDER — DIPHENHYDRAMINE HCL 50 MG
25 CAPSULE ORAL ONCE
Qty: 0 | Refills: 0 | Status: DISCONTINUED | OUTPATIENT
Start: 2019-01-23 | End: 2019-01-23

## 2019-01-23 RX ADMIN — Medication 650 MILLIGRAM(S): at 19:03

## 2019-01-23 RX ADMIN — Medication 650 MILLIGRAM(S): at 23:11

## 2019-01-23 RX ADMIN — Medication 650 MILLIGRAM(S): at 18:05

## 2019-01-23 RX ADMIN — Medication 25 MILLIGRAM(S): at 18:04

## 2019-01-23 RX ADMIN — Medication 25 MILLIGRAM(S): at 23:39

## 2019-01-23 RX ADMIN — SODIUM CHLORIDE 3 MILLILITER(S): 9 INJECTION INTRAMUSCULAR; INTRAVENOUS; SUBCUTANEOUS at 14:00

## 2019-01-23 NOTE — ED ADULT NURSE REASSESSMENT NOTE - NS ED NURSE REASSESS COMMENT FT1
Received pt at this time from ongoing shift. pt a&ox3, receiving 1u PRBC. no s/s adverse rx. pt asymptomatic from anemia, denies any sob, fatigue, dizziness. NSR on cardiac monitor. Call out to dr barrera to explain ct results to family. pt on o2 3l nc per baseline, resp even and unlabored, lungs clear. pending admission. pt and family updated on plan of care, verbalize understanding. call bell in reach

## 2019-01-23 NOTE — ED ADULT NURSE NOTE - OBJECTIVE STATEMENT
PT sent by hematologist for low hemaglobin. Pt pts daughter. Pt was recently discharged s/p left pelvic fx. Pt at rehab on Lovenox blood clot to right arm. PT offers no c/o chest pain dizziness or SOB. PT Lined and labed per orders. CTM

## 2019-01-23 NOTE — ED PROVIDER NOTE - MEDICAL DECISION MAKING DETAILS
PT USUALLY RUNS HGB AROUND 6.7. DR BLANKENSHIP WANTS PT TO HAVE HGB 6 OR HIGHER. TODAY IT IS LESS. ADMIT AND TRANSFUSE

## 2019-01-23 NOTE — CONSULT NOTE ADULT - SUBJECTIVE AND OBJECTIVE BOX
REASON FOR Tele-evaluation    SUBJECTIVE: (***)    HPI:        OBJECTIVE  ROS:  (***)    PHYSICAL EXAM: Tele-evaluation precludes physical exam. Pertinent physical exam findings as per verbal communication by …… and nurse at bedside are as following:  (***)          ASSESSMENT AND PLAN: (***)    Care plan discussed with (***) REASON FOR Tele-evaluation    SUBJECTIVE: send from NH for blood transfusion     ED HPI:  89 YO FEMALE SENT FOR TRANSFUSION. PT HAS HX MYELODYSPLASIA AND OTHER CONDITION THAT DR MONTES IS FOLLOWING. PT HERE IN DEC AFTER INCURRING A PELVIC FRACTURE. FROM [facility] WENT TO [facility] WHERE SHE IS CURRENTLY UNDERGOING CARE. PT HAS A DVT RUE AND IS ON LOVENOX. SHE IS FEELING WEAK AND HAS POOR APPETITE BUT SHE STATES SHE IS "NOT AN EATER" PT ALSO HAS A HISTORY OF LOW SODIUM.  	MED HX Hypothyroidism (acquired)    	Myelodysplastic syndrome    	Myelofibrosis    Thrombocytosis  ANNMARIE 2 thrombocytosis    Above ED HPI reviewed and noted: History obtained from Chart and patients daughter. She is an 88 year old female sent from [facility] for transfusion. Pt has a significant history of Left pubis fracture s/p fall while at assisted living facility. Thrombocytopenia (baseline 60-70’s); Anemia (baseline Hemoglobin 6.5-6.9); Myelodysplastic syndrome; myelofibrosis and RUE DVT diagnosed 1/16 and started on Lovenox. Patient has had no changes in stool or urine color, no abdominal pain and no discomfort complaints at this time.    OBJECTIVE  ROS:  all other negative except as documented above    PHYSICAL EXAM: Tele-evaluation precludes physical exam.  Vital Signs Last 24 Hrs  T(C): 37.4 (23 Jan 2019 15:26), Max: 37.4 (23 Jan 2019 15:26)  T(F): 99.3 (23 Jan 2019 15:26), Max: 99.3 (23 Jan 2019 15:26)  HR: 96 (23 Jan 2019 15:26) (96 - 102)  BP: 105/58 (23 Jan 2019 15:26) (105/58 - 113/67)  BP(mean): --  RR: 17 (23 Jan 2019 15:26) (17 - 20)  SpO2: 97% (23 Jan 2019 15:26) (97% - 97%)        LABS: All Labs Reviewed:                        4.5    18.6  )-----------( 56       ( 23 Jan 2019 14:20 )             14.3     01-23    132<L>  |  96<L>  |  17.0  ----------------------------<  113  3.7   |  23.0  |  0.62    Ca    8.2<L>      23 Jan 2019 14:20    TPro  7.3  /  Alb  2.7<L>  /  TBili  0.6  /  DBili  x   /  AST  14  /  ALT  10  /  AlkPhos  141<H>  01-23    PT/INR - ( 23 Jan 2019 14:20 )   PT: 15.7 sec;   INR: 1.35 ratio         PTT - ( 23 Jan 2019 14:20 )  PTT:31.0 sec    Blood Culture:     RADIOLOGY/EKG:      ASSESSMENT AND PLAN    History obtained from Chart and patients daughter. She is an 88 year old female sent from [facility] for transfusion. Pt has a significant history of Left pubis fracture s/p fall while at assisted living facility. Thrombocytopenia (baseline 60-70’s); Anemia (baseline Hemoglobin 6.5-6.9); Myelodysplastic syndrome; myelofibrosis and RUE DVT diagnosed 1/16 and started on Lovenox. Patient has had no changes in stool or urine color, no abdominal pain and no discomfort complaints at this time.      Acute severe anemia in patient with multiple co-morbid conditions  -	Admit to monitored unit  -	Occult Blood, stat  -	CTA abd/pelvis r/o bleed  -	Clear liquid diet  -	Type and screen in process  -	2 unit pack RBC pending  -	cont PPI  RUE DVT, no provoking circumstances identified  -	f/u RUE ultrasound  -	Held Lovenox    Myelodysplastic syndrome; myelofibrosis  -	Currently being managed by Onc Dr. Montes, please consult.   -	Bedrest   Jakafi not available in [facility] pharmacy, Pharmacy will attempt to have medication picked up from the NH for cont administration     chronic co-morbid conditions medication reconciliation completed.     Care plan discussed with Dr. Boyce

## 2019-01-23 NOTE — ED PROVIDER NOTE - CONSTITUTIONAL, MLM
normal... Well appearing, well nourished, awake, alert, oriented to person, place, time/situation and in no apparent distress. PALE

## 2019-01-23 NOTE — ED ADULT NURSE REASSESSMENT NOTE - NS ED NURSE REASSESS COMMENT FT1
Called 5T RN- unable to take report because she is "getting report from PACU" aware this writer will call back in 15 mins.

## 2019-01-23 NOTE — ED PROVIDER NOTE - OBJECTIVE STATEMENT
89 YO FEMALE SENT FOR TRANSFUSION. PT HAS HX MYELODYSPLASIA AND OTHER CONDITION THAT DR BLANKENSHIP IS FOLLOWING . PT HERE IN DEC AFTER INCURRING A PELVIC FRACTURE. FROM [facility] WENT TO [facility] WHERE SHE IS CURRENTLY UNDERGOING CARE.  PT HAS A DVT RUE AND IS ON LOVENOX. SHE IS FEELING WEAK AND HAS POOR APPETITE BUT SHE STATES SHE IS "NOT AN EATER" PT ALSO HAS A HISTORY OF LOW SODIUM.  MED HX Hypothyroidism (acquired)    Myelodysplastic syndrome    Myelofibrosis    Thrombocytosis  ANNMARIE 2 thrombocytosis

## 2019-01-23 NOTE — H&P ADULT - ASSESSMENT
patient is an 88 year old female sent from [facility] for transfusion. Pt has a significant history of Left pubis fracture s/p fall while at assisted living facility. Thrombocytopenia (baseline 60-70’s); Anemia (baseline Hemoglobin 6.5-6.9); Myelodysplastic syndrome; myelofibrosis and RUE DVT diagnosed 1/16 and started on Lovenox. Patient has had no changes in stool or urine color, no abdominal pain and no discomfort complaints at this time.     A/P:  Acute severe anemia, acute on chronic  in patient with multiple co-morbid conditions and h/p multiple transfusion  reported history transfusion reaction and pain. usually gets tylenol and bendryl before transfusion  consent obtained from daughter Princess as per patient request  -	Admit to monitored unit  -	f/u Occult Blood   -	f/u CTA abd/pelvis r/o bleed. no previous h/o CT with contrast reported.   -	Clear liquid diet  -	Type and screen in process  -	2 unit pack RBC pending  -	premedicate with Tylenol and Bendaryl before PRBC  -	cont PPI  -	Consult hematology [  is primary hematologist]  RUE DVT, no provoking circumstances identified  -	f/u RUE ultrasound  -	Held Lovenox for npw  -	consult hematology    Myelodysplastic syndrome; myelofibrosis  -	Currently being managed by Onc Dr. Montse, called for consult  -	Bedrest   Jakafi not available in [facility] pharmacy, Pharmacy will attempt to have medication picked up from the NH for cont administration     DVT: SCD for now patient is an 88 year old female sent from [facility] for transfusion. Pt has a significant history of Left pubis fracture s/p fall while at assisted living facility. Thrombocytopenia (baseline 60-70’s); Anemia (baseline Hemoglobin 6.5-6.9); Myelodysplastic syndrome; myelofibrosis and RUE DVT diagnosed 1/16 and started on Lovenox. Patient has had no changes in stool or urine color, no abdominal pain and no discomfort complaints at this time.     A/P:  Acute severe anemia, acute on chronic  in patient with multiple co-morbid conditions and h/p multiple transfusion  reported history transfusion reaction and pain. usually gets tylenol and bendryl before transfusion  consent obtained from daughter Princess as per patient request  -	Admit to monitored unit  -	f/u Occult Blood   -	f/u CTA abd/pelvis r/o bleed. no previous h/o CT with contrast reported.   -	Clear liquid diet  -	Type and screen in process  -	2 unit pack RBC pending  -	premedicate with Tylenol and Bendaryl before PRBC  -	cont PPI  -	Consult hematology [  is primary hematologist]  RUE DVT, no provoking circumstances identified  -	f/u RUE ultrasound  -	Held Lovenox for npw  -	consult hematology    Myelodysplastic syndrome; myelofibrosis  -	Currently being managed by Onc Dr. Montes, called for consult  -	Bedrest   Jakafi not available in [facility] pharmacy, Pharmacy will attempt to have medication picked up from the NH for cont administration     DVT: SCD for now    Addendum:     < from: CT Angio Abdomen and Pelvis w/ IV Cont (01.23.19 @ 18:05) >  IMPRESSION:     No evidence of active GI bleeding.  A chronic a RIGHT superior inferior pubic ramus with enlarging RIGHT   internal obturator hematoma joint which extends anteriorly into the space   of Retzius deviating bladder towards the RIGHT . abscess cannot be ruled   out.  There is diffuse thickening of the rectum and distal sigmoid colon,   concerning for proctitis or with a presacral soft tissue swelling.  Large a splenic calcifications as described. splenomegaly.  Cholelithiasis.  Bladder is distended, bladder measuring 15.6 cm in height.  RIGHT middle lobe a nodule/mass of indeterminate etiology.  LEFT greater than RIGHT pleural effusions.    < end of copied text >    called [facility] for consult.   monitor VS

## 2019-01-23 NOTE — H&P ADULT - HISTORY OF PRESENT ILLNESS
"87 YO FEMALE SENT FOR TRANSFUSION. PT HAS HX MYELODYSPLASIA AND OTHER CONDITION THAT DR BLANKENSHIP IS FOLLOWING. PT HERE IN DEC AFTER INCURRING A PELVIC FRACTURE. FROM [facility] WENT TO [facility] WHERE SHE IS CURRENTLY UNDERGOING CARE. PT HAS A DVT RUE AND IS ON LOVENOX. SHE IS FEELING WEAK AND HAS POOR APPETITE BUT SHE STATES SHE IS "NOT AN EATER" PT ALSO HAS A HISTORY OF LOW SODIUM.  	MED HX Hypothyroidism (acquired)    	Myelodysplastic syndrome    	Myelofibrosis    Thrombocytosis  ANNMARIE 2 thrombocytosis  Above ED HPI reviewed and noted: History obtained from Chart and patients daughter. She is an 88 year old female sent from [facility] for transfusion. Pt has a significant history of Left pubis fracture s/p fall while at assisted living facility. Thrombocytopenia (baseline 60-70’s); Anemia (baseline Hemoglobin 6.5-6.9); Myelodysplastic syndrome; myelofibrosis and RUE DVT diagnosed 1/16 and started on Lovenox. Patient has had no changes in stool or urine color, no abdominal pain and no discomfort complaints at this time.  "  above HPI reviewed NH papers reviewed. verified with patient and daughters at bedside. patient prefers daughter Ilya speaks and sign papers o behalf of her. she feels weak and tires. denied any pain. denied CP/SOB/aplpitation/lightheadedness/abd pain/n/v/d/c/dysuria/headaches. admits limb weakness for gen weakness and DVT and pelvic fracture. all other ROS neg

## 2019-01-23 NOTE — H&P ADULT - NSHPPHYSICALEXAM_GEN_ALL_CORE
Vital Signs Last 24 Hrs  T(C): 37.4 (23 Jan 2019 15:26), Max: 37.4 (23 Jan 2019 15:26)  T(F): 99.3 (23 Jan 2019 15:26), Max: 99.3 (23 Jan 2019 15:26)  HR: 96 (23 Jan 2019 15:26) (96 - 102)  BP: 105/58 (23 Jan 2019 15:26) (105/58 - 113/67)  BP(mean): --  RR: 17 (23 Jan 2019 15:26) (17 - 20)  SpO2: 97% (23 Jan 2019 15:26) (97% - 97%)    CAPILLARY BLOOD GLUCOSE      GENERAL: Not in distress. Alert. pale looking, cachectic  HEENT:  Normocephalic and atraumatic. PEARLA,EOMI. pallor+ no icterus  NECK: Supple.  No JVD.    CARDIOVASCULAR: RRR S1, S2. No murmur/rubs/gallop  LUNGS: BLAE+, no rales, no wheezing, no rhonchi.    ABDOMEN: ND. Soft,  NT, no guarding / rebound / rigidity. BS normoactive. No CVA tenderness.    BACK: No spine tenderness.  EXTREMITIES: no cyanosis, no clubbing, no leg edema. LUE swelling and tenderness+  SKIN: no rash. No skin break or ulcer.   NEUROLOGIC: AAO*3.speech fluent.  strnght 5/5 LUE, 4+/5 RUE. 4+/5 bLUE due to giveaway weakness  PSYCHIATRIC: Calm.  No agitation.

## 2019-01-23 NOTE — H&P ADULT - PMH
Acute deep vein thrombosis (DVT) of right upper extremity, unspecified vein    Fall, sequela    Hypothyroidism (acquired)    Myelodysplastic syndrome    Myelofibrosis    Pelvic fracture    Thrombocytosis  ANNMARIE 2 thrombocytosis

## 2019-01-23 NOTE — ED ADULT NURSE REASSESSMENT NOTE - NS ED NURSE REASSESS COMMENT FT1
pt status unchanged, refer to flowsheet and chart, pt safety maintained, pt hemodynamically stable. Pt resting comfortably awaiting blood transfusion. NAD noted at this time. Ctm

## 2019-01-23 NOTE — ED ADULT TRIAGE NOTE - CHIEF COMPLAINT QUOTE
patient was sent from the MD office for Low Hemoglobin, patient has Right arm blood clot. being treated with Lovenox. patient denies pain, vomiting or diarrhea.

## 2019-01-23 NOTE — ED ADULT NURSE REASSESSMENT NOTE - NS ED NURSE REASSESS COMMENT FT1
s/p 1u prbc. no s/s adverse rx. asymptomatic. pending bed. daughter at bedside. updated on plan of care, verbalize understanding. call bell in reach

## 2019-01-23 NOTE — H&P ADULT - NSHPLABSRESULTS_GEN_ALL_CORE
4.5    18.6  )-----------( 56       ( 23 Jan 2019 14:20 )             14.3     01-23    132<L>  |  96<L>  |  17.0  ----------------------------<  113  3.7   |  23.0  |  0.62    Ca    8.2<L>      23 Jan 2019 14:20    TPro  7.3  /  Alb  2.7<L>  /  TBili  0.6  /  DBili  x   /  AST  14  /  ALT  10  /  AlkPhos  141<H>  01-23    PT/INR - ( 23 Jan 2019 14:20 )   PT: 15.7 sec;   INR: 1.35 ratio         PTT - ( 23 Jan 2019 14:20 )  PTT:31.0 sec

## 2019-01-23 NOTE — ED ADULT NURSE NOTE - NSIMPLEMENTINTERV_GEN_ALL_ED
Implemented All Fall Risk Interventions:  [place] to call system. Call bell, personal items and telephone within reach. Instruct patient to call for assistance. Room bathroom lighting operational. Non-slip footwear when patient is off stretcher. Physically safe environment: no spills, clutter or unnecessary equipment. Stretcher in lowest position, wheels locked, appropriate side rails in place. Provide visual cue, wrist band, yellow gown, etc. Monitor gait and stability. Monitor for mental status changes and reorient to person, place, and time. Review medications for side effects contributing to fall risk. Reinforce activity limits and safety measures with patient and family.

## 2019-01-24 LAB
ALBUMIN SERPL ELPH-MCNC: 2.6 G/DL — LOW (ref 3.3–5.2)
ALP SERPL-CCNC: 133 U/L — HIGH (ref 40–120)
ALT FLD-CCNC: 10 U/L — SIGNIFICANT CHANGE UP
ANION GAP SERPL CALC-SCNC: 11 MMOL/L — SIGNIFICANT CHANGE UP (ref 5–17)
ANISOCYTOSIS BLD QL: SLIGHT — SIGNIFICANT CHANGE UP
AST SERPL-CCNC: 12 U/L — SIGNIFICANT CHANGE UP
BASOPHILS # BLD AUTO: 0 K/UL — SIGNIFICANT CHANGE UP (ref 0–0.2)
BASOPHILS NFR BLD AUTO: 0 % — SIGNIFICANT CHANGE UP (ref 0–2)
BILIRUB SERPL-MCNC: 0.7 MG/DL — SIGNIFICANT CHANGE UP (ref 0.4–2)
BLASTS # FLD: 18 % — HIGH (ref 0–0)
BUN SERPL-MCNC: 17 MG/DL — SIGNIFICANT CHANGE UP (ref 8–20)
CALCIUM SERPL-MCNC: 8.2 MG/DL — LOW (ref 8.6–10.2)
CHLORIDE SERPL-SCNC: 98 MMOL/L — SIGNIFICANT CHANGE UP (ref 98–107)
CO2 SERPL-SCNC: 25 MMOL/L — SIGNIFICANT CHANGE UP (ref 22–29)
CREAT SERPL-MCNC: 0.69 MG/DL — SIGNIFICANT CHANGE UP (ref 0.5–1.3)
DACRYOCYTES BLD QL SMEAR: SLIGHT — SIGNIFICANT CHANGE UP
EOSINOPHIL # BLD AUTO: 0.1 K/UL — SIGNIFICANT CHANGE UP (ref 0–0.5)
EOSINOPHIL NFR BLD AUTO: 1 % — SIGNIFICANT CHANGE UP (ref 0–5)
GLUCOSE SERPL-MCNC: 106 MG/DL — SIGNIFICANT CHANGE UP (ref 70–115)
HCT VFR BLD CALC: 19.4 % — CRITICAL LOW (ref 37–47)
HGB BLD-MCNC: 6.3 G/DL — CRITICAL LOW (ref 12–16)
LACTATE BLDV-MCNC: 0.5 MMOL/L — SIGNIFICANT CHANGE UP (ref 0.5–2)
LDH SERPL L TO P-CCNC: 229 U/L — HIGH (ref 98–192)
LYMPHOCYTES # BLD AUTO: 19.2 % — LOW (ref 20–55)
LYMPHOCYTES # BLD AUTO: 2.9 K/UL — SIGNIFICANT CHANGE UP (ref 1–4.8)
MACROCYTES BLD QL: SLIGHT — SIGNIFICANT CHANGE UP
MCHC RBC-ENTMCNC: 30.1 PG — SIGNIFICANT CHANGE UP (ref 27–31)
MCHC RBC-ENTMCNC: 32.5 G/DL — SIGNIFICANT CHANGE UP (ref 32–36)
MCV RBC AUTO: 92.8 FL — SIGNIFICANT CHANGE UP (ref 81–99)
METAMYELOCYTES # FLD: 3 % — HIGH (ref 0–0)
MICROCYTES BLD QL: SLIGHT — SIGNIFICANT CHANGE UP
MONOCYTES # BLD AUTO: 10.6 K/UL — HIGH (ref 0–0.8)
MONOCYTES NFR BLD AUTO: 69.4 % — HIGH (ref 3–10)
MYELOCYTES NFR BLD: 4 % — HIGH (ref 0–0)
NEUTROPHILS # BLD AUTO: 0.8 K/UL — LOW (ref 1.8–8)
NEUTROPHILS NFR BLD AUTO: 4.9 % — LOW (ref 37–73)
NEUTS BAND # BLD: 1 % — SIGNIFICANT CHANGE UP (ref 0–8)
OVALOCYTES BLD QL SMEAR: SLIGHT — SIGNIFICANT CHANGE UP
PLAT MORPH BLD: NORMAL — SIGNIFICANT CHANGE UP
PLATELET # BLD AUTO: 41 K/UL — LOW (ref 150–400)
POIKILOCYTOSIS BLD QL AUTO: SLIGHT — SIGNIFICANT CHANGE UP
POTASSIUM SERPL-MCNC: 3.4 MMOL/L — LOW (ref 3.5–5.3)
POTASSIUM SERPL-SCNC: 3.4 MMOL/L — LOW (ref 3.5–5.3)
PROT SERPL-MCNC: 7.2 G/DL — SIGNIFICANT CHANGE UP (ref 6.6–8.7)
RBC # BLD: 2.09 M/UL — LOW (ref 4.4–5.2)
RBC # BLD: 2.09 M/UL — LOW (ref 4.4–5.2)
RBC # FLD: 21.9 % — HIGH (ref 11–15.6)
RBC BLD AUTO: ABNORMAL
RETICS #: 2.6 K/UL — LOW (ref 25–125)
RETICS/RBC NFR: 1.2 % — SIGNIFICANT CHANGE UP (ref 0.5–2.5)
SCHISTOCYTES BLD QL AUTO: SLIGHT — SIGNIFICANT CHANGE UP
SODIUM SERPL-SCNC: 134 MMOL/L — LOW (ref 135–145)
VARIANT LYMPHS # BLD: 5 % — SIGNIFICANT CHANGE UP (ref 0–6)
WBC # BLD: 15.4 K/UL — HIGH (ref 4.8–10.8)
WBC # FLD AUTO: 15.4 K/UL — HIGH (ref 4.8–10.8)

## 2019-01-24 PROCEDURE — 99232 SBSQ HOSP IP/OBS MODERATE 35: CPT

## 2019-01-24 RX ORDER — RUXOLITINIB 25 MG/1
10 TABLET ORAL DAILY
Qty: 0 | Refills: 0 | Status: DISCONTINUED | OUTPATIENT
Start: 2019-01-24 | End: 2019-01-25

## 2019-01-24 RX ORDER — DIPHENHYDRAMINE HCL 50 MG
25 CAPSULE ORAL EVERY 4 HOURS
Qty: 0 | Refills: 0 | Status: COMPLETED | OUTPATIENT
Start: 2019-01-24 | End: 2019-01-24

## 2019-01-24 RX ORDER — ACETAMINOPHEN 500 MG
650 TABLET ORAL EVERY 4 HOURS
Qty: 0 | Refills: 0 | Status: COMPLETED | OUTPATIENT
Start: 2019-01-24 | End: 2019-01-24

## 2019-01-24 RX ORDER — POTASSIUM CHLORIDE 20 MEQ
40 PACKET (EA) ORAL ONCE
Qty: 0 | Refills: 0 | Status: COMPLETED | OUTPATIENT
Start: 2019-01-24 | End: 2019-01-24

## 2019-01-24 RX ADMIN — Medication 2000 UNIT(S): at 14:19

## 2019-01-24 RX ADMIN — Medication 100 MILLIGRAM(S): at 14:21

## 2019-01-24 RX ADMIN — Medication 650 MILLIGRAM(S): at 15:01

## 2019-01-24 RX ADMIN — CELECOXIB 200 MILLIGRAM(S): 200 CAPSULE ORAL at 15:01

## 2019-01-24 RX ADMIN — PANTOPRAZOLE SODIUM 40 MILLIGRAM(S): 20 TABLET, DELAYED RELEASE ORAL at 05:06

## 2019-01-24 RX ADMIN — Medication 40 MILLIEQUIVALENT(S): at 14:19

## 2019-01-24 RX ADMIN — RUXOLITINIB 10 MILLIGRAM(S): 25 TABLET ORAL at 14:21

## 2019-01-24 RX ADMIN — Medication 650 MILLIGRAM(S): at 14:20

## 2019-01-24 RX ADMIN — Medication 25 MILLIGRAM(S): at 09:46

## 2019-01-24 RX ADMIN — ESCITALOPRAM OXALATE 10 MILLIGRAM(S): 10 TABLET, FILM COATED ORAL at 14:19

## 2019-01-24 RX ADMIN — CELECOXIB 200 MILLIGRAM(S): 200 CAPSULE ORAL at 14:18

## 2019-01-24 RX ADMIN — Medication 2 TABLET(S): at 14:19

## 2019-01-24 RX ADMIN — Medication 650 MILLIGRAM(S): at 00:01

## 2019-01-24 RX ADMIN — Medication 500 MILLIGRAM(S): at 14:19

## 2019-01-24 RX ADMIN — Medication 650 MILLIGRAM(S): at 09:17

## 2019-01-24 RX ADMIN — Medication 650 MILLIGRAM(S): at 09:55

## 2019-01-24 RX ADMIN — Medication 75 MICROGRAM(S): at 05:05

## 2019-01-24 RX ADMIN — Medication 25 MILLIGRAM(S): at 14:43

## 2019-01-24 NOTE — PROGRESS NOTE ADULT - ASSESSMENT
88 year old female sent from [facility] for transfusion. Pt has a significant history of Left pubis fracture s/p fall while at assisted living facility. Thrombocytopenia (baseline 60-70’s); Anemia (baseline Hemoglobin 6.5-6.9); Myelodysplastic syndrome; myelofibrosis and RUE DVT diagnosed 1/16 and started on Lovenox. Patient has had no changes in stool or urine color, no abdominal pain and no discomfort complaints at this time.       1) Acute on chronic blood loss anemia with hx of Myelodysplastic syndrome; myelofibrosis  - s/p 2 units PRBC 1/23/19, will transfuse 2 more units today  - Stool occult peding    2) Pelvic Hematoma  - CT shows hematoma at site of pelvic fx  - will monitor H/H and patient  - can be due ot recent lovenox injection for ?DVT?    3) Right upper extremity Myxoma  - DVT was ruled out on recent U/S  - per discussion with Dr Montes, hold lovenox going forward and follow up with him in his office    4) Myelodysplastic syndrome; myelofibrosis  - Currently being managed by Onc Dr. Montes   - pt using her own [employer], pharmacy aware    - Leukocytosis, anemia and thrombocytopenia chronic, will monitor    5) Hypothyroid   - c/w synthroid    6) Prophylactic measure:  - DVT: SCD

## 2019-01-24 NOTE — CONSULT NOTE ADULT - SUBJECTIVE AND OBJECTIVE BOX
REASON FOR CONSULTATION    HPI:  "87 YO FEMALE SENT FOR TRANSFUSION. PT HAS HX MYELODYSPLASIA AND OTHER CONDITION THAT DR BLANKENSHIP IS FOLLOWING. PT HERE IN DEC AFTER INCURRING A PELVIC FRACTURE. FROM [facility] WENT TO [facility] WHERE SHE IS CURRENTLY UNDERGOING CARE. PT HAS A DVT RUE AND IS ON LOVENOX. SHE IS FEELING WEAK AND HAS POOR APPETITE BUT SHE STATES SHE IS "NOT AN EATER" PT ALSO HAS A HISTORY OF LOW SODIUM.  	MED HX Hypothyroidism (acquired)    	Myelodysplastic syndrome    	Myelofibrosis  - treated with Jakafi  Thrombocytosis  ANNMARIE 2 thrombocytosis  Above ED HPI reviewed and noted: History obtained from Chart and patients daughter. She is an 88 year old female sent from [facility] for transfusion. Pt has a significant history of Left pubis fracture s/p fall while at assisted living facility. Thrombocytopenia (baseline 60-70’s); Anemia (baseline Hemoglobin 6.5-6.9); Myelodysplastic syndrome; myelofibrosis and RUE DVT diagnosed 1/16 and started on Lovenox. Patient has had no changes in stool or urine color, no abdominal pain and no discomfort complaints at this time.  "  above HPI reviewed NH papers reviewed. verified with patient and daughters at bedside. patient prefers daughter Ilya speaks and sign papers o behalf of her. she feels weak and tires. denied any pain. denied CP/SOB/aplpitation/lightheadedness/abd pain/n/v/d/c/dysuria/headaches. admits limb weakness for gen weakness and DVT and pelvic fracture. all other ROS neg (23 Jan 2019 17:32)    Sent in for transfusion with H/H 4.5/14.3.  REVIEW OF SYSTEMS:    CONSTITUTIONAL: No fever. + fatigue  RESPIRATORY: No cough, wheezing, chills or hemoptysis; No shortness of breath  CARDIOVASCULAR: No chest pain, palpitations, dizziness, or leg swelling  GASTROINTESTINAL: No abdominal or epigastric pain. No nausea, vomiting, or hematemesis; No diarrhea or constipation. No melena or hematochezia.  GENITOURINARY: No dysuria, frequency, hematuria, or incontinence  NEUROLOGICAL: No headaches, memory loss, loss of strength, numbness, or tremors  SKIN: No itching, burning, rashes, or lesions   LYMPH NODES: No enlarged glands  ENDOCRINE: No heat or cold intolerance; No hair loss  MUSCULOSKELETAL: No joint pain or swelling; No muscle, back, or extremity pain  PSYCHIATRIC: No depression, anxiety, mood swings, or difficulty sleeping  HEME/LYMPH: Enlarging hematoma since starting lovenox 1/16 for RUE DVT      PAST MEDICAL & SURGICAL HISTORY:  Acute deep vein thrombosis (DVT) of right upper extremity, unspecified vein  Fall, sequela  Pelvic fracture  Myelofibrosis  Thrombocytosis: ANNMARIE 2 thrombocytosis  Hypothyroidism (acquired)  Myelodysplastic syndrome  H/O total vaginal hysterectomy      SOCIAL HISTORY:    FAMILY HISTORY:  No pertinent family history in first degree relatives      SOCIAL HISTORY:    Allergies    No Known Allergies    Intolerances        MEDICATIONS  (STANDING):  ascorbic acid 500 milliGRAM(s) Oral daily  calcium carbonate    500 mG (Tums) Chewable 2 Tablet(s) Chew daily  celecoxib 200 milliGRAM(s) Oral daily  cholecalciferol 2000 Unit(s) Oral daily  docusate sodium 100 milliGRAM(s) Oral three times a day  escitalopram 10 milliGRAM(s) Oral daily  levothyroxine 75 MICROGram(s) Oral daily  pantoprazole    Tablet 40 milliGRAM(s) Oral before breakfast  ruxolitinib 10 milliGRAM(s) Oral daily    MEDICATIONS  (PRN):  acetaminophen   Tablet .. 650 milliGRAM(s) Oral every 6 hours PRN Temp greater or equal to 38C (100.4F)      Vital Signs Last 24 Hrs  T(C): 36.6 (24 Jan 2019 17:23), Max: 37.2 (23 Jan 2019 19:44)  T(F): 97.9 (24 Jan 2019 17:23), Max: 98.9 (23 Jan 2019 19:44)  HR: 98 (24 Jan 2019 17:23) (76 - 105)  BP: 117/62 (24 Jan 2019 17:23) (108/61 - 121/63)  BP(mean): --  RR: 18 (24 Jan 2019 17:23) (16 - 18)  SpO2: 98% (24 Jan 2019 17:23) (98% - 99%)    PHYSICAL EXAM:    GENERAL: fatigued, bedridden  HEAD:  Atraumatic, Normocephalic  EYES: EOMI, PERRLA, conjunctiva and sclera clear  NECK: Supple, No JVD, Normal thyroid  CHEST/LUNG: Clear to percussion bilaterally; No rales, rhonchi, wheezing, or rubs  HEART: Regular rate and rhythm; No murmurs, rubs, or gallops  ABDOMEN: Soft, Nontender, Nondistended; Bowel sounds present  EXTREMITIES:  2+ Peripheral Pulses, No clubbing, cyanosis, or edema  LYMPH: No lymphadenopathy noted  SKIN: No rashes or lesions      LABS:                        6.3    15.4  )-----------( 41       ( 24 Jan 2019 06:04 )             19.4     01-24    134<L>  |  98  |  17.0  ----------------------------<  106  3.4<L>   |  25.0  |  0.69    Ca    8.2<L>      24 Jan 2019 06:04    TPro  7.2  /  Alb  2.6<L>  /  TBili  0.7  /  DBili  x   /  AST  12  /  ALT  10  /  AlkPhos  133<H>  01-24    PT/INR - ( 23 Jan 2019 14:20 )   PT: 15.7 sec;   INR: 1.35 ratio         PTT - ( 23 Jan 2019 14:20 )  PTT:31.0 sec

## 2019-01-24 NOTE — CONSULT NOTE ADULT - SUBJECTIVE AND OBJECTIVE BOX
TRAUMA SURGERY CONSULT    HPI: 88y Female hx MDS presents to ED due with acute on chronic anemia on routine labs with Dr. Montes her hematologist. baseline 6.5 Hgb. 4 weeks ago, patient fell from standing, with b/l pelvic fx which were nonop per orthopedics, underwent syncopal workup which was negative, treated for UTI and discharged to rehab. Patient had stable H&H, however 1 week ago developed RUE swelling found with R brachial DVT and started on therapeutic lovenox. At that time her H&H was at baseline, however this Tuesday her labs showed decreased Hgb and Dr. Montes recommended patient present to ED for further workup and care. Patient had CTA for r/o GIB which found instead a slightly increased in size pelvic hematoma from prior. At the current time patient denies fevers/chills, denies lightheadedness/dizziness, denies SOB/chest pain, denies nausea/vomiting. Feels well, complains of no pain anywhere in her body. Denies numbness/tingling.    A airway intact, speaking full sentences  B equal breath sounds bilaterally  C radial/DP/femoral pulses intact bilaterally   D GCS15 [id number], moving all fours, no focal deficits, pupils R 3mm reactive/L 3mm reactive  E patient fully exposed, no gross deformities or bleeding, provided warm blankets    ROS:   General: denies fatigue, unintended weight loss or night sweats  Neuro: denies focal weakness, numbness or tingling  CV: denies chest pain, chest pressure or palpitations  Resp: denies shortness of breath, pleuritic pain, cough, or wheezing  GI: denies changes in bowel movement, melena or BRBPR  : denies dysuria, hematuria, urinary frequency or urinary urgency  MSK: denies myalgias    PAST MEDICAL & SURGICAL HISTORY:  Acute deep vein thrombosis (DVT) of right upper extremity, unspecified vein  Fall, sequela  Pelvic fracture  Myelofibrosis  Thrombocytosis: ANNMARIE 2 thrombocytosis  Hypothyroidism (acquired)  Myelodysplastic syndrome  H/O total vaginal hysterectomy    FAMILY HISTORY:  No pertinent family history in first degree relatives    [x] Family history not pertinent as reviewed with the patient and family    SOCIAL HISTORY: lives at home normally and functional, but since her fall has been at rehab    ALLERGIES: NKA No Known Allergies    Home Medications:  docusate sodium 100 mg oral capsule: 1 cap(s) orally 3 times a day (02 Jan 2019 08:49)  Jakafi 10 mg oral tablet: 1 cap(s) orally once a day (29 Dec 2018 13:37)  lidocaine 5% topical film: Apply topically to affected area once a day. 12 hours on followed by 12 hours off (02 Jan 2019 08:52)  lisinopril 5 mg oral tablet: 1 tab(s) orally once a day (02 Jan 2019 08:49)  Lovenox 60 mg/0.6 mL injectable solution: 60 milligram(s) injectable 2 times a day (23 Jan 2019 19:03)  mirtazapine 30 mg oral tablet: 1 tab(s) orally once a day (02 Jan 2019 08:49)  sodium chloride 1 g oral tablet: 1 tab(s) orally 2 times a day (23 Jan 2019 19:03)      --------------------------------------------------------------------------------------------    PHYSICAL EXAM:   General: NAD, Lying in bed comfortably  Neuro: A+Ox3  HEENT: NC/AT, EOMI  Neck: Soft, supple  Cardio: RRR, nml S1/S2  Resp: Good effort, CTA b/l  Thorax: No chest wall tenderness  Breast: No lesions/masses, no drainage  GI/Abd: Soft, NT/ND, no rebound/guarding, no masses palpated  Vascular: All 4 extremities warm. motor and sensatino intact in all 4 extremities  Skin: RUE swelling in forearm  Lymphatic/Nodes: No palpable lymphadenopathy  DALIA: no blood, soft stool ni the vault, low rectal tone but present  Pelvis: stable  Musculoskeletal: All 4 extremities moving spontaneously, no limitations, no spinal tenderness or stepoffs  --------------------------------------------------------------------------------------------    LABS                 4.5    18.6   )----------(  56        ( 23 Jan 2019 14:20 )               14.3      132    |  96     |  17.0   ----------------------------<  113        ( 23 Jan 2019 14:20 )  3.7     |  23.0   |  0.62     Ca    8.2        ( 23 Jan 2019 14:20 )    TPro  7.3    /  Alb  2.7    /  TBili  0.6    /  DBili  x      /  AST  14     /  ALT  10     /  AlkPhos  141    ( 23 Jan 2019 14:20 )    LIVER FUNCTIONS - ( 23 Jan 2019 14:20 )  Alb: 2.7 g/dL / Pro: 7.3 g/dL / ALK PHOS: 141 U/L / ALT: 10 U/L / AST: 14 U/L / GGT: x           PT/INR -  15.7 sec / 1.35 ratio   ( 23 Jan 2019 14:20 )       PTT -  31.0 sec   ( 23 Jan 2019 14:20 )        --------------------------------------------------------------------------------------------  IMAGING  CTA   IMPRESSION:     No evidence of active GI bleeding.  A chronic a RIGHT superior inferior pubic ramus with enlarging RIGHT   internal obturator hematoma joint which extends anteriorly into the space   of Retzius deviating bladder towards the RIGHT . abscess cannot be ruled   out.  There is diffuse thickening of the rectum and distal sigmoid colon,   concerning for proctitis or with a presacral soft tissue swelling.  Large a splenic calcifications as described. splenomegaly.  Cholelithiasis.  Bladder is distended, bladder measuring 15.6 cm in height.  RIGHT middle lobe a nodule/mass of indeterminate etiology.  LEFT greater than RIGHT pleural effusions.      ASSESSMENT: Patient is a 88y old female with L pelvic hematoma slightly increased on theraprutic anticoaguation with RUE DVT    PLAN:    - recommend owen placement for urinary retention  - trend H&H  - hold anticoagulation  - transfuse PRN  - will follow  - Patient seen/examined with senior resident  - Plan discussed with Attending, Dr. Celeste

## 2019-01-24 NOTE — PROGRESS NOTE ADULT - SUBJECTIVE AND OBJECTIVE BOX
KEVIN RIVERAJORGITO    [id number]    88y      Female    CC: acute on chronic anemia  88 year old female sent from [facility] for transfusion. Pt has a significant history of Left pubis fracture s/p fall while at assisted living facility. Thrombocytopenia (baseline 60-70’s); Anemia (baseline Hemoglobin 6.5-6.9); Myelodysplastic syndrome; myelofibrosis and RUE DVT diagnosed 1/16 and started on Lovenox. Patient has had no changes in stool or urine color, no abdominal pain and no discomfort complaints at this time.     pt received 2 units of PRBC 1/23/19    Pt denies any sx of sob, no n/v or abd pain, no chest pain    INTERVAL HPI/OVERNIGHT EVENTS:    REVIEW OF SYSTEMS:    CONSTITUTIONAL: No fever,  + fatigue  RESPIRATORY: No cough, wheezing, hemoptysis; No shortness of breath  CARDIOVASCULAR: No chest pain, palpitations  GASTROINTESTINAL: No abdominal or epigastric pain. No nausea, vomiting      Vital Signs Last 24 Hrs  T(C): 36.6 (24 Jan 2019 09:47), Max: 37.4 (23 Jan 2019 15:26)  T(F): 97.9 (24 Jan 2019 09:47), Max: 99.3 (23 Jan 2019 15:26)  HR: 93 (24 Jan 2019 09:47) (76 - 96)  BP: 121/63 (24 Jan 2019 09:47) (105/58 - 125/59)  BP(mean): --  RR: 18 (24 Jan 2019 09:47) (16 - 18)  SpO2: 98% (24 Jan 2019 09:47) (97% - 99%)    PHYSICAL EXAM:    GENERAL: NAD, well-groomed  HEENT: PERRL, +EOMI  CHEST/LUNG: Clear to auscultation bilaterally; No wheezing  HEART: S1S2+, Regular rate and rhythm; No murmurs  ABDOMEN: Soft, Nontender, Nondistended; Bowel sounds present  Ext: no edema no tender, no cynsosis      LABS:                        6.3    15.4  )-----------( 41       ( 24 Jan 2019 06:04 )             19.4     01-24    134<L>  |  98  |  17.0  ----------------------------<  106  3.4<L>   |  25.0  |  0.69    Ca    8.2<L>      24 Jan 2019 06:04    TPro  7.2  /  Alb  2.6<L>  /  TBili  0.7  /  DBili  x   /  AST  12  /  ALT  10  /  AlkPhos  133<H>  01-24    PT/INR - ( 23 Jan 2019 14:20 )   PT: 15.7 sec;   INR: 1.35 ratio         PTT - ( 23 Jan 2019 14:20 )  PTT:31.0 sec        MEDICATIONS  (STANDING):  acetaminophen   Tablet .. 650 milliGRAM(s) Oral every 4 hours  ascorbic acid 500 milliGRAM(s) Oral daily  calcium carbonate    500 mG (Tums) Chewable 2 Tablet(s) Chew daily  celecoxib 200 milliGRAM(s) Oral daily  cholecalciferol 2000 Unit(s) Oral daily  diphenhydrAMINE   Injectable 25 milliGRAM(s) IV Push every 4 hours  docusate sodium 100 milliGRAM(s) Oral three times a day  escitalopram 10 milliGRAM(s) Oral daily  levothyroxine 75 MICROGram(s) Oral daily  pantoprazole    Tablet 40 milliGRAM(s) Oral before breakfast  potassium chloride    Tablet ER 40 milliEquivalent(s) Oral once  ruxolitinib 10 milliGRAM(s) Oral daily    MEDICATIONS  (PRN):  acetaminophen   Tablet .. 650 milliGRAM(s) Oral every 6 hours PRN Temp greater or equal to 38C (100.4F)      RADIOLOGY & ADDITIONAL TESTS:  CT abd/pelvis:  FINDINGS:   No evidence of active GI bleeding seen. I there is thickening of the   rectal and distal sigmoid colon wall which may indicate colitis. There is   presacral soft tissue infiltration Hounsfield units measuring 14 may be   secondary to patient's known no fractures or secondary to proctitis.  Remaining large bowel, small bowel, duodenum and stomach are unremarkable.  RIGHT superior-inferior pubic ramus fracture noted with no intra muscular   hematoma/abscess within the internal obturator muscle extending superior   in the space of Retzius, superior component measuring 4.4 x 3.7 cm   compressing the bladder towards RIGHT . multiple lung and healed   fractures seen within the inferior LEFT pubic ramus . no active   extravasation of contrast seen within the internal obturator muscle   hematoma.    RIGHT middle lobe nodule/mass measures 1.9 x 1 cm . I there is a mild   LEFT pleural effusion with bilateral LEFT greater than RIGHT since   segmental and subsegmental airspace consolidation/atelectasis .  There is no free intra-abdominal air or ascites.  There are multiple large anterior splenic of corticated calcifications   largest lesion measuring 2.7 cm noted which may represent calcified   splenic arterial aneurysms, calcified splenic hemangiomas,. In the lower   half of the spleen is normal aside from splenomegaly spleen measuring 13   cm in length.  The liver, , pancreas, adrenal glandsare normal.  Gallbladder contains multiple gallstones without secondary signs of acute   cholecystitis.  There is no intra or extrahepatic biliary ductal dilatation.    Both kidneys show normal uptake of contrast media without masses or   hydronephrosis.      The reproductive organs are within normal limits.       There are no retroperitoneal abnormal lymphadenopathy.  The retroperitoneal vessels are normal.      There is severe degenerative spondylosis of lower lumbar spine noted   particularly involving L4-5-5 S1 disc space resulting in mild   multifactorial central canal stenosis@L4-5 central canal. Bilateral facet   arthrosis noted.      IMPRESSION:     No evidence of active GI bleeding.  A chronic a RIGHT superior inferior pubic ramus with enlarging RIGHT   internal obturator hematoma joint which extends anteriorly into the space   of Retzius deviating bladder towards the RIGHT . abscess cannot be ruled   out.  There is diffuse thickening of the rectum and distal sigmoid colon,   concerning for proctitis or with a presacral soft tissue swelling.  Large a splenic calcifications as described . splenomegaly.  Cholelithiasis.  Bladder is distended, bladder measuring 15.6 cm in height.  RIGHT middle lobe a nodule/mass of indeterminate etiology.  LEFT greater than RIGHT pleural effusions.    PARISA BLACK M.D., ATTENDING RADIOLOGIST  This document has been electronically signed. Jan 23 2019  7:13PM        U/S RUE:  FINDINGS:    The right internal jugular, subclavian, axillary, brachial, basilic and   cephalic veins are patent and compressible where applicable.     Doppler examination shows normal spontaneous and phasic flow.    Note is made of a heterogeneous mass in the antecubital fossa measuring   4.4 x 2.1 x 2.8 cm. There is no flow within this mass. There is high   attenuation material layering posteriorly. This most likely represents   myxoma.    IMPRESSION:     No evidence of right upper extremity deep venous thrombosis. Probable   hematoma in the antecubital fossa.

## 2019-01-24 NOTE — CONSULT NOTE ADULT - ASSESSMENT
MDS/myelofibrosis in this 89 y/o woman with transfusion-dependent anemia, who sustained a hip fracture and has been in rehab facility. RUE DVT prompted use of lovenox, and anemia has worsened since that time, with an expanding hematoma within internal obturator muscle seen on CT.   The plan is to discontinue lovenox, transfuse to 8-9 grams, and discharge back to Rehab facility.  Jakafi 10 mg daily will be continued as therapy for myelofibrosis.

## 2019-01-25 ENCOUNTER — APPOINTMENT (OUTPATIENT)
Dept: ORTHOPEDIC SURGERY | Facility: CLINIC | Age: 84
End: 2019-01-25

## 2019-01-25 ENCOUNTER — TRANSCRIPTION ENCOUNTER (OUTPATIENT)
Age: 84
End: 2019-01-25

## 2019-01-25 VITALS
SYSTOLIC BLOOD PRESSURE: 116 MMHG | HEART RATE: 81 BPM | TEMPERATURE: 99 F | RESPIRATION RATE: 18 BRPM | DIASTOLIC BLOOD PRESSURE: 64 MMHG | OXYGEN SATURATION: 93 %

## 2019-01-25 LAB
ANION GAP SERPL CALC-SCNC: 11 MMOL/L — SIGNIFICANT CHANGE UP (ref 5–17)
BUN SERPL-MCNC: 21 MG/DL — HIGH (ref 8–20)
CALCIUM SERPL-MCNC: 8.1 MG/DL — LOW (ref 8.6–10.2)
CHLORIDE SERPL-SCNC: 100 MMOL/L — SIGNIFICANT CHANGE UP (ref 98–107)
CO2 SERPL-SCNC: 24 MMOL/L — SIGNIFICANT CHANGE UP (ref 22–29)
CREAT SERPL-MCNC: 0.73 MG/DL — SIGNIFICANT CHANGE UP (ref 0.5–1.3)
GLUCOSE SERPL-MCNC: 96 MG/DL — SIGNIFICANT CHANGE UP (ref 70–115)
HCT VFR BLD CALC: 25.2 % — LOW (ref 37–47)
HGB BLD-MCNC: 8.4 G/DL — LOW (ref 12–16)
MAGNESIUM SERPL-MCNC: 1.9 MG/DL — SIGNIFICANT CHANGE UP (ref 1.6–2.6)
MCHC RBC-ENTMCNC: 30.9 PG — SIGNIFICANT CHANGE UP (ref 27–31)
MCHC RBC-ENTMCNC: 33.3 G/DL — SIGNIFICANT CHANGE UP (ref 32–36)
MCV RBC AUTO: 92.6 FL — SIGNIFICANT CHANGE UP (ref 81–99)
PLATELET # BLD AUTO: 47 K/UL — LOW (ref 150–400)
POTASSIUM SERPL-MCNC: 4.3 MMOL/L — SIGNIFICANT CHANGE UP (ref 3.5–5.3)
POTASSIUM SERPL-SCNC: 4.3 MMOL/L — SIGNIFICANT CHANGE UP (ref 3.5–5.3)
RBC # BLD: 2.72 M/UL — LOW (ref 4.4–5.2)
RBC # FLD: 19.6 % — HIGH (ref 11–15.6)
SODIUM SERPL-SCNC: 135 MMOL/L — SIGNIFICANT CHANGE UP (ref 135–145)
WBC # BLD: 17.8 K/UL — HIGH (ref 4.8–10.8)
WBC # FLD AUTO: 17.8 K/UL — HIGH (ref 4.8–10.8)

## 2019-01-25 PROCEDURE — 86900 BLOOD TYPING SEROLOGIC ABO: CPT

## 2019-01-25 PROCEDURE — 83605 ASSAY OF LACTIC ACID: CPT

## 2019-01-25 PROCEDURE — 74174 CTA ABD&PLVS W/CONTRAST: CPT

## 2019-01-25 PROCEDURE — 85027 COMPLETE CBC AUTOMATED: CPT

## 2019-01-25 PROCEDURE — 93971 EXTREMITY STUDY: CPT

## 2019-01-25 PROCEDURE — 85730 THROMBOPLASTIN TIME PARTIAL: CPT

## 2019-01-25 PROCEDURE — 99239 HOSP IP/OBS DSCHRG MGMT >30: CPT

## 2019-01-25 PROCEDURE — 36430 TRANSFUSION BLD/BLD COMPNT: CPT

## 2019-01-25 PROCEDURE — 83615 LACTATE (LD) (LDH) ENZYME: CPT

## 2019-01-25 PROCEDURE — 80048 BASIC METABOLIC PNL TOTAL CA: CPT

## 2019-01-25 PROCEDURE — 99285 EMERGENCY DEPT VISIT HI MDM: CPT | Mod: 25

## 2019-01-25 PROCEDURE — 85610 PROTHROMBIN TIME: CPT

## 2019-01-25 PROCEDURE — 86901 BLOOD TYPING SEROLOGIC RH(D): CPT

## 2019-01-25 PROCEDURE — 96374 THER/PROPH/DIAG INJ IV PUSH: CPT | Mod: XU

## 2019-01-25 PROCEDURE — 80053 COMPREHEN METABOLIC PANEL: CPT

## 2019-01-25 PROCEDURE — 36415 COLL VENOUS BLD VENIPUNCTURE: CPT

## 2019-01-25 PROCEDURE — 97163 PT EVAL HIGH COMPLEX 45 MIN: CPT

## 2019-01-25 PROCEDURE — P9016: CPT

## 2019-01-25 PROCEDURE — 86850 RBC ANTIBODY SCREEN: CPT

## 2019-01-25 PROCEDURE — 86922 COMPATIBILITY TEST ANTIGLOB: CPT

## 2019-01-25 PROCEDURE — 83735 ASSAY OF MAGNESIUM: CPT

## 2019-01-25 PROCEDURE — 85045 AUTOMATED RETICULOCYTE COUNT: CPT

## 2019-01-25 RX ORDER — ENOXAPARIN SODIUM 100 MG/ML
60 INJECTION SUBCUTANEOUS
Qty: 0 | Refills: 0 | COMMUNITY

## 2019-01-25 RX ADMIN — Medication 75 MICROGRAM(S): at 06:02

## 2019-01-25 RX ADMIN — Medication 500 MILLIGRAM(S): at 11:25

## 2019-01-25 RX ADMIN — RUXOLITINIB 10 MILLIGRAM(S): 25 TABLET ORAL at 11:25

## 2019-01-25 RX ADMIN — PANTOPRAZOLE SODIUM 40 MILLIGRAM(S): 20 TABLET, DELAYED RELEASE ORAL at 06:02

## 2019-01-25 RX ADMIN — Medication 2 TABLET(S): at 11:25

## 2019-01-25 RX ADMIN — ESCITALOPRAM OXALATE 10 MILLIGRAM(S): 10 TABLET, FILM COATED ORAL at 11:25

## 2019-01-25 RX ADMIN — Medication 2000 UNIT(S): at 11:24

## 2019-01-25 RX ADMIN — CELECOXIB 200 MILLIGRAM(S): 200 CAPSULE ORAL at 11:25

## 2019-01-25 RX ADMIN — Medication 100 MILLIGRAM(S): at 15:21

## 2019-01-25 NOTE — PROGRESS NOTE ADULT - SUBJECTIVE AND OBJECTIVE BOX
INTERVAL HPI/OVERNIGHT EVENTS: No acute events overnight hemoglobin back to baseline after transfusion. Plan for d/c per primary.    SUBJECTIVE: Denies pain this AM. Afebrile. Tolerating diet. +Urination. + Flatus. +BM. Denies F/C/N/V/CP/SOB.       MEDICATIONS  (STANDING):  ascorbic acid 500 milliGRAM(s) Oral daily  calcium carbonate    500 mG (Tums) Chewable 2 Tablet(s) Chew daily  celecoxib 200 milliGRAM(s) Oral daily  cholecalciferol 2000 Unit(s) Oral daily  docusate sodium 100 milliGRAM(s) Oral three times a day  escitalopram 10 milliGRAM(s) Oral daily  levothyroxine 75 MICROGram(s) Oral daily  pantoprazole    Tablet 40 milliGRAM(s) Oral before breakfast  ruxolitinib 10 milliGRAM(s) Oral daily    MEDICATIONS  (PRN):  acetaminophen   Tablet .. 650 milliGRAM(s) Oral every 6 hours PRN Temp greater or equal to 38C (100.4F)      Vital Signs Last 24 Hrs  T(C): 36.3 (25 Jan 2019 04:04), Max: 36.8 (24 Jan 2019 14:47)  T(F): 97.4 (25 Jan 2019 04:04), Max: 98.2 (24 Jan 2019 14:47)  HR: 81 (25 Jan 2019 04:04) (81 - 105)  BP: 131/75 (25 Jan 2019 04:04) (108/61 - 131/75)  BP(mean): --  RR: 17 (25 Jan 2019 04:04) (17 - 18)  SpO2: 86% (25 Jan 2019 04:04) (86% - 99%)    PE  Gen: AAO x3, NAD  Pulm: CTAB, Symmetrical chest rise  CV: RRR  Abd: Soft, NT, ND, -R/-G  Ext: Decreased ROM b/l 2/2 pain. No C/C/E  Vasc: 2+ Radial, DP pulses  Neuro: No focal neurological deficits      I&O's Detail    24 Jan 2019 07:01  -  25 Jan 2019 07:00  --------------------------------------------------------  IN:    Oral Fluid: 100 mL  Total IN: 100 mL    OUT:  Total OUT: 0 mL    Total NET: 100 mL          LABS:                        8.4    17.8  )-----------( 47       ( 25 Jan 2019 07:25 )             25.2     01-25    135  |  100  |  21.0<H>  ----------------------------<  96  4.3   |  24.0  |  0.73    Ca    8.1<L>      25 Jan 2019 07:25  Mg     1.9     01-25    TPro  7.2  /  Alb  2.6<L>  /  TBili  0.7  /  DBili  x   /  AST  12  /  ALT  10  /  AlkPhos  133<H>  01-24    PT/INR - ( 23 Jan 2019 14:20 )   PT: 15.7 sec;   INR: 1.35 ratio         PTT - ( 23 Jan 2019 14:20 )  PTT:31.0 sec

## 2019-01-25 NOTE — PROGRESS NOTE ADULT - ASSESSMENT
88 year old female sent from [facility] for transfusion. Pt has a significant history of Left pubis fracture s/p fall while at assisted living facility. Thrombocytopenia (baseline 60-70’s); Anemia (baseline Hemoglobin 6.5-6.9); Myelodysplastic syndrome; myelofibrosis and RUE DVT diagnosed 1/16 and started on Lovenox. Patient has had no changes in stool or urine color, no abdominal pain and no discomfort complaints at this time.       1) Acute on chronic blood loss anemia with hx of Myelodysplastic syndrome; myelofibrosis  - s/p 2 units PRBC 1/23/19, and 2 more units 1/24/19  - H/H better  - follow up with Dr Montes to monitor H/H levels    2) Pelvic Hematoma  - CT shows hematoma at site of pelvic fx  - stable  - can be due to recent lovenox injection for ?DVT?    3) Right upper extremity Myxoma  - DVT was ruled out on recent U/S  - per discussion with Dr Montes, hold lovenox going forward and follow up with him in his office    4) Myelodysplastic syndrome; myelofibrosis  - Currently being managed by Onc Dr. Montes   - c/w Gavin   - Leukocytosis, anemia and thrombocytopenia chronic, will monitor    5) Hypothyroid   - c/w synthroid    Disposition: [facility] vs [facility], tp be determined today   pt stable for discharge

## 2019-01-25 NOTE — PROGRESS NOTE ADULT - SUBJECTIVE AND OBJECTIVE BOX
HPI/OVERNIGHT EVENTS: 89yo F s/p fall examined at bedside and found in no acute distress. No events overnight. Patient has no complaints, is tolerating diet, passing gas, and voiding. Denies fever, chills, nausea, vomiting, chest pain, and SOB.    Vital Signs Last 24 Hrs  T(C): 36.4 (24 Jan 2019 20:54), Max: 36.8 (24 Jan 2019 14:47)  T(F): 97.5 (24 Jan 2019 20:54), Max: 98.2 (24 Jan 2019 14:47)  HR: 85 (24 Jan 2019 20:54) (85 - 105)  BP: 111/61 (24 Jan 2019 20:54) (108/61 - 121/63)  BP(mean): --  RR: 18 (24 Jan 2019 20:54) (18 - 18)  SpO2: 98% (24 Jan 2019 20:54) (98% - 99%)    I&O's Detail    24 Jan 2019 07:01  -  25 Jan 2019 05:32  --------------------------------------------------------  IN:    Oral Fluid: 100 mL  Total IN: 100 mL    OUT:  Total OUT: 0 mL    Total NET: 100 mL          Constitutional: patient resting comfortably in bed, in no acute distress  HEENT: EOMI / PERRL   Neck: No JVD, full ROM without pain  Respiratory: CTAB, respirations are unlabored, no accessory muscle use, no conversational dyspnea  Cardiovascular: regular rate & rhythm  Gastrointestinal: Abdomen soft, non-tender, non-distended, no rebound tenderness / guarding  MSK: RUE mildly swollen   LABS:                        6.3    15.4  )-----------( 41       ( 24 Jan 2019 06:04 )             19.4     01-24    134<L>  |  98  |  17.0  ----------------------------<  106  3.4<L>   |  25.0  |  0.69    Ca    8.2<L>      24 Jan 2019 06:04    TPro  7.2  /  Alb  2.6<L>  /  TBili  0.7  /  DBili  x   /  AST  12  /  ALT  10  /  AlkPhos  133<H>  01-24    PT/INR - ( 23 Jan 2019 14:20 )   PT: 15.7 sec;   INR: 1.35 ratio         PTT - ( 23 Jan 2019 14:20 )  PTT:31.0 sec      MEDICATIONS  (STANDING):  ascorbic acid 500 milliGRAM(s) Oral daily  calcium carbonate    500 mG (Tums) Chewable 2 Tablet(s) Chew daily  celecoxib 200 milliGRAM(s) Oral daily  cholecalciferol 2000 Unit(s) Oral daily  docusate sodium 100 milliGRAM(s) Oral three times a day  escitalopram 10 milliGRAM(s) Oral daily  levothyroxine 75 MICROGram(s) Oral daily  pantoprazole    Tablet 40 milliGRAM(s) Oral before breakfast  ruxolitinib 10 milliGRAM(s) Oral daily    MEDICATIONS  (PRN):  acetaminophen   Tablet .. 650 milliGRAM(s) Oral every 6 hours PRN Temp greater or equal to 38C (100.4F) HPI/OVERNIGHT EVENTS: 89yo F w acute on chronic anemia examined at bedside and found in no acute distress. No events overnight. Patient has no complaints, is tolerating diet, passing gas, and voiding. Denies fever, chills, nausea, vomiting, chest pain, and SOB.    Vital Signs Last 24 Hrs  T(C): 36.4 (24 Jan 2019 20:54), Max: 36.8 (24 Jan 2019 14:47)  T(F): 97.5 (24 Jan 2019 20:54), Max: 98.2 (24 Jan 2019 14:47)  HR: 85 (24 Jan 2019 20:54) (85 - 105)  BP: 111/61 (24 Jan 2019 20:54) (108/61 - 121/63)  BP(mean): --  RR: 18 (24 Jan 2019 20:54) (18 - 18)  SpO2: 98% (24 Jan 2019 20:54) (98% - 99%)    I&O's Detail    24 Jan 2019 07:01  -  25 Jan 2019 05:32  --------------------------------------------------------  IN:    Oral Fluid: 100 mL  Total IN: 100 mL    OUT:  Total OUT: 0 mL    Total NET: 100 mL          Constitutional: patient resting comfortably in bed, in no acute distress  HEENT: EOMI / PERRL   Neck: No JVD, full ROM without pain  Respiratory: CTAB, respirations are unlabored, no accessory muscle use, no conversational dyspnea  Cardiovascular: regular rate & rhythm  Gastrointestinal: Abdomen soft, non-tender, non-distended, no rebound tenderness / guarding  MSK: RUE mildly swollen   LABS:                        6.3    15.4  )-----------( 41       ( 24 Jan 2019 06:04 )             19.4     01-24    134<L>  |  98  |  17.0  ----------------------------<  106  3.4<L>   |  25.0  |  0.69    Ca    8.2<L>      24 Jan 2019 06:04    TPro  7.2  /  Alb  2.6<L>  /  TBili  0.7  /  DBili  x   /  AST  12  /  ALT  10  /  AlkPhos  133<H>  01-24    PT/INR - ( 23 Jan 2019 14:20 )   PT: 15.7 sec;   INR: 1.35 ratio         PTT - ( 23 Jan 2019 14:20 )  PTT:31.0 sec      MEDICATIONS  (STANDING):  ascorbic acid 500 milliGRAM(s) Oral daily  calcium carbonate    500 mG (Tums) Chewable 2 Tablet(s) Chew daily  celecoxib 200 milliGRAM(s) Oral daily  cholecalciferol 2000 Unit(s) Oral daily  docusate sodium 100 milliGRAM(s) Oral three times a day  escitalopram 10 milliGRAM(s) Oral daily  levothyroxine 75 MICROGram(s) Oral daily  pantoprazole    Tablet 40 milliGRAM(s) Oral before breakfast  ruxolitinib 10 milliGRAM(s) Oral daily    MEDICATIONS  (PRN):  acetaminophen   Tablet .. 650 milliGRAM(s) Oral every 6 hours PRN Temp greater or equal to 38C (100.4F)

## 2019-01-25 NOTE — DISCHARGE NOTE ADULT - PATIENT PORTAL LINK FT
You can access the [facility] Patient Portal, offered by [facility], by registering with the following website: [email or web address]

## 2019-01-25 NOTE — DISCHARGE NOTE ADULT - CARE PLAN
Principal Discharge DX:	Anemia, unspecified type  Goal:	s/p 4 units of blood  Assessment and plan of treatment:	follow up with hematology as out pt  Secondary Diagnosis:	Hypothyroidism (acquired)  Secondary Diagnosis:	Myelodysplastic syndrome  Secondary Diagnosis:	Myelofibrosis  Secondary Diagnosis:	Thrombocytopenia

## 2019-01-25 NOTE — DISCHARGE NOTE ADULT - HOSPITAL COURSE
88 year old female sent from [facility] for transfusion. Pt has a significant history of Left pubis fracture s/p fall while at assisted living facility. Thrombocytopenia (baseline 60-70’s); Anemia (baseline Hemoglobin 6.5-6.9); Myelodysplastic syndrome; myelofibrosis and RUE DVT diagnosed 1/16 and started on Lovenox. Patient has had no changes in stool or urine color, no abdominal pain and no discomfort complaints at this time.       1) Acute on chronic blood loss anemia with hx of Myelodysplastic syndrome; myelofibrosis  - s/p 2 units PRBC 1/23/19, and 2 more units 1/24/19  - H/H better  - follow up with Dr Montes to monitor H/H levels    2) Pelvic Hematoma  - CT shows hematoma at site of pelvic fx  - stable  - can be due to recent lovenox injection for ?DVT?    3) Right upper extremity Myxoma  - DVT was ruled out on recent U/S  - per discussion with Dr Montes, hold lovenox going forward and follow up with him in his office    4) Myelodysplastic syndrome; myelofibrosis  - Currently being managed by Onc Dr. Montes   - c/w Gavin   - Leukocytosis, anemia and thrombocytopenia chronic, will monitor    5) Hypothyroid   - c/w synthroid      PHYSICAL EXAM:  Vital Signs Last 24 Hrs  T(C): 36.3 (25 Jan 2019 04:04), Max: 36.8 (24 Jan 2019 14:47)  T(F): 97.4 (25 Jan 2019 04:04), Max: 98.2 (24 Jan 2019 14:47)  HR: 81 (25 Jan 2019 04:04) (81 - 105)  BP: 131/75 (25 Jan 2019 04:04) (108/61 - 131/75)  BP(mean): --  RR: 17 (25 Jan 2019 04:04) (17 - 18)  SpO2: 86% (25 Jan 2019 04:04) (86% - 99%)    GENERAL: NAD, well-groomed  HEENT: PERRL, +EOMI  CHEST/LUNG: Clear to auscultation bilaterally; No wheezing  HEART: S1S2+, Regular rate and rhythm; No murmurs  ABDOMEN: Soft, Nontender, Nondistended; Bowel sounds present  Ext: no edema no tender, no cynsosis    pt stable    discharge today    total time spent for discharge 36 minutes 88 year old female sent from [facility] for transfusion. Pt has a significant history of Left pubis fracture s/p fall while at assisted living facility. Thrombocytopenia (baseline 60-70’s); Anemia (baseline Hemoglobin 6.5-6.9); Myelodysplastic syndrome; myelofibrosis and RUE DVT diagnosed 1/16 and started on Lovenox. Patient has had no changes in stool or urine color, no abdominal pain and no discomfort complaints at this time.       1) Acute on chronic blood loss anemia with hx of Myelodysplastic syndrome; myelofibrosis  - s/p 2 units PRBC 1/23/19, and 2 more units 1/24/19  - H/H better  - follow up with Dr Montes to monitor H/H levels    2) Pelvic Hematoma  - CT shows hematoma at site of pelvic fx  - stable  - can be due to recent lovenox injection for ?DVT?    3) Right upper extremity Myxoma  - DVT was ruled out on recent U/S  - per discussion with Dr Montes, hold lovenox going forward and follow up with him in his office    4) Myelodysplastic syndrome; myelofibrosis  - Currently being managed by Onc Dr. Montes   - c/w Gavin   - Leukocytosis, anemia and thrombocytopenia chronic, will monitor    5) Hypothyroid   - c/w synthroid      PHYSICAL EXAM:  Vital Signs Last 24 Hrs  T(C): 36.3 (25 Jan 2019 04:04), Max: 36.8 (24 Jan 2019 14:47)  T(F): 97.4 (25 Jan 2019 04:04), Max: 98.2 (24 Jan 2019 14:47)  HR: 81 (25 Jan 2019 04:04) (81 - 105)  BP: 131/75 (25 Jan 2019 04:04) (108/61 - 131/75)  BP(mean): --  RR: 17 (25 Jan 2019 04:04) (17 - 18)  SpO2: 86% (25 Jan 2019 04:04) (86% - 99%)    GENERAL: NAD, well-groomed  HEENT: PERRL, +EOMI  CHEST/LUNG: Clear to auscultation bilaterally; No wheezing  HEART: S1S2+, Regular rate and rhythm; No murmurs  ABDOMEN: Soft, Nontender, Nondistended; Bowel sounds present  Ext: no edema no tender, no cynsosis    pt stable    discharge today    total time spent for discharge 36 minutes    Due to the patient’s pelvic fracture, she can’t complete MRADLs such as toileting in a reasonable time frame without a wheel chair. Cane/walker are not sufficient and the patient’s home provides adequate space for use of a manual wheelchair. The manual wheelchair will improve the ability to participate in MRADLs and she will use it regularly. Patient is willing to use the manual wheelchair and she has a caregiver willing and available to provide assistance with it.

## 2019-01-25 NOTE — PROVIDER CONTACT NOTE (OTHER) - ACTION/TREATMENT ORDERED:
Instructed to put a line through medication indicating not to give and leave MD contact information.

## 2019-01-25 NOTE — PROGRESS NOTE ADULT - ASSESSMENT
89yo F w/ hx of myelodysplatic disorder and chronic anemia s/p fall with resulting bilateral pelic fxs and pelvic hematoma, clinically stable   -No acute trauma surgery intervention indicated at this time  -trend H/H. Recommend one more CBC to ensure Hgb/Hct stabilized prior to discharge  -Continued care per primary.

## 2019-01-25 NOTE — PROGRESS NOTE ADULT - REASON FOR ADMISSION
sent for blood transfusion

## 2019-01-25 NOTE — PHYSICAL THERAPY INITIAL EVALUATION ADULT - ADDITIONAL COMMENTS
Pt is a poor historian, as per chart , pt comes to us from a [facility].  Pt in [place] due to fall at [facility] in December 2018.  PTA, pt amb with RW and required assist with all at [facility].

## 2019-01-25 NOTE — DISCHARGE NOTE ADULT - MEDICATION SUMMARY - MEDICATIONS TO STOP TAKING
I will STOP taking the medications listed below when I get home from the hospital:    Lovenox 60 mg/0.6 mL injectable solution  -- 60 milligram(s) injectable 2 times a day

## 2019-01-25 NOTE — DISCHARGE NOTE ADULT - MEDICATION SUMMARY - MEDICATIONS TO TAKE
I will START or STAY ON the medications listed below when I get home from the hospital:    acetaminophen 325 mg oral tablet  -- 2 tab(s) by mouth every 6 hours, As needed, For Temp greater than 38 C (100.4 F)  -- Indication: For Pain    celecoxib 200 mg oral capsule  -- 1 cap(s) by mouth once a day  -- Indication: For Pain    lisinopril 5 mg oral tablet  -- 1 tab(s) by mouth once a day  -- Indication: For Htn    calcium carbonate 500 mg (200 mg elemental calcium) oral tablet, chewable  -- 2 tab(s) by mouth once a day  -- Indication: For calcium    escitalopram 10 mg oral tablet  -- 1 tab(s) by mouth once a day  -- Indication: For depression    mirtazapine 30 mg oral tablet  -- 1 tab(s) by mouth once a day  -- Indication: For depression    lidocaine 5% topical film  -- Apply on skin to affected area once a day. 12 hours on followed by 12 hours off  -- Indication: For Pain    docusate sodium 100 mg oral capsule  -- 1 cap(s) by mouth 3 times a day  -- Indication: For constipation    sodium chloride 1 g oral tablet  -- 1 tab(s) by mouth 2 times a day  -- Indication: For Sodium    Jakafi 10 mg oral tablet  -- 1 cap(s) by mouth once a day  -- Indication: For Hemetology    pantoprazole 40 mg oral delayed release tablet  -- 1 tab(s) by mouth once a day (before a meal)  -- Indication: For gerd    levothyroxine 75 mcg (0.075 mg) oral tablet  -- 1 tab(s) by mouth once a day  -- Indication: For Hypothyrodism    Multiple Vitamins oral tablet  -- 1 tab(s) by mouth once a day  -- Indication: For vitamin    ascorbic acid 500 mg oral tablet  -- 1 tab(s) by mouth once a day  -- Indication: For vitamin    cholecalciferol oral tablet  -- 2000 unit(s) by mouth once a day  -- Indication: For Supplement I will START or STAY ON the medications listed below when I get home from the hospital:    wheelchair  -- Dx Unsteady gait  -- Indication: For unsteady gait    celecoxib 200 mg oral capsule  -- 1 cap(s) by mouth once a day  -- Indication: For Pain    acetaminophen 325 mg oral tablet  -- 2 tab(s) by mouth every 6 hours, As needed, For Temp greater than 38 C (100.4 F)  -- Indication: For Pain    lisinopril 5 mg oral tablet  -- 1 tab(s) by mouth once a day  -- Indication: For Htn    calcium carbonate 500 mg (200 mg elemental calcium) oral tablet, chewable  -- 2 tab(s) by mouth once a day  -- Indication: For calcium    escitalopram 10 mg oral tablet  -- 1 tab(s) by mouth once a day  -- Indication: For depression    mirtazapine 30 mg oral tablet  -- 1 tab(s) by mouth once a day  -- Indication: For depression    lidocaine 5% topical film  -- Apply on skin to affected area once a day. 12 hours on followed by 12 hours off  -- Indication: For Pain    docusate sodium 100 mg oral capsule  -- 1 cap(s) by mouth 3 times a day  -- Indication: For constipation    sodium chloride 1 g oral tablet  -- 1 tab(s) by mouth 2 times a day  -- Indication: For Sodium    Jakafi 10 mg oral tablet  -- 1 cap(s) by mouth once a day  -- Indication: For Hemetology    pantoprazole 40 mg oral delayed release tablet  -- 1 tab(s) by mouth once a day (before a meal)  -- Indication: For gerd    levothyroxine 75 mcg (0.075 mg) oral tablet  -- 1 tab(s) by mouth once a day  -- Indication: For Hypothyrodism    Multiple Vitamins oral tablet  -- 1 tab(s) by mouth once a day  -- Indication: For vitamin    ascorbic acid 500 mg oral tablet  -- 1 tab(s) by mouth once a day  -- Indication: For vitamin    cholecalciferol oral tablet  -- 2000 unit(s) by mouth once a day  -- Indication: For Supplement

## 2019-01-25 NOTE — PROVIDER CONTACT NOTE (OTHER) - SITUATION
daughter and patient requesting clarification on lisinopril order in dischareg paoper work. patient and daughter states that she was never taking any medication for blood pressure

## 2019-01-25 NOTE — PROGRESS NOTE ADULT - SUBJECTIVE AND OBJECTIVE BOX
KEVIN RIVERAJORGITO    [id number]    88y      Female    CC: acute on chronic anemia  88 year old female sent from [facility] for transfusion. Pt has a significant history of Left pubis fracture s/p fall while at assisted living facility. Thrombocytopenia (baseline 60-70’s); Anemia (baseline Hemoglobin 6.5-6.9); Myelodysplastic syndrome; myelofibrosis and RUE DVT diagnosed 1/16 and started on Lovenox. Patient has had no changes in stool or urine color, no abdominal pain and no discomfort complaints at this time.     pt received 2 units of PRBC 1/23/19 and 2 more 1/24/19    Pt denies any sx of sob, no n/v or abd pain, no chest pain      REVIEW OF SYSTEMS:    CONSTITUTIONAL: No fever,  + fatigue  RESPIRATORY: No cough, wheezing, hemoptysis; No shortness of breath  CARDIOVASCULAR: No chest pain, palpitations  GASTROINTESTINAL: No abdominal or epigastric pain. No nausea, vomiting        Vital Signs Last 24 Hrs  T(C): 36.3 (25 Jan 2019 04:04), Max: 36.8 (24 Jan 2019 14:47)  T(F): 97.4 (25 Jan 2019 04:04), Max: 98.2 (24 Jan 2019 14:47)  HR: 81 (25 Jan 2019 04:04) (81 - 105)  BP: 131/75 (25 Jan 2019 04:04) (108/61 - 131/75)  BP(mean): --  RR: 17 (25 Jan 2019 04:04) (17 - 18)  SpO2: 86% (25 Jan 2019 04:04) (86% - 99%)    PHYSICAL EXAM:    GENERAL: NAD, well-groomed  HEENT: PERRL, +EOMI  CHEST/LUNG: Clear to auscultation bilaterally; No wheezing  HEART: S1S2+, Regular rate and rhythm; No murmurs  ABDOMEN: Soft, Nontender, Nondistended; Bowel sounds present  Ext: no edema no tender, no cynsosis      LABS:                        8.4    17.8  )-----------( 47       ( 25 Jan 2019 07:25 )             25.2     01-25    135  |  100  |  21.0<H>  ----------------------------<  96  4.3   |  24.0  |  0.73    Ca    8.1<L>      25 Jan 2019 07:25  Mg     1.9     01-25    TPro  7.2  /  Alb  2.6<L>  /  TBili  0.7  /  DBili  x   /  AST  12  /  ALT  10  /  AlkPhos  133<H>  01-24    PT/INR - ( 23 Jan 2019 14:20 )   PT: 15.7 sec;   INR: 1.35 ratio         PTT - ( 23 Jan 2019 14:20 )  PTT:31.0 sec        MEDICATIONS  (STANDING):  ascorbic acid 500 milliGRAM(s) Oral daily  calcium carbonate    500 mG (Tums) Chewable 2 Tablet(s) Chew daily  celecoxib 200 milliGRAM(s) Oral daily  cholecalciferol 2000 Unit(s) Oral daily  docusate sodium 100 milliGRAM(s) Oral three times a day  escitalopram 10 milliGRAM(s) Oral daily  levothyroxine 75 MICROGram(s) Oral daily  pantoprazole    Tablet 40 milliGRAM(s) Oral before breakfast  ruxolitinib 10 milliGRAM(s) Oral daily    MEDICATIONS  (PRN):  acetaminophen   Tablet .. 650 milliGRAM(s) Oral every 6 hours PRN Temp greater or equal to 38C (100.4F)

## 2019-01-25 NOTE — DISCHARGE NOTE ADULT - CARE PROVIDER_API CALL
Oliver Montes), Hematology; Internal Medicine; Medical Oncology  [address] [place] [address]  Phone: [phone]  Fax: [phone]

## 2019-01-28 PROBLEM — I82.621 ACUTE EMBOLISM AND THROMBOSIS OF DEEP VEINS OF RIGHT UPPER EXTREMITY: Chronic | Status: ACTIVE | Noted: 2019-01-23

## 2019-01-28 PROBLEM — S32.9XXA FRACTURE OF UNSPECIFIED PARTS OF LUMBOSACRAL SPINE AND PELVIS, INITIAL ENCOUNTER FOR CLOSED FRACTURE: Chronic | Status: ACTIVE | Noted: 2019-01-23
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
## 2019-01-31 ENCOUNTER — RX RENEWAL (OUTPATIENT)
Age: 84
End: 2019-01-31

## 2019-02-04 ENCOUNTER — INPATIENT (INPATIENT)
Facility: HOSPITAL | Age: 84
LOS: 0 days | Discharge: HOME | DRG: 394 | End: 2019-02-05
Attending: FAMILY MEDICINE | Admitting: HOSPITALIST
Payer: MEDICARE

## 2019-02-04 ENCOUNTER — APPOINTMENT (OUTPATIENT)
Dept: COLORECTAL SURGERY | Facility: CLINIC | Age: 84
End: 2019-02-04
Payer: MEDICARE

## 2019-02-04 VITALS
DIASTOLIC BLOOD PRESSURE: 71 MMHG | HEIGHT: 63 IN | TEMPERATURE: 98 F | HEART RATE: 99 BPM | OXYGEN SATURATION: 95 % | WEIGHT: 125 LBS | SYSTOLIC BLOOD PRESSURE: 118 MMHG | RESPIRATION RATE: 16 BRPM

## 2019-02-04 DIAGNOSIS — D75.81 MYELOFIBROSIS: ICD-10-CM

## 2019-02-04 DIAGNOSIS — Z90.710 ACQUIRED ABSENCE OF BOTH CERVIX AND UTERUS: Chronic | ICD-10-CM

## 2019-02-04 DIAGNOSIS — K62.3 RECTAL PROLAPSE: ICD-10-CM

## 2019-02-04 DIAGNOSIS — K64.9 UNSPECIFIED HEMORRHOIDS: ICD-10-CM

## 2019-02-04 LAB
ALBUMIN SERPL ELPH-MCNC: 2.9 G/DL — LOW (ref 3.3–5.2)
ALP SERPL-CCNC: 139 U/L — HIGH (ref 40–120)
ALT FLD-CCNC: 7 U/L — SIGNIFICANT CHANGE UP
ANION GAP SERPL CALC-SCNC: 12 MMOL/L — SIGNIFICANT CHANGE UP (ref 5–17)
APTT BLD: 27.1 SEC — LOW (ref 27.5–36.3)
AST SERPL-CCNC: 14 U/L — SIGNIFICANT CHANGE UP
BILIRUB SERPL-MCNC: 0.5 MG/DL — SIGNIFICANT CHANGE UP (ref 0.4–2)
BLD GP AB SCN SERPL QL: SIGNIFICANT CHANGE UP
BUN SERPL-MCNC: 19 MG/DL — SIGNIFICANT CHANGE UP (ref 8–20)
CALCIUM SERPL-MCNC: 8.4 MG/DL — LOW (ref 8.6–10.2)
CHLORIDE SERPL-SCNC: 98 MMOL/L — SIGNIFICANT CHANGE UP (ref 98–107)
CO2 SERPL-SCNC: 23 MMOL/L — SIGNIFICANT CHANGE UP (ref 22–29)
CREAT SERPL-MCNC: 0.61 MG/DL — SIGNIFICANT CHANGE UP (ref 0.5–1.3)
GLUCOSE SERPL-MCNC: 106 MG/DL — SIGNIFICANT CHANGE UP (ref 70–115)
HCT VFR BLD CALC: 24.5 % — LOW (ref 37–47)
HGB BLD-MCNC: 7.6 G/DL — LOW (ref 12–16)
INR BLD: 1.3 RATIO — HIGH (ref 0.88–1.16)
LACTATE BLDV-MCNC: 0.6 MMOL/L — SIGNIFICANT CHANGE UP (ref 0.5–2)
MCHC RBC-ENTMCNC: 29.6 PG — SIGNIFICANT CHANGE UP (ref 27–31)
MCHC RBC-ENTMCNC: 31 G/DL — LOW (ref 32–36)
MCV RBC AUTO: 95.3 FL — SIGNIFICANT CHANGE UP (ref 81–99)
PLATELET # BLD AUTO: 47 K/UL — LOW (ref 150–400)
POTASSIUM SERPL-MCNC: 3.6 MMOL/L — SIGNIFICANT CHANGE UP (ref 3.5–5.3)
POTASSIUM SERPL-SCNC: 3.6 MMOL/L — SIGNIFICANT CHANGE UP (ref 3.5–5.3)
PROT SERPL-MCNC: 8.1 G/DL — SIGNIFICANT CHANGE UP (ref 6.6–8.7)
PROTHROM AB SERPL-ACNC: 15.1 SEC — HIGH (ref 10–12.9)
RBC # BLD: 2.57 M/UL — LOW (ref 4.4–5.2)
RBC # FLD: 17.8 % — HIGH (ref 11–15.6)
SODIUM SERPL-SCNC: 133 MMOL/L — LOW (ref 135–145)
TYPE + AB SCN PNL BLD: SIGNIFICANT CHANGE UP
WBC # BLD: 11.8 K/UL — HIGH (ref 4.8–10.8)
WBC # FLD AUTO: 11.8 K/UL — HIGH (ref 4.8–10.8)

## 2019-02-04 PROCEDURE — 99291 CRITICAL CARE FIRST HOUR: CPT

## 2019-02-04 RX ORDER — ACETAMINOPHEN 500 MG
650 TABLET ORAL ONCE
Qty: 0 | Refills: 0 | Status: COMPLETED | OUTPATIENT
Start: 2019-02-04 | End: 2019-02-04

## 2019-02-04 RX ADMIN — Medication 650 MILLIGRAM(S): at 23:01

## 2019-02-04 NOTE — ED PROVIDER NOTE - CONSTITUTIONAL, MLM
normal... Awake, alert, oriented to person, place, time/situation and in no apparent distress. - - -

## 2019-02-04 NOTE — ED ADULT NURSE NOTE - OBJECTIVE STATEMENT
from [facility] assisted living, rectal. bleeding, hemorrhoids history. Trauma PA here for colorectal surgery to exam.

## 2019-02-04 NOTE — ED PROVIDER NOTE - CARE PLAN
Principal Discharge DX:	GI bleeding  Secondary Diagnosis:	Myelofibrosis  Secondary Diagnosis:	Diarrhea

## 2019-02-04 NOTE — ED PROVIDER NOTE - MEDICAL DECISION MAKING DETAILS
An 88 year old female pt presents to the ED c/o abdominal pain. Pt to be admitted for re-check of H&H, possible transfusion.

## 2019-02-04 NOTE — ED ADULT NURSE NOTE - NSIMPLEMENTINTERV_GEN_ALL_ED
Implemented All Fall Risk Interventions:  [place] to call system. Call bell, personal items and telephone within reach. Instruct patient to call for assistance. Room bathroom lighting operational. Non-slip footwear when patient is off stretcher. Physically safe environment: no spills, clutter or unnecessary equipment. Stretcher in lowest position, wheels locked, appropriate side rails in place. Provide visual cue, wrist band, yellow gown, etc. Monitor gait and stability. Monitor for mental status changes and reorient to person, place, and time. Review medications for side effects contributing to fall risk. Reinforce activity limits and safety measures with patient and family.

## 2019-02-04 NOTE — CONSULT NOTE ADULT - ASSESSMENT
88 year old female with PMH of irritable bowel disease, internal/external hemorrhoids and myelofibrosis presents with rectal bleeding and diarrhea x 2 days. Patient presents with internal and external hemorrhoids and rectal prolapse.     -No clinical signs of colitis and abdomen is non-tender, suggesting no need for follow up CT.   -Monitor patient's H & H, vitals, and culture for C. Diff. Awaiting H & H results to compare to this mornings results. If C. Diff culture is negative, evaluate reason for WBC count.  If H & H and vitals are stable, and C. Diff culture is negative, patient can be discharged and follow up as outpatient as already scheduled.

## 2019-02-04 NOTE — CONSULT NOTE ADULT - SUBJECTIVE AND OBJECTIVE BOX
INTERVAL HPI/OVERNIGHT EVENTS:       88 year old female with PMH of irritable bowel disease, internal/external hemorrhoids and myelofibrosis presents with diarrhea and rectal bleeding x 2 days. Patient resides at [facility], and staff were concerned after seeing large amounts of blood in the toilet after patient had a bowel movement. Nursing staff at [facility] were concerned that patient had a rectal prolapse. Patient's daughter states she has a history of internal and external hemorrhoids for over 40 years, and occasionally has large amounts of bright red blood when toileting. Ct of the abdomen was done on 1/23/19 for concern of GI bleed. Scan showed no signs of GI bleed and thickening of the rectal and distal sigmoid colon. Patient currently denies any nausea, vomiting, or dizziness. Denies any abdominal pain.   Denies AC.  Hx obtained mostly through family but pt adds as well      SUBJECTIVE:  Flatus: [X ] YES [ ] NO             Bowel Movement: [X ] YES [ ] NO  Pain (0-10):            Pain Control Adequate: [ ] YES [ ] NO  Nausea: [ ] YES [X ] NO            Vomiting: [ ] YES [X ] NO  Diarrhea: [X ] YES [ ] NO         Constipation: [ ] YES [X ] NO     Chest Pain: [ ] YES [X ] NO    SOB:  [ ] YES [X ] NO    MEDICATIONS  (STANDING):  Jakofi, Levothyroxine  MEDICATIONS  (PRN):  Tylenol    Vital Signs Last 24 Hrs  T(C): 37.7 (04 Feb 2019 18:38), Max: 37.7 (04 Feb 2019 18:38)  T(F): 99.8 (04 Feb 2019 18:38), Max: 99.8 (04 Feb 2019 18:38)  HR: 96 (04 Feb 2019 18:38) (96 - 99)  BP: 149/55 (04 Feb 2019 18:38) (118/71 - 149/55)  BP(mean): --  RR: 16 (04 Feb 2019 18:38) (16 - 16)  SpO2: 96% (04 Feb 2019 18:38) (95% - 96%)    PHYSICAL EXAM:     Gen: NAD, Well appearing, No cyanosis, Pallor.    Eyes: PERRL ~ 3mm, EOMI,     Neurological: A&Ox3, GCS 15, No focal defficit.     Neck: Supple. NT AT, FROM no pain.  No JVD. No meningeal signs    Pulmonary: NAD, CTA, = BL .      Cardiovascular: RRR, S1, S2, No Murmurs, rubs or gallops noted.    Gastrointestinal:ND, Soft, NT.    Genitourinary: External hemorrhoids and rectal prolapse visible on DALIA. Prolapse is easily reducible, but pops back out.     Extremities: NT, AT, no edema, erythema or palpable cord noted.                LABS:    Hemoglobin 8.4            RADIOLOGY & ADDITIONAL STUDIES:    Ct abdomen on 1/23/19. Scan showed no yoan of GI bleed. Thickening of rectal and distal sigmoid colon suggestive of colitis.

## 2019-02-05 ENCOUNTER — TRANSCRIPTION ENCOUNTER (OUTPATIENT)
Age: 84
End: 2019-02-05

## 2019-02-05 VITALS
RESPIRATION RATE: 18 BRPM | HEART RATE: 87 BPM | TEMPERATURE: 99 F | SYSTOLIC BLOOD PRESSURE: 114 MMHG | OXYGEN SATURATION: 93 % | DIASTOLIC BLOOD PRESSURE: 66 MMHG

## 2019-02-05 DIAGNOSIS — R19.7 DIARRHEA, UNSPECIFIED: ICD-10-CM

## 2019-02-05 DIAGNOSIS — K92.2 GASTROINTESTINAL HEMORRHAGE, UNSPECIFIED: ICD-10-CM

## 2019-02-05 LAB
ANION GAP SERPL CALC-SCNC: 11 MMOL/L — SIGNIFICANT CHANGE UP (ref 5–17)
ANISOCYTOSIS BLD QL: SLIGHT — SIGNIFICANT CHANGE UP
BLASTS # FLD: 4 % — HIGH (ref 0–0)
BUN SERPL-MCNC: 13 MG/DL — SIGNIFICANT CHANGE UP (ref 8–20)
CALCIUM SERPL-MCNC: 8 MG/DL — LOW (ref 8.6–10.2)
CHLORIDE SERPL-SCNC: 100 MMOL/L — SIGNIFICANT CHANGE UP (ref 98–107)
CO2 SERPL-SCNC: 23 MMOL/L — SIGNIFICANT CHANGE UP (ref 22–29)
CREAT SERPL-MCNC: 0.52 MG/DL — SIGNIFICANT CHANGE UP (ref 0.5–1.3)
ELLIPTOCYTES BLD QL SMEAR: SLIGHT — SIGNIFICANT CHANGE UP
EOSINOPHIL NFR BLD AUTO: 1 % — SIGNIFICANT CHANGE UP (ref 0–5)
GLUCOSE SERPL-MCNC: 105 MG/DL — SIGNIFICANT CHANGE UP (ref 70–115)
HCT VFR BLD CALC: 21.8 % — LOW (ref 37–47)
HGB BLD-MCNC: 7.1 G/DL — LOW (ref 12–16)
HYPOCHROMIA BLD QL: SLIGHT — SIGNIFICANT CHANGE UP
LYMPHOCYTES # BLD AUTO: 32 % — SIGNIFICANT CHANGE UP (ref 20–55)
MACROCYTES BLD QL: SLIGHT — SIGNIFICANT CHANGE UP
MCHC RBC-ENTMCNC: 30.2 PG — SIGNIFICANT CHANGE UP (ref 27–31)
MCHC RBC-ENTMCNC: 32.6 G/DL — SIGNIFICANT CHANGE UP (ref 32–36)
MCV RBC AUTO: 92.8 FL — SIGNIFICANT CHANGE UP (ref 81–99)
METAMYELOCYTES # FLD: 3 % — HIGH (ref 0–0)
MICROCYTES BLD QL: SLIGHT — SIGNIFICANT CHANGE UP
MONOCYTES NFR BLD AUTO: 33 % — HIGH (ref 3–10)
MYELOCYTES NFR BLD: 5 % — HIGH (ref 0–0)
NEUTROPHILS NFR BLD AUTO: 16 % — LOW (ref 37–73)
NEUTS BAND # BLD: 1 % — SIGNIFICANT CHANGE UP (ref 0–8)
OVALOCYTES BLD QL SMEAR: SLIGHT — SIGNIFICANT CHANGE UP
PLAT MORPH BLD: NORMAL — SIGNIFICANT CHANGE UP
PLATELET # BLD AUTO: 30 K/UL — LOW (ref 150–400)
POIKILOCYTOSIS BLD QL AUTO: SLIGHT — SIGNIFICANT CHANGE UP
POTASSIUM SERPL-MCNC: 3.3 MMOL/L — LOW (ref 3.5–5.3)
POTASSIUM SERPL-SCNC: 3.3 MMOL/L — LOW (ref 3.5–5.3)
RBC # BLD: 2.35 M/UL — LOW (ref 4.4–5.2)
RBC # FLD: 18.1 % — HIGH (ref 11–15.6)
RBC BLD AUTO: ABNORMAL
SODIUM SERPL-SCNC: 134 MMOL/L — LOW (ref 135–145)
VARIANT LYMPHS # BLD: 5 % — SIGNIFICANT CHANGE UP (ref 0–6)
WBC # BLD: 9.7 K/UL — SIGNIFICANT CHANGE UP (ref 4.8–10.8)
WBC # FLD AUTO: 9.7 K/UL — SIGNIFICANT CHANGE UP (ref 4.8–10.8)

## 2019-02-05 PROCEDURE — 93005 ELECTROCARDIOGRAM TRACING: CPT

## 2019-02-05 PROCEDURE — 86901 BLOOD TYPING SEROLOGIC RH(D): CPT

## 2019-02-05 PROCEDURE — 83605 ASSAY OF LACTIC ACID: CPT

## 2019-02-05 PROCEDURE — 86922 COMPATIBILITY TEST ANTIGLOB: CPT

## 2019-02-05 PROCEDURE — 80048 BASIC METABOLIC PNL TOTAL CA: CPT

## 2019-02-05 PROCEDURE — 85027 COMPLETE CBC AUTOMATED: CPT

## 2019-02-05 PROCEDURE — 85730 THROMBOPLASTIN TIME PARTIAL: CPT

## 2019-02-05 PROCEDURE — 80053 COMPREHEN METABOLIC PANEL: CPT

## 2019-02-05 PROCEDURE — 86850 RBC ANTIBODY SCREEN: CPT

## 2019-02-05 PROCEDURE — 85610 PROTHROMBIN TIME: CPT

## 2019-02-05 PROCEDURE — 99285 EMERGENCY DEPT VISIT HI MDM: CPT

## 2019-02-05 PROCEDURE — 74177 CT ABD & PELVIS W/CONTRAST: CPT | Mod: 26

## 2019-02-05 PROCEDURE — 86900 BLOOD TYPING SEROLOGIC ABO: CPT

## 2019-02-05 PROCEDURE — 74177 CT ABD & PELVIS W/CONTRAST: CPT

## 2019-02-05 PROCEDURE — 36430 TRANSFUSION BLD/BLD COMPNT: CPT

## 2019-02-05 PROCEDURE — 12345: CPT | Mod: NC

## 2019-02-05 PROCEDURE — 36415 COLL VENOUS BLD VENIPUNCTURE: CPT

## 2019-02-05 PROCEDURE — P9016: CPT

## 2019-02-05 PROCEDURE — 99232 SBSQ HOSP IP/OBS MODERATE 35: CPT

## 2019-02-05 RX ORDER — MIRTAZAPINE 45 MG/1
45 TABLET, ORALLY DISINTEGRATING ORAL AT BEDTIME
Qty: 0 | Refills: 0 | Status: DISCONTINUED | OUTPATIENT
Start: 2019-02-05 | End: 2019-02-05

## 2019-02-05 RX ORDER — LEVOTHYROXINE SODIUM 125 MCG
75 TABLET ORAL DAILY
Qty: 0 | Refills: 0 | Status: DISCONTINUED | OUTPATIENT
Start: 2019-02-05 | End: 2019-02-05

## 2019-02-05 RX ORDER — HYDROMORPHONE HYDROCHLORIDE 2 MG/ML
1 INJECTION INTRAMUSCULAR; INTRAVENOUS; SUBCUTANEOUS ONCE
Qty: 0 | Refills: 0 | Status: DISCONTINUED | OUTPATIENT
Start: 2019-02-05 | End: 2019-02-05

## 2019-02-05 RX ORDER — PANTOPRAZOLE SODIUM 20 MG/1
40 TABLET, DELAYED RELEASE ORAL
Qty: 0 | Refills: 0 | Status: DISCONTINUED | OUTPATIENT
Start: 2019-02-05 | End: 2019-02-05

## 2019-02-05 RX ORDER — ACETAMINOPHEN 500 MG
650 TABLET ORAL EVERY 6 HOURS
Qty: 0 | Refills: 0 | Status: DISCONTINUED | OUTPATIENT
Start: 2019-02-05 | End: 2019-02-05

## 2019-02-05 RX ORDER — DIPHENHYDRAMINE HCL 50 MG
25 CAPSULE ORAL ONCE
Qty: 0 | Refills: 0 | Status: COMPLETED | OUTPATIENT
Start: 2019-02-05 | End: 2019-02-05

## 2019-02-05 RX ORDER — ESCITALOPRAM OXALATE 10 MG/1
5 TABLET, FILM COATED ORAL DAILY
Qty: 0 | Refills: 0 | Status: DISCONTINUED | OUTPATIENT
Start: 2019-02-05 | End: 2019-02-05

## 2019-02-05 RX ORDER — POTASSIUM CHLORIDE 20 MEQ
40 PACKET (EA) ORAL ONCE
Qty: 0 | Refills: 0 | Status: COMPLETED | OUTPATIENT
Start: 2019-02-05 | End: 2019-02-05

## 2019-02-05 RX ADMIN — ESCITALOPRAM OXALATE 5 MILLIGRAM(S): 10 TABLET, FILM COATED ORAL at 14:01

## 2019-02-05 RX ADMIN — Medication 25 MILLIGRAM(S): at 11:05

## 2019-02-05 RX ADMIN — Medication 75 MICROGRAM(S): at 06:06

## 2019-02-05 RX ADMIN — Medication 40 MILLIEQUIVALENT(S): at 14:01

## 2019-02-05 RX ADMIN — Medication 650 MILLIGRAM(S): at 10:17

## 2019-02-05 RX ADMIN — Medication 650 MILLIGRAM(S): at 00:30

## 2019-02-05 RX ADMIN — PANTOPRAZOLE SODIUM 40 MILLIGRAM(S): 20 TABLET, DELAYED RELEASE ORAL at 06:06

## 2019-02-05 NOTE — H&P ADULT - HISTORY OF PRESENT ILLNESS
Pt. is an 88 year old female with sig. pmh of internal/external hemorrhoids , IBS, myelofibrosis presents with diarrhea and rectal bleeding x 2 days. Pt. had one episode in ER as per ER physician. pt. is from [facility], and staff noted large amounts of blood in the toilet after patient had a BM.  Nursing staff at [facility] also noted that patient had rectal prolapse. pt. did not have any abd. pain. no n/v. no fever. no cp. no cough, no sob.  Patient's daughter states she has a history of internal and external hemorrhoids for over 40 years, and occasionally has large amounts of bright red blood when toileting. Ct of the abdomen was done on 1/23/19 for concern of GI bleed. Scan showed no signs of GI bleed and thickening of the rectal and distal sigmoid colon. NO recent antibiotic use per record. Pt. Follows with hem/ onc Dr. Montes. It appears pt's Hb runs around mid 7 to 8 +. Today in ER Hb is 7.6. Pt. is an 88 year old female with sig. pmh of internal/external hemorrhoids , IBS, myelofibrosis presents with diarrhea and rectal bleeding x 2 days. Pt. had one episode in ER as per ER physician. pt. is from [facility], and staff noted large amounts of blood in the toilet after patient had a BM.  Nursing staff at [facility] also noted that patient had rectal prolapse. pt. did not have any abd. pain. no n/v. no fever. no cp. no cough, no sob.  Patient's daughter states she has a history of internal and external hemorrhoids for over 40 years, and occasionally has large amounts of bright red blood when toileting. Ct of the abdomen was done on 1/23/19 for concern of GI bleed. Scan showed no signs of GI bleed and thickening of the rectal and distal sigmoid colon. NO recent antibiotic use per record. Pt. Follows with hem/ onc Dr. Montes. It appears pt's Hb runs around 6.5 to 6.9 and platelets 60 to 70's as per Dr. Montes's note from last admission.  Today in ER Hb is 7.6. pt. was discharged from [facility] on 1/25/19 after she was admitted for blood transfusion for her anemia.

## 2019-02-05 NOTE — CONSULT NOTE ADULT - SUBJECTIVE AND OBJECTIVE BOX
REASON FOR CONSULTATION:     HPI:  Pt. is an 88 year old female with sig. pmh of internal/external hemorrhoids , IBS, myelofibrosis presents with diarrhea and rectal bleeding x 2 days. Pt. had one episode in ER as per ER physician. pt. is from [place], and staff noted large amounts of blood in the toilet after patient had a BM.  Nursing staff at [place] also noted that patient had rectal prolapse. pt. did not have any abd. pain. no n/v. no fever. no cp. no cough, no sob.  Patient's daughter states she has a history of internal and external hemorrhoids for over 40 years, and occasionally has large amounts of bright red blood when toileting. Ct of the abdomen was done on 1/23/19 for concern of GI bleed. Scan showed no signs of GI bleed and thickening of the rectal and distal sigmoid colon. NO recent antibiotic use per record. Pt. Follows with hem/ onc Dr. Montes. It appears pt's Hb runs around 6.5 to 6.9 and platelets 60 to 70's as per Dr. Montes's note from last admission.  Today in ER Hb is 7.6. pt. was discharged from [facility] on 1/25/19 after she was admitted for blood transfusion for her anemia. (05 Feb 2019 00:12)      REVIEW OF SYSTEMS:  Constitutional, Eyes, ENT, Cardiovascular, Respiratory, Gastrointestinal, Genitourinary, Musculoskeletal, Integumentary, Neurological, Psychiatric, Endocrine, Heme/Lymph, and Allergic/Immunologic review of systems are otherwise negative except as noted in the HPI.    PAST MEDICAL & SURGICAL HISTORY:  Acute deep vein thrombosis (DVT) of right upper extremity, unspecified vein  Fall, sequela  Pelvic fracture  Myelofibrosis  Thrombocytosis: ANNMARIE 2 thrombocytosis  Hypothyroidism (acquired)  Myelodysplastic syndrome  H/O total vaginal hysterectomy      FAMILY HISTORY:  No pertinent family history in first degree relatives      SOCIAL HISTORY:    Allergies    No Known Allergies    Intolerances        MEDICATIONS  (STANDING):  escitalopram 5 milliGRAM(s) Oral daily  jakafi ( ruxolitinib) 10 milliGRAM(s) 10 milliGRAM(s) Oral daily  levothyroxine 75 MICROGram(s) Oral daily  mirtazapine 45 milliGRAM(s) Oral at bedtime  pantoprazole    Tablet 40 milliGRAM(s) Oral before breakfast    MEDICATIONS  (PRN):      Vital Signs Last 24 Hrs  T(C): 37.1 (05 Feb 2019 04:20), Max: 37.7 (04 Feb 2019 18:38)  T(F): 98.7 (05 Feb 2019 04:20), Max: 99.8 (04 Feb 2019 18:38)  HR: 85 (05 Feb 2019 04:20) (85 - 103)  BP: 129/59 (05 Feb 2019 04:20) (118/71 - 149/55)  BP(mean): --  RR: 18 (05 Feb 2019 04:20) (16 - 20)  SpO2: 96% (05 Feb 2019 04:20) (93% - 96%)    PHYSICAL EXAM:    GENERAL: NAD, well-groomed, well-developed  HEAD:  Atraumatic, Normocephalic  EYES: EOMI, PERRLA, conjunctiva and sclera clear  ENMT: moist mucus membranes  NECK: Supple, No JVD, Normal thyroid  NERVOUS SYSTEM:  Alert & Oriented X3, Good concentration;   CHEST/LUNG: Clear to auscultation bilaterally; No rales, rhonchi, wheezing, or rubs  HEART: Regular rate and rhythm; No murmurs, rubs, or gallops  ABDOMEN: Soft, Nontender, Nondistended; Bowel sounds present  EXTREMITIES:  2+ Peripheral Pulses, No clubbing, cyanosis, or edema  LYMPH: No lymphadenopathy noted  SKIN: No rashes or lesions      LABS:                        7.6    11.8  )-----------( 47       ( 04 Feb 2019 19:06 )             24.5     02-04    133<L>  |  98  |  19.0  ----------------------------<  106  3.6   |  23.0  |  0.61    Ca    8.4<L>      04 Feb 2019 19:06    TPro  8.1  /  Alb  2.9<L>  /  TBili  0.5  /  DBili  x   /  AST  14  /  ALT  7   /  AlkPhos  139<H>  02-04    PT/INR - ( 04 Feb 2019 19:06 )   PT: 15.1 sec;   INR: 1.30 ratio         PTT - ( 04 Feb 2019 19:06 )  PTT:27.1 sec        RADIOLOGY & ADDITIONAL STUDIES:    < from: CT Abdomen and Pelvis w/ IV Cont (02.05.19 @ 00:33) >  1. No new acute CT findings.  2. Decreasing size of complex collection associated with the left pubic   rami fractures.  3. Stable 2.9 cm right middle lobe nodular mass, indeterminate. PET/CT or   biopsy is recommended.      < end of copied text > REASON FOR CONSULTATION:     HPI:  Pt. is an 88 year old female with sig. pmh of internal/external hemorrhoids , IBS, myelofibrosis presents with diarrhea and rectal bleeding x 2 days. Pt. had one episode in ER as per ER physician. pt. is from [place], and staff noted large amounts of blood in the toilet after patient had a BM.  Nursing staff at [place] also noted that patient had rectal prolapse. pt. did not have any abd. pain. no n/v. no fever. no cp. no cough, no sob.  Patient's daughter states she has a history of internal and external hemorrhoids for over 40 years, and occasionally has large amounts of bright red blood when toileting. Ct of the abdomen was done on 1/23/19 for concern of GI bleed. Scan showed no signs of GI bleed and thickening of the rectal and distal sigmoid colon. NO recent antibiotic use per record. Pt. Follows with hem/ onc Dr. Montes. It appears pt's Hb runs around 6.5 to 6.9 and platelets 60 to 70's as per Dr. Montes's note from last admission.  Today in ER Hb is 7.6. pt. was discharged from [facility] on 1/25/19 after she was admitted for blood transfusion for her anemia. (05 Feb 2019 00:12)      REVIEW OF SYSTEMS:  Constitutional, Eyes, ENT, Cardiovascular, Respiratory, Gastrointestinal, Genitourinary, Musculoskeletal, Integumentary, Neurological, Psychiatric, Endocrine, Heme/Lymph, and Allergic/Immunologic review of systems are otherwise negative except as noted in the HPI.    PAST MEDICAL & SURGICAL HISTORY:  Acute deep vein thrombosis (DVT) of right upper extremity, unspecified vein  Fall, sequela  Pelvic fracture  Myelofibrosis  Thrombocytosis: ANNMARIE 2 thrombocytosis  Hypothyroidism (acquired)  Myelodysplastic syndrome  H/O total vaginal hysterectomy      FAMILY HISTORY:  No pertinent family history in first degree relatives      SOCIAL HISTORY:    Allergies    No Known Allergies    Intolerances        MEDICATIONS  (STANDING):  escitalopram 5 milliGRAM(s) Oral daily  jakafi ( ruxolitinib) 10 milliGRAM(s) 10 milliGRAM(s) Oral daily  levothyroxine 75 MICROGram(s) Oral daily  mirtazapine 45 milliGRAM(s) Oral at bedtime  pantoprazole    Tablet 40 milliGRAM(s) Oral before breakfast    MEDICATIONS  (PRN):      Vital Signs Last 24 Hrs  T(C): 37.1 (05 Feb 2019 04:20), Max: 37.7 (04 Feb 2019 18:38)  T(F): 98.7 (05 Feb 2019 04:20), Max: 99.8 (04 Feb 2019 18:38)  HR: 85 (05 Feb 2019 04:20) (85 - 103)  BP: 129/59 (05 Feb 2019 04:20) (118/71 - 149/55)  BP(mean): --  RR: 18 (05 Feb 2019 04:20) (16 - 20)  SpO2: 96% (05 Feb 2019 04:20) (93% - 96%)    PHYSICAL EXAM:    GENERAL: NAD, well-groomed, well-developed  HEAD:  Atraumatic, Normocephalic  EYES: EOMI, PERRLA,   ENMT: moist mucus membranes  NECK: Supple,  NERVOUS SYSTEM:  Alert & Oriented X3, Good concentration;   CHEST/LUNG: Clear to auscultation bilaterally;  HEART: Regular rate and rhythm;   ABDOMEN: Soft, Nontender,   EXTREMITIES:  no edema  LYMPH: No lymphadenopathy noted  SKIN: No rashes or lesions      LABS:                        7.6    11.8  )-----------( 47       ( 04 Feb 2019 19:06 )             24.5     02-04    133<L>  |  98  |  19.0  ----------------------------<  106  3.6   |  23.0  |  0.61    Ca    8.4<L>      04 Feb 2019 19:06    TPro  8.1  /  Alb  2.9<L>  /  TBili  0.5  /  DBili  x   /  AST  14  /  ALT  7   /  AlkPhos  139<H>  02-04    PT/INR - ( 04 Feb 2019 19:06 )   PT: 15.1 sec;   INR: 1.30 ratio         PTT - ( 04 Feb 2019 19:06 )  PTT:27.1 sec        RADIOLOGY & ADDITIONAL STUDIES:    < from: CT Abdomen and Pelvis w/ IV Cont (02.05.19 @ 00:33) >  1. No new acute CT findings.  2. Decreasing size of complex collection associated with the left pubic   rami fractures.  3. Stable 2.9 cm right middle lobe nodular mass, indeterminate. PET/CT or   biopsy is recommended.      < end of copied text >

## 2019-02-05 NOTE — PROGRESS NOTE ADULT - ASSESSMENT
88 year old female with PMHx of internal/external hemorrhoids, IBS, myelofibrosis presents with diarrhea and rectal bleeding x 2 days    1. Rectal Hemorrhage due to hemorrhoids with mild drop in HH - no intervention as clinically asymptomatic    2. Anemia of chronic Dx with myelofibrosis - transfusion dependent on jakifa - transfuse 1 unit as pt will be sent back within 24-48h for transfusion [facility]    3. Hypothyroidism - synthroid    4. Dementia - remeron    5. Depression - escitalopram

## 2019-02-05 NOTE — DISCHARGE NOTE ADULT - MEDICATION SUMMARY - MEDICATIONS TO STOP TAKING
I will STOP taking the medications listed below when I get home from the hospital:    celecoxib 200 mg oral capsule  -- 1 cap(s) by mouth once a day I will STOP taking the medications listed below when I get home from the hospital:  None

## 2019-02-05 NOTE — PROGRESS NOTE ADULT - SUBJECTIVE AND OBJECTIVE BOX
CC: rectal bleed (05 Feb 2019 09:05)    HPI: 88 year old female with PMHx of internal/external hemorrhoids , IBS, myelofibrosis presents with diarrhea and rectal bleeding x 2 days. Pt. had one episode in ER as per ER physician. pt. is from [place], and staff noted large amounts of blood in the toilet after patient had a BM.  Nursing staff at [place] also noted that patient had rectal prolapse. pt. did not have any abd. pain. no n/v. no fever. no cp. no cough, no sob.  Patient's daughter states she has a history of internal and external hemorrhoids for over 40 years, and occasionally has large amounts of bright red blood when toileting. Ct of the abdomen was done on 1/23/19 for concern of GI bleed. Scan showed no signs of GI bleed and thickening of the rectal and distal sigmoid colon. NO recent antibiotic use per record. Pt. Follows with hem/ onc Dr. Montes. It appears pt's Hb runs around 6.5 to 6.9 and platelets 60 to 70's as per Dr. Montes's note from last admission.  Today in ER Hb is 7.6. pt. was discharged from [facility] on 1/25/19 after she was admitted for blood transfusion for her anemia. (05 Feb 2019 00:12)    INTERVAL HPI/OVERNIGHT EVENTS:    Vital Signs Last 24 Hrs  T(C): 37.1 (05 Feb 2019 04:20), Max: 37.7 (04 Feb 2019 18:38)  T(F): 98.7 (05 Feb 2019 04:20), Max: 99.8 (04 Feb 2019 18:38)  HR: 85 (05 Feb 2019 04:20) (85 - 103)  BP: 129/59 (05 Feb 2019 04:20) (118/71 - 149/55)  RR: 18 (05 Feb 2019 04:20) (16 - 20)  SpO2: 96% (05 Feb 2019 04:20) (93% - 96%)                        7.1    9.7   )-----------( 30       ( 05 Feb 2019 09:03 )             21.8     04 Feb 2019 19:06    133    |  98     |  19.0   ----------------------------<  106    3.6     |  23.0   |  0.61     Ca    8.4        04 Feb 2019 19:06    TPro  8.1    /  Alb  2.9    /  TBili  0.5    /  DBili  x      /  AST  14     /  ALT  7      /  AlkPhos  139    04 Feb 2019 19:06    PT/INR - ( 04 Feb 2019 19:06 )   PT: 15.1 sec;   INR: 1.30 ratio         PTT - ( 04 Feb 2019 19:06 )  PTT:27.1 sec    LIVER FUNCTIONS - ( 04 Feb 2019 19:06 )  Alb: 2.9 g/dL / Pro: 8.1 g/dL / ALK PHOS: 139 U/L / ALT: 7 U/L / AST: 14 U/L / GGT: x           MEDICATIONS  (STANDING):  escitalopram 5 milliGRAM(s) Oral daily  jakafi ( ruxolitinib) 10 milliGRAM(s) 10 milliGRAM(s) Oral daily  levothyroxine 75 MICROGram(s) Oral daily  mirtazapine 45 milliGRAM(s) Oral at bedtime  pantoprazole    Tablet 40 milliGRAM(s) Oral before breakfast    MEDICATIONS  (PRN):  acetaminophen   Tablet .. 650 milliGRAM(s) Oral every 6 hours PRN Moderate Pain (4 - 6)  diphenhydrAMINE 25 milliGRAM(s) Oral once PRN Rash and/or Itching    RADIOLOGY & ADDITIONAL TESTS: personally visualized

## 2019-02-05 NOTE — DISCHARGE NOTE ADULT - MEDICATION SUMMARY - MEDICATIONS TO TAKE
I will START or STAY ON the medications listed below when I get home from the hospital:    acetaminophen 325 mg oral tablet  -- 2 tab(s) by mouth every 6 hours, As needed, For Temp greater than 38 C (100.4 F)  -- Indication: For pain    lisinopril 5 mg oral tablet  -- 1 tab(s) by mouth once a day  -- Indication: For HTN    calcium carbonate 500 mg (200 mg elemental calcium) oral tablet, chewable  -- 2 tab(s) by mouth once a day  -- Indication: For supplement    escitalopram 10 mg oral tablet  -- 1 tab(s) by mouth once a day  -- Indication: For Depression    mirtazapine 30 mg oral tablet  -- 1 tab(s) by mouth once a day  -- Indication: For Dementia    lidocaine 5% topical film  -- Apply on skin to affected area once a day. 12 hours on followed by 12 hours off  -- Indication: For pain    docusate sodium 100 mg oral capsule  -- 1 cap(s) by mouth 3 times a day  -- Indication: For bowel regimen    sodium chloride 1 g oral tablet  -- 1 tab(s) by mouth 2 times a day  -- Indication: For Hyponatremia    Jakafi 10 mg oral tablet  -- 1 cap(s) by mouth once a day  -- Indication: For MDS    pantoprazole 40 mg oral delayed release tablet  -- 1 tab(s) by mouth once a day (before a meal)  -- Indication: For GI prophylaxis    levothyroxine 75 mcg (0.075 mg) oral tablet  -- 1 tab(s) by mouth once a day  -- Indication: For Hypothyroidism    Multiple Vitamins oral tablet  -- 1 tab(s) by mouth once a day  -- Indication: For supplement    ascorbic acid 500 mg oral tablet  -- 1 tab(s) by mouth once a day  -- Indication: For supplement    cholecalciferol oral tablet  -- 2000 unit(s) by mouth once a day  -- Indication: For supplement

## 2019-02-05 NOTE — DISCHARGE NOTE ADULT - CARE PLAN
Principal Discharge DX:	Gastrointestinal hemorrhage associated with anorectal source  Goal:	prevention  Assessment and plan of treatment:	transfuse and DC to [facility] Principal Discharge DX:	Gastrointestinal hemorrhage associated with anorectal source  Goal:	prevention  Assessment and plan of treatment:	transfuse and DC to [facility], f/u with GI on 2/7 as scheduled

## 2019-02-05 NOTE — DISCHARGE NOTE ADULT - MEDICATION SUMMARY - MEDICATIONS TO CHANGE
Left message for patient to call back.    2nd call.    I will SWITCH the dose or number of times a day I take the medications listed below when I get home from the hospital:  None

## 2019-02-05 NOTE — DISCHARGE NOTE ADULT - CARE PROVIDER_API CALL
Yemi Reese)  HematologyOncology; HospicePalliative Medicine; Internal Medicine  [address] [place] [address]  Phone: [phone]  Fax: [phone]  Follow Up Time: Yemi Reese)  HematologyOncology; [facility]; Internal Medicine  [address] [place] [address]  Phone: [phone]  Fax: [phone]  Follow Up Time:     Shari Cordero)  ColonRectal Surgery; Surgery  [address] [place] [address]  Phone: [phone]  Fax: [phone]  Follow Up Time:

## 2019-02-05 NOTE — DISCHARGE NOTE ADULT - PATIENT PORTAL LINK FT
You can access the [facility] Patient Portal, offered by [facility], by registering with the following website: [email or web address]

## 2019-02-05 NOTE — DISCHARGE NOTE ADULT - HOSPITAL COURSE
88 year old female with PMHx of internal/external hemorrhoids, IBS, myelofibrosis/MDS, UE DVT off AC now, HTN, Depression/Dementia presents with diarrhea and rectal bleeding x 2 days    1. Rectal Hemorrhage due to hemorrhoids with mild drop in HH - no intervention as clinically asymptomatic    2. Anemia of chronic Dx with myelofibrosis - transfusion dependent on jakifa - transfuse 1 unit as pt will be sent back within 24-48h for transfusion and DC back to [facility]    3. Hypothyroidism - synthroid    4. Dementia - remeron    5. Depression - escitalopram    DC to [facility] after transfusion 88 year old female with PMHx of internal/external hemorrhoids, IBS, myelofibrosis/MDS, UE DVT off AC now, HTN, Depression/Dementia presents with diarrhea and rectal bleeding x 2 days    1. Rectal Hemorrhage due to hemorrhoids with mild drop in HH - no intervention as clinically asymptomatic, f/u with GI as outpt on 2/7    2. Anemia of chronic Dx with myelofibrosis/MDS with thrombocytopenia and mild leukocytosis - transfusion dependent on jakifa - transfuse 1 unit as pt will be sent back within 24-48h for transfusion and [facility] to [facility]. Discussed with Hematology - f/u as outpt on 2/7 as scheduled    3. Hypothyroidism - synthroid    4. Dementia - remeron    5. Depression - escitalopram    DC to [facility] after transfusion

## 2019-02-05 NOTE — CONSULT NOTE ADULT - ASSESSMENT
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 Hgb on admission was 7.6 g/dL.  CT w/o evidence of any acute findings.    Goal for transfusion for this patient w/ myelofibrosis is when Hgb<6 g/dL per her primary oncologist.   Her thrombocytopenia and slight leukocytosis is part of her disease.   Would check c.diff  Follow up with Dr. Cordero for hemorrhoids as outpatient on 2/7  Follow up with Dr. 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 Hgb on admission was 7.6 g/dL.  CT w/o evidence of any acute findings.    Hgb 7.1 g/dL this am, transfuse 1  unit prbc, premedicate with tylenol and benadryl  Her thrombocytopenia and slight leukocytosis is part of her disease.   Follow up with Dr. Cordero for hemorrhoids as outpatient on 2/7  Follow up with Dr. Montes also on 2/7

## 2019-02-05 NOTE — H&P ADULT - ASSESSMENT
pt. is admitted for     Rectal bleed. hemorrhoidal bleed ? colitis ? c- diff ? follow c- diff . will send stool studies.  no active rectal bleeding at the time of admission. pt's repeat cbc to be followed if 7 or below will give one unit. Hematology consult. Ct abd. to follow.     Rectal prolapse , surgery follow up.     MDS/ Myelofibrois, Jakafi to be continued. pt. is admitted for     Rectal bleed. hemorrhoidal bleed ? colitis ? c- diff ? follow c- diff . will send stool studies.  no active rectal bleeding at the time of admission. pt's repeat cbc to be followed if 7 or below will give one unit. Hematology consult., dr. Montes.  Ct abd. to follow.     Rectal prolapse , surgery follow up.     External hemorrhoids might be causing rectal bleed.     Thrombocytopenia. chronic , baseline in 60 to 70's as per dr. montes, today 47, close follow up.     MDS/ Myelofibrois, Jakafi to be continued. pt. is admitted for     Rectal bleed. hemorrhoidal bleed ? colitis ? c- diff ? follow c- diff . will send stool studies.  no active rectal bleeding at the time of admission. pt's repeat cbc to be followed if 7 or below will give one unit. Hematology consult., dr. Montes.  Ct abd. to follow.     Rectal prolapse , surgery follow up.     External hemorrhoids might be causing rectal bleed.     Thrombocytopenia / anemia, chronic , baseline Hb 6.5 to 6.9 and platelet in 60 to 70's as per dr. montes, today 47, close follow up.     MDS/ Myelofibrois, Jakafi to be continued.

## 2019-02-05 NOTE — DISCHARGE NOTE ADULT - CARE PROVIDERS DIRECT ADDRESSES
,[email or web address]

## 2019-02-05 NOTE — DISCHARGE NOTE ADULT - PROVIDER TOKENS
PROVIDER:[TOKEN:[[id number]]] PROVIDER:[TOKEN:[[id number]]],PROVIDER:[TOKEN:[[id number]]]

## 2019-02-07 ENCOUNTER — APPOINTMENT (OUTPATIENT)
Dept: COLORECTAL SURGERY | Facility: CLINIC | Age: 84
End: 2019-02-07
Payer: MEDICARE

## 2019-02-07 VITALS
HEART RATE: 90 BPM | SYSTOLIC BLOOD PRESSURE: 110 MMHG | HEIGHT: 63 IN | TEMPERATURE: 98.1 F | BODY MASS INDEX: 22.15 KG/M2 | DIASTOLIC BLOOD PRESSURE: 68 MMHG | WEIGHT: 125 LBS

## 2019-02-07 PROCEDURE — 46500 INJECTION INTO HEMORRHOID(S): CPT

## 2019-02-07 PROCEDURE — 99214 OFFICE O/P EST MOD 30 MIN: CPT | Mod: 25

## 2019-02-07 NOTE — PHYSICAL EXAM
[Lax] : was lax [None] : there was no rectal mass  [Respiratory Effort] : normal respiratory effort [Calm] : calm [Excoriation] : no perianal excoriation [de-identified] : soft, non tender, non distended. [de-identified] : Large circumferential external hemorrhoids with prolapsing internal hemorrhoids. No active bleeding although the anterior hemorrhoid with stigma of recent bleeding [de-identified] : in no distress [de-identified] : normocephalic, atraumatic [de-identified] : In wheelchair [de-identified] : warm and dry [de-identified] : alert and oriented x 3

## 2019-02-07 NOTE — HISTORY OF PRESENT ILLNESS
[FreeTextEntry1] : 88 year old female with a history of myelofibrosis and a history of symptomatic hemorrhoids who presents for follow up for rectal bleeding. She has had several episodes of bright red blood per rectum and was recently seen in the ER on February 5th and hemoglobin was stable at 7.1. She was hospitalized at the end of January (23/24th)  and received a total of 4 units of PRBC. She denies rectal pain, constipation or diarrhea. Her daughter reports alternating constipation and diarrhea and states that stool softener was discontinued due to diarrhea.

## 2019-02-07 NOTE — PROCEDURE
[FreeTextEntry1] : Anoscopy was performed and small areas of petechia noted in rectum but no mass or bleeding. After risk and benefits reviewed, 5% phenol was used as a sclerosing agent and injected above dentate line in the right hemorrhoidal columns.

## 2019-02-08 ENCOUNTER — OUTPATIENT (OUTPATIENT)
Dept: OUTPATIENT SERVICES | Facility: HOSPITAL | Age: 84
LOS: 1 days | Discharge: HOME | End: 2019-02-08

## 2019-02-08 DIAGNOSIS — D47.3 ESSENTIAL (HEMORRHAGIC) THROMBOCYTHEMIA: ICD-10-CM

## 2019-02-08 DIAGNOSIS — Z90.710 ACQUIRED ABSENCE OF BOTH CERVIX AND UTERUS: Chronic | ICD-10-CM

## 2019-02-11 ENCOUNTER — MEDICATION RENEWAL (OUTPATIENT)
Age: 84
End: 2019-02-11

## 2019-02-11 ENCOUNTER — OTHER (OUTPATIENT)
Age: 84
End: 2019-02-11

## 2019-02-11 RX ORDER — PSYLLIUM SEED
58.6 PACKET (EA) ORAL DAILY
Qty: 1 | Refills: 3 | Status: ACTIVE | OUTPATIENT
Start: 2019-02-11

## 2019-02-21 ENCOUNTER — APPOINTMENT (OUTPATIENT)
Dept: HEMATOLOGY ONCOLOGY | Facility: CLINIC | Age: 84
End: 2019-02-21
Payer: MEDICARE

## 2019-02-21 ENCOUNTER — RESULT REVIEW (OUTPATIENT)
Age: 84
End: 2019-02-21

## 2019-02-21 ENCOUNTER — APPOINTMENT (OUTPATIENT)
Dept: RHEUMATOLOGY | Facility: CLINIC | Age: 84
End: 2019-02-21

## 2019-02-21 VITALS
HEART RATE: 95 BPM | HEIGHT: 63 IN | SYSTOLIC BLOOD PRESSURE: 114 MMHG | OXYGEN SATURATION: 96 % | DIASTOLIC BLOOD PRESSURE: 71 MMHG | BODY MASS INDEX: 22.15 KG/M2 | WEIGHT: 125 LBS

## 2019-02-21 LAB
ANISOCYTOSIS BLD QL: SLIGHT — SIGNIFICANT CHANGE UP
BASOPHILS # BLD AUTO: 0.62 K/UL — SIGNIFICANT CHANGE UP (ref 0–0.2)
BASOPHILS NFR BLD AUTO: 5 % — HIGH (ref 0–2)
BLASTS # FLD: 7 % — HIGH (ref 0–0)
EOSINOPHIL # BLD AUTO: 0.23 K/UL — SIGNIFICANT CHANGE UP (ref 0–0.5)
HCT VFR BLD CALC: 24.8 % — LOW (ref 34.5–45)
HGB BLD-MCNC: 8 G/DL — LOW (ref 11.5–15.5)
HYPOCHROMIA BLD QL: SLIGHT — SIGNIFICANT CHANGE UP
LYMPHOCYTES # BLD AUTO: 2.14 K/UL — SIGNIFICANT CHANGE UP (ref 1–3.3)
LYMPHOCYTES # BLD AUTO: 24 % — SIGNIFICANT CHANGE UP (ref 13–44)
LYMPHOCYTES # SPEC AUTO: 6 % — HIGH (ref 0–0)
MACROCYTES BLD QL: SLIGHT — SIGNIFICANT CHANGE UP
MCHC RBC-ENTMCNC: 29.1 PG — SIGNIFICANT CHANGE UP (ref 27–34)
MCHC RBC-ENTMCNC: 32.5 G/DL — SIGNIFICANT CHANGE UP (ref 32–36)
MCV RBC AUTO: 89.6 FL — SIGNIFICANT CHANGE UP (ref 80–100)
METAMYELOCYTES # FLD: 1 % — HIGH (ref 0–0)
MICROCYTES BLD QL: SLIGHT — SIGNIFICANT CHANGE UP
MONOCYTES # BLD AUTO: 2.6 K/UL — HIGH (ref 0–0.9)
MONOCYTES NFR BLD AUTO: 33 % — HIGH (ref 2–14)
MYELOCYTES NFR BLD: 7 % — HIGH (ref 0–0)
NEUTROPHILS # BLD AUTO: 1.22 K/UL — LOW (ref 1.8–7.4)
NEUTROPHILS NFR BLD AUTO: 16 % — LOW (ref 43–77)
NEUTS BAND # BLD: 1 % — SIGNIFICANT CHANGE UP (ref 0–8)
PLAT MORPH BLD: NORMAL — SIGNIFICANT CHANGE UP
PLATELET # BLD AUTO: 59 K/UL — LOW (ref 150–400)
POIKILOCYTOSIS BLD QL AUTO: SLIGHT — SIGNIFICANT CHANGE UP
POLYCHROMASIA BLD QL SMEAR: SLIGHT — SIGNIFICANT CHANGE UP
RBC # BLD: 2.76 M/UL — LOW (ref 3.8–5.2)
RBC # FLD: 15.7 % — HIGH (ref 10.3–14.5)
RBC BLD AUTO: SIGNIFICANT CHANGE UP
SMUDGE CELLS # BLD: PRESENT — SIGNIFICANT CHANGE UP
SPHEROCYTES BLD QL SMEAR: SLIGHT — SIGNIFICANT CHANGE UP
WBC # BLD: 6.8 K/UL — SIGNIFICANT CHANGE UP (ref 3.8–10.5)
WBC # FLD AUTO: 6.8 K/UL — SIGNIFICANT CHANGE UP (ref 3.8–10.5)

## 2019-02-21 PROCEDURE — 99213 OFFICE O/P EST LOW 20 MIN: CPT

## 2019-02-21 RX ORDER — CHOLECALCIFEROL (VITAMIN D3) 25 MCG
TABLET ORAL
Refills: 0 | Status: ACTIVE | COMMUNITY

## 2019-02-21 RX ORDER — PANTOPRAZOLE 40 MG/1
40 TABLET, DELAYED RELEASE ORAL
Refills: 0 | Status: ACTIVE | COMMUNITY

## 2019-02-21 NOTE — PHYSICAL EXAM
[Ambulatory and capable of all self care but unable to carry out any work activities] : Status 2- Ambulatory and capable of all self care but unable to carry out any work activities. Up and about more than 50% of waking hours [Thin] : thin [Normal] : affect appropriate

## 2019-02-24 NOTE — ASSESSMENT
[FreeTextEntry1] : Mrs. 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 Recent bone marrow showed 5% blasts with moderate reticulin fibrosis.. Was started on Jakafi, and we are monitoring her counts.. Her HGb has been stable at 6-7 gms, She is tolerating this HGb. Recent admission for pelvic fracture, received at least 2 units of packed cells, she is not symptomatic from the anemia, and plan is to transfuse her if her hgb is below 6 gms. No transfusion at this time, HGB is 8 gms, she is doing better . RTO in 2 weeks.

## 2019-02-24 NOTE — HISTORY OF PRESENT ILLNESS
[de-identified] : Mrs. Chen is a 88 yo WF with a history of Reji 2+ thrombocytosis, dating back to 2013. She had been on Hydrea since Olya 10, 2013. Most recently she was found to be pancytopenic and the Hydrea was held as of July 12, 2017. A bone marrow was done on August 21, 2017, which a normocellular to mildly hypercellular marrow with mild megakaryocytic hyperplasia and moderate reticulin fibrosis and focal areas of increased blasts (5% ). \par  She was started on Jakafi.  [de-identified] : has been in the hospital with rectal bleeding, was seen by Dr. Cordero, and it being treated for hemorroids. bleeding has lessened.\par Pelvic pain is a little better from prior pelvic fracture, is eating better and moving around more.\par  No excessive fatigue,, SOB or chest pain.

## 2019-03-04 ENCOUNTER — RESULT REVIEW (OUTPATIENT)
Age: 84
End: 2019-03-04

## 2019-03-04 ENCOUNTER — APPOINTMENT (OUTPATIENT)
Dept: HEMATOLOGY ONCOLOGY | Facility: CLINIC | Age: 84
End: 2019-03-04

## 2019-03-04 ENCOUNTER — APPOINTMENT (OUTPATIENT)
Dept: COLORECTAL SURGERY | Facility: CLINIC | Age: 84
End: 2019-03-04
Payer: MEDICARE

## 2019-03-04 VITALS
HEIGHT: 63 IN | BODY MASS INDEX: 22.15 KG/M2 | TEMPERATURE: 97.9 F | HEART RATE: 105 BPM | SYSTOLIC BLOOD PRESSURE: 112 MMHG | WEIGHT: 125 LBS | DIASTOLIC BLOOD PRESSURE: 66 MMHG

## 2019-03-04 DIAGNOSIS — K64.4 RESIDUAL HEMORRHOIDAL SKIN TAGS: ICD-10-CM

## 2019-03-04 DIAGNOSIS — K64.8 RESIDUAL HEMORRHOIDAL SKIN TAGS: ICD-10-CM

## 2019-03-04 LAB
BASOPHILS # BLD AUTO: HIGH K/UL (ref 0–0.2)
BLASTS # FLD: 12 % — HIGH (ref 0–0)
EOSINOPHIL # BLD AUTO: SIGNIFICANT CHANGE UP K/UL (ref 0–0.5)
EOSINOPHIL NFR BLD AUTO: 1 % — SIGNIFICANT CHANGE UP (ref 0–6)
HCT VFR BLD CALC: 16 % — CRITICAL LOW (ref 34.5–45)
HGB BLD-MCNC: 5.3 G/DL — CRITICAL LOW (ref 11.5–15.5)
LYMPHOCYTES # BLD AUTO: 37 % — SIGNIFICANT CHANGE UP (ref 13–44)
LYMPHOCYTES # BLD AUTO: SIGNIFICANT CHANGE UP K/UL (ref 1–3.3)
LYMPHOCYTES # SPEC AUTO: 11 % — HIGH (ref 0–0)
MCHC RBC-ENTMCNC: 29.1 PG — SIGNIFICANT CHANGE UP (ref 27–34)
MCHC RBC-ENTMCNC: 33.4 G/DL — SIGNIFICANT CHANGE UP (ref 32–36)
MCV RBC AUTO: 87.2 FL — SIGNIFICANT CHANGE UP (ref 80–100)
MONOCYTES # BLD AUTO: HIGH K/UL (ref 0–0.9)
MONOCYTES NFR BLD AUTO: 26 % — HIGH (ref 2–14)
NEUTROPHILS NFR BLD AUTO: 13 % — LOW (ref 43–77)
PLAT MORPH BLD: NORMAL — SIGNIFICANT CHANGE UP
PLATELET # BLD AUTO: 64 K/UL — LOW (ref 150–400)
RBC # BLD: 1.83 M/UL — LOW (ref 3.8–5.2)
RBC # FLD: 14.9 % — HIGH (ref 10.3–14.5)
RBC BLD AUTO: SIGNIFICANT CHANGE UP
ROULEAUX BLD QL SMEAR: PRESENT — SIGNIFICANT CHANGE UP
WBC # BLD: 7.9 K/UL — SIGNIFICANT CHANGE UP (ref 3.8–10.5)
WBC # FLD AUTO: 7.9 K/UL — SIGNIFICANT CHANGE UP (ref 3.8–10.5)

## 2019-03-04 PROCEDURE — 99214 OFFICE O/P EST MOD 30 MIN: CPT | Mod: 25

## 2019-03-04 PROCEDURE — 46500 INJECTION INTO HEMORRHOID(S): CPT

## 2019-03-04 NOTE — PROCEDURE
[FreeTextEntry1] : After risk and benefits reviewed, an inflamed hemorrhoid anteriorly was banded above the dentate line. Banding slid off a right hemorrhoid as as such 5% phenol was used as a sclerosing agent and injected above dentate line in the right hemorrhoidal columns

## 2019-03-04 NOTE — PHYSICAL EXAM
[Lax] : was lax [None] : there was no rectal mass  [Respiratory Effort] : normal respiratory effort [Calm] : calm [Excoriation] : no perianal excoriation [de-identified] : Large circumferential external hemorrhoids with prolapsing internal hemorrhoids. Multiple areas of inflamed/irritated mucosa of the hemorrhoid with some bleeding [de-identified] : large firm stool removed from rectum [de-identified] : in no distress [de-identified] : normocephalic, atraumatic [de-identified] : In wheelchair [de-identified] : warm and dry [de-identified] : alert and oriented x 3

## 2019-03-04 NOTE — HISTORY OF PRESENT ILLNESS
[FreeTextEntry1] : 88 year old female with a history of myelofibrosis and a history of symptomatic hemorrhoids who presents for follow up for rectal bleeding. She underwent sclerotherapy injection 3 weeks ago and had some improvement in the amount of bleeding. She is not on any bowel regimen and reportedly has soft stools and felt it was not needed. Since her hospitalizations with blood transfusions in January and early February, She has not had any more hospitalizations. hemoglobin on Feb 21st was 8 and she has blood work scheduled for this afternoon.

## 2019-03-05 ENCOUNTER — EMERGENCY (EMERGENCY)
Facility: HOSPITAL | Age: 84
LOS: 1 days | Discharge: HOME | End: 2019-03-05
Attending: EMERGENCY MEDICINE
Payer: MEDICARE

## 2019-03-05 VITALS
OXYGEN SATURATION: 96 % | TEMPERATURE: 98 F | HEART RATE: 93 BPM | RESPIRATION RATE: 18 BRPM | SYSTOLIC BLOOD PRESSURE: 149 MMHG | DIASTOLIC BLOOD PRESSURE: 81 MMHG

## 2019-03-05 VITALS
RESPIRATION RATE: 18 BRPM | SYSTOLIC BLOOD PRESSURE: 112 MMHG | OXYGEN SATURATION: 99 % | HEIGHT: 63 IN | TEMPERATURE: 98 F | WEIGHT: 117.95 LBS | DIASTOLIC BLOOD PRESSURE: 66 MMHG | HEART RATE: 99 BPM

## 2019-03-05 DIAGNOSIS — Z90.710 ACQUIRED ABSENCE OF BOTH CERVIX AND UTERUS: Chronic | ICD-10-CM

## 2019-03-05 LAB
BLD GP AB SCN SERPL QL: SIGNIFICANT CHANGE UP
TYPE + AB SCN PNL BLD: SIGNIFICANT CHANGE UP

## 2019-03-05 PROCEDURE — 99218: CPT

## 2019-03-05 PROCEDURE — 86900 BLOOD TYPING SEROLOGIC ABO: CPT

## 2019-03-05 PROCEDURE — G0378: CPT

## 2019-03-05 PROCEDURE — 36430 TRANSFUSION BLD/BLD COMPNT: CPT

## 2019-03-05 PROCEDURE — 86901 BLOOD TYPING SEROLOGIC RH(D): CPT

## 2019-03-05 PROCEDURE — 86850 RBC ANTIBODY SCREEN: CPT

## 2019-03-05 PROCEDURE — 36415 COLL VENOUS BLD VENIPUNCTURE: CPT

## 2019-03-05 PROCEDURE — P9016: CPT

## 2019-03-05 PROCEDURE — 99285 EMERGENCY DEPT VISIT HI MDM: CPT | Mod: 25

## 2019-03-05 PROCEDURE — 86922 COMPATIBILITY TEST ANTIGLOB: CPT

## 2019-03-05 RX ORDER — DIPHENHYDRAMINE HCL 50 MG
25 CAPSULE ORAL EVERY 6 HOURS
Qty: 0 | Refills: 0 | Status: DISCONTINUED | OUTPATIENT
Start: 2019-03-05 | End: 2019-03-10

## 2019-03-05 RX ORDER — ACETAMINOPHEN 500 MG
650 TABLET ORAL ONCE
Qty: 0 | Refills: 0 | Status: COMPLETED | OUTPATIENT
Start: 2019-03-05 | End: 2019-03-05

## 2019-03-05 RX ADMIN — Medication 650 MILLIGRAM(S): at 12:03

## 2019-03-05 RX ADMIN — Medication 650 MILLIGRAM(S): at 10:36

## 2019-03-05 RX ADMIN — Medication 25 MILLIGRAM(S): at 10:33

## 2019-03-05 NOTE — ED ADULT NURSE NOTE - CAS EDN DISCHARGE ASSESSMENT
Symptoms improved/Alert and oriented to person, place and time/Patient baseline mental status/Awake/Dressing clean and dry

## 2019-03-05 NOTE — ED CDU PROVIDER DISPOSITION NOTE - ATTENDING CONTRIBUTION TO CARE
I, Deangelo Olson, performed a face to face bedside interview with this patient regarding history of present illness, review of symptoms and relevant past medical, social and family history.  I completed an independent physical examination. I have communicated the patient’s plan of care and disposition with the ACP.  88 year old with pmh MDS presents with anemia. Pt reports no complaints, no fatigue, n chest pain, SOB. Went for scheduled appt with heme/onc, found to be anemic Hgb 5's. No black, bloody stools, no abd pain. placed on OPbs for transfusion  Gen: NAD, well appearing  CV: RRR  Pul: CTA b/l  Abd: Soft, non-distended, non-tender  Neuro: no focal deficits  Pt improved, stable for dc

## 2019-03-05 NOTE — ED CDU PROVIDER DISPOSITION NOTE - CLINICAL COURSE
88y female pmhx myelodysplasia sent from [facility] by Dr. Montes for transfusion of 2 units PRBC. Pt had initial hgb of 5.3. Patient asymptomatic throughout entire hospital course. Pt received 2 units over approx 6 hours without any issue or complication. Pt observed for 30 minutes after transfusion completion without any acute events/symptoms. Pt stable for discharge at this time with outpt follow-up with Dr. Montes.

## 2019-03-05 NOTE — ED CDU PROVIDER INITIAL DAY NOTE - CONSTITUTIONAL, MLM
normal... Frail appearing, well nourished, awake, alert, oriented to person, place, time/situation and in no apparent distress.

## 2019-03-05 NOTE — ED STATDOCS - OBJECTIVE STATEMENT
87 y/o F pt with PMHx myelofibrosis, Myelodysplastic syndrome presents to the ED with daughter c/o abnormal lab results.  Pt saw Dr. Montes yesterday for a routine appt, pt was feeling normal at the time, had blood work done, was called back saying her hemoglobin levels were low and advised to come into the ED for 2 units transfusion.  Pt has had several transfusions before, last transfusion was after pt fractured her pelvis in Dec, had 2 units transfusion at the time.  Per daughter, pt needs Tylenol and benadryl prior to the transfusion.  Denies CP, palpitations, SOB.  No further acute complaints at this time.

## 2019-03-05 NOTE — ED CDU PROVIDER INITIAL DAY NOTE - DETAILS
Pt sent from [facility] for transfusion of 2 units. Pt seen and sent by Dr. Montes

## 2019-03-05 NOTE — ED ADULT TRIAGE NOTE - CHIEF COMPLAINT QUOTE
alert patient with no physical complaints here for transfusion due to yesterday lab work by Dr Montes. HGB 5.3, normal for patient is 6.7. No active bleeding.

## 2019-03-05 NOTE — ED CDU PROVIDER INITIAL DAY NOTE - MEDICAL DECISION MAKING DETAILS
Pt placed in observation for transfusion of 2 units PRBC. Will give tylenol and benadryl prior to transfusion as per family/prior need. T&S, transfusion. f/u with Dr. Montes as scheduled

## 2019-03-05 NOTE — ED CDU PROVIDER INITIAL DAY NOTE - ATTENDING CONTRIBUTION TO CARE
I, Jude Chávez, have personally performed a face to face diagnostic evaluation on this patient. I have reviewed the ACP note and agree with the history, exam and plan of care, except as noted.    87 yo F hx of myelofibrosis, myelodysplastic syndrome sent in by  for blood transfusion. Hb yesterday was 5.4. No chest pain, no sob, no dizziness, no abdominal pain. Patient had prior transfusion in the past without complication. She will required 2 U PRBC.

## 2019-03-05 NOTE — ED ADULT NURSE NOTE - CHPI ED NUR SYMPTOMS NEG
no headache/no loss of consciousness/no nausea/no back pain/no chills/no dizziness/no fever/no decreased eating/drinking/no pain/no vomiting

## 2019-03-05 NOTE — ED STATDOCS - PHYSICAL EXAMINATION
Constitutional: non-toxic, no acute distress  EYES: pale conjunctiva  RESPIRATORY: lungs clear  CARDIAC: heart regular, no obvious murmur  MSK: no LE edema, no calf tenderness Constitutional: non-toxic, no acute distress  EYES: pale conjunctiva  ENT: Moist mm's  RESPIRATORY: lungs clear  CARDIAC: heart regular, no obvious murmur  : no CVAT  MSK: no LE edema, no calf tenderness  NEURO: answering appropriately, no gross abnormalities  SKIN: pale, warm and dry

## 2019-03-05 NOTE — ED CDU PROVIDER INITIAL DAY NOTE - OBJECTIVE STATEMENT
88 y female pt with PMHx myelofibrosis, myelodysplastic syndrome presents to the ED with daughter for abnormal lab results. Pt seen by Dr. Montes yesterday at [facility] for a routine appt, had blood work done, and was called back saying her hemoglobin levels were low and advised to come into the ED for 2 units transfusion. Hemoglobin was 5.3. Pt has had several transfusions before, last transfusion was after pt fractured her pelvis in Dec, had 2 units transfusion at the time.  Per daughter, pt needs Tylenol and benadryl prior to the transfusion.  Denies CP, palpitations, SOB.  No further acute complaints at this time.

## 2019-03-05 NOTE — ED ADULT NURSE REASSESSMENT NOTE - NS ED NURSE REASSESS COMMENT FT1
rec'd pt from triage for low hemoglobin and blood transfusion. family at bedside, no apparent distress
pt rec'd one unit of PRBCs without S/S of transfusion reaction noted. pt in no apparent distress

## 2019-03-06 LAB
BASOPHILS NFR BLD AUTO: 0 % — SIGNIFICANT CHANGE UP (ref 0–2)
NEUTROPHILS # BLD AUTO: 1.3 K/UL — LOW (ref 1.8–7.4)

## 2019-03-07 ENCOUNTER — APPOINTMENT (OUTPATIENT)
Dept: HEMATOLOGY ONCOLOGY | Facility: CLINIC | Age: 84
End: 2019-03-07

## 2019-03-07 ENCOUNTER — OUTPATIENT (OUTPATIENT)
Dept: OUTPATIENT SERVICES | Facility: HOSPITAL | Age: 84
LOS: 1 days | Discharge: HOME | End: 2019-03-07

## 2019-03-07 DIAGNOSIS — D47.3 ESSENTIAL (HEMORRHAGIC) THROMBOCYTHEMIA: ICD-10-CM

## 2019-03-07 DIAGNOSIS — Z90.710 ACQUIRED ABSENCE OF BOTH CERVIX AND UTERUS: Chronic | ICD-10-CM

## 2019-03-11 ENCOUNTER — OTHER (OUTPATIENT)
Age: 84
End: 2019-03-11

## 2019-03-11 RX ORDER — HYDROCORTISONE 25 MG/G
2.5 CREAM TOPICAL DAILY
Qty: 1 | Refills: 3 | Status: ACTIVE | OUTPATIENT
Start: 2019-03-11

## 2019-03-13 NOTE — ASSESSMENT
[FreeTextEntry1] : Mrs. 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 recent bone marrow showed 5% blasts with moderate reticulin fibrosis.. Was started on Jakafi, and we are monitoring her counts.. Her HGb has been stable at 6-7 gms, She is tolerating this HGb. recent HGb dropped to 5.9 and she received 1 unit of packed cells and feels better. No transfuse at this time. will continue to monitor her CBC every 2 weeks or sooner if she becomes symptomatic.

## 2019-03-13 NOTE — HISTORY OF PRESENT ILLNESS
[de-identified] : \par Mrs. Chen is a 88 yo WF with a history of Reji 2+ thrombocytosis, dating back to 2013. She had been on Hydrea since Olya 10, 2013. Most recently she was found to be pancytopenic and the Hydrea was held as of July 12, 2017. A bone marrow was done on August 21, 2017, which a normocellular to mildly hypercellular marrow with mild megakaryocytic hyperplasia and moderate reticulin fibrosis and focal areas of increased blasts (5% ). \par  She was started on Jakafi.  [de-identified] : Able to carry on usual activities, no excessive fatigue, or SOB.\par transfused 1 unit of packed cells in October 2018.

## 2019-03-15 ENCOUNTER — APPOINTMENT (OUTPATIENT)
Dept: ORTHOPEDIC SURGERY | Facility: CLINIC | Age: 84
End: 2019-03-15
Payer: MEDICARE

## 2019-03-15 VITALS
BODY MASS INDEX: 22.15 KG/M2 | SYSTOLIC BLOOD PRESSURE: 123 MMHG | WEIGHT: 125 LBS | DIASTOLIC BLOOD PRESSURE: 72 MMHG | HEART RATE: 102 BPM | HEIGHT: 63 IN

## 2019-03-15 DIAGNOSIS — S32.592D OTHER SPECIFIED FRACTURE OF RIGHT PUBIS, SUBSEQUENT ENCOUNTER FOR FRACTURE WITH ROUTINE HEALING: ICD-10-CM

## 2019-03-15 DIAGNOSIS — M16.11 UNILATERAL PRIMARY OSTEOARTHRITIS, RIGHT HIP: ICD-10-CM

## 2019-03-15 DIAGNOSIS — S32.591D OTHER SPECIFIED FRACTURE OF RIGHT PUBIS, SUBSEQUENT ENCOUNTER FOR FRACTURE WITH ROUTINE HEALING: ICD-10-CM

## 2019-03-15 PROCEDURE — 72170 X-RAY EXAM OF PELVIS: CPT

## 2019-03-15 PROCEDURE — 99213 OFFICE O/P EST LOW 20 MIN: CPT

## 2019-03-15 NOTE — DISCUSSION/SUMMARY
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 I reassured her that her left hip fracture is well healing, and she can return to activities as tolerated. \par \par She can follow-up as needed.

## 2019-03-15 NOTE — ADDENDUM
[FreeTextEntry1] : I, Blu Corado, acted solely as a scribe for Dr. Miguelangel Martinez on this date 03/15/2019.

## 2019-03-15 NOTE — HISTORY OF PRESENT ILLNESS
[Pain Location] : pain [Improving] : improving [___ mths] : [unfilled] month(s) ago [0] : a current pain level of 0/10 [de-identified] : 88 year old female presents for f/u of left pelvic rami fx.  Pt fell on 12/28/19. pt mel to ED and placed to [facility] rehab.\par pt found to have right arm blood clot and being followed by Dr Montes, hematology. \par she denies pain in the hip, buttock or groin. \par today she complains of neck and lumbar back pain. \par she had recent CT scan 2/2019 showed Chronic fracture deformity of the left superior-inferior pubic rami\par She currently lives in an assisted living home. She is currently ambulating with a walker.

## 2019-03-15 NOTE — PHYSICAL EXAM
[LE] : Sensory: Intact in bilateral lower extremities [ALL] : dorsalis pedis, posterior tibial, femoral, popliteal, and radial 2+ and symmetric bilaterally [Walker] : ambulates with walker [Poor Appearance] : well-appearing [Acute Distress] : not in acute distress [Obese] : not obese [de-identified] : GENERAL APPEARANCE: Well nourished and hydrated, pleasant, alert, and oriented x 3. Appears their stated age. \par HEENT: Normocephalic, extraocular eye motion intact. Nasal septum midline. Oral cavity clear. External auditory canal clear. \par RESPIRATORY: Breath sounds clear and audible in all lobes. No wheezing, No accessory muscle use.\par CARDIOVASCULAR: No apparent abnormalities. No lower leg edema. No varicosities. Pedal pulses are palpable.\par NEUROLOGIC: Sensation is normal, no muscle weakness in the upper or lower extremities.\par DERMATOLOGIC: No apparent skin lesions, moist, warm, no rash.\par SPINE: Cervical spine appears normal and moves freely; thoracic spine appears normal and moves freely; lumbosacral spine appears normal and moves freely, normal, nontender.\par MUSCULOSKELETAL: Hands, wrists, and elbows are normal and move freely, shoulders are normal and move freely.  [de-identified] : Bilateral hip exam shows no pain with gentle range of motion in both hips. [de-identified] : 1V Xray of the pelvis done in office today and reviewed by Dr. 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

## 2019-03-15 NOTE — REVIEW OF SYSTEMS
[Joint Pain] : joint pain [Feeling Tired] : fatigue [Constipation] : constipation [Diarrhea] : diarrhea [Incontinence] : incontinence [Negative] : Heme/Lymph

## 2019-03-18 ENCOUNTER — APPOINTMENT (OUTPATIENT)
Dept: HEMATOLOGY ONCOLOGY | Facility: CLINIC | Age: 84
End: 2019-03-18
Payer: MEDICARE

## 2019-03-18 ENCOUNTER — RESULT REVIEW (OUTPATIENT)
Age: 84
End: 2019-03-18

## 2019-03-18 VITALS
HEIGHT: 63 IN | HEART RATE: 97 BPM | BODY MASS INDEX: 22.15 KG/M2 | OXYGEN SATURATION: 96 % | DIASTOLIC BLOOD PRESSURE: 69 MMHG | WEIGHT: 125 LBS | SYSTOLIC BLOOD PRESSURE: 117 MMHG | TEMPERATURE: 98.2 F

## 2019-03-18 LAB
ANISOCYTOSIS BLD QL: SLIGHT — SIGNIFICANT CHANGE UP
BASO STIPL BLD QL SMEAR: PRESENT — SIGNIFICANT CHANGE UP
BASOPHILS # BLD AUTO: 0.4 K/UL — HIGH (ref 0–0.2)
BLASTS # FLD: 16 % — HIGH (ref 0–0)
EOSINOPHIL # BLD AUTO: 0.1 K/UL — SIGNIFICANT CHANGE UP (ref 0–0.5)
EOSINOPHIL NFR BLD AUTO: 1 % — SIGNIFICANT CHANGE UP (ref 0–6)
GIANT PLATELETS BLD QL SMEAR: PRESENT — SIGNIFICANT CHANGE UP
HCT VFR BLD CALC: 22.5 % — LOW (ref 34.5–45)
HGB BLD-MCNC: 7.5 G/DL — LOW (ref 11.5–15.5)
HYPOCHROMIA BLD QL: SLIGHT — SIGNIFICANT CHANGE UP
LG PLATELETS BLD QL AUTO: SLIGHT — SIGNIFICANT CHANGE UP
LYMPHOCYTES # BLD AUTO: 2.3 K/UL — SIGNIFICANT CHANGE UP (ref 1–3.3)
LYMPHOCYTES # BLD AUTO: 44 % — SIGNIFICANT CHANGE UP (ref 13–44)
LYMPHOCYTES # SPEC AUTO: 3 % — HIGH (ref 0–0)
MACROCYTES BLD QL: SLIGHT — SIGNIFICANT CHANGE UP
MCHC RBC-ENTMCNC: 29.1 PG — SIGNIFICANT CHANGE UP (ref 27–34)
MCHC RBC-ENTMCNC: 33.2 G/DL — SIGNIFICANT CHANGE UP (ref 32–36)
MCV RBC AUTO: 87.5 FL — SIGNIFICANT CHANGE UP (ref 80–100)
MICROCYTES BLD QL: SLIGHT — SIGNIFICANT CHANGE UP
MONOCYTES # BLD AUTO: SIGNIFICANT CHANGE UP (ref 0–0.9)
MONOCYTES NFR BLD AUTO: 17 % — HIGH (ref 2–14)
MYELOCYTES NFR BLD: 1 % — HIGH (ref 0–0)
NEUTROPHILS # BLD AUTO: 0.8 K/UL — LOW (ref 1.8–7.4)
NEUTROPHILS NFR BLD AUTO: 18 % — LOW (ref 43–77)
NRBC # BLD: 3 /100 — HIGH (ref 0–0)
PLAT MORPH BLD: NORMAL — SIGNIFICANT CHANGE UP
PLATELET # BLD AUTO: 61 K/UL — LOW (ref 150–400)
POIKILOCYTOSIS BLD QL AUTO: SLIGHT — SIGNIFICANT CHANGE UP
POLYCHROMASIA BLD QL SMEAR: SLIGHT — SIGNIFICANT CHANGE UP
RBC # BLD: 2.57 M/UL — LOW (ref 3.8–5.2)
RBC # FLD: 14.5 % — SIGNIFICANT CHANGE UP (ref 10.3–14.5)
RBC BLD AUTO: ABNORMAL
WBC # BLD: 5.6 K/UL — SIGNIFICANT CHANGE UP (ref 3.8–10.5)
WBC # FLD AUTO: 5.6 K/UL — SIGNIFICANT CHANGE UP (ref 3.8–10.5)

## 2019-03-18 PROCEDURE — 99213 OFFICE O/P EST LOW 20 MIN: CPT

## 2019-03-19 ENCOUNTER — OTHER (OUTPATIENT)
Age: 84
End: 2019-03-19

## 2019-03-25 NOTE — HISTORY OF PRESENT ILLNESS
[de-identified] : Mrs. Chen is a 86 yo WF with a history of Reji 2+ thrombocytosis, dating back to 2013. She had been on Hydrea since Olya 10, 2013. Most recently she was found to be pancytopenic and the Hydrea was held as of July 12, 2017. A bone marrow was done on August 21, 2017, which a normocellular to mildly hypercellular marrow with mild megakaryocytic hyperplasia and moderate reticulin fibrosis and focal areas of increased blasts (5% ). \par  She was started on Jakafi.  [de-identified] : Was transfused on 3-6-19. Today , she states she feels fine. no SOB or chest pain or palpitations.\par  Daughter states that she has been c/o pelvic pain and neck pain, but patient denies it at this time.\par She is now agreeing to work on selling her house .

## 2019-03-25 NOTE — ASSESSMENT
[FreeTextEntry1] : Mrs. 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 Recent bone marrow showed 5% blasts with moderate reticulin fibrosis.. Was started on Jakafi, and we are monitoring her counts.. Her HGb has been stable at 6-7 gms, She is tolerating this HGb., Today her HGb is 7.5 and she is asymptomatic, no transfusion at this time. Continue the Jakafi. RTO in 12 days.

## 2019-03-25 NOTE — PHYSICAL EXAM
[Ambulatory and capable of all self care but unable to carry out any work activities] : Status 2- Ambulatory and capable of all self care but unable to carry out any work activities. Up and about more than 50% of waking hours [Normal] : affect appropriate [de-identified] : superficial ecchymosis on anterior neck, L>R ( ? excoriations)

## 2019-03-28 ENCOUNTER — RESULT REVIEW (OUTPATIENT)
Age: 84
End: 2019-03-28

## 2019-03-28 ENCOUNTER — APPOINTMENT (OUTPATIENT)
Dept: HEMATOLOGY ONCOLOGY | Facility: CLINIC | Age: 84
End: 2019-03-28
Payer: MEDICARE

## 2019-03-28 VITALS
OXYGEN SATURATION: 96 % | HEART RATE: 96 BPM | SYSTOLIC BLOOD PRESSURE: 109 MMHG | TEMPERATURE: 98.8 F | WEIGHT: 125 LBS | DIASTOLIC BLOOD PRESSURE: 67 MMHG | HEIGHT: 63 IN | BODY MASS INDEX: 22.15 KG/M2

## 2019-03-28 LAB
ANISOCYTOSIS BLD QL: SLIGHT — SIGNIFICANT CHANGE UP
BASO STIPL BLD QL SMEAR: PRESENT — SIGNIFICANT CHANGE UP
BASOPHILS # BLD AUTO: 1 K/UL — HIGH (ref 0–0.2)
BASOPHILS NFR BLD AUTO: 10 % — HIGH (ref 0–2)
BLASTS # FLD: 20 % — HIGH (ref 0–0)
EOSINOPHIL # BLD AUTO: 0.1 K/UL — SIGNIFICANT CHANGE UP (ref 0–0.5)
HCT VFR BLD CALC: 19.7 % — CRITICAL LOW (ref 34.5–45)
HGB BLD-MCNC: 6.6 G/DL — CRITICAL LOW (ref 11.5–15.5)
HYPOCHROMIA BLD QL: SLIGHT — SIGNIFICANT CHANGE UP
LYMPHOCYTES # BLD AUTO: 17 % — SIGNIFICANT CHANGE UP (ref 13–44)
LYMPHOCYTES # BLD AUTO: 2 K/UL — SIGNIFICANT CHANGE UP (ref 1–3.3)
LYMPHOCYTES # SPEC AUTO: 18 % — HIGH (ref 0–0)
MACROCYTES BLD QL: SLIGHT — SIGNIFICANT CHANGE UP
MCHC RBC-ENTMCNC: 29.5 PG — SIGNIFICANT CHANGE UP (ref 27–34)
MCHC RBC-ENTMCNC: 33.6 G/DL — SIGNIFICANT CHANGE UP (ref 32–36)
MCV RBC AUTO: 87.7 FL — SIGNIFICANT CHANGE UP (ref 80–100)
METAMYELOCYTES # FLD: 1 % — HIGH (ref 0–0)
MICROCYTES BLD QL: SLIGHT — SIGNIFICANT CHANGE UP
MONOCYTES # BLD AUTO: SIGNIFICANT CHANGE UP (ref 0–0.9)
MONOCYTES NFR BLD AUTO: 9 % — SIGNIFICANT CHANGE UP (ref 2–14)
MYELOCYTES NFR BLD: 12 % — HIGH (ref 0–0)
NEUTROPHILS # BLD AUTO: 0.9 K/UL — LOW (ref 1.8–7.4)
NEUTROPHILS NFR BLD AUTO: 12 % — LOW (ref 43–77)
NEUTS BAND # BLD: 1 % — SIGNIFICANT CHANGE UP (ref 0–8)
NRBC # BLD: 7 /100 — HIGH (ref 0–0)
PLAT MORPH BLD: NORMAL — SIGNIFICANT CHANGE UP
PLATELET # BLD AUTO: 88 K/UL — LOW (ref 150–400)
POIKILOCYTOSIS BLD QL AUTO: SLIGHT — SIGNIFICANT CHANGE UP
POLYCHROMASIA BLD QL SMEAR: SIGNIFICANT CHANGE UP
RBC # BLD: 2.25 M/UL — LOW (ref 3.8–5.2)
RBC # FLD: 16.7 % — HIGH (ref 10.3–14.5)
RBC BLD AUTO: ABNORMAL
SMUDGE CELLS # BLD: PRESENT — SIGNIFICANT CHANGE UP
WBC # BLD: 7.1 K/UL — SIGNIFICANT CHANGE UP (ref 3.8–10.5)
WBC # FLD AUTO: 7.1 K/UL — SIGNIFICANT CHANGE UP (ref 3.8–10.5)

## 2019-03-28 PROCEDURE — 99213 OFFICE O/P EST LOW 20 MIN: CPT

## 2019-03-28 NOTE — REASON FOR VISIT
[Follow-Up Visit] : a follow-up visit for [Myelodysplastic syndrome] : myelodysplastic syndrome [Family Member] : family member

## 2019-03-31 NOTE — HISTORY OF PRESENT ILLNESS
[de-identified] : Mrs. Chen is a 88 yo WF with a history of Reji 2+ thrombocytosis, dating back to 2013. She had been on Hydrea since Olya 10, 2013. Most recently she was found to be pancytopenic and the Hydrea was held as of July 12, 2017. A bone marrow was done on August 21, 2017, which a normocellular to mildly hypercellular marrow with mild megakaryocytic hyperplasia and moderate reticulin fibrosis and focal areas of increased blasts (5% ). \par  She was started on Jakafi.  [de-identified] : Feeling well, no excessive fatigue, has intermittent pelvic pain ( S/P fracture)

## 2019-03-31 NOTE — ASSESSMENT
[FreeTextEntry1] : Mrs. 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 Recent bone marrow showed 5% blasts with moderate reticulin fibrosis.. Was started on Jakafi, and we are monitoring her counts.. Her HGb has been stable at 6-7 gms, She is tolerating this HGb., Today her HGb is 6.6 and she is asymptomatic, no transfusion at this time, will bring back in 1 week to check HGB.

## 2019-04-04 ENCOUNTER — RESULT REVIEW (OUTPATIENT)
Age: 84
End: 2019-04-04

## 2019-04-04 ENCOUNTER — APPOINTMENT (OUTPATIENT)
Dept: HEMATOLOGY ONCOLOGY | Facility: CLINIC | Age: 84
End: 2019-04-04
Payer: MEDICARE

## 2019-04-04 LAB
ANISOCYTOSIS BLD QL: SLIGHT — SIGNIFICANT CHANGE UP
BASO STIPL BLD QL SMEAR: PRESENT — SIGNIFICANT CHANGE UP
BASOPHILS NFR BLD AUTO: 2 % — SIGNIFICANT CHANGE UP (ref 0–2)
BLASTS # FLD: 12 % — HIGH (ref 0–0)
DACRYOCYTES BLD QL SMEAR: SLIGHT — SIGNIFICANT CHANGE UP
ELLIPTOCYTES BLD QL SMEAR: SLIGHT — SIGNIFICANT CHANGE UP
GIANT PLATELETS BLD QL SMEAR: PRESENT — SIGNIFICANT CHANGE UP
HCT VFR BLD CALC: 18.3 % — CRITICAL LOW (ref 34.5–45)
HGB BLD-MCNC: 6.2 G/DL — CRITICAL LOW (ref 11.5–15.5)
HYPOCHROMIA BLD QL: SLIGHT — SIGNIFICANT CHANGE UP
LG PLATELETS BLD QL AUTO: SLIGHT — SIGNIFICANT CHANGE UP
LYMPHOCYTES # BLD AUTO: 29 % — SIGNIFICANT CHANGE UP (ref 13–44)
LYMPHOCYTES # SPEC AUTO: 28 % — HIGH (ref 0–0)
MACROCYTES BLD QL: SLIGHT — SIGNIFICANT CHANGE UP
MCHC RBC-ENTMCNC: 30.4 PG — SIGNIFICANT CHANGE UP (ref 27–34)
MCHC RBC-ENTMCNC: 34.1 G/DL — SIGNIFICANT CHANGE UP (ref 32–36)
MCV RBC AUTO: 89.1 FL — SIGNIFICANT CHANGE UP (ref 80–100)
METAMYELOCYTES # FLD: 2 % — HIGH (ref 0–0)
MICROCYTES BLD QL: SLIGHT — SIGNIFICANT CHANGE UP
MONOCYTES # BLD AUTO: SIGNIFICANT CHANGE UP (ref 0–0.9)
MONOCYTES NFR BLD AUTO: 4 % — SIGNIFICANT CHANGE UP (ref 2–14)
MYELOCYTES NFR BLD: 11 % — HIGH (ref 0–0)
NEUTROPHILS NFR BLD AUTO: 10 % — LOW (ref 43–77)
NRBC # BLD: 11 /100 — HIGH (ref 0–0)
PLAT MORPH BLD: NORMAL — SIGNIFICANT CHANGE UP
PLATELET # BLD AUTO: 73 K/UL — LOW (ref 150–400)
POIKILOCYTOSIS BLD QL AUTO: SLIGHT — SIGNIFICANT CHANGE UP
POLYCHROMASIA BLD QL SMEAR: SLIGHT — SIGNIFICANT CHANGE UP
PROMYELOCYTES # FLD: 2 % — HIGH (ref 0–0)
RBC # BLD: 2.06 M/UL — LOW (ref 3.8–5.2)
RBC # FLD: 18.6 % — HIGH (ref 10.3–14.5)
RBC BLD AUTO: ABNORMAL
SMUDGE CELLS # BLD: PRESENT — SIGNIFICANT CHANGE UP
TARGETS BLD QL SMEAR: SLIGHT — SIGNIFICANT CHANGE UP
TOXIC GRANULES BLD QL SMEAR: PRESENT — SIGNIFICANT CHANGE UP
WBC # BLD: 9.3 K/UL — SIGNIFICANT CHANGE UP (ref 3.8–10.5)
WBC # FLD AUTO: 9.3 K/UL — SIGNIFICANT CHANGE UP (ref 3.8–10.5)

## 2019-04-04 PROCEDURE — 99213 OFFICE O/P EST LOW 20 MIN: CPT

## 2019-04-05 ENCOUNTER — OUTPATIENT (OUTPATIENT)
Dept: OUTPATIENT SERVICES | Facility: HOSPITAL | Age: 84
LOS: 1 days | Discharge: HOME | End: 2019-04-05

## 2019-04-05 DIAGNOSIS — Z90.710 ACQUIRED ABSENCE OF BOTH CERVIX AND UTERUS: Chronic | ICD-10-CM

## 2019-04-05 DIAGNOSIS — D47.3 ESSENTIAL (HEMORRHAGIC) THROMBOCYTHEMIA: ICD-10-CM

## 2019-04-08 LAB
BASOPHILS # BLD AUTO: 1.2 K/UL — HIGH (ref 0–0.2)
EOSINOPHIL # BLD AUTO: 0.1 K/UL — SIGNIFICANT CHANGE UP (ref 0–0.5)
EOSINOPHIL NFR BLD AUTO: 1.4 % — SIGNIFICANT CHANGE UP (ref 0–6)
LYMPHOCYTES # BLD AUTO: 2.7 K/UL — SIGNIFICANT CHANGE UP (ref 1–3.3)
NEUTROPHILS # BLD AUTO: 1 K/UL — LOW (ref 1.8–7.4)

## 2019-04-11 ENCOUNTER — RESULT REVIEW (OUTPATIENT)
Age: 84
End: 2019-04-11

## 2019-04-11 ENCOUNTER — APPOINTMENT (OUTPATIENT)
Dept: HEMATOLOGY ONCOLOGY | Facility: CLINIC | Age: 84
End: 2019-04-11

## 2019-04-11 LAB
ANISOCYTOSIS BLD QL: SLIGHT — SIGNIFICANT CHANGE UP
BASO STIPL BLD QL SMEAR: PRESENT — SIGNIFICANT CHANGE UP
BASOPHILS # BLD AUTO: 2.3 K/UL — HIGH (ref 0–0.2)
BLASTS # FLD: 12 % — HIGH (ref 0–0)
DACRYOCYTES BLD QL SMEAR: SLIGHT — SIGNIFICANT CHANGE UP
ELLIPTOCYTES BLD QL SMEAR: SLIGHT — SIGNIFICANT CHANGE UP
EOSINOPHIL # BLD AUTO: 0.2 K/UL — SIGNIFICANT CHANGE UP (ref 0–0.5)
EOSINOPHIL NFR BLD AUTO: 1 % — SIGNIFICANT CHANGE UP (ref 0–6)
GIANT PLATELETS BLD QL SMEAR: PRESENT — SIGNIFICANT CHANGE UP
HCT VFR BLD CALC: 19 % — CRITICAL LOW (ref 34.5–45)
HGB BLD-MCNC: 6.5 G/DL — CRITICAL LOW (ref 11.5–15.5)
HYPOCHROMIA BLD QL: SIGNIFICANT CHANGE UP
LG PLATELETS BLD QL AUTO: SLIGHT — SIGNIFICANT CHANGE UP
LYMPHOCYTES # BLD AUTO: 28 % — SIGNIFICANT CHANGE UP (ref 13–44)
LYMPHOCYTES # BLD AUTO: 3.7 K/UL — HIGH (ref 1–3.3)
LYMPHOCYTES # SPEC AUTO: 25 % — HIGH (ref 0–0)
MACROCYTES BLD QL: SLIGHT — SIGNIFICANT CHANGE UP
MCHC RBC-ENTMCNC: 31.1 PG — SIGNIFICANT CHANGE UP (ref 27–34)
MCHC RBC-ENTMCNC: 34.3 G/DL — SIGNIFICANT CHANGE UP (ref 32–36)
MCV RBC AUTO: 90.7 FL — SIGNIFICANT CHANGE UP (ref 80–100)
MICROCYTES BLD QL: SLIGHT — SIGNIFICANT CHANGE UP
MONOCYTES # BLD AUTO: 6.8 K/UL — HIGH (ref 0–0.9)
MONOCYTES NFR BLD AUTO: 20 % — HIGH (ref 2–14)
MYELOCYTES NFR BLD: 4 % — HIGH (ref 0–0)
NEUTROPHILS # BLD AUTO: 1 K/UL — LOW (ref 1.8–7.4)
NEUTROPHILS NFR BLD AUTO: 8 % — LOW (ref 43–77)
NEUTS BAND # BLD: 1 % — SIGNIFICANT CHANGE UP (ref 0–8)
NRBC # BLD: 15 /100 — HIGH (ref 0–0)
PLAT MORPH BLD: NORMAL — SIGNIFICANT CHANGE UP
PLATELET # BLD AUTO: 86 K/UL — LOW (ref 150–400)
POIKILOCYTOSIS BLD QL AUTO: SLIGHT — SIGNIFICANT CHANGE UP
POLYCHROMASIA BLD QL SMEAR: SIGNIFICANT CHANGE UP
PROMYELOCYTES # FLD: 1 % — HIGH (ref 0–0)
RBC # BLD: 2.1 M/UL — LOW (ref 3.8–5.2)
RBC # FLD: 21.3 % — HIGH (ref 10.3–14.5)
RBC BLD AUTO: ABNORMAL
SMUDGE CELLS # BLD: PRESENT — SIGNIFICANT CHANGE UP
TARGETS BLD QL SMEAR: SLIGHT — SIGNIFICANT CHANGE UP
TOXIC GRANULES BLD QL SMEAR: PRESENT — SIGNIFICANT CHANGE UP
WBC # BLD: 14 K/UL — HIGH (ref 3.8–10.5)
WBC # FLD AUTO: 14 K/UL — HIGH (ref 3.8–10.5)

## 2019-04-15 ENCOUNTER — APPOINTMENT (OUTPATIENT)
Dept: RHEUMATOLOGY | Facility: CLINIC | Age: 84
End: 2019-04-15
Payer: MEDICARE

## 2019-04-15 VITALS
TEMPERATURE: 99 F | WEIGHT: 115 LBS | BODY MASS INDEX: 20.38 KG/M2 | HEIGHT: 63 IN | OXYGEN SATURATION: 96 % | SYSTOLIC BLOOD PRESSURE: 126 MMHG | DIASTOLIC BLOOD PRESSURE: 66 MMHG | RESPIRATION RATE: 17 BRPM | HEART RATE: 99 BPM

## 2019-04-15 PROCEDURE — 96372 THER/PROPH/DIAG INJ SC/IM: CPT

## 2019-04-15 PROCEDURE — 99215 OFFICE O/P EST HI 40 MIN: CPT | Mod: 25

## 2019-04-15 RX ORDER — DENOSUMAB 60 MG/ML
60 INJECTION SUBCUTANEOUS
Refills: 0 | Status: COMPLETED | OUTPATIENT
Start: 2019-04-15

## 2019-04-15 RX ADMIN — DENOSUMAB 0 MG/ML: 60 INJECTION SUBCUTANEOUS at 00:00

## 2019-04-15 NOTE — HISTORY OF PRESENT ILLNESS
[Fatigue] : fatigue [Difficulty Standing] : difficulty standing [Difficulty Walking] : difficulty walking [Muscle Weakness] : muscle weakness [Malar Facial Rash] : no malar facial rash [Skin Nodules] : no skin nodules [Skin Lesions] : no lesions [Oral Ulcers] : no oral ulcers [Cough] : no cough [Dry Mouth] : no dry mouth [Dysphagia] : no dysphagia [Shortness of Breath] : no shortness of breath [Chest Pain] : no chest pain [Arthralgias] : no arthralgias [Joint Swelling] : no joint swelling [Joint Warmth] : no joint warmth [Joint Deformity] : no joint deformity [Decreased ROM] : no decreased range of motion [Morning Stiffness] : no morning stiffness [Dyspnea] : no dyspnea [Myalgias] : no myalgias [Muscle Cramping] : no muscle cramping [Muscle Spasms] : no muscle spasms [Visual Changes] : no visual changes [Eye Pain] : no eye pain [Dry Eyes] : no dry eyes [Eye Redness] : no eye redness [FreeTextEntry1] : Pt fell on 12/28/18 and suffered fracture of her pubic rami.  She was hospitalized initially then sent for rehab, where she was found to have a clot.  Pt now feeling much better w/ therapy, though she does experience some groin pain.  She also c/o severe neck pain - the pain is worse with activity and better at rest.  No radiation.  She denies any weakness/numbness/tingling in any of her extremities.    No other current complaints.

## 2019-04-15 NOTE — DATA REVIEWED
[FreeTextEntry1] : CT cervical spine 12/28/18:  advanced multilevel spondylosis and foraminal stenosis

## 2019-04-15 NOTE — PROCEDURE
[Today's Date:] : Date: [unfilled] [Soft Tissue Injection] : soft tissue injection was performed [Patient] : the patient [Risks] : risks [Benefits] : benefits [Alternatives] : alternatives [Consent Obtained] : written consent was obtained prior to the procedure and is detailed in the patient's record [#1 Site: ______] : #1 site identified in the [unfilled] [Therapeutic] : therapeutic [Tolerated Well] : the patient tolerated the procedure well [Alcohol] : alcohol [No Complications] : there were no complications [Instructions Given] : handouts/patient instructions were given to patient [Patient Instructed to Call] : patient was instructed to call if redness at site, a decrease in range of motion or an increase in pain is noted after procedure. [de-identified] : Prolia 60mg

## 2019-04-15 NOTE — ASSESSMENT
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  resolved\par   - Minimize activities placing stress on the elbow\par   - meloxicam 7.5mg daily prn\par   - ice packs\par   - tennis elbow band\par 5)  Neck pain:  CT reveals advanced OA and foraminal stenosis.\par   - PT\par   - analgesia as above.

## 2019-04-15 NOTE — PHYSICAL EXAM
[General Appearance - Alert] : alert [Sclera] : the sclera and conjunctiva were normal [General Appearance - In No Acute Distress] : in no acute distress [Outer Ear] : the ears and nose were normal in appearance [Oropharynx] : the oropharynx was normal [Neck Appearance] : the appearance of the neck was normal [Neck Cervical Mass (___cm)] : no neck mass was observed [Thyroid Diffuse Enlargement] : the thyroid was not enlarged [Jugular Venous Distention Increased] : there was no jugular-venous distention [Thyroid Nodule] : there were no palpable thyroid nodules [Auscultation Breath Sounds / Voice Sounds] : lungs were clear to auscultation bilaterally [Heart Rate And Rhythm] : heart rate was normal and rhythm regular [Heart Sounds] : normal S1 and S2 [Heart Sounds Gallop] : no gallops [Murmurs] : no murmurs [Heart Sounds Pericardial Friction Rub] : no pericardial rub [Bowel Sounds] : normal bowel sounds [Abdomen Soft] : soft [Abdomen Tenderness] : non-tender [Abdomen Mass (___ Cm)] : no abdominal mass palpated [Cervical Lymph Nodes Enlarged Posterior Bilaterally] : posterior cervical [Cervical Lymph Nodes Enlarged Anterior Bilaterally] : anterior cervical [Supraclavicular Lymph Nodes Enlarged Bilaterally] : supraclavicular [No Spinal Tenderness] : no spinal tenderness [Skin Color & Pigmentation] : normal skin color and pigmentation [Skin Turgor] : normal skin turgor [] : no rash [Oriented To Time, Place, And Person] : oriented to person, place, and time [Impaired Insight] : insight and judgment were intact [Affect] : the affect was normal [FreeTextEntry1] : strength: 4-/5 in B/L proximal UE's and LE's;  5/5 distally

## 2019-04-15 NOTE — REVIEW OF SYSTEMS
[Feeling Tired] : feeling tired [As Noted in HPI] : as noted in HPI [Limb Weakness] : limb weakness [Negative] : Heme/Lymph

## 2019-04-22 ENCOUNTER — RESULT REVIEW (OUTPATIENT)
Age: 84
End: 2019-04-22

## 2019-04-22 ENCOUNTER — APPOINTMENT (OUTPATIENT)
Dept: HEMATOLOGY ONCOLOGY | Facility: CLINIC | Age: 84
End: 2019-04-22

## 2019-04-22 LAB
ANISOCYTOSIS BLD QL: SLIGHT — SIGNIFICANT CHANGE UP
BASO STIPL BLD QL SMEAR: PRESENT — SIGNIFICANT CHANGE UP
BASOPHILS # BLD AUTO: SIGNIFICANT CHANGE UP (ref 0–0.2)
BLASTS # FLD: 23 % — HIGH (ref 0–0)
ELLIPTOCYTES BLD QL SMEAR: SLIGHT — SIGNIFICANT CHANGE UP
EOSINOPHIL # BLD AUTO: 0.1 K/UL — SIGNIFICANT CHANGE UP (ref 0–0.5)
GIANT PLATELETS BLD QL SMEAR: PRESENT — SIGNIFICANT CHANGE UP
HCT VFR BLD CALC: 17.4 % — CRITICAL LOW (ref 34.5–45)
HGB BLD-MCNC: 5.7 G/DL — CRITICAL LOW (ref 11.5–15.5)
HYPOCHROMIA BLD QL: SIGNIFICANT CHANGE UP
LG PLATELETS BLD QL AUTO: SLIGHT — SIGNIFICANT CHANGE UP
LYMPHOCYTES # BLD AUTO: 22 % — SIGNIFICANT CHANGE UP (ref 13–44)
LYMPHOCYTES # BLD AUTO: 3.6 K/UL — HIGH (ref 1–3.3)
LYMPHOCYTES # SPEC AUTO: 26 % — HIGH (ref 0–0)
MACROCYTES BLD QL: SLIGHT — SIGNIFICANT CHANGE UP
MCHC RBC-ENTMCNC: 31.4 PG — SIGNIFICANT CHANGE UP (ref 27–34)
MCHC RBC-ENTMCNC: 32.6 G/DL — SIGNIFICANT CHANGE UP (ref 32–36)
MCV RBC AUTO: 96.1 FL — SIGNIFICANT CHANGE UP (ref 80–100)
MICROCYTES BLD QL: SLIGHT — SIGNIFICANT CHANGE UP
MONOCYTES # BLD AUTO: SIGNIFICANT CHANGE UP (ref 0–0.9)
MONOCYTES NFR BLD AUTO: 9 % — SIGNIFICANT CHANGE UP (ref 2–14)
MYELOCYTES NFR BLD: 6 % — HIGH (ref 0–0)
NEUTROPHILS # BLD AUTO: 1.6 K/UL — LOW (ref 1.8–7.4)
NEUTROPHILS NFR BLD AUTO: 14 % — LOW (ref 43–77)
NRBC # BLD: 8 /100 — HIGH (ref 0–0)
OVALOCYTES BLD QL SMEAR: SLIGHT — SIGNIFICANT CHANGE UP
PLAT MORPH BLD: NORMAL — SIGNIFICANT CHANGE UP
PLATELET # BLD AUTO: 68 K/UL — LOW (ref 150–400)
POIKILOCYTOSIS BLD QL AUTO: SLIGHT — SIGNIFICANT CHANGE UP
POLYCHROMASIA BLD QL SMEAR: SIGNIFICANT CHANGE UP
RBC # BLD: 1.81 M/UL — LOW (ref 3.8–5.2)
RBC # FLD: 25.6 % — HIGH (ref 10.3–14.5)
RBC BLD AUTO: ABNORMAL
SMUDGE CELLS # BLD: PRESENT — SIGNIFICANT CHANGE UP
WBC # BLD: 18.6 K/UL — HIGH (ref 3.8–10.5)
WBC # FLD AUTO: 18.6 K/UL — HIGH (ref 3.8–10.5)

## 2019-04-22 NOTE — HISTORY OF PRESENT ILLNESS
[de-identified] : Mrs. Chen is a 88 yo WF with a history of Reji 2+ thrombocytosis, dating back to 2013. She had been on Hydrea since Olya 10, 2013. Most recently she was found to be pancytopenic and the Hydrea was held as of July 12, 2017. A bone marrow was done on August 21, 2017, which a normocellular to mildly hypercellular marrow with mild megakaryocytic hyperplasia and moderate reticulin fibrosis and focal areas of increased blasts (5% ). \par  She was started on Jakafi. \par  [de-identified] : No excessive fatigue, no bleeding

## 2019-04-22 NOTE — ASSESSMENT
[FreeTextEntry1] : Mrs. 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 Recent bone marrow showed 5% blasts with moderate reticulin fibrosis.. Was started on Jakafi, and we are monitoring her counts.. Her HGb has been stable at 6-7 gms, She is tolerating this HGb, no transfusion at this time, will continue to monitor HGb, will transfuse if HGb drops below 6 gms, or she becomes symptomatic.

## 2019-04-23 ENCOUNTER — EMERGENCY (EMERGENCY)
Facility: HOSPITAL | Age: 84
LOS: 1 days | Discharge: HOME | End: 2019-04-23
Attending: EMERGENCY MEDICINE
Payer: MEDICARE

## 2019-04-23 VITALS
WEIGHT: 117.95 LBS | TEMPERATURE: 98 F | SYSTOLIC BLOOD PRESSURE: 120 MMHG | DIASTOLIC BLOOD PRESSURE: 57 MMHG | HEIGHT: 63 IN | HEART RATE: 103 BPM | RESPIRATION RATE: 20 BRPM | OXYGEN SATURATION: 97 %

## 2019-04-23 VITALS
RESPIRATION RATE: 16 BRPM | TEMPERATURE: 98 F | HEART RATE: 89 BPM | DIASTOLIC BLOOD PRESSURE: 67 MMHG | SYSTOLIC BLOOD PRESSURE: 123 MMHG | OXYGEN SATURATION: 98 %

## 2019-04-23 DIAGNOSIS — Z90.710 ACQUIRED ABSENCE OF BOTH CERVIX AND UTERUS: Chronic | ICD-10-CM

## 2019-04-23 LAB
ALBUMIN SERPL ELPH-MCNC: 4.2 G/DL — SIGNIFICANT CHANGE UP (ref 3.3–5.2)
ALP SERPL-CCNC: 98 U/L — SIGNIFICANT CHANGE UP (ref 40–120)
ALT FLD-CCNC: 7 U/L — SIGNIFICANT CHANGE UP
ANION GAP SERPL CALC-SCNC: 13 MMOL/L — SIGNIFICANT CHANGE UP (ref 5–17)
ANISOCYTOSIS BLD QL: SLIGHT — SIGNIFICANT CHANGE UP
AST SERPL-CCNC: 19 U/L — SIGNIFICANT CHANGE UP
BASOPHILS # BLD AUTO: 0 K/UL — SIGNIFICANT CHANGE UP (ref 0–0.2)
BASOPHILS NFR BLD AUTO: 0.1 % — SIGNIFICANT CHANGE UP (ref 0–2)
BILIRUB SERPL-MCNC: 0.9 MG/DL — SIGNIFICANT CHANGE UP (ref 0.4–2)
BLASTS # FLD: 22 % — HIGH (ref 0–0)
BLD GP AB SCN SERPL QL: SIGNIFICANT CHANGE UP
BUN SERPL-MCNC: 19 MG/DL — SIGNIFICANT CHANGE UP (ref 8–20)
CALCIUM SERPL-MCNC: 9 MG/DL — SIGNIFICANT CHANGE UP (ref 8.6–10.2)
CHLORIDE SERPL-SCNC: 106 MMOL/L — SIGNIFICANT CHANGE UP (ref 98–107)
CO2 SERPL-SCNC: 20 MMOL/L — LOW (ref 22–29)
CREAT SERPL-MCNC: 0.74 MG/DL — SIGNIFICANT CHANGE UP (ref 0.5–1.3)
ELLIPTOCYTES BLD QL SMEAR: SLIGHT — SIGNIFICANT CHANGE UP
EOSINOPHIL # BLD AUTO: 0.2 K/UL — SIGNIFICANT CHANGE UP (ref 0–0.5)
EOSINOPHIL NFR BLD AUTO: 1 % — SIGNIFICANT CHANGE UP (ref 0–5)
GLUCOSE SERPL-MCNC: 105 MG/DL — SIGNIFICANT CHANGE UP (ref 70–115)
HCT VFR BLD CALC: 18.6 % — CRITICAL LOW (ref 37–47)
HGB BLD-MCNC: 5.9 G/DL — CRITICAL LOW (ref 12–16)
HYPOCHROMIA BLD QL: SLIGHT — SIGNIFICANT CHANGE UP
LYMPHOCYTES # BLD AUTO: 18 % — LOW (ref 20–55)
MACROCYTES BLD QL: SLIGHT — SIGNIFICANT CHANGE UP
MCHC RBC-ENTMCNC: 30.9 PG — SIGNIFICANT CHANGE UP (ref 27–31)
MCHC RBC-ENTMCNC: 31.7 G/DL — LOW (ref 32–36)
MCV RBC AUTO: 97.4 FL — SIGNIFICANT CHANGE UP (ref 81–99)
METAMYELOCYTES # FLD: 1 % — HIGH (ref 0–0)
MICROCYTES BLD QL: SLIGHT — SIGNIFICANT CHANGE UP
MONOCYTES NFR BLD AUTO: 4 % — SIGNIFICANT CHANGE UP (ref 3–10)
MYELOCYTES NFR BLD: 2 % — HIGH (ref 0–0)
NEUTROPHILS NFR BLD AUTO: 6 % — LOW (ref 37–73)
NRBC # BLD: 1 /100 — HIGH (ref 0–0)
PLAT MORPH BLD: NORMAL — SIGNIFICANT CHANGE UP
PLATELET # BLD AUTO: 75 K/UL — LOW (ref 150–400)
POIKILOCYTOSIS BLD QL AUTO: SLIGHT — SIGNIFICANT CHANGE UP
POTASSIUM SERPL-MCNC: 4 MMOL/L — SIGNIFICANT CHANGE UP (ref 3.5–5.3)
POTASSIUM SERPL-SCNC: 4 MMOL/L — SIGNIFICANT CHANGE UP (ref 3.5–5.3)
PROT SERPL-MCNC: 8.1 G/DL — SIGNIFICANT CHANGE UP (ref 6.6–8.7)
RBC # BLD: 1.91 M/UL — LOW (ref 4.4–5.2)
RBC # FLD: 26.2 % — HIGH (ref 11–15.6)
RBC BLD AUTO: ABNORMAL
SODIUM SERPL-SCNC: 139 MMOL/L — SIGNIFICANT CHANGE UP (ref 135–145)
TYPE + AB SCN PNL BLD: SIGNIFICANT CHANGE UP
VARIANT LYMPHS # BLD: 46 % — HIGH (ref 0–6)
WBC # BLD: 14.9 K/UL — HIGH (ref 4.8–10.8)
WBC # FLD AUTO: 14.9 K/UL — HIGH (ref 4.8–10.8)

## 2019-04-23 PROCEDURE — 99218: CPT

## 2019-04-23 PROCEDURE — 86900 BLOOD TYPING SEROLOGIC ABO: CPT

## 2019-04-23 PROCEDURE — 86901 BLOOD TYPING SEROLOGIC RH(D): CPT

## 2019-04-23 PROCEDURE — 86850 RBC ANTIBODY SCREEN: CPT

## 2019-04-23 PROCEDURE — 86922 COMPATIBILITY TEST ANTIGLOB: CPT

## 2019-04-23 PROCEDURE — 36430 TRANSFUSION BLD/BLD COMPNT: CPT

## 2019-04-23 PROCEDURE — 36415 COLL VENOUS BLD VENIPUNCTURE: CPT

## 2019-04-23 PROCEDURE — 85027 COMPLETE CBC AUTOMATED: CPT

## 2019-04-23 PROCEDURE — 99285 EMERGENCY DEPT VISIT HI MDM: CPT | Mod: 25

## 2019-04-23 PROCEDURE — P9016: CPT

## 2019-04-23 PROCEDURE — 80053 COMPREHEN METABOLIC PANEL: CPT

## 2019-04-23 PROCEDURE — G0378: CPT

## 2019-04-23 RX ORDER — ACETAMINOPHEN 500 MG
650 TABLET ORAL EVERY 4 HOURS
Qty: 0 | Refills: 0 | Status: DISCONTINUED | OUTPATIENT
Start: 2019-04-23 | End: 2019-04-28

## 2019-04-23 RX ORDER — DIPHENHYDRAMINE HCL 50 MG
50 CAPSULE ORAL EVERY 4 HOURS
Qty: 0 | Refills: 0 | Status: DISCONTINUED | OUTPATIENT
Start: 2019-04-23 | End: 2019-04-28

## 2019-04-23 RX ADMIN — Medication 50 MILLIGRAM(S): at 10:01

## 2019-04-23 RX ADMIN — Medication 650 MILLIGRAM(S): at 10:01

## 2019-04-23 RX ADMIN — Medication 650 MILLIGRAM(S): at 10:40

## 2019-04-23 NOTE — ED STATDOCS - OBJECTIVE STATEMENT
87 y/o F pt with hx of anemia, myelofibrosis, presents to the ED c/o abnormal lab results; low count RBC, requesting transfusion. Pt was seen by her PMD: Simon recently, and was advised that her RBC was in the 5.6-5.4, pt's RBC at normal baseline is typically "around 6" as per daughter. Pt's PMD was concerned with pt's number going under 6; pt advised to come into ED for 1 unit of blood transfusion. Per daughter states pt normally takes Tylenol and Benadryl, prior to any transfusions due to past reaction. Pt is currently on Jakafi, and Synthroid. Denies chest pain, SOB, f/c. No further complaints at this time. 89 y/o F pt with hx of anemia, myelofibrosis, presents to the ED c/o abnormal lab results; low count RBC, requesting transfusion. Pt in ED states she has no complaints. Pt was seen by her PMD: Simon recently, and was advised that her RBC was in the 5.6-5.4, pt's RBC at normal baseline is typically "around 6" as per daughter. Per daughter states pt sees her PMD on a regular basis. Pt's PMD was concerned with pt's number going under 6; pt advised to come into ED for 1 unit of blood. Per daughter states pt normally takes Tylenol and Benadryl, prior to any transfusions due to past reaction. Pt is currently on Jakafi, and Synthroid. Denies chest pain, SOB, f/c. No further complaints at this time.

## 2019-04-23 NOTE — ED CDU PROVIDER DISPOSITION NOTE - CLINICAL COURSE
88y female sent from [facility] for transfusion due to abnormal lab results. Pt H&H approx 5.6. Pt d/w Dr stevenson directly, lives at low Hb approx 6.5; aware of patient in ED, requesting 1 unit only of PRBC and discharge. Pt received 1 unit PRBC in CDU. No events during or after tranfusion. Transfusion completed, saline flushed through IV. Pt resting comfortably with no current complaints, VS stable, afebrile, asymptomatic, tolerating PO intake. Pt feeling well and stable for dc at this time.

## 2019-04-23 NOTE — ED CDU PROVIDER INITIAL DAY NOTE - MEDICAL DECISION MAKING DETAILS
d/w Dr stevenson directly, lives at low Hb approx 6.5; aware of patient in ED, requesting 1 unit only of PRBC; thereafter should be ok for d/c; patient without any other complaints

## 2019-04-23 NOTE — ED ADULT NURSE REASSESSMENT NOTE - NS ED NURSE REASSESS COMMENT FT1
Transfusion of 1 unit of PRBC completed. No s/s of transfusion reaction. Patient resting comfortably. Awaiting transport, daughter is picking up patient.
Assumed care of the patient at 1200. Patient moved to observation unit cdu 11. Patient A&Ox3. no s/s of distress or pain. Lungs clear B/L on ausculation. Denies CP or SOB. VSS, no s/s of transfusion reaction. Meal tray provided. No edema noted. Patient verbalizes understanding of plan of care. Patient with no further questions for the nurse. Resting comfortably. Will continue to monitor.

## 2019-04-23 NOTE — ED CDU PROVIDER INITIAL DAY NOTE - OBJECTIVE STATEMENT
87 y/o F pt with hx of anemia, myelofibrosis, presents to the ED c/o abnormal lab results; low count RBC, requesting transfusion. Pt in ED states she has no complaints. Pt was seen by her PMD: Simon recently, and was advised that her RBC was in the 5.6-5.4, pt's RBC at normal baseline is typically "around 6" as per daughter. Per daughter states pt sees her PMD on a regular basis. Pt's PMD was concerned with pt's number going under 6; pt advised to come into ED for 1 unit of blood. Per daughter states pt normally takes Tylenol and Benadryl, prior to any transfusions due to past reaction. Pt is currently on Jakafi, and Synthroid. Denies chest pain, SOB, f/c. No further complaints at this time.

## 2019-04-23 NOTE — ED CDU PROVIDER INITIAL DAY NOTE - PHYSICAL EXAMINATION
Const: Awake, alert and oriented. In no acute distress. Well appearing.  HEENT: NC/AT. Moist mucous membranes.  Eyes: No scleral icterus. EOMI.  Neck:. Soft and supple. Full ROM without pain.  Cardiac: Regular rate and rhythm. +S1/S2. No murmurs. Peripheral pulses 2+ and symmetric. No LE edema.  Resp: Speaking in full sentences. No evidence of respiratory distress. No wheezes, rales or rhonchi.  Abd: Soft, non-tender, non-distended. Normal bowel sounds in all 4 quadrants. No guarding or rebound.  Back: Spine midline and non-tender. No CVAT.  Skin: Mild pale skin tone.

## 2019-04-23 NOTE — ED STATDOCS - NS ED ROS FT
Const: Denies fever, chills  HEENT: Denies blurry vision, sore throat  Neck: Denies neck pain/stiffness  Resp: Denies coughing, SOB  Cardiovascular: Denies CP, palpitations, LE edema  GI: Deneis nausea, vomiting, abdominal pain, diarrhea, constipation, blood in stool  : Denies urinary frequency/urgency/dysuria, hematuria  MSK: Denies back pain  Neuro: Denies HA, dizziness, numbness, weakness  Skin: Denies rashes. Const: Denies fever, chills  HEENT: Denies blurry vision, sore throat  Neck: Denies neck pain/stiffness  Resp: Denies coughing, SOB  Cardiovascular: Denies CP, palpitations, LE edema  GI: Denies nausea, vomiting, abdominal pain, diarrhea, constipation, blood in stool  : Denies urinary frequency/urgency/dysuria, hematuria  MSK: Denies back pain  Neuro: Denies HA, dizziness, numbness, weakness  Skin: Denies rashes.

## 2019-04-23 NOTE — ED ADULT TRIAGE NOTE - CHIEF COMPLAINT QUOTE
just received lab work results and sent for 1 unit PRBC. No physical complaints. Wants to get 1 unit then DC home.

## 2019-04-23 NOTE — ED ADULT NURSE NOTE - PMH
Acute deep vein thrombosis (DVT) of right upper extremity, unspecified vein    Anemia    Fall, sequela    Hypothyroidism (acquired)    Myelodysplastic syndrome    Myelofibrosis    Pelvic fracture    Thrombocytosis  ANNMARIE 2 thrombocytosis

## 2019-04-23 NOTE — ED STATDOCS - PHYSICAL EXAMINATION
Const: Awake, alert and oriented. In no acute distress. Well appearing.  HEENT: NC/AT. Moist mucous membranes.  Eyes: No scleral icterus. EOMI.  Cardiac: Regular rate and rhythm. +S1/S2. No murmurs. Peripheral pulses 2+ and symmetric. No LE edema.  Resp: Speaking in full sentences. No evidence of respiratory distress. No wheezes, rales or rhonchi.  Skin: No rashes, abrasions or lacerations. Const: Awake, alert and oriented. In no acute distress. Well appearing.  HEENT: NC/AT. Moist mucous membranes.  Eyes: No scleral icterus. EOMI.  Neck:. Soft and supple. Full ROM without pain.  Cardiac: Regular rate and rhythm. +S1/S2. No murmurs. Peripheral pulses 2+ and symmetric. No LE edema.  Resp: Speaking in full sentences. No evidence of respiratory distress. No wheezes, rales or rhonchi.  Abd: Soft, non-tender, non-distended. Normal bowel sounds in all 4 quadrants. No guarding or rebound.  Back: Spine midline and non-tender. No CVAT.  Skin: Mild pale skin tone.

## 2019-04-23 NOTE — ED CDU PROVIDER DISPOSITION NOTE - ATTENDING CONTRIBUTION TO CARE
seen with acp: s/p transfusion, feels well, no complications, ok for d/c with outpt f/u with dr stevenson.

## 2019-05-07 ENCOUNTER — OUTPATIENT (OUTPATIENT)
Dept: OUTPATIENT SERVICES | Facility: HOSPITAL | Age: 84
LOS: 1 days | Discharge: HOME | End: 2019-05-07
Payer: MEDICARE

## 2019-05-07 DIAGNOSIS — Z90.710 ACQUIRED ABSENCE OF BOTH CERVIX AND UTERUS: Chronic | ICD-10-CM

## 2019-05-07 DIAGNOSIS — D47.3 ESSENTIAL (HEMORRHAGIC) THROMBOCYTHEMIA: ICD-10-CM

## 2019-05-08 PROBLEM — D64.9 ANEMIA, UNSPECIFIED: Chronic | Status: ACTIVE | Noted: 2019-04-23

## 2019-05-09 ENCOUNTER — RESULT REVIEW (OUTPATIENT)
Age: 84
End: 2019-05-09

## 2019-05-09 ENCOUNTER — APPOINTMENT (OUTPATIENT)
Dept: HEMATOLOGY ONCOLOGY | Facility: CLINIC | Age: 84
End: 2019-05-09

## 2019-05-09 LAB
ANISOCYTOSIS BLD QL: SIGNIFICANT CHANGE UP
AUER BODIES BLD QL SMEAR: PRESENT — SIGNIFICANT CHANGE UP
BASO STIPL BLD QL SMEAR: PRESENT — SIGNIFICANT CHANGE UP
BLASTS # FLD: 34 % — HIGH (ref 0–0)
DACRYOCYTES BLD QL SMEAR: SIGNIFICANT CHANGE UP
ELLIPTOCYTES BLD QL SMEAR: SLIGHT — SIGNIFICANT CHANGE UP
GIANT PLATELETS BLD QL SMEAR: PRESENT — SIGNIFICANT CHANGE UP
HCT VFR BLD CALC: 20.8 % — CRITICAL LOW (ref 34.5–45)
HGB BLD-MCNC: 6.8 G/DL — CRITICAL LOW (ref 11.5–15.5)
HYPOCHROMIA BLD QL: SIGNIFICANT CHANGE UP
HYPOGRANULAR PLT: PRESENT — SIGNIFICANT CHANGE UP
LG PLATELETS BLD QL AUTO: SLIGHT — SIGNIFICANT CHANGE UP
LYMPHOCYTES # BLD AUTO: 28 % — SIGNIFICANT CHANGE UP (ref 13–44)
LYMPHOCYTES # BLD AUTO: 5.9 K/UL — HIGH (ref 1–3.3)
LYMPHOCYTES # SPEC AUTO: 4 % — HIGH (ref 0–0)
MACROCYTES BLD QL: SLIGHT — SIGNIFICANT CHANGE UP
MCHC RBC-ENTMCNC: 32.4 PG — SIGNIFICANT CHANGE UP (ref 27–34)
MCHC RBC-ENTMCNC: 32.9 G/DL — SIGNIFICANT CHANGE UP (ref 32–36)
MCV RBC AUTO: 98.4 FL — SIGNIFICANT CHANGE UP (ref 80–100)
METAMYELOCYTES # FLD: 2 % — HIGH (ref 0–0)
MICROCYTES BLD QL: SIGNIFICANT CHANGE UP
MONOCYTES # BLD AUTO: HIGH K/UL (ref 0–0.9)
MONOCYTES NFR BLD AUTO: 17 % — HIGH (ref 2–14)
MYELOCYTES NFR BLD: 2 % — HIGH (ref 0–0)
NEUTROPHILS # BLD AUTO: 4 K/UL — SIGNIFICANT CHANGE UP (ref 1.8–7.4)
NEUTROPHILS NFR BLD AUTO: 7 % — LOW (ref 43–77)
NEUTS BAND # BLD: 6 % — SIGNIFICANT CHANGE UP (ref 0–8)
NRBC # BLD: 1 /100 — HIGH (ref 0–0)
OVALOCYTES BLD QL SMEAR: SLIGHT — SIGNIFICANT CHANGE UP
PLAT MORPH BLD: ABNORMAL
PLATELET # BLD AUTO: 63 K/UL — LOW (ref 150–400)
POIKILOCYTOSIS BLD QL AUTO: SLIGHT — SIGNIFICANT CHANGE UP
POLYCHROMASIA BLD QL SMEAR: SIGNIFICANT CHANGE UP
RBC # BLD: 2.11 M/UL — LOW (ref 3.8–5.2)
RBC # FLD: 24.5 % — HIGH (ref 10.3–14.5)
RBC BLD AUTO: ABNORMAL
ROULEAUX BLD QL SMEAR: PRESENT — SIGNIFICANT CHANGE UP
SCHISTOCYTES BLD QL AUTO: SLIGHT — SIGNIFICANT CHANGE UP
SMUDGE CELLS # BLD: PRESENT — SIGNIFICANT CHANGE UP
TARGETS BLD QL SMEAR: SLIGHT — SIGNIFICANT CHANGE UP
WBC # BLD: 32.6 K/UL — HIGH (ref 3.8–10.5)
WBC # FLD AUTO: 32.6 K/UL — HIGH (ref 3.8–10.5)

## 2019-05-16 ENCOUNTER — RX RENEWAL (OUTPATIENT)
Age: 84
End: 2019-05-16

## 2019-05-20 ENCOUNTER — APPOINTMENT (OUTPATIENT)
Dept: HEMATOLOGY ONCOLOGY | Facility: CLINIC | Age: 84
End: 2019-05-20

## 2019-05-20 ENCOUNTER — RESULT REVIEW (OUTPATIENT)
Age: 84
End: 2019-05-20

## 2019-05-20 ENCOUNTER — OUTPATIENT (OUTPATIENT)
Dept: OUTPATIENT SERVICES | Facility: HOSPITAL | Age: 84
LOS: 1 days | End: 2019-05-20
Payer: MEDICARE

## 2019-05-20 ENCOUNTER — APPOINTMENT (OUTPATIENT)
Dept: HEMATOLOGY ONCOLOGY | Facility: CLINIC | Age: 84
End: 2019-05-20
Payer: MEDICARE

## 2019-05-20 VITALS
HEART RATE: 98 BPM | OXYGEN SATURATION: 88 % | DIASTOLIC BLOOD PRESSURE: 68 MMHG | TEMPERATURE: 98.3 F | SYSTOLIC BLOOD PRESSURE: 124 MMHG | HEIGHT: 63 IN | BODY MASS INDEX: 20.38 KG/M2 | WEIGHT: 115 LBS

## 2019-05-20 DIAGNOSIS — Z90.710 ACQUIRED ABSENCE OF BOTH CERVIX AND UTERUS: Chronic | ICD-10-CM

## 2019-05-20 DIAGNOSIS — D47.3 ESSENTIAL (HEMORRHAGIC) THROMBOCYTHEMIA: ICD-10-CM

## 2019-05-20 LAB
ANISOCYTOSIS BLD QL: SIGNIFICANT CHANGE UP
AUER BODIES BLD QL SMEAR: PRESENT — SIGNIFICANT CHANGE UP
BASO STIPL BLD QL SMEAR: PRESENT — SIGNIFICANT CHANGE UP
BASOPHILS # BLD AUTO: SIGNIFICANT CHANGE UP (ref 0–0.2)
BLASTS # FLD: 34 % — HIGH (ref 0–0)
BLD GP AB SCN SERPL QL: SIGNIFICANT CHANGE UP
ELLIPTOCYTES BLD QL SMEAR: SLIGHT — SIGNIFICANT CHANGE UP
EOSINOPHIL # BLD AUTO: 0.4 K/UL — SIGNIFICANT CHANGE UP (ref 0–0.5)
HCT VFR BLD CALC: 17.9 % — CRITICAL LOW (ref 34.5–45)
HGB BLD-MCNC: 5.7 G/DL — CRITICAL LOW (ref 11.5–15.5)
HYPOCHROMIA BLD QL: SIGNIFICANT CHANGE UP
LG PLATELETS BLD QL AUTO: SLIGHT — SIGNIFICANT CHANGE UP
LYMPHOCYTES # BLD AUTO: 15 % — SIGNIFICANT CHANGE UP (ref 13–44)
LYMPHOCYTES # BLD AUTO: 6.8 K/UL — HIGH (ref 1–3.3)
LYMPHOCYTES # SPEC AUTO: 10 % — HIGH (ref 0–0)
MACROCYTES BLD QL: SLIGHT — SIGNIFICANT CHANGE UP
MCHC RBC-ENTMCNC: 31.7 G/DL — LOW (ref 32–36)
MCHC RBC-ENTMCNC: 31.8 PG — SIGNIFICANT CHANGE UP (ref 27–34)
MCV RBC AUTO: 100.3 FL — HIGH (ref 80–100)
METAMYELOCYTES # FLD: 2 % — HIGH (ref 0–0)
MICROCYTES BLD QL: SIGNIFICANT CHANGE UP
MONOCYTES # BLD AUTO: HIGH K/UL (ref 0–0.9)
MONOCYTES NFR BLD AUTO: 19 % — HIGH (ref 2–14)
MYELOCYTES NFR BLD: 3 % — HIGH (ref 0–0)
NEUTROPHILS # BLD AUTO: 5 K/UL — SIGNIFICANT CHANGE UP (ref 1.8–7.4)
NEUTROPHILS NFR BLD AUTO: 12 % — LOW (ref 43–77)
NRBC # BLD: 10 /100 — HIGH (ref 0–0)
OVALOCYTES BLD QL SMEAR: SLIGHT — SIGNIFICANT CHANGE UP
PLAT MORPH BLD: ABNORMAL
PLATELET # BLD AUTO: 65 K/UL — LOW (ref 150–400)
POIKILOCYTOSIS BLD QL AUTO: SLIGHT — SIGNIFICANT CHANGE UP
POLYCHROMASIA BLD QL SMEAR: SIGNIFICANT CHANGE UP
RBC # BLD: 1.78 M/UL — LOW (ref 3.8–5.2)
RBC # FLD: 23.3 % — HIGH (ref 10.3–14.5)
RBC BLD AUTO: ABNORMAL
ROULEAUX BLD QL SMEAR: PRESENT — SIGNIFICANT CHANGE UP
SMUDGE CELLS # BLD: PRESENT — SIGNIFICANT CHANGE UP
TYPE + AB SCN PNL BLD: SIGNIFICANT CHANGE UP
VARIANT LYMPHS # BLD: 5 % — SIGNIFICANT CHANGE UP (ref 0–6)
WBC # BLD: 45.1 K/UL — CRITICAL HIGH (ref 3.8–10.5)
WBC # FLD AUTO: 45.1 K/UL — CRITICAL HIGH (ref 3.8–10.5)

## 2019-05-20 PROCEDURE — 99214 OFFICE O/P EST MOD 30 MIN: CPT

## 2019-05-20 RX ORDER — CELECOXIB 200 MG/1
200 CAPSULE ORAL
Refills: 0 | Status: DISCONTINUED | COMMUNITY
End: 2019-05-20

## 2019-05-20 RX ORDER — MELOXICAM 15 MG/1
TABLET ORAL
Refills: 0 | Status: DISCONTINUED | COMMUNITY
End: 2019-05-20

## 2019-05-21 ENCOUNTER — APPOINTMENT (OUTPATIENT)
Age: 84
End: 2019-05-21

## 2019-05-21 ENCOUNTER — OTHER (OUTPATIENT)
Age: 84
End: 2019-05-21

## 2019-05-21 ENCOUNTER — APPOINTMENT (OUTPATIENT)
Dept: COLORECTAL SURGERY | Facility: CLINIC | Age: 84
End: 2019-05-21

## 2019-05-21 PROCEDURE — 86922 COMPATIBILITY TEST ANTIGLOB: CPT

## 2019-05-21 PROCEDURE — 86900 BLOOD TYPING SEROLOGIC ABO: CPT

## 2019-05-21 PROCEDURE — P9016: CPT

## 2019-05-21 PROCEDURE — 36430 TRANSFUSION BLD/BLD COMPNT: CPT

## 2019-05-21 PROCEDURE — 86850 RBC ANTIBODY SCREEN: CPT

## 2019-05-21 PROCEDURE — 36415 COLL VENOUS BLD VENIPUNCTURE: CPT

## 2019-05-21 PROCEDURE — 86901 BLOOD TYPING SEROLOGIC RH(D): CPT

## 2019-05-21 RX ORDER — SODIUM CHLORIDE 9 MG/ML
1 INJECTION INTRAMUSCULAR; INTRAVENOUS; SUBCUTANEOUS
Qty: 0 | Refills: 0 | DISCHARGE

## 2019-05-22 DIAGNOSIS — D75.81 MYELOFIBROSIS: ICD-10-CM

## 2019-05-22 NOTE — HISTORY OF PRESENT ILLNESS
[de-identified] : Mrs. Chen is a 88 yo WF with a history of Reji 2+ thrombocytosis, dating back to 2013. She had been on Hydrea since Olya 10, 2013. Most recently she was found to be pancytopenic and the Hydrea was held as of July 12, 2017. A bone marrow was done on August 21, 2017, which a normocellular to mildly hypercellular marrow with mild megakaryocytic hyperplasia and moderate reticulin fibrosis and focal areas of increased blasts (5% ). \par  She was started on Jakafi.  [de-identified] : Daughter states that Her mother has been more lethargic lately, no hemorrhoidal bleeding, still on Jakafi 10 mg.

## 2019-05-22 NOTE — ASSESSMENT
[FreeTextEntry1] : Mrs. 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 Bone marrow showed 5% blasts with moderate reticulin fibrosis.. Was started on Jakafi, and we are monitoring her counts.. Her HGb has been stable at 6-7 gms, Review of recent blood work reveals increased number of blasts. Case discussed with Dr. 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 He came into speak with patient and family regarding transformation. The plan as of now is to transfuse her 2 units of packed cells tomorrow. We discussed the possibility of Azacytadine treatment and explained the reasoning and side effects and schedule. they will discuss this as a family and will F/U with us next week.

## 2019-05-28 ENCOUNTER — RESULT REVIEW (OUTPATIENT)
Age: 84
End: 2019-05-28

## 2019-05-28 ENCOUNTER — APPOINTMENT (OUTPATIENT)
Dept: HEMATOLOGY ONCOLOGY | Facility: CLINIC | Age: 84
End: 2019-05-28
Payer: MEDICARE

## 2019-05-28 VITALS
WEIGHT: 115 LBS | BODY MASS INDEX: 20.38 KG/M2 | OXYGEN SATURATION: 92 % | HEIGHT: 63 IN | TEMPERATURE: 98.3 F | DIASTOLIC BLOOD PRESSURE: 72 MMHG | HEART RATE: 85 BPM | SYSTOLIC BLOOD PRESSURE: 133 MMHG

## 2019-05-28 LAB
ANISOCYTOSIS BLD QL: SIGNIFICANT CHANGE UP
AUER BODIES BLD QL SMEAR: PRESENT — SIGNIFICANT CHANGE UP
BASO STIPL BLD QL SMEAR: PRESENT — SIGNIFICANT CHANGE UP
BASOPHILS # BLD AUTO: SIGNIFICANT CHANGE UP (ref 0–0.2)
BASOPHILS NFR BLD AUTO: 1 % — SIGNIFICANT CHANGE UP (ref 0–2)
BLASTS # FLD: 29 % — HIGH (ref 0–0)
ELLIPTOCYTES BLD QL SMEAR: SLIGHT — SIGNIFICANT CHANGE UP
EOSINOPHIL # BLD AUTO: 0.3 K/UL — SIGNIFICANT CHANGE UP (ref 0–0.5)
EOSINOPHIL NFR BLD AUTO: 1 % — SIGNIFICANT CHANGE UP (ref 0–6)
HCT VFR BLD CALC: 27.3 % — LOW (ref 34.5–45)
HGB BLD-MCNC: 8.3 G/DL — LOW (ref 11.5–15.5)
HYPOCHROMIA BLD QL: SLIGHT — SIGNIFICANT CHANGE UP
LG PLATELETS BLD QL AUTO: SLIGHT — SIGNIFICANT CHANGE UP
LYMPHOCYTES # BLD AUTO: 18 % — SIGNIFICANT CHANGE UP (ref 13–44)
LYMPHOCYTES # BLD AUTO: 4.5 K/UL — HIGH (ref 1–3.3)
LYMPHOCYTES # SPEC AUTO: 20 % — HIGH (ref 0–0)
MACROCYTES BLD QL: SLIGHT — SIGNIFICANT CHANGE UP
MCHC RBC-ENTMCNC: 30.2 PG — SIGNIFICANT CHANGE UP (ref 27–34)
MCHC RBC-ENTMCNC: 30.6 G/DL — LOW (ref 32–36)
MCV RBC AUTO: 98.7 FL — SIGNIFICANT CHANGE UP (ref 80–100)
MICROCYTES BLD QL: SLIGHT — SIGNIFICANT CHANGE UP
MONOCYTES # BLD AUTO: SIGNIFICANT CHANGE UP (ref 0–0.9)
MONOCYTES NFR BLD AUTO: 13 % — SIGNIFICANT CHANGE UP (ref 2–14)
MYELOCYTES NFR BLD: 4 % — HIGH (ref 0–0)
NEUTROPHILS # BLD AUTO: 4.7 K/UL — SIGNIFICANT CHANGE UP (ref 1.8–7.4)
NEUTROPHILS NFR BLD AUTO: 14 % — LOW (ref 43–77)
NRBC # BLD: 5 /100 — HIGH (ref 0–0)
PLAT MORPH BLD: NORMAL — SIGNIFICANT CHANGE UP
PLATELET # BLD AUTO: 50 K/UL — LOW (ref 150–400)
POIKILOCYTOSIS BLD QL AUTO: SLIGHT — SIGNIFICANT CHANGE UP
POLYCHROMASIA BLD QL SMEAR: SIGNIFICANT CHANGE UP
RBC # BLD: 2.76 M/UL — LOW (ref 3.8–5.2)
RBC # FLD: 20.9 % — HIGH (ref 10.3–14.5)
RBC BLD AUTO: ABNORMAL
SMUDGE CELLS # BLD: PRESENT — SIGNIFICANT CHANGE UP
WBC # BLD: 30 K/UL — HIGH (ref 3.8–10.5)
WBC # FLD AUTO: 30 K/UL — HIGH (ref 3.8–10.5)

## 2019-05-28 PROCEDURE — 99214 OFFICE O/P EST MOD 30 MIN: CPT

## 2019-05-28 PROCEDURE — 93010 ELECTROCARDIOGRAM REPORT: CPT

## 2019-05-28 RX ORDER — PROCHLORPERAZINE MALEATE 10 MG/1
10 TABLET ORAL EVERY 6 HOURS
Qty: 28 | Refills: 2 | Status: ACTIVE | COMMUNITY
Start: 2019-05-28 | End: 1900-01-01

## 2019-05-28 NOTE — REASON FOR VISIT
[Follow-Up Visit] : a follow-up visit for [Family Member] : family member [FreeTextEntry2] : myelofibrosis. acute leukemia

## 2019-05-28 NOTE — ASSESSMENT
[FreeTextEntry1] : \par Mrs. 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 Bone marrow showed 5% blasts with moderate reticulin fibrosis.. Was started on Jakafi, and we are monitoring her counts. 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 Montes in to speak with family and patient regarding treatment. He wants to start her on low dose Azacytididne. Side effects discussed.agrees to start Azacytididne. Will be scheduled, told that we will need to monitor her counts closely while on treatment.

## 2019-05-28 NOTE — HISTORY OF PRESENT ILLNESS
[de-identified] : Mrs. Chen is a 86 yo WF with a history of Reji 2+ thrombocytosis, dating back to 2013. She had been on Hydrea since Olya 10, 2013. Most recently she was found to be pancytopenic and the Hydrea was held as of July 12, 2017. A bone marrow was done on August 21, 2017, which a normocellular to mildly hypercellular marrow with mild megakaryocytic hyperplasia and moderate reticulin fibrosis and focal areas of increased blasts (5% ). \par  She was started on Jakafi.  [de-identified] : Received 2 units of packed cells last week at [facility], feels better and is back to her baseline. CBBC is showing persient increase in Blasts, now up to 34 %.

## 2019-05-28 NOTE — PHYSICAL EXAM
[Restricted in physically strenuous activity but ambulatory and able to carry out work of a light or sedentary nature] : Status 1- Restricted in physically strenuous activity but ambulatory and able to carry out work of a light or sedentary nature, e.g., light house work, office work [Thin] : thin [Normal] : normal appearance, no rash, nodules, vesicles, ulcers, erythema

## 2019-06-03 ENCOUNTER — APPOINTMENT (OUTPATIENT)
Age: 84
End: 2019-06-03

## 2019-06-03 ENCOUNTER — RESULT REVIEW (OUTPATIENT)
Age: 84
End: 2019-06-03

## 2019-06-03 LAB
ANISOCYTOSIS BLD QL: SIGNIFICANT CHANGE UP
AUER BODIES BLD QL SMEAR: PRESENT — SIGNIFICANT CHANGE UP
BASO STIPL BLD QL SMEAR: PRESENT — SIGNIFICANT CHANGE UP
BASOPHILS # BLD AUTO: 3.9 K/UL — HIGH (ref 0–0.2)
BASOPHILS NFR BLD AUTO: 1 % — SIGNIFICANT CHANGE UP (ref 0–2)
BLASTS # FLD: 35 % — HIGH (ref 0–0)
DACRYOCYTES BLD QL SMEAR: SLIGHT — SIGNIFICANT CHANGE UP
EOSINOPHIL # BLD AUTO: 0.4 K/UL — SIGNIFICANT CHANGE UP (ref 0–0.5)
EOSINOPHIL NFR BLD AUTO: 1 % — SIGNIFICANT CHANGE UP (ref 0–6)
HCT VFR BLD CALC: 23.4 % — LOW (ref 34.5–45)
HGB BLD-MCNC: 7.9 G/DL — LOW (ref 11.5–15.5)
LYMPHOCYTES # BLD AUTO: 14 % — SIGNIFICANT CHANGE UP (ref 13–44)
LYMPHOCYTES # BLD AUTO: 4.7 K/UL — HIGH (ref 1–3.3)
MACROCYTES BLD QL: SLIGHT — SIGNIFICANT CHANGE UP
MCHC RBC-ENTMCNC: 33.1 PG — SIGNIFICANT CHANGE UP (ref 27–34)
MCHC RBC-ENTMCNC: 33.7 G/DL — SIGNIFICANT CHANGE UP (ref 32–36)
MCV RBC AUTO: 98.1 FL — SIGNIFICANT CHANGE UP (ref 80–100)
METAMYELOCYTES # FLD: 4 % — HIGH (ref 0–0)
MONOCYTES # BLD AUTO: 18.2 K/UL — HIGH (ref 0–0.9)
MONOCYTES NFR BLD AUTO: 37 % — HIGH (ref 2–14)
MYELOCYTES NFR BLD: 3 % — HIGH (ref 0–0)
NEUTROPHILS # BLD AUTO: 3.9 K/UL — SIGNIFICANT CHANGE UP (ref 1.8–7.4)
NEUTROPHILS NFR BLD AUTO: 5 % — LOW (ref 43–77)
NRBC # BLD: 1 /100 — HIGH (ref 0–0)
PLAT MORPH BLD: NORMAL — SIGNIFICANT CHANGE UP
PLATELET # BLD AUTO: 41 K/UL — LOW (ref 150–400)
POIKILOCYTOSIS BLD QL AUTO: SLIGHT — SIGNIFICANT CHANGE UP
POLYCHROMASIA BLD QL SMEAR: SLIGHT — SIGNIFICANT CHANGE UP
RBC # BLD: 2.39 M/UL — LOW (ref 3.8–5.2)
RBC # FLD: 20 % — HIGH (ref 10.3–14.5)
RBC BLD AUTO: ABNORMAL
ROULEAUX BLD QL SMEAR: PRESENT — SIGNIFICANT CHANGE UP
TOXIC GRANULES BLD QL SMEAR: PRESENT — SIGNIFICANT CHANGE UP
WBC # BLD: 31 K/UL — HIGH (ref 3.8–10.5)
WBC # FLD AUTO: 31 K/UL — HIGH (ref 3.8–10.5)

## 2019-06-04 ENCOUNTER — APPOINTMENT (OUTPATIENT)
Age: 84
End: 2019-06-04

## 2019-06-04 DIAGNOSIS — Z51.11 ENCOUNTER FOR ANTINEOPLASTIC CHEMOTHERAPY: ICD-10-CM

## 2019-06-04 DIAGNOSIS — D46.9 MYELODYSPLASTIC SYNDROME, UNSPECIFIED: ICD-10-CM

## 2019-06-04 DIAGNOSIS — R11.2 NAUSEA WITH VOMITING, UNSPECIFIED: ICD-10-CM

## 2019-06-05 ENCOUNTER — APPOINTMENT (OUTPATIENT)
Age: 84
End: 2019-06-05

## 2019-06-06 ENCOUNTER — RESULT REVIEW (OUTPATIENT)
Age: 84
End: 2019-06-06

## 2019-06-06 ENCOUNTER — APPOINTMENT (OUTPATIENT)
Age: 84
End: 2019-06-06

## 2019-06-06 LAB
ANISOCYTOSIS BLD QL: SIGNIFICANT CHANGE UP
AUER BODIES BLD QL SMEAR: PRESENT — SIGNIFICANT CHANGE UP
BASO STIPL BLD QL SMEAR: PRESENT — SIGNIFICANT CHANGE UP
BASOPHILS # BLD AUTO: 5.1 K/UL — HIGH (ref 0–0.2)
BASOPHILS NFR BLD AUTO: 1 % — SIGNIFICANT CHANGE UP (ref 0–2)
BLASTS # FLD: 27 % — HIGH (ref 0–0)
DOHLE BOD BLD QL SMEAR: PRESENT — SIGNIFICANT CHANGE UP
ELLIPTOCYTES BLD QL SMEAR: SLIGHT — SIGNIFICANT CHANGE UP
EOSINOPHIL # BLD AUTO: 0.4 K/UL — SIGNIFICANT CHANGE UP (ref 0–0.5)
EOSINOPHIL NFR BLD AUTO: 3 % — SIGNIFICANT CHANGE UP (ref 0–6)
HCT VFR BLD CALC: 23.6 % — LOW (ref 34.5–45)
HGB BLD-MCNC: 8.1 G/DL — LOW (ref 11.5–15.5)
LG PLATELETS BLD QL AUTO: SLIGHT — SIGNIFICANT CHANGE UP
LYMPHOCYTES # BLD AUTO: 14 % — SIGNIFICANT CHANGE UP (ref 13–44)
LYMPHOCYTES # BLD AUTO: 4 K/UL — HIGH (ref 1–3.3)
LYMPHOCYTES # SPEC AUTO: 13 % — HIGH (ref 0–0)
MACROCYTES BLD QL: SLIGHT — SIGNIFICANT CHANGE UP
MCHC RBC-ENTMCNC: 33.6 PG — SIGNIFICANT CHANGE UP (ref 27–34)
MCHC RBC-ENTMCNC: 34.3 G/DL — SIGNIFICANT CHANGE UP (ref 32–36)
MCV RBC AUTO: 98.1 FL — SIGNIFICANT CHANGE UP (ref 80–100)
METAMYELOCYTES # FLD: 3 % — HIGH (ref 0–0)
MICROCYTES BLD QL: SLIGHT — SIGNIFICANT CHANGE UP
MONOCYTES # BLD AUTO: 13 K/UL — HIGH (ref 0–0.9)
MONOCYTES NFR BLD AUTO: 22 % — HIGH (ref 2–14)
NEUTROPHILS # BLD AUTO: 3.9 K/UL — SIGNIFICANT CHANGE UP (ref 1.8–7.4)
NEUTROPHILS NFR BLD AUTO: 11 % — LOW (ref 43–77)
NEUTS BAND # BLD: 3 % — SIGNIFICANT CHANGE UP (ref 0–8)
OVALOCYTES BLD QL SMEAR: SLIGHT — SIGNIFICANT CHANGE UP
PLAT MORPH BLD: NORMAL — SIGNIFICANT CHANGE UP
PLATELET # BLD AUTO: 50 K/UL — LOW (ref 150–400)
POIKILOCYTOSIS BLD QL AUTO: SIGNIFICANT CHANGE UP
POLYCHROMASIA BLD QL SMEAR: SLIGHT — SIGNIFICANT CHANGE UP
PROMYELOCYTES # FLD: 1 % — HIGH (ref 0–0)
RBC # BLD: 2.4 M/UL — LOW (ref 3.8–5.2)
RBC # FLD: 20.2 % — HIGH (ref 10.3–14.5)
RBC BLD AUTO: ABNORMAL
SCHISTOCYTES BLD QL AUTO: SLIGHT — SIGNIFICANT CHANGE UP
SMUDGE CELLS # BLD: PRESENT — SIGNIFICANT CHANGE UP
SPHEROCYTES BLD QL SMEAR: SLIGHT — SIGNIFICANT CHANGE UP
STOMATOCYTES BLD QL SMEAR: PRESENT — SIGNIFICANT CHANGE UP
VARIANT LYMPHS # BLD: 2 % — SIGNIFICANT CHANGE UP (ref 0–6)
WBC # BLD: 26.4 K/UL — HIGH (ref 3.8–10.5)
WBC # FLD AUTO: 26.4 K/UL — HIGH (ref 3.8–10.5)

## 2019-06-07 ENCOUNTER — OUTPATIENT (OUTPATIENT)
Dept: OUTPATIENT SERVICES | Facility: HOSPITAL | Age: 84
LOS: 1 days | Discharge: HOME | End: 2019-06-07

## 2019-06-07 ENCOUNTER — APPOINTMENT (OUTPATIENT)
Age: 84
End: 2019-06-07

## 2019-06-07 DIAGNOSIS — Z90.710 ACQUIRED ABSENCE OF BOTH CERVIX AND UTERUS: Chronic | ICD-10-CM

## 2019-06-07 DIAGNOSIS — D75.81 MYELOFIBROSIS: ICD-10-CM

## 2019-06-10 DIAGNOSIS — D46.9 MYELODYSPLASTIC SYNDROME, UNSPECIFIED: ICD-10-CM

## 2019-06-10 DIAGNOSIS — Z51.11 ENCOUNTER FOR ANTINEOPLASTIC CHEMOTHERAPY: ICD-10-CM

## 2019-06-10 DIAGNOSIS — R11.2 NAUSEA WITH VOMITING, UNSPECIFIED: ICD-10-CM

## 2019-06-11 ENCOUNTER — APPOINTMENT (OUTPATIENT)
Dept: HEMATOLOGY ONCOLOGY | Facility: CLINIC | Age: 84
End: 2019-06-11

## 2019-06-11 ENCOUNTER — RESULT REVIEW (OUTPATIENT)
Age: 84
End: 2019-06-11

## 2019-06-11 LAB
ANISOCYTOSIS BLD QL: SIGNIFICANT CHANGE UP
AUER BODIES BLD QL SMEAR: PRESENT — SIGNIFICANT CHANGE UP
BASOPHILS # BLD AUTO: 2.3 K/UL — HIGH (ref 0–0.2)
BASOPHILS NFR BLD AUTO: 1 % — SIGNIFICANT CHANGE UP (ref 0–2)
BLASTS # FLD: 15 % — HIGH (ref 0–0)
DACRYOCYTES BLD QL SMEAR: SLIGHT — SIGNIFICANT CHANGE UP
ELLIPTOCYTES BLD QL SMEAR: SLIGHT — SIGNIFICANT CHANGE UP
EOSINOPHIL # BLD AUTO: 0.4 K/UL — SIGNIFICANT CHANGE UP (ref 0–0.5)
EOSINOPHIL NFR BLD AUTO: 2 % — SIGNIFICANT CHANGE UP (ref 0–6)
GIANT PLATELETS BLD QL SMEAR: PRESENT — SIGNIFICANT CHANGE UP
HCT VFR BLD CALC: 23.9 % — LOW (ref 34.5–45)
HGB BLD-MCNC: 7.9 G/DL — LOW (ref 11.5–15.5)
HYPOCHROMIA BLD QL: SLIGHT — SIGNIFICANT CHANGE UP
LG PLATELETS BLD QL AUTO: SLIGHT — SIGNIFICANT CHANGE UP
LYMPHOCYTES # BLD AUTO: 15 % — SIGNIFICANT CHANGE UP (ref 13–44)
LYMPHOCYTES # BLD AUTO: 4.3 K/UL — HIGH (ref 1–3.3)
LYMPHOCYTES # SPEC AUTO: 26 % — HIGH (ref 0–0)
MCHC RBC-ENTMCNC: 32.8 PG — SIGNIFICANT CHANGE UP (ref 27–34)
MCHC RBC-ENTMCNC: 33.1 G/DL — SIGNIFICANT CHANGE UP (ref 32–36)
MCV RBC AUTO: 98.9 FL — SIGNIFICANT CHANGE UP (ref 80–100)
MONOCYTES # BLD AUTO: 8.2 K/UL — HIGH (ref 0–0.9)
MONOCYTES NFR BLD AUTO: 33 % — HIGH (ref 2–14)
MYELOCYTES NFR BLD: 2 % — HIGH (ref 0–0)
NEUTROPHILS # BLD AUTO: 2.4 K/UL — SIGNIFICANT CHANGE UP (ref 1.8–7.4)
NEUTROPHILS NFR BLD AUTO: 5 % — LOW (ref 43–77)
NEUTS BAND # BLD: 1 % — SIGNIFICANT CHANGE UP (ref 0–8)
NRBC # BLD: 1 /100 — HIGH (ref 0–0)
PLAT MORPH BLD: NORMAL — SIGNIFICANT CHANGE UP
PLATELET # BLD AUTO: 42 K/UL — LOW (ref 150–400)
POLYCHROMASIA BLD QL SMEAR: SLIGHT — SIGNIFICANT CHANGE UP
RBC # BLD: 2.42 M/UL — LOW (ref 3.8–5.2)
RBC # FLD: 20.2 % — HIGH (ref 10.3–14.5)
RBC BLD AUTO: ABNORMAL
STOMATOCYTES BLD QL SMEAR: PRESENT — SIGNIFICANT CHANGE UP
TOXIC GRANULES BLD QL SMEAR: PRESENT — SIGNIFICANT CHANGE UP
WBC # BLD: 18.9 K/UL — HIGH (ref 3.8–10.5)
WBC # FLD AUTO: 18.9 K/UL — HIGH (ref 3.8–10.5)

## 2019-06-13 ENCOUNTER — RESULT REVIEW (OUTPATIENT)
Age: 84
End: 2019-06-13

## 2019-06-13 ENCOUNTER — APPOINTMENT (OUTPATIENT)
Dept: HEMATOLOGY ONCOLOGY | Facility: CLINIC | Age: 84
End: 2019-06-13

## 2019-06-13 LAB
ANISOCYTOSIS BLD QL: SLIGHT — SIGNIFICANT CHANGE UP
AUER BODIES BLD QL SMEAR: PRESENT — SIGNIFICANT CHANGE UP
BASOPHILS # BLD AUTO: 3.3 K/UL — HIGH (ref 0–0.2)
BASOPHILS NFR BLD AUTO: 1 % — SIGNIFICANT CHANGE UP (ref 0–2)
BLASTS # FLD: 18 % — HIGH (ref 0–0)
DACRYOCYTES BLD QL SMEAR: SLIGHT — SIGNIFICANT CHANGE UP
ELLIPTOCYTES BLD QL SMEAR: SLIGHT — SIGNIFICANT CHANGE UP
EOSINOPHIL # BLD AUTO: 0.3 K/UL — SIGNIFICANT CHANGE UP (ref 0–0.5)
EOSINOPHIL NFR BLD AUTO: 2 % — SIGNIFICANT CHANGE UP (ref 0–6)
HCT VFR BLD CALC: 21.4 % — LOW (ref 34.5–45)
HGB BLD-MCNC: 7.3 G/DL — LOW (ref 11.5–15.5)
HYPOCHROMIA BLD QL: SLIGHT — SIGNIFICANT CHANGE UP
LG PLATELETS BLD QL AUTO: SLIGHT — SIGNIFICANT CHANGE UP
LYMPHOCYTES # BLD AUTO: 22 % — SIGNIFICANT CHANGE UP (ref 13–44)
LYMPHOCYTES # BLD AUTO: 4.8 K/UL — HIGH (ref 1–3.3)
LYMPHOCYTES # SPEC AUTO: 21 % — HIGH (ref 0–0)
MACROCYTES BLD QL: SLIGHT — SIGNIFICANT CHANGE UP
MCHC RBC-ENTMCNC: 33.8 PG — SIGNIFICANT CHANGE UP (ref 27–34)
MCHC RBC-ENTMCNC: 34 G/DL — SIGNIFICANT CHANGE UP (ref 32–36)
MCV RBC AUTO: 99.6 FL — SIGNIFICANT CHANGE UP (ref 80–100)
MICROCYTES BLD QL: SLIGHT — SIGNIFICANT CHANGE UP
MONOCYTES # BLD AUTO: 8.2 K/UL — HIGH (ref 0–0.9)
MONOCYTES NFR BLD AUTO: 22 % — HIGH (ref 2–14)
MYELOCYTES NFR BLD: 1 % — HIGH (ref 0–0)
NEUTROPHILS # BLD AUTO: 2.9 K/UL — SIGNIFICANT CHANGE UP (ref 1.8–7.4)
NEUTROPHILS NFR BLD AUTO: 13 % — LOW (ref 43–77)
NRBC # BLD: 2 /100 — HIGH (ref 0–0)
OVALOCYTES BLD QL SMEAR: SLIGHT — SIGNIFICANT CHANGE UP
PLAT MORPH BLD: NORMAL — SIGNIFICANT CHANGE UP
PLATELET # BLD AUTO: 54 K/UL — LOW (ref 150–400)
POIKILOCYTOSIS BLD QL AUTO: SIGNIFICANT CHANGE UP
POLYCHROMASIA BLD QL SMEAR: SLIGHT — SIGNIFICANT CHANGE UP
RBC # BLD: 2.15 M/UL — LOW (ref 3.8–5.2)
RBC # FLD: 19.8 % — HIGH (ref 10.3–14.5)
RBC BLD AUTO: ABNORMAL
SCHISTOCYTES BLD QL AUTO: SLIGHT — SIGNIFICANT CHANGE UP
SPHEROCYTES BLD QL SMEAR: SLIGHT — SIGNIFICANT CHANGE UP
STOMATOCYTES BLD QL SMEAR: PRESENT — SIGNIFICANT CHANGE UP
TARGETS BLD QL SMEAR: SLIGHT — SIGNIFICANT CHANGE UP
TOXIC GRANULES BLD QL SMEAR: PRESENT — SIGNIFICANT CHANGE UP
WBC # BLD: 19.5 K/UL — HIGH (ref 3.8–10.5)
WBC # FLD AUTO: 19.5 K/UL — HIGH (ref 3.8–10.5)

## 2019-06-17 ENCOUNTER — RESULT REVIEW (OUTPATIENT)
Age: 84
End: 2019-06-17

## 2019-06-17 ENCOUNTER — APPOINTMENT (OUTPATIENT)
Dept: HEMATOLOGY ONCOLOGY | Facility: CLINIC | Age: 84
End: 2019-06-17

## 2019-06-17 LAB
ANISOCYTOSIS BLD QL: SLIGHT — SIGNIFICANT CHANGE UP
BASO STIPL BLD QL SMEAR: PRESENT — SIGNIFICANT CHANGE UP
BASOPHILS # BLD AUTO: SIGNIFICANT CHANGE UP (ref 0–0.2)
BASOPHILS NFR BLD AUTO: 0 % — HIGH (ref 0–2)
BLASTS # FLD: 10 % — HIGH (ref 0–0)
DACRYOCYTES BLD QL SMEAR: SLIGHT — SIGNIFICANT CHANGE UP
ELLIPTOCYTES BLD QL SMEAR: SLIGHT — SIGNIFICANT CHANGE UP
EOSINOPHIL # BLD AUTO: 0.3 K/UL — SIGNIFICANT CHANGE UP (ref 0–0.5)
EOSINOPHIL NFR BLD AUTO: 1 % — SIGNIFICANT CHANGE UP (ref 0–6)
HCT VFR BLD CALC: 19.8 % — CRITICAL LOW (ref 34.5–45)
HGB BLD-MCNC: 6.9 G/DL — CRITICAL LOW (ref 11.5–15.5)
HYPOCHROMIA BLD QL: SLIGHT — SIGNIFICANT CHANGE UP
LG PLATELETS BLD QL AUTO: SLIGHT — SIGNIFICANT CHANGE UP
LYMPHOCYTES # BLD AUTO: 2.7 K/UL — SIGNIFICANT CHANGE UP (ref 1–3.3)
LYMPHOCYTES # BLD AUTO: 35 % — SIGNIFICANT CHANGE UP (ref 13–44)
LYMPHOCYTES # SPEC AUTO: 16 % — HIGH (ref 0–0)
MACROCYTES BLD QL: SLIGHT — SIGNIFICANT CHANGE UP
MCHC RBC-ENTMCNC: 34.3 PG — HIGH (ref 27–34)
MCHC RBC-ENTMCNC: 35 G/DL — SIGNIFICANT CHANGE UP (ref 32–36)
MCV RBC AUTO: 98.1 FL — SIGNIFICANT CHANGE UP (ref 80–100)
METAMYELOCYTES # FLD: 4 % — HIGH (ref 0–0)
MICROCYTES BLD QL: SLIGHT — SIGNIFICANT CHANGE UP
MONOCYTES # BLD AUTO: SIGNIFICANT CHANGE UP (ref 0–0.9)
MONOCYTES NFR BLD AUTO: 8 % — SIGNIFICANT CHANGE UP (ref 2–14)
MYELOCYTES NFR BLD: 5 % — HIGH (ref 0–0)
NEUTROPHILS # BLD AUTO: 4.7 K/UL — SIGNIFICANT CHANGE UP (ref 1.8–7.4)
NEUTROPHILS NFR BLD AUTO: 17 % — LOW (ref 43–77)
NEUTS BAND # BLD: 2 % — SIGNIFICANT CHANGE UP (ref 0–8)
NRBC # BLD: 5 /100 — HIGH (ref 0–0)
OVALOCYTES BLD QL SMEAR: SLIGHT — SIGNIFICANT CHANGE UP
PLAT MORPH BLD: NORMAL — SIGNIFICANT CHANGE UP
PLATELET # BLD AUTO: 74 K/UL — LOW (ref 150–400)
POIKILOCYTOSIS BLD QL AUTO: SLIGHT — SIGNIFICANT CHANGE UP
POLYCHROMASIA BLD QL SMEAR: SLIGHT — SIGNIFICANT CHANGE UP
PROMYELOCYTES # FLD: 2 % — HIGH (ref 0–0)
RBC # BLD: 2.02 M/UL — LOW (ref 3.8–5.2)
RBC # FLD: 20.1 % — HIGH (ref 10.3–14.5)
RBC BLD AUTO: ABNORMAL
SMUDGE CELLS # BLD: PRESENT — SIGNIFICANT CHANGE UP
SPHEROCYTES BLD QL SMEAR: SLIGHT — SIGNIFICANT CHANGE UP
WBC # BLD: 17 K/UL — HIGH (ref 3.8–10.5)
WBC # FLD AUTO: 17 K/UL — HIGH (ref 3.8–10.5)

## 2019-06-20 ENCOUNTER — RESULT REVIEW (OUTPATIENT)
Age: 84
End: 2019-06-20

## 2019-06-20 ENCOUNTER — APPOINTMENT (OUTPATIENT)
Dept: HEMATOLOGY ONCOLOGY | Facility: CLINIC | Age: 84
End: 2019-06-20

## 2019-06-20 LAB
ANISOCYTOSIS BLD QL: SLIGHT — SIGNIFICANT CHANGE UP
AUER BODIES BLD QL SMEAR: PRESENT — SIGNIFICANT CHANGE UP
BASOPHILS # BLD AUTO: 2.2 K/UL — HIGH (ref 0–0.2)
BASOPHILS NFR BLD AUTO: 1 % — SIGNIFICANT CHANGE UP (ref 0–2)
BLASTS # FLD: 8 % — HIGH (ref 0–0)
DACRYOCYTES BLD QL SMEAR: SLIGHT — SIGNIFICANT CHANGE UP
DOHLE BOD BLD QL SMEAR: PRESENT — SIGNIFICANT CHANGE UP
ELLIPTOCYTES BLD QL SMEAR: SLIGHT — SIGNIFICANT CHANGE UP
EOSINOPHIL # BLD AUTO: 0.2 K/UL — SIGNIFICANT CHANGE UP (ref 0–0.5)
EOSINOPHIL NFR BLD AUTO: 2 % — SIGNIFICANT CHANGE UP (ref 0–6)
HCT VFR BLD CALC: 19.8 % — CRITICAL LOW (ref 34.5–45)
HGB BLD-MCNC: 6.7 G/DL — CRITICAL LOW (ref 11.5–15.5)
HYPOCHROMIA BLD QL: SLIGHT — SIGNIFICANT CHANGE UP
LG PLATELETS BLD QL AUTO: SLIGHT — SIGNIFICANT CHANGE UP
LYMPHOCYTES # BLD AUTO: 23 % — SIGNIFICANT CHANGE UP (ref 13–44)
LYMPHOCYTES # BLD AUTO: 3.9 K/UL — HIGH (ref 1–3.3)
LYMPHOCYTES # SPEC AUTO: 27 % — HIGH (ref 0–0)
MACROCYTES BLD QL: SLIGHT — SIGNIFICANT CHANGE UP
MCHC RBC-ENTMCNC: 33.8 G/DL — SIGNIFICANT CHANGE UP (ref 32–36)
MCHC RBC-ENTMCNC: 33.8 PG — SIGNIFICANT CHANGE UP (ref 27–34)
MCV RBC AUTO: 99.9 FL — SIGNIFICANT CHANGE UP (ref 80–100)
MICROCYTES BLD QL: SLIGHT — SIGNIFICANT CHANGE UP
MONOCYTES # BLD AUTO: 10.1 K/UL — HIGH (ref 0–0.9)
MONOCYTES NFR BLD AUTO: 26 % — HIGH (ref 2–14)
NEUTROPHILS # BLD AUTO: 3 K/UL — SIGNIFICANT CHANGE UP (ref 1.8–7.4)
NEUTROPHILS NFR BLD AUTO: 13 % — LOW (ref 43–77)
OVALOCYTES BLD QL SMEAR: SLIGHT — SIGNIFICANT CHANGE UP
PLAT MORPH BLD: NORMAL — SIGNIFICANT CHANGE UP
PLATELET # BLD AUTO: 53 K/UL — LOW (ref 150–400)
POIKILOCYTOSIS BLD QL AUTO: SLIGHT — SIGNIFICANT CHANGE UP
POLYCHROMASIA BLD QL SMEAR: SLIGHT — SIGNIFICANT CHANGE UP
RBC # BLD: 1.98 M/UL — LOW (ref 3.8–5.2)
RBC # FLD: 20.9 % — HIGH (ref 10.3–14.5)
RBC BLD AUTO: ABNORMAL
ROULEAUX BLD QL SMEAR: PRESENT — SIGNIFICANT CHANGE UP
SCHISTOCYTES BLD QL AUTO: SLIGHT — SIGNIFICANT CHANGE UP
STOMATOCYTES BLD QL SMEAR: PRESENT — SIGNIFICANT CHANGE UP
TOXIC GRANULES BLD QL SMEAR: PRESENT — SIGNIFICANT CHANGE UP
WBC # BLD: 19.5 K/UL — HIGH (ref 3.8–10.5)
WBC # FLD AUTO: 19.5 K/UL — HIGH (ref 3.8–10.5)

## 2019-06-21 ENCOUNTER — APPOINTMENT (OUTPATIENT)
Age: 84
End: 2019-06-21

## 2019-06-24 ENCOUNTER — RESULT REVIEW (OUTPATIENT)
Age: 84
End: 2019-06-24

## 2019-06-24 ENCOUNTER — APPOINTMENT (OUTPATIENT)
Dept: HEMATOLOGY ONCOLOGY | Facility: CLINIC | Age: 84
End: 2019-06-24

## 2019-06-24 LAB
ANISOCYTOSIS BLD QL: SLIGHT — SIGNIFICANT CHANGE UP
AUER BODIES BLD QL SMEAR: PRESENT — SIGNIFICANT CHANGE UP
BASO STIPL BLD QL SMEAR: PRESENT — SIGNIFICANT CHANGE UP
BASOPHILS # BLD AUTO: SIGNIFICANT CHANGE UP (ref 0–0.2)
BASOPHILS NFR BLD AUTO: 1 % — SIGNIFICANT CHANGE UP (ref 0–2)
BLASTS # FLD: 16 % — HIGH (ref 0–0)
DACRYOCYTES BLD QL SMEAR: SLIGHT — SIGNIFICANT CHANGE UP
ELLIPTOCYTES BLD QL SMEAR: SLIGHT — SIGNIFICANT CHANGE UP
EOSINOPHIL # BLD AUTO: 0.2 K/UL — SIGNIFICANT CHANGE UP (ref 0–0.5)
EOSINOPHIL NFR BLD AUTO: 3 % — SIGNIFICANT CHANGE UP (ref 0–6)
GIANT PLATELETS BLD QL SMEAR: PRESENT — SIGNIFICANT CHANGE UP
HCT VFR BLD CALC: 19.9 % — CRITICAL LOW (ref 34.5–45)
HGB BLD-MCNC: 6.6 G/DL — CRITICAL LOW (ref 11.5–15.5)
HYPOCHROMIA BLD QL: SLIGHT — SIGNIFICANT CHANGE UP
LG PLATELETS BLD QL AUTO: SLIGHT — SIGNIFICANT CHANGE UP
LYMPHOCYTES # BLD AUTO: 21 % — SIGNIFICANT CHANGE UP (ref 13–44)
LYMPHOCYTES # BLD AUTO: 3.2 K/UL — SIGNIFICANT CHANGE UP (ref 1–3.3)
LYMPHOCYTES # SPEC AUTO: 26 % — HIGH (ref 0–0)
MACROCYTES BLD QL: SLIGHT — SIGNIFICANT CHANGE UP
MCHC RBC-ENTMCNC: 33.3 G/DL — SIGNIFICANT CHANGE UP (ref 32–36)
MCHC RBC-ENTMCNC: 33.8 PG — SIGNIFICANT CHANGE UP (ref 27–34)
MCV RBC AUTO: 101.5 FL — HIGH (ref 80–100)
METAMYELOCYTES # FLD: 2 % — HIGH (ref 0–0)
MICROCYTES BLD QL: SLIGHT — SIGNIFICANT CHANGE UP
MONOCYTES # BLD AUTO: SIGNIFICANT CHANGE UP (ref 0–0.9)
MONOCYTES NFR BLD AUTO: 8 % — SIGNIFICANT CHANGE UP (ref 2–14)
MYELOCYTES NFR BLD: 5 % — HIGH (ref 0–0)
NEUTROPHILS # BLD AUTO: 3.5 K/UL — SIGNIFICANT CHANGE UP (ref 1.8–7.4)
NEUTROPHILS NFR BLD AUTO: 16 % — LOW (ref 43–77)
NEUTS BAND # BLD: 1 % — SIGNIFICANT CHANGE UP (ref 0–8)
NRBC # BLD: 4 /100 — HIGH (ref 0–0)
OVALOCYTES BLD QL SMEAR: SLIGHT — SIGNIFICANT CHANGE UP
PLAT MORPH BLD: NORMAL — SIGNIFICANT CHANGE UP
PLATELET # BLD AUTO: 101 K/UL — LOW (ref 150–400)
POIKILOCYTOSIS BLD QL AUTO: SLIGHT — SIGNIFICANT CHANGE UP
POLYCHROMASIA BLD QL SMEAR: SLIGHT — SIGNIFICANT CHANGE UP
RBC # BLD: 1.96 M/UL — LOW (ref 3.8–5.2)
RBC # FLD: 21.2 % — HIGH (ref 10.3–14.5)
RBC BLD AUTO: ABNORMAL
SCHISTOCYTES BLD QL AUTO: SLIGHT — SIGNIFICANT CHANGE UP
SMUDGE CELLS # BLD: PRESENT — SIGNIFICANT CHANGE UP
SPHEROCYTES BLD QL SMEAR: SLIGHT — SIGNIFICANT CHANGE UP
STOMATOCYTES BLD QL SMEAR: PRESENT — SIGNIFICANT CHANGE UP
TARGETS BLD QL SMEAR: SLIGHT — SIGNIFICANT CHANGE UP
TOXIC GRANULES BLD QL SMEAR: PRESENT — SIGNIFICANT CHANGE UP
WBC # BLD: 17.6 K/UL — HIGH (ref 3.8–10.5)
WBC # FLD AUTO: 17.6 K/UL — HIGH (ref 3.8–10.5)

## 2019-06-27 ENCOUNTER — RESULT REVIEW (OUTPATIENT)
Age: 84
End: 2019-06-27

## 2019-06-27 ENCOUNTER — APPOINTMENT (OUTPATIENT)
Dept: HEMATOLOGY ONCOLOGY | Facility: CLINIC | Age: 84
End: 2019-06-27
Payer: MEDICARE

## 2019-06-27 VITALS
HEIGHT: 63 IN | WEIGHT: 120 LBS | HEART RATE: 86 BPM | DIASTOLIC BLOOD PRESSURE: 67 MMHG | SYSTOLIC BLOOD PRESSURE: 127 MMHG | BODY MASS INDEX: 21.26 KG/M2 | OXYGEN SATURATION: 94 %

## 2019-06-27 LAB
ANISOCYTOSIS BLD QL: SLIGHT — SIGNIFICANT CHANGE UP
AUER BODIES BLD QL SMEAR: PRESENT — SIGNIFICANT CHANGE UP
BASO STIPL BLD QL SMEAR: PRESENT — SIGNIFICANT CHANGE UP
BASOPHILS # BLD AUTO: 1.8 K/UL — HIGH (ref 0–0.2)
BLASTS # FLD: 13 % — HIGH (ref 0–0)
DOHLE BOD BLD QL SMEAR: PRESENT — SIGNIFICANT CHANGE UP
ELLIPTOCYTES BLD QL SMEAR: SLIGHT — SIGNIFICANT CHANGE UP
EOSINOPHIL # BLD AUTO: 0.2 K/UL — SIGNIFICANT CHANGE UP (ref 0–0.5)
EOSINOPHIL NFR BLD AUTO: 4 % — SIGNIFICANT CHANGE UP (ref 0–6)
GIANT PLATELETS BLD QL SMEAR: PRESENT — SIGNIFICANT CHANGE UP
HCT VFR BLD CALC: 20.2 % — CRITICAL LOW (ref 34.5–45)
HGB BLD-MCNC: 6.6 G/DL — CRITICAL LOW (ref 11.5–15.5)
HYPOCHROMIA BLD QL: SLIGHT — SIGNIFICANT CHANGE UP
LG PLATELETS BLD QL AUTO: SLIGHT — SIGNIFICANT CHANGE UP
LYMPHOCYTES # BLD AUTO: 19 % — SIGNIFICANT CHANGE UP (ref 13–44)
LYMPHOCYTES # BLD AUTO: 3.3 K/UL — SIGNIFICANT CHANGE UP (ref 1–3.3)
LYMPHOCYTES # SPEC AUTO: 16 % — HIGH (ref 0–0)
MACROCYTES BLD QL: SIGNIFICANT CHANGE UP
MCHC RBC-ENTMCNC: 32.6 G/DL — SIGNIFICANT CHANGE UP (ref 32–36)
MCHC RBC-ENTMCNC: 33.2 PG — SIGNIFICANT CHANGE UP (ref 27–34)
MCV RBC AUTO: 102 FL — HIGH (ref 80–100)
METAMYELOCYTES # FLD: 7 % — HIGH (ref 0–0)
MICROCYTES BLD QL: SLIGHT — SIGNIFICANT CHANGE UP
MONOCYTES # BLD AUTO: SIGNIFICANT CHANGE UP (ref 0–0.9)
MONOCYTES NFR BLD AUTO: 27 % — HIGH (ref 2–14)
MYELOCYTES NFR BLD: 3 % — HIGH (ref 0–0)
NEUTROPHILS # BLD AUTO: 2.6 K/UL — SIGNIFICANT CHANGE UP (ref 1.8–7.4)
NEUTROPHILS NFR BLD AUTO: 11 % — LOW (ref 43–77)
NRBC # BLD: 5 /100 — HIGH (ref 0–0)
OVALOCYTES BLD QL SMEAR: SLIGHT — SIGNIFICANT CHANGE UP
PLAT MORPH BLD: NORMAL — SIGNIFICANT CHANGE UP
PLATELET # BLD AUTO: 164 K/UL — SIGNIFICANT CHANGE UP (ref 150–400)
POIKILOCYTOSIS BLD QL AUTO: SLIGHT — SIGNIFICANT CHANGE UP
POLYCHROMASIA BLD QL SMEAR: SIGNIFICANT CHANGE UP
RBC # BLD: 1.98 M/UL — LOW (ref 3.8–5.2)
RBC # FLD: 22.2 % — HIGH (ref 10.3–14.5)
RBC BLD AUTO: ABNORMAL
SCHISTOCYTES BLD QL AUTO: SLIGHT — SIGNIFICANT CHANGE UP
SPHEROCYTES BLD QL SMEAR: SLIGHT — SIGNIFICANT CHANGE UP
TOXIC GRANULES BLD QL SMEAR: PRESENT — SIGNIFICANT CHANGE UP
WBC # BLD: 16.5 K/UL — HIGH (ref 3.8–10.5)
WBC # FLD AUTO: 16.5 K/UL — HIGH (ref 3.8–10.5)

## 2019-06-27 PROCEDURE — 99214 OFFICE O/P EST MOD 30 MIN: CPT

## 2019-07-03 NOTE — HISTORY OF PRESENT ILLNESS
[de-identified] : Feels well.\par Tolerated Azacytididne excellently. Denies any side effects.\par \par H/H today is 6.6/20.2, . [de-identified] : Mrs. Chen is an 87 y/o WF with a history of Reji 2+ thrombocytosis, treated on Hydrea since 6/10/13. She was found to be pancytopenic and the Hydrea was held as of 7/12/17. A bone marrow was done on 8/21/17 that demonstrated a normocellular to mildly hypercellular marrow with mild megakaryocytic hyperplasia, moderate reticulin fibrosis and focal areas of increased blasts (5%). She was started on Jakafi. 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 Tolerating well thus far with PLT normalizing to 164. Anemia persists.

## 2019-07-03 NOTE — ADDENDUM
[FreeTextEntry1] : Documented by Mahin Moseley acting as scribe for Dr. Oliver Montes on 6/27/2019.\par \par All medical record entries made by the Scribe were at my (Dr. Oliver Montes's) direction and personally dictated by me on aforementioned date. I have reviewed the chart and agree that the record accurately reflects my personal performance of the history, physical exam, assessment and plan. I have also personally directed, reviewed, and agree with the discharge instructions.

## 2019-07-03 NOTE — REASON FOR VISIT
[Follow-Up Visit] : a follow-up visit for [Myelodysplastic syndrome] : myelodysplastic syndrome [Family Member] : family member [FreeTextEntry2] : myelofibrosis

## 2019-07-03 NOTE — ASSESSMENT
[FreeTextEntry1] : Myelodysplasia with excess blasts in an 89 y/o WF, recently started on low dose Azacytididne. Tolerated well with PLT normalizing to 164. Remains anemic with HGB of 6.6, but is asymptomatic.\par \par Plan:\par -Continue Azacytididne regimen at same low dose\par -Transfuse one unit of packed RBCs. Timing TBD.\par -Follow up based on treatment schedule

## 2019-07-05 ENCOUNTER — APPOINTMENT (OUTPATIENT)
Dept: HEMATOLOGY ONCOLOGY | Facility: CLINIC | Age: 84
End: 2019-07-05

## 2019-07-07 ENCOUNTER — OUTPATIENT (OUTPATIENT)
Dept: OUTPATIENT SERVICES | Facility: HOSPITAL | Age: 84
LOS: 1 days | Discharge: HOME | End: 2019-07-07

## 2019-07-07 DIAGNOSIS — Z90.710 ACQUIRED ABSENCE OF BOTH CERVIX AND UTERUS: Chronic | ICD-10-CM

## 2019-07-07 DIAGNOSIS — D46.9 MYELODYSPLASTIC SYNDROME, UNSPECIFIED: ICD-10-CM

## 2019-07-08 ENCOUNTER — RESULT REVIEW (OUTPATIENT)
Age: 84
End: 2019-07-08

## 2019-07-08 ENCOUNTER — APPOINTMENT (OUTPATIENT)
Age: 84
End: 2019-07-08

## 2019-07-08 LAB
ANISOCYTOSIS BLD QL: SLIGHT — SIGNIFICANT CHANGE UP
AUER BODIES BLD QL SMEAR: PRESENT — SIGNIFICANT CHANGE UP
BASOPHILS # BLD AUTO: 1 K/UL — HIGH (ref 0–0.2)
BASOPHILS NFR BLD AUTO: 6.5 % — HIGH (ref 0–2)
BLASTS # FLD: 5 % — HIGH (ref 0–0)
DACRYOCYTES BLD QL SMEAR: SLIGHT — SIGNIFICANT CHANGE UP
ELLIPTOCYTES BLD QL SMEAR: SLIGHT — SIGNIFICANT CHANGE UP
EOSINOPHIL # BLD AUTO: 0.5 K/UL — SIGNIFICANT CHANGE UP (ref 0–0.5)
EOSINOPHIL NFR BLD AUTO: 3 % — SIGNIFICANT CHANGE UP (ref 0–6)
HCT VFR BLD CALC: 19.8 % — CRITICAL LOW (ref 34.5–45)
HGB BLD-MCNC: 7.1 G/DL — LOW (ref 11.5–15.5)
HYPOCHROMIA BLD QL: SLIGHT — SIGNIFICANT CHANGE UP
LG PLATELETS BLD QL AUTO: SLIGHT — SIGNIFICANT CHANGE UP
LYMPHOCYTES # BLD AUTO: 20 % — SIGNIFICANT CHANGE UP (ref 13–44)
LYMPHOCYTES # BLD AUTO: 4.1 K/UL — HIGH (ref 1–3.3)
LYMPHOCYTES # SPEC AUTO: 28 % — HIGH (ref 0–0)
MACROCYTES BLD QL: SIGNIFICANT CHANGE UP
MCHC RBC-ENTMCNC: 35.9 G/DL — SIGNIFICANT CHANGE UP (ref 32–36)
MCHC RBC-ENTMCNC: 37.3 PG — HIGH (ref 27–34)
MCV RBC AUTO: 104 FL — HIGH (ref 80–100)
MICROCYTES BLD QL: SLIGHT — SIGNIFICANT CHANGE UP
MONOCYTES # BLD AUTO: 6.6 K/UL — HIGH (ref 0–0.9)
MONOCYTES NFR BLD AUTO: 23 % — HIGH (ref 2–14)
NEUTROPHILS # BLD AUTO: 2.9 K/UL — SIGNIFICANT CHANGE UP (ref 1.8–7.4)
NEUTROPHILS NFR BLD AUTO: 20 % — LOW (ref 43–77)
NEUTS BAND # BLD: 1 % — SIGNIFICANT CHANGE UP (ref 0–8)
NRBC # BLD: 1 /100 — HIGH (ref 0–0)
OVALOCYTES BLD QL SMEAR: SLIGHT — SIGNIFICANT CHANGE UP
PLAT MORPH BLD: NORMAL — SIGNIFICANT CHANGE UP
PLATELET # BLD AUTO: 223 K/UL — SIGNIFICANT CHANGE UP (ref 150–400)
POIKILOCYTOSIS BLD QL AUTO: SLIGHT — SIGNIFICANT CHANGE UP
POLYCHROMASIA BLD QL SMEAR: SIGNIFICANT CHANGE UP
RBC # BLD: 1.9 M/UL — LOW (ref 3.8–5.2)
RBC # FLD: 22.1 % — HIGH (ref 10.3–14.5)
RBC BLD AUTO: ABNORMAL
SCHISTOCYTES BLD QL AUTO: SLIGHT — SIGNIFICANT CHANGE UP
STOMATOCYTES BLD QL SMEAR: PRESENT — SIGNIFICANT CHANGE UP
TARGETS BLD QL SMEAR: SLIGHT — SIGNIFICANT CHANGE UP
TOXIC GRANULES BLD QL SMEAR: PRESENT — SIGNIFICANT CHANGE UP
WBC # BLD: 15 K/UL — HIGH (ref 3.8–10.5)
WBC # FLD AUTO: 15 K/UL — HIGH (ref 3.8–10.5)

## 2019-07-09 ENCOUNTER — APPOINTMENT (OUTPATIENT)
Age: 84
End: 2019-07-09

## 2019-07-09 DIAGNOSIS — R11.2 NAUSEA WITH VOMITING, UNSPECIFIED: ICD-10-CM

## 2019-07-09 DIAGNOSIS — Z51.11 ENCOUNTER FOR ANTINEOPLASTIC CHEMOTHERAPY: ICD-10-CM

## 2019-07-10 ENCOUNTER — RESULT REVIEW (OUTPATIENT)
Age: 84
End: 2019-07-10

## 2019-07-10 ENCOUNTER — APPOINTMENT (OUTPATIENT)
Age: 84
End: 2019-07-10

## 2019-07-10 LAB
ANISOCYTOSIS BLD QL: SLIGHT — SIGNIFICANT CHANGE UP
AUER BODIES BLD QL SMEAR: PRESENT — SIGNIFICANT CHANGE UP
BASOPHILS # BLD AUTO: 1.2 K/UL — HIGH (ref 0–0.2)
BASOPHILS NFR BLD AUTO: 5 % — HIGH (ref 0–2)
BLASTS # FLD: 10 % — HIGH (ref 0–0)
DACRYOCYTES BLD QL SMEAR: SLIGHT — SIGNIFICANT CHANGE UP
ELLIPTOCYTES BLD QL SMEAR: SLIGHT — SIGNIFICANT CHANGE UP
EOSINOPHIL # BLD AUTO: 0.3 K/UL — SIGNIFICANT CHANGE UP (ref 0–0.5)
EOSINOPHIL NFR BLD AUTO: 1 % — SIGNIFICANT CHANGE UP (ref 0–6)
GIANT PLATELETS BLD QL SMEAR: PRESENT — SIGNIFICANT CHANGE UP
HCT VFR BLD CALC: 19.6 % — CRITICAL LOW (ref 34.5–45)
HGB BLD-MCNC: 6.7 G/DL — CRITICAL LOW (ref 11.5–15.5)
HYPOCHROMIA BLD QL: SLIGHT — SIGNIFICANT CHANGE UP
LG PLATELETS BLD QL AUTO: SLIGHT — SIGNIFICANT CHANGE UP
LYMPHOCYTES # BLD AUTO: 23 % — SIGNIFICANT CHANGE UP (ref 13–44)
LYMPHOCYTES # BLD AUTO: 3.5 K/UL — HIGH (ref 1–3.3)
LYMPHOCYTES # SPEC AUTO: 7 % — HIGH (ref 0–0)
MACROCYTES BLD QL: SIGNIFICANT CHANGE UP
MCHC RBC-ENTMCNC: 34.2 G/DL — SIGNIFICANT CHANGE UP (ref 32–36)
MCHC RBC-ENTMCNC: 35.8 PG — HIGH (ref 27–34)
MCV RBC AUTO: 104.8 FL — HIGH (ref 80–100)
MICROCYTES BLD QL: SLIGHT — SIGNIFICANT CHANGE UP
MONOCYTES # BLD AUTO: 5.5 K/UL — HIGH (ref 0–0.9)
MONOCYTES NFR BLD AUTO: 40 % — HIGH (ref 2–14)
NEUTROPHILS # BLD AUTO: 2.1 K/UL — SIGNIFICANT CHANGE UP (ref 1.8–7.4)
NEUTROPHILS NFR BLD AUTO: 14 % — LOW (ref 43–77)
OVALOCYTES BLD QL SMEAR: SLIGHT — SIGNIFICANT CHANGE UP
PLAT MORPH BLD: NORMAL — SIGNIFICANT CHANGE UP
PLATELET # BLD AUTO: 203 K/UL — SIGNIFICANT CHANGE UP (ref 150–400)
POIKILOCYTOSIS BLD QL AUTO: SLIGHT — SIGNIFICANT CHANGE UP
POLYCHROMASIA BLD QL SMEAR: SIGNIFICANT CHANGE UP
RBC # BLD: 1.87 M/UL — LOW (ref 3.8–5.2)
RBC # FLD: 22.3 % — HIGH (ref 10.3–14.5)
RBC BLD AUTO: ABNORMAL
ROULEAUX BLD QL SMEAR: PRESENT — SIGNIFICANT CHANGE UP
SCHISTOCYTES BLD QL AUTO: SLIGHT — SIGNIFICANT CHANGE UP
SPHEROCYTES BLD QL SMEAR: SLIGHT — SIGNIFICANT CHANGE UP
STOMATOCYTES BLD QL SMEAR: PRESENT — SIGNIFICANT CHANGE UP
TOXIC GRANULES BLD QL SMEAR: PRESENT — SIGNIFICANT CHANGE UP
WBC # BLD: 12.6 K/UL — HIGH (ref 3.8–10.5)
WBC # FLD AUTO: 12.6 K/UL — HIGH (ref 3.8–10.5)

## 2019-07-11 ENCOUNTER — APPOINTMENT (OUTPATIENT)
Age: 84
End: 2019-07-11

## 2019-07-12 ENCOUNTER — APPOINTMENT (OUTPATIENT)
Age: 84
End: 2019-07-12

## 2019-07-15 ENCOUNTER — RESULT REVIEW (OUTPATIENT)
Age: 84
End: 2019-07-15

## 2019-07-15 ENCOUNTER — APPOINTMENT (OUTPATIENT)
Dept: HEMATOLOGY ONCOLOGY | Facility: CLINIC | Age: 84
End: 2019-07-15

## 2019-07-15 ENCOUNTER — OUTPATIENT (OUTPATIENT)
Dept: OUTPATIENT SERVICES | Facility: HOSPITAL | Age: 84
LOS: 1 days | End: 2019-07-15
Payer: MEDICARE

## 2019-07-15 DIAGNOSIS — D46.9 MYELODYSPLASTIC SYNDROME, UNSPECIFIED: ICD-10-CM

## 2019-07-15 DIAGNOSIS — Z90.710 ACQUIRED ABSENCE OF BOTH CERVIX AND UTERUS: Chronic | ICD-10-CM

## 2019-07-15 DIAGNOSIS — D75.81 MYELOFIBROSIS: ICD-10-CM

## 2019-07-15 LAB
ANISOCYTOSIS BLD QL: SLIGHT — SIGNIFICANT CHANGE UP
AUER BODIES BLD QL SMEAR: PRESENT — SIGNIFICANT CHANGE UP
BASO STIPL BLD QL SMEAR: PRESENT — SIGNIFICANT CHANGE UP
BASOPHILS # BLD AUTO: 0.2 K/UL — SIGNIFICANT CHANGE UP (ref 0–0.2)
BASOPHILS NFR BLD AUTO: 1 % — SIGNIFICANT CHANGE UP (ref 0–2)
BLASTS # FLD: 2 % — HIGH (ref 0–0)
BLD GP AB SCN SERPL QL: SIGNIFICANT CHANGE UP
DACRYOCYTES BLD QL SMEAR: SLIGHT — SIGNIFICANT CHANGE UP
DOHLE BOD BLD QL SMEAR: PRESENT — SIGNIFICANT CHANGE UP
ELLIPTOCYTES BLD QL SMEAR: SLIGHT — SIGNIFICANT CHANGE UP
EOSINOPHIL # BLD AUTO: 0.3 K/UL — SIGNIFICANT CHANGE UP (ref 0–0.5)
EOSINOPHIL NFR BLD AUTO: 3 % — SIGNIFICANT CHANGE UP (ref 0–6)
GIANT PLATELETS BLD QL SMEAR: PRESENT — SIGNIFICANT CHANGE UP
HCT VFR BLD CALC: 17 % — CRITICAL LOW (ref 34.5–45)
HGB BLD-MCNC: 6.1 G/DL — CRITICAL LOW (ref 11.5–15.5)
HYPOCHROMIA BLD QL: SLIGHT — SIGNIFICANT CHANGE UP
LG PLATELETS BLD QL AUTO: SLIGHT — SIGNIFICANT CHANGE UP
LYMPHOCYTES # BLD AUTO: 2.4 K/UL — SIGNIFICANT CHANGE UP (ref 1–3.3)
LYMPHOCYTES # BLD AUTO: 34 % — SIGNIFICANT CHANGE UP (ref 13–44)
LYMPHOCYTES # SPEC AUTO: 13 % — HIGH (ref 0–0)
MACROCYTES BLD QL: SIGNIFICANT CHANGE UP
MCHC RBC-ENTMCNC: 35.8 G/DL — SIGNIFICANT CHANGE UP (ref 32–36)
MCHC RBC-ENTMCNC: 36.3 PG — HIGH (ref 27–34)
MCV RBC AUTO: 101.5 FL — HIGH (ref 80–100)
METAMYELOCYTES # FLD: 2 % — HIGH (ref 0–0)
MICROCYTES BLD QL: SLIGHT — SIGNIFICANT CHANGE UP
MONOCYTES # BLD AUTO: 1.2 K/UL — HIGH (ref 0–0.9)
MONOCYTES NFR BLD AUTO: 22 % — HIGH (ref 2–14)
MYELOCYTES NFR BLD: 10 % — HIGH (ref 0–0)
NEUTROPHILS # BLD AUTO: 2 K/UL — SIGNIFICANT CHANGE UP (ref 1.8–7.4)
NEUTROPHILS NFR BLD AUTO: 13 % — LOW (ref 43–77)
NRBC # BLD: 2 /100 — HIGH (ref 0–0)
PLAT MORPH BLD: NORMAL — SIGNIFICANT CHANGE UP
PLATELET # BLD AUTO: 149 K/UL — LOW (ref 150–400)
POIKILOCYTOSIS BLD QL AUTO: SLIGHT — SIGNIFICANT CHANGE UP
POLYCHROMASIA BLD QL SMEAR: SLIGHT — SIGNIFICANT CHANGE UP
RBC # BLD: 1.67 M/UL — LOW (ref 3.8–5.2)
RBC # FLD: 22.4 % — HIGH (ref 10.3–14.5)
RBC BLD AUTO: ABNORMAL
SCHISTOCYTES BLD QL AUTO: SLIGHT — SIGNIFICANT CHANGE UP
SMUDGE CELLS # BLD: PRESENT — SIGNIFICANT CHANGE UP
SPHEROCYTES BLD QL SMEAR: SLIGHT — SIGNIFICANT CHANGE UP
STOMATOCYTES BLD QL SMEAR: PRESENT — SIGNIFICANT CHANGE UP
TARGETS BLD QL SMEAR: SLIGHT — SIGNIFICANT CHANGE UP
TYPE + AB SCN PNL BLD: SIGNIFICANT CHANGE UP
WBC # BLD: 8.2 K/UL — SIGNIFICANT CHANGE UP (ref 3.8–10.5)
WBC # FLD AUTO: 8.2 K/UL — SIGNIFICANT CHANGE UP (ref 3.8–10.5)

## 2019-07-16 ENCOUNTER — APPOINTMENT (OUTPATIENT)
Age: 84
End: 2019-07-16

## 2019-07-16 PROCEDURE — 86922 COMPATIBILITY TEST ANTIGLOB: CPT

## 2019-07-16 PROCEDURE — 86901 BLOOD TYPING SEROLOGIC RH(D): CPT

## 2019-07-16 PROCEDURE — 36415 COLL VENOUS BLD VENIPUNCTURE: CPT

## 2019-07-16 PROCEDURE — 86850 RBC ANTIBODY SCREEN: CPT

## 2019-07-16 PROCEDURE — P9016: CPT

## 2019-07-16 PROCEDURE — 86900 BLOOD TYPING SEROLOGIC ABO: CPT

## 2019-07-18 DIAGNOSIS — Z51.89 ENCOUNTER FOR OTHER SPECIFIED AFTERCARE: ICD-10-CM

## 2019-07-23 ENCOUNTER — RESULT REVIEW (OUTPATIENT)
Age: 84
End: 2019-07-23

## 2019-07-23 ENCOUNTER — APPOINTMENT (OUTPATIENT)
Dept: HEMATOLOGY ONCOLOGY | Facility: CLINIC | Age: 84
End: 2019-07-23

## 2019-07-23 LAB
ANISOCYTOSIS BLD QL: SLIGHT — SIGNIFICANT CHANGE UP
AUER BODIES BLD QL SMEAR: PRESENT — SIGNIFICANT CHANGE UP
BASO STIPL BLD QL SMEAR: PRESENT — SIGNIFICANT CHANGE UP
BASOPHILS # BLD AUTO: 0.7 K/UL — HIGH (ref 0–0.2)
BLASTS # FLD: 7 % — HIGH (ref 0–0)
DOHLE BOD BLD QL SMEAR: PRESENT — SIGNIFICANT CHANGE UP
ELLIPTOCYTES BLD QL SMEAR: SLIGHT — SIGNIFICANT CHANGE UP
EOSINOPHIL # BLD AUTO: 0.3 K/UL — SIGNIFICANT CHANGE UP (ref 0–0.5)
EOSINOPHIL NFR BLD AUTO: 3 % — SIGNIFICANT CHANGE UP (ref 0–6)
GIANT PLATELETS BLD QL SMEAR: PRESENT — SIGNIFICANT CHANGE UP
HCT VFR BLD CALC: 22.8 % — LOW (ref 34.5–45)
HGB BLD-MCNC: 7.5 G/DL — LOW (ref 11.5–15.5)
HYPOCHROMIA BLD QL: SLIGHT — SIGNIFICANT CHANGE UP
LG PLATELETS BLD QL AUTO: SLIGHT — SIGNIFICANT CHANGE UP
LYMPHOCYTES # BLD AUTO: 2.6 K/UL — SIGNIFICANT CHANGE UP (ref 1–3.3)
LYMPHOCYTES # BLD AUTO: 35 % — SIGNIFICANT CHANGE UP (ref 13–44)
LYMPHOCYTES # SPEC AUTO: 7 % — HIGH (ref 0–0)
MACROCYTES BLD QL: SIGNIFICANT CHANGE UP
MCHC RBC-ENTMCNC: 32.9 G/DL — SIGNIFICANT CHANGE UP (ref 32–36)
MCHC RBC-ENTMCNC: 33.3 PG — SIGNIFICANT CHANGE UP (ref 27–34)
MCV RBC AUTO: 101.1 FL — HIGH (ref 80–100)
METAMYELOCYTES # FLD: 4 % — HIGH (ref 0–0)
MICROCYTES BLD QL: SLIGHT — SIGNIFICANT CHANGE UP
MONOCYTES # BLD AUTO: SIGNIFICANT CHANGE UP (ref 0–0.9)
MONOCYTES NFR BLD AUTO: 26 % — HIGH (ref 2–14)
MYELOCYTES NFR BLD: 4 % — HIGH (ref 0–0)
NEUTROPHILS # BLD AUTO: 1.7 K/UL — LOW (ref 1.8–7.4)
NEUTROPHILS NFR BLD AUTO: 13 % — LOW (ref 43–77)
NEUTS BAND # BLD: 1 % — SIGNIFICANT CHANGE UP (ref 0–8)
NRBC # BLD: 13 /100 — HIGH (ref 0–0)
PLAT MORPH BLD: NORMAL — SIGNIFICANT CHANGE UP
PLATELET # BLD AUTO: 89 K/UL — LOW (ref 150–400)
POIKILOCYTOSIS BLD QL AUTO: SLIGHT — SIGNIFICANT CHANGE UP
POLYCHROMASIA BLD QL SMEAR: SLIGHT — SIGNIFICANT CHANGE UP
RBC # BLD: 2.25 M/UL — LOW (ref 3.8–5.2)
RBC # FLD: 22.4 % — HIGH (ref 10.3–14.5)
RBC BLD AUTO: ABNORMAL
SCHISTOCYTES BLD QL AUTO: SLIGHT — SIGNIFICANT CHANGE UP
SMUDGE CELLS # BLD: PRESENT — SIGNIFICANT CHANGE UP
SPHEROCYTES BLD QL SMEAR: SLIGHT — SIGNIFICANT CHANGE UP
STOMATOCYTES BLD QL SMEAR: PRESENT — SIGNIFICANT CHANGE UP
WBC # BLD: 9.5 K/UL — SIGNIFICANT CHANGE UP (ref 3.8–10.5)
WBC # FLD AUTO: 9.5 K/UL — SIGNIFICANT CHANGE UP (ref 3.8–10.5)

## 2019-07-30 ENCOUNTER — APPOINTMENT (OUTPATIENT)
Dept: HEMATOLOGY ONCOLOGY | Facility: CLINIC | Age: 84
End: 2019-07-30

## 2019-08-01 ENCOUNTER — APPOINTMENT (OUTPATIENT)
Dept: HEMATOLOGY ONCOLOGY | Facility: CLINIC | Age: 84
End: 2019-08-01
Payer: MEDICARE

## 2019-08-01 ENCOUNTER — RESULT REVIEW (OUTPATIENT)
Age: 84
End: 2019-08-01

## 2019-08-01 VITALS
HEIGHT: 63 IN | OXYGEN SATURATION: 97 % | TEMPERATURE: 98.4 F | WEIGHT: 120 LBS | DIASTOLIC BLOOD PRESSURE: 74 MMHG | HEART RATE: 85 BPM | BODY MASS INDEX: 21.26 KG/M2 | SYSTOLIC BLOOD PRESSURE: 149 MMHG

## 2019-08-01 LAB
ANISOCYTOSIS BLD QL: SLIGHT — SIGNIFICANT CHANGE UP
BASOPHILS NFR BLD AUTO: 1 % — SIGNIFICANT CHANGE UP (ref 0–2)
BLASTS # FLD: 7 % — HIGH (ref 0–0)
DACRYOCYTES BLD QL SMEAR: SLIGHT — SIGNIFICANT CHANGE UP
EOSINOPHIL # BLD AUTO: 0.4 K/UL — SIGNIFICANT CHANGE UP (ref 0–0.5)
EOSINOPHIL NFR BLD AUTO: 6 % — SIGNIFICANT CHANGE UP (ref 0–6)
GIANT PLATELETS BLD QL SMEAR: PRESENT — SIGNIFICANT CHANGE UP
HCT VFR BLD CALC: 21.7 % — LOW (ref 34.5–45)
HGB BLD-MCNC: 7.4 G/DL — LOW (ref 11.5–15.5)
HYPOCHROMIA BLD QL: SLIGHT — SIGNIFICANT CHANGE UP
LG PLATELETS BLD QL AUTO: SLIGHT — SIGNIFICANT CHANGE UP
LYMPHOCYTES # BLD AUTO: 31 % — SIGNIFICANT CHANGE UP (ref 13–44)
LYMPHOCYTES # BLD AUTO: 4.3 K/UL — HIGH (ref 1–3.3)
LYMPHOCYTES # SPEC AUTO: 7 % — HIGH (ref 0–0)
MACROCYTES BLD QL: SIGNIFICANT CHANGE UP
MCHC RBC-ENTMCNC: 34.2 G/DL — SIGNIFICANT CHANGE UP (ref 32–36)
MCHC RBC-ENTMCNC: 34.9 PG — HIGH (ref 27–34)
MCV RBC AUTO: 101.9 FL — HIGH (ref 80–100)
METAMYELOCYTES # FLD: 1 % — HIGH (ref 0–0)
MONOCYTES NFR BLD AUTO: 18 % — HIGH (ref 2–14)
MYELOCYTES NFR BLD: 5 % — HIGH (ref 0–0)
NEUTROPHILS # BLD AUTO: 2.8 K/UL — SIGNIFICANT CHANGE UP (ref 1.8–7.4)
NEUTROPHILS NFR BLD AUTO: 23 % — LOW (ref 43–77)
NEUTS BAND # BLD: 1 % — SIGNIFICANT CHANGE UP (ref 0–8)
NRBC # BLD: 8 /100 — HIGH (ref 0–0)
PLAT MORPH BLD: NORMAL — SIGNIFICANT CHANGE UP
PLATELET # BLD AUTO: 330 K/UL — SIGNIFICANT CHANGE UP (ref 150–400)
POIKILOCYTOSIS BLD QL AUTO: SLIGHT — SIGNIFICANT CHANGE UP
POLYCHROMASIA BLD QL SMEAR: SLIGHT — SIGNIFICANT CHANGE UP
RBC # BLD: 2.13 M/UL — LOW (ref 3.8–5.2)
RBC # FLD: 22.3 % — HIGH (ref 10.3–14.5)
RBC BLD AUTO: ABNORMAL
ROULEAUX BLD QL SMEAR: PRESENT — SIGNIFICANT CHANGE UP
SCHISTOCYTES BLD QL AUTO: SLIGHT — SIGNIFICANT CHANGE UP
SMUDGE CELLS # BLD: PRESENT — SIGNIFICANT CHANGE UP
SPHEROCYTES BLD QL SMEAR: SLIGHT — SIGNIFICANT CHANGE UP
WBC # BLD: 13.1 K/UL — HIGH (ref 3.8–10.5)
WBC # FLD AUTO: 13.1 K/UL — HIGH (ref 3.8–10.5)

## 2019-08-01 PROCEDURE — 99213 OFFICE O/P EST LOW 20 MIN: CPT

## 2019-08-02 NOTE — HISTORY OF PRESENT ILLNESS
[de-identified] : Mrs. Chen is an 89 y/o WF with a history of Reji 2+ thrombocytosis, treated on Hydrea since 6/10/13. She was found to be pancytopenic and the Hydrea was held as of 7/12/17. A bone marrow was done on 8/21/17 that demonstrated a normocellular to mildly hypercellular marrow with mild megakaryocytic hyperplasia, moderate reticulin fibrosis and focal areas of increased blasts (5%). She was started on Jakafi. 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 Tolerating well thus far with PLT normalizing to 164. Anemia persists. [de-identified] : Feels well.\par Tolerated Azacytididne excellently. Denies any side effects.\par \par H/H today is 7.4/21.7%,

## 2019-08-02 NOTE — ASSESSMENT
[FreeTextEntry1] : Myelodysplasia with excess blasts in an 87 y/o WF, recently started on low dose Azacytididne. Tolerated well with PLT normal at 330. Remains anemic with HGB of 7.4, but is asymptomatic. Blast count in peripheral blood has responded well.\par \par Plan:\par -Continue Azacytididne regimen at same low dose\par -Transfuse one unit of packed RBCs. Timing TBD.\par -Follow up based on treatment schedule

## 2019-08-02 NOTE — PHYSICAL EXAM
[Normal] : normoactive bowel sounds, soft and nontender, no hepatosplenomegaly or masses appreciated [Thin] : thin [de-identified] : Pale [de-identified] : 2/6 PRESTON

## 2019-08-05 ENCOUNTER — RESULT REVIEW (OUTPATIENT)
Age: 84
End: 2019-08-05

## 2019-08-05 ENCOUNTER — APPOINTMENT (OUTPATIENT)
Age: 84
End: 2019-08-05

## 2019-08-05 LAB
ANISOCYTOSIS BLD QL: SLIGHT — SIGNIFICANT CHANGE UP
BASO STIPL BLD QL SMEAR: PRESENT — SIGNIFICANT CHANGE UP
BASOPHILS # BLD AUTO: 0.9 K/UL — HIGH (ref 0–0.2)
BASOPHILS NFR BLD AUTO: 3 % — HIGH (ref 0–2)
BLASTS # FLD: 1 % — HIGH (ref 0–0)
DACRYOCYTES BLD QL SMEAR: SLIGHT — SIGNIFICANT CHANGE UP
ELLIPTOCYTES BLD QL SMEAR: SLIGHT — SIGNIFICANT CHANGE UP
EOSINOPHIL # BLD AUTO: 0.2 K/UL — SIGNIFICANT CHANGE UP (ref 0–0.5)
EOSINOPHIL NFR BLD AUTO: 2 % — SIGNIFICANT CHANGE UP (ref 0–6)
HCT VFR BLD CALC: 21.5 % — LOW (ref 34.5–45)
HGB BLD-MCNC: 7.1 G/DL — LOW (ref 11.5–15.5)
LG PLATELETS BLD QL AUTO: SLIGHT — SIGNIFICANT CHANGE UP
LYMPHOCYTES # BLD AUTO: 3.6 K/UL — HIGH (ref 1–3.3)
LYMPHOCYTES # BLD AUTO: 43 % — SIGNIFICANT CHANGE UP (ref 13–44)
LYMPHOCYTES # SPEC AUTO: 9 % — HIGH (ref 0–0)
MACROCYTES BLD QL: SIGNIFICANT CHANGE UP
MCHC RBC-ENTMCNC: 32.8 G/DL — SIGNIFICANT CHANGE UP (ref 32–36)
MCHC RBC-ENTMCNC: 34.3 PG — HIGH (ref 27–34)
MCV RBC AUTO: 104.4 FL — HIGH (ref 80–100)
MICROCYTES BLD QL: SLIGHT — SIGNIFICANT CHANGE UP
MONOCYTES # BLD AUTO: 5.9 K/UL — HIGH (ref 0–0.9)
MONOCYTES NFR BLD AUTO: 18 % — HIGH (ref 2–14)
MYELOCYTES NFR BLD: 1 % — HIGH (ref 0–0)
NEUTROPHILS # BLD AUTO: 2.8 K/UL — SIGNIFICANT CHANGE UP (ref 1.8–7.4)
NEUTROPHILS NFR BLD AUTO: 19 % — LOW (ref 43–77)
NEUTS BAND # BLD: 4 % — SIGNIFICANT CHANGE UP (ref 0–8)
NRBC # BLD: 3 /100 — HIGH (ref 0–0)
PLAT MORPH BLD: NORMAL — SIGNIFICANT CHANGE UP
PLATELET # BLD AUTO: 427 K/UL — HIGH (ref 150–400)
POIKILOCYTOSIS BLD QL AUTO: SLIGHT — SIGNIFICANT CHANGE UP
POLYCHROMASIA BLD QL SMEAR: SLIGHT — SIGNIFICANT CHANGE UP
RBC # BLD: 2.06 M/UL — LOW (ref 3.8–5.2)
RBC # FLD: 23.5 % — HIGH (ref 10.3–14.5)
RBC BLD AUTO: ABNORMAL
ROULEAUX BLD QL SMEAR: PRESENT — SIGNIFICANT CHANGE UP
SCHISTOCYTES BLD QL AUTO: SLIGHT — SIGNIFICANT CHANGE UP
STOMATOCYTES BLD QL SMEAR: PRESENT — SIGNIFICANT CHANGE UP
WBC # BLD: 13.5 K/UL — HIGH (ref 3.8–10.5)
WBC # FLD AUTO: 13.5 K/UL — HIGH (ref 3.8–10.5)

## 2019-08-06 ENCOUNTER — APPOINTMENT (OUTPATIENT)
Age: 84
End: 2019-08-06

## 2019-08-07 ENCOUNTER — APPOINTMENT (OUTPATIENT)
Age: 84
End: 2019-08-07

## 2019-08-07 ENCOUNTER — OUTPATIENT (OUTPATIENT)
Dept: OUTPATIENT SERVICES | Facility: HOSPITAL | Age: 84
LOS: 1 days | Discharge: HOME | End: 2019-08-07

## 2019-08-07 DIAGNOSIS — D75.81 MYELOFIBROSIS: ICD-10-CM

## 2019-08-07 DIAGNOSIS — D46.9 MYELODYSPLASTIC SYNDROME, UNSPECIFIED: ICD-10-CM

## 2019-08-07 DIAGNOSIS — Z90.710 ACQUIRED ABSENCE OF BOTH CERVIX AND UTERUS: Chronic | ICD-10-CM

## 2019-08-08 ENCOUNTER — RESULT REVIEW (OUTPATIENT)
Age: 84
End: 2019-08-08

## 2019-08-08 ENCOUNTER — APPOINTMENT (OUTPATIENT)
Age: 84
End: 2019-08-08

## 2019-08-08 DIAGNOSIS — Z51.11 ENCOUNTER FOR ANTINEOPLASTIC CHEMOTHERAPY: ICD-10-CM

## 2019-08-08 DIAGNOSIS — R11.2 NAUSEA WITH VOMITING, UNSPECIFIED: ICD-10-CM

## 2019-08-08 LAB
ANISOCYTOSIS BLD QL: SIGNIFICANT CHANGE UP
BASOPHILS # BLD AUTO: 0.9 K/UL — HIGH (ref 0–0.2)
BLASTS # FLD: 1 % — HIGH (ref 0–0)
DACRYOCYTES BLD QL SMEAR: SLIGHT — SIGNIFICANT CHANGE UP
ELLIPTOCYTES BLD QL SMEAR: SLIGHT — SIGNIFICANT CHANGE UP
EOSINOPHIL # BLD AUTO: 0.3 K/UL — SIGNIFICANT CHANGE UP (ref 0–0.5)
EOSINOPHIL NFR BLD AUTO: 3 % — SIGNIFICANT CHANGE UP (ref 0–6)
HCT VFR BLD CALC: 21.8 % — LOW (ref 34.5–45)
HGB BLD-MCNC: 7.3 G/DL — LOW (ref 11.5–15.5)
HYPOCHROMIA BLD QL: SLIGHT — SIGNIFICANT CHANGE UP
LG PLATELETS BLD QL AUTO: SLIGHT — SIGNIFICANT CHANGE UP
LYMPHOCYTES # BLD AUTO: 3.1 K/UL — SIGNIFICANT CHANGE UP (ref 1–3.3)
LYMPHOCYTES # BLD AUTO: 35 % — SIGNIFICANT CHANGE UP (ref 13–44)
LYMPHOCYTES # SPEC AUTO: 7 % — HIGH (ref 0–0)
MACROCYTES BLD QL: SIGNIFICANT CHANGE UP
MCHC RBC-ENTMCNC: 33.4 G/DL — SIGNIFICANT CHANGE UP (ref 32–36)
MCHC RBC-ENTMCNC: 34.6 PG — HIGH (ref 27–34)
MCV RBC AUTO: 103.4 FL — HIGH (ref 80–100)
METAMYELOCYTES # FLD: 3 % — HIGH (ref 0–0)
MICROCYTES BLD QL: SLIGHT — SIGNIFICANT CHANGE UP
MONOCYTES # BLD AUTO: 4.6 K/UL — HIGH (ref 0–0.9)
MONOCYTES NFR BLD AUTO: 23 % — HIGH (ref 2–14)
NEUTROPHILS # BLD AUTO: 2.1 K/UL — SIGNIFICANT CHANGE UP (ref 1.8–7.4)
NEUTROPHILS NFR BLD AUTO: 28 % — LOW (ref 43–77)
NRBC # BLD: 2 /100 — HIGH (ref 0–0)
PLAT MORPH BLD: NORMAL — SIGNIFICANT CHANGE UP
PLATELET # BLD AUTO: 445 K/UL — HIGH (ref 150–400)
POIKILOCYTOSIS BLD QL AUTO: SLIGHT — SIGNIFICANT CHANGE UP
POLYCHROMASIA BLD QL SMEAR: SLIGHT — SIGNIFICANT CHANGE UP
RBC # BLD: 2.1 M/UL — LOW (ref 3.8–5.2)
RBC # FLD: 22.6 % — HIGH (ref 10.3–14.5)
RBC BLD AUTO: ABNORMAL
ROULEAUX BLD QL SMEAR: PRESENT — SIGNIFICANT CHANGE UP
SCHISTOCYTES BLD QL AUTO: SLIGHT — SIGNIFICANT CHANGE UP
WBC # BLD: 10.9 K/UL — HIGH (ref 3.8–10.5)
WBC # FLD AUTO: 10.9 K/UL — HIGH (ref 3.8–10.5)

## 2019-08-09 ENCOUNTER — APPOINTMENT (OUTPATIENT)
Age: 84
End: 2019-08-09

## 2019-08-12 LAB
ALBUMIN SERPL ELPH-MCNC: 4 G/DL
ALP BLD-CCNC: 71 U/L
ALT SERPL-CCNC: 22 U/L
ANION GAP SERPL CALC-SCNC: 12 MMOL/L
AST SERPL-CCNC: 21 U/L
BILIRUB SERPL-MCNC: 0.6 MG/DL
BUN SERPL-MCNC: 27 MG/DL
CALCIUM SERPL-MCNC: 9.5 MG/DL
CHLORIDE SERPL-SCNC: 104 MMOL/L
CO2 SERPL-SCNC: 22 MMOL/L
CREAT SERPL-MCNC: 1.18 MG/DL
GLUCOSE SERPL-MCNC: 99 MG/DL
POTASSIUM SERPL-SCNC: 4.8 MMOL/L
PROT SERPL-MCNC: 7.8 G/DL
SODIUM SERPL-SCNC: 138 MMOL/L

## 2019-08-13 ENCOUNTER — APPOINTMENT (OUTPATIENT)
Dept: HEMATOLOGY ONCOLOGY | Facility: CLINIC | Age: 84
End: 2019-08-13

## 2019-08-15 ENCOUNTER — APPOINTMENT (OUTPATIENT)
Dept: HEMATOLOGY ONCOLOGY | Facility: CLINIC | Age: 84
End: 2019-08-15

## 2019-08-15 ENCOUNTER — RESULT REVIEW (OUTPATIENT)
Age: 84
End: 2019-08-15

## 2019-08-15 LAB
ANISOCYTOSIS BLD QL: SIGNIFICANT CHANGE UP
AUER BODIES BLD QL SMEAR: PRESENT — SIGNIFICANT CHANGE UP
BASO STIPL BLD QL SMEAR: PRESENT — SIGNIFICANT CHANGE UP
BASOPHILS # BLD AUTO: 0.8 K/UL — HIGH (ref 0–0.2)
BLASTS # FLD: 7 % — HIGH (ref 0–0)
DACRYOCYTES BLD QL SMEAR: SLIGHT — SIGNIFICANT CHANGE UP
ELLIPTOCYTES BLD QL SMEAR: SLIGHT — SIGNIFICANT CHANGE UP
EOSINOPHIL # BLD AUTO: 0.3 K/UL — SIGNIFICANT CHANGE UP (ref 0–0.5)
EOSINOPHIL NFR BLD AUTO: 2 % — SIGNIFICANT CHANGE UP (ref 0–6)
GIANT PLATELETS BLD QL SMEAR: PRESENT — SIGNIFICANT CHANGE UP
HCT VFR BLD CALC: 19.4 % — CRITICAL LOW (ref 34.5–45)
HGB BLD-MCNC: 6.7 G/DL — CRITICAL LOW (ref 11.5–15.5)
HYPOCHROMIA BLD QL: SLIGHT — SIGNIFICANT CHANGE UP
LG PLATELETS BLD QL AUTO: SLIGHT — SIGNIFICANT CHANGE UP
LYMPHOCYTES # BLD AUTO: 24 % — SIGNIFICANT CHANGE UP (ref 13–44)
LYMPHOCYTES # BLD AUTO: 3.9 K/UL — HIGH (ref 1–3.3)
LYMPHOCYTES # SPEC AUTO: 8 % — HIGH (ref 0–0)
MACROCYTES BLD QL: SLIGHT — SIGNIFICANT CHANGE UP
MCHC RBC-ENTMCNC: 34.4 G/DL — SIGNIFICANT CHANGE UP (ref 32–36)
MCHC RBC-ENTMCNC: 35.6 PG — HIGH (ref 27–34)
MCV RBC AUTO: 103.2 FL — HIGH (ref 80–100)
METAMYELOCYTES # FLD: 3 % — HIGH (ref 0–0)
MICROCYTES BLD QL: SLIGHT — SIGNIFICANT CHANGE UP
MONOCYTES # BLD AUTO: 3.2 K/UL — HIGH (ref 0–0.9)
MONOCYTES NFR BLD AUTO: 15 % — HIGH (ref 2–14)
MYELOCYTES NFR BLD: 6 % — HIGH (ref 0–0)
NEUTROPHILS # BLD AUTO: 2.2 K/UL — SIGNIFICANT CHANGE UP (ref 1.8–7.4)
NEUTROPHILS NFR BLD AUTO: 33 % — LOW (ref 43–77)
NEUTS BAND # BLD: 1 % — SIGNIFICANT CHANGE UP (ref 0–8)
NRBC # BLD: 6 /100 — HIGH (ref 0–0)
PLAT MORPH BLD: NORMAL — SIGNIFICANT CHANGE UP
PLATELET # BLD AUTO: 343 K/UL — SIGNIFICANT CHANGE UP (ref 150–400)
POIKILOCYTOSIS BLD QL AUTO: SLIGHT — SIGNIFICANT CHANGE UP
POLYCHROMASIA BLD QL SMEAR: SLIGHT — SIGNIFICANT CHANGE UP
RBC # BLD: 1.88 M/UL — LOW (ref 3.8–5.2)
RBC # FLD: 22.2 % — HIGH (ref 10.3–14.5)
RBC BLD AUTO: ABNORMAL
SCHISTOCYTES BLD QL AUTO: SLIGHT — SIGNIFICANT CHANGE UP
SMUDGE CELLS # BLD: PRESENT — SIGNIFICANT CHANGE UP
VARIANT LYMPHS # BLD: 1 % — SIGNIFICANT CHANGE UP (ref 0–6)
WBC # BLD: 10.2 K/UL — SIGNIFICANT CHANGE UP (ref 3.8–10.5)
WBC # FLD AUTO: 10.2 K/UL — SIGNIFICANT CHANGE UP (ref 3.8–10.5)

## 2019-08-20 ENCOUNTER — RX RENEWAL (OUTPATIENT)
Age: 84
End: 2019-08-20

## 2019-08-20 ENCOUNTER — APPOINTMENT (OUTPATIENT)
Dept: HEMATOLOGY ONCOLOGY | Facility: CLINIC | Age: 84
End: 2019-08-20

## 2019-08-20 RX ORDER — RUXOLITINIB 10 MG/1
10 TABLET ORAL
Qty: 30 | Refills: 5 | Status: ACTIVE | COMMUNITY
Start: 2018-10-26 | End: 1900-01-01

## 2019-08-21 ENCOUNTER — RESULT REVIEW (OUTPATIENT)
Age: 84
End: 2019-08-21

## 2019-08-21 ENCOUNTER — APPOINTMENT (OUTPATIENT)
Dept: HEMATOLOGY ONCOLOGY | Facility: CLINIC | Age: 84
End: 2019-08-21

## 2019-08-21 LAB
ANISOCYTOSIS BLD QL: SIGNIFICANT CHANGE UP
AUER BODIES BLD QL SMEAR: PRESENT — SIGNIFICANT CHANGE UP
BASO STIPL BLD QL SMEAR: PRESENT — SIGNIFICANT CHANGE UP
BASOPHILS # BLD AUTO: 1.2 K/UL — HIGH (ref 0–0.2)
BASOPHILS NFR BLD AUTO: 4 % — HIGH (ref 0–2)
BLASTS # FLD: 9 % — HIGH (ref 0–0)
EOSINOPHIL # BLD AUTO: 0.4 K/UL — SIGNIFICANT CHANGE UP (ref 0–0.5)
EOSINOPHIL NFR BLD AUTO: 7 % — HIGH (ref 0–6)
GIANT PLATELETS BLD QL SMEAR: PRESENT — SIGNIFICANT CHANGE UP
HCT VFR BLD CALC: 20.5 % — CRITICAL LOW (ref 34.5–45)
HGB BLD-MCNC: 7.1 G/DL — LOW (ref 11.5–15.5)
HYPOCHROMIA BLD QL: SLIGHT — SIGNIFICANT CHANGE UP
LG PLATELETS BLD QL AUTO: SLIGHT — SIGNIFICANT CHANGE UP
LYMPHOCYTES # BLD AUTO: 27 % — SIGNIFICANT CHANGE UP (ref 13–44)
LYMPHOCYTES # BLD AUTO: 3.1 K/UL — SIGNIFICANT CHANGE UP (ref 1–3.3)
LYMPHOCYTES # SPEC AUTO: 3 % — HIGH (ref 0–0)
MACROCYTES BLD QL: SLIGHT — SIGNIFICANT CHANGE UP
MCHC RBC-ENTMCNC: 34.7 G/DL — SIGNIFICANT CHANGE UP (ref 32–36)
MCHC RBC-ENTMCNC: 36.3 PG — HIGH (ref 27–34)
MCV RBC AUTO: 104.6 FL — HIGH (ref 80–100)
METAMYELOCYTES # FLD: 2 % — HIGH (ref 0–0)
MICROCYTES BLD QL: SLIGHT — SIGNIFICANT CHANGE UP
MONOCYTES # BLD AUTO: SIGNIFICANT CHANGE UP (ref 0–0.9)
MONOCYTES NFR BLD AUTO: 8 % — SIGNIFICANT CHANGE UP (ref 2–14)
MYELOCYTES NFR BLD: 7 % — HIGH (ref 0–0)
NEUTROPHILS # BLD AUTO: 3.8 K/UL — SIGNIFICANT CHANGE UP (ref 1.8–7.4)
NEUTROPHILS NFR BLD AUTO: 33 % — LOW (ref 43–77)
NRBC # BLD: 13 /100 — HIGH (ref 0–0)
PLAT MORPH BLD: NORMAL — SIGNIFICANT CHANGE UP
PLATELET # BLD AUTO: 295 K/UL — SIGNIFICANT CHANGE UP (ref 150–400)
POIKILOCYTOSIS BLD QL AUTO: SLIGHT — SIGNIFICANT CHANGE UP
POLYCHROMASIA BLD QL SMEAR: SLIGHT — SIGNIFICANT CHANGE UP
RBC # BLD: 1.96 M/UL — LOW (ref 3.8–5.2)
RBC # FLD: 22.5 % — HIGH (ref 10.3–14.5)
RBC BLD AUTO: ABNORMAL
ROULEAUX BLD QL SMEAR: PRESENT — SIGNIFICANT CHANGE UP
SCHISTOCYTES BLD QL AUTO: SLIGHT — SIGNIFICANT CHANGE UP
SMUDGE CELLS # BLD: PRESENT — SIGNIFICANT CHANGE UP
SPHEROCYTES BLD QL SMEAR: SLIGHT — SIGNIFICANT CHANGE UP
WBC # BLD: 13.1 K/UL — HIGH (ref 3.8–10.5)
WBC # FLD AUTO: 13.1 K/UL — HIGH (ref 3.8–10.5)

## 2019-08-23 ENCOUNTER — APPOINTMENT (OUTPATIENT)
Dept: HEMATOLOGY ONCOLOGY | Facility: CLINIC | Age: 84
End: 2019-08-23

## 2019-08-26 ENCOUNTER — FORM ENCOUNTER (OUTPATIENT)
Age: 84
End: 2019-08-26

## 2019-08-27 ENCOUNTER — OUTPATIENT (OUTPATIENT)
Dept: OUTPATIENT SERVICES | Facility: HOSPITAL | Age: 84
LOS: 1 days | End: 2019-08-27
Payer: MEDICARE

## 2019-08-27 ENCOUNTER — RESULT REVIEW (OUTPATIENT)
Age: 84
End: 2019-08-27

## 2019-08-27 ENCOUNTER — APPOINTMENT (OUTPATIENT)
Dept: HEMATOLOGY ONCOLOGY | Facility: CLINIC | Age: 84
End: 2019-08-27
Payer: MEDICARE

## 2019-08-27 ENCOUNTER — APPOINTMENT (OUTPATIENT)
Dept: HEMATOLOGY ONCOLOGY | Facility: CLINIC | Age: 84
End: 2019-08-27

## 2019-08-27 VITALS
HEART RATE: 88 BPM | RESPIRATION RATE: 16 BRPM | SYSTOLIC BLOOD PRESSURE: 122 MMHG | TEMPERATURE: 98.7 F | DIASTOLIC BLOOD PRESSURE: 70 MMHG | OXYGEN SATURATION: 91 %

## 2019-08-27 DIAGNOSIS — Z00.00 ENCOUNTER FOR GENERAL ADULT MEDICAL EXAMINATION WITHOUT ABNORMAL FINDINGS: ICD-10-CM

## 2019-08-27 DIAGNOSIS — R05 COUGH: ICD-10-CM

## 2019-08-27 DIAGNOSIS — Z90.710 ACQUIRED ABSENCE OF BOTH CERVIX AND UTERUS: Chronic | ICD-10-CM

## 2019-08-27 DIAGNOSIS — D75.81 MYELOFIBROSIS: ICD-10-CM

## 2019-08-27 LAB
ANISOCYTOSIS BLD QL: SIGNIFICANT CHANGE UP
BASO STIPL BLD QL SMEAR: PRESENT — SIGNIFICANT CHANGE UP
BASOPHILS # BLD AUTO: 2.4 K/UL — HIGH (ref 0–0.2)
BASOPHILS NFR BLD AUTO: 4 % — HIGH (ref 0–2)
BLASTS # FLD: 8 % — HIGH (ref 0–0)
DACRYOCYTES BLD QL SMEAR: SLIGHT — SIGNIFICANT CHANGE UP
ELLIPTOCYTES BLD QL SMEAR: SLIGHT — SIGNIFICANT CHANGE UP
EOSINOPHIL # BLD AUTO: 0.3 K/UL — SIGNIFICANT CHANGE UP (ref 0–0.5)
EOSINOPHIL NFR BLD AUTO: 0 % — SIGNIFICANT CHANGE UP (ref 0–6)
GIANT PLATELETS BLD QL SMEAR: PRESENT — SIGNIFICANT CHANGE UP
HCT VFR BLD CALC: 18.5 % — CRITICAL LOW (ref 34.5–45)
HGB BLD-MCNC: 6.5 G/DL — CRITICAL LOW (ref 11.5–15.5)
HYPOCHROMIA BLD QL: SLIGHT — SIGNIFICANT CHANGE UP
LG PLATELETS BLD QL AUTO: SLIGHT — SIGNIFICANT CHANGE UP
LYMPHOCYTES # BLD AUTO: 48 % — HIGH (ref 13–44)
LYMPHOCYTES # BLD AUTO: SIGNIFICANT CHANGE UP K/UL (ref 1–3.3)
MACROCYTES BLD QL: SLIGHT — SIGNIFICANT CHANGE UP
MCHC RBC-ENTMCNC: 34.9 G/DL — SIGNIFICANT CHANGE UP (ref 32–36)
MCHC RBC-ENTMCNC: 37.3 PG — HIGH (ref 27–34)
MCV RBC AUTO: 106.9 FL — HIGH (ref 80–100)
METAMYELOCYTES # FLD: 1 % — HIGH (ref 0–0)
MICROCYTES BLD QL: SLIGHT — SIGNIFICANT CHANGE UP
MONOCYTES # BLD AUTO: HIGH K/UL (ref 0–0.9)
MONOCYTES NFR BLD AUTO: 29 % — HIGH (ref 2–14)
NEUTROPHILS # BLD AUTO: 3.1 K/UL — SIGNIFICANT CHANGE UP (ref 1.8–7.4)
NEUTROPHILS NFR BLD AUTO: 9 % — LOW (ref 43–77)
OVALOCYTES BLD QL SMEAR: SLIGHT — SIGNIFICANT CHANGE UP
PLAT MORPH BLD: NORMAL — SIGNIFICANT CHANGE UP
PLATELET # BLD AUTO: 339 K/UL — SIGNIFICANT CHANGE UP (ref 150–400)
POIKILOCYTOSIS BLD QL AUTO: SLIGHT — SIGNIFICANT CHANGE UP
POLYCHROMASIA BLD QL SMEAR: SLIGHT — SIGNIFICANT CHANGE UP
PROMYELOCYTES # FLD: 1 % — HIGH (ref 0–0)
RBC # BLD: 1.73 M/UL — LOW (ref 3.8–5.2)
RBC # FLD: 22.8 % — HIGH (ref 10.3–14.5)
RBC BLD AUTO: ABNORMAL
ROULEAUX BLD QL SMEAR: PRESENT — SIGNIFICANT CHANGE UP
SCHISTOCYTES BLD QL AUTO: SLIGHT — SIGNIFICANT CHANGE UP
STOMATOCYTES BLD QL SMEAR: PRESENT — SIGNIFICANT CHANGE UP
TARGETS BLD QL SMEAR: SLIGHT — SIGNIFICANT CHANGE UP
WBC # BLD: 17.3 K/UL — HIGH (ref 3.8–10.5)
WBC # FLD AUTO: 17.3 K/UL — HIGH (ref 3.8–10.5)

## 2019-08-27 PROCEDURE — 99213 OFFICE O/P EST LOW 20 MIN: CPT

## 2019-08-27 PROCEDURE — 71046 X-RAY EXAM CHEST 2 VIEWS: CPT

## 2019-08-27 PROCEDURE — 71046 X-RAY EXAM CHEST 2 VIEWS: CPT | Mod: 26

## 2019-08-29 NOTE — REASON FOR VISIT
[Follow-Up Visit] : a follow-up visit for [Family Member] : family member [FreeTextEntry2] : myelofibrosis, leukemia, new cough

## 2019-08-29 NOTE — ASSESSMENT
[FreeTextEntry1] : Myelodysplasia with excess blasts in an 87 y/o WF, recently started on low dose Azacytididne. Tolerated well with PLT normal at 330. Remains anemic with HGB of 6.5 but is asymptomatic. Blast count in peripheral blood has responded well. Today presents with new productive cough, no SOB or fevers. Will not transfuse with HGB of 6.5, usually will transfuse at 6 gms or below, still is asymptomatic, but because she has a new cough, will do CXR today. Case discussed with Dr. Montes, will continue with Azacytidine but now at 6 week intervals, she would be due around 9-16-19, will follow CBC weekly, knows to call if becomes more fatigued or symptomatic.

## 2019-08-29 NOTE — HISTORY OF PRESENT ILLNESS
[de-identified] : \par Mrs. Chen is an 89 y/o WF with a history of Reji 2+ thrombocytosis, treated on Hydrea since 6/10/13. She was found to be pancytopenic and the Hydrea was held as of 7/12/17. A bone marrow was done on 8/21/17 that demonstrated a normocellular to mildly hypercellular marrow with mild megakaryocytic hyperplasia, moderate reticulin fibrosis and focal areas of increased blasts (5%). She was started on Jakafi. 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 Tolerating well thus far with PLT normalizing to 164. Anemia persists. \par  [de-identified] : Has had a productive cough x last few days no fevers , no SOB, no excessive fatigue

## 2019-08-29 NOTE — PHYSICAL EXAM
[Ambulatory and capable of all self care but unable to carry out any work activities] : Status 2- Ambulatory and capable of all self care but unable to carry out any work activities. Up and about more than 50% of waking hours [Thin] : thin [Normal] : affect appropriate [de-identified] : loose cough, scattered rhonchi

## 2019-09-03 ENCOUNTER — RESULT REVIEW (OUTPATIENT)
Age: 84
End: 2019-09-03

## 2019-09-03 ENCOUNTER — APPOINTMENT (OUTPATIENT)
Dept: HEMATOLOGY ONCOLOGY | Facility: CLINIC | Age: 84
End: 2019-09-03
Payer: MEDICARE

## 2019-09-03 VITALS
HEART RATE: 81 BPM | SYSTOLIC BLOOD PRESSURE: 147 MMHG | TEMPERATURE: 97.9 F | DIASTOLIC BLOOD PRESSURE: 78 MMHG | HEIGHT: 63 IN | BODY MASS INDEX: 21.26 KG/M2 | OXYGEN SATURATION: 97 % | WEIGHT: 120 LBS

## 2019-09-03 LAB
ANISOCYTOSIS BLD QL: SIGNIFICANT CHANGE UP
BASO STIPL BLD QL SMEAR: PRESENT — SIGNIFICANT CHANGE UP
BASOPHILS # BLD AUTO: SIGNIFICANT CHANGE UP (ref 0–0.2)
BASOPHILS NFR BLD AUTO: 3 % — HIGH (ref 0–2)
BIZARRE PLATELETS BLD QL SMEAR: PRESENT — SIGNIFICANT CHANGE UP
BLASTS # FLD: 24 % — HIGH (ref 0–0)
DACRYOCYTES BLD QL SMEAR: SLIGHT — SIGNIFICANT CHANGE UP
DOHLE BOD BLD QL SMEAR: PRESENT — SIGNIFICANT CHANGE UP
EOSINOPHIL # BLD AUTO: 0.2 K/UL — SIGNIFICANT CHANGE UP (ref 0–0.5)
GIANT PLATELETS BLD QL SMEAR: PRESENT — SIGNIFICANT CHANGE UP
HCT VFR BLD CALC: 20.2 % — CRITICAL LOW (ref 34.5–45)
HGB BLD-MCNC: 6.7 G/DL — CRITICAL LOW (ref 11.5–15.5)
HYPOCHROMIA BLD QL: SIGNIFICANT CHANGE UP
LG PLATELETS BLD QL AUTO: SLIGHT — SIGNIFICANT CHANGE UP
LYMPHOCYTES # BLD AUTO: 18 % — SIGNIFICANT CHANGE UP (ref 13–44)
LYMPHOCYTES # BLD AUTO: 4.4 K/UL — HIGH (ref 1–3.3)
LYMPHOCYTES # SPEC AUTO: 9 % — HIGH (ref 0–0)
MACROCYTES BLD QL: SIGNIFICANT CHANGE UP
MCHC RBC-ENTMCNC: 33.1 G/DL — SIGNIFICANT CHANGE UP (ref 32–36)
MCHC RBC-ENTMCNC: 35.7 PG — HIGH (ref 27–34)
MCV RBC AUTO: 108.1 FL — HIGH (ref 80–100)
METAMYELOCYTES # FLD: 4 % — HIGH (ref 0–0)
MICROCYTES BLD QL: SLIGHT — SIGNIFICANT CHANGE UP
MONOCYTES # BLD AUTO: SIGNIFICANT CHANGE UP (ref 0–0.9)
MONOCYTES NFR BLD AUTO: 13 % — SIGNIFICANT CHANGE UP (ref 2–14)
MYELOCYTES NFR BLD: 6 % — HIGH (ref 0–0)
NEUTROPHILS # BLD AUTO: 5.6 K/UL — SIGNIFICANT CHANGE UP (ref 1.8–7.4)
NEUTROPHILS NFR BLD AUTO: 19 % — LOW (ref 43–77)
NEUTS BAND # BLD: 4 % — SIGNIFICANT CHANGE UP (ref 0–8)
NRBC # BLD: 14 /100 — HIGH (ref 0–0)
OVALOCYTES BLD QL SMEAR: SLIGHT — SIGNIFICANT CHANGE UP
PLAT MORPH BLD: ABNORMAL
PLATELET # BLD AUTO: 498 K/UL — HIGH (ref 150–400)
POIKILOCYTOSIS BLD QL AUTO: SLIGHT — SIGNIFICANT CHANGE UP
POLYCHROMASIA BLD QL SMEAR: SLIGHT — SIGNIFICANT CHANGE UP
RBC # BLD: 1.87 M/UL — LOW (ref 3.8–5.2)
RBC # FLD: 23.3 % — HIGH (ref 10.3–14.5)
RBC BLD AUTO: SIGNIFICANT CHANGE UP
ROULEAUX BLD QL SMEAR: PRESENT — SIGNIFICANT CHANGE UP
SCHISTOCYTES BLD QL AUTO: SLIGHT — SIGNIFICANT CHANGE UP
SMUDGE CELLS # BLD: PRESENT — SIGNIFICANT CHANGE UP
SPHEROCYTES BLD QL SMEAR: SLIGHT — SIGNIFICANT CHANGE UP
STOMATOCYTES BLD QL SMEAR: PRESENT — SIGNIFICANT CHANGE UP
WBC # BLD: 23.4 K/UL — HIGH (ref 3.8–10.5)
WBC # FLD AUTO: 23.4 K/UL — HIGH (ref 3.8–10.5)

## 2019-09-03 PROCEDURE — 99213 OFFICE O/P EST LOW 20 MIN: CPT

## 2019-09-03 NOTE — HISTORY OF PRESENT ILLNESS
[de-identified] : 89 yo WF with excess blasts, on Jakafi and azacytadine [de-identified] : Started on levaquin on 8-30 -19 for cough, CXR shows inflammation vs infection, Persistent mass. She still has cough, no fevers, feels well.

## 2019-09-03 NOTE — ASSESSMENT
[FreeTextEntry1] : Myelodysplasia with excess blasts in an 89 y/o WF, recently started on low dose Azacytididne, and continues on Jakafi. Platelet count has improved, Blast count has decreased. recently placed on levaquin for a possible pulmonary infection. still has cough, no fevers., , HGB stable at 6.7, since asymptomatic, will not transfuse. RTO in 1 week.

## 2019-09-03 NOTE — PHYSICAL EXAM
[Ambulatory and capable of all self care but unable to carry out any work activities] : Status 2- Ambulatory and capable of all self care but unable to carry out any work activities. Up and about more than 50% of waking hours [Thin] : thin [Normal] : affect appropriate [de-identified] : rhonchi left lung field

## 2019-09-06 ENCOUNTER — OUTPATIENT (OUTPATIENT)
Dept: OUTPATIENT SERVICES | Facility: HOSPITAL | Age: 84
LOS: 1 days | Discharge: HOME | End: 2019-09-06

## 2019-09-06 DIAGNOSIS — Z90.710 ACQUIRED ABSENCE OF BOTH CERVIX AND UTERUS: Chronic | ICD-10-CM

## 2019-09-06 DIAGNOSIS — D75.81 MYELOFIBROSIS: ICD-10-CM

## 2019-09-06 DIAGNOSIS — D46.9 MYELODYSPLASTIC SYNDROME, UNSPECIFIED: ICD-10-CM

## 2019-09-10 ENCOUNTER — RESULT REVIEW (OUTPATIENT)
Age: 84
End: 2019-09-10

## 2019-09-10 ENCOUNTER — APPOINTMENT (OUTPATIENT)
Dept: HEMATOLOGY ONCOLOGY | Facility: CLINIC | Age: 84
End: 2019-09-10
Payer: MEDICARE

## 2019-09-10 VITALS
HEIGHT: 63 IN | WEIGHT: 120 LBS | HEART RATE: 93 BPM | OXYGEN SATURATION: 95 % | DIASTOLIC BLOOD PRESSURE: 61 MMHG | BODY MASS INDEX: 21.26 KG/M2 | TEMPERATURE: 98.5 F | SYSTOLIC BLOOD PRESSURE: 117 MMHG

## 2019-09-10 LAB
ANISOCYTOSIS BLD QL: SIGNIFICANT CHANGE UP
BASO STIPL BLD QL SMEAR: PRESENT — SIGNIFICANT CHANGE UP
BASOPHILS # BLD AUTO: SIGNIFICANT CHANGE UP (ref 0–0.2)
BIZARRE PLATELETS BLD QL SMEAR: PRESENT — SIGNIFICANT CHANGE UP
BLASTS # FLD: 12 % — HIGH (ref 0–0)
DOHLE BOD BLD QL SMEAR: PRESENT — SIGNIFICANT CHANGE UP
EOSINOPHIL # BLD AUTO: 0.2 K/UL — SIGNIFICANT CHANGE UP (ref 0–0.5)
EOSINOPHIL NFR BLD AUTO: 2 % — SIGNIFICANT CHANGE UP (ref 0–6)
GIANT PLATELETS BLD QL SMEAR: PRESENT — SIGNIFICANT CHANGE UP
HCT VFR BLD CALC: 20 % — CRITICAL LOW (ref 34.5–45)
HGB BLD-MCNC: 7.1 G/DL — LOW (ref 11.5–15.5)
HYPOCHROMIA BLD QL: SLIGHT — SIGNIFICANT CHANGE UP
LG PLATELETS BLD QL AUTO: SLIGHT — SIGNIFICANT CHANGE UP
LYMPHOCYTES # BLD AUTO: 31 % — SIGNIFICANT CHANGE UP (ref 13–44)
LYMPHOCYTES # BLD AUTO: 4.1 K/UL — HIGH (ref 1–3.3)
LYMPHOCYTES # SPEC AUTO: 6 % — HIGH (ref 0–0)
MACROCYTES BLD QL: SIGNIFICANT CHANGE UP
MCHC RBC-ENTMCNC: 35.5 G/DL — SIGNIFICANT CHANGE UP (ref 32–36)
MCHC RBC-ENTMCNC: 38.2 PG — HIGH (ref 27–34)
MCV RBC AUTO: 107.5 FL — HIGH (ref 80–100)
METAMYELOCYTES # FLD: 5 % — HIGH (ref 0–0)
MICROCYTES BLD QL: SLIGHT — SIGNIFICANT CHANGE UP
MONOCYTES # BLD AUTO: SIGNIFICANT CHANGE UP (ref 0–0.9)
MONOCYTES NFR BLD AUTO: 13 % — SIGNIFICANT CHANGE UP (ref 2–14)
MYELOCYTES NFR BLD: 4 % — HIGH (ref 0–0)
NEUTROPHILS # BLD AUTO: 4.5 K/UL — SIGNIFICANT CHANGE UP (ref 1.8–7.4)
NEUTROPHILS NFR BLD AUTO: 23 % — LOW (ref 43–77)
NEUTS BAND # BLD: 4 % — SIGNIFICANT CHANGE UP (ref 0–8)
NRBC # BLD: 11 /100 — HIGH (ref 0–0)
PLAT MORPH BLD: NORMAL — SIGNIFICANT CHANGE UP
PLATELET # BLD AUTO: 402 K/UL — HIGH (ref 150–400)
POIKILOCYTOSIS BLD QL AUTO: SLIGHT — SIGNIFICANT CHANGE UP
POLYCHROMASIA BLD QL SMEAR: SIGNIFICANT CHANGE UP
RBC # BLD: 1.86 M/UL — LOW (ref 3.8–5.2)
RBC # FLD: 22.4 % — HIGH (ref 10.3–14.5)
RBC BLD AUTO: ABNORMAL
ROULEAUX BLD QL SMEAR: PRESENT — SIGNIFICANT CHANGE UP
SCHISTOCYTES BLD QL AUTO: SLIGHT — SIGNIFICANT CHANGE UP
SMUDGE CELLS # BLD: PRESENT — SIGNIFICANT CHANGE UP
SPHEROCYTES BLD QL SMEAR: SLIGHT — SIGNIFICANT CHANGE UP
TOXIC GRANULES BLD QL SMEAR: PRESENT — SIGNIFICANT CHANGE UP
WBC # BLD: 20.3 K/UL — HIGH (ref 3.8–10.5)
WBC # FLD AUTO: 20.3 K/UL — HIGH (ref 3.8–10.5)

## 2019-09-10 PROCEDURE — 99213 OFFICE O/P EST LOW 20 MIN: CPT

## 2019-09-11 RX ORDER — LEVOFLOXACIN 500 MG/1
500 TABLET, FILM COATED ORAL DAILY
Qty: 7 | Refills: 0 | Status: DISCONTINUED | COMMUNITY
Start: 2019-08-28 | End: 2019-09-11

## 2019-09-11 NOTE — ASSESSMENT
[FreeTextEntry1] : Myelodysplasia with excess blasts in an 89 y/o WF, recently started on low dose Azacytididne\par S/P 3rd cycle of Azacytidine. Recent pulmonary infection, completed 7 days of levaquin, feeling better. 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 Told to call if gets worse. As per Dr. Montes, we are continuing with Azacytidine, now every 6 weeks, IShe is due for treatment next week, will follow counts.

## 2019-09-11 NOTE — PHYSICAL EXAM
[Restricted in physically strenuous activity but ambulatory and able to carry out work of a light or sedentary nature] : Status 1- Restricted in physically strenuous activity but ambulatory and able to carry out work of a light or sedentary nature, e.g., light house work, office work [Thin] : thin [Normal] : affect appropriate [de-identified] : minimal swelling of left ankle, no erythema or loss of funstion or tenderness, no evidence of trauma

## 2019-09-11 NOTE — HISTORY OF PRESENT ILLNESS
[de-identified] : 89 yo WF with excess blasts, on Jakafi and azacytadine.  [de-identified] : Recently placed on Levaquin for a pulmonary infection, feeling better. had noticed swelling of her left ankle over the past weekend, this is now imoproved, no pain associated with swelling.No fevers.

## 2019-09-11 NOTE — REASON FOR VISIT
[Follow-Up Visit] : a follow-up visit for [Family Member] : family member [FreeTextEntry2] : MDS excess blasts

## 2019-09-16 ENCOUNTER — RESULT REVIEW (OUTPATIENT)
Age: 84
End: 2019-09-16

## 2019-09-16 ENCOUNTER — APPOINTMENT (OUTPATIENT)
Age: 84
End: 2019-09-16

## 2019-09-16 LAB
ANISOCYTOSIS BLD QL: SLIGHT — SIGNIFICANT CHANGE UP
BASO STIPL BLD QL SMEAR: PRESENT — SIGNIFICANT CHANGE UP
BASOPHILS # BLD AUTO: 2.1 K/UL — HIGH (ref 0–0.2)
BLASTS # FLD: 11 % — HIGH (ref 0–0)
EOSINOPHIL # BLD AUTO: 0.5 K/UL — SIGNIFICANT CHANGE UP (ref 0–0.5)
EOSINOPHIL NFR BLD AUTO: 4 % — SIGNIFICANT CHANGE UP (ref 0–6)
HCT VFR BLD CALC: 21 % — CRITICAL LOW (ref 34.5–45)
HGB BLD-MCNC: 6.9 G/DL — CRITICAL LOW (ref 11.5–15.5)
HYPOCHROMIA BLD QL: SLIGHT — SIGNIFICANT CHANGE UP
LG PLATELETS BLD QL AUTO: SLIGHT — SIGNIFICANT CHANGE UP
LYMPHOCYTES # BLD AUTO: 18 % — SIGNIFICANT CHANGE UP (ref 13–44)
LYMPHOCYTES # BLD AUTO: 4.5 K/UL — HIGH (ref 1–3.3)
LYMPHOCYTES # SPEC AUTO: 4 % — HIGH (ref 0–0)
MACROCYTES BLD QL: SIGNIFICANT CHANGE UP
MCHC RBC-ENTMCNC: 33.1 G/DL — SIGNIFICANT CHANGE UP (ref 32–36)
MCHC RBC-ENTMCNC: 36.8 PG — HIGH (ref 27–34)
MCV RBC AUTO: 111.4 FL — HIGH (ref 80–100)
MICROCYTES BLD QL: SLIGHT — SIGNIFICANT CHANGE UP
MONOCYTES # BLD AUTO: 12.1 K/UL — HIGH (ref 0–0.9)
MONOCYTES NFR BLD AUTO: 44 % — HIGH (ref 2–14)
NEUTROPHILS # BLD AUTO: 5.9 K/UL — SIGNIFICANT CHANGE UP (ref 1.8–7.4)
NEUTROPHILS NFR BLD AUTO: 17 % — LOW (ref 43–77)
NEUTS BAND # BLD: 1 % — SIGNIFICANT CHANGE UP (ref 0–8)
PLAT MORPH BLD: NORMAL — SIGNIFICANT CHANGE UP
PLATELET # BLD AUTO: 362 K/UL — SIGNIFICANT CHANGE UP (ref 150–400)
POIKILOCYTOSIS BLD QL AUTO: SLIGHT — SIGNIFICANT CHANGE UP
POLYCHROMASIA BLD QL SMEAR: SIGNIFICANT CHANGE UP
RBC # BLD: 1.88 M/UL — LOW (ref 3.8–5.2)
RBC # FLD: 22.4 % — HIGH (ref 10.3–14.5)
RBC BLD AUTO: ABNORMAL
ROULEAUX BLD QL SMEAR: PRESENT — SIGNIFICANT CHANGE UP
SCHISTOCYTES BLD QL AUTO: SLIGHT — SIGNIFICANT CHANGE UP
SMUDGE CELLS # BLD: PRESENT — SIGNIFICANT CHANGE UP
SPHEROCYTES BLD QL SMEAR: SLIGHT — SIGNIFICANT CHANGE UP
STOMATOCYTES BLD QL SMEAR: PRESENT — SIGNIFICANT CHANGE UP
TOXIC GRANULES BLD QL SMEAR: PRESENT — SIGNIFICANT CHANGE UP
VARIANT LYMPHS # BLD: 1 % — SIGNIFICANT CHANGE UP (ref 0–6)
WBC # BLD: 25.1 K/UL — HIGH (ref 3.8–10.5)
WBC # FLD AUTO: 25.1 K/UL — HIGH (ref 3.8–10.5)

## 2019-09-17 ENCOUNTER — APPOINTMENT (OUTPATIENT)
Age: 84
End: 2019-09-17

## 2019-09-17 DIAGNOSIS — Z51.11 ENCOUNTER FOR ANTINEOPLASTIC CHEMOTHERAPY: ICD-10-CM

## 2019-09-17 DIAGNOSIS — R11.2 NAUSEA WITH VOMITING, UNSPECIFIED: ICD-10-CM

## 2019-09-17 LAB
ALBUMIN SERPL ELPH-MCNC: 4.3 G/DL
ALP BLD-CCNC: 56 U/L
ALT SERPL-CCNC: 12 U/L
ANION GAP SERPL CALC-SCNC: 13 MMOL/L
AST SERPL-CCNC: 24 U/L
BILIRUB SERPL-MCNC: 0.5 MG/DL
BUN SERPL-MCNC: 29 MG/DL
CALCIUM SERPL-MCNC: 9.5 MG/DL
CHLORIDE SERPL-SCNC: 101 MMOL/L
CO2 SERPL-SCNC: 21 MMOL/L
CREAT SERPL-MCNC: 1.3 MG/DL
GLUCOSE SERPL-MCNC: 96 MG/DL
POTASSIUM SERPL-SCNC: 4.9 MMOL/L
PROT SERPL-MCNC: 8.2 G/DL
SODIUM SERPL-SCNC: 135 MMOL/L

## 2019-09-18 ENCOUNTER — APPOINTMENT (OUTPATIENT)
Age: 84
End: 2019-09-18

## 2019-09-19 ENCOUNTER — RESULT REVIEW (OUTPATIENT)
Age: 84
End: 2019-09-19

## 2019-09-19 ENCOUNTER — APPOINTMENT (OUTPATIENT)
Age: 84
End: 2019-09-19

## 2019-09-19 LAB
ANISOCYTOSIS BLD QL: SLIGHT — SIGNIFICANT CHANGE UP
BASOPHILS # BLD AUTO: 1.9 K/UL — HIGH (ref 0–0.2)
BASOPHILS NFR BLD AUTO: 9 % — HIGH (ref 0–2)
BIZARRE PLATELETS BLD QL SMEAR: PRESENT — SIGNIFICANT CHANGE UP
BLASTS # FLD: 7 % — HIGH (ref 0–0)
ELLIPTOCYTES BLD QL SMEAR: SLIGHT — SIGNIFICANT CHANGE UP
EOSINOPHIL # BLD AUTO: 0.4 K/UL — SIGNIFICANT CHANGE UP (ref 0–0.5)
EOSINOPHIL NFR BLD AUTO: 1.7 % — SIGNIFICANT CHANGE UP (ref 0–6)
GIANT PLATELETS BLD QL SMEAR: PRESENT — SIGNIFICANT CHANGE UP
HCT VFR BLD CALC: 17.2 % — CRITICAL LOW (ref 34.5–45)
HGB BLD-MCNC: 6.1 G/DL — CRITICAL LOW (ref 11.5–15.5)
HYPOCHROMIA BLD QL: SLIGHT — SIGNIFICANT CHANGE UP
LG PLATELETS BLD QL AUTO: SLIGHT — SIGNIFICANT CHANGE UP
LYMPHOCYTES # BLD AUTO: 20 % — SIGNIFICANT CHANGE UP (ref 13–44)
LYMPHOCYTES # BLD AUTO: 4.3 K/UL — HIGH (ref 1–3.3)
MACROCYTES BLD QL: SIGNIFICANT CHANGE UP
MCHC RBC-ENTMCNC: 35.4 G/DL — SIGNIFICANT CHANGE UP (ref 32–36)
MCHC RBC-ENTMCNC: 38.5 PG — HIGH (ref 27–34)
MCV RBC AUTO: 108.9 FL — HIGH (ref 80–100)
METAMYELOCYTES # FLD: 3 % — HIGH (ref 0–0)
MICROCYTES BLD QL: SLIGHT — SIGNIFICANT CHANGE UP
MONOCYTES # BLD AUTO: 10.1 K/UL — HIGH (ref 0–0.9)
MONOCYTES NFR BLD AUTO: 47.2 % — HIGH (ref 2–14)
MYELOCYTES NFR BLD: 2 % — HIGH (ref 0–0)
NEUTROPHILS # BLD AUTO: 4.7 K/UL — SIGNIFICANT CHANGE UP (ref 1.8–7.4)
NEUTROPHILS NFR BLD AUTO: 22 % — LOW (ref 43–77)
NRBC # BLD: 1 /100 — HIGH (ref 0–0)
OVALOCYTES BLD QL SMEAR: SLIGHT — SIGNIFICANT CHANGE UP
PLAT MORPH BLD: NORMAL — SIGNIFICANT CHANGE UP
PLATELET # BLD AUTO: 26 K/UL — LOW (ref 150–400)
POIKILOCYTOSIS BLD QL AUTO: SLIGHT — SIGNIFICANT CHANGE UP
POLYCHROMASIA BLD QL SMEAR: SIGNIFICANT CHANGE UP
PROMYELOCYTES # FLD: 1 % — HIGH (ref 0–0)
RBC # BLD: 1.58 M/UL — LOW (ref 3.8–5.2)
RBC # FLD: 22.6 % — HIGH (ref 10.3–14.5)
RBC BLD AUTO: ABNORMAL
SCHISTOCYTES BLD QL AUTO: SLIGHT — SIGNIFICANT CHANGE UP
SMUDGE CELLS # BLD: PRESENT — SIGNIFICANT CHANGE UP
SPHEROCYTES BLD QL SMEAR: SLIGHT — SIGNIFICANT CHANGE UP
STOMATOCYTES BLD QL SMEAR: PRESENT — SIGNIFICANT CHANGE UP
TOXIC GRANULES BLD QL SMEAR: PRESENT — SIGNIFICANT CHANGE UP
VARIANT LYMPHS # BLD: 1 % — SIGNIFICANT CHANGE UP (ref 0–6)
WBC # BLD: 21.4 K/UL — HIGH (ref 3.8–10.5)
WBC # FLD AUTO: 21.4 K/UL — HIGH (ref 3.8–10.5)

## 2019-09-20 ENCOUNTER — APPOINTMENT (OUTPATIENT)
Age: 84
End: 2019-09-20

## 2019-09-24 ENCOUNTER — RESULT REVIEW (OUTPATIENT)
Age: 84
End: 2019-09-24

## 2019-09-24 ENCOUNTER — APPOINTMENT (OUTPATIENT)
Dept: HEMATOLOGY ONCOLOGY | Facility: CLINIC | Age: 84
End: 2019-09-24

## 2019-09-24 ENCOUNTER — OUTPATIENT (OUTPATIENT)
Dept: OUTPATIENT SERVICES | Facility: HOSPITAL | Age: 84
LOS: 1 days | End: 2019-09-24
Payer: MEDICARE

## 2019-09-24 DIAGNOSIS — D46.9 MYELODYSPLASTIC SYNDROME, UNSPECIFIED: ICD-10-CM

## 2019-09-24 DIAGNOSIS — Z90.710 ACQUIRED ABSENCE OF BOTH CERVIX AND UTERUS: Chronic | ICD-10-CM

## 2019-09-24 LAB
AGGLUTINATION: PRESENT — SIGNIFICANT CHANGE UP
ANISOCYTOSIS BLD QL: SLIGHT — SIGNIFICANT CHANGE UP
BASO STIPL BLD QL SMEAR: PRESENT — SIGNIFICANT CHANGE UP
BASOPHILS # BLD AUTO: SIGNIFICANT CHANGE UP (ref 0–0.2)
BASOPHILS NFR BLD AUTO: 3 % — HIGH (ref 0–2)
BIZARRE PLATELETS BLD QL SMEAR: PRESENT — SIGNIFICANT CHANGE UP
BLASTS # FLD: 7 % — HIGH (ref 0–0)
BLD GP AB SCN SERPL QL: SIGNIFICANT CHANGE UP
DACRYOCYTES BLD QL SMEAR: SLIGHT — SIGNIFICANT CHANGE UP
DOHLE BOD BLD QL SMEAR: PRESENT — SIGNIFICANT CHANGE UP
ELLIPTOCYTES BLD QL SMEAR: SLIGHT — SIGNIFICANT CHANGE UP
EOSINOPHIL # BLD AUTO: 0.4 K/UL — SIGNIFICANT CHANGE UP (ref 0–0.5)
EOSINOPHIL NFR BLD AUTO: 2 % — SIGNIFICANT CHANGE UP (ref 0–6)
GIANT PLATELETS BLD QL SMEAR: PRESENT — SIGNIFICANT CHANGE UP
HCT VFR BLD CALC: 16.8 % — CRITICAL LOW (ref 34.5–45)
HGB BLD-MCNC: 5.8 G/DL — CRITICAL LOW (ref 11.5–15.5)
HYPOCHROMIA BLD QL: SLIGHT — SIGNIFICANT CHANGE UP
LG PLATELETS BLD QL AUTO: SLIGHT — SIGNIFICANT CHANGE UP
LYMPHOCYTES # BLD AUTO: 13 % — SIGNIFICANT CHANGE UP (ref 13–44)
LYMPHOCYTES # BLD AUTO: 3.7 K/UL — HIGH (ref 1–3.3)
LYMPHOCYTES # SPEC AUTO: 11 % — HIGH (ref 0–0)
MACROCYTES BLD QL: SIGNIFICANT CHANGE UP
MCHC RBC-ENTMCNC: 34.5 G/DL — SIGNIFICANT CHANGE UP (ref 32–36)
MCHC RBC-ENTMCNC: 37.8 PG — HIGH (ref 27–34)
MCV RBC AUTO: 109.5 FL — HIGH (ref 80–100)
METAMYELOCYTES # FLD: 1 % — HIGH (ref 0–0)
MICROCYTES BLD QL: SLIGHT — SIGNIFICANT CHANGE UP
MONOCYTES # BLD AUTO: SIGNIFICANT CHANGE UP (ref 0–0.9)
MONOCYTES NFR BLD AUTO: 20 % — HIGH (ref 2–14)
MYELOCYTES NFR BLD: 10 % — HIGH (ref 0–0)
NEUTROPHILS # BLD AUTO: 5.4 K/UL — SIGNIFICANT CHANGE UP (ref 1.8–7.4)
NEUTROPHILS NFR BLD AUTO: 32 % — LOW (ref 43–77)
NRBC # BLD: 17 /100 — HIGH (ref 0–0)
OVALOCYTES BLD QL SMEAR: SLIGHT — SIGNIFICANT CHANGE UP
PLAT MORPH BLD: ABNORMAL
PLATELET # BLD AUTO: 355 K/UL — SIGNIFICANT CHANGE UP (ref 150–400)
POIKILOCYTOSIS BLD QL AUTO: SIGNIFICANT CHANGE UP
POLYCHROMASIA BLD QL SMEAR: SIGNIFICANT CHANGE UP
PROMYELOCYTES # FLD: 1 % — HIGH (ref 0–0)
RBC # BLD: 1.53 M/UL — LOW (ref 3.8–5.2)
RBC # FLD: 23.7 % — HIGH (ref 10.3–14.5)
RBC BLD AUTO: ABNORMAL
SCHISTOCYTES BLD QL AUTO: SLIGHT — SIGNIFICANT CHANGE UP
SMUDGE CELLS # BLD: PRESENT — SIGNIFICANT CHANGE UP
SPHEROCYTES BLD QL SMEAR: SLIGHT — SIGNIFICANT CHANGE UP
STOMATOCYTES BLD QL SMEAR: PRESENT — SIGNIFICANT CHANGE UP
TOXIC GRANULES BLD QL SMEAR: PRESENT — SIGNIFICANT CHANGE UP
WBC # BLD: 23.2 K/UL — HIGH (ref 3.8–10.5)
WBC # FLD AUTO: 23.2 K/UL — HIGH (ref 3.8–10.5)

## 2019-09-25 ENCOUNTER — APPOINTMENT (OUTPATIENT)
Age: 84
End: 2019-09-25

## 2019-09-25 PROCEDURE — 86922 COMPATIBILITY TEST ANTIGLOB: CPT

## 2019-09-25 PROCEDURE — P9040: CPT

## 2019-09-25 PROCEDURE — 86850 RBC ANTIBODY SCREEN: CPT

## 2019-09-25 PROCEDURE — 36415 COLL VENOUS BLD VENIPUNCTURE: CPT

## 2019-09-25 PROCEDURE — 86900 BLOOD TYPING SEROLOGIC ABO: CPT

## 2019-09-25 PROCEDURE — 86901 BLOOD TYPING SEROLOGIC RH(D): CPT

## 2019-09-26 ENCOUNTER — APPOINTMENT (OUTPATIENT)
Dept: RHEUMATOLOGY | Facility: CLINIC | Age: 84
End: 2019-09-26
Payer: MEDICARE

## 2019-09-26 VITALS
TEMPERATURE: 98.5 F | HEART RATE: 87 BPM | WEIGHT: 120 LBS | OXYGEN SATURATION: 90 % | DIASTOLIC BLOOD PRESSURE: 66 MMHG | BODY MASS INDEX: 21.26 KG/M2 | SYSTOLIC BLOOD PRESSURE: 118 MMHG | HEIGHT: 63 IN | RESPIRATION RATE: 17 BRPM

## 2019-09-26 DIAGNOSIS — Z51.89 ENCOUNTER FOR OTHER SPECIFIED AFTERCARE: ICD-10-CM

## 2019-09-26 PROCEDURE — 99214 OFFICE O/P EST MOD 30 MIN: CPT

## 2019-09-26 RX ORDER — DENOSUMAB 60 MG/ML
60 INJECTION SUBCUTANEOUS
Qty: 1 | Refills: 0 | Status: DISCONTINUED | COMMUNITY
Start: 2018-05-22 | End: 2019-09-26

## 2019-09-26 NOTE — REVIEW OF SYSTEMS
[Feeling Tired] : feeling tired [Limb Weakness] : limb weakness [As Noted in HPI] : as noted in HPI [Negative] : Heme/Lymph

## 2019-09-26 NOTE — ASSESSMENT
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

## 2019-09-26 NOTE — PHYSICAL EXAM
[General Appearance - Alert] : alert [General Appearance - In No Acute Distress] : in no acute distress [Sclera] : the sclera and conjunctiva were normal [Outer Ear] : the ears and nose were normal in appearance [Oropharynx] : the oropharynx was normal [Neck Appearance] : the appearance of the neck was normal [Neck Cervical Mass (___cm)] : no neck mass was observed [Thyroid Diffuse Enlargement] : the thyroid was not enlarged [Jugular Venous Distention Increased] : there was no jugular-venous distention [Thyroid Nodule] : there were no palpable thyroid nodules [Auscultation Breath Sounds / Voice Sounds] : lungs were clear to auscultation bilaterally [Heart Rate And Rhythm] : heart rate was normal and rhythm regular [Heart Sounds Gallop] : no gallops [Heart Sounds] : normal S1 and S2 [Murmurs] : no murmurs [Heart Sounds Pericardial Friction Rub] : no pericardial rub [Bowel Sounds] : normal bowel sounds [Abdomen Soft] : soft [Abdomen Tenderness] : non-tender [Abdomen Mass (___ Cm)] : no abdominal mass palpated [Cervical Lymph Nodes Enlarged Posterior Bilaterally] : posterior cervical [Cervical Lymph Nodes Enlarged Anterior Bilaterally] : anterior cervical [Supraclavicular Lymph Nodes Enlarged Bilaterally] : supraclavicular [No Spinal Tenderness] : no spinal tenderness [Skin Color & Pigmentation] : normal skin color and pigmentation [] : no rash [Skin Turgor] : normal skin turgor [Oriented To Time, Place, And Person] : oriented to person, place, and time [Affect] : the affect was normal [Impaired Insight] : insight and judgment were intact [FreeTextEntry1] : strength: 4-/5 in B/L proximal UE's and LE's;  5/5 distally

## 2019-09-26 NOTE — HISTORY OF PRESENT ILLNESS
[Fatigue] : fatigue [Difficulty Standing] : difficulty standing [Difficulty Walking] : difficulty walking [Muscle Weakness] : muscle weakness [Malar Facial Rash] : no malar facial rash [Skin Lesions] : no lesions [Skin Nodules] : no skin nodules [Oral Ulcers] : no oral ulcers [Cough] : no cough [Dry Mouth] : no dry mouth [Dysphagia] : no dysphagia [Shortness of Breath] : no shortness of breath [Chest Pain] : no chest pain [Arthralgias] : no arthralgias [Joint Swelling] : no joint swelling [Joint Warmth] : no joint warmth [Joint Deformity] : no joint deformity [Decreased ROM] : no decreased range of motion [Morning Stiffness] : no morning stiffness [Dyspnea] : no dyspnea [Myalgias] : no myalgias [Muscle Spasms] : no muscle spasms [Muscle Cramping] : no muscle cramping [Visual Changes] : no visual changes [Eye Pain] : no eye pain [Eye Redness] : no eye redness [Dry Eyes] : no dry eyes [FreeTextEntry1] : Continued to feel fine overall off prednisone.  She has been started on azacytadine by heme.  Only current complaint is painless swelling in her left ankle.

## 2019-10-01 ENCOUNTER — RESULT REVIEW (OUTPATIENT)
Age: 84
End: 2019-10-01

## 2019-10-01 ENCOUNTER — APPOINTMENT (OUTPATIENT)
Dept: HEMATOLOGY ONCOLOGY | Facility: CLINIC | Age: 84
End: 2019-10-01

## 2019-10-01 LAB
ANISOCYTOSIS BLD QL: SLIGHT — SIGNIFICANT CHANGE UP
AUER BODIES BLD QL SMEAR: PRESENT — SIGNIFICANT CHANGE UP
BASO STIPL BLD QL SMEAR: PRESENT — SIGNIFICANT CHANGE UP
BASOPHILS # BLD AUTO: SIGNIFICANT CHANGE UP (ref 0–0.2)
BASOPHILS NFR BLD AUTO: 1 % — SIGNIFICANT CHANGE UP (ref 0–2)
BLASTS # FLD: 21 % — HIGH (ref 0–0)
ELLIPTOCYTES BLD QL SMEAR: SLIGHT — SIGNIFICANT CHANGE UP
EOSINOPHIL # BLD AUTO: 0.4 K/UL — SIGNIFICANT CHANGE UP (ref 0–0.5)
EOSINOPHIL NFR BLD AUTO: 2 % — SIGNIFICANT CHANGE UP (ref 0–6)
GIANT PLATELETS BLD QL SMEAR: PRESENT — SIGNIFICANT CHANGE UP
HCT VFR BLD CALC: 21.6 % — LOW (ref 34.5–45)
HGB BLD-MCNC: 7 G/DL — CRITICAL LOW (ref 11.5–15.5)
LG PLATELETS BLD QL AUTO: SLIGHT — SIGNIFICANT CHANGE UP
LYMPHOCYTES # BLD AUTO: 12 % — LOW (ref 13–44)
LYMPHOCYTES # BLD AUTO: 4.3 K/UL — HIGH (ref 1–3.3)
LYMPHOCYTES # SPEC AUTO: 21 % — HIGH (ref 0–0)
MACROCYTES BLD QL: SIGNIFICANT CHANGE UP
MCHC RBC-ENTMCNC: 32.6 G/DL — SIGNIFICANT CHANGE UP (ref 32–36)
MCHC RBC-ENTMCNC: 35.8 PG — HIGH (ref 27–34)
MCV RBC AUTO: 109.9 FL — HIGH (ref 80–100)
METAMYELOCYTES # FLD: 3 % — HIGH (ref 0–0)
MICROCYTES BLD QL: SLIGHT — SIGNIFICANT CHANGE UP
MONOCYTES # BLD AUTO: SIGNIFICANT CHANGE UP (ref 0–0.9)
MONOCYTES NFR BLD AUTO: 17 % — HIGH (ref 2–14)
MYELOCYTES NFR BLD: 7 % — HIGH (ref 0–0)
NEUTROPHILS # BLD AUTO: 6.3 K/UL — SIGNIFICANT CHANGE UP (ref 1.8–7.4)
NEUTROPHILS NFR BLD AUTO: 11 % — LOW (ref 43–77)
NEUTS BAND # BLD: 4 % — SIGNIFICANT CHANGE UP (ref 0–8)
NRBC # BLD: 7 /100 — HIGH (ref 0–0)
PLAT MORPH BLD: ABNORMAL
PLATELET # BLD AUTO: 282 K/UL — SIGNIFICANT CHANGE UP (ref 150–400)
POIKILOCYTOSIS BLD QL AUTO: SIGNIFICANT CHANGE UP
POLYCHROMASIA BLD QL SMEAR: SLIGHT — SIGNIFICANT CHANGE UP
PROMYELOCYTES # FLD: 1 % — HIGH (ref 0–0)
RBC # BLD: 1.97 M/UL — LOW (ref 3.8–5.2)
RBC # FLD: 22.3 % — HIGH (ref 10.3–14.5)
RBC BLD AUTO: ABNORMAL
ROULEAUX BLD QL SMEAR: PRESENT — SIGNIFICANT CHANGE UP
SMUDGE CELLS # BLD: PRESENT — SIGNIFICANT CHANGE UP
WBC # BLD: 32.1 K/UL — HIGH (ref 3.8–10.5)
WBC # FLD AUTO: 32.1 K/UL — HIGH (ref 3.8–10.5)

## 2019-10-04 ENCOUNTER — OUTPATIENT (OUTPATIENT)
Dept: OUTPATIENT SERVICES | Facility: HOSPITAL | Age: 84
LOS: 1 days | Discharge: HOME | End: 2019-10-04

## 2019-10-04 DIAGNOSIS — Z90.710 ACQUIRED ABSENCE OF BOTH CERVIX AND UTERUS: Chronic | ICD-10-CM

## 2019-10-04 DIAGNOSIS — D46.9 MYELODYSPLASTIC SYNDROME, UNSPECIFIED: ICD-10-CM

## 2019-10-04 DIAGNOSIS — D75.81 MYELOFIBROSIS: ICD-10-CM

## 2019-10-08 ENCOUNTER — APPOINTMENT (OUTPATIENT)
Dept: HEMATOLOGY ONCOLOGY | Facility: CLINIC | Age: 84
End: 2019-10-08

## 2019-10-08 ENCOUNTER — RESULT REVIEW (OUTPATIENT)
Age: 84
End: 2019-10-08

## 2019-10-08 LAB
ANISOCYTOSIS BLD QL: SLIGHT — SIGNIFICANT CHANGE UP
BASO STIPL BLD QL SMEAR: PRESENT — SIGNIFICANT CHANGE UP
BASOPHILS # BLD AUTO: SIGNIFICANT CHANGE UP (ref 0–0.2)
BIZARRE PLATELETS BLD QL SMEAR: PRESENT — SIGNIFICANT CHANGE UP
BLASTS # FLD: 8 % — HIGH (ref 0–0)
ELLIPTOCYTES BLD QL SMEAR: SLIGHT — SIGNIFICANT CHANGE UP
EOSINOPHIL # BLD AUTO: 0.4 K/UL — SIGNIFICANT CHANGE UP (ref 0–0.5)
GIANT PLATELETS BLD QL SMEAR: PRESENT — SIGNIFICANT CHANGE UP
HCT VFR BLD CALC: 21.3 % — LOW (ref 34.5–45)
HGB BLD-MCNC: 7.1 G/DL — LOW (ref 11.5–15.5)
HYPOCHROMIA BLD QL: SLIGHT — SIGNIFICANT CHANGE UP
LG PLATELETS BLD QL AUTO: SLIGHT — SIGNIFICANT CHANGE UP
LYMPHOCYTES # BLD AUTO: 5.9 K/UL — HIGH (ref 1–3.3)
LYMPHOCYTES # BLD AUTO: 7 % — LOW (ref 13–44)
LYMPHOCYTES # SPEC AUTO: 19 % — HIGH (ref 0–0)
MACROCYTES BLD QL: SLIGHT — SIGNIFICANT CHANGE UP
MCHC RBC-ENTMCNC: 33.4 G/DL — SIGNIFICANT CHANGE UP (ref 32–36)
MCHC RBC-ENTMCNC: 36.6 PG — HIGH (ref 27–34)
MCV RBC AUTO: 109.5 FL — HIGH (ref 80–100)
METAMYELOCYTES # FLD: 5 % — HIGH (ref 0–0)
MONOCYTES # BLD AUTO: SIGNIFICANT CHANGE UP (ref 0–0.9)
MONOCYTES NFR BLD AUTO: 29 % — HIGH (ref 2–14)
MYELOCYTES NFR BLD: 6 % — HIGH (ref 0–0)
NEUTROPHILS # BLD AUTO: 9.2 K/UL — HIGH (ref 1.8–7.4)
NEUTROPHILS NFR BLD AUTO: 21 % — LOW (ref 43–77)
NEUTS BAND # BLD: 2 % — SIGNIFICANT CHANGE UP (ref 0–8)
NRBC # BLD: 14 /100 — HIGH (ref 0–0)
PLAT MORPH BLD: ABNORMAL
PLATELET # BLD AUTO: 251 K/UL — SIGNIFICANT CHANGE UP (ref 150–400)
POIKILOCYTOSIS BLD QL AUTO: SLIGHT — SIGNIFICANT CHANGE UP
POLYCHROMASIA BLD QL SMEAR: SLIGHT — SIGNIFICANT CHANGE UP
PROMYELOCYTES # FLD: 3 % — HIGH (ref 0–0)
RBC # BLD: 1.94 M/UL — LOW (ref 3.8–5.2)
RBC # FLD: 22.1 % — HIGH (ref 10.3–14.5)
RBC BLD AUTO: ABNORMAL
ROULEAUX BLD QL SMEAR: PRESENT — SIGNIFICANT CHANGE UP
SMUDGE CELLS # BLD: PRESENT — SIGNIFICANT CHANGE UP
WBC # BLD: 43.6 K/UL — CRITICAL HIGH (ref 3.8–10.5)
WBC # FLD AUTO: 43.6 K/UL — CRITICAL HIGH (ref 3.8–10.5)

## 2019-10-17 ENCOUNTER — OUTPATIENT (OUTPATIENT)
Dept: OUTPATIENT SERVICES | Facility: HOSPITAL | Age: 84
LOS: 1 days | End: 2019-10-17
Payer: MEDICARE

## 2019-10-17 ENCOUNTER — RESULT REVIEW (OUTPATIENT)
Age: 84
End: 2019-10-17

## 2019-10-17 ENCOUNTER — APPOINTMENT (OUTPATIENT)
Dept: HEMATOLOGY ONCOLOGY | Facility: CLINIC | Age: 84
End: 2019-10-17
Payer: MEDICARE

## 2019-10-17 VITALS
DIASTOLIC BLOOD PRESSURE: 76 MMHG | WEIGHT: 120 LBS | HEIGHT: 63 IN | HEART RATE: 94 BPM | SYSTOLIC BLOOD PRESSURE: 144 MMHG | BODY MASS INDEX: 21.26 KG/M2 | OXYGEN SATURATION: 91 %

## 2019-10-17 DIAGNOSIS — D46.9 MYELODYSPLASTIC SYNDROME, UNSPECIFIED: ICD-10-CM

## 2019-10-17 DIAGNOSIS — Z90.710 ACQUIRED ABSENCE OF BOTH CERVIX AND UTERUS: Chronic | ICD-10-CM

## 2019-10-17 DIAGNOSIS — D47.3 ESSENTIAL (HEMORRHAGIC) THROMBOCYTHEMIA: ICD-10-CM

## 2019-10-17 LAB
ANISOCYTOSIS BLD QL: SLIGHT — SIGNIFICANT CHANGE UP
BASO STIPL BLD QL SMEAR: PRESENT — SIGNIFICANT CHANGE UP
BASOPHILS # BLD AUTO: SIGNIFICANT CHANGE UP (ref 0–0.2)
BASOPHILS NFR BLD AUTO: 1 % — SIGNIFICANT CHANGE UP (ref 0–2)
BIZARRE PLATELETS BLD QL SMEAR: PRESENT — SIGNIFICANT CHANGE UP
BLASTS # FLD: 13 % — HIGH (ref 0–0)
BLD GP AB SCN SERPL QL: SIGNIFICANT CHANGE UP
ELLIPTOCYTES BLD QL SMEAR: SLIGHT — SIGNIFICANT CHANGE UP
EOSINOPHIL # BLD AUTO: 0.7 K/UL — HIGH (ref 0–0.5)
EOSINOPHIL NFR BLD AUTO: 3 % — SIGNIFICANT CHANGE UP (ref 0–6)
GIANT PLATELETS BLD QL SMEAR: PRESENT — SIGNIFICANT CHANGE UP
HCT VFR BLD CALC: 19.8 % — CRITICAL LOW (ref 34.5–45)
HGB BLD-MCNC: 6.6 G/DL — CRITICAL LOW (ref 11.5–15.5)
HYPOCHROMIA BLD QL: SLIGHT — SIGNIFICANT CHANGE UP
HYPOGRANULAR PLT: PRESENT — SIGNIFICANT CHANGE UP
LG PLATELETS BLD QL AUTO: SLIGHT — SIGNIFICANT CHANGE UP
LYMPHOCYTES # BLD AUTO: 10 K/UL — HIGH (ref 1–3.3)
LYMPHOCYTES # BLD AUTO: 32 % — SIGNIFICANT CHANGE UP (ref 13–44)
LYMPHOCYTES # SPEC AUTO: 3 % — HIGH (ref 0–0)
MACROCYTES BLD QL: SIGNIFICANT CHANGE UP
MCHC RBC-ENTMCNC: 33.3 G/DL — SIGNIFICANT CHANGE UP (ref 32–36)
MCHC RBC-ENTMCNC: 35.8 PG — HIGH (ref 27–34)
MCV RBC AUTO: 107.6 FL — HIGH (ref 80–100)
METAMYELOCYTES # FLD: 1 % — HIGH (ref 0–0)
MICROCYTES BLD QL: SLIGHT — SIGNIFICANT CHANGE UP
MONOCYTES # BLD AUTO: SIGNIFICANT CHANGE UP (ref 0–0.9)
MONOCYTES NFR BLD AUTO: 16 % — HIGH (ref 2–14)
MYELOCYTES NFR BLD: 7 % — HIGH (ref 0–0)
NEUTROPHILS # BLD AUTO: 12.6 K/UL — HIGH (ref 1.8–7.4)
NEUTROPHILS NFR BLD AUTO: 21 % — LOW (ref 43–77)
NEUTS BAND # BLD: 3 % — SIGNIFICANT CHANGE UP (ref 0–8)
NRBC # BLD: 1 /100 — HIGH (ref 0–0)
PLAT MORPH BLD: ABNORMAL
PLATELET # BLD AUTO: 64 K/UL — LOW (ref 150–400)
PLATELET # BLD MANUAL: 571 K/UL — SIGNIFICANT CHANGE UP (ref 150–400)
POIKILOCYTOSIS BLD QL AUTO: SLIGHT — SIGNIFICANT CHANGE UP
POLYCHROMASIA BLD QL SMEAR: SIGNIFICANT CHANGE UP
RBC # BLD: 1.84 M/UL — LOW (ref 3.8–5.2)
RBC # FLD: 20.8 % — HIGH (ref 10.3–14.5)
RBC BLD AUTO: ABNORMAL
SMUDGE CELLS # BLD: PRESENT — SIGNIFICANT CHANGE UP
SPHEROCYTES BLD QL SMEAR: SLIGHT — SIGNIFICANT CHANGE UP
WBC # BLD: 49.9 K/UL — CRITICAL HIGH (ref 3.8–10.5)
WBC # FLD AUTO: 49.9 K/UL — CRITICAL HIGH (ref 3.8–10.5)

## 2019-10-17 PROCEDURE — 99213 OFFICE O/P EST LOW 20 MIN: CPT

## 2019-10-18 ENCOUNTER — APPOINTMENT (OUTPATIENT)
Age: 84
End: 2019-10-18

## 2019-10-18 PROCEDURE — P9016: CPT

## 2019-10-18 PROCEDURE — 36415 COLL VENOUS BLD VENIPUNCTURE: CPT

## 2019-10-18 PROCEDURE — 86850 RBC ANTIBODY SCREEN: CPT

## 2019-10-18 PROCEDURE — 86922 COMPATIBILITY TEST ANTIGLOB: CPT

## 2019-10-18 PROCEDURE — 86901 BLOOD TYPING SEROLOGIC RH(D): CPT

## 2019-10-18 PROCEDURE — 86900 BLOOD TYPING SEROLOGIC ABO: CPT

## 2019-10-20 PROBLEM — D47.3 ESSENTIAL THROMBOCYTOSIS: Status: ACTIVE | Noted: 2017-09-05

## 2019-10-20 NOTE — ADDENDUM
[FreeTextEntry1] : I, Elver Childs, solely acted as scribe for Dr. Oliver Montes on 10/17/2019.\par

## 2019-10-20 NOTE — ASSESSMENT
[FreeTextEntry1] : Myelodysplasia with excess blasts in an 89 y/o WF, on low dose Azacytididne\par Today's hemoglobin 6.6, WBC 49.9\par \par Plan:\par -Transfusion scheduled\par -Next treatment Nov. 4th\par

## 2019-10-20 NOTE — HISTORY OF PRESENT ILLNESS
[de-identified] : Mrs. Chen is an 88 y/o WF with a history of Reji 2+ thrombocytosis, treated on Hydrea since 6/10/13. She was found to be pancytopenic and the Hydrea was held as of 7/12/17. A bone marrow was done on 8/21/17 that demonstrated a normocellular to mildly hypercellular marrow with mild megakaryocytic hyperplasia, moderate reticulin fibrosis and focal areas of increased blasts (5%). She was started on Jakafi. 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 Tolerating well thus far with PLT normalizing to 164. Anemia persists.  [de-identified] : Reports fatigue today.\par WBC 49.9\par Hemoglobin 6.6\par

## 2019-10-20 NOTE — PHYSICAL EXAM
[Restricted in physically strenuous activity but ambulatory and able to carry out work of a light or sedentary nature] : Status 1- Restricted in physically strenuous activity but ambulatory and able to carry out work of a light or sedentary nature, e.g., light house work, office work [Thin] : thin [Normal] : full range of motion and no deformities appreciated [de-identified] : Pale

## 2019-10-23 DIAGNOSIS — Z51.89 ENCOUNTER FOR OTHER SPECIFIED AFTERCARE: ICD-10-CM

## 2019-10-23 DIAGNOSIS — R11.2 NAUSEA WITH VOMITING, UNSPECIFIED: ICD-10-CM

## 2019-10-29 ENCOUNTER — RESULT REVIEW (OUTPATIENT)
Age: 84
End: 2019-10-29

## 2019-10-29 ENCOUNTER — APPOINTMENT (OUTPATIENT)
Dept: HEMATOLOGY ONCOLOGY | Facility: CLINIC | Age: 84
End: 2019-10-29

## 2019-10-29 LAB
ANISOCYTOSIS BLD QL: SLIGHT — SIGNIFICANT CHANGE UP
AUER BODIES BLD QL SMEAR: PRESENT — SIGNIFICANT CHANGE UP
BASO STIPL BLD QL SMEAR: PRESENT — SIGNIFICANT CHANGE UP
BASOPHILS # BLD AUTO: SIGNIFICANT CHANGE UP (ref 0–0.2)
BASOPHILS NFR BLD AUTO: 0 % — HIGH (ref 0–2)
BLASTS # FLD: 22 % — HIGH (ref 0–0)
DACRYOCYTES BLD QL SMEAR: SLIGHT — SIGNIFICANT CHANGE UP
ELLIPTOCYTES BLD QL SMEAR: SLIGHT — SIGNIFICANT CHANGE UP
EOSINOPHIL # BLD AUTO: 1 K/UL — HIGH (ref 0–0.5)
EOSINOPHIL NFR BLD AUTO: 1 % — SIGNIFICANT CHANGE UP (ref 0–6)
GIANT PLATELETS BLD QL SMEAR: PRESENT — SIGNIFICANT CHANGE UP
HCT VFR BLD CALC: 23.7 % — LOW (ref 34.5–45)
HGB BLD-MCNC: 7.8 G/DL — LOW (ref 11.5–15.5)
HYPOCHROMIA BLD QL: SLIGHT — SIGNIFICANT CHANGE UP
LG PLATELETS BLD QL AUTO: SLIGHT — SIGNIFICANT CHANGE UP
LYMPHOCYTES # BLD AUTO: 13 % — SIGNIFICANT CHANGE UP (ref 13–44)
LYMPHOCYTES # BLD AUTO: 6 K/UL — HIGH (ref 1–3.3)
LYMPHOCYTES # SPEC AUTO: 3 % — HIGH (ref 0–0)
MACROCYTES BLD QL: SLIGHT — SIGNIFICANT CHANGE UP
MCHC RBC-ENTMCNC: 32.8 G/DL — SIGNIFICANT CHANGE UP (ref 32–36)
MCHC RBC-ENTMCNC: 33.9 PG — SIGNIFICANT CHANGE UP (ref 27–34)
MCV RBC AUTO: 103.5 FL — HIGH (ref 80–100)
METAMYELOCYTES # FLD: 5 % — HIGH (ref 0–0)
MICROCYTES BLD QL: SLIGHT — SIGNIFICANT CHANGE UP
MONOCYTES # BLD AUTO: SIGNIFICANT CHANGE UP (ref 0–0.9)
MONOCYTES NFR BLD AUTO: 34 % — HIGH (ref 2–14)
MYELOCYTES NFR BLD: 6 % — HIGH (ref 0–0)
NEUTROPHILS # BLD AUTO: 11.7 K/UL — HIGH (ref 1.8–7.4)
NEUTROPHILS NFR BLD AUTO: 13 % — LOW (ref 43–77)
NEUTS BAND # BLD: 2 % — SIGNIFICANT CHANGE UP (ref 0–8)
NRBC # BLD: 2 /100 — HIGH (ref 0–0)
PLAT MORPH BLD: ABNORMAL
PLATELET # BLD AUTO: 180 K/UL — SIGNIFICANT CHANGE UP (ref 150–400)
POIKILOCYTOSIS BLD QL AUTO: SLIGHT — SIGNIFICANT CHANGE UP
POLYCHROMASIA BLD QL SMEAR: SIGNIFICANT CHANGE UP
PROMYELOCYTES # FLD: 1 % — HIGH (ref 0–0)
RBC # BLD: 2.29 M/UL — LOW (ref 3.8–5.2)
RBC # FLD: 20.8 % — HIGH (ref 10.3–14.5)
RBC BLD AUTO: ABNORMAL
SMUDGE CELLS # BLD: PRESENT — SIGNIFICANT CHANGE UP
SPHEROCYTES BLD QL SMEAR: SLIGHT — SIGNIFICANT CHANGE UP
WBC # BLD: 51.4 K/UL — CRITICAL HIGH (ref 3.8–10.5)
WBC # FLD AUTO: 51.4 K/UL — CRITICAL HIGH (ref 3.8–10.5)

## 2019-10-31 ENCOUNTER — OUTPATIENT (OUTPATIENT)
Dept: OUTPATIENT SERVICES | Facility: HOSPITAL | Age: 84
LOS: 1 days | Discharge: HOME | End: 2019-10-31

## 2019-10-31 DIAGNOSIS — D75.81 MYELOFIBROSIS: ICD-10-CM

## 2019-10-31 DIAGNOSIS — Z90.710 ACQUIRED ABSENCE OF BOTH CERVIX AND UTERUS: Chronic | ICD-10-CM

## 2019-10-31 DIAGNOSIS — D46.9 MYELODYSPLASTIC SYNDROME, UNSPECIFIED: ICD-10-CM

## 2019-11-04 ENCOUNTER — RESULT REVIEW (OUTPATIENT)
Age: 84
End: 2019-11-04

## 2019-11-04 ENCOUNTER — APPOINTMENT (OUTPATIENT)
Age: 84
End: 2019-11-04

## 2019-11-04 LAB
ANISOCYTOSIS BLD QL: SLIGHT — SIGNIFICANT CHANGE UP
AUER BODIES BLD QL SMEAR: PRESENT — SIGNIFICANT CHANGE UP
BASO STIPL BLD QL SMEAR: PRESENT — SIGNIFICANT CHANGE UP
BASOPHILS # BLD AUTO: SIGNIFICANT CHANGE UP (ref 0–0.2)
BASOPHILS NFR BLD AUTO: 3 % — HIGH (ref 0–2)
BLASTS # FLD: 20 % — HIGH (ref 0–0)
DACRYOCYTES BLD QL SMEAR: SLIGHT — SIGNIFICANT CHANGE UP
ELLIPTOCYTES BLD QL SMEAR: SLIGHT — SIGNIFICANT CHANGE UP
EOSINOPHIL # BLD AUTO: 1.5 K/UL — HIGH (ref 0–0.5)
EOSINOPHIL NFR BLD AUTO: 4 % — SIGNIFICANT CHANGE UP (ref 0–6)
GIANT PLATELETS BLD QL SMEAR: PRESENT — SIGNIFICANT CHANGE UP
HCT VFR BLD CALC: 20.7 % — CRITICAL LOW (ref 34.5–45)
HGB BLD-MCNC: 7.3 G/DL — LOW (ref 11.5–15.5)
HYPOCHROMIA BLD QL: SLIGHT — SIGNIFICANT CHANGE UP
LG PLATELETS BLD QL AUTO: SLIGHT — SIGNIFICANT CHANGE UP
LYMPHOCYTES # BLD AUTO: 6.8 K/UL — HIGH (ref 1–3.3)
LYMPHOCYTES # BLD AUTO: 7 % — LOW (ref 13–44)
LYMPHOCYTES # SPEC AUTO: 9 % — HIGH (ref 0–0)
MACROCYTES BLD QL: SIGNIFICANT CHANGE UP
MCHC RBC-ENTMCNC: 35.4 G/DL — SIGNIFICANT CHANGE UP (ref 32–36)
MCHC RBC-ENTMCNC: 36.5 PG — HIGH (ref 27–34)
MCV RBC AUTO: 103.2 FL — HIGH (ref 80–100)
METAMYELOCYTES # FLD: 5 % — HIGH (ref 0–0)
MICROCYTES BLD QL: SLIGHT — SIGNIFICANT CHANGE UP
MONOCYTES # BLD AUTO: SIGNIFICANT CHANGE UP (ref 0–0.9)
MONOCYTES NFR BLD AUTO: 31 % — HIGH (ref 2–14)
MYELOCYTES NFR BLD: 10 % — HIGH (ref 0–0)
NEUTROPHILS # BLD AUTO: 10.3 K/UL — HIGH (ref 1.8–7.4)
NEUTROPHILS NFR BLD AUTO: 10 % — LOW (ref 43–77)
NEUTS BAND # BLD: 1 % — SIGNIFICANT CHANGE UP (ref 0–8)
NRBC # BLD: 4 /100 — HIGH (ref 0–0)
PLAT MORPH BLD: ABNORMAL
PLATELET # BLD AUTO: 208 K/UL — SIGNIFICANT CHANGE UP (ref 150–400)
POIKILOCYTOSIS BLD QL AUTO: SLIGHT — SIGNIFICANT CHANGE UP
POLYCHROMASIA BLD QL SMEAR: SIGNIFICANT CHANGE UP
RBC # BLD: 2.01 M/UL — LOW (ref 3.8–5.2)
RBC # FLD: 21.6 % — HIGH (ref 10.3–14.5)
RBC BLD AUTO: ABNORMAL
ROULEAUX BLD QL SMEAR: PRESENT — SIGNIFICANT CHANGE UP
SMUDGE CELLS # BLD: PRESENT — SIGNIFICANT CHANGE UP
WBC # BLD: 54.5 K/UL — CRITICAL HIGH (ref 3.8–10.5)
WBC # FLD AUTO: 54.5 K/UL — CRITICAL HIGH (ref 3.8–10.5)

## 2019-11-05 ENCOUNTER — APPOINTMENT (OUTPATIENT)
Age: 84
End: 2019-11-05

## 2019-11-05 DIAGNOSIS — Z51.11 ENCOUNTER FOR ANTINEOPLASTIC CHEMOTHERAPY: ICD-10-CM

## 2019-11-05 DIAGNOSIS — R11.2 NAUSEA WITH VOMITING, UNSPECIFIED: ICD-10-CM

## 2019-11-06 ENCOUNTER — APPOINTMENT (OUTPATIENT)
Age: 84
End: 2019-11-06

## 2019-11-07 ENCOUNTER — APPOINTMENT (OUTPATIENT)
Age: 84
End: 2019-11-07

## 2019-11-07 ENCOUNTER — RESULT REVIEW (OUTPATIENT)
Age: 84
End: 2019-11-07

## 2019-11-07 LAB
ANISOCYTOSIS BLD QL: SLIGHT — SIGNIFICANT CHANGE UP
AUER BODIES BLD QL SMEAR: PRESENT — SIGNIFICANT CHANGE UP
BASO STIPL BLD QL SMEAR: PRESENT — SIGNIFICANT CHANGE UP
BASOPHILS # BLD AUTO: SIGNIFICANT CHANGE UP (ref 0–0.2)
BIZARRE CELLS [PRESENCE] IN BLOOD BY LIGHT MICROSCOPY: SLIGHT — SIGNIFICANT CHANGE UP
BIZARRE PLATELETS BLD QL SMEAR: PRESENT — SIGNIFICANT CHANGE UP
BLASTS # FLD: 11 % — HIGH (ref 0–0)
DACRYOCYTES BLD QL SMEAR: SLIGHT — SIGNIFICANT CHANGE UP
EOSINOPHIL # BLD AUTO: 0.7 K/UL — HIGH (ref 0–0.5)
GIANT PLATELETS BLD QL SMEAR: PRESENT — SIGNIFICANT CHANGE UP
HCT VFR BLD CALC: 20 % — CRITICAL LOW (ref 34.5–45)
HGB BLD-MCNC: 6.6 G/DL — CRITICAL LOW (ref 11.5–15.5)
HYPOCHROMIA BLD QL: SLIGHT — SIGNIFICANT CHANGE UP
LG PLATELETS BLD QL AUTO: SLIGHT — SIGNIFICANT CHANGE UP
LYMPHOCYTES # BLD AUTO: 29 % — SIGNIFICANT CHANGE UP (ref 13–44)
LYMPHOCYTES # BLD AUTO: 9.2 K/UL — HIGH (ref 1–3.3)
LYMPHOCYTES # SPEC AUTO: 6 % — HIGH (ref 0–0)
MACROCYTES BLD QL: SIGNIFICANT CHANGE UP
MCHC RBC-ENTMCNC: 32.8 G/DL — SIGNIFICANT CHANGE UP (ref 32–36)
MCHC RBC-ENTMCNC: 34.4 PG — HIGH (ref 27–34)
MCV RBC AUTO: 104.9 FL — HIGH (ref 80–100)
MICROCYTES BLD QL: SLIGHT — SIGNIFICANT CHANGE UP
MONOCYTES # BLD AUTO: SIGNIFICANT CHANGE UP (ref 0–0.9)
MONOCYTES NFR BLD AUTO: 33 % — HIGH (ref 2–14)
MYELOCYTES NFR BLD: 4 % — HIGH (ref 0–0)
NEUTROPHILS # BLD AUTO: 9.4 K/UL — HIGH (ref 1.8–7.4)
NEUTROPHILS NFR BLD AUTO: 17 % — LOW (ref 43–77)
PLAT MORPH BLD: ABNORMAL
PLATELET # BLD AUTO: 320 K/UL — SIGNIFICANT CHANGE UP (ref 150–400)
POIKILOCYTOSIS BLD QL AUTO: SLIGHT — SIGNIFICANT CHANGE UP
POLYCHROMASIA BLD QL SMEAR: SIGNIFICANT CHANGE UP
RBC # BLD: 1.91 M/UL — LOW (ref 3.8–5.2)
RBC # FLD: 21 % — HIGH (ref 10.3–14.5)
RBC BLD AUTO: ABNORMAL
ROULEAUX BLD QL SMEAR: PRESENT — SIGNIFICANT CHANGE UP
SMUDGE CELLS # BLD: PRESENT — SIGNIFICANT CHANGE UP
WBC # BLD: 56.4 K/UL — CRITICAL HIGH (ref 3.8–10.5)
WBC # FLD AUTO: 56.4 K/UL — CRITICAL HIGH (ref 3.8–10.5)

## 2019-11-08 ENCOUNTER — RESULT REVIEW (OUTPATIENT)
Age: 84
End: 2019-11-08

## 2019-11-08 ENCOUNTER — APPOINTMENT (OUTPATIENT)
Age: 84
End: 2019-11-08

## 2019-11-08 ENCOUNTER — APPOINTMENT (OUTPATIENT)
Dept: VASCULAR SURGERY | Facility: CLINIC | Age: 84
End: 2019-11-08

## 2019-11-08 ENCOUNTER — OUTPATIENT (OUTPATIENT)
Dept: OUTPATIENT SERVICES | Facility: HOSPITAL | Age: 84
LOS: 1 days | End: 2019-11-08
Payer: MEDICARE

## 2019-11-08 DIAGNOSIS — D47.3 ESSENTIAL (HEMORRHAGIC) THROMBOCYTHEMIA: ICD-10-CM

## 2019-11-08 DIAGNOSIS — Z90.710 ACQUIRED ABSENCE OF BOTH CERVIX AND UTERUS: Chronic | ICD-10-CM

## 2019-11-08 LAB
ANISOCYTOSIS BLD QL: SLIGHT — SIGNIFICANT CHANGE UP
AUER BODIES BLD QL SMEAR: PRESENT — SIGNIFICANT CHANGE UP
BASO STIPL BLD QL SMEAR: PRESENT — SIGNIFICANT CHANGE UP
BASOPHILS # BLD AUTO: 6.9 K/UL — HIGH (ref 0–0.2)
BLASTS # FLD: 15 % — HIGH (ref 0–0)
BLD GP AB SCN SERPL QL: SIGNIFICANT CHANGE UP
DACRYOCYTES BLD QL SMEAR: SLIGHT — SIGNIFICANT CHANGE UP
EOSINOPHIL # BLD AUTO: 0.6 K/UL — HIGH (ref 0–0.5)
EOSINOPHIL NFR BLD AUTO: 1 % — SIGNIFICANT CHANGE UP (ref 0–6)
GIANT PLATELETS BLD QL SMEAR: PRESENT — SIGNIFICANT CHANGE UP
HCT VFR BLD CALC: 20.6 % — CRITICAL LOW (ref 34.5–45)
HGB BLD-MCNC: 6.8 G/DL — CRITICAL LOW (ref 11.5–15.5)
HYPOCHROMIA BLD QL: SIGNIFICANT CHANGE UP
LG PLATELETS BLD QL AUTO: SLIGHT — SIGNIFICANT CHANGE UP
LYMPHOCYTES # BLD AUTO: 16 % — SIGNIFICANT CHANGE UP (ref 13–44)
LYMPHOCYTES # BLD AUTO: 6.5 K/UL — HIGH (ref 1–3.3)
LYMPHOCYTES # SPEC AUTO: 9 % — HIGH (ref 0–0)
MACROCYTES BLD QL: SIGNIFICANT CHANGE UP
MCHC RBC-ENTMCNC: 32.9 G/DL — SIGNIFICANT CHANGE UP (ref 32–36)
MCHC RBC-ENTMCNC: 35.2 PG — HIGH (ref 27–34)
MCV RBC AUTO: 107 FL — HIGH (ref 80–100)
METAMYELOCYTES # FLD: 3 % — HIGH (ref 0–0)
MICROCYTES BLD QL: SLIGHT — SIGNIFICANT CHANGE UP
MONOCYTES # BLD AUTO: 31.1 K/UL — HIGH (ref 0–0.9)
MONOCYTES NFR BLD AUTO: 34 % — HIGH (ref 2–14)
MYELOCYTES NFR BLD: 6 % — HIGH (ref 0–0)
NEUTROPHILS # BLD AUTO: 9.5 K/UL — HIGH (ref 1.8–7.4)
NEUTROPHILS NFR BLD AUTO: 13 % — LOW (ref 43–77)
NEUTS BAND # BLD: 3 % — SIGNIFICANT CHANGE UP (ref 0–8)
NRBC # BLD: 12 /100 — HIGH (ref 0–0)
PLAT MORPH BLD: ABNORMAL
PLATELET # BLD AUTO: 372 K/UL — SIGNIFICANT CHANGE UP (ref 150–400)
POIKILOCYTOSIS BLD QL AUTO: SLIGHT — SIGNIFICANT CHANGE UP
POLYCHROMASIA BLD QL SMEAR: SLIGHT — SIGNIFICANT CHANGE UP
RBC # BLD: 1.93 M/UL — LOW (ref 3.8–5.2)
RBC # FLD: 22 % — HIGH (ref 10.3–14.5)
RBC BLD AUTO: ABNORMAL
ROULEAUX BLD QL SMEAR: PRESENT — SIGNIFICANT CHANGE UP
SMUDGE CELLS # BLD: PRESENT — SIGNIFICANT CHANGE UP
WBC # BLD: 54.7 K/UL — CRITICAL HIGH (ref 3.8–10.5)
WBC # FLD AUTO: 54.7 K/UL — CRITICAL HIGH (ref 3.8–10.5)

## 2019-11-08 PROCEDURE — 86922 COMPATIBILITY TEST ANTIGLOB: CPT

## 2019-11-08 PROCEDURE — 86901 BLOOD TYPING SEROLOGIC RH(D): CPT

## 2019-11-08 PROCEDURE — 86900 BLOOD TYPING SEROLOGIC ABO: CPT

## 2019-11-08 PROCEDURE — 86850 RBC ANTIBODY SCREEN: CPT

## 2019-11-08 PROCEDURE — 36415 COLL VENOUS BLD VENIPUNCTURE: CPT

## 2019-11-10 LAB
ALBUMIN SERPL ELPH-MCNC: 4.1 G/DL
ALP BLD-CCNC: 80 U/L
ALT SERPL-CCNC: 19 U/L
ANION GAP SERPL CALC-SCNC: 17 MMOL/L
AST SERPL-CCNC: 30 U/L
BILIRUB SERPL-MCNC: 0.4 MG/DL
BUN SERPL-MCNC: 34 MG/DL
CALCIUM SERPL-MCNC: 9.4 MG/DL
CHLORIDE SERPL-SCNC: 101 MMOL/L
CO2 SERPL-SCNC: 18 MMOL/L
CREAT SERPL-MCNC: 1.48 MG/DL
GLUCOSE SERPL-MCNC: 100 MG/DL
POTASSIUM SERPL-SCNC: 4.7 MMOL/L
PROT SERPL-MCNC: 8.3 G/DL
SODIUM SERPL-SCNC: 136 MMOL/L

## 2019-11-11 ENCOUNTER — APPOINTMENT (OUTPATIENT)
Age: 84
End: 2019-11-11

## 2019-11-13 DIAGNOSIS — Z51.89 ENCOUNTER FOR OTHER SPECIFIED AFTERCARE: ICD-10-CM

## 2019-11-18 ENCOUNTER — RESULT REVIEW (OUTPATIENT)
Age: 84
End: 2019-11-18

## 2019-11-18 ENCOUNTER — APPOINTMENT (OUTPATIENT)
Dept: HEMATOLOGY ONCOLOGY | Facility: CLINIC | Age: 84
End: 2019-11-18

## 2019-11-18 LAB
AGGLUTINATION: PRESENT — SIGNIFICANT CHANGE UP
ANISOCYTOSIS BLD QL: SLIGHT — SIGNIFICANT CHANGE UP
AUER BODIES BLD QL SMEAR: PRESENT — SIGNIFICANT CHANGE UP
BASO STIPL BLD QL SMEAR: SIGNIFICANT CHANGE UP
BASOPHILS # BLD AUTO: 7.4 K/UL — HIGH (ref 0–0.2)
BLASTS # FLD: 20 % — HIGH (ref 0–0)
DACRYOCYTES BLD QL SMEAR: SLIGHT — SIGNIFICANT CHANGE UP
ELLIPTOCYTES BLD QL SMEAR: SIGNIFICANT CHANGE UP
EOSINOPHIL # BLD AUTO: 0.9 K/UL — HIGH (ref 0–0.5)
EOSINOPHIL NFR BLD AUTO: 1 % — SIGNIFICANT CHANGE UP (ref 0–6)
GIANT PLATELETS BLD QL SMEAR: PRESENT — SIGNIFICANT CHANGE UP
HCT VFR BLD CALC: 21.5 % — LOW (ref 34.5–45)
HGB BLD-MCNC: 7.1 G/DL — LOW (ref 11.5–15.5)
HYPOCHROMIA BLD QL: SIGNIFICANT CHANGE UP
LG PLATELETS BLD QL AUTO: SLIGHT — SIGNIFICANT CHANGE UP
LYMPHOCYTES # BLD AUTO: 7 % — LOW (ref 13–44)
LYMPHOCYTES # BLD AUTO: 9.6 K/UL — HIGH (ref 1–3.3)
LYMPHOCYTES # SPEC AUTO: 12 % — HIGH (ref 0–0)
MACROCYTES BLD QL: SIGNIFICANT CHANGE UP
MCHC RBC-ENTMCNC: 32.8 G/DL — SIGNIFICANT CHANGE UP (ref 32–36)
MCHC RBC-ENTMCNC: 33 PG — SIGNIFICANT CHANGE UP (ref 27–34)
MCV RBC AUTO: 100.8 FL — HIGH (ref 80–100)
METAMYELOCYTES # FLD: 2 % — HIGH (ref 0–0)
MICROCYTES BLD QL: SLIGHT — SIGNIFICANT CHANGE UP
MONOCYTES # BLD AUTO: SIGNIFICANT CHANGE UP (ref 0–0.9)
MONOCYTES NFR BLD AUTO: 30 % — HIGH (ref 2–14)
MYELOCYTES NFR BLD: 11 % — HIGH (ref 0–0)
NEUTROPHILS # BLD AUTO: 12.1 K/UL — HIGH (ref 1.8–7.4)
NEUTROPHILS NFR BLD AUTO: 15 % — LOW (ref 43–77)
NEUTS BAND # BLD: 2 % — SIGNIFICANT CHANGE UP (ref 0–8)
NRBC # BLD: 1 /100 — HIGH (ref 0–0)
PLAT MORPH BLD: ABNORMAL
PLATELET # BLD AUTO: 256 K/UL — SIGNIFICANT CHANGE UP (ref 150–400)
POIKILOCYTOSIS BLD QL AUTO: SLIGHT — SIGNIFICANT CHANGE UP
POLYCHROMASIA BLD QL SMEAR: SLIGHT — SIGNIFICANT CHANGE UP
RBC # BLD: 2.14 M/UL — LOW (ref 3.8–5.2)
RBC # FLD: 22.4 % — HIGH (ref 10.3–14.5)
RBC BLD AUTO: ABNORMAL
SMUDGE CELLS # BLD: PRESENT — SIGNIFICANT CHANGE UP
WBC # BLD: 56.2 K/UL — CRITICAL HIGH (ref 3.8–10.5)
WBC # FLD AUTO: 56.2 K/UL — CRITICAL HIGH (ref 3.8–10.5)

## 2019-11-21 ENCOUNTER — APPOINTMENT (OUTPATIENT)
Dept: RHEUMATOLOGY | Facility: CLINIC | Age: 84
End: 2019-11-21
Payer: MEDICARE

## 2019-11-21 VITALS
HEART RATE: 102 BPM | SYSTOLIC BLOOD PRESSURE: 130 MMHG | TEMPERATURE: 98.6 F | RESPIRATION RATE: 17 BRPM | WEIGHT: 118 LBS | HEIGHT: 63 IN | OXYGEN SATURATION: 92 % | BODY MASS INDEX: 20.91 KG/M2 | DIASTOLIC BLOOD PRESSURE: 80 MMHG

## 2019-11-21 DIAGNOSIS — M75.41 IMPINGEMENT SYNDROME OF RIGHT SHOULDER: ICD-10-CM

## 2019-11-21 DIAGNOSIS — M77.02 MEDIAL EPICONDYLITIS, LEFT ELBOW: ICD-10-CM

## 2019-11-21 DIAGNOSIS — M75.42 IMPINGEMENT SYNDROME OF RIGHT SHOULDER: ICD-10-CM

## 2019-11-21 DIAGNOSIS — Z79.899 OTHER LONG TERM (CURRENT) DRUG THERAPY: ICD-10-CM

## 2019-11-21 DIAGNOSIS — M79.604 PAIN IN RIGHT LEG: ICD-10-CM

## 2019-11-21 DIAGNOSIS — M79.89 PAIN IN RIGHT LEG: ICD-10-CM

## 2019-11-21 DIAGNOSIS — M47.812 SPONDYLOSIS W/OUT MYELOPATHY OR RADICULOPATHY, CERVICAL REGION: ICD-10-CM

## 2019-11-21 DIAGNOSIS — M81.0 AGE-RELATED OSTEOPOROSIS W/OUT CURRENT PATHOLOGICAL FRACTURE: ICD-10-CM

## 2019-11-21 DIAGNOSIS — R60.9 OTHER SYNOVITIS AND TENOSYNOVITIS, OTHER SITE: ICD-10-CM

## 2019-11-21 DIAGNOSIS — M65.88 OTHER SYNOVITIS AND TENOSYNOVITIS, OTHER SITE: ICD-10-CM

## 2019-11-21 PROCEDURE — 96372 THER/PROPH/DIAG INJ SC/IM: CPT

## 2019-11-21 PROCEDURE — 99215 OFFICE O/P EST HI 40 MIN: CPT | Mod: 25

## 2019-11-21 RX ORDER — DENOSUMAB 60 MG/ML
60 INJECTION SUBCUTANEOUS
Qty: 1 | Refills: 0 | Status: COMPLETED | OUTPATIENT
Start: 2019-11-21

## 2019-11-21 RX ADMIN — DENOSUMAB 0 MG/ML: 60 INJECTION SUBCUTANEOUS at 00:00

## 2019-11-21 NOTE — PROCEDURE
[Today's Date:] : Date: [unfilled] [Soft Tissue Injection] : soft tissue injection was performed [Patient] : the patient [Risks] : risks [Benefits] : benefits [Alternatives] : alternatives [Therapeutic] : therapeutic [#1 Site: ______] : #1 site identified in the [unfilled]

## 2019-11-21 NOTE — PHYSICAL EXAM
[General Appearance - Alert] : alert [General Appearance - In No Acute Distress] : in no acute distress [Sclera] : the sclera and conjunctiva were normal [Outer Ear] : the ears and nose were normal in appearance [Oropharynx] : the oropharynx was normal [Neck Appearance] : the appearance of the neck was normal [Neck Cervical Mass (___cm)] : no neck mass was observed [Jugular Venous Distention Increased] : there was no jugular-venous distention [Thyroid Diffuse Enlargement] : the thyroid was not enlarged [Thyroid Nodule] : there were no palpable thyroid nodules [Auscultation Breath Sounds / Voice Sounds] : lungs were clear to auscultation bilaterally [Heart Rate And Rhythm] : heart rate was normal and rhythm regular [Heart Sounds] : normal S1 and S2 [Heart Sounds Gallop] : no gallops [Murmurs] : no murmurs [Heart Sounds Pericardial Friction Rub] : no pericardial rub [Bowel Sounds] : normal bowel sounds [Abdomen Soft] : soft [Abdomen Tenderness] : non-tender [Abdomen Mass (___ Cm)] : no abdominal mass palpated [Cervical Lymph Nodes Enlarged Posterior Bilaterally] : posterior cervical [Cervical Lymph Nodes Enlarged Anterior Bilaterally] : anterior cervical [Supraclavicular Lymph Nodes Enlarged Bilaterally] : supraclavicular [No Spinal Tenderness] : no spinal tenderness [Skin Color & Pigmentation] : normal skin color and pigmentation [Skin Turgor] : normal skin turgor [] : no rash [Oriented To Time, Place, And Person] : oriented to person, place, and time [Impaired Insight] : insight and judgment were intact [Affect] : the affect was normal [FreeTextEntry1] : strength: 4-/5 in B/L proximal UE's and LE's;  5/5 distally

## 2019-11-21 NOTE — ASSESSMENT
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  resolved\par   - Minimize activities placing stress on the elbow\par   - meloxicam 7.5mg daily prn\par   - ice packs\par   - tennis elbow band\par 5)  Neck pain:  CT reveals advanced OA and foraminal stenosis.\par   - Cont PT / exercise\par   - analgesia as above.\par 6)  B/L LE edema (L>R):  ?vascular etiology\par   - Awaiting vascular eval.

## 2019-11-21 NOTE — HISTORY OF PRESENT ILLNESS
[Fatigue] : fatigue [Difficulty Standing] : difficulty standing [Difficulty Walking] : difficulty walking [Muscle Weakness] : muscle weakness [Malar Facial Rash] : no malar facial rash [Skin Lesions] : no lesions [Skin Nodules] : no skin nodules [Oral Ulcers] : no oral ulcers [Cough] : no cough [Dry Mouth] : no dry mouth [Dysphagia] : no dysphagia [Shortness of Breath] : no shortness of breath [Chest Pain] : no chest pain [Arthralgias] : no arthralgias [Joint Swelling] : no joint swelling [Joint Warmth] : no joint warmth [Joint Deformity] : no joint deformity [Decreased ROM] : no decreased range of motion [Morning Stiffness] : no morning stiffness [Dyspnea] : no dyspnea [Myalgias] : no myalgias [Muscle Spasms] : no muscle spasms [Muscle Cramping] : no muscle cramping [Visual Changes] : no visual changes [Eye Pain] : no eye pain [Eye Redness] : no eye redness [Dry Eyes] : no dry eyes [FreeTextEntry1] : Continues to feel fine overall off prednisone.  Still w/ B/L LE swelling (L>R) - no pain, erythema, or warmth.  Has not yet seen vascular.  No new complaints.

## 2019-11-22 RX ORDER — DENOSUMAB 60 MG/ML
60 INJECTION SUBCUTANEOUS
Qty: 1 | Refills: 1 | Status: ACTIVE | COMMUNITY
Start: 2019-09-26

## 2019-11-26 ENCOUNTER — OUTPATIENT (OUTPATIENT)
Dept: OUTPATIENT SERVICES | Facility: HOSPITAL | Age: 84
LOS: 1 days | Discharge: HOME | End: 2019-11-26

## 2019-11-26 DIAGNOSIS — D46.9 MYELODYSPLASTIC SYNDROME, UNSPECIFIED: ICD-10-CM

## 2019-11-26 DIAGNOSIS — D75.81 MYELOFIBROSIS: ICD-10-CM

## 2019-11-26 DIAGNOSIS — Z90.710 ACQUIRED ABSENCE OF BOTH CERVIX AND UTERUS: Chronic | ICD-10-CM

## 2019-11-27 ENCOUNTER — APPOINTMENT (OUTPATIENT)
Dept: HEMATOLOGY ONCOLOGY | Facility: CLINIC | Age: 84
End: 2019-11-27

## 2019-12-03 ENCOUNTER — APPOINTMENT (OUTPATIENT)
Dept: HEMATOLOGY ONCOLOGY | Facility: CLINIC | Age: 84
End: 2019-12-03
Payer: MEDICARE

## 2019-12-03 ENCOUNTER — RESULT REVIEW (OUTPATIENT)
Age: 84
End: 2019-12-03

## 2019-12-03 ENCOUNTER — OUTPATIENT (OUTPATIENT)
Dept: OUTPATIENT SERVICES | Facility: HOSPITAL | Age: 84
LOS: 1 days | End: 2019-12-03
Payer: MEDICARE

## 2019-12-03 VITALS
SYSTOLIC BLOOD PRESSURE: 145 MMHG | RESPIRATION RATE: 16 BRPM | DIASTOLIC BLOOD PRESSURE: 71 MMHG | OXYGEN SATURATION: 95 % | TEMPERATURE: 98.3 F | HEART RATE: 94 BPM

## 2019-12-03 DIAGNOSIS — Z90.710 ACQUIRED ABSENCE OF BOTH CERVIX AND UTERUS: Chronic | ICD-10-CM

## 2019-12-03 DIAGNOSIS — D46.9 MYELODYSPLASTIC SYNDROME, UNSPECIFIED: ICD-10-CM

## 2019-12-03 LAB
AGGLUTINATION: PRESENT — SIGNIFICANT CHANGE UP
ANISOCYTOSIS BLD QL: SLIGHT — SIGNIFICANT CHANGE UP
AUER BODIES BLD QL SMEAR: PRESENT — SIGNIFICANT CHANGE UP
BASO STIPL BLD QL SMEAR: PRESENT — SIGNIFICANT CHANGE UP
BASOPHILS # BLD AUTO: SIGNIFICANT CHANGE UP (ref 0–0.2)
BASOPHILS NFR BLD AUTO: 2 % — SIGNIFICANT CHANGE UP (ref 0–2)
BLASTS # FLD: 20 % — HIGH (ref 0–0)
BLD GP AB SCN SERPL QL: SIGNIFICANT CHANGE UP
ELLIPTOCYTES BLD QL SMEAR: SLIGHT — SIGNIFICANT CHANGE UP
EOSINOPHIL # BLD AUTO: 1.9 K/UL — HIGH (ref 0–0.5)
EOSINOPHIL NFR BLD AUTO: 5 % — SIGNIFICANT CHANGE UP (ref 0–6)
GIANT PLATELETS BLD QL SMEAR: PRESENT — SIGNIFICANT CHANGE UP
HCT VFR BLD CALC: 19.8 % — CRITICAL LOW (ref 34.5–45)
HGB BLD-MCNC: 6.5 G/DL — CRITICAL LOW (ref 11.5–15.5)
HYPOCHROMIA BLD QL: SIGNIFICANT CHANGE UP
LG PLATELETS BLD QL AUTO: SLIGHT — SIGNIFICANT CHANGE UP
LYMPHOCYTES # BLD AUTO: 25.8 K/UL — HIGH (ref 1–3.3)
LYMPHOCYTES # BLD AUTO: 8 % — LOW (ref 13–44)
LYMPHOCYTES # SPEC AUTO: 6 % — HIGH (ref 0–0)
MACROCYTES BLD QL: SIGNIFICANT CHANGE UP
MCHC RBC-ENTMCNC: 32.9 PG — SIGNIFICANT CHANGE UP (ref 27–34)
MCHC RBC-ENTMCNC: 33 G/DL — SIGNIFICANT CHANGE UP (ref 32–36)
MCV RBC AUTO: 99.6 FL — SIGNIFICANT CHANGE UP (ref 80–100)
METAMYELOCYTES # FLD: 5 % — HIGH (ref 0–0)
MICROCYTES BLD QL: SLIGHT — SIGNIFICANT CHANGE UP
MONOCYTES # BLD AUTO: SIGNIFICANT CHANGE UP (ref 0–0.9)
MONOCYTES NFR BLD AUTO: 30 % — HIGH (ref 2–14)
MYELOCYTES NFR BLD: 2 % — HIGH (ref 0–0)
NEUTROPHILS # BLD AUTO: 23.8 K/UL — HIGH (ref 1.8–7.4)
NEUTROPHILS NFR BLD AUTO: 19 % — LOW (ref 43–77)
NEUTS BAND # BLD: 3 % — SIGNIFICANT CHANGE UP (ref 0–8)
NEUTS VAC BLD QL SMEAR: PRESENT — SIGNIFICANT CHANGE UP
NRBC # BLD: 9 /100 — HIGH (ref 0–0)
PLAT MORPH BLD: ABNORMAL
PLATELET # BLD AUTO: 432 K/UL — HIGH (ref 150–400)
POIKILOCYTOSIS BLD QL AUTO: SLIGHT — SIGNIFICANT CHANGE UP
POLYCHROMASIA BLD QL SMEAR: SLIGHT — SIGNIFICANT CHANGE UP
RBC # BLD: 1.99 M/UL — LOW (ref 3.8–5.2)
RBC # FLD: 23.3 % — HIGH (ref 10.3–14.5)
RBC BLD AUTO: ABNORMAL
SMUDGE CELLS # BLD: PRESENT — SIGNIFICANT CHANGE UP
SPHEROCYTES BLD QL SMEAR: SLIGHT — SIGNIFICANT CHANGE UP
WBC # BLD: 115.5 K/UL — CRITICAL HIGH (ref 3.8–10.5)
WBC # FLD AUTO: 115.5 K/UL — CRITICAL HIGH (ref 3.8–10.5)

## 2019-12-03 PROCEDURE — 99213 OFFICE O/P EST LOW 20 MIN: CPT

## 2019-12-03 PROCEDURE — 99499A: CUSTOM | Mod: NC

## 2019-12-04 ENCOUNTER — APPOINTMENT (OUTPATIENT)
Age: 84
End: 2019-12-04

## 2019-12-04 PROCEDURE — 86901 BLOOD TYPING SEROLOGIC RH(D): CPT

## 2019-12-04 PROCEDURE — 86922 COMPATIBILITY TEST ANTIGLOB: CPT

## 2019-12-04 PROCEDURE — P9016: CPT

## 2019-12-04 PROCEDURE — 86900 BLOOD TYPING SEROLOGIC ABO: CPT

## 2019-12-04 PROCEDURE — 36415 COLL VENOUS BLD VENIPUNCTURE: CPT

## 2019-12-04 PROCEDURE — 86850 RBC ANTIBODY SCREEN: CPT

## 2019-12-06 DIAGNOSIS — D72.829 ELEVATED WHITE BLOOD CELL COUNT, UNSPECIFIED: ICD-10-CM

## 2019-12-09 ENCOUNTER — APPOINTMENT (OUTPATIENT)
Dept: HEMATOLOGY ONCOLOGY | Facility: CLINIC | Age: 84
End: 2019-12-09
Payer: MEDICARE

## 2019-12-09 ENCOUNTER — RESULT REVIEW (OUTPATIENT)
Age: 84
End: 2019-12-09

## 2019-12-09 VITALS
HEIGHT: 63 IN | OXYGEN SATURATION: 93 % | DIASTOLIC BLOOD PRESSURE: 71 MMHG | SYSTOLIC BLOOD PRESSURE: 137 MMHG | HEART RATE: 93 BPM

## 2019-12-09 DIAGNOSIS — Z51.89 ENCOUNTER FOR OTHER SPECIFIED AFTERCARE: ICD-10-CM

## 2019-12-09 DIAGNOSIS — D46.9 MYELODYSPLASTIC SYNDROME, UNSPECIFIED: ICD-10-CM

## 2019-12-09 LAB
ANISOCYTOSIS BLD QL: SLIGHT — SIGNIFICANT CHANGE UP
BASOPHILS # BLD AUTO: 16.2 K/UL — HIGH (ref 0–0.2)
BASOPHILS NFR BLD AUTO: 5 % — HIGH (ref 0–2)
BLASTS # FLD: 13 % — HIGH (ref 0–0)
ELLIPTOCYTES BLD QL SMEAR: SLIGHT — SIGNIFICANT CHANGE UP
EOSINOPHIL # BLD AUTO: 2.4 K/UL — HIGH (ref 0–0.5)
EOSINOPHIL NFR BLD AUTO: 5 % — SIGNIFICANT CHANGE UP (ref 0–6)
HCT VFR BLD CALC: 21.9 % — LOW (ref 34.5–45)
HGB BLD-MCNC: 7.3 G/DL — LOW (ref 11.5–15.5)
HYPOCHROMIA BLD QL: SIGNIFICANT CHANGE UP
LG PLATELETS BLD QL AUTO: SLIGHT — SIGNIFICANT CHANGE UP
LYMPHOCYTES # BLD AUTO: 12 % — LOW (ref 13–44)
LYMPHOCYTES # BLD AUTO: 12.4 K/UL — HIGH (ref 1–3.3)
LYMPHOCYTES # SPEC AUTO: 4 % — HIGH (ref 0–0)
MACROCYTES BLD QL: SIGNIFICANT CHANGE UP
MCHC RBC-ENTMCNC: 33.3 PG — SIGNIFICANT CHANGE UP (ref 27–34)
MCHC RBC-ENTMCNC: 33.5 G/DL — SIGNIFICANT CHANGE UP (ref 32–36)
MCV RBC AUTO: 99.6 FL — SIGNIFICANT CHANGE UP (ref 80–100)
METAMYELOCYTES # FLD: 1 % — HIGH (ref 0–0)
MICROCYTES BLD QL: SLIGHT — SIGNIFICANT CHANGE UP
MONOCYTES # BLD AUTO: 56.6 K/UL — HIGH (ref 0–0.9)
MONOCYTES NFR BLD AUTO: 37 % — HIGH (ref 2–14)
MYELOCYTES NFR BLD: 5 % — HIGH (ref 0–0)
NEUTROPHILS # BLD AUTO: 33.9 K/UL — HIGH (ref 1.8–7.4)
NEUTROPHILS NFR BLD AUTO: 17 % — LOW (ref 43–77)
NEUTS VAC BLD QL SMEAR: PRESENT — SIGNIFICANT CHANGE UP
NRBC # BLD: 2 /100 — HIGH (ref 0–0)
PLAT MORPH BLD: ABNORMAL
PLATELET # BLD AUTO: 414 K/UL — HIGH (ref 150–400)
POIKILOCYTOSIS BLD QL AUTO: SLIGHT — SIGNIFICANT CHANGE UP
POLYCHROMASIA BLD QL SMEAR: SLIGHT — SIGNIFICANT CHANGE UP
RBC # BLD: 2.19 M/UL — LOW (ref 3.8–5.2)
RBC # FLD: 22.7 % — HIGH (ref 10.3–14.5)
RBC BLD AUTO: ABNORMAL
SPHEROCYTES BLD QL SMEAR: SLIGHT — SIGNIFICANT CHANGE UP
VARIANT LYMPHS # BLD: 1 % — SIGNIFICANT CHANGE UP (ref 0–6)
WBC # BLD: 121.5 K/UL — CRITICAL HIGH (ref 3.8–10.5)
WBC # FLD AUTO: 121.5 K/UL — CRITICAL HIGH (ref 3.8–10.5)

## 2019-12-09 PROCEDURE — 99214 OFFICE O/P EST MOD 30 MIN: CPT

## 2019-12-09 RX ORDER — HYDROXYUREA 500 MG/1
500 CAPSULE ORAL DAILY
Qty: 90 | Refills: 3 | Status: ACTIVE | COMMUNITY
Start: 2019-12-09 | End: 1900-01-01

## 2019-12-10 ENCOUNTER — APPOINTMENT (OUTPATIENT)
Dept: VASCULAR SURGERY | Facility: CLINIC | Age: 84
End: 2019-12-10

## 2019-12-10 PROBLEM — D46.9 MDS (MYELODYSPLASTIC SYNDROME): Status: ACTIVE | Noted: 2019-07-03

## 2019-12-10 LAB
ALBUMIN SERPL ELPH-MCNC: 3.3 G/DL
ALP BLD-CCNC: 161 U/L
ALT SERPL-CCNC: 66 U/L
ANION GAP SERPL CALC-SCNC: 10 MMOL/L
AST SERPL-CCNC: 45 U/L
BILIRUB SERPL-MCNC: 0.5 MG/DL
BUN SERPL-MCNC: 26 MG/DL
CALCIUM SERPL-MCNC: 8.4 MG/DL
CHLORIDE SERPL-SCNC: 104 MMOL/L
CO2 SERPL-SCNC: 20 MMOL/L
CREAT SERPL-MCNC: 1.71 MG/DL
GLUCOSE SERPL-MCNC: 130 MG/DL
POTASSIUM SERPL-SCNC: 4.3 MMOL/L
PROT SERPL-MCNC: 8 G/DL
SODIUM SERPL-SCNC: 134 MMOL/L

## 2019-12-10 NOTE — ASSESSMENT
[FreeTextEntry1] : Myelodysplasia with excess blasts in an 89 y/o WF, was on low dose Azacytididne but now with increasing WBC (121,000 with 13% blasts)  will  switch to Hydroxyurea in an effort to simply control the white count. Will continue to transfuse prn. \par \par Plan:\par -Discontinue Azacytidine \par -Continue Jakafi \par -Start Hydroxyurea 500 mg QD, discussed benefits, risks, and side effects \par -Follow up in 2 weeks for repeat CBC\par

## 2019-12-10 NOTE — REASON FOR VISIT
[Family Member] : family member [Follow-Up Visit] : a follow-up visit for [FreeTextEntry2] : MDS excess blasts

## 2019-12-10 NOTE — ADDENDUM
[FreeTextEntry1] : I, Elver Childs, solely acted as scribe for Dr. Oliver Montes on 12/09/2019.\par

## 2019-12-10 NOTE — PHYSICAL EXAM
[Restricted in physically strenuous activity but ambulatory and able to carry out work of a light or sedentary nature] : Status 1- Restricted in physically strenuous activity but ambulatory and able to carry out work of a light or sedentary nature, e.g., light house work, office work [Thin] : thin [Normal] : affect appropriate [de-identified] : Pale

## 2019-12-10 NOTE — HISTORY OF PRESENT ILLNESS
[de-identified] : Mrs. Chen is an 88 y/o WF with a history of Reji 2+ thrombocytosis, treated on Hydrea since 6/10/13. She was found to be pancytopenic and the Hydrea was held as of 7/12/17. A bone marrow was done on 8/21/17 that demonstrated a normocellular to mildly hypercellular marrow with mild megakaryocytic hyperplasia, moderate reticulin fibrosis and focal areas of increased blasts (5%). She was started on Jakafi.  After her blast count was rising (34%) and seemed to be transforming to acute leukemia, a decision was made to treat with low dose Azacytididne, which was effective initially, but now WBC is climbing steadily. [de-identified] : Reports recent infection.\par Family states she is fatigued and left leg edema.\par Today's WBC is 121k\par \par

## 2019-12-16 ENCOUNTER — RESULT REVIEW (OUTPATIENT)
Age: 84
End: 2019-12-16

## 2019-12-16 ENCOUNTER — OUTPATIENT (OUTPATIENT)
Dept: OUTPATIENT SERVICES | Facility: HOSPITAL | Age: 84
LOS: 1 days | End: 2019-12-16

## 2019-12-16 ENCOUNTER — APPOINTMENT (OUTPATIENT)
Age: 84
End: 2019-12-16

## 2019-12-16 ENCOUNTER — APPOINTMENT (OUTPATIENT)
Dept: HEMATOLOGY ONCOLOGY | Facility: CLINIC | Age: 84
End: 2019-12-16

## 2019-12-16 DIAGNOSIS — D46.9 MYELODYSPLASTIC SYNDROME, UNSPECIFIED: ICD-10-CM

## 2019-12-16 DIAGNOSIS — Z90.710 ACQUIRED ABSENCE OF BOTH CERVIX AND UTERUS: Chronic | ICD-10-CM

## 2019-12-16 LAB
ANISOCYTOSIS BLD QL: SIGNIFICANT CHANGE UP
BASO STIPL BLD QL SMEAR: PRESENT — SIGNIFICANT CHANGE UP
BASOPHILS # BLD AUTO: 19.3 K/UL — HIGH (ref 0–0.2)
BLASTS # FLD: 17 % — HIGH (ref 0–0)
ELLIPTOCYTES BLD QL SMEAR: SLIGHT — SIGNIFICANT CHANGE UP
EOSINOPHIL # BLD AUTO: 1.9 K/UL — HIGH (ref 0–0.5)
EOSINOPHIL NFR BLD AUTO: 2 % — SIGNIFICANT CHANGE UP (ref 0–6)
GIANT PLATELETS BLD QL SMEAR: PRESENT — SIGNIFICANT CHANGE UP
HCT VFR BLD CALC: 20.3 % — CRITICAL LOW (ref 34.5–45)
HGB BLD-MCNC: 6.7 G/DL — CRITICAL LOW (ref 11.5–15.5)
HYPOCHROMIA BLD QL: SLIGHT — SIGNIFICANT CHANGE UP
LG PLATELETS BLD QL AUTO: SLIGHT — SIGNIFICANT CHANGE UP
LYMPHOCYTES # BLD AUTO: 14 % — SIGNIFICANT CHANGE UP (ref 13–44)
LYMPHOCYTES # BLD AUTO: 14.9 K/UL — HIGH (ref 1–3.3)
LYMPHOCYTES # SPEC AUTO: 4 % — HIGH (ref 0–0)
MACROCYTES BLD QL: SLIGHT — SIGNIFICANT CHANGE UP
MCHC RBC-ENTMCNC: 32.7 PG — SIGNIFICANT CHANGE UP (ref 27–34)
MCHC RBC-ENTMCNC: 33.1 G/DL — SIGNIFICANT CHANGE UP (ref 32–36)
MCV RBC AUTO: 98.8 FL — SIGNIFICANT CHANGE UP (ref 80–100)
MICROCYTES BLD QL: SLIGHT — SIGNIFICANT CHANGE UP
MONOCYTES # BLD AUTO: 61.8 K/UL — HIGH (ref 0–0.9)
MONOCYTES NFR BLD AUTO: 40 % — HIGH (ref 2–14)
MYELOCYTES NFR BLD: 7 % — HIGH (ref 0–0)
NEUTROPHILS # BLD AUTO: 33.6 K/UL — HIGH (ref 1.8–7.4)
NEUTROPHILS NFR BLD AUTO: 14 % — LOW (ref 43–77)
NEUTS BAND # BLD: 2 % — SIGNIFICANT CHANGE UP (ref 0–8)
NRBC # BLD: 2 /100 — HIGH (ref 0–0)
PLAT MORPH BLD: ABNORMAL
PLATELET # BLD AUTO: 111 K/UL — LOW (ref 150–400)
POIKILOCYTOSIS BLD QL AUTO: SLIGHT — SIGNIFICANT CHANGE UP
POLYCHROMASIA BLD QL SMEAR: SLIGHT — SIGNIFICANT CHANGE UP
RBC # BLD: 2.06 M/UL — LOW (ref 3.8–5.2)
RBC # FLD: 22.4 % — HIGH (ref 10.3–14.5)
RBC BLD AUTO: ABNORMAL
SMUDGE CELLS # BLD: PRESENT — SIGNIFICANT CHANGE UP
SPHEROCYTES BLD QL SMEAR: SLIGHT — SIGNIFICANT CHANGE UP
WBC # BLD: 131.5 K/UL — CRITICAL HIGH (ref 3.8–10.5)
WBC # FLD AUTO: 131.5 K/UL — CRITICAL HIGH (ref 3.8–10.5)

## 2019-12-17 ENCOUNTER — APPOINTMENT (OUTPATIENT)
Age: 84
End: 2019-12-17

## 2019-12-17 ENCOUNTER — APPOINTMENT (OUTPATIENT)
Dept: HEMATOLOGY ONCOLOGY | Facility: CLINIC | Age: 84
End: 2019-12-17

## 2019-12-17 ENCOUNTER — INPATIENT (INPATIENT)
Facility: HOSPITAL | Age: 84
LOS: 5 days | Discharge: HOME | DRG: 871 | End: 2019-12-23
Attending: INTERNAL MEDICINE | Admitting: HOSPITALIST
Payer: MEDICARE

## 2019-12-17 VITALS
OXYGEN SATURATION: 88 % | SYSTOLIC BLOOD PRESSURE: 146 MMHG | TEMPERATURE: 98 F | HEART RATE: 106 BPM | HEIGHT: 64 IN | DIASTOLIC BLOOD PRESSURE: 68 MMHG | WEIGHT: 130.07 LBS | RESPIRATION RATE: 20 BRPM

## 2019-12-17 DIAGNOSIS — D64.9 ANEMIA, UNSPECIFIED: ICD-10-CM

## 2019-12-17 DIAGNOSIS — Z90.710 ACQUIRED ABSENCE OF BOTH CERVIX AND UTERUS: Chronic | ICD-10-CM

## 2019-12-17 LAB
ALBUMIN SERPL ELPH-MCNC: 3.2 G/DL — LOW (ref 3.3–5.2)
ALBUMIN SERPL ELPH-MCNC: 3.3 G/DL
ALP BLD-CCNC: 114 U/L
ALP SERPL-CCNC: 114 U/L — SIGNIFICANT CHANGE UP (ref 40–120)
ALT FLD-CCNC: 37 U/L — HIGH
ALT SERPL-CCNC: 35 U/L
ANION GAP SERPL CALC-SCNC: 11 MMOL/L
ANION GAP SERPL CALC-SCNC: 13 MMOL/L — SIGNIFICANT CHANGE UP (ref 5–17)
ANISOCYTOSIS BLD QL: SIGNIFICANT CHANGE UP
APTT BLD: 31.8 SEC — SIGNIFICANT CHANGE UP (ref 27.5–36.3)
AST SERPL-CCNC: 50 U/L
AST SERPL-CCNC: 62 U/L — HIGH
BASOPHILS # BLD AUTO: 0 K/UL — SIGNIFICANT CHANGE UP (ref 0–0.2)
BASOPHILS NFR BLD AUTO: 0 % — SIGNIFICANT CHANGE UP (ref 0–2)
BILIRUB SERPL-MCNC: 0.6 MG/DL
BILIRUB SERPL-MCNC: 0.6 MG/DL — SIGNIFICANT CHANGE UP (ref 0.4–2)
BLASTS # FLD: 48 % — HIGH (ref 0–0)
BLD GP AB SCN SERPL QL: SIGNIFICANT CHANGE UP
BUN SERPL-MCNC: 36 MG/DL
BUN SERPL-MCNC: 37 MG/DL — HIGH (ref 8–20)
CALCIUM SERPL-MCNC: 7.9 MG/DL — LOW (ref 8.6–10.2)
CALCIUM SERPL-MCNC: 8.1 MG/DL
CHLORIDE SERPL-SCNC: 102 MMOL/L
CHLORIDE SERPL-SCNC: 99 MMOL/L — SIGNIFICANT CHANGE UP (ref 98–107)
CO2 SERPL-SCNC: 19 MMOL/L
CO2 SERPL-SCNC: 19 MMOL/L — LOW (ref 22–29)
CREAT SERPL-MCNC: 1.89 MG/DL
CREAT SERPL-MCNC: 1.93 MG/DL — HIGH (ref 0.5–1.3)
EOSINOPHIL # BLD AUTO: 1.31 K/UL — HIGH (ref 0–0.5)
EOSINOPHIL NFR BLD AUTO: 1 % — SIGNIFICANT CHANGE UP (ref 0–6)
GLUCOSE SERPL-MCNC: 108 MG/DL — SIGNIFICANT CHANGE UP (ref 70–115)
GLUCOSE SERPL-MCNC: 115 MG/DL
HCT VFR BLD CALC: 19.2 % — CRITICAL LOW (ref 34.5–45)
HGB BLD-MCNC: 6.3 G/DL — CRITICAL LOW (ref 11.5–15.5)
HYPOCHROMIA BLD QL: SLIGHT — SIGNIFICANT CHANGE UP
INR BLD: 1.59 RATIO — HIGH (ref 0.88–1.16)
LYMPHOCYTES # BLD AUTO: 17 % — SIGNIFICANT CHANGE UP (ref 13–44)
LYMPHOCYTES # BLD AUTO: 22.27 K/UL — HIGH (ref 1–3.3)
MACROCYTES BLD QL: SIGNIFICANT CHANGE UP
MANUAL SMEAR VERIFICATION: SIGNIFICANT CHANGE UP
MCHC RBC-ENTMCNC: 32.8 GM/DL — SIGNIFICANT CHANGE UP (ref 32–36)
MCHC RBC-ENTMCNC: 34.4 PG — HIGH (ref 27–34)
MCV RBC AUTO: 104.9 FL — HIGH (ref 80–100)
MONOCYTES # BLD AUTO: 14.41 K/UL — HIGH (ref 0–0.9)
MONOCYTES NFR BLD AUTO: 11 % — SIGNIFICANT CHANGE UP (ref 2–14)
MYELOCYTES NFR BLD: 1 % — HIGH (ref 0–0)
NEUTROPHILS # BLD AUTO: 26.2 K/UL — HIGH (ref 1.8–7.4)
NEUTROPHILS NFR BLD AUTO: 19 % — LOW (ref 43–77)
NEUTS BAND # BLD: 1 % — SIGNIFICANT CHANGE UP (ref 0–8)
NRBC # BLD: 2 /100 — HIGH (ref 0–0)
NT-PROBNP SERPL-SCNC: 1475 PG/ML — HIGH (ref 0–300)
OVALOCYTES BLD QL SMEAR: SLIGHT — SIGNIFICANT CHANGE UP
PLAT MORPH BLD: NORMAL — SIGNIFICANT CHANGE UP
PLATELET # BLD AUTO: 172 K/UL — SIGNIFICANT CHANGE UP (ref 150–400)
POIKILOCYTOSIS BLD QL AUTO: SLIGHT — SIGNIFICANT CHANGE UP
POTASSIUM SERPL-MCNC: 4.3 MMOL/L — SIGNIFICANT CHANGE UP (ref 3.5–5.3)
POTASSIUM SERPL-SCNC: 4.3 MMOL/L — SIGNIFICANT CHANGE UP (ref 3.5–5.3)
POTASSIUM SERPL-SCNC: 4.6 MMOL/L
PROCALCITONIN SERPL-MCNC: 0.43 NG/ML — HIGH (ref 0.02–0.1)
PROMYELOCYTES # FLD: 2 % — HIGH (ref 0–0)
PROT SERPL-MCNC: 8.1 G/DL
PROT SERPL-MCNC: 8.2 G/DL — SIGNIFICANT CHANGE UP (ref 6.6–8.7)
PROTHROM AB SERPL-ACNC: 18.6 SEC — HIGH (ref 10–12.9)
RBC # BLD: 1.83 M/UL — LOW (ref 3.8–5.2)
RBC # FLD: 24.6 % — HIGH (ref 10.3–14.5)
RBC BLD AUTO: ABNORMAL
SODIUM SERPL-SCNC: 131 MMOL/L — LOW (ref 135–145)
SODIUM SERPL-SCNC: 132 MMOL/L
TROPONIN T SERPL-MCNC: 0.01 NG/ML — SIGNIFICANT CHANGE UP (ref 0–0.06)
WBC # BLD: 130.99 K/UL — CRITICAL HIGH (ref 3.8–10.5)
WBC # FLD AUTO: 130.99 K/UL — CRITICAL HIGH (ref 3.8–10.5)

## 2019-12-17 PROCEDURE — 99223 1ST HOSP IP/OBS HIGH 75: CPT

## 2019-12-17 PROCEDURE — 71250 CT THORAX DX C-: CPT | Mod: 26

## 2019-12-17 PROCEDURE — 71045 X-RAY EXAM CHEST 1 VIEW: CPT | Mod: 26

## 2019-12-17 PROCEDURE — 99285 EMERGENCY DEPT VISIT HI MDM: CPT

## 2019-12-17 RX ORDER — SODIUM CHLORIDE 9 MG/ML
1000 INJECTION INTRAMUSCULAR; INTRAVENOUS; SUBCUTANEOUS
Refills: 0 | Status: DISCONTINUED | OUTPATIENT
Start: 2019-12-17 | End: 2019-12-21

## 2019-12-17 RX ORDER — HEPARIN SODIUM 5000 [USP'U]/ML
5000 INJECTION INTRAVENOUS; SUBCUTANEOUS EVERY 12 HOURS
Refills: 0 | Status: DISCONTINUED | OUTPATIENT
Start: 2019-12-17 | End: 2019-12-23

## 2019-12-17 RX ORDER — PANTOPRAZOLE SODIUM 20 MG/1
40 TABLET, DELAYED RELEASE ORAL
Refills: 0 | Status: DISCONTINUED | OUTPATIENT
Start: 2019-12-17 | End: 2019-12-23

## 2019-12-17 RX ORDER — ESCITALOPRAM OXALATE 10 MG/1
5 TABLET, FILM COATED ORAL DAILY
Refills: 0 | Status: DISCONTINUED | OUTPATIENT
Start: 2019-12-17 | End: 2019-12-23

## 2019-12-17 RX ORDER — PIPERACILLIN AND TAZOBACTAM 4; .5 G/20ML; G/20ML
3.38 INJECTION, POWDER, LYOPHILIZED, FOR SOLUTION INTRAVENOUS ONCE
Refills: 0 | Status: DISCONTINUED | OUTPATIENT
Start: 2019-12-17 | End: 2019-12-17

## 2019-12-17 RX ORDER — IPRATROPIUM/ALBUTEROL SULFATE 18-103MCG
3 AEROSOL WITH ADAPTER (GRAM) INHALATION ONCE
Refills: 0 | Status: COMPLETED | OUTPATIENT
Start: 2019-12-17 | End: 2019-12-17

## 2019-12-17 RX ORDER — HYDROXYUREA 500 MG/1
1000 CAPSULE ORAL DAILY
Refills: 0 | Status: DISCONTINUED | OUTPATIENT
Start: 2019-12-17 | End: 2019-12-23

## 2019-12-17 RX ORDER — HYDROCORTISONE 1 %
1 OINTMENT (GRAM) TOPICAL THREE TIMES A DAY
Refills: 0 | Status: DISCONTINUED | OUTPATIENT
Start: 2019-12-17 | End: 2019-12-23

## 2019-12-17 RX ORDER — VANCOMYCIN HCL 1 G
1000 VIAL (EA) INTRAVENOUS EVERY 12 HOURS
Refills: 0 | Status: DISCONTINUED | OUTPATIENT
Start: 2019-12-17 | End: 2019-12-17

## 2019-12-17 RX ORDER — MIRTAZAPINE 45 MG/1
45 TABLET, ORALLY DISINTEGRATING ORAL DAILY
Refills: 0 | Status: DISCONTINUED | OUTPATIENT
Start: 2019-12-17 | End: 2019-12-23

## 2019-12-17 RX ORDER — DIPHENHYDRAMINE HCL 50 MG
25 CAPSULE ORAL ONCE
Refills: 0 | Status: COMPLETED | OUTPATIENT
Start: 2019-12-17 | End: 2019-12-17

## 2019-12-17 RX ORDER — SODIUM CHLORIDE 9 MG/ML
1000 INJECTION, SOLUTION INTRAVENOUS
Refills: 0 | Status: DISCONTINUED | OUTPATIENT
Start: 2019-12-17 | End: 2019-12-17

## 2019-12-17 RX ORDER — LEVOTHYROXINE SODIUM 125 MCG
75 TABLET ORAL DAILY
Refills: 0 | Status: DISCONTINUED | OUTPATIENT
Start: 2019-12-17 | End: 2019-12-23

## 2019-12-17 RX ORDER — PIPERACILLIN AND TAZOBACTAM 4; .5 G/20ML; G/20ML
3.38 INJECTION, POWDER, LYOPHILIZED, FOR SOLUTION INTRAVENOUS ONCE
Refills: 0 | Status: COMPLETED | OUTPATIENT
Start: 2019-12-17 | End: 2019-12-17

## 2019-12-17 RX ORDER — ASCORBIC ACID 60 MG
500 TABLET,CHEWABLE ORAL DAILY
Refills: 0 | Status: DISCONTINUED | OUTPATIENT
Start: 2019-12-17 | End: 2019-12-23

## 2019-12-17 RX ORDER — ACETAMINOPHEN 500 MG
650 TABLET ORAL ONCE
Refills: 0 | Status: COMPLETED | OUTPATIENT
Start: 2019-12-17 | End: 2019-12-17

## 2019-12-17 RX ORDER — CALCIUM CARBONATE 500(1250)
1 TABLET ORAL DAILY
Refills: 0 | Status: DISCONTINUED | OUTPATIENT
Start: 2019-12-17 | End: 2019-12-23

## 2019-12-17 RX ORDER — VANCOMYCIN HCL 1 G
1000 VIAL (EA) INTRAVENOUS ONCE
Refills: 0 | Status: COMPLETED | OUTPATIENT
Start: 2019-12-17 | End: 2019-12-17

## 2019-12-17 RX ORDER — VANCOMYCIN HCL 1 G
500 VIAL (EA) INTRAVENOUS EVERY 24 HOURS
Refills: 0 | Status: DISCONTINUED | OUTPATIENT
Start: 2019-12-17 | End: 2019-12-17

## 2019-12-17 RX ORDER — PIPERACILLIN AND TAZOBACTAM 4; .5 G/20ML; G/20ML
3.38 INJECTION, POWDER, LYOPHILIZED, FOR SOLUTION INTRAVENOUS EVERY 12 HOURS
Refills: 0 | Status: DISCONTINUED | OUTPATIENT
Start: 2019-12-18 | End: 2019-12-18

## 2019-12-17 RX ORDER — ACETAMINOPHEN 500 MG
650 TABLET ORAL EVERY 6 HOURS
Refills: 0 | Status: DISCONTINUED | OUTPATIENT
Start: 2019-12-17 | End: 2019-12-23

## 2019-12-17 RX ORDER — HYDROCORTISONE 1 %
1 OINTMENT (GRAM) TOPICAL THREE TIMES A DAY
Refills: 0 | Status: DISCONTINUED | OUTPATIENT
Start: 2019-12-17 | End: 2019-12-17

## 2019-12-17 RX ORDER — SODIUM CHLORIDE 9 MG/ML
500 INJECTION INTRAMUSCULAR; INTRAVENOUS; SUBCUTANEOUS ONCE
Refills: 0 | Status: COMPLETED | OUTPATIENT
Start: 2019-12-17 | End: 2019-12-17

## 2019-12-17 RX ADMIN — SODIUM CHLORIDE 500 MILLILITER(S): 9 INJECTION INTRAMUSCULAR; INTRAVENOUS; SUBCUTANEOUS at 13:45

## 2019-12-17 RX ADMIN — Medication 25 MILLIGRAM(S): at 23:07

## 2019-12-17 RX ADMIN — Medication 3 MILLILITER(S): at 11:44

## 2019-12-17 RX ADMIN — PIPERACILLIN AND TAZOBACTAM 200 GRAM(S): 4; .5 INJECTION, POWDER, LYOPHILIZED, FOR SOLUTION INTRAVENOUS at 16:30

## 2019-12-17 RX ADMIN — Medication 250 MILLIGRAM(S): at 19:00

## 2019-12-17 RX ADMIN — Medication 650 MILLIGRAM(S): at 23:10

## 2019-12-17 RX ADMIN — Medication 650 MILLIGRAM(S): at 17:45

## 2019-12-17 NOTE — ED ADULT NURSE REASSESSMENT NOTE - NS ED NURSE REASSESS COMMENT FT1
Pt care assumed 1945, report received from offgoing RN IB. Pt is resting in bed comfortably at this time, no apparent distress noted at this time. pt safety maintained. Pt denies any complaints at this time. pt educated on plan of care, plan of care taught back to RN. plan of care education deemed proficient through successful teach back. will continue to reeducate pt regarding plan of care.

## 2019-12-17 NOTE — H&P ADULT - NSHPPHYSICALEXAM_GEN_ALL_CORE
GENERAL: NAD, well-groomed, well-developed  HEAD:  Atraumatic, Normocephalic  EYES: EOMI, PERRLA, conjunctiva and sclera clear  NECK: Supple, No JVD  NERVOUS SYSTEM:  Alert & Oriented X3, Motor Strength 5/5 B/L upper and lower extremities; DTRs 2+ intact and symmetric  CHEST/LUNG: b/l rhonchi; No rales, wheezing, or rubs  HEART: Regular rate and rhythm; No murmurs, rubs, or gallops  ABDOMEN: Soft, Nontender, Nondistended; Bowel sounds present  EXTREMITIES:  2+ Peripheral Pulses, 1+  edema b/l  SKIN: No rashes or lesions

## 2019-12-17 NOTE — H&P ADULT - ASSESSMENT
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 Patient get transfusion weekly at Dr. Montes's office. Hb from yesterday 6.7. :t last got transfused 2 weeks ago. Family also reports peristent non-productive cough since Thanksgiving but worse the past week .along with SOB & fatigue No O2 use at home. She finished immunotherapy 2 months prior and is now on hydroxyurea & Jakafi. In the ED pt had a temp of 102. CXR showed b/l basilar infiltrates.  No chest pain, palpitations,  light headedness/dizziness, chills, abdominal pain, n/v, diarrhea/constipation, dysuria or increased urinary frequency.         Sepsis due to b/l Gram Positive/ Gram negative PNA in an Immunocompromised pt-  Admit to monitored bed with   IV Vanco & Zosyn  CT chest  Blood cx, UA, Ucx, urine legionella AG, sputum cx, RVP  ID consult appreciated  Lactate wnl  Tylenol for temps      Anemia due to MDS/ myelofibrosis-  Transfuse 2 units  Tylenol & benadryl with every transfusion      MDS/ myelofibrosis with possible transformation to AML-  Notified Dr. Mosqueda for consult  increase Hydroxyurea 500 mg to twice a day  -hold jakafi for now  palliative care called as this is MDS likely in transformation to AML - s/p POD on azacytidine.    Supportive care recommended as per hemonc      ELENI- likely prerenal  NS x 1 L  f/u BMP in am      DVT ppx

## 2019-12-17 NOTE — ED PROVIDER NOTE - NS ED ROS FT
Review of Systems  •	CONSTITUTIONAL - no  fever, no diaphoresis, no weight change  •	SKIN - no rash  •	HEMATOLOGIC - no bleeding, no bruising  •	EYES - no eye pain, no blurred vision  •	ENT - no change in hearing, no pain  •	RESPIRATORY -  (+) shortness of breath, (+) cough  •	CARDIAC - no chest pain, no palpitations  •	GI - no abd pain, no nausea, no vomiting, no diarrhea, no constipation, no bleeding  •	GENITO-URINARY - no discharge, no dysuria; no hematuria,   •	ENDO - no polydipsia, no polyuria, no heat/no cold intolerance  •	MUSCULOSKELETAL - no joint pain, no swelling, no redness  •	NEUROLOGIC - no weakness, no headache, no anesthesia, no paresthesias  •	PSYCH - no anxiety, non suicidal, non homicidal, no hallucination, no depression

## 2019-12-17 NOTE — CONSULT NOTE ADULT - SUBJECTIVE AND OBJECTIVE BOX
INFECTIOUS DISEASES AND INTERNAL MEDICINE at [place]  =======================================================  Manny Maldonado MD  Diplomates [employer] Board of Internal Medicine and Infectious Diseases  Telephone [phone]  Fax            [phone]  =======================================================    KEVIN [id number]      HPI:  89 yo F hx of myelofibrosis and Myelodysplastic syndrome  CAME TO ER BECAUSE OF PERSISTENT COUGH  AND LOW HB  PT GETS TRASNFUSION   ALMOST MONTHLY NOW   AS PER DAUGHTER AT BEDSIDE PT HAS BEEN COUGHING ON AND OFF SINCE THANKSGIVING    DENIES FEVERS  CXR ? PNEUMONIA A   ASKED TO EVALUATE FROM ID STANDPOINT     PAST MEDICAL & SURGICAL HISTORY:  Anemia  Acute deep vein thrombosis (DVT) of right upper extremity, unspecified vein  Fall, sequela  Pelvic fracture  Myelofibrosis  Thrombocytosis: ANNMARIE 2 thrombocytosis  Hypothyroidism (acquired)  Myelodysplastic syndrome  H/O total vaginal hysterectomy      ANTIBIOTICS  piperacillin/tazobactam IVPB. 3.375 Gram(s) IV Intermittent Once  vancomycin  IVPB 1000 milliGRAM(s) IV Intermittent once  vancomycin  IVPB 500 milliGRAM(s) IV Intermittent every 24 hours      Allergies    No Known Allergies    Intolerances        SOCIAL HISTORY:     FAMILY HX   FAMILY HISTORY:  No pertinent family history in first degree relatives      Vital Signs Last 24 Hrs  T(C): 36.8 (17 Dec 2019 10:08), Max: 36.8 (17 Dec 2019 10:08)  T(F): 98.2 (17 Dec 2019 10:08), Max: 98.2 (17 Dec 2019 10:08)  HR: 106 (17 Dec 2019 10:08) (106 - 106)  BP: 146/68 (17 Dec 2019 10:08) (146/68 - 146/68)  BP(mean): --  RR: 20 (17 Dec 2019 10:08) (20 - 20)  SpO2: 88% (17 Dec 2019 10:08) (88% - 88%)  Drug Dosing Weight  Height (cm): 162.56 (17 Dec 2019 10:08)  Weight (kg): 59 (17 Dec 2019 10:08)  BMI (kg/m2): 22.3 (17 Dec 2019 10:08)  BSA (m2): 1.63 (17 Dec 2019 10:08)      REVIEW OF SYSTEMS:    CONSTITUTIONAL:  As per HPI.    HEENT:  Eyes:  No diplopia or blurred vision. ENT:  No earache, sore throat or runny nose.    CARDIOVASCULAR:  No pressure, squeezing, strangling, tightness, heaviness or aching about the chest, neck, axilla or epigastrium.    RESPIRATORY:  COUGH SOB     GASTROINTESTINAL:  No nausea, vomiting or diarrhea.    GENITOURINARY:  No dysuria, frequency or urgency.    MUSCULOSKELETAL:  As per HPI.    SKIN:  No change in skin, hair or nails.    NEUROLOGIC:  No paresthesias, fasciculations, seizures or weakness.                  PHYSICAL EXAMINATION:    GENERAL: The patient is a well-developed, well-nourished _____IN NAD ON 02  COUGHING  VITAL SIGNS: T(C): 36.8 (12-17-19 @ 10:08), Max: 36.8 (12-17-19 @ 10:08)  HR: 106 (12-17-19 @ 10:08) (106 - 106)  BP: 146/68 (12-17-19 @ 10:08) (146/68 - 146/68)  RR: 20 (12-17-19 @ 10:08) (20 - 20)  SpO2: 88% (12-17-19 @ 10:08) (88% - 88%)  Wt(kg): --    HEENT: Head is normocephalic and atraumatic.  ANICTERIC  NECK: Supple. No carotid bruits.  No lymphadenopathy or thyromegaly.    LUNGS: COARSE BREATH SOUNDS    HEART: Regular rate and rhythm without murmur.    ABDOMEN: Soft, nontender, and nondistended.  Positive bowel sounds.  No hepatosplenomegaly was noted. NO REBOUND NO GUARDING    EXTREMITIES:  POSITIVE 2+  EDEMA BILATERALLY    NEUROLOGIC: NON FOCAL      SKIN: No ulceration or induration present. NO RASH        BLOOD CULTURES       URINE CX          LABS:                        6.3    130.99 )-----------( 172      ( 17 Dec 2019 11:48 )             19.2     12-17    131<L>  |  99  |  37.0<H>  ----------------------------<  108  4.3   |  19.0<L>  |  1.93<H>    Ca    7.9<L>      17 Dec 2019 11:48    TPro  8.2  /  Alb  3.2<L>  /  TBili  0.6  /  DBili  x   /  AST  62<H>  /  ALT  37<H>  /  AlkPhos  114  12-17    PT/INR - ( 17 Dec 2019 11:48 )   PT: 18.6 sec;   INR: 1.59 ratio         PTT - ( 17 Dec 2019 11:48 )  PTT:31.8 sec      RADIOLOGY & ADDITIONAL STUDIES:      INTERPRETATION:  History: Shortness of breath and cough    Portable radiograph of the chest is performed. comparison is made to   8/27/2019.    The heart is not enlarged. The trachea is midline. Again seen is an ovoid   mass in the right lower lung at the inferior hilar level similar to the   prior study. There are increased markings at the lung bases which may be   due to positioning and correlation with lateral radiograph is   recommended. There is osteopenia and degenerative changes of bony   structures.    Impression: Increased markings at the lung bases left greater than right   new from the prior study questionably technical. Recommend lateral   radiograph  Stable nodule right lung base.          ASSESSMENT/PLAN  89 yo F hx of myelofibrosis and Myelodysplastic syndrome  CAME TO ER BECAUSE OF PERSISTENT COUGH  AND LOW HB  PT GETS TRANSFUSION   ALMOST MONTHLY NOW   AS PER DAUGHTER AT BEDSIDE PT HAS BEEN COUGHING ON AND OFF SINCE THANKSGIVING    DENIES FEVERS  CXR ? PNEUMONIA   BLOOD CX X2 SETS R/O BACTEREMIA  CANNOT USE WBC AS MARKER BECAUSE OF MDS  PLAN CT CHEST TO BETTER EVALUATE FOR PNEUMONIA  SUGGEST Procalcitonin  PLAN EMPIRIC ABX IN THIS IMMUNOCOMPROMISED PATIENT                   FRANKLIN GODINEZ MD INFECTIOUS DISEASES AND INTERNAL MEDICINE at [place]  =======================================================  Manny Maldonado MD  Diplomates American Board of Internal Medicine and Infectious Diseases  Telephone [phone]  Fax            [phone]  =======================================================    KEVIN [id number]      HPI:  88 yo F hx of myelofibrosis and Myelodysplastic syndrome  CAME TO ER BECAUSE OF PERSISTENT COUGH  AND LOW HB  PT GETS TRASNFUSION   ALMOST MONTHLY NOW   AS PER DAUGHTER AT BEDSIDE PT HAS BEEN COUGHING ON AND OFF SINCE THANKSGIVING    DENIES FEVERS  CXR ? PNEUMONIA A   ASKED TO EVALUATE FROM ID STANDPOINT     PAST MEDICAL & SURGICAL HISTORY:  Anemia  Acute deep vein thrombosis (DVT) of right upper extremity, unspecified vein  Fall, sequela  Pelvic fracture  Myelofibrosis  Thrombocytosis: ANNMARIE 2 thrombocytosis  Hypothyroidism (acquired)  Myelodysplastic syndrome  H/O total vaginal hysterectomy      ANTIBIOTICS  piperacillin/tazobactam IVPB. 3.375 Gram(s) IV Intermittent Once  vancomycin  IVPB 1000 milliGRAM(s) IV Intermittent once  vancomycin  IVPB 500 milliGRAM(s) IV Intermittent every 24 hours      Allergies    No Known Allergies    Intolerances        SOCIAL HISTORY:     FAMILY HX   FAMILY HISTORY:  No pertinent family history in first degree relatives      Vital Signs Last 24 Hrs  T(C): 36.8 (17 Dec 2019 10:08), Max: 36.8 (17 Dec 2019 10:08)  T(F): 98.2 (17 Dec 2019 10:08), Max: 98.2 (17 Dec 2019 10:08)  HR: 106 (17 Dec 2019 10:08) (106 - 106)  BP: 146/68 (17 Dec 2019 10:08) (146/68 - 146/68)  BP(mean): --  RR: 20 (17 Dec 2019 10:08) (20 - 20)  SpO2: 88% (17 Dec 2019 10:08) (88% - 88%)  Drug Dosing Weight  Height (cm): 162.56 (17 Dec 2019 10:08)  Weight (kg): 59 (17 Dec 2019 10:08)  BMI (kg/m2): 22.3 (17 Dec 2019 10:08)  BSA (m2): 1.63 (17 Dec 2019 10:08)      REVIEW OF SYSTEMS:    CONSTITUTIONAL:  As per HPI.    HEENT:  Eyes:  No diplopia or blurred vision. ENT:  No earache, sore throat or runny nose.    CARDIOVASCULAR:  No pressure, squeezing, strangling, tightness, heaviness or aching about the chest, neck, axilla or epigastrium.    RESPIRATORY:  COUGH SOB     GASTROINTESTINAL:  No nausea, vomiting or diarrhea.    GENITOURINARY:  No dysuria, frequency or urgency.    MUSCULOSKELETAL:  As per HPI.    SKIN:  No change in skin, hair or nails.    NEUROLOGIC:  No paresthesias, fasciculations, seizures or weakness.                  PHYSICAL EXAMINATION:    GENERAL: The patient is a well-developed, well-nourished _____IN NAD ON 02  COUGHING  VITAL SIGNS: T(C): 36.8 (12-17-19 @ 10:08), Max: 36.8 (12-17-19 @ 10:08)  HR: 106 (12-17-19 @ 10:08) (106 - 106)  BP: 146/68 (12-17-19 @ 10:08) (146/68 - 146/68)  RR: 20 (12-17-19 @ 10:08) (20 - 20)  SpO2: 88% (12-17-19 @ 10:08) (88% - 88%)  Wt(kg): --    HEENT: Head is normocephalic and atraumatic.  ANICTERIC  NECK: Supple. No carotid bruits.  No lymphadenopathy or thyromegaly.    LUNGS: COARSE BREATH SOUNDS    HEART: Regular rate and rhythm without murmur.    ABDOMEN: Soft, nontender, and nondistended.  Positive bowel sounds.  No hepatosplenomegaly was noted. NO REBOUND NO GUARDING    EXTREMITIES:  POSITIVE 2+  EDEMA BILATERALLY    NEUROLOGIC: NON FOCAL      SKIN: No ulceration or induration present. NO RASH        BLOOD CULTURES       URINE CX          LABS:                        6.3    130.99 )-----------( 172      ( 17 Dec 2019 11:48 )             19.2     12-17    131<L>  |  99  |  37.0<H>  ----------------------------<  108  4.3   |  19.0<L>  |  1.93<H>    Ca    7.9<L>      17 Dec 2019 11:48    TPro  8.2  /  Alb  3.2<L>  /  TBili  0.6  /  DBili  x   /  AST  62<H>  /  ALT  37<H>  /  AlkPhos  114  12-17    PT/INR - ( 17 Dec 2019 11:48 )   PT: 18.6 sec;   INR: 1.59 ratio         PTT - ( 17 Dec 2019 11:48 )  PTT:31.8 sec      RADIOLOGY & ADDITIONAL STUDIES:      INTERPRETATION:  History: Shortness of breath and cough    Portable radiograph of the chest is performed. comparison is made to   8/27/2019.    The heart is not enlarged. The trachea is midline. Again seen is an ovoid   mass in the right lower lung at the inferior hilar level similar to the   prior study. There are increased markings at the lung bases which may be   due to positioning and correlation with lateral radiograph is   recommended. There is osteopenia and degenerative changes of bony   structures.    Impression: Increased markings at the lung bases left greater than right   new from the prior study questionably technical. Recommend lateral   radiograph  Stable nodule right lung base.          ASSESSMENT/PLAN  88 yo F hx of myelofibrosis and Myelodysplastic syndrome  CAME TO ER BECAUSE OF PERSISTENT COUGH  AND LOW HB  PT GETS TRANSFUSION   ALMOST MONTHLY NOW   AS PER DAUGHTER AT BEDSIDE PT HAS BEEN COUGHING ON AND OFF SINCE THANKSGIVING    DENIES FEVERS  CXR ? PNEUMONIA   BLOOD CX X2 SETS R/O BACTEREMIA  CANNOT USE WBC AS MARKER BECAUSE OF MDS  PLAN CT CHEST TO BETTER EVALUATE FOR PNEUMONIA  SUGGEST Procalcitonin  CHECK  RVP  PLAN EMPIRIC ABX IN THIS IMMUNOCOMPROMISED PATIENT                   FRANKLIN GODINZE MD

## 2019-12-17 NOTE — ED PROVIDER NOTE - PHYSICAL EXAMINATION
VITAL SIGNS: I have reviewed nursing notes and confirm.  CONSTITUTIONAL: Well-developed; well-nourished; in no acute distress.  SKIN: Skin exam is warm and dry, no acute rash.  HEAD: Normocephalic; atraumatic.  EYES: PERRL, EOM intact; (+) pale conjunctiva.  sclera clear.  ENT: No nasal discharge; airway clear. Throat clear.  NECK: Supple; non tender.    CARD: S1, S2 normal; no murmurs, gallops, or rubs. Regular rate and rhythm.  RESP: (+) diffuse crackles (+) wheezing at the base   ABD:  soft; non-distended; non-tender;   EXT: Normal ROM. No clubbing, cyanosis (+) diffuse 1+ pitting edema of b/l extremities   NEURO: Alert, oriented. Grossly unremarkable. No focal deficits. no facial droop, moves all extremities,    PSYCH: Cooperative, appropriate.

## 2019-12-17 NOTE — CONSULT NOTE ADULT - ASSESSMENT
88 year old female with JAK2+ myelofibrosis, currently on Jakafi who is admitted with anemia and cough; CXR c/w PNA

## 2019-12-17 NOTE — CONSULT NOTE ADULT - PROBLEM SELECTOR RECOMMENDATION 9
-leukocytosis x ~ 2 weeks and anemic  -recommend 2 units PRBC  -increase Hydroxyurea 500 mg to twice a day  -holding jakafi and tx cough/PNA per ID recs with abx.  -discussed with Dr. Montes, primary oncologist; pall care should be called as this is MDS likely in transformation to AML - s/p POD on azacytidine.  Supportive care recommended.

## 2019-12-17 NOTE — ED PROVIDER NOTE - CLINICAL SUMMARY MEDICAL DECISION MAKING FREE TEXT BOX
88 yo F hx of mylodysplastic syndrome p/w anemia hb 6.3. 2U PBC ordered. She also has been coughing worsen over the past week. patient hypoxic in 88% on RA that improved to 95% on 4L. cxr showed increased opacity. wbc 130. patient given pre-emptive abx of vanc and zosyn for possible pneumonia. patient admitted for further treatment.

## 2019-12-17 NOTE — ED ADULT NURSE NOTE - OBJECTIVE STATEMENT
Pt A&OX3, states she woke up this AM very weak, has had a cough since Thanksgiving Day.  Wheezing bilat, abd soft nondistended, nontender, moving all ext well.  4LNC in place, 95% .

## 2019-12-17 NOTE — CONSULT NOTE ADULT - SUBJECTIVE AND OBJECTIVE BOX
REASON FOR Tele-evaluation    SUBJECTIVE: " I don't know why my daughters brought me in"    HPI: 89 yr old female with known history of JAK2+ myelofibrosis with anemia, IBS, hypothyroidism, dementia, depression presents brought in by family. As per patient she denied any complaints although she is coughing through out the video conference , states she doesn't wanna be here and doesn't know why her family brought her in. she denied any fever or chills , states her cough is chronic but as per daughter she hasn't been looking well. states she is on no medications at home.      ROS: As per HPI      T(C): 36.8 (12-17-19 @ 10:08), Max: 36.8 (12-17-19 @ 10:08)  HR: 106 (12-17-19 @ 10:08) (106 - 106)  BP: 146/68 (12-17-19 @ 10:08) (146/68 - 146/68)  RR: 20 (12-17-19 @ 10:08) (20 - 20)  SpO2: 88% (12-17-19 @ 10:08) (88% - 88%)    PHYSICAL EXAM: evaluation precludes physical exam. Pertinent physical exam findings as per video conference with  nurse at bedside is as follow: Awake and alert , has dementia t baseline is pleasant and answers appropriately                           6.3    130.99 )-----------( 172      ( 17 Dec 2019 11:48 )             19.2   12-17    131<L>  |  99  |  37.0<H>  ----------------------------<  108  4.3   |  19.0<L>  |  1.93<H>    Ca    7.9<L>      17 Dec 2019 11:48    TPro  8.2  /  Alb  3.2<L>  /  TBili  0.6  /  DBili  x   /  AST  62<H>  /  ALT  37<H>  /  AlkPhos  114  12-17        Radiology findings         Medication reconciliation done         Care plan discussed with        Survey offered to patient -declined- doesn't text - no family at bedside

## 2019-12-17 NOTE — H&P ADULT - HISTORY OF PRESENT ILLNESS
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 Patient get transfusion weekly at Dr. Montes's office. Hb from yesterday 6.7. :t last got transfused 2 weeks ago. Family also reports peristent non-productive cough since Thanksgiving but worse the past week .along with SOB & fatigue No O2 use at home. She finished immunotherapy 2 months prior and is now on hydroxyurea & Jakafi. In the ED pt had a temp of 102. CXR showed b/l basilar infiltrates.  No chest pain, palpitations,  light headedness/dizziness, chills, abdominal pain, n/v, diarrhea/constipation, dysuria or increased urinary frequency.

## 2019-12-17 NOTE — CONSULT NOTE ADULT - SUBJECTIVE AND OBJECTIVE BOX
REASON FOR CONSULTATION:     HPI:  Pt. is an 88 year old female with sig. pmh of internal/external hemorrhoids, IBS, myelofibrosis presents with diarrhea and rectal bleeding x 2 days. Pt. had one episode in ER as per ER physician. pt. is from [place], and staff noted large amounts of blood in the toilet after patient had a BM.  Nursing staff at [place] also noted that patient had rectal prolapse. pt. did not have any abd. pain. no n/v. no fever. no cp. no cough, no sob.  Patient's daughter states she has a history of internal and external hemorrhoids for over 40 years, and occasionally has large amounts of bright red blood when toileting. Ct of the abdomen was done on 1/23/19 for concern of GI bleed. Scan showed no signs of GI bleed and thickening of the rectal and distal sigmoid colon. NO recent antibiotic use per record. Pt. Follows with hem/ onc Dr. Montes. It appears pt's Hb runs around 6.5 to 6.9 and platelets 60 to 70's as per Dr. Montes's note from last admission.  Today in ER Hb is 7.6. pt. was [facility] on 1/25/19 after she was admitted for blood transfusion for her anemia. (05 Feb 2019 00:12)      REVIEW OF SYSTEMS:  Constitutional, Eyes, ENT, Cardiovascular, Respiratory, Gastrointestinal, Genitourinary, Musculoskeletal, Integumentary, Neurological, Psychiatric, Endocrine, Heme/Lymph, and Allergic/Immunologic review of systems are otherwise negative except as noted in the HPI.    PAST MEDICAL & SURGICAL HISTORY:  Acute deep vein thrombosis (DVT) of right upper extremity, unspecified vein  Fall, sequela  Pelvic fracture  Myelofibrosis  Thrombocytosis: ANNMARIE 2 thrombocytosis  Hypothyroidism (acquired)  Myelodysplastic syndrome  H/O total vaginal hysterectomy      FAMILY HISTORY:  No pertinent family history in first degree relatives      SOCIAL HISTORY:    Allergies    No Known Allergies    Intolerances        MEDICATIONS  (STANDING):  escitalopram 5 milliGRAM(s) Oral daily  jakafi ( ruxolitinib) 10 milliGRAM(s) 10 milliGRAM(s) Oral daily  levothyroxine 75 MICROGram(s) Oral daily  mirtazapine 45 milliGRAM(s) Oral at bedtime  pantoprazole    Tablet 40 milliGRAM(s) Oral before breakfast    MEDICATIONS  (PRN):      < end of copied text >  Vital Signs Last 24 Hrs  T(C): 37.1 (05 Feb 2019 04:20), Max: 37.7 (04 Feb 2019 18:38)  T(F): 98.7 (05 Feb 2019 04:20), Max: 99.8 (04 Feb 2019 18:38)  HR: 85 (05 Feb 2019 04:20) (85 - 103)  BP: 129/59 (05 Feb 2019 04:20) (118/71 - 149/55)  BP(mean): --  RR: 18 (05 Feb 2019 04:20) (16 - 20)  SpO2: 96% (05 Feb 2019 04:20) (93% - 96%)    PHYSICAL EXAM:    GENERAL: NAD, well-groomed, well-developed  HEAD:  Atraumatic, Normocephalic  EYES: EOMI, PERRLA,   ENMT: moist mucus membranes  NECK: Supple,  NERVOUS SYSTEM:  Alert & Oriented X3, Good concentration;   CHEST/LUNG: Clear to auscultation bilaterally;  HEART: Regular rate and rhythm;   ABDOMEN: Soft, Nontender,   EXTREMITIES:  no edema  LYMPH: No lymphadenopathy noted  SKIN: No rashes or lesions      LABS:                        7.6    11.8  )-----------( 47       ( 04 Feb 2019 19:06 )             24.5     02-04    133<L>  |  98  |  19.0  ----------------------------<  106  3.6   |  23.0  |  0.61    Ca    8.4<L>      04 Feb 2019 19:06    TPro  8.1  /  Alb  2.9<L>  /  TBili  0.5  /  DBili  x   /  AST  14  /  ALT  7   /  AlkPhos  139<H>  02-04    PT/INR - ( 04 Feb 2019 19:06 )   PT: 15.1 sec;   INR: 1.30 ratio         PTT - ( 04 Feb 2019 19:06 )  PTT:27.1 sec        RADIOLOGY & ADDITIONAL STUDIES:  < from: Xray Chest 1 View AP/PA. (12.17.19 @ 12:30) >   EXAM:  XR CHEST AP OR PA 1V                          PROCEDURE DATE:  12/17/2019          INTERPRETATION:  History: Shortness of breath and cough    Portable radiograph of the chest is performed. comparison is made to   8/27/2019.    The heart is not enlarged. The trachea is midline. Again seen is an ovoid   mass in the right lower lung at the inferior hilar level similar to the   prior study. There are increased markings at the lung bases which may be   due to positioning and correlation withlateral radiograph is   recommended. There is osteopenia and degenerative changes of bony   structures.    Impression: Increased markings at the lung bases left greater than right   new from the prior study questionably technical. Recommend lateral   radiograph  Stable nodule right lung base.    < end of copied text >

## 2019-12-17 NOTE — CONSULT NOTE ADULT - ASSESSMENT
89 yr old female with known history of JAK2+ myelofibrosis with anemia, IBS, hypothyroidism, dementia, depression presents with cough and possible pneumonia on CXR- start Zosyn Vanco- can deescalate later, normal lactate -    ARF? unknown baseline - hold IVF with elevated BNP- monitor   DVt Ppx- heparin  Myelofibrosis with anemia- chronic stable since last visit 89 yr old female with known history of JAK2+ myelofibrosis with anemia, IBS, hypothyroidism, dementia, depression presents with cough and possible pneumonia on CXR- start Zosyn Vanco- can deescalate later, normal lactate -    ARF? unknown baseline - hold IVF with elevated BNP- monitor, hold protonix and meloxicam   DVt Ppx- heparin  Myelofibrosis with anemia- chronic stable since last visit   hypothyroidism- synthroid  Hemorrhoids cont rectal steroids

## 2019-12-17 NOTE — ED ADULT TRIAGE NOTE - CHIEF COMPLAINT QUOTE
non productive cough since November, some tachypnea. Was sent for chest x-ray and transfusion which she gets every few months. No chest pain

## 2019-12-17 NOTE — ED PROVIDER NOTE - OBJECTIVE STATEMENT
88 yo F hx of myelofibrosis and Myelodysplastic syndrome brought in for low hb and persistent cough. Patient get transfusion regularly. Hb from yesterday 6.7. Family also report peristent non-productive cough since Thanksgiving but worse the past week. No O2 use at home. No fever. no abdominal pain. She finished immunotherapy 2 months prior and is now on hydroxyuria.     oncology:

## 2019-12-18 ENCOUNTER — APPOINTMENT (OUTPATIENT)
Age: 84
End: 2019-12-18

## 2019-12-18 LAB
ANION GAP SERPL CALC-SCNC: 11 MMOL/L — SIGNIFICANT CHANGE UP (ref 5–17)
B PERT DNA SPEC QL NAA+PROBE: SIGNIFICANT CHANGE UP
BASOPHILS # BLD AUTO: 0.14 K/UL — SIGNIFICANT CHANGE UP (ref 0–0.2)
BASOPHILS NFR BLD AUTO: 0.1 % — SIGNIFICANT CHANGE UP (ref 0–2)
BUN SERPL-MCNC: 36 MG/DL — HIGH (ref 8–20)
C PNEUM DNA SPEC QL NAA+PROBE: SIGNIFICANT CHANGE UP
CALCIUM SERPL-MCNC: 7.5 MG/DL — LOW (ref 8.6–10.2)
CHLORIDE SERPL-SCNC: 101 MMOL/L — SIGNIFICANT CHANGE UP (ref 98–107)
CO2 SERPL-SCNC: 18 MMOL/L — LOW (ref 22–29)
CREAT SERPL-MCNC: 2.11 MG/DL — HIGH (ref 0.5–1.3)
EOSINOPHIL # BLD AUTO: 2.06 K/UL — HIGH (ref 0–0.5)
EOSINOPHIL NFR BLD AUTO: 2 % — SIGNIFICANT CHANGE UP (ref 0–6)
FLUAV H1 2009 PAND RNA SPEC QL NAA+PROBE: SIGNIFICANT CHANGE UP
FLUAV H1 RNA SPEC QL NAA+PROBE: SIGNIFICANT CHANGE UP
FLUAV H3 RNA SPEC QL NAA+PROBE: SIGNIFICANT CHANGE UP
FLUAV SUBTYP SPEC NAA+PROBE: SIGNIFICANT CHANGE UP
FLUBV RNA SPEC QL NAA+PROBE: SIGNIFICANT CHANGE UP
GLUCOSE SERPL-MCNC: 97 MG/DL — SIGNIFICANT CHANGE UP (ref 70–115)
HADV DNA SPEC QL NAA+PROBE: SIGNIFICANT CHANGE UP
HCOV PNL SPEC NAA+PROBE: SIGNIFICANT CHANGE UP
HCT VFR BLD CALC: 23.6 % — LOW (ref 34.5–45)
HGB BLD-MCNC: 8.1 G/DL — LOW (ref 11.5–15.5)
HMPV RNA SPEC QL NAA+PROBE: SIGNIFICANT CHANGE UP
HPIV1 RNA SPEC QL NAA+PROBE: SIGNIFICANT CHANGE UP
HPIV2 RNA SPEC QL NAA+PROBE: SIGNIFICANT CHANGE UP
HPIV3 RNA SPEC QL NAA+PROBE: SIGNIFICANT CHANGE UP
HPIV4 RNA SPEC QL NAA+PROBE: SIGNIFICANT CHANGE UP
IMM GRANULOCYTES NFR BLD AUTO: 21.7 % — HIGH (ref 0–1.5)
LYMPHOCYTES # BLD AUTO: 8 % — LOW (ref 13–44)
LYMPHOCYTES # BLD AUTO: 8.36 K/UL — HIGH (ref 1–3.3)
MAGNESIUM SERPL-MCNC: 1.9 MG/DL — SIGNIFICANT CHANGE UP (ref 1.6–2.6)
MCHC RBC-ENTMCNC: 34.3 GM/DL — SIGNIFICANT CHANGE UP (ref 32–36)
MCHC RBC-ENTMCNC: 34.5 PG — HIGH (ref 27–34)
MCV RBC AUTO: 100.4 FL — HIGH (ref 80–100)
MONOCYTES # BLD AUTO: 55.32 K/UL — HIGH (ref 0–0.9)
MONOCYTES NFR BLD AUTO: 53.2 % — HIGH (ref 2–14)
NEUTROPHILS # BLD AUTO: 15.48 K/UL — HIGH (ref 1.8–7.4)
NEUTROPHILS NFR BLD AUTO: 15 % — LOW (ref 43–77)
PHOSPHATE SERPL-MCNC: 3.9 MG/DL — SIGNIFICANT CHANGE UP (ref 2.4–4.7)
PLATELET # BLD AUTO: 117 K/UL — LOW (ref 150–400)
POTASSIUM SERPL-MCNC: 4.3 MMOL/L — SIGNIFICANT CHANGE UP (ref 3.5–5.3)
POTASSIUM SERPL-SCNC: 4.3 MMOL/L — SIGNIFICANT CHANGE UP (ref 3.5–5.3)
RAPID RVP RESULT: DETECTED
RBC # BLD: 2.35 M/UL — LOW (ref 3.8–5.2)
RBC # FLD: 23.2 % — HIGH (ref 10.3–14.5)
RSV RNA SPEC QL NAA+PROBE: DETECTED
RV+EV RNA SPEC QL NAA+PROBE: SIGNIFICANT CHANGE UP
SODIUM SERPL-SCNC: 130 MMOL/L — LOW (ref 135–145)
WBC # BLD: 103.96 K/UL — CRITICAL HIGH (ref 3.8–10.5)
WBC # FLD AUTO: 103.96 K/UL — CRITICAL HIGH (ref 3.8–10.5)

## 2019-12-18 PROCEDURE — 99232 SBSQ HOSP IP/OBS MODERATE 35: CPT

## 2019-12-18 PROCEDURE — 99223 1ST HOSP IP/OBS HIGH 75: CPT

## 2019-12-18 PROCEDURE — 99497 ADVNCD CARE PLAN 30 MIN: CPT | Mod: 25

## 2019-12-18 RX ORDER — PIPERACILLIN AND TAZOBACTAM 4; .5 G/20ML; G/20ML
3.38 INJECTION, POWDER, LYOPHILIZED, FOR SOLUTION INTRAVENOUS EVERY 12 HOURS
Refills: 0 | Status: DISCONTINUED | OUTPATIENT
Start: 2019-12-18 | End: 2019-12-20

## 2019-12-18 RX ORDER — PIPERACILLIN AND TAZOBACTAM 4; .5 G/20ML; G/20ML
3.38 INJECTION, POWDER, LYOPHILIZED, FOR SOLUTION INTRAVENOUS ONCE
Refills: 0 | Status: COMPLETED | OUTPATIENT
Start: 2019-12-18 | End: 2019-12-18

## 2019-12-18 RX ORDER — PIPERACILLIN AND TAZOBACTAM 4; .5 G/20ML; G/20ML
2.25 INJECTION, POWDER, LYOPHILIZED, FOR SOLUTION INTRAVENOUS EVERY 6 HOURS
Refills: 0 | Status: DISCONTINUED | OUTPATIENT
Start: 2019-12-18 | End: 2019-12-18

## 2019-12-18 RX ORDER — PIPERACILLIN AND TAZOBACTAM 4; .5 G/20ML; G/20ML
2.25 INJECTION, POWDER, LYOPHILIZED, FOR SOLUTION INTRAVENOUS ONCE
Refills: 0 | Status: DISCONTINUED | OUTPATIENT
Start: 2019-12-18 | End: 2019-12-18

## 2019-12-18 RX ADMIN — Medication 1 TABLET(S): at 08:24

## 2019-12-18 RX ADMIN — HYDROXYUREA 1000 MILLIGRAM(S): 500 CAPSULE ORAL at 08:23

## 2019-12-18 RX ADMIN — ESCITALOPRAM OXALATE 5 MILLIGRAM(S): 10 TABLET, FILM COATED ORAL at 08:23

## 2019-12-18 RX ADMIN — Medication 1 TABLET(S): at 08:23

## 2019-12-18 RX ADMIN — PANTOPRAZOLE SODIUM 40 MILLIGRAM(S): 20 TABLET, DELAYED RELEASE ORAL at 08:23

## 2019-12-18 RX ADMIN — HEPARIN SODIUM 5000 UNIT(S): 5000 INJECTION INTRAVENOUS; SUBCUTANEOUS at 23:34

## 2019-12-18 RX ADMIN — PIPERACILLIN AND TAZOBACTAM 25 GRAM(S): 4; .5 INJECTION, POWDER, LYOPHILIZED, FOR SOLUTION INTRAVENOUS at 23:00

## 2019-12-18 RX ADMIN — PIPERACILLIN AND TAZOBACTAM 200 GRAM(S): 4; .5 INJECTION, POWDER, LYOPHILIZED, FOR SOLUTION INTRAVENOUS at 10:04

## 2019-12-18 RX ADMIN — Medication 500 MILLIGRAM(S): at 08:24

## 2019-12-18 RX ADMIN — MIRTAZAPINE 45 MILLIGRAM(S): 45 TABLET, ORALLY DISINTEGRATING ORAL at 08:24

## 2019-12-18 RX ADMIN — PIPERACILLIN AND TAZOBACTAM 25 GRAM(S): 4; .5 INJECTION, POWDER, LYOPHILIZED, FOR SOLUTION INTRAVENOUS at 04:51

## 2019-12-18 RX ADMIN — Medication 1 APPLICATION(S): at 23:00

## 2019-12-18 RX ADMIN — Medication 75 MICROGRAM(S): at 06:06

## 2019-12-18 NOTE — PROGRESS NOTE ADULT - ASSESSMENT
89 yo F hx of myelofibrosis and Myelodysplastic syndrome  CAME TO ER BECAUSE OF PERSISTENT COUGH  AND LOW HB  PT GETS TRANSFUSION   ALMOST MONTHLY NOW     PT HAS BEEN COUGHING ON AND OFF SINCE THANKSGIVING    DENIES FEVERS  CXR ? PNEUMONIA   BLOOD CX X2 SETS R/O BACTEREMIA  CANNOT USE WBC AS MARKER BECAUSE OF MDS    REVIEWED CT SCAN WITH RADIOLOGY CT CHEST  WITH AREAS OF INFILTRATE  IN ADDITION TO RSV     Procalcitonin LMINIMALLY ELVATED     PLAN EMPIRIC ZOSYN   IN THIS IMMUNOCOMPROMISED PATIENT   OVERALL IMPROVED

## 2019-12-18 NOTE — CONSULT NOTE ADULT - ASSESSMENT
89F with h/o MDS (s/p azacytidine, on hydroxyurea and Jakfi), chronic transfusions, resident of [facility] admitted with acute anemia and sepsis workup. Found with +RSV and PNA, on IV antibiotic. Leukocytosis also concerning for possible MDS transforming into AML. Per heme/onc recommend supportive therapy. Palliative consulted to assist with goc.     PLAN    RSV/Bacterial PNA  - on IV antibiotic Zosyn  - O2 supplement and cough suppressant PRN    MDS   - dx ~5 yrs ago, s/p treatment with azacytidine (last dose ~2 months ago)  - on hydroxyurea and Jakafi (on hold)  - heme/onc input noted, concern for transformation into AML, recommend supportive care  - dtr recalled conversation held with oncologist Dr. Mosqueda yesterday, frustrated on "why the jakafi" is being held, wants to know when it can be resumed  - dtr is awaiting to hear back from primary oncologist Dr. Montes/ or his PA regarding treatment options     Dyspnea   - comfortable on O2 supplement, was not on any prior O2 dependence    Depression  - on escitalopram and mirtazapine    Palliative Care Encounter  Met with pt and dtr to introduce palliative service. Pt appears to have limited insight into her hospital course and medical history, often defers to her dtr. Pt is more concerned about when she can return home ( back to [place]). Dtr informed me that she is awaiting to hear back from oncology, Dr Montes or his PA, regarding mother's treatment options. Per dtr, mother had tolerated azacytidine well with minimal side effects and had been doing fairly well at [facility]. At baseline, pt does require assistance with ADLs (toileting, dressing, bathing), has private aide with her.     Advance directives reviewed. Pt has HCP form and living will at home, proxies designated to her 2 daughters Rita and Raven Kelly. I asked if any specific wishes were discussed in living will regarding life support interventions (ex. CPR and mechanical intubation). Pt and dtr both confirmed her wishes for trial of both CPR/intubation to give best chance at recovery but would not want to be prolonged on life support if no meaningful recovery. Remains full code at this time.     Ongoing goc discussion pending oncology f/u from pt/family.

## 2019-12-18 NOTE — PROGRESS NOTE ADULT - SUBJECTIVE AND OBJECTIVE BOX
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 Patient get transfusion weekly at Dr. Montes's office. Hb from yesterday 6.7. :t last got transfused 2 weeks ago. Family also reports peristent non-productive cough since Thanksgiving but worse the past week .along with SOB & fatigue No O2 use at home. She finished immunotherapy 2 months prior and is now on hydroxyurea & Jakafi. In the ED pt had a temp of 102. CXR showed b/l basilar infiltrates.  No chest pain, palpitations,  light headedness/dizziness, chills, abdominal pain, n/v, diarrhea/constipation, dysuria or increased urinary frequency. (17 Dec 2019 19:14)      SUBJECTIVE & OBJECTIVE/ ROS: Pt seen and examined at bedside. c/o cough & SOB. No chest pain, palpitation, light headedness/dizziness,fevers/chills, abdominal pain, n/v, diarrhea/constipation, dysuria or increased urinary frequency. Daughter at bedside     ICU Vital Signs Last 24 Hrs  T(C): 37.2 (18 Dec 2019 16:39), Max: 37.2 (18 Dec 2019 16:39)  T(F): 98.9 (18 Dec 2019 16:39), Max: 98.9 (18 Dec 2019 16:39)  HR: 99 (18 Dec 2019 16:39) (80 - 100)  BP: 118/70 (18 Dec 2019 16:39) (100/58 - 127/68)  BP(mean): 76 (18 Dec 2019 03:44) (76 - 86)  ABP: --  ABP(mean): --  RR: 18 (18 Dec 2019 16:39) (18 - 20)  SpO2: 96% (18 Dec 2019 16:39) (92% - 96%)        MEDICATIONS  (STANDING):  ascorbic acid 500 milliGRAM(s) Oral daily  calcium carbonate    500 mG (Tums) Chewable 1 Tablet(s) Chew daily  escitalopram 5 milliGRAM(s) Oral daily  heparin  Injectable 5000 Unit(s) SubCutaneous every 12 hours  hydrocortisone 2.5% Rectal Cream 1 Application(s) Rectal three times a day  hydroxyurea 1000 milliGRAM(s) Oral daily  levothyroxine 75 MICROGram(s) Oral daily  mirtazapine 45 milliGRAM(s) Oral daily  multivitamin 1 Tablet(s) Oral daily  pantoprazole    Tablet 40 milliGRAM(s) Oral before breakfast  piperacillin/tazobactam IVPB.. 3.375 Gram(s) IV Intermittent every 12 hours  sodium chloride 0.9%. 1000 milliLiter(s) (75 mL/Hr) IV Continuous <Continuous>    MEDICATIONS  (PRN):  acetaminophen   Tablet .. 650 milliGRAM(s) Oral every 6 hours PRN Temp greater or equal to 38C (100.4F), Mild Pain (1 - 3)  hydrocodone/homatropine Syrup 5 milliLiter(s) Oral three times a day PRN Cough      LABS:                        8.1    103.96 )-----------( 117      ( 18 Dec 2019 07:05 )             23.6     12-18    130<L>  |  101  |  36.0<H>  ----------------------------<  97  4.3   |  18.0<L>  |  2.11<H>    Ca    7.5<L>      18 Dec 2019 07:05  Phos  3.9     12-18  Mg     1.9     12-18    TPro  8.2  /  Alb  3.2<L>  /  TBili  0.6  /  DBili  x   /  AST  62<H>  /  ALT  37<H>  /  AlkPhos  114  12-17    PT/INR - ( 17 Dec 2019 11:48 )   PT: 18.6 sec;   INR: 1.59 ratio         PTT - ( 17 Dec 2019 11:48 )  PTT:31.8 sec      CAPILLARY BLOOD GLUCOSE          RECENT CULTURES:      RADIOLOGY & ADDITIONAL TESTS:      PHYSICAL EXAM:    GENERAL: NAD, well-groomed, well-developed  HEAD:  Atraumatic, Normocephalic  EYES: EOMI, PERRLA, conjunctiva and sclera clear  NECK: Supple, No JVD  NERVOUS SYSTEM:  Alert & Oriented X3, Motor Strength 5/5 B/L upper and lower extremities; DTRs 2+ intact and symmetric  CHEST/LUNG: b/l rhonchi; No rales, wheezing, or rubs  HEART: Regular rate and rhythm; No murmurs, rubs, or gallops  ABDOMEN: Soft, Nontender, Nondistended; Bowel sounds present  EXTREMITIES:  2+ Peripheral Pulses, 1+  edema b/l L > R  SKIN: No rashes or lesions

## 2019-12-18 NOTE — PROGRESS NOTE ADULT - SUBJECTIVE AND OBJECTIVE BOX
INFECTIOUS DISEASES AND INTERNAL MEDICINE at [place]  =======================================================  Manny Maldonado MD  Diplomates American Board of Internal Medicine and Infectious Diseases  Telephone [phone]  Fax            [phone]  =======================================================    KEVIN BARAJAS [id number]    Follow up: pneumonia    Allergies:  No Known Allergies      Medications:  acetaminophen   Tablet .. 650 milliGRAM(s) Oral every 6 hours PRN  ascorbic acid 500 milliGRAM(s) Oral daily  calcium carbonate    500 mG (Tums) Chewable 1 Tablet(s) Chew daily  escitalopram 5 milliGRAM(s) Oral daily  heparin  Injectable 5000 Unit(s) SubCutaneous every 12 hours  hydrocodone/homatropine Syrup 5 milliLiter(s) Oral three times a day PRN  hydrocortisone 2.5% Rectal Cream 1 Application(s) Rectal three times a day  hydroxyurea 1000 milliGRAM(s) Oral daily  levothyroxine 75 MICROGram(s) Oral daily  mirtazapine 45 milliGRAM(s) Oral daily  multivitamin 1 Tablet(s) Oral daily  pantoprazole    Tablet 40 milliGRAM(s) Oral before breakfast  piperacillin/tazobactam IVPB.. 3.375 Gram(s) IV Intermittent every 12 hours  sodium chloride 0.9%. 1000 milliLiter(s) IV Continuous <Continuous>    SOCIAL       FAMILY   FAMILY HISTORY:  No pertinent family history in first degree relatives    REVIEW OF SYSTEMS:  CONSTITUTIONAL:  No Fever or chills  HEENT:   No diplopia or blurred vision.  No earache, sore throat or runny nose.  CARDIOVASCULAR:  No pressure, squeezing, strangling, tightness, heaviness or aching about the chest, neck, axilla or epigastrium.  RESPIRATORY:  No cough, shortness of breath, PND or orthopnea.  GASTROINTESTINAL:  No nausea, vomiting or diarrhea.  GENITOURINARY:  No dysuria, frequency or urgency. No Blood in urine  MUSCULOSKELETAL:   AS PER HPI  SKIN:  No change in skin, hair or nails.  NEUROLOGIC:  No paresthesias, fasciculations, seizures or weakness.  PSYCHIATRIC:  No disorder of thought or mood.  ENDOCRINE:  No heat or cold intolerance, polyuria or polydipsia.  HEMATOLOGICAL:  No easy bruising or bleeding.            Physical Exam:  ICU Vital Signs Last 24 Hrs  T(C): 36.7 (18 Dec 2019 08:41), Max: 38.9 (17 Dec 2019 16:55)  T(F): 98 (18 Dec 2019 08:41), Max: 102 (17 Dec 2019 16:55)  HR: 85 (18 Dec 2019 08:41) (80 - 103)  BP: 126/79 (18 Dec 2019 08:41) (100/58 - 126/79)  BP(mean): 76 (18 Dec 2019 03:44) (76 - 86)  ABP: --  ABP(mean): --  RR: 18 (18 Dec 2019 08:41) (18 - 20)  SpO2: 96% (18 Dec 2019 08:41) (92% - 96%)    GEN: NAD,   HEENT: normocephalic and atraumatic. EOMI. JOHN.    NECK: Supple. No carotid bruits.  No lymphadenopathy or thyromegaly.  LUNGS: Clear to auscultation.  HEART: Regular rate and rhythm without murmur.  ABDOMEN: Soft, nontender, and nondistended.  Positive bowel sounds.    : No CVA tenderness  EXTREMITIES: Without any cyanosis, clubbing, rash, lesions or edema.  MSK: no joint swelling  NEUROLOGIC: Cranial nerves II through XII are grossly intact.  PSYCHIATRIC: Appropriate affect .  SKIN: No ulceration or induration present.        Labs:  12-18    130<L>  |  101  |  36.0<H>  ----------------------------<  97  4.3   |  18.0<L>  |  2.11<H>    Ca    7.5<L>      18 Dec 2019 07:05  Phos  3.9     12-18  Mg     1.9     12-18    TPro  8.2  /  Alb  3.2<L>  /  TBili  0.6  /  DBili  x   /  AST  62<H>  /  ALT  37<H>  /  AlkPhos  114  12-17                          8.1    103.96 )-----------( 117      ( 18 Dec 2019 07:05 )             23.6       PT/INR - ( 17 Dec 2019 11:48 )   PT: 18.6 sec;   INR: 1.59 ratio         PTT - ( 17 Dec 2019 11:48 )  PTT:31.8 sec    LIVER FUNCTIONS - ( 17 Dec 2019 11:48 )  Alb: 3.2 g/dL / Pro: 8.2 g/dL / ALK PHOS: 114 U/L / ALT: 37 U/L / AST: 62 U/L / GGT: x           CARDIAC MARKERS ( 17 Dec 2019 11:48 )  x     / 0.01 ng/mL / x     / x     / x          CAPILLARY BLOOD GLUCOSE            RECENT CULTURES:  12-17 @ 21:26          Detected

## 2019-12-18 NOTE — CONSULT NOTE ADULT - SUBJECTIVE AND OBJECTIVE BOX
PALLIATIVE CONSULT    CC: Patient is a 89y old  Female who presents with a chief complaint of cough (18 Dec 2019 10:15)    HPI:  Mrs. Chen is an 89 year old female resident of [facility] (x2 yrs with private aide) with PMHx of MDS/myelofibrosis (dx ~5 yrs ago, s/p azacytidine last infusion ~8 weeks ago, currently on hydroxyurea and Jakafi), chronic transfusions presented with acute anemia and persistent cough. In ER, found with +RSV and CXR/CT chest concerning for PNA. Started on IV antibiotics. Seen by oncology, concerned that MDS is transforming into AML and recommending supportive care. Palliative consult to assist with goc.     Pt seen and examined this afternoon with medical resident Dr. Quigley. Dtr Tiffany present. Pt asking when she can return to [place] as she does not want to be here. Pt is a poor historian of her medical history. Info obtained from chart, medical staff and dtr.     Present Symptoms:   Dyspnea:  No, on O2NC 5L (no prior O2 dependence)  Nausea/Vomiting:  No  Anxiety:   No  Depression:  No  Fatigue: Yes   Loss of appetite: Yes  but never had a good appetite at baseline  Pain: No          Review of Systems: As per HPI.  All others negative    PERTINENT PMH REVIEWED: Yes     PAST MEDICAL & SURGICAL HISTORY:  Anemia  Acute deep vein thrombosis (DVT) of right upper extremity, unspecified vein  Fall, sequela  Pelvic fracture  Myelofibrosis  Thrombocytosis: ANNMARIE 2 thrombocytosis  Hypothyroidism (acquired)  Myelodysplastic syndrome  H/O total vaginal hysterectomy      SOCIAL HISTORY:    Admitted from: [facility] x 2 yrs  - children: 2 dtrs    Baseline ADLs (prior to admission):  mostly Dependent   Karnofsky: 40%    Surrogate/HCP/Guardian:   - per pt, HCPs are her two dtr Tiffany ([phone]) and Jannie Kelly  - family has both HCP and living will at home, will bring a copy in for our records    ADVANCE DIRECTIVES: FULL CODE    FAMILY HISTORY:  No pertinent family history in first degree relatives       Allergies    No Known Allergies    Intolerances    MEDICATIONS  (STANDING):  ascorbic acid 500 milliGRAM(s) Oral daily  calcium carbonate    500 mG (Tums) Chewable 1 Tablet(s) Chew daily  escitalopram 5 milliGRAM(s) Oral daily  heparin  Injectable 5000 Unit(s) SubCutaneous every 12 hours  hydrocortisone 2.5% Rectal Cream 1 Application(s) Rectal three times a day  hydroxyurea 1000 milliGRAM(s) Oral daily  levothyroxine 75 MICROGram(s) Oral daily  mirtazapine 45 milliGRAM(s) Oral daily  multivitamin 1 Tablet(s) Oral daily  pantoprazole    Tablet 40 milliGRAM(s) Oral before breakfast  piperacillin/tazobactam IVPB.. 3.375 Gram(s) IV Intermittent every 12 hours  sodium chloride 0.9%. 1000 milliLiter(s) (75 mL/Hr) IV Continuous <Continuous>    MEDICATIONS  (PRN):  acetaminophen   Tablet .. 650 milliGRAM(s) Oral every 6 hours PRN Temp greater or equal to 38C (100.4F), Mild Pain (1 - 3)  hydrocodone/homatropine Syrup 5 milliLiter(s) Oral three times a day PRN Cough      PHYSICAL EXAM:    Vital Signs Last 24 Hrs  T(C): 37.2 (18 Dec 2019 16:39), Max: 38.9 (17 Dec 2019 16:55)  T(F): 98.9 (18 Dec 2019 16:39), Max: 102 (17 Dec 2019 16:55)  HR: 99 (18 Dec 2019 16:39) (80 - 103)  BP: 118/70 (18 Dec 2019 16:39) (100/58 - 127/68)  BP(mean): 76 (18 Dec 2019 03:44) (76 - 86)  RR: 18 (18 Dec 2019 16:39) (18 - 20)  SpO2: 96% (18 Dec 2019 16:39) (92% - 96%)    General: Resting comfortably. No acute distress.   HEENT: mucous membrane dry   Lungs: comfortable. non-labored. O2NC  CV: +s1/s2. RRR  GI:+ bowel sound. abdomen soft, non-tender, non-distended.  MSK: Moves all 4 extremities.  No cyanosis. +BLE edema. weakness.   Neuro: nonfocal. Awake and alert, orientedx2.   Skin: warm and dry. pale    LABS:                        8.1    103.96 )-----------( 117      ( 18 Dec 2019 07:05 )             23.6     12-18    130<L>  |  101  |  36.0<H>  ----------------------------<  97  4.3   |  18.0<L>  |  2.11<H>    Ca    7.5<L>      18 Dec 2019 07:05  Phos  3.9     12-18  Mg     1.9     12-18    TPro  8.2  /  Alb  3.2<L>  /  TBili  0.6  /  DBili  x   /  AST  62<H>  /  ALT  37<H>  /  AlkPhos  114  12-17    PT/INR - ( 17 Dec 2019 11:48 )   PT: 18.6 sec;   INR: 1.59 ratio         PTT - ( 17 Dec 2019 11:48 )  PTT:31.8 sec      RADIOLOGY & ADDITIONAL STUDIES:      CXR 12/17/19  The heart is not enlarged. The trachea is midline. Again seen is an ovoid   mass in the right lower lung at the inferior hilar level similar to the   prior study. There are increased markings at the lung bases which may be   due to positioning and correlation with lateral radiograph is   recommended. There is osteopenia and degenerative changes of bony   structures.    Impression: Increased markings at the lung bases left greater than right   new from the prior study questionably technical. Recommend lateral   radiograph  Stable nodule right lung base.    CT Chest 12/17/19  FINDINGS:   Lungs/airways: Central airways are patent. Peribronchial thickening in   the lower lobes. Bilateral lower lobe and lingular opacities which are   likely partially atelectasis, however likely superimposed pneumonia. 2.9   x 1.5 cm lobular nodule in the right middle lobe, unchanged since   12/28/2018, however increased in size since 9/29/2017, previously 1.5 x   0.6 cm when remeasured with similar technique. Scattered groundglass   nodules in the right upper lobe including a representative 5 mm nodule   (series 2 image 26), unchanged. Small opacity the posterior aspect of the   right upper lobe (series 2 image 70), likely infectious in etiology.  Pleural space: No pleural effusion.  Heart: Normal size. No pericardial effusion. Aortic root and mitral   annular calcification.  Vessels: Thoracic ureter normal in caliber with mild calcified plaque.   Coronary artery calcifications.  Mediastinum/james: Enlarged precarinal lymph node measuring 1.7 x 1.1 cm   (series 3 image 61).  Chest wall/lower neck: Thyroid prominent in size. No enlarged axillary   lymph nodes.  Upper abdomen: Rim calcified structures again seen in the spleen   measuring up to 2.1 cm.  Bones/joints: Degenerative changes in both shoulders. Old bilateral rib   fractures. Hemangiomas in the upper thoracic spine.      IMPRESSION:   Bilateral lung opacities, greatest in the lower lobes, which are at least   partially atelectasis, however likely with superimposed pneumonia.    Lobular right middle lobe nodule measuring 2.9 cm, unchanged since   12/28/2018, however increased in size since 9/29/2017; a PET/CT is   recommended for further evaluation following treatment of the lung opacities.    Right upper lobe groundglass nodules, unchanged; continued attention is   recommended on follow-up studies.        Thank you for the opportunity to assist with the care of this patient.   [facility] [phone].

## 2019-12-18 NOTE — PROGRESS NOTE ADULT - ASSESSMENT
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 Patient get transfusion weekly at Dr. Montes's office. Hb from yesterday 6.7. :t last got transfused 2 weeks ago. Family also reports peristent non-productive cough since Thanksgiving but worse the past week .along with SOB & fatigue No O2 use at home. She finished immunotherapy 2 months prior and is now on hydroxyurea & Jakafi. In the ED pt had a temp of 102. CXR showed b/l basilar infiltrates.  No chest pain, palpitations,  light headedness/dizziness, chills, abdominal pain, n/v, diarrhea/constipation, dysuria or increased urinary frequency.         Sepsis due to b/l Gram Positive/ Gram negative/ RSV PNA in an Immunocompromised pt-  c/w IV Zosyn  CT chest: Bilateral lung opacities, greatest in the lower lobes, which are at least partially atelectasis, however likely with superimposed pneumonia.Lobular right middle lobe nodule measuring 2.9 cm, unchanged since 12/28/2018, however increased in size since 9/29/2017; a PET/CT is   recommended for further evaluation following treatment of the lung opacities.Right upper lobe groundglass nodules, unchanged; continued attention is   recommended on follow-up studies.  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 Mosqueda for consult  increase Hydroxyurea 500 mg to twice a day  -hold jakafi for now  palliative care called as this is MDS likely in transformation to AML - s/p POD on azacytidine.  Pt is full code for now  Supportive care recommended as per hemonc      ELENI- likely prerenal  NS x 1 L  f/u BMP in am      DVT ppx

## 2019-12-19 ENCOUNTER — APPOINTMENT (OUTPATIENT)
Age: 84
End: 2019-12-19

## 2019-12-19 LAB
ANION GAP SERPL CALC-SCNC: 14 MMOL/L — SIGNIFICANT CHANGE UP (ref 5–17)
ANISOCYTOSIS BLD QL: SLIGHT — SIGNIFICANT CHANGE UP
BASOPHILS # BLD AUTO: 1.93 K/UL — HIGH (ref 0–0.2)
BASOPHILS NFR BLD AUTO: 1.7 % — SIGNIFICANT CHANGE UP (ref 0–2)
BLASTS # FLD: 25.4 % — HIGH (ref 0–0)
BUN SERPL-MCNC: 35 MG/DL — HIGH (ref 8–20)
CALCIUM SERPL-MCNC: 7.8 MG/DL — LOW (ref 8.6–10.2)
CHLORIDE SERPL-SCNC: 102 MMOL/L — SIGNIFICANT CHANGE UP (ref 98–107)
CO2 SERPL-SCNC: 18 MMOL/L — LOW (ref 22–29)
CREAT SERPL-MCNC: 1.94 MG/DL — HIGH (ref 0.5–1.3)
EOSINOPHIL # BLD AUTO: 2.96 K/UL — HIGH (ref 0–0.5)
EOSINOPHIL NFR BLD AUTO: 2.6 % — SIGNIFICANT CHANGE UP (ref 0–6)
GLUCOSE SERPL-MCNC: 74 MG/DL — SIGNIFICANT CHANGE UP (ref 70–115)
HCT VFR BLD CALC: 25 % — LOW (ref 34.5–45)
HGB BLD-MCNC: 8.3 G/DL — LOW (ref 11.5–15.5)
LYMPHOCYTES # BLD AUTO: 11 % — LOW (ref 13–44)
LYMPHOCYTES # BLD AUTO: 12.5 K/UL — HIGH (ref 1–3.3)
MACROCYTES BLD QL: SLIGHT — SIGNIFICANT CHANGE UP
MAGNESIUM SERPL-MCNC: 1.9 MG/DL — SIGNIFICANT CHANGE UP (ref 1.6–2.6)
MANUAL SMEAR VERIFICATION: SIGNIFICANT CHANGE UP
MCHC RBC-ENTMCNC: 33.1 PG — SIGNIFICANT CHANGE UP (ref 27–34)
MCHC RBC-ENTMCNC: 33.2 GM/DL — SIGNIFICANT CHANGE UP (ref 32–36)
MCV RBC AUTO: 99.6 FL — SIGNIFICANT CHANGE UP (ref 80–100)
METAMYELOCYTES # FLD: 2.5 % — HIGH (ref 0–0)
MONOCYTES # BLD AUTO: 48.19 K/UL — HIGH (ref 0–0.9)
MONOCYTES NFR BLD AUTO: 42.4 % — HIGH (ref 2–14)
MYELOCYTES NFR BLD: 3.4 % — HIGH (ref 0–0)
NEUTROPHILS # BLD AUTO: 10.57 K/UL — HIGH (ref 1.8–7.4)
NEUTROPHILS NFR BLD AUTO: 9.3 % — LOW (ref 43–77)
PHOSPHATE SERPL-MCNC: 3.5 MG/DL — SIGNIFICANT CHANGE UP (ref 2.4–4.7)
PLAT MORPH BLD: NORMAL — SIGNIFICANT CHANGE UP
PLATELET # BLD AUTO: 86 K/UL — LOW (ref 150–400)
POIKILOCYTOSIS BLD QL AUTO: SLIGHT — SIGNIFICANT CHANGE UP
POTASSIUM SERPL-MCNC: 4.5 MMOL/L — SIGNIFICANT CHANGE UP (ref 3.5–5.3)
POTASSIUM SERPL-SCNC: 4.5 MMOL/L — SIGNIFICANT CHANGE UP (ref 3.5–5.3)
RBC # BLD: 2.51 M/UL — LOW (ref 3.8–5.2)
RBC # FLD: 23.6 % — HIGH (ref 10.3–14.5)
RBC BLD AUTO: ABNORMAL
SCHISTOCYTES BLD QL AUTO: SLIGHT — SIGNIFICANT CHANGE UP
SMUDGE CELLS # BLD: PRESENT — SIGNIFICANT CHANGE UP
SODIUM SERPL-SCNC: 134 MMOL/L — LOW (ref 135–145)
VARIANT LYMPHS # BLD: 1.7 % — SIGNIFICANT CHANGE UP (ref 0–6)
WBC # BLD: 113.66 K/UL — CRITICAL HIGH (ref 3.8–10.5)
WBC # FLD AUTO: 113.66 K/UL — CRITICAL HIGH (ref 3.8–10.5)

## 2019-12-19 PROCEDURE — 99232 SBSQ HOSP IP/OBS MODERATE 35: CPT

## 2019-12-19 RX ADMIN — PANTOPRAZOLE SODIUM 40 MILLIGRAM(S): 20 TABLET, DELAYED RELEASE ORAL at 06:40

## 2019-12-19 RX ADMIN — Medication 75 MICROGRAM(S): at 06:41

## 2019-12-19 RX ADMIN — Medication 1 APPLICATION(S): at 06:41

## 2019-12-19 RX ADMIN — Medication 1 TABLET(S): at 15:52

## 2019-12-19 RX ADMIN — HYDROXYUREA 1000 MILLIGRAM(S): 500 CAPSULE ORAL at 11:38

## 2019-12-19 RX ADMIN — PIPERACILLIN AND TAZOBACTAM 25 GRAM(S): 4; .5 INJECTION, POWDER, LYOPHILIZED, FOR SOLUTION INTRAVENOUS at 18:11

## 2019-12-19 RX ADMIN — Medication 1 APPLICATION(S): at 15:52

## 2019-12-19 RX ADMIN — MIRTAZAPINE 45 MILLIGRAM(S): 45 TABLET, ORALLY DISINTEGRATING ORAL at 11:38

## 2019-12-19 RX ADMIN — ESCITALOPRAM OXALATE 5 MILLIGRAM(S): 10 TABLET, FILM COATED ORAL at 15:50

## 2019-12-19 RX ADMIN — Medication 1 TABLET(S): at 11:38

## 2019-12-19 RX ADMIN — HEPARIN SODIUM 5000 UNIT(S): 5000 INJECTION INTRAVENOUS; SUBCUTANEOUS at 15:50

## 2019-12-19 RX ADMIN — PIPERACILLIN AND TAZOBACTAM 25 GRAM(S): 4; .5 INJECTION, POWDER, LYOPHILIZED, FOR SOLUTION INTRAVENOUS at 11:38

## 2019-12-19 RX ADMIN — Medication 500 MILLIGRAM(S): at 15:52

## 2019-12-19 RX ADMIN — HEPARIN SODIUM 5000 UNIT(S): 5000 INJECTION INTRAVENOUS; SUBCUTANEOUS at 22:42

## 2019-12-19 NOTE — PROGRESS NOTE ADULT - SUBJECTIVE AND OBJECTIVE BOX
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 Patient get transfusion weekly at Dr. Montes's office. Hb from yesterday 6.7. :t last got transfused 2 weeks ago. Family also reports peristent non-productive cough since Thanksgiving but worse the past week .along with SOB & fatigue No O2 use at home. She finished immunotherapy 2 months prior and is now on hydroxyurea & Jakafi. In the ED pt had a temp of 102. CXR showed b/l basilar infiltrates.  No chest pain, palpitations,  light headedness/dizziness, chills, abdominal pain, n/v, diarrhea/constipation, dysuria or increased urinary frequency. (17 Dec 2019 19:14)      SUBJECTIVE & OBJECTIVE/ ROS: Pt seen and examined at bedside. Cough & SOB improved. Afebrile. No chest pain, palpitations,  light headedness/dizziness,fevers/chills, abdominal pain, n/v, diarrhea/constipation, dysuria or increased urinary frequency.     ICU Vital Signs Last 24 Hrs  T(C): 36.4 (19 Dec 2019 10:10), Max: 37.2 (18 Dec 2019 16:39)  T(F): 97.6 (19 Dec 2019 10:10), Max: 98.9 (18 Dec 2019 16:39)  HR: 93 (19 Dec 2019 10:10) (93 - 106)  BP: 98/52 (19 Dec 2019 10:10) (98/52 - 131/66)  BP(mean): --  ABP: --  ABP(mean): --  RR: 18 (19 Dec 2019 10:10) (18 - 20)  SpO2: 92% (19 Dec 2019 10:10) (92% - 96%)        MEDICATIONS  (STANDING):  ascorbic acid 500 milliGRAM(s) Oral daily  calcium carbonate    500 mG (Tums) Chewable 1 Tablet(s) Chew daily  escitalopram 5 milliGRAM(s) Oral daily  heparin  Injectable 5000 Unit(s) SubCutaneous every 12 hours  hydrocortisone 2.5% Rectal Cream 1 Application(s) Rectal three times a day  hydroxyurea 1000 milliGRAM(s) Oral daily  levothyroxine 75 MICROGram(s) Oral daily  mirtazapine 45 milliGRAM(s) Oral daily  multivitamin 1 Tablet(s) Oral daily  pantoprazole    Tablet 40 milliGRAM(s) Oral before breakfast  piperacillin/tazobactam IVPB.. 3.375 Gram(s) IV Intermittent every 12 hours  sodium chloride 0.9%. 1000 milliLiter(s) (75 mL/Hr) IV Continuous <Continuous>    MEDICATIONS  (PRN):  acetaminophen   Tablet .. 650 milliGRAM(s) Oral every 6 hours PRN Temp greater or equal to 38C (100.4F), Mild Pain (1 - 3)  hydrocodone/homatropine Syrup 5 milliLiter(s) Oral three times a day PRN Cough      LABS:                        8.3    113.66 )-----------( 86       ( 19 Dec 2019 06:40 )             25.0     12-19    134<L>  |  102  |  35.0<H>  ----------------------------<  74  4.5   |  18.0<L>  |  1.94<H>    Ca    7.8<L>      19 Dec 2019 06:40  Phos  3.5     12-19  Mg     1.9     12-19            CAPILLARY BLOOD GLUCOSE          RECENT CULTURES:      RADIOLOGY & ADDITIONAL TESTS:      PHYSICAL EXAM:    GENERAL: NAD, well-groomed, well-developed  HEAD:  Atraumatic, Normocephalic  EYES: EOMI, PERRLA, conjunctiva and sclera clear  NECK: Supple, No JVD  NERVOUS SYSTEM:  Alert & Oriented X3, Motor Strength 5/5 B/L upper and lower extremities; DTRs 2+ intact and symmetric  CHEST/LUNG: b/l rhonchi; No rales, wheezing, or rubs  HEART: Regular rate and rhythm; No murmurs, rubs, or gallops  ABDOMEN: Soft, Nontender, Nondistended; Bowel sounds present  EXTREMITIES:  2+ Peripheral Pulses, 1+  edema b/l L > R  SKIN: No rashes or lesions

## 2019-12-19 NOTE — PROGRESS NOTE ADULT - ASSESSMENT
88 yo F hx of myelofibrosis and Myelodysplastic syndrome  CAME TO ER BECAUSE OF PERSISTENT COUGH  AND LOW HB  PT GETS TRANSFUSION   ALMOST MONTHLY NOW     PT HAS BEEN COUGHING ON AND OFF SINCE THANKSGIVING    DENIES FEVERS     BLOOD CX X2 SETs so far neg   CANNOT USE WBC AS MARKER BECAUSE OF MDS    REVIEWED CT SCAN WITH RADIOLOGY CT CHEST  WITH AREAS OF INFILTRATE  IN ADDITION TO RSV     Procalcitonin MINIMALLY ELVATED     PLAN EMPIRIC ZOSYN   IN THIS IMMUNOCOMPROMISED PATIENT   OVERALL IMPROVED  WILL FOLLOW  UP WITH [facility]THERR ECOMMENDATIONS

## 2019-12-19 NOTE — PROGRESS NOTE ADULT - SUBJECTIVE AND OBJECTIVE BOX
INFECTIOUS DISEASES AND INTERNAL MEDICINE at [facility]  =======================================================  Manny Maldonado MD  Diplomates American Board of Internal Medicine and Infectious Diseases  Telephone [phone]  Fax            [phone]  =======================================================    KEVIN BARAJAS [id number]    Follow up: pneumonia    Allergies:  No Known Allergies      Medications:  acetaminophen   Tablet .. 650 milliGRAM(s) Oral every 6 hours PRN  ascorbic acid 500 milliGRAM(s) Oral daily  calcium carbonate    500 mG (Tums) Chewable 1 Tablet(s) Chew daily  escitalopram 5 milliGRAM(s) Oral daily  heparin  Injectable 5000 Unit(s) SubCutaneous every 12 hours  hydrocodone/homatropine Syrup 5 milliLiter(s) Oral three times a day PRN  hydrocortisone 2.5% Rectal Cream 1 Application(s) Rectal three times a day  hydroxyurea 1000 milliGRAM(s) Oral daily  levothyroxine 75 MICROGram(s) Oral daily  mirtazapine 45 milliGRAM(s) Oral daily  multivitamin 1 Tablet(s) Oral daily  pantoprazole    Tablet 40 milliGRAM(s) Oral before breakfast  piperacillin/tazobactam IVPB.. 3.375 Gram(s) IV Intermittent every 12 hours  sodium chloride 0.9%. 1000 milliLiter(s) IV Continuous <Continuous>    SOCIAL       FAMILY   FAMILY HISTORY:  No pertinent family history in first degree relatives    REVIEW OF SYSTEMS:  CONSTITUTIONAL:  No Fever or chills  HEENT:   No diplopia or blurred vision.  No earache, sore throat or runny nose.  CARDIOVASCULAR:  No pressure, squeezing, strangling, tightness, heaviness or aching about the chest, neck, axilla or epigastrium.  RESPIRATORY:  No cough, shortness of breath, PND or orthopnea.  GASTROINTESTINAL:  No nausea, vomiting or diarrhea.  GENITOURINARY:  No dysuria, frequency or urgency. No Blood in urine  MUSCULOSKELETAL:   AS PER HPI  SKIN:  No change in skin, hair or nails.  NEUROLOGIC:  No paresthesias, fasciculations, seizures or weakness.  PSYCHIATRIC:  No disorder of thought or mood.  ENDOCRINE:  No heat or cold intolerance, polyuria or polydipsia.  HEMATOLOGICAL:  No easy bruising or bleeding.            Physical Exam:  I Vital Signs Last 24 Hrs  T(C): 36.4 (19 Dec 2019 10:10), Max: 36.9 (18 Dec 2019 21:00)  T(F): 97.6 (19 Dec 2019 10:10), Max: 98.4 (18 Dec 2019 21:00)  HR: 93 (19 Dec 2019 10:10) (93 - 106)  BP: 98/52 (19 Dec 2019 10:10) (98/52 - 131/66)  BP(mean): --  RR: 18 (19 Dec 2019 10:10) (18 - 20)  SpO2: 92% (19 Dec 2019 10:10) (92% - 95%)    GEN: NAD,   HEENT: normocephalic and atraumatic. EOMI. JOHN.    NECK: Supple. No carotid bruits.  No lymphadenopathy or thyromegaly.  LUNGS: Clear to auscultation.  HEART: Regular rate and rhythm without murmur.  ABDOMEN: Soft, nontender, and nondistended.  Positive bowel sounds.    : No CVA tenderness  EXTREMITIES: Without any cyanosis, clubbing, rash, lesions or edema.  MSK: no joint swelling  NEUROLOGIC: Cranial nerves II through XII are grossly intact.  PSYCHIATRIC: Appropriate affect .  SKIN: No ulceration or induration present.        Labs:                         8.3    113.66 )-----------( 86       ( 19 Dec 2019 06:40 )             25.0   12-19    134<L>  |  102  |  35.0<H>  ----------------------------<  74  4.5   |  18.0<L>  |  1.94<H>    Ca    7.8<L>      19 Dec 2019 06:40  Phos  3.5     12-19  Mg     1.9     12-19                   CARDIAC MARKERS ( 17 Dec 2019 11:48 )  x     / 0.01 ng/mL / x     / x     / x          CAPILLARY BLOOD GLUCOSE            RECENT CULTURES:  12-17 @ 21:26          Detected

## 2019-12-19 NOTE — PROGRESS NOTE ADULT - ASSESSMENT
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 Patient get transfusion weekly at Dr. Montes's office. Hb from yesterday 6.7. :t last got transfused 2 weeks ago. Family also reports peristent non-productive cough since Thanksgiving but worse the past week .along with SOB & fatigue No O2 use at home. She finished immunotherapy 2 months prior and is now on hydroxyurea & Jakafi. In the ED pt had a temp of 102. CXR showed b/l basilar infiltrates.  No chest pain, palpitations,  light headedness/dizziness, chills, abdominal pain, n/v, diarrhea/constipation, dysuria or increased urinary frequency.         Sepsis due to b/l Gram Positive/ Gram negative/ RSV PNA in an Immunocompromised pt-  c/w IV Zosyn (renal dosing)  CT chest: Bilateral lung opacities, greatest in the lower lobes, which are at least partially atelectasis, however likely with superimposed pneumonia.Lobular right middle lobe nodule measuring 2.9 cm, unchanged since 12/28/2018, however increased in size since 9/29/2017; a PET/CT is   recommended for further evaluation following treatment of the lung opacities.Right upper lobe groundglass nodules, unchanged; continued attention is   recommended on follow-up studies.  f/u Blood cx, UA, Ucx, urine legionella AG, sputum cx, RVP  ID consult appreciated  Lactate wnl  Tylenol for temps      Anemia/ Thrombocytopenia due to MDS/ myelofibrosis-  Transfuse 2 units  Hg stable  Monitor platelets  Tylenol & benadryl with every transfusion      MDS/ myelofibrosis with possible transformation to AML-  hemonc appreciated  c/w Hydroxyurea 500 mg to twice a day  -hold jakafi for now  palliative care called as this is MDS likely in transformation to AML - s/p POD on azacytidine.  Pt is full code for now  Supportive care recommended as per hemonc      ELENI on CKD stage 4- likely prerenal  s/p NS x 1 L  f/u BMP in am    LLE- duplex ordered but pt refused.       DVT ppx

## 2019-12-20 ENCOUNTER — TRANSCRIPTION ENCOUNTER (OUTPATIENT)
Age: 84
End: 2019-12-20

## 2019-12-20 ENCOUNTER — APPOINTMENT (OUTPATIENT)
Age: 84
End: 2019-12-20

## 2019-12-20 LAB
ANION GAP SERPL CALC-SCNC: 15 MMOL/L — SIGNIFICANT CHANGE UP (ref 5–17)
BASOPHILS # BLD AUTO: 0.16 K/UL — SIGNIFICANT CHANGE UP (ref 0–0.2)
BASOPHILS NFR BLD AUTO: 0.2 % — SIGNIFICANT CHANGE UP (ref 0–2)
BUN SERPL-MCNC: 44 MG/DL — HIGH (ref 8–20)
CALCIUM SERPL-MCNC: 7.5 MG/DL — LOW (ref 8.6–10.2)
CHLORIDE SERPL-SCNC: 99 MMOL/L — SIGNIFICANT CHANGE UP (ref 98–107)
CO2 SERPL-SCNC: 17 MMOL/L — LOW (ref 22–29)
CREAT SERPL-MCNC: 2.02 MG/DL — HIGH (ref 0.5–1.3)
EOSINOPHIL # BLD AUTO: 1.47 K/UL — HIGH (ref 0–0.5)
EOSINOPHIL NFR BLD AUTO: 1.5 % — SIGNIFICANT CHANGE UP (ref 0–6)
GLUCOSE SERPL-MCNC: 98 MG/DL — SIGNIFICANT CHANGE UP (ref 70–115)
HCT VFR BLD CALC: 24.7 % — LOW (ref 34.5–45)
HGB BLD-MCNC: 8.2 G/DL — LOW (ref 11.5–15.5)
IMM GRANULOCYTES NFR BLD AUTO: 39.9 % — HIGH (ref 0–1.5)
LYMPHOCYTES # BLD AUTO: 11.32 K/UL — HIGH (ref 1–3.3)
LYMPHOCYTES # BLD AUTO: 11.4 % — LOW (ref 13–44)
MAGNESIUM SERPL-MCNC: 1.9 MG/DL — SIGNIFICANT CHANGE UP (ref 1.6–2.6)
MCHC RBC-ENTMCNC: 33.1 PG — SIGNIFICANT CHANGE UP (ref 27–34)
MCHC RBC-ENTMCNC: 33.2 GM/DL — SIGNIFICANT CHANGE UP (ref 32–36)
MCV RBC AUTO: 99.6 FL — SIGNIFICANT CHANGE UP (ref 80–100)
MONOCYTES # BLD AUTO: 35.8 K/UL — HIGH (ref 0–0.9)
MONOCYTES NFR BLD AUTO: 35.9 % — HIGH (ref 2–14)
NEUTROPHILS # BLD AUTO: 11.13 K/UL — HIGH (ref 1.8–7.4)
NEUTROPHILS NFR BLD AUTO: 11.1 % — LOW (ref 43–77)
NT-PROBNP SERPL-SCNC: 915 PG/ML — HIGH (ref 0–300)
PHOSPHATE SERPL-MCNC: 4.4 MG/DL — SIGNIFICANT CHANGE UP (ref 2.4–4.7)
PLATELET # BLD AUTO: 68 K/UL — LOW (ref 150–400)
POTASSIUM SERPL-MCNC: 4.2 MMOL/L — SIGNIFICANT CHANGE UP (ref 3.5–5.3)
POTASSIUM SERPL-SCNC: 4.2 MMOL/L — SIGNIFICANT CHANGE UP (ref 3.5–5.3)
RBC # BLD: 2.48 M/UL — LOW (ref 3.8–5.2)
RBC # FLD: 23.4 % — HIGH (ref 10.3–14.5)
SODIUM SERPL-SCNC: 131 MMOL/L — LOW (ref 135–145)
WBC # BLD: 99.62 K/UL — CRITICAL HIGH (ref 3.8–10.5)
WBC # FLD AUTO: 99.62 K/UL — CRITICAL HIGH (ref 3.8–10.5)

## 2019-12-20 PROCEDURE — 99232 SBSQ HOSP IP/OBS MODERATE 35: CPT

## 2019-12-20 PROCEDURE — 71045 X-RAY EXAM CHEST 1 VIEW: CPT | Mod: 26

## 2019-12-20 RX ORDER — CEFPODOXIME PROXETIL 100 MG
200 TABLET ORAL EVERY 24 HOURS
Refills: 0 | Status: DISCONTINUED | OUTPATIENT
Start: 2019-12-20 | End: 2019-12-20

## 2019-12-20 RX ORDER — PIPERACILLIN AND TAZOBACTAM 4; .5 G/20ML; G/20ML
2.25 INJECTION, POWDER, LYOPHILIZED, FOR SOLUTION INTRAVENOUS EVERY 6 HOURS
Refills: 0 | Status: DISCONTINUED | OUTPATIENT
Start: 2019-12-20 | End: 2019-12-20

## 2019-12-20 RX ORDER — PIPERACILLIN AND TAZOBACTAM 4; .5 G/20ML; G/20ML
3.38 INJECTION, POWDER, LYOPHILIZED, FOR SOLUTION INTRAVENOUS EVERY 12 HOURS
Refills: 0 | Status: DISCONTINUED | OUTPATIENT
Start: 2019-12-20 | End: 2019-12-23

## 2019-12-20 RX ORDER — FUROSEMIDE 40 MG
40 TABLET ORAL ONCE
Refills: 0 | Status: COMPLETED | OUTPATIENT
Start: 2019-12-20 | End: 2019-12-20

## 2019-12-20 RX ORDER — IPRATROPIUM/ALBUTEROL SULFATE 18-103MCG
3 AEROSOL WITH ADAPTER (GRAM) INHALATION ONCE
Refills: 0 | Status: COMPLETED | OUTPATIENT
Start: 2019-12-20 | End: 2019-12-20

## 2019-12-20 RX ADMIN — Medication 75 MICROGRAM(S): at 06:36

## 2019-12-20 RX ADMIN — Medication 3 MILLILITER(S): at 03:15

## 2019-12-20 RX ADMIN — Medication 500 MILLIGRAM(S): at 09:00

## 2019-12-20 RX ADMIN — Medication 1 TABLET(S): at 09:00

## 2019-12-20 RX ADMIN — PIPERACILLIN AND TAZOBACTAM 25 GRAM(S): 4; .5 INJECTION, POWDER, LYOPHILIZED, FOR SOLUTION INTRAVENOUS at 17:55

## 2019-12-20 RX ADMIN — MIRTAZAPINE 45 MILLIGRAM(S): 45 TABLET, ORALLY DISINTEGRATING ORAL at 09:00

## 2019-12-20 RX ADMIN — PIPERACILLIN AND TAZOBACTAM 25 GRAM(S): 4; .5 INJECTION, POWDER, LYOPHILIZED, FOR SOLUTION INTRAVENOUS at 06:36

## 2019-12-20 RX ADMIN — PANTOPRAZOLE SODIUM 40 MILLIGRAM(S): 20 TABLET, DELAYED RELEASE ORAL at 06:36

## 2019-12-20 RX ADMIN — Medication 40 MILLIGRAM(S): at 17:55

## 2019-12-20 RX ADMIN — ESCITALOPRAM OXALATE 5 MILLIGRAM(S): 10 TABLET, FILM COATED ORAL at 09:00

## 2019-12-20 RX ADMIN — HEPARIN SODIUM 5000 UNIT(S): 5000 INJECTION INTRAVENOUS; SUBCUTANEOUS at 22:22

## 2019-12-20 RX ADMIN — HEPARIN SODIUM 5000 UNIT(S): 5000 INJECTION INTRAVENOUS; SUBCUTANEOUS at 09:00

## 2019-12-20 NOTE — PROGRESS NOTE ADULT - SUBJECTIVE AND OBJECTIVE BOX
FOLLOW UP VISIT    INTERVAL HPI/OVERNIGHT EVENTS:  Pt resting comfortably in chair. She offers no acute complaint. Eager to return back to [facility].      Present Symptoms:   Dyspnea:  No, on O2NC  Nausea/Vomiting:  No  Anxiety:  Yes, intermittent  Depression:  No  Fatigue:  No  Loss of appetite: Yes , poor baseline appetite  Constipation: No  Pain: No    Review of Systems: Reviewed, All others negative    MEDICATIONS  (STANDING):  ascorbic acid 500 milliGRAM(s) Oral daily  calcium carbonate    500 mG (Tums) Chewable 1 Tablet(s) Chew daily  cefpodoxime 200 milliGRAM(s) Oral every 24 hours  escitalopram 5 milliGRAM(s) Oral daily  heparin  Injectable 5000 Unit(s) SubCutaneous every 12 hours  hydrocortisone 2.5% Rectal Cream 1 Application(s) Rectal three times a day  hydroxyurea 1000 milliGRAM(s) Oral daily  levothyroxine 75 MICROGram(s) Oral daily  mirtazapine 45 milliGRAM(s) Oral daily  multivitamin 1 Tablet(s) Oral daily  pantoprazole    Tablet 40 milliGRAM(s) Oral before breakfast  sodium chloride 0.9%. 1000 milliLiter(s) (75 mL/Hr) IV Continuous <Continuous>    MEDICATIONS  (PRN):  acetaminophen   Tablet .. 650 milliGRAM(s) Oral every 6 hours PRN Temp greater or equal to 38C (100.4F), Mild Pain (1 - 3)  hydrocodone/homatropine Syrup 5 milliLiter(s) Oral three times a day PRN Cough      PHYSICAL EXAM:    Vital Signs Last 24 Hrs  T(C): 37.2 (20 Dec 2019 09:26), Max: 37.2 (20 Dec 2019 09:26)  T(F): 98.9 (20 Dec 2019 09:26), Max: 98.9 (20 Dec 2019 09:26)  HR: 90 (20 Dec 2019 09:26) (82 - 106)  BP: 118/70 (20 Dec 2019 09:26) (100/54 - 120/60)  BP(mean): --  RR: 16 (20 Dec 2019 09:26) (16 - 24)  SpO2: 96% (20 Dec 2019 09:26) (89% - 96%)    General: Resting comfortably. No acute distress.   HEENT: mucous membrane moist  Lungs: comfortable. non-labored. O2NC  CV: +s1/s2. RRR  GI:+ bowel sound. abdomen soft, non-tender, non-distended.  MSK: Moves all 4 extremities.  No cyanosis. +BLE edema. weakness.   Neuro: nonfocal. Awake and alert, orientedx2.  Skin: warm and dry. pale    LABS:                   8.2    99.62 )-----------( 68       ( 20 Dec 2019 06:59 )             24.7     12-20    131<L>  |  99  |  44.0<H>  ----------------------------<  98  4.2   |  17.0<L>  |  2.02<H>    Ca    7.5<L>      20 Dec 2019 06:59  Phos  4.4     12-20  Mg     1.9     12-20    RADIOLOGY & ADDITIONAL STUDIES: Reviewed     ADVANCE DIRECTIVES: FULL CODE

## 2019-12-20 NOTE — PROGRESS NOTE ADULT - SUBJECTIVE AND OBJECTIVE BOX
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 Patient get transfusion weekly at Dr. Montes's office. Hb from yesterday 6.7. :t last got transfused 2 weeks ago. Family also reports peristent non-productive cough since Thanksgiving but worse the past week .along with SOB & fatigue No O2 use at home. She finished immunotherapy 2 months prior and is now on hydroxyurea & Jakafi. In the ED pt had a temp of 102. CXR showed b/l basilar infiltrates.  No chest pain, palpitations,  light headedness/dizziness, chills, abdominal pain, n/v, diarrhea/constipation, dysuria or increased urinary frequency. (17 Dec 2019 19:14)      SUBJECTIVE & OBJECTIVE/ ROS: Pt seen and examined at bedside. Cough & SOB improved as per pt but pt desaturates to 85 while on RA. Not on home O2. Afebrile. No chest pain, palpitations,  light headedness/dizziness,fevers/chills, abdominal pain, n/v, diarrhea/constipation, dysuria or increased urinary frequency.     ICU Vital Signs Last 24 Hrs  T(C): 36.4 (19 Dec 2019 10:10), Max: 37.2 (18 Dec 2019 16:39)  T(F): 97.6 (19 Dec 2019 10:10), Max: 98.9 (18 Dec 2019 16:39)  HR: 93 (19 Dec 2019 10:10) (93 - 106)  BP: 98/52 (19 Dec 2019 10:10) (98/52 - 131/66)  BP(mean): --  ABP: --  ABP(mean): --  RR: 18 (19 Dec 2019 10:10) (18 - 20)  SpO2: 92% (19 Dec 2019 10:10) (92% - 96%)        MEDICATIONS  (STANDING):  ascorbic acid 500 milliGRAM(s) Oral daily  calcium carbonate    500 mG (Tums) Chewable 1 Tablet(s) Chew daily  escitalopram 5 milliGRAM(s) Oral daily  heparin  Injectable 5000 Unit(s) SubCutaneous every 12 hours  hydrocortisone 2.5% Rectal Cream 1 Application(s) Rectal three times a day  hydroxyurea 1000 milliGRAM(s) Oral daily  levothyroxine 75 MICROGram(s) Oral daily  mirtazapine 45 milliGRAM(s) Oral daily  multivitamin 1 Tablet(s) Oral daily  pantoprazole    Tablet 40 milliGRAM(s) Oral before breakfast  piperacillin/tazobactam IVPB.. 3.375 Gram(s) IV Intermittent every 12 hours  sodium chloride 0.9%. 1000 milliLiter(s) (75 mL/Hr) IV Continuous <Continuous>    MEDICATIONS  (PRN):  acetaminophen   Tablet .. 650 milliGRAM(s) Oral every 6 hours PRN Temp greater or equal to 38C (100.4F), Mild Pain (1 - 3)  hydrocodone/homatropine Syrup 5 milliLiter(s) Oral three times a day PRN Cough      LABS:                        8.3    113.66 )-----------( 86       ( 19 Dec 2019 06:40 )             25.0     12-19    134<L>  |  102  |  35.0<H>  ----------------------------<  74  4.5   |  18.0<L>  |  1.94<H>    Ca    7.8<L>      19 Dec 2019 06:40  Phos  3.5     12-19  Mg     1.9     12-19            CAPILLARY BLOOD GLUCOSE          RECENT CULTURES:      RADIOLOGY & ADDITIONAL TESTS:      PHYSICAL EXAM:    GENERAL: NAD, well-groomed, well-developed  HEAD:  Atraumatic, Normocephalic  EYES: EOMI, PERRLA, conjunctiva and sclera clear  NECK: Supple, No JVD  NERVOUS SYSTEM:  Alert & Oriented X3, Motor Strength 5/5 B/L upper and lower extremities; DTRs 2+ intact and symmetric  CHEST/LUNG: b/l crackles; No rhonchi, wheezing, or rubs  HEART: Regular rate and rhythm; No murmurs, rubs, or gallops  ABDOMEN: Soft, Nontender, Nondistended; Bowel sounds present  EXTREMITIES:  2+ Peripheral Pulses, 1+  edema b/l L > R  SKIN: No rashes or lesions

## 2019-12-20 NOTE — DISCHARGE NOTE NURSING/CASE MANAGEMENT/SOCIAL WORK - PATIENT PORTAL LINK FT
You can access the FollowMyHealth Patient Portal offered by [facility] by registering at the following website: [email or web address]. By joining [facility]’s FollowMyHealth portal, you will also be able to view your health information using other applications (apps) compatible with our system.

## 2019-12-20 NOTE — PHYSICAL THERAPY INITIAL EVALUATION ADULT - ADDITIONAL COMMENTS
Pt is questionable historian. Pt lives at [facility]. She ambulates with walker prior to admission. Pt required assistance with ADL prior to admission.

## 2019-12-20 NOTE — PROGRESS NOTE ADULT - ASSESSMENT
89 yo F hx of myelofibrosis and Myelodysplastic syndrome  CAME TO ER BECAUSE OF PERSISTENT COUGH  AND LOW HB  PT GETS TRANSFUSION   ALMOST MONTHLY NOW     PT HAS BEEN COUGHING ON AND OFF SINCE THANKSGIVING    DENIES FEVERS   BLOOD CX X2 SETs so far neg   CANNOT USE WBC AS MARKER BECAUSE OF MDS    REVIEWED CT SCAN WITH RADIOLOGY CT CHEST  WITH AREAS OF INFILTRATE  IN ADDITION TO RSV     Procalcitonin MINIMALLY ELEVATED      on  EMPIRIC ZOSYN   IN THIS IMMUNOCOMPROMISED PATIENT   OVERALL IMPROVED  POSSIBLE TRANSITION TO ORAL AGENT  WILL  D/W HOSPITALIST 89 yo F hx of myelofibrosis and Myelodysplastic syndrome  CAME TO ER BECAUSE OF PERSISTENT COUGH  AND LOW HB  PT GETS TRANSFUSION   ALMOST MONTHLY NOW     PT HAS BEEN COUGHING ON AND OFF SINCE THANKSGIVING    DENIES FEVERS   BLOOD CX X2 SETs so far neg   CANNOT USE WBC AS MARKER BECAUSE OF MDS    REVIEWED CT SCAN WITH RADIOLOGY CT CHEST  WITH AREAS OF INFILTRATE  IN ADDITION TO RSV     Procalcitonin MINIMALLY ELEVATED      on  EMPIRIC ZOSYN   IN THIS IMMUNOCOMPROMISED PATIENT   OVERALL IMPROVED  POSSIBLE TRANSITION TO ORAL AGENT    D/W HOSPITALIST    will follow up as needed please call if questions

## 2019-12-20 NOTE — PROGRESS NOTE ADULT - ASSESSMENT
89F with h/o MDS (s/p azacytidine, on hydroxyurea and Jakfi), chronic transfusions, resident of [facility] admitted with acute anemia and sepsis workup. Found with +RSV and PNA, on IV antibiotic. Leukocytosis also concerning for possible MDS transforming into AML. Per heme/onc recommend supportive therapy.      PLAN    RSV/Bacterial PNA  - on IV antibiotic Zosyn  - O2 supplement and cough suppressant PRN    MDS   - dx ~5 yrs ago, s/p treatment with azacytidine (last dose ~2 months ago)  - on hydroxyurea and Jakafi (on hold)  - heme/onc input noted, concern for transformation into AML, recommend supportive care    Dyspnea   - stable on O2 supplement  - Pt will need O2 supplement on discharge to [place] given recent PT eval and O2 sat    Depression  - on escitalopram and mirtazapine    Palliative Care Encounter  Pt is comfortable and remains hemodynamically stable. Pt wants to return to [facility]. She has poor insight into her medical conditions. I called her dtr Tiffany at  ([phone]) to follow up with our conversation from 12/18. Tiffany spoke with Dr. Montes's BRENDA Bradshaw, stated that the plan is for pt to get over acute illness and have Jakfi on hold. Pt/dtr plans to follow up with oncology OP to be reevaluated regarding ongoing treatment options. Dtr informed me that she is not ready to consider hospice and does not want their services at this time.     Overall prognosis is guarded with high risk of rehospitalization. If pt is deemed no longer a candidate for any further systemic therapies down the road for her MDS/?AML, would recommend hospice evaluation if pt/family amendable. Psychosocial support provided. All questions answered.

## 2019-12-20 NOTE — PROGRESS NOTE ADULT - ASSESSMENT
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 Patient get transfusion weekly at Dr. Montes's office. Hb from yesterday 6.7. :t last got transfused 2 weeks ago. Family also reports peristent non-productive cough since Thanksgiving but worse the past week .along with SOB & fatigue No O2 use at home. She finished immunotherapy 2 months prior and is now on hydroxyurea & Jakafi. In the ED pt had a temp of 102. CXR showed b/l basilar infiltrates.  No chest pain, palpitations,  light headedness/dizziness, chills, abdominal pain, n/v, diarrhea/constipation, dysuria or increased urinary frequency.         Sepsis due to b/l Gram Positive/ Gram negative/ RSV PNA in an Immunocompromised pt-  c/w IV Zosyn (renal dosing), switch to PO when ready for d/c  CT chest: Bilateral lung opacities, greatest in the lower lobes, which are at least partially atelectasis, however likely with superimposed pneumonia.Lobular right middle lobe nodule measuring 2.9 cm, unchanged since 12/28/2018, however increased in size since 9/29/2017; a PET/CT is   recommended for further evaluation following treatment of the lung opacities.Right upper lobe groundglass nodules, unchanged; continued attention is   recommended on follow-up studies.  Blood cx NGTD, f/u UA, Ucx, urine legionella AG, sputum cx  RSV +ve  ID consult appreciated  Lactate wnl  Tylenol for temps      Possible Fluid overload-  Cough & SOB improved as per pt but pt desaturates to 85 while on RA. Not on home O2. Afebrile.  LE edema noted. Pt refusing LE duplex. Repeat CXR with worsening b/l infiltrates. But pt is afebrile & improving leukocytosis on IV zosyn. Will add one time dose of IV lasix and check for urine output. No hx of CHF but liekly iatrogenic from IVF & PRBC. Monitor BMP while on lasix. f/u Echo. [facility] cardio called.       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 Patient get transfusion weekly at Dr. Montes's office. Hb from yesterday 6.7. :t last got transfused 2 weeks ago. Family also reports peristent non-productive cough since Thanksgiving but worse the past week .along with SOB & fatigue No O2 use at home. She finished immunotherapy 2 months prior and is now on hydroxyurea & Jakafi. In the ED pt had a temp of 102. CXR showed b/l basilar infiltrates.  No chest pain, palpitations,  light headedness/dizziness, chills, abdominal pain, n/v, diarrhea/constipation, dysuria or increased urinary frequency. Pt was found to have Sepsis due to b/l Gram Positive/ Gram negative/ RSV PNA & was started on IV abx per ID. New LE edema noted along with repeat CXR with worsening b/l infiltrates. But pt is afebrile & improving leukocytosis on IV zosyn. Will add one time dose of IV lasix and check for urine output. No hx of CHF but liekly iatrogenic from IVF & PRBC. Monitor BMP while on lasix. f/u Echo. [facility] cardio called.         Sepsis due to b/l Gram Positive/ Gram negative/ RSV PNA in an Immunocompromised pt-  c/w IV Zosyn (renal dosing), switch to PO when ready for d/c  CT chest: Bilateral lung opacities, greatest in the lower lobes, which are at least partially atelectasis, however likely with superimposed pneumonia.Lobular right middle lobe nodule measuring 2.9 cm, unchanged since 12/28/2018, however increased in size since 9/29/2017; a PET/CT is   recommended for further evaluation following treatment of the lung opacities.Right upper lobe groundglass nodules, unchanged; continued attention is   recommended on follow-up studies.  Blood cx NGTD, f/u UA, Ucx, urine legionella AG, sputum cx  RSV +ve  ID consult appreciated  Lactate wnl  Tylenol for temps      Possible Fluid overload-  Cough & SOB improved as per pt but pt desaturates to 85 while on RA. Not on home O2. Afebrile.  LE edema noted. Pt refusing LE duplex. Repeat CXR with worsening b/l infiltrates. But pt is afebrile & improving leukocytosis on IV zosyn. Will add one time dose of IV lasix and check for urine output. No hx of CHF but liekly iatrogenic from IVF & PRBC. Monitor BMP while on lasix. f/u Echo. [facility] cardio called.       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  Pt is full code for now  Supportive care recommended as per hemonc      ELENI on CKD stage 4- likely prerenal  s/p NS x 1 L in the ED, hold off on further IVF  f/u BMP in am  Monitor while on IV lasix    LLE- duplex ordered pt initially refused now agreeable      DVT ppx    Dispo: undetermined for now

## 2019-12-20 NOTE — PROGRESS NOTE ADULT - SUBJECTIVE AND OBJECTIVE BOX
INFECTIOUS DISEASES AND INTERNAL MEDICINE at [place]  =======================================================  Manny Maldonado MD  Diplomates American Board of Internal Medicine and Infectious Diseases  Telephone [phone]  Fax            [phone]  =======================================================    KEVIN BARAJAS [id number]    Follow up: pneumonia    Allergies:  No Known Allergies      Medications:  acetaminophen   Tablet .. 650 milliGRAM(s) Oral every 6 hours PRN  ascorbic acid 500 milliGRAM(s) Oral daily  calcium carbonate    500 mG (Tums) Chewable 1 Tablet(s) Chew daily  escitalopram 5 milliGRAM(s) Oral daily  heparin  Injectable 5000 Unit(s) SubCutaneous every 12 hours  hydrocodone/homatropine Syrup 5 milliLiter(s) Oral three times a day PRN  hydrocortisone 2.5% Rectal Cream 1 Application(s) Rectal three times a day  hydroxyurea 1000 milliGRAM(s) Oral daily  levothyroxine 75 MICROGram(s) Oral daily  mirtazapine 45 milliGRAM(s) Oral daily  multivitamin 1 Tablet(s) Oral daily  pantoprazole    Tablet 40 milliGRAM(s) Oral before breakfast  piperacillin/tazobactam IVPB.. 3.375 Gram(s) IV Intermittent every 12 hours  sodium chloride 0.9%. 1000 milliLiter(s) IV Continuous <Continuous>    SOCIAL       FAMILY   FAMILY HISTORY:  No pertinent family history in first degree relatives    REVIEW OF SYSTEMS:  CONSTITUTIONAL:  No Fever or chills  HEENT:   No diplopia or blurred vision.  No earache, sore throat or runny nose.  CARDIOVASCULAR:  No pressure, squeezing, strangling, tightness, heaviness or aching about the chest, neck, axilla or epigastrium.  RESPIRATORY:  No cough, shortness of breath, PND or orthopnea.  GASTROINTESTINAL:  No nausea, vomiting or diarrhea.  GENITOURINARY:  No dysuria, frequency or urgency. No Blood in urine  MUSCULOSKELETAL:   AS PER HPI  SKIN:  No change in skin, hair or nails.  NEUROLOGIC:  No paresthesias, fasciculations, seizures or weakness.  PSYCHIATRIC:  No disorder of thought or mood.  ENDOCRINE:  No heat or cold intolerance, polyuria or polydipsia.  HEMATOLOGICAL:  No easy bruising or bleeding.            Physical Exam:  I  Vital Signs Last 24 Hrs  T(C): 36.8 (20 Dec 2019 06:32), Max: 36.9 (19 Dec 2019 16:40)  T(F): 98.2 (20 Dec 2019 06:32), Max: 98.5 (19 Dec 2019 16:40)  HR: 89 (20 Dec 2019 06:32) (82 - 106)  BP: 100/54 (20 Dec 2019 06:32) (98/52 - 120/60)  BP(mean): --  RR: 19 (20 Dec 2019 06:32) (18 - 24)  SpO2: 92% (20 Dec 2019 06:32) (89% - 93%)    GEN: NAD,   HEENT: normocephalic and atraumatic. EOMI. JOHN.    NECK: Supple. No carotid bruits.  No lymphadenopathy or thyromegaly.  LUNGS: Clear to auscultation.  HEART: Regular rate and rhythm without murmur.  ABDOMEN: Soft, nontender, and nondistended.  Positive bowel sounds.    : No CVA tenderness  EXTREMITIES: Without any cyanosis, clubbing, rash, lesions or edema.  MSK: no joint swelling  NEUROLOGIC: Cranial nerves II through XII are grossly intact.  PSYCHIATRIC: Appropriate affect .  SKIN: No ulceration or induration present.        Labs:                                                8.2    99.62 )-----------( 68       ( 20 Dec 2019 06:59 )             24.7   12-20    131<L>  |  99  |  44.0<H>  ----------------------------<  98  4.2   |  17.0<L>  |  2.02<H>    Ca    7.5<L>      20 Dec 2019 06:59  Phos  4.4     12-20  Mg     1.9     12-20                RECENT CULTURES:  12-17 @ 21:26          Detected

## 2019-12-20 NOTE — PHYSICAL THERAPY INITIAL EVALUATION ADULT - PERTINENT HX OF CURRENT PROBLEM, REHAB EVAL
88 yo F hx of myelofibrosis and Myelodysplastic syndrome  CAME TO ER BECAUSE OF PERSISTENT COUGH  AND LOW HB

## 2019-12-21 LAB
ANION GAP SERPL CALC-SCNC: 14 MMOL/L — SIGNIFICANT CHANGE UP (ref 5–17)
BASOPHILS # BLD AUTO: 0 K/UL — SIGNIFICANT CHANGE UP (ref 0–0.2)
BASOPHILS NFR BLD AUTO: 0 % — SIGNIFICANT CHANGE UP (ref 0–2)
BLASTS # FLD: 14 % — HIGH (ref 0–0)
BUN SERPL-MCNC: 47 MG/DL — HIGH (ref 8–20)
CALCIUM SERPL-MCNC: 7.2 MG/DL — LOW (ref 8.6–10.2)
CHLORIDE SERPL-SCNC: 99 MMOL/L — SIGNIFICANT CHANGE UP (ref 98–107)
CO2 SERPL-SCNC: 19 MMOL/L — LOW (ref 22–29)
CREAT SERPL-MCNC: 2.22 MG/DL — HIGH (ref 0.5–1.3)
EOSINOPHIL # BLD AUTO: 1.2 K/UL — HIGH (ref 0–0.5)
EOSINOPHIL NFR BLD AUTO: 1 % — SIGNIFICANT CHANGE UP (ref 0–6)
GLUCOSE SERPL-MCNC: 95 MG/DL — SIGNIFICANT CHANGE UP (ref 70–115)
HCT VFR BLD CALC: 25.7 % — LOW (ref 34.5–45)
HGB BLD-MCNC: 8.6 G/DL — LOW (ref 11.5–15.5)
LYMPHOCYTES # BLD AUTO: 19 % — SIGNIFICANT CHANGE UP (ref 13–44)
LYMPHOCYTES # BLD AUTO: 22.75 K/UL — HIGH (ref 1–3.3)
MAGNESIUM SERPL-MCNC: 1.8 MG/DL — SIGNIFICANT CHANGE UP (ref 1.6–2.6)
MCHC RBC-ENTMCNC: 33 PG — SIGNIFICANT CHANGE UP (ref 27–34)
MCHC RBC-ENTMCNC: 33.5 GM/DL — SIGNIFICANT CHANGE UP (ref 32–36)
MCV RBC AUTO: 98.5 FL — SIGNIFICANT CHANGE UP (ref 80–100)
MONOCYTES # BLD AUTO: 29.94 K/UL — HIGH (ref 0–0.9)
MONOCYTES NFR BLD AUTO: 25 % — HIGH (ref 2–14)
NEUTROPHILS # BLD AUTO: 13.17 K/UL — HIGH (ref 1.8–7.4)
NEUTROPHILS NFR BLD AUTO: 11 % — LOW (ref 43–77)
NRBC # BLD: 2 /100 — HIGH (ref 0–0)
PHOSPHATE SERPL-MCNC: 4.6 MG/DL — SIGNIFICANT CHANGE UP (ref 2.4–4.7)
PLATELET # BLD AUTO: 70 K/UL — LOW (ref 150–400)
POTASSIUM SERPL-MCNC: 3.9 MMOL/L — SIGNIFICANT CHANGE UP (ref 3.5–5.3)
POTASSIUM SERPL-SCNC: 3.9 MMOL/L — SIGNIFICANT CHANGE UP (ref 3.5–5.3)
RBC # BLD: 2.61 M/UL — LOW (ref 3.8–5.2)
RBC # FLD: 23.1 % — HIGH (ref 10.3–14.5)
SODIUM SERPL-SCNC: 132 MMOL/L — LOW (ref 135–145)
VARIANT LYMPHS # BLD: 30 % — HIGH (ref 0–6)
WBC # BLD: 119.76 K/UL — CRITICAL HIGH (ref 3.8–10.5)
WBC # FLD AUTO: 119.76 K/UL — CRITICAL HIGH (ref 3.8–10.5)

## 2019-12-21 PROCEDURE — 93970 EXTREMITY STUDY: CPT | Mod: 26

## 2019-12-21 PROCEDURE — 99233 SBSQ HOSP IP/OBS HIGH 50: CPT

## 2019-12-21 RX ORDER — FUROSEMIDE 40 MG
20 TABLET ORAL DAILY
Refills: 0 | Status: DISCONTINUED | OUTPATIENT
Start: 2019-12-21 | End: 2019-12-23

## 2019-12-21 RX ADMIN — Medication 1 TABLET(S): at 11:40

## 2019-12-21 RX ADMIN — Medication 75 MICROGRAM(S): at 06:03

## 2019-12-21 RX ADMIN — HEPARIN SODIUM 5000 UNIT(S): 5000 INJECTION INTRAVENOUS; SUBCUTANEOUS at 23:01

## 2019-12-21 RX ADMIN — HYDROXYUREA 1000 MILLIGRAM(S): 500 CAPSULE ORAL at 17:25

## 2019-12-21 RX ADMIN — HEPARIN SODIUM 5000 UNIT(S): 5000 INJECTION INTRAVENOUS; SUBCUTANEOUS at 11:40

## 2019-12-21 RX ADMIN — PANTOPRAZOLE SODIUM 40 MILLIGRAM(S): 20 TABLET, DELAYED RELEASE ORAL at 06:03

## 2019-12-21 RX ADMIN — MIRTAZAPINE 45 MILLIGRAM(S): 45 TABLET, ORALLY DISINTEGRATING ORAL at 11:40

## 2019-12-21 RX ADMIN — Medication 500 MILLIGRAM(S): at 11:40

## 2019-12-21 RX ADMIN — Medication 600 MILLIGRAM(S): at 17:23

## 2019-12-21 RX ADMIN — ESCITALOPRAM OXALATE 5 MILLIGRAM(S): 10 TABLET, FILM COATED ORAL at 11:40

## 2019-12-21 RX ADMIN — PIPERACILLIN AND TAZOBACTAM 25 GRAM(S): 4; .5 INJECTION, POWDER, LYOPHILIZED, FOR SOLUTION INTRAVENOUS at 06:03

## 2019-12-21 RX ADMIN — PIPERACILLIN AND TAZOBACTAM 25 GRAM(S): 4; .5 INJECTION, POWDER, LYOPHILIZED, FOR SOLUTION INTRAVENOUS at 17:24

## 2019-12-21 RX ADMIN — Medication 20 MILLIGRAM(S): at 17:23

## 2019-12-21 NOTE — PROGRESS NOTE ADULT - SUBJECTIVE AND OBJECTIVE BOX
cc: sob , pna, mds     interval hx: patient seen and eval. comfortable but still sob. also has productive cough and b/l scattered wheezing , no fever or chills. no diarrhea     Vital Signs Last 24 Hrs  T(C): 36.8 (21 Dec 2019 09:35), Max: 36.9 (20 Dec 2019 22:16)  T(F): 98.3 (21 Dec 2019 09:35), Max: 98.4 (20 Dec 2019 22:16)  HR: 98 (21 Dec 2019 09:35) (93 - 99)  BP: 125/73 (21 Dec 2019 09:35) (118/62 - 132/72)  BP(mean): --  RR: 18 (21 Dec 2019 09:35) (18 - 19)  SpO2: 98% (21 Dec 2019 09:35) (92% - 98%)    GENERAL: NAD, well-groomed, well-developed  HEAD:  Atraumatic, Normocephalic  EYES: EOMI, PERRLA, conjunctiva and sclera clear  NECK: Supple, No JVD  NERVOUS SYSTEM:  Alert & Oriented X3, Motor Strength 5/5 B/L upper and lower extremities; DTRs 2+ intact and symmetric  CHEST/LUNG: b/l rhonchi and scattered wheezing   HEART: Regular rate and rhythm; No murmurs, rubs, or gallops  ABDOMEN: Soft, Nontender, Nondistended; Bowel sounds present  EXTREMITIES:  2+ Peripheral Pulses, 1+  edema b/l L > R  SKIN: No rashes or lesions                          8.6    119.76 )-----------( 70       ( 21 Dec 2019 08:59 )             25.7   12-21    132<L>  |  99  |  47.0<H>  ----------------------------<  95  3.9   |  19.0<L>  |  2.22<H>    Ca    7.2<L>      21 Dec 2019 08:59  Phos  4.6     12-21  Mg     1.8     12-21      < from: US Duplex Venous Lower Ext Complete, Bilateral (12.21.19 @ 08:47) >  MPRESSION:     No evidence of deep venous thrombosis in either lower extremity.    2 lymph nodes in the right groin, measuring 1.3 x 1.1 x 1.3 cm and 1.7 x 8.7 x 1.5 cm. Clinical correlation is recommended      < end of copied text >

## 2019-12-21 NOTE — PROGRESS NOTE ADULT - ASSESSMENT
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 montes ,  brought in for low hb and persistent cough. Patient get transfusion weekly at Dr. Montes's office. Hb from yesterday 6.7. :t last got transfused 2 weeks ago. Family also reports peristent non-productive cough since Thanksgiving but worse the past week .along with SOB & fatigue No O2 use at home. She finished immunotherapy 2 months prior and is now on hydroxyurea & Jakafi. In the ED pt had a temp of 102. CXR showed b/l basilar infiltrates.  No chest pain, palpitations,  light headedness/dizziness, chills, abdominal pain, n/v, diarrhea/constipation, dysuria or increased urinary frequency. Pt was found to have Sepsis due to b/l Gram Positive/ Gram negative/ RSV PNA & was started on IV abx per ID. New LE edema noted along with repeat CXR with worsening b/l infiltrates. But pt is afebrile & improving leukocytosis on IV zosyn. Will add one time dose of IV lasix and check for urine output. No hx of CHF but liekly iatrogenic from IVF & PRBC. Monitor BMP while on lasix. f/u Echo. [facility] cardio called.         Sepsis due to b/l PNA in an Immunocompromised pt/ complex pna / rsv positive/ right middle lobe nodule measuring 2.9 cm, unchanged since 12/28/2018, however increased in size since 9/29/2017; a PET/CT  acute hypoxic respiratory failure   c/w IV Zosyn   recommended on follow-up studies.  Blood cx NGTD, f/u UA, Ucx, urine legionella AG, sputum cx  RSV +ve  ID consult appreciated  repeat ct chest as op     Possible Fluid overload-  -f/u echo   -c/w iv lasix   -monitor     Anemia/ Thrombocytopenia due to MDS/ myelofibrosis/ recent dx with AML / follows with dr. montes as op   s/p 2 units PRBCs  monitor   hemonc appreciated  c/w Hydroxyurea 500 mg to twice a day  -hold jakafi for now  palliative care called as this is MDS likely in transformation to AML - s/p POD on azacytidine.  Pt is full code for now  Supportive care recommended as per hemonc      ELENI on CKD stage 4- likely prerenal/ stable   s/p NS x 1 L in the ED, hold off on further IVF  f/u BMP in am  Monitor while on IV lasix      DVT ppx: on sq heparin     dispo : undetermined at this time. plan of care discussed with patient and daughter francis on phone , [phone]

## 2019-12-22 LAB
ALBUMIN SERPL ELPH-MCNC: 2.5 G/DL — LOW (ref 3.3–5.2)
ALP SERPL-CCNC: 209 U/L — HIGH (ref 40–120)
ALT FLD-CCNC: 55 U/L — HIGH
ANION GAP SERPL CALC-SCNC: 15 MMOL/L — SIGNIFICANT CHANGE UP (ref 5–17)
AST SERPL-CCNC: 75 U/L — HIGH
BILIRUB SERPL-MCNC: 0.6 MG/DL — SIGNIFICANT CHANGE UP (ref 0.4–2)
BUN SERPL-MCNC: 46 MG/DL — HIGH (ref 8–20)
CALCIUM SERPL-MCNC: 7.3 MG/DL — LOW (ref 8.6–10.2)
CHLORIDE SERPL-SCNC: 101 MMOL/L — SIGNIFICANT CHANGE UP (ref 98–107)
CO2 SERPL-SCNC: 20 MMOL/L — LOW (ref 22–29)
CREAT SERPL-MCNC: 2.04 MG/DL — HIGH (ref 0.5–1.3)
CULTURE RESULTS: SIGNIFICANT CHANGE UP
CULTURE RESULTS: SIGNIFICANT CHANGE UP
GLUCOSE SERPL-MCNC: 77 MG/DL — SIGNIFICANT CHANGE UP (ref 70–115)
HCT VFR BLD CALC: 22.9 % — LOW (ref 34.5–45)
HGB BLD-MCNC: 7.8 G/DL — LOW (ref 11.5–15.5)
MCHC RBC-ENTMCNC: 33.9 PG — SIGNIFICANT CHANGE UP (ref 27–34)
MCHC RBC-ENTMCNC: 34.1 GM/DL — SIGNIFICANT CHANGE UP (ref 32–36)
MCV RBC AUTO: 99.6 FL — SIGNIFICANT CHANGE UP (ref 80–100)
PLATELET # BLD AUTO: 61 K/UL — LOW (ref 150–400)
POTASSIUM SERPL-MCNC: 3.6 MMOL/L — SIGNIFICANT CHANGE UP (ref 3.5–5.3)
POTASSIUM SERPL-SCNC: 3.6 MMOL/L — SIGNIFICANT CHANGE UP (ref 3.5–5.3)
PROT SERPL-MCNC: 7.3 G/DL — SIGNIFICANT CHANGE UP (ref 6.6–8.7)
RBC # BLD: 2.3 M/UL — LOW (ref 3.8–5.2)
RBC # FLD: 23.4 % — HIGH (ref 10.3–14.5)
SODIUM SERPL-SCNC: 136 MMOL/L — SIGNIFICANT CHANGE UP (ref 135–145)
SPECIMEN SOURCE: SIGNIFICANT CHANGE UP
SPECIMEN SOURCE: SIGNIFICANT CHANGE UP
WBC # BLD: 103.2 K/UL — CRITICAL HIGH (ref 3.8–10.5)
WBC # FLD AUTO: 103.2 K/UL — CRITICAL HIGH (ref 3.8–10.5)

## 2019-12-22 PROCEDURE — 99232 SBSQ HOSP IP/OBS MODERATE 35: CPT

## 2019-12-22 RX ADMIN — PANTOPRAZOLE SODIUM 40 MILLIGRAM(S): 20 TABLET, DELAYED RELEASE ORAL at 06:29

## 2019-12-22 RX ADMIN — PIPERACILLIN AND TAZOBACTAM 25 GRAM(S): 4; .5 INJECTION, POWDER, LYOPHILIZED, FOR SOLUTION INTRAVENOUS at 06:29

## 2019-12-22 RX ADMIN — Medication 75 MICROGRAM(S): at 06:29

## 2019-12-22 RX ADMIN — Medication 600 MILLIGRAM(S): at 06:29

## 2019-12-22 RX ADMIN — Medication 600 MILLIGRAM(S): at 17:43

## 2019-12-22 RX ADMIN — Medication 500 MILLIGRAM(S): at 12:30

## 2019-12-22 RX ADMIN — Medication 1 TABLET(S): at 12:29

## 2019-12-22 RX ADMIN — MIRTAZAPINE 45 MILLIGRAM(S): 45 TABLET, ORALLY DISINTEGRATING ORAL at 12:30

## 2019-12-22 RX ADMIN — ESCITALOPRAM OXALATE 5 MILLIGRAM(S): 10 TABLET, FILM COATED ORAL at 12:30

## 2019-12-22 RX ADMIN — Medication 1 TABLET(S): at 12:31

## 2019-12-22 RX ADMIN — HYDROXYUREA 1000 MILLIGRAM(S): 500 CAPSULE ORAL at 12:31

## 2019-12-22 RX ADMIN — HEPARIN SODIUM 5000 UNIT(S): 5000 INJECTION INTRAVENOUS; SUBCUTANEOUS at 12:32

## 2019-12-22 RX ADMIN — Medication 20 MILLIGRAM(S): at 06:29

## 2019-12-22 RX ADMIN — PIPERACILLIN AND TAZOBACTAM 25 GRAM(S): 4; .5 INJECTION, POWDER, LYOPHILIZED, FOR SOLUTION INTRAVENOUS at 17:43

## 2019-12-22 RX ADMIN — HEPARIN SODIUM 5000 UNIT(S): 5000 INJECTION INTRAVENOUS; SUBCUTANEOUS at 23:15

## 2019-12-22 NOTE — PROGRESS NOTE ADULT - ASSESSMENT
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 montes ,  brought in for low hb and persistent cough. Patient get transfusion weekly at Dr. Montes's office. Hb from yesterday 6.7. :t last got transfused 2 weeks ago. Family also reports peristent non-productive cough since Thanksgiving but worse the past week .along with SOB & fatigue No O2 use at home. She finished immunotherapy 2 months prior and is now on hydroxyurea & Jakafi. In the ED pt had a temp of 102. CXR showed b/l basilar infiltrates.  No chest pain, palpitations,  light headedness/dizziness, chills, abdominal pain, n/v, diarrhea/constipation, dysuria or increased urinary frequency. Pt was found to have Sepsis due to b/l Gram Positive/ Gram negative/ RSV PNA & was started on IV abx per ID. New LE edema noted along with repeat CXR with worsening b/l infiltrates. But pt is afebrile & improving leukocytosis on IV zosyn. Will add one time dose of IV lasix and check for urine output. No hx of CHF but liekly iatrogenic from IVF & PRBC. Monitor BMP while on lasix. f/u Echo. [facility] cardio called.         Sepsis due to b/l PNA in an Immunocompromised pt/ complex pna / rsv positive/ right middle lobe nodule measuring 2.9 cm, unchanged since 12/28/2018, however increased in size since 9/29/2017; a PET/CT  acute hypoxic respiratory failure / improved now   c/w IV Zosyn   recommended on follow-up studies.  Blood cx NGTD, f/u UA, Ucx, urine legionella AG, sputum cx  RSV +ve  ID consult appreciated  repeat ct chest as op   patient needs home o2 arranged , patient desated tp 84 on RA while sitting     Possible Fluid overload/ echo shows grade 1 diastolic chf/ likely mild acute on chronic diastolic chf / now improved   -f/u echo   -will change iv lasix to po   -monitor     Anemia/ Thrombocytopenia due to MDS/ myelofibrosis/ recent dx with AML / follows with dr. montes as op   s/p 2 units PRBCs  monitor   hemonc appreciated  c/w Hydroxyurea 500 mg to twice a day  -hold jakafi for now  palliative care called as this is MDS likely in transformation to AML - s/p POD on azacytidine.  Pt is full code for now  Supportive care recommended as per hemonc      ELENI on CKD stage 4- likely prerenal/ stable   s/p NS x 1 L in the ED, hold off on further IVF  f/u BMP in am  Monitor      DVT ppx: on sq heparin     dispo : possible dc in am to [facility] with home care and home o2. patient refused to go to [facility]. plan of care discussed with patient and daughter francis on phone , [phone]

## 2019-12-22 NOTE — PROGRESS NOTE ADULT - SUBJECTIVE AND OBJECTIVE BOX
cc: sob , pna, mds     interval hx: patient seen and eval. comfortable but still sob. also has productive cough and b/l scattered wheezing , no fever or chills. no diarrhea     Vital Signs Last 24 Hrs  T(C): 36.7 (22 Dec 2019 10:05), Max: 36.9 (21 Dec 2019 16:30)  T(F): 98.1 (22 Dec 2019 10:05), Max: 98.5 (21 Dec 2019 16:30)  HR: 102 (22 Dec 2019 10:05) (88 - 102)  BP: 110/60 (22 Dec 2019 10:05) (110/60 - 147/76)  BP(mean): --  RR: 20 (22 Dec 2019 10:05) (17 - 20)  SpO2: 93% (22 Dec 2019 10:05) (92% - 95%)    GENERAL: NAD, well-groomed, well-developed  HEAD:  Atraumatic, Normocephalic  EYES: EOMI, PERRLA, conjunctiva and sclera clear  NECK: Supple, No JVD  NERVOUS SYSTEM:  Alert & Oriented X3, Motor Strength 5/5 B/L upper and lower extremities; DTRs 2+ intact and symmetric  CHEST/LUNG: b/l rhonchi and scattered wheezing   HEART: Regular rate and rhythm; No murmurs, rubs, or gallops  ABDOMEN: Soft, Nontender, Nondistended; Bowel sounds present  EXTREMITIES:  2+ Peripheral Pulses, 1+  edema b/l L > R  SKIN: No rashes or lesions                                     7.8    103.20 )-----------( 61       ( 22 Dec 2019 06:23 )             22.9   12-22    136  |  101  |  46.0<H>  ----------------------------<  77  3.6   |  20.0<L>  |  2.04<H>    Ca    7.3<L>      22 Dec 2019 06:23  Phos  4.6     12-21  Mg     1.8     12-21    TPro  7.3  /  Alb  2.5<L>  /  TBili  0.6  /  DBili  x   /  AST  75<H>  /  ALT  55<H>  /  AlkPhos  209<H>  12-22    < from: US Duplex Venous Lower Ext Complete, Bilateral (12.21.19 @ 08:47) >  MPRESSION:     No evidence of deep venous thrombosis in either lower extremity.    2 lymph nodes in the right groin, measuring 1.3 x 1.1 x 1.3 cm and 1.7 x 8.7 x 1.5 cm. Clinical correlation is recommended      < end of copied text >

## 2019-12-23 ENCOUNTER — TRANSCRIPTION ENCOUNTER (OUTPATIENT)
Age: 84
End: 2019-12-23

## 2019-12-23 ENCOUNTER — APPOINTMENT (OUTPATIENT)
Dept: HEMATOLOGY ONCOLOGY | Facility: CLINIC | Age: 84
End: 2019-12-23

## 2019-12-23 VITALS
OXYGEN SATURATION: 93 % | HEART RATE: 98 BPM | DIASTOLIC BLOOD PRESSURE: 69 MMHG | TEMPERATURE: 98 F | SYSTOLIC BLOOD PRESSURE: 116 MMHG | RESPIRATION RATE: 19 BRPM

## 2019-12-23 LAB
ANION GAP SERPL CALC-SCNC: 15 MMOL/L — SIGNIFICANT CHANGE UP (ref 5–17)
BUN SERPL-MCNC: 53 MG/DL — HIGH (ref 8–20)
CALCIUM SERPL-MCNC: 7.1 MG/DL — LOW (ref 8.6–10.2)
CHLORIDE SERPL-SCNC: 98 MMOL/L — SIGNIFICANT CHANGE UP (ref 98–107)
CO2 SERPL-SCNC: 19 MMOL/L — LOW (ref 22–29)
CREAT SERPL-MCNC: 2.02 MG/DL — HIGH (ref 0.5–1.3)
CULTURE RESULTS: SIGNIFICANT CHANGE UP
GLUCOSE SERPL-MCNC: 71 MG/DL — SIGNIFICANT CHANGE UP (ref 70–115)
HCT VFR BLD CALC: 26.3 % — LOW (ref 34.5–45)
HGB BLD-MCNC: 8.1 G/DL — LOW (ref 11.5–15.5)
MCHC RBC-ENTMCNC: 30.2 PG — SIGNIFICANT CHANGE UP (ref 27–34)
MCHC RBC-ENTMCNC: 30.8 GM/DL — LOW (ref 32–36)
MCV RBC AUTO: 98.1 FL — SIGNIFICANT CHANGE UP (ref 80–100)
PLAT MORPH BLD: NORMAL — SIGNIFICANT CHANGE UP
PLATELET # BLD AUTO: 60 K/UL — LOW (ref 150–400)
POTASSIUM SERPL-MCNC: 3.9 MMOL/L — SIGNIFICANT CHANGE UP (ref 3.5–5.3)
POTASSIUM SERPL-SCNC: 3.9 MMOL/L — SIGNIFICANT CHANGE UP (ref 3.5–5.3)
RBC # BLD: 2.68 M/UL — LOW (ref 3.8–5.2)
RBC # FLD: 22.9 % — HIGH (ref 10.3–14.5)
RBC BLD AUTO: ABNORMAL
SODIUM SERPL-SCNC: 132 MMOL/L — LOW (ref 135–145)
SPECIMEN SOURCE: SIGNIFICANT CHANGE UP
WBC # BLD: 100.84 K/UL — CRITICAL HIGH (ref 3.8–10.5)
WBC # FLD AUTO: 100.84 K/UL — CRITICAL HIGH (ref 3.8–10.5)

## 2019-12-23 PROCEDURE — 93306 TTE W/DOPPLER COMPLETE: CPT

## 2019-12-23 PROCEDURE — 87798 DETECT AGENT NOS DNA AMP: CPT

## 2019-12-23 PROCEDURE — 86850 RBC ANTIBODY SCREEN: CPT

## 2019-12-23 PROCEDURE — 80053 COMPREHEN METABOLIC PANEL: CPT

## 2019-12-23 PROCEDURE — 86922 COMPATIBILITY TEST ANTIGLOB: CPT

## 2019-12-23 PROCEDURE — 36415 COLL VENOUS BLD VENIPUNCTURE: CPT

## 2019-12-23 PROCEDURE — 83605 ASSAY OF LACTIC ACID: CPT

## 2019-12-23 PROCEDURE — 87581 M.PNEUMON DNA AMP PROBE: CPT

## 2019-12-23 PROCEDURE — 87040 BLOOD CULTURE FOR BACTERIA: CPT

## 2019-12-23 PROCEDURE — 87486 CHLMYD PNEUM DNA AMP PROBE: CPT

## 2019-12-23 PROCEDURE — 84484 ASSAY OF TROPONIN QUANT: CPT

## 2019-12-23 PROCEDURE — 84100 ASSAY OF PHOSPHORUS: CPT

## 2019-12-23 PROCEDURE — 71250 CT THORAX DX C-: CPT

## 2019-12-23 PROCEDURE — 93970 EXTREMITY STUDY: CPT

## 2019-12-23 PROCEDURE — 83880 ASSAY OF NATRIURETIC PEPTIDE: CPT

## 2019-12-23 PROCEDURE — 85027 COMPLETE CBC AUTOMATED: CPT

## 2019-12-23 PROCEDURE — 94640 AIRWAY INHALATION TREATMENT: CPT

## 2019-12-23 PROCEDURE — P9016: CPT

## 2019-12-23 PROCEDURE — 99285 EMERGENCY DEPT VISIT HI MDM: CPT | Mod: 25

## 2019-12-23 PROCEDURE — 71045 X-RAY EXAM CHEST 1 VIEW: CPT

## 2019-12-23 PROCEDURE — 99239 HOSP IP/OBS DSCHRG MGMT >30: CPT

## 2019-12-23 PROCEDURE — 85730 THROMBOPLASTIN TIME PARTIAL: CPT

## 2019-12-23 PROCEDURE — 36430 TRANSFUSION BLD/BLD COMPNT: CPT

## 2019-12-23 PROCEDURE — 80048 BASIC METABOLIC PNL TOTAL CA: CPT

## 2019-12-23 PROCEDURE — 86900 BLOOD TYPING SEROLOGIC ABO: CPT

## 2019-12-23 PROCEDURE — 96374 THER/PROPH/DIAG INJ IV PUSH: CPT

## 2019-12-23 PROCEDURE — 93005 ELECTROCARDIOGRAM TRACING: CPT

## 2019-12-23 PROCEDURE — 87633 RESP VIRUS 12-25 TARGETS: CPT

## 2019-12-23 PROCEDURE — 85610 PROTHROMBIN TIME: CPT

## 2019-12-23 PROCEDURE — 84145 PROCALCITONIN (PCT): CPT

## 2019-12-23 PROCEDURE — 97163 PT EVAL HIGH COMPLEX 45 MIN: CPT

## 2019-12-23 PROCEDURE — 83735 ASSAY OF MAGNESIUM: CPT

## 2019-12-23 PROCEDURE — 86901 BLOOD TYPING SEROLOGIC RH(D): CPT

## 2019-12-23 RX ORDER — HYDROXYUREA 500 MG/1
2 CAPSULE ORAL
Qty: 60 | Refills: 0
Start: 2019-12-23 | End: 2020-01-21

## 2019-12-23 RX ORDER — ESCITALOPRAM OXALATE 10 MG/1
1 TABLET, FILM COATED ORAL
Qty: 30 | Refills: 0
Start: 2019-12-23 | End: 2020-01-21

## 2019-12-23 RX ORDER — ESCITALOPRAM OXALATE 10 MG/1
1 TABLET, FILM COATED ORAL
Qty: 0 | Refills: 0 | DISCHARGE
Start: 2019-12-23

## 2019-12-23 RX ORDER — RUXOLITINIB 25 MG/1
1 TABLET ORAL
Qty: 0 | Refills: 0 | DISCHARGE

## 2019-12-23 RX ORDER — LEVOTHYROXINE SODIUM 125 MCG
1 TABLET ORAL
Qty: 0 | Refills: 0 | DISCHARGE
Start: 2019-12-23

## 2019-12-23 RX ORDER — FUROSEMIDE 40 MG
1 TABLET ORAL
Qty: 30 | Refills: 0
Start: 2019-12-23 | End: 2020-01-21

## 2019-12-23 RX ADMIN — Medication 20 MILLIGRAM(S): at 06:00

## 2019-12-23 RX ADMIN — Medication 500 MILLIGRAM(S): at 12:55

## 2019-12-23 RX ADMIN — HYDROXYUREA 1000 MILLIGRAM(S): 500 CAPSULE ORAL at 12:54

## 2019-12-23 RX ADMIN — Medication 1 TABLET(S): at 12:55

## 2019-12-23 RX ADMIN — PIPERACILLIN AND TAZOBACTAM 25 GRAM(S): 4; .5 INJECTION, POWDER, LYOPHILIZED, FOR SOLUTION INTRAVENOUS at 06:00

## 2019-12-23 RX ADMIN — PANTOPRAZOLE SODIUM 40 MILLIGRAM(S): 20 TABLET, DELAYED RELEASE ORAL at 06:00

## 2019-12-23 RX ADMIN — Medication 75 MICROGRAM(S): at 06:00

## 2019-12-23 RX ADMIN — Medication 1 APPLICATION(S): at 13:30

## 2019-12-23 RX ADMIN — Medication 600 MILLIGRAM(S): at 06:00

## 2019-12-23 RX ADMIN — HEPARIN SODIUM 5000 UNIT(S): 5000 INJECTION INTRAVENOUS; SUBCUTANEOUS at 12:53

## 2019-12-23 RX ADMIN — ESCITALOPRAM OXALATE 5 MILLIGRAM(S): 10 TABLET, FILM COATED ORAL at 12:54

## 2019-12-23 RX ADMIN — MIRTAZAPINE 45 MILLIGRAM(S): 45 TABLET, ORALLY DISINTEGRATING ORAL at 12:55

## 2019-12-23 RX ADMIN — Medication 1 TABLET(S): at 12:54

## 2019-12-23 NOTE — DISCHARGE NOTE PROVIDER - HOSPITAL COURSE
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 montes ,  brought in for low hb and persistent cough. Patient get transfusion weekly at Dr. Montes's office. Hb from yesterday 6.7. :t last got transfused 2 weeks ago. Family also reports peristent non-productive cough since Thanksgiving but worse the past week .along with SOB & fatigue No O2 use at home. She finished immunotherapy 2 months prior and is now on hydroxyurea & Jakafi. In the ED pt had a temp of 102. CXR showed b/l basilar infiltrates.  No chest pain, palpitations,  light headedness/dizziness, chills, abdominal pain, n/v, diarrhea/constipation, dysuria or increased urinary frequency. Pt was found to have Sepsis due to b/l Gram Positive/ Gram negative/ RSV PNA & was started on IV abx per ID.                 Sepsis due to b/l PNA in an Immunocompromised pt/ complex pna / rsv positive/ right middle lobe nodule measuring 2.9 cm, unchanged since 12/28/2018, however increased in size since 9/29/2017; a PET/CT    acute hypoxic respiratory failure / improved now     c/w IV Zosyn     recommended on follow-up studies.    Blood cx NGTD, f/u UA, Ucx, urine legionella AG, sputum cx    RSV +ve    ID consult appreciated    repeat ct chest as op     patient needs home o2 arranged , patient desated tp 84 on RA while sitting         Possible Fluid overload/ echo shows grade 1 diastolic chf/ likely mild acute on chronic diastolic chf / now improved     -f/u echo     -will change iv lasix to po     -monitor         Anemia/ Thrombocytopenia due to MDS/ myelofibrosis/ recent dx with AML / follows with dr. montes as op     s/p 2 units PRBCs    monitor     hemonc appreciated    c/w Hydroxyurea 500 mg to twice a day    -hold jakafi for now    palliative care called as this is MDS likely in transformation to AML - s/p POD on azacytidine.  Pt is full code for now    Supportive care recommended as per hemonc            ELENI on CKD stage 4- likely prerenal/ stable     s/p NS x 1 L in the ED, hold off on further IVF    f/u BMP in am    Monitor            Vital Signs Last 24 Hrs    T(C): 36.7 (23 Dec 2019 05:55), Max: 36.9 (22 Dec 2019 16:44)    T(F): 98.1 (23 Dec 2019 05:55), Max: 98.5 (22 Dec 2019 16:44)    HR: 86 (23 Dec 2019 05:55) (84 - 102)    BP: 108/52 (23 Dec 2019 05:55) (108/52 - 120/74)    BP(mean): --    RR: 19 (23 Dec 2019 05:55) (17 - 20)    SpO2: 96% (23 Dec 2019 05:55) (91% - 96%)        GENERAL: NAD, well-groomed, well-developed    HEAD:  Atraumatic, Normocephalic    EYES: EOMI, PERRLA, conjunctiva and sclera clear    NECK: Supple, No JVD    NERVOUS SYSTEM:  Alert & Oriented X3, Motor Strength 5/5 B/L upper and lower extremities; DTRs 2+ intact and symmetric    CHEST/LUNG: b/l rhonchi and scattered wheezing     HEART: Regular rate and rhythm; No murmurs, rubs, or gallops    ABDOMEN: Soft, Nontender, Nondistended; Bowel sounds present    EXTREMITIES:  2+ Peripheral Pulses, 1+  edema b/l L > R    SKIN: No rashes or lesions 88 year old female with  pmh of internal/external hemorrhoids, IBS, MDS/ myelofibrosis with recent  transformation to AML / follows with dr. montes ,  brought in for low hb and persistent cough. Patient get transfusion weekly at Dr. Montes's office. Hb from yesterday 6.7. :t last got transfused 2 weeks ago. Family also reports peristent non-productive cough since Thanksgiving but worse the past week .along with SOB & fatigue No O2 use at home. She finished immunotherapy 2 months prior and is now on hydroxyurea & Jakafi. In the ED pt had a temp of 102. CXR showed b/l basilar infiltrates.  a/w Sepsis due to b/l PNA in an Immunocompromised pt/ complex pna / rsv positive/ right middle lobe nodule measuring 2.9 cm, unchanged since 12/28/2018, however increased in size since 9/29/2017; a PET/CT. acute hypoxic respiratory failure / improved now     received  IV Zosyn while in hospital , now changed to po augmentin to complete course x 7. Blood cx NGTD, f/u UA, Ucx, urine legionella AG, sputum cx. also tested RSV +ve. needs     repeat ct chest as op in 2-4 weeks. patient needs home o2 arranged , patient desated tp 84 on RA while sitting . patient also had Possible Fluid overload/ echo shows grade 1 diastolic chf/ likely mild acute on chronic diastolic chf / now improved and stable. will change iv lasix to po         for Anemia/ Thrombocytopenia due to MDS/ myelofibrosis/ recent dx with AML / follows with dr. montes as op. s/p 2 units PRBCs    c/w Hydroxyurea 500 mg to twice a day and hold jakafi for now as per onc recs.     palliative care called as this is MDS likely in transformation to AML - s/p POD on azacytidine.  Pt is full code for now    Supportive care recommended as per hemonc            Vital Signs Last 24 Hrs    T(C): 36.7 (23 Dec 2019 05:55), Max: 36.9 (22 Dec 2019 16:44)    T(F): 98.1 (23 Dec 2019 05:55), Max: 98.5 (22 Dec 2019 16:44)    HR: 86 (23 Dec 2019 05:55) (84 - 102)    BP: 108/52 (23 Dec 2019 05:55) (108/52 - 120/74)    BP(mean): --    RR: 19 (23 Dec 2019 05:55) (17 - 20)    SpO2: 96% (23 Dec 2019 05:55) (91% - 96%)        GENERAL: NAD, well-groomed, well-developed    HEAD:  Atraumatic, Normocephalic    EYES: EOMI, PERRLA, conjunctiva and sclera clear    NECK: Supple, No JVD    NERVOUS SYSTEM:  Alert & Oriented X3, Motor Strength 5/5 B/L upper and lower extremities; DTRs 2+ intact and symmetric    CHEST/LUNG: b/l rhonchi and scattered wheezing     HEART: Regular rate and rhythm; No murmurs, rubs, or gallops    ABDOMEN: Soft, Nontender, Nondistended; Bowel sounds present    EXTREMITIES:  2+ Peripheral Pulses, 1+  edema b/l L > R    SKIN: No rashes or lesions        today patient is doing  well. medically stable. patient and daughter francis updated on plan of care. patient wants to go to [place] , refused rehab,     time spent for this dc 46 mins

## 2019-12-23 NOTE — DISCHARGE NOTE PROVIDER - PROVIDER TOKENS
FREE:[LAST:[primary care],PHONE:[(   )    -],FAX:[(   )    -],FOLLOWUP:[1-3 days]],PROVIDER:[TOKEN:[[id number]],FOLLOWUP:[1 week]]

## 2019-12-23 NOTE — DISCHARGE NOTE PROVIDER - NSDCCPCAREPLAN_GEN_ALL_CORE_FT
PRINCIPAL DISCHARGE DIAGNOSIS  Diagnosis: Viral illness  Assessment and Plan of Treatment: you have been treated for viral illness and possible pneumonia.   complete antibiotics as prescribed      SECONDARY DISCHARGE DIAGNOSES  Diagnosis: MDS (myelodysplastic syndrome)  Assessment and Plan of Treatment: continue with hydroxyuria.   follow up with dr. Montes in 1 week.    Diagnosis: Diastolic CHF, acute on chronic  Assessment and Plan of Treatment: stable now. continue with lasix.

## 2019-12-23 NOTE — DISCHARGE NOTE PROVIDER - NSDCFUSCHEDAPPT_GEN_ALL_CORE_FT
KEVIN BARAJAS ; 12/23/2019 ; [facility] ; 01/06/2020 ; [facility] [address] [facility] ; 01/13/2020 ; [facility] [address] [place] [address] KEVIN BARAJAS ; 12/23/2019 ; [facility] ; 01/06/2020 ; [facility] [address] [facility] ; 01/13/2020 ; [facility] [address] [place] [address]

## 2019-12-23 NOTE — DISCHARGE NOTE PROVIDER - CARE PROVIDER_API CALL
primary care,   Phone: (   )    -  Fax: (   )    -  Follow Up Time: 1-3 days    Oliver Montes)  Hematology; Internal Medicine; Medical Oncology  [address] [place] [address]  Phone: [phone]  Fax: [phone]  Follow Up Time: 1 week

## 2019-12-23 NOTE — DISCHARGE NOTE PROVIDER - NSDCMRMEDTOKEN_GEN_ALL_CORE_FT
acetaminophen 325 mg oral tablet: 2 tab(s) orally every 6 hours, As needed, For Temp greater than 38 C (100.4 F)  amoxicillin-clavulanate 875 mg-125 mg oral tablet: 1 tab(s) orally 2 times a day   ascorbic acid 500 mg oral tablet: 1 tab(s) orally once a day  calcium carbonate 500 mg (200 mg elemental calcium) oral tablet, chewable: 2 tab(s) orally once a day  cholecalciferol oral tablet: 2000 unit(s) orally once a day  escitalopram 5 mg oral tablet: 1 tab(s) orally once a day  guaiFENesin 600 mg oral tablet, extended release: 1 tab(s) orally every 12 hours  hydroxyurea 500 mg oral capsule: 2 cap(s) orally once a day  levothyroxine 75 mcg (0.075 mg) oral tablet: 1 tab(s) orally once a day  mirtazapine 30 mg oral tablet: 1 tab(s) orally once a day  Proctozone HC 2.5% topical cream: Apply topically to affected area 3 times a day acetaminophen 325 mg oral tablet: 2 tab(s) orally every 6 hours, As needed, For Temp greater than 38 C (100.4 F)  amoxicillin-clavulanate 875 mg-125 mg oral tablet: 1 tab(s) orally 2 times a day   ascorbic acid 500 mg oral tablet: 1 tab(s) orally once a day  calcium carbonate 500 mg (200 mg elemental calcium) oral tablet, chewable: 2 tab(s) orally once a day  cholecalciferol oral tablet: 2000 unit(s) orally once a day  escitalopram 5 mg oral tablet: 1 tab(s) orally once a day  guaiFENesin 600 mg oral tablet, extended release: 1 tab(s) orally every 12 hours  hydroxyurea 500 mg oral capsule: 2 cap(s) orally once a day  Lasix 20 mg oral tablet: 1 tab(s) orally once a day   levothyroxine 75 mcg (0.075 mg) oral tablet: 1 tab(s) orally once a day  mirtazapine 30 mg oral tablet: 1 tab(s) orally once a day  Proctozone HC 2.5% topical cream: Apply topically to affected area 3 times a day

## 2020-01-02 NOTE — HISTORY OF PRESENT ILLNESS
[de-identified] : Mrs. Chen is an 89 y/o WF with a history of Reji 2+ thrombocytosis, treated on Hydrea since 6/10/13. She was found to be pancytopenic and the Hydrea was held as of 7/12/17. A bone marrow was done on 8/21/17 that demonstrated a normocellular to mildly hypercellular marrow with mild megakaryocytic hyperplasia, moderate reticulin fibrosis and focal areas of increased blasts (5%). She was started on Jakafi. 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 Tolerating well thus far with PLT normalizing to 164. Anemia persists. \par  [de-identified] : Comes in for CBC check, C/O cough , no fevers, no SOB or chest pain.

## 2020-01-02 NOTE — HISTORY OF PRESENT ILLNESS
[de-identified] : in for weekly CBC check, has some fatigue, and a cough, on robitussin, with improvement, no fevers. [de-identified] : \par Mrs. Chen is an 88 y/o WF with a history of Reji 2+ thrombocytosis, treated on Hydrea since 6/10/13. She was found to be pancytopenic and the Hydrea was held as of 7/12/17. A bone marrow was done on 8/21/17 that demonstrated a normocellular to mildly hypercellular marrow with mild megakaryocytic hyperplasia, moderate reticulin fibrosis and focal areas of increased blasts (5%). She was started on Jakafi. 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 Tolerating well thus far with PLT normalizing to 164. Anemia persists.

## 2020-01-02 NOTE — PHYSICAL EXAM
[Ambulatory and capable of all self care but unable to carry out any work activities] : Status 2- Ambulatory and capable of all self care but unable to carry out any work activities. Up and about more than 50% of waking hours [Thin] : thin [Normal] : affect appropriate [de-identified] : chronic bilateral crackles

## 2020-01-02 NOTE — PHYSICAL EXAM
[Ambulatory and capable of all self care but unable to carry out any work activities] : Status 2- Ambulatory and capable of all self care but unable to carry out any work activities. Up and about more than 50% of waking hours [Normal] : affect appropriate [de-identified] : bilateral basilar crackles

## 2020-01-04 ENCOUNTER — INPATIENT (INPATIENT)
Facility: HOSPITAL | Age: 85
LOS: 3 days | DRG: 177 | End: 2020-01-08
Attending: INTERNAL MEDICINE | Admitting: HOSPITALIST
Payer: MEDICARE

## 2020-01-04 VITALS
OXYGEN SATURATION: 94 % | RESPIRATION RATE: 20 BRPM | TEMPERATURE: 98 F | HEIGHT: 63 IN | HEART RATE: 92 BPM | WEIGHT: 130.07 LBS | SYSTOLIC BLOOD PRESSURE: 85 MMHG | DIASTOLIC BLOOD PRESSURE: 51 MMHG

## 2020-01-04 DIAGNOSIS — Z90.710 ACQUIRED ABSENCE OF BOTH CERVIX AND UTERUS: Chronic | ICD-10-CM

## 2020-01-04 DIAGNOSIS — D69.6 THROMBOCYTOPENIA, UNSPECIFIED: ICD-10-CM

## 2020-01-04 DIAGNOSIS — J18.9 PNEUMONIA, UNSPECIFIED ORGANISM: ICD-10-CM

## 2020-01-04 DIAGNOSIS — D64.9 ANEMIA, UNSPECIFIED: ICD-10-CM

## 2020-01-04 DIAGNOSIS — D46.9 MYELODYSPLASTIC SYNDROME, UNSPECIFIED: ICD-10-CM

## 2020-01-04 DIAGNOSIS — E87.5 HYPERKALEMIA: ICD-10-CM

## 2020-01-04 LAB
ALBUMIN SERPL ELPH-MCNC: 2.1 G/DL — LOW (ref 3.3–5.2)
ALBUMIN SERPL ELPH-MCNC: 2.1 G/DL — LOW (ref 3.3–5.2)
ALP SERPL-CCNC: 268 U/L — HIGH (ref 40–120)
ALP SERPL-CCNC: 268 U/L — HIGH (ref 40–120)
ALT FLD-CCNC: 65 U/L — HIGH
ALT FLD-CCNC: 66 U/L — HIGH
ANION GAP SERPL CALC-SCNC: 16 MMOL/L — SIGNIFICANT CHANGE UP (ref 5–17)
ANION GAP SERPL CALC-SCNC: 17 MMOL/L — SIGNIFICANT CHANGE UP (ref 5–17)
APTT BLD: 32.3 SEC — SIGNIFICANT CHANGE UP (ref 27.5–36.3)
AST SERPL-CCNC: 102 U/L — HIGH
AST SERPL-CCNC: 108 U/L — HIGH
BILIRUB SERPL-MCNC: 0.9 MG/DL — SIGNIFICANT CHANGE UP (ref 0.4–2)
BILIRUB SERPL-MCNC: 1 MG/DL — SIGNIFICANT CHANGE UP (ref 0.4–2)
BLD GP AB SCN SERPL QL: SIGNIFICANT CHANGE UP
BUN SERPL-MCNC: 90 MG/DL — HIGH (ref 8–20)
BUN SERPL-MCNC: 92 MG/DL — HIGH (ref 8–20)
CALCIUM SERPL-MCNC: 6.2 MG/DL — CRITICAL LOW (ref 8.6–10.2)
CALCIUM SERPL-MCNC: 6.3 MG/DL — CRITICAL LOW (ref 8.6–10.2)
CHLORIDE SERPL-SCNC: 95 MMOL/L — LOW (ref 98–107)
CHLORIDE SERPL-SCNC: 96 MMOL/L — LOW (ref 98–107)
CO2 SERPL-SCNC: 16 MMOL/L — LOW (ref 22–29)
CO2 SERPL-SCNC: 17 MMOL/L — LOW (ref 22–29)
CREAT SERPL-MCNC: 2.64 MG/DL — HIGH (ref 0.5–1.3)
CREAT SERPL-MCNC: 2.76 MG/DL — HIGH (ref 0.5–1.3)
FLU A RESULT: SIGNIFICANT CHANGE UP
FLU A RESULT: SIGNIFICANT CHANGE UP
FLUAV AG NPH QL: SIGNIFICANT CHANGE UP
FLUBV AG NPH QL: SIGNIFICANT CHANGE UP
GAS PNL BLDA: SIGNIFICANT CHANGE UP
GLUCOSE BLDC GLUCOMTR-MCNC: 129 MG/DL — HIGH (ref 70–99)
GLUCOSE SERPL-MCNC: 139 MG/DL — HIGH (ref 70–115)
GLUCOSE SERPL-MCNC: 142 MG/DL — HIGH (ref 70–115)
HCT VFR BLD CALC: 18.3 % — CRITICAL LOW (ref 34.5–45)
HGB BLD-MCNC: 5.9 G/DL — CRITICAL LOW (ref 11.5–15.5)
INR BLD: 2.14 RATIO — HIGH (ref 0.88–1.16)
LACTATE BLDV-MCNC: 3.2 MMOL/L — HIGH (ref 0.5–2)
MCHC RBC-ENTMCNC: 32.2 GM/DL — SIGNIFICANT CHANGE UP (ref 32–36)
MCHC RBC-ENTMCNC: 32.4 PG — SIGNIFICANT CHANGE UP (ref 27–34)
MCV RBC AUTO: 100.5 FL — HIGH (ref 80–100)
PLATELET # BLD AUTO: 22 K/UL — LOW (ref 150–400)
POTASSIUM SERPL-MCNC: 6.8 MMOL/L — CRITICAL HIGH (ref 3.5–5.3)
POTASSIUM SERPL-MCNC: 6.9 MMOL/L — CRITICAL HIGH (ref 3.5–5.3)
POTASSIUM SERPL-SCNC: 6.8 MMOL/L — CRITICAL HIGH (ref 3.5–5.3)
POTASSIUM SERPL-SCNC: 6.9 MMOL/L — CRITICAL HIGH (ref 3.5–5.3)
PROT SERPL-MCNC: 8.1 G/DL — SIGNIFICANT CHANGE UP (ref 6.6–8.7)
PROT SERPL-MCNC: 8.3 G/DL — SIGNIFICANT CHANGE UP (ref 6.6–8.7)
PROTHROM AB SERPL-ACNC: 25.2 SEC — HIGH (ref 10–12.9)
RBC # BLD: 1.82 M/UL — LOW (ref 3.8–5.2)
RBC # FLD: 23.9 % — HIGH (ref 10.3–14.5)
RSV RESULT: DETECTED
RSV RNA RESP QL NAA+PROBE: DETECTED
SODIUM SERPL-SCNC: 128 MMOL/L — LOW (ref 135–145)
SODIUM SERPL-SCNC: 129 MMOL/L — LOW (ref 135–145)
WBC # BLD: 141.41 K/UL — CRITICAL HIGH (ref 3.8–10.5)
WBC # FLD AUTO: 141.41 K/UL — CRITICAL HIGH (ref 3.8–10.5)

## 2020-01-04 PROCEDURE — 99233 SBSQ HOSP IP/OBS HIGH 50: CPT

## 2020-01-04 PROCEDURE — 99291 CRITICAL CARE FIRST HOUR: CPT

## 2020-01-04 PROCEDURE — 70450 CT HEAD/BRAIN W/O DYE: CPT | Mod: 26

## 2020-01-04 PROCEDURE — 71045 X-RAY EXAM CHEST 1 VIEW: CPT | Mod: 26

## 2020-01-04 PROCEDURE — 72125 CT NECK SPINE W/O DYE: CPT | Mod: 26

## 2020-01-04 PROCEDURE — 93010 ELECTROCARDIOGRAM REPORT: CPT

## 2020-01-04 PROCEDURE — 71250 CT THORAX DX C-: CPT | Mod: 26

## 2020-01-04 RX ORDER — AZITHROMYCIN 500 MG/1
500 TABLET, FILM COATED ORAL ONCE
Refills: 0 | Status: COMPLETED | OUTPATIENT
Start: 2020-01-04 | End: 2020-01-04

## 2020-01-04 RX ORDER — CALCIUM GLUCONATE 100 MG/ML
1 VIAL (ML) INTRAVENOUS ONCE
Refills: 0 | Status: COMPLETED | OUTPATIENT
Start: 2020-01-04 | End: 2020-01-04

## 2020-01-04 RX ORDER — ACETAMINOPHEN 500 MG
650 TABLET ORAL EVERY 6 HOURS
Refills: 0 | Status: DISCONTINUED | OUTPATIENT
Start: 2020-01-04 | End: 2020-01-06

## 2020-01-04 RX ORDER — DEXTROSE 50 % IN WATER 50 %
50 SYRINGE (ML) INTRAVENOUS ONCE
Refills: 0 | Status: COMPLETED | OUTPATIENT
Start: 2020-01-04 | End: 2020-01-04

## 2020-01-04 RX ORDER — MIRTAZAPINE 45 MG/1
30 TABLET, ORALLY DISINTEGRATING ORAL DAILY
Refills: 0 | Status: DISCONTINUED | OUTPATIENT
Start: 2020-01-04 | End: 2020-01-06

## 2020-01-04 RX ORDER — SODIUM BICARBONATE 1 MEQ/ML
50 SYRINGE (ML) INTRAVENOUS ONCE
Refills: 0 | Status: COMPLETED | OUTPATIENT
Start: 2020-01-04 | End: 2020-01-04

## 2020-01-04 RX ORDER — IPRATROPIUM/ALBUTEROL SULFATE 18-103MCG
3 AEROSOL WITH ADAPTER (GRAM) INHALATION ONCE
Refills: 0 | Status: COMPLETED | OUTPATIENT
Start: 2020-01-04 | End: 2020-01-04

## 2020-01-04 RX ORDER — VANCOMYCIN HCL 1 G
1000 VIAL (EA) INTRAVENOUS ONCE
Refills: 0 | Status: COMPLETED | OUTPATIENT
Start: 2020-01-04 | End: 2020-01-05

## 2020-01-04 RX ORDER — ASCORBIC ACID 60 MG
500 TABLET,CHEWABLE ORAL DAILY
Refills: 0 | Status: DISCONTINUED | OUTPATIENT
Start: 2020-01-04 | End: 2020-01-06

## 2020-01-04 RX ORDER — DIPHENHYDRAMINE HCL 50 MG
25 CAPSULE ORAL ONCE
Refills: 0 | Status: COMPLETED | OUTPATIENT
Start: 2020-01-04 | End: 2020-01-04

## 2020-01-04 RX ORDER — ACETAMINOPHEN 500 MG
650 TABLET ORAL ONCE
Refills: 0 | Status: COMPLETED | OUTPATIENT
Start: 2020-01-04 | End: 2020-01-04

## 2020-01-04 RX ORDER — SODIUM POLYSTYRENE SULFONATE 4.1 MEQ/G
15 POWDER, FOR SUSPENSION ORAL ONCE
Refills: 0 | Status: COMPLETED | OUTPATIENT
Start: 2020-01-04 | End: 2020-01-04

## 2020-01-04 RX ORDER — CHOLECALCIFEROL (VITAMIN D3) 125 MCG
2000 CAPSULE ORAL DAILY
Refills: 0 | Status: DISCONTINUED | OUTPATIENT
Start: 2020-01-04 | End: 2020-01-06

## 2020-01-04 RX ORDER — SODIUM CHLORIDE 9 MG/ML
1000 INJECTION INTRAMUSCULAR; INTRAVENOUS; SUBCUTANEOUS ONCE
Refills: 0 | Status: COMPLETED | OUTPATIENT
Start: 2020-01-04 | End: 2020-01-04

## 2020-01-04 RX ORDER — INSULIN HUMAN 100 [IU]/ML
10 INJECTION, SOLUTION SUBCUTANEOUS ONCE
Refills: 0 | Status: COMPLETED | OUTPATIENT
Start: 2020-01-04 | End: 2020-01-04

## 2020-01-04 RX ORDER — PIPERACILLIN AND TAZOBACTAM 4; .5 G/20ML; G/20ML
3.38 INJECTION, POWDER, LYOPHILIZED, FOR SOLUTION INTRAVENOUS ONCE
Refills: 0 | Status: COMPLETED | OUTPATIENT
Start: 2020-01-04 | End: 2020-01-04

## 2020-01-04 RX ORDER — LEVOTHYROXINE SODIUM 125 MCG
75 TABLET ORAL DAILY
Refills: 0 | Status: DISCONTINUED | OUTPATIENT
Start: 2020-01-04 | End: 2020-01-06

## 2020-01-04 RX ADMIN — SODIUM POLYSTYRENE SULFONATE 15 GRAM(S): 4.1 POWDER, FOR SUSPENSION ORAL at 23:16

## 2020-01-04 RX ADMIN — Medication 650 MILLIGRAM(S): at 18:00

## 2020-01-04 RX ADMIN — Medication 100 GRAM(S): at 23:09

## 2020-01-04 RX ADMIN — AZITHROMYCIN 255 MILLIGRAM(S): 500 TABLET, FILM COATED ORAL at 16:58

## 2020-01-04 RX ADMIN — Medication 50 MILLILITER(S): at 17:03

## 2020-01-04 RX ADMIN — Medication 50 MILLILITER(S): at 23:08

## 2020-01-04 RX ADMIN — SODIUM CHLORIDE 1000 MILLILITER(S): 9 INJECTION INTRAMUSCULAR; INTRAVENOUS; SUBCUTANEOUS at 15:28

## 2020-01-04 RX ADMIN — Medication 3 MILLILITER(S): at 17:04

## 2020-01-04 RX ADMIN — INSULIN HUMAN 10 UNIT(S): 100 INJECTION, SOLUTION SUBCUTANEOUS at 23:08

## 2020-01-04 RX ADMIN — Medication 50 MILLIEQUIVALENT(S): at 17:03

## 2020-01-04 RX ADMIN — Medication 3 MILLILITER(S): at 14:05

## 2020-01-04 RX ADMIN — Medication 25 MILLIGRAM(S): at 18:00

## 2020-01-04 RX ADMIN — SODIUM CHLORIDE 1000 MILLILITER(S): 9 INJECTION INTRAMUSCULAR; INTRAVENOUS; SUBCUTANEOUS at 16:27

## 2020-01-04 RX ADMIN — PIPERACILLIN AND TAZOBACTAM 200 GRAM(S): 4; .5 INJECTION, POWDER, LYOPHILIZED, FOR SOLUTION INTRAVENOUS at 18:43

## 2020-01-04 RX ADMIN — INSULIN HUMAN 10 UNIT(S): 100 INJECTION, SOLUTION SUBCUTANEOUS at 17:05

## 2020-01-04 RX ADMIN — SODIUM CHLORIDE 1000 MILLILITER(S): 9 INJECTION INTRAMUSCULAR; INTRAVENOUS; SUBCUTANEOUS at 14:35

## 2020-01-04 RX ADMIN — Medication 650 MILLIGRAM(S): at 19:30

## 2020-01-04 NOTE — H&P ADULT - NSICDXPASTMEDICALHX_GEN_ALL_CORE_FT
PAST MEDICAL HISTORY:  Acute deep vein thrombosis (DVT) of right upper extremity, unspecified vein     Anemia     Fall, sequela     Hypothyroidism (acquired)     Myelodysplastic syndrome     Myelofibrosis     Pelvic fracture     Thrombocytosis ANNMARIE 2 thrombocytosis

## 2020-01-04 NOTE — ED ADULT NURSE REASSESSMENT NOTE - NS ED NURSE REASSESS COMMENT FT1
Report received from cristy SEGURA Pt A&Ox4 at this time. Patient denies pain or discomfort, remains on o2 @4l NC sat 94%. breathing unlabored, lung sounds dim through out. denies chest pain or abd pain. denies n/v/d. Pt resting comfortably, VSS, no signs of distress at this time, CM in place, 2nd unit of PRBC maintained as ordered. safety maintained, call bell in reach.

## 2020-01-04 NOTE — CONSULT NOTE ADULT - SUBJECTIVE AND OBJECTIVE BOX
TELEHOSPITALIST NOTE  [facility]    PMD:     Hem/Onc: Dr Montes    HPI:  89 y.o F comes to the ER with fall. she reports feeling week and slipped and fell. Does not recall the fall. She remembers standing up from a sitting position and falling. she was able to get up. she called her daughter falling the fall. They brought her to the ER. In the ER patient was hypotensive, responded to fluids, noted to be anemic and was started on PRBC transfusion. Being admitted for anemia.       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, DVT, Hypothyroidism,   PSH: Hysterectomy   All: NKDA  FH: Denies   SH: Denies smoking, ETOH or drug use    Medications:   levothyroxine 75 mcg (0.075 mg) oral tablet: 1 tab(s) orally once a day   mirtazapine 30 mg oral tablet: 1 tab(s) orally once a day   Proctozone HC 2.5% topical cream: Apply topically to affected area 3 times a day      Vital Signs Last 24 Hrs  T(C): 36.5 (04 Jan 2020 19:36), Max: 36.8 (04 Jan 2020 13:01)  T(F): 97.7 (04 Jan 2020 19:36), Max: 98.2 (04 Jan 2020 13:01)  HR: 98 (04 Jan 2020 19:36) (92 - 98)  BP: 136/65 (04 Jan 2020 19:36) (85/51 - 136/65)  RR: 21 (04 Jan 2020 19:36) (20 - 22)  SpO2: 95% (04 Jan 2020 19:36) (91% - 95%)    PHYSICAL EXAM: evaluation precludes physical exam. Pertinent physical exam findings as per video conference with  nurse at bedside is as follow:      Labs:  01-04    129<L>  |  96<L>  |  92.0<H>  ----------------------------<  139<H>  6.9<HH>   |  17.0<L>  |  2.76<H>    Ca    6.3<LL>      04 Jan 2020 16:05    TPro  8.1  /  Alb  2.1<L>  /  TBili  0.9  /  DBili  x   /  AST  102<H>  /  ALT  65<H>  /  AlkPhos  268<H>  01-04                            5.9    141.41 )-----------( 22       ( 04 Jan 2020 14:26 )               18.3       PT/INR - ( 04 Jan 2020 14:26 )   PT: 25.2 sec;   INR: 2.14 ratio         PTT - ( 04 Jan 2020 14:26 )  PTT:32.3 sec    LIVER FUNCTIONS - ( 04 Jan 2020 16:05 )  Alb: 2.1 g/dL / Pro: 8.1 g/dL / ALK PHOS: 268 U/L / ALT: 65 U/L / AST: 102 U/L / GGT: x             Radiology findings:  < from: CT Head No Cont (01.04.20 @ 15:42) >   EXAM:  CT BRAIN                          PROCEDURE DATE:  01/04/2020      INTERPRETATION:  Head CT without contrast   COMPARISON: None.  CLINICAL INFORMATION: Head injury 2 days ago. Pain. Assess for intracranial hemorrhage. Fracture..  TECHNIQUE: Contiguous axial 2.5 mm slice thickness images of the head were obtained without the use of intravenous contrast media.  This scan was performed using automatic exposure control (radiation dose reduction software) to obtain a diagnostic image quality scan with patient dose as low as reasonably achievable.     FINDINGS:  There is a consolidation of the frontal and ethmoid air cells. The maxillary and sphenoid air cells remain aerated.  Bilateral mastoid effusion seen. No otitis media and otitis externa. CP angles clear.   Moderate cerebral volume loss is present with secondary proportional prominence of the sulci and ventricles.    The posterior fossa structures and basal cisterns appear normal.    There is no intracranial hemorrhage.     There is no intracranial mass or midline shift.    There is no sulcal effacement to suggest acute stroke.    There are scattered white matter hypodensities consistent with small vessel disease.    The basal ganglia and thalami appear normal in morphology and attenuation.    The visualized portions of the optic globes are normal.    There are no skull fractures.    IMPRESSION:  Sinusitis. Bilateral mastoid effusions.  No acute intracranial findings.  Moderate atrophy and chronic white matter microvascular ischemic changes as described.     < end of copied text >        < from: Xray Chest 1 View-PORTABLE IMMEDIATE (01.04.20 @ 15:54) >  EXAM:  XR CHEST PORTABLE IMMED 1V                          PROCEDURE DATE:  01/04/2020      INTERPRETATION:  EXAM:XR CHEST IMMEDIATE.     CLINICAL INDICATION: Sepsis .     TECHNIQUE: AP view.     PRIOR EXAM: 12/20/2019.     FINDINGS:      Persistent right lower lung infiltrate with masslike configuration in the infrahilar region. At least moderate improvement of left lower lung infiltrate. Stable cardiac and mediastinal silhouette. Bilateral rotator cuff arthropathy.    IMPRESSION:     Persistent right lower lung infiltrate.    Improved left lower lung infiltrate.    < end of copied text >      < from: CT Cervical Spine No Cont (01.04.20 @ 15:42) >  EXAM:  CT CERVICAL SPINE                          PROCEDURE DATE:  01/04/2020      INTERPRETATION:  HISTORY: Neck injury with pain. Assess for fracture.    Technique: 1.25 mm axial sections with sagittal and coronal reconstructions, were obtained from T2 to the base of the skull without contrast. 3-D imaging was created and interpreted.    Comparisons: None    FINDINGS:  There is degenerative spondylosis with narrowing of the C5-6 the disc space with large anterior C4 C5 C6 endplate osteophytes. There is straightening of the cervical curve.  There is no fracture, vertebral subluxation or pre-vertebral soft tissue swelling. The cervicomedullary junction shows degenerative arthritic changes without fracture deformity. There is severe bilateral facet arthrosis.  Axial imaging shows a severe multilevel bilateral foraminal narrowing. No significant central canal stenosis.    . RIGHT upper lobe lung parenchymal mass of indeterminate etiology noted. Please see follow-up CT scan 1/4/2020.  IMPRESSION:    Degenerative spondylosis as described.  No fracture, dislocation or prevertebral soft tissue swelling of the cervical spine.    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

## 2020-01-04 NOTE — CONSULT NOTE ADULT - SUBJECTIVE AND OBJECTIVE BOX
REASON FOR CONSULTATION:     HPI:  89 year old female with MDS with blasts, recurrent anemia requiring transfusions intermittent, rectal bleeding, s/p fall a few days ago, who is was brought to ED for cough, sob, and failure to thrive. Per daughter, patient has had poor intake for a few days. Patient is unable to participate in conversation due to lethargy but does open eyes to command. No fever. No abd pain. ++diarrhea      REVIEW OF SYSTEMS:  Constitutional, Eyes, ENT, Cardiovascular, Respiratory, Gastrointestinal, Genitourinary, Musculoskeletal, Integumentary, Neurological, Psychiatric, Endocrine, Heme/Lymph, and Allergic/Immunologic review of systems are otherwise negative except as noted in the HPI.    PAST MEDICAL & SURGICAL HISTORY:  Anemia  Acute deep vein thrombosis (DVT) of right upper extremity, unspecified vein  Fall, sequela  Pelvic fracture  Myelofibrosis  Thrombocytosis: ANNMARIE 2 thrombocytosis  Hypothyroidism (acquired)  Myelodysplastic syndrome  H/O total vaginal hysterectomy      FAMILY HISTORY:  No pertinent family history in first degree relatives      SOCIAL HISTORY:    Allergies    No Known Allergies    Intolerances        MEDICATIONS  (STANDING):  ascorbic acid 500 milliGRAM(s) Oral daily  calcium gluconate IVPB 1 Gram(s) IV Intermittent once  cholecalciferol 2000 Unit(s) Oral daily  dextrose 50% Injectable 50 milliLiter(s) IV Push once  insulin regular  human recombinant. 10 Unit(s) IV Push once  levothyroxine 75 MICROGram(s) Oral daily  mirtazapine 30 milliGRAM(s) Oral daily  sodium polystyrene sulfonate Suspension 15 Gram(s) Oral once  vancomycin  IVPB 1000 milliGRAM(s) IV Intermittent once    MEDICATIONS  (PRN):  acetaminophen   Tablet .. 650 milliGRAM(s) Oral every 6 hours PRN Temp greater or equal to 38.5C (101.3F), Mild Pain (1 - 3)      Vital Signs Last 24 Hrs  T(C): 36.5 (04 Jan 2020 19:36), Max: 36.8 (04 Jan 2020 13:01)  T(F): 97.7 (04 Jan 2020 19:36), Max: 98.2 (04 Jan 2020 13:01)  HR: 98 (04 Jan 2020 19:36) (92 - 98)  BP: 136/65 (04 Jan 2020 19:36) (85/51 - 136/65)  BP(mean): --  RR: 21 (04 Jan 2020 19:36) (20 - 22)  SpO2: 95% (04 Jan 2020 19:36) (91% - 95%)    PHYSICAL EXAM:    GENERAL: chronically ill appearing  HEAD:  Atraumatic  FACE: bruising on forhead @ glabella  NECK: Supple,  NERVOUS SYSTEM: lethargic but arousable  CHEST/LUNG: bilateral air entry  HEART: S1/S2  ABDOMEN: Soft, Nontender  EXTREMITIES:  no pitting edema  LYMPH: No lymphadenopathy noted  SKIN: as above      LABS:                        5.9    141.41 )-----------( 22       ( 04 Jan 2020 14:26 )             18.3     01-04    129<L>  |  96<L>  |  92.0<H>  ----------------------------<  139<H>  6.9<HH>   |  17.0<L>  |  2.76<H>    Ca    6.3<LL>      04 Jan 2020 16:05    TPro  8.1  /  Alb  2.1<L>  /  TBili  0.9  /  DBili  x   /  AST  102<H>  /  ALT  65<H>  /  AlkPhos  268<H>  01-04    PT/INR - ( 04 Jan 2020 14:26 )   PT: 25.2 sec;   INR: 2.14 ratio         PTT - ( 04 Jan 2020 14:26 )  PTT:32.3 sec        RADIOLOGY & ADDITIONAL STUDIES:    < from: CT Chest No Cont (01.04.20 @ 15:42) >    Improving left lung pneumonia and worsening right lung pneumonia.    Bibasilar endobronchial mucoid impaction.     < end of copied text >

## 2020-01-04 NOTE — CONSULT NOTE ADULT - ASSESSMENT
89 yo female pmhx MDS/myelofibrosis failing chemotherapy/immunotherapy (followed by Dr. Montes), anemia requiring frequent transfusions, internal/external hemorrhoids, IBS and recent admission with RSV PNA     NEURO:  CV:  RESP:  RENAL:  GI:  ENDO:  ID:  HEME:  DISPO: DNR/DNI; see GOC note. 89 yo female pmhx MDS/myelofibrosis failing chemotherapy/immunotherapy (followed by Dr. Montes), anemia requiring frequent transfusions, internal/external hemorrhoids, IBS and recent admission with RSV PNA     NEURO: Lethargic; failure to thrive.  Supportive care.    RESP: Acute hypoxic respiratory failure 2/2 RSV and PNA.  ABG revealing hypoxia.  HFNC ordered.    RENAL: ELENI with hyperkalemia s/p cocktail. Avoid nephrotoxic medications, renally dose meds, trend urine output, bun/cr and electrolytes.  Trend BMPs.  Discussed Nephrology consult with patient's family and that in her conditioned that she is unlikely to be a candidate for HD and if she was it is extremely unlikely that she would be able to tolerate it.    GI: NPO  ID: Broad spectrum abx for pna.  RSV + likely from previous admission.   HEME: MDS and anemia; received 2U PRBC from ED staff.  trend H&H and transfuse as needed.  Suggest holding anitplt/ac in setting of thrombocytopenia   DISPO: DNR/DNI; see GOC note.       Patient seen at bedside, vitals/chart/medications reviewed, case discussed with MICU attending Dr. Chadwick.  At this time patient does not require MICU admission; recommendations above.

## 2020-01-04 NOTE — H&P ADULT - HISTORY OF PRESENT ILLNESS
89 year old female with hx of internal/external hemorrhoids, IBS, MDS/ myelofibrosis with recent transformation to AML, known leukocytosis >100, anemia and thrombocytopenia, recently d/c from [facility] on 12/23/19 for sepsis 2/2 PNA and during that hospitalization was advised by oncology that no further tx would be offered for MDS, pt was discharged to AL, currently sent from assisted living for failure to thrive, noted worsening lethargy/weakness, with fall 2 days prior, noted bruising on pts forehead and throughout her body. Difficult to obtain hx from the patient bc of intermittent lethargy.     Daughter bedside stating pt with continued wet cough, and has ~2 episodes of diarrhea daily. S/p zosyn and augmentin. Pt denies any complaints herself but appears lethargic (daughter states she wlil never complain). oriented x 3 and answers other questions.         89 year old female with MDS with blasts, recurrent anemia requiring transfusions intermittent, rectal bleeding, s/p fall a few days ago, who is was brought to ED for cough, sob, and failure to thrive. Per daughter, patient has had poor intake for a few days. Patient is unable to participate in conversation due to lethargy but does open eyes to command. 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 Difficult to obtain hx from the patient bc of intermittent lethargy. Daughters report that since the pt left the hospital pt continued with a wet cough and some diarrhea. Aside from this continued to decline, decreased po intake, progressive weakness. Pt noted with hypotension, hgb ~5.5 and given 2 u prbc with fluid bolus and improvement in sbp ~100 range. Pt evaluated by ICU and deemed not a candidate, MOLST completed and daughters wanted DNR/DNI but to continue medical management and if neede dup titration to HFNC or bipap. Are in agreement with speaking to the palliative/ hospice team but also want to continue medical tx.

## 2020-01-04 NOTE — CONSULT NOTE ADULT - SUBJECTIVE AND OBJECTIVE BOX
89 yo female pmhx MDS/myelofibrosis failing chemotherapy/immunotherapy (followed by Dr. Montes), anemia requiring frequent transfusions, internal/external hemorrhoids, IBS and recent admission with RSV PNA biba from home with FFT, persistent cough.  In ED labs significant for  (higher than baseline 100), K 6.9, BUN/Cr 92/1.39, serum CO2 17 prompting MICU consult.  Patient seen at bedside, appears pale, tachypneic with intercostal use. Patient lethargic, endorses generalized malaise.  Patient's daughters Princess Gonzalez and Misty Marquez at bedside.    PAST MEDICAL & SURGICAL HISTORY:  Anemia  Acute deep vein thrombosis (DVT) of right upper extremity, unspecified vein  Fall, sequela  Pelvic fracture  Myelofibrosis  Thrombocytosis: ANNMARIE 2 thrombocytosis  Hypothyroidism (acquired)  Myelodysplastic syndrome  H/O total vaginal hysterectomy    Allergies  No Known Allergies      FAMILY HISTORY:  No pertinent family history in first degree relatives      Social History:   From Home.       Review of Systems:  All ROS negative except as appreciated above.       Vitals During Exam:   HR: 95  BP: 107/55 mmHg   RR: 22  sPO2: 94% on 5L NC    Physical Examination:    General: Elderly female, lying in bed, appears ill.     HEENT: NC/AT, Pupils equal, reactive to light.  Symmetric. NC in place.     PULM: Symmetrical thorax expansion.  Clear to auscultation bilaterally, no significant sputum production appreciated    CVS: Regular rate and rhythm, no murmurs, rubs, or gallops appreciated    ABD: Soft, nondistended, nontender, normoactive bowel sounds, no masses appreciated    EXT: Nonpitting edema, nontender    SKIN: Warm and well perfused, no rashes noted.    NEURO: Lethargic       Medications:  vancomycin  IVPB 1000 milliGRAM(s) IV Intermittent once      ICU Vital Signs Last 24 Hrs  T(C): 36.8 (04 Jan 2020 13:01), Max: 36.8 (04 Jan 2020 13:01)  T(F): 98.2 (04 Jan 2020 13:01), Max: 98.2 (04 Jan 2020 13:01)  HR: 95 (04 Jan 2020 13:38) (92 - 97)  BP: 107/55 (04 Jan 2020 13:38) (85/51 - 121/58)  BP(mean): --  ABP: --  ABP(mean): --  RR: 22 (04 Jan 2020 13:38) (20 - 22)  SpO2: 91% (04 Jan 2020 13:38) (91% - 95%)    Vital Signs Last 24 Hrs  T(C): 36.8 (04 Jan 2020 13:01), Max: 36.8 (04 Jan 2020 13:01)  T(F): 98.2 (04 Jan 2020 13:01), Max: 98.2 (04 Jan 2020 13:01)  HR: 95 (04 Jan 2020 13:38) (92 - 97)  BP: 107/55 (04 Jan 2020 13:38) (85/51 - 121/58)  BP(mean): --  RR: 22 (04 Jan 2020 13:38) (20 - 22)  SpO2: 91% (04 Jan 2020 13:38) (91% - 95%)      LABS:                        5.9    141.41 )-----------( 22       ( 04 Jan 2020 14:26 )             18.3     01-04    129<L>  |  96<L>  |  92.0<H>  ----------------------------<  139<H>  6.9<HH>   |  17.0<L>  |  2.76<H>    Ca    6.3<LL>      04 Jan 2020 16:05    TPro  8.1  /  Alb  2.1<L>  /  TBili  0.9  /  DBili  x   /  AST  102<H>  /  ALT  65<H>  /  AlkPhos  268<H>  01-04      PT/INR - ( 04 Jan 2020 14:26 )   PT: 25.2 sec;   INR: 2.14 ratio    PTT - ( 04 Jan 2020 14:26 )  PTT:32.3 sec      CULTURES:  Pending; +RSV; Flu negative.        RADIOLOGY:   < from: Xray Chest 1 View-PORTABLE IMMEDIATE (01.04.20 @ 15:54) >   EXAM:  XR CHEST PORTABLE IMMED 1V                          PROCEDURE DATE:  01/04/2020      INTERPRETATION:  EXAM:XR CHEST IMMEDIATE.     CLINICAL INDICATION: Sepsis .     TECHNIQUE: AP view.     PRIOR EXAM: 12/20/2019.     FINDINGS:      Persistent right lower lung infiltrate with masslike configuration in the infrahilar region. At least moderate improvement of left lower lung infiltrate. Stable cardiac and mediastinal silhouette. Bilateral rotator cuff arthropathy.    IMPRESSION:     Persistent right lower lung infiltrate.    Improved left lower lung infiltrate.    FINN SAUCEDA M.D., ATTENDING RADIOLOGIST  This document has been electronically signed. Jan 4 2020  4:54PM    < end of copied text >      SUPPLEMENTAL O2: NC  LINES: Peripheral   IVF: N  NAZARIO: Y  PPx: N/A  CONTACT: N

## 2020-01-04 NOTE — ED ADULT NURSE NOTE - NSIMPLEMENTINTERV_GEN_ALL_ED
Implemented All Fall with Harm Risk Interventions:  [place] to call system. Call bell, personal items and telephone within reach. Instruct patient to call for assistance. Room bathroom lighting operational. Non-slip footwear when patient is off stretcher. Physically safe environment: no spills, clutter or unnecessary equipment. Stretcher in lowest position, wheels locked, appropriate side rails in place. Provide visual cue, wrist band, yellow gown, etc. Monitor gait and stability. Monitor for mental status changes and reorient to person, place, and time. Review medications for side effects contributing to fall risk. Reinforce activity limits and safety measures with patient and family. Provide visual clues: red socks.

## 2020-01-04 NOTE — ED ADULT NURSE NOTE - OBJECTIVE STATEMENT
Patient arrived to ED today with c/o not eating much at home, diarrhea, general weakness, refusal to go into bed to sleep, not wanting to eat or drink, failure to thrive, recent fall hitting her head two days ago and did not seek medical attention.  Patient was found to be hypotensive in triage, patient /58 in critical.  Dr. Wiseman at bedside.

## 2020-01-04 NOTE — ED ADULT NURSE REASSESSMENT NOTE - NS ED NURSE REASSESS COMMENT FT1
pt care assumed 1930, report received from offgoing RN PF. pt resting in bed comfortably on 4LNC at this time, respirations even and unlabored, no apparent distress noted. Blood transfusion running as per transfusion administration record. Will continue to monitor pt while in critical care area.

## 2020-01-04 NOTE — CONSULT NOTE ADULT - ASSESSMENT
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  Patient under the care of Dr. Montes. She is no longer responding to azacitidine chemotherapy and was recently switched to Hydrea as her WBC continued to climb.    1)Anemia / Thrombocytopenia - Natural progression of her MDS, progression to leukemia, vs. bone marrow suppression due to hydrea, or a coombination thereof     -stop hydrea  -supportive transfusion to get Hgb closer to 7 g/dL  -platelet transfusion only if active bleeding  -daily CBC    2)PNA, + RSV  -antibiotics  -supportive care    I had a discussion with patient's daughters at bedside. We discussed advanced directives including DNR as she has potential to decompensate quickly. She has no effective treatment options left at this point, and hospice was discussed. They are agreeable to hospice transition but do want to continue supportive care to stabilize her.

## 2020-01-04 NOTE — ED PROVIDER NOTE - PROGRESS NOTE DETAILS
[place]: pt with pna, hyperk, anemia, sukhjinder, sinusitis with mastoid effusions, seen by icu, they discussed with pt/family as well as pt's Onc, pt to be dnr/dni, will continue medical treatment without dialysis and admit to stepdown, to see if pt improves vs if pt goes towards hospice. no aggressive interventions.

## 2020-01-04 NOTE — ED PROVIDER NOTE - CARE PLAN
Principal Discharge DX:	Hyperkalemia  Secondary Diagnosis:	Anemia  Secondary Diagnosis:	Thrombocytopenia  Secondary Diagnosis:	Pneumonia

## 2020-01-04 NOTE — ED PROVIDER NOTE - OBJECTIVE STATEMENT
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 stevenson ,  recent admission for pna/sepsis and transfusion, sent from assisted living for decreased eating, lethargy/weakness, with fall 2 days ago and bruising to front of head. Daughter bedside stating pt with continued wet cough, and has ~2 episodes of diarrhea daily. S/p zosyn and augmentin. Pt denies any complaints herself but appears lethargic (daughter states she wlil never complain). oriented x 3 and answers other questions. denies a/c.     limited ros by historian.

## 2020-01-04 NOTE — ED PROVIDER NOTE - CLINICAL SUMMARY MEDICAL DECISION MAKING FREE TEXT BOX
lethargic, bruising to head, with continued cough, 2 episodes diarrhea per day (some brbpr but has hemorrhoids and that's typical for her), hx frequent transfusion. will check ct head/neck/chest, labs, fluids (appaers dry), possible transfuse as need. contact Onc.

## 2020-01-04 NOTE — H&P ADULT - ASSESSMENT
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 on 12/23/19 for sepsis 2/2 PNA and during that hospitalization was advised by oncology that no further tx would be offered for MDS, pt was discharged to AL, currently sent from assisted living for failure to thrive, noted worsening lethargy/weakness, with fall 2 days prior, noted bruising on pts forehead and throughout her body. Noted hypotension and acute on chronic anemia with hgb 5.5 and plt 22. Pt admitted with failure to thrive/ weakness 2/2 progressive end stage MDS. Transfused 2 u prbc and 1 plt. Given fall 2 days prior and noted bruising around abdominal area also will rule out RP bleed, pending ct abd/pelvis. Also noted with acute respiratory failure with hypoxia 2/2 RSV positive; requiring up titration of oxygen to hi kwesi. CT chest noted with improving L sided pna but worsening R sided PNA; could be prior infection. Also noted hyperkalemia, noted moderate hemolysis when tested potassium 6.8 and repeat post coktail 6.9 so another cocktail of insulin/ dextrose/ calcium and kayexlate given.  ICU consulted and pt not deemed a candidate for the ICU at this time. DNR/DNI and MOLST completed, but pt to c/w medical management. Guarded prognosis. Pt could benefit from hospice intervention.       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  - Noted hgb 5.5  plt 22   - leukocytosis >140   - s/p fall 2 days ago with scalp hematoma and multiple bruises on the abdomen, will r/o RP bleed  - f/u ct abd/pelvis    - transfused 2 u prbc and 1 plt   - will f/u labs post transfusion   - lasix IV in between transfusions   - fall precautions   - no hydroxyurea per oncology   -supportive transfusion to get Hgb closer to 7 g/dL  -platelet transfusion only if active bleeding  - ct head, c spine negative for any findings   - heme/ onc consult noted and appreciated, pt is no longer a candidate for any tx options recommended hospice   - Hospice consulted   - DNR/DNI but to continue on medical management for now     Acute respiratory failure with hypoxia 2/2 RSV  - recently tx for pna  - leukocytosis elevated due to MDS   - afebrile   - ct chest findings with left sided improving pna but worsening R sided pna, likely all RSV related  - s/p abx in the ER will hold off on further abx   - c/w supportive care  - c/w robitussin prn    - c/w tessalon pearls   - aspiration precautions     ELENI on CKD 4 with hyperkalemia  - in the setting of dehydration/ decreased po intake  - noted hyponatremia, hypocalcemia- seems fluid overloaded   - given 2 liters IVF in er will hold off on any further fluids   - c/w monitoring bun/cr and electrolytes  - s/p insulin/ dextrose x 2   - giving kayexlate and calcium gluconate   - repeat labs ordered post cocktail     Hypothyroid  - f/u tsh   - c/w synthroid po qd     Hx IBS  - known to have bouts of diarrhea vs constipation  - currently with about 2-3 episodes of diarrhea at the facility     DVT ppx  - c/w scd boots b/l  - no chemical AC given anemia and thrombocytopenia     Activity level: Bed rest    Advanced directives: DNR/DNI - MOLST completed by ICU   next of kin: Daughter William     Dispo: Guarded prognosis, very ill and would benefit from hospice intervention. Daughters want to continue medical management but do understand guarded prognosis and in agreement with speaking to palliative/ hospice team. 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 Noted hypotension and acute on chronic anemia with hgb 5.5 and plt 22. Pt admitted with failure to thrive/ weakness 2/2 progressive end stage MDS. Transfused 2 u prbc and 1 plt, noted improvement in plt count but hgb remained below 7 so additional unit prbc ordered. Given fall 2 days prior and noted bruising around abdominal area also will rule out RP bleed, pending ct abd/pelvis. Also noted with acute respiratory failure with hypoxia 2/2 RSV positive; requiring up titration of oxygen to hi kwesi. CT chest noted with improving L sided pna but worsening R sided PNA; could be prior infection. Also noted hyperkalemia, noted moderate hemolysis when tested potassium 6.8 and repeat post coktail 6.9 so another cocktail of insulin/ dextrose/ calcium and kayexlate given with improvement in potassium.  ICU consulted and pt not deemed a candidate for the ICU at this time. DNR/DNI and MOLST completed, but pt to c/w medical management. Guarded prognosis. Pt could benefit from hospice intervention.       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  - s/p fall 2 days ago with scalp hematoma and multiple bruises on the abdomen, will r/o RP bleed  - f/u ct abd/pelvis    - Noted hgb 5.5  plt 22   - leukocytosis >140   - transfused 2 u prbc and 1 plt   with improvement of plt to 70 range but hgb remains below 7 so additional 1 u prbc ordered   - will f/u labs in the am   - lasix IV in between transfusions   - fall precautions   - no hydroxyurea per oncology   -supportive transfusion to get Hgb closer to 7 g/dL  -platelet transfusion only if active bleeding  - ct head, c spine negative for any findings   - heme/ onc consult noted and appreciated, pt is no longer a candidate for any tx options recommended hospice   - Hospice consulted   - DNR/DNI but to continue on medical management for now     Acute respiratory failure with hypoxia 2/2 RSV  - recently tx for pna  - leukocytosis elevated due to MDS   - afebrile   - ct chest findings with left sided improving pna but worsening R sided pna, likely all RSV related  - s/p abx in the ER will hold off on further abx   - c/w supportive care  - c/w robitussin prn    - c/w tessalon pearls   - aspiration precautions     ELENI on CKD 4 with hyperkalemia  - in the setting of dehydration/ decreased po intake  - noted hyponatremia, hypocalcemia- seems fluid overloaded   - given 2 liters IVF in er will hold off on any further fluids   - c/w monitoring bun/cr and electrolytes  - s/p insulin/ dextrose x 2   - s/p kayexlate and calcium gluconate   - repeat labs ordered and potassium normalized to 4.2   - avoiding nephrotoxic agents     Hypothyroid  - f/u tsh   - c/w synthroid po qd     Hx IBS  - known to have bouts of diarrhea vs constipation  - currently with about 2-3 episodes of diarrhea at the facility     DVT ppx  - c/w scd boots b/l  - no chemical AC given anemia and thrombocytopenia     Activity level: Bed rest    Advanced directives: DNR/DNI - MOLST completed by ICU   next of kin: Daughter William     Dispo: Guarded prognosis, very ill and would benefit from hospice intervention. Daughters want to continue medical management but do understand guarded prognosis and in agreement with speaking to palliative/ hospice team. 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 Noted hypotension and acute on chronic anemia with hgb 5.5 and plt 22. Pt admitted with failure to thrive/ weakness 2/2 progressive end stage MDS. Transfused 2 u prbc and 1 plt, noted improvement in plt count but hgb remained below 7 so additional unit prbc ordered. Given fall 2 days prior and noted bruising around abdominal area also will rule out RP bleed, pending ct abd/pelvis. Also noted with acute respiratory failure with hypoxia 2/2 RSV positive; requiring up titration of oxygen to hi kwesi. CT chest noted with improving L sided pna but worsening R sided PNA; could be prior infection. Also noted hyperkalemia, noted moderate hemolysis when tested potassium 6.8 and repeat post coktail 6.9 so another cocktail of insulin/ dextrose/ calcium and kayexlate given with improvement in potassium.  ICU consulted and pt not deemed a candidate for the ICU at this time. DNR/DNI and MOLST completed, but pt to c/w medical management. Guarded prognosis. Pt could benefit from hospice intervention, daughter Princess is in agreement with initiation of symptom control medications, but needs to speak to her sister who is also a proxy prior to converting to full comfort, DNR/DNI suspended mews, continue medical management for now until both daughters come to the hospital today for final decision.     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  - s/p fall 2 days ago with scalp hematoma and multiple bruises on the abdomen, will r/o RP bleed  - f/u ct abd/pelvis    - Noted hgb 5.5  plt 22   - leukocytosis >140   - transfused 2 u prbc and 1 plt   with improvement of plt to 70 range but hgb remains below 7 so additional 1 u prbc ordered   - will f/u labs in the am   - lasix IV in between transfusions   - fall precautions   - no hydroxyurea per oncology   -supportive transfusion to get Hgb closer to 7 g/dL  -platelet transfusion only if active bleeding  - ct head, c spine negative for any findings   - heme/ onc consult noted and appreciated, pt is no longer a candidate for any tx options recommended hospice   - Hospice consulted   - DNR/DNI but to continue on medical management for now until both daughters in agreement to convert to full comfort     Acute respiratory failure with hypoxia 2/2 RSV  - recently tx for pna  - leukocytosis elevated due to MDS   - afebrile   - ct chest findings with left sided improving pna but worsening R sided pna, likely all RSV related  - s/p abx in the ER will hold off on further abx   - c/w supportive care  - c/w robitussin prn    - c/w tessalon pearls   - c/w morphine IV prn for dyspnea/ pain   - c/w ativan IV prn for agitation/ anxiety   - c/w hi kwesi   - aspiration precautions   - As per daughter Princess will c/w prn IV comfort meds, will discuss with her sister and then will make a decision together to convert to full comfort.      ELENI on CKD 4 with hyperkalemia  - in the setting of dehydration/ decreased po intake  - noted hyponatremia, hypocalcemia- seems fluid overloaded   - given 2 liters IVF in er will hold off on any further fluids   - c/w monitoring bun/cr and electrolytes  - s/p insulin/ dextrose x 2   - s/p kayexlate and calcium gluconate   - repeat labs ordered and potassium normalized to 4.2   - avoiding nephrotoxic agents     Hypothyroid  - f/u tsh   - c/w synthroid po qd     Hx IBS  - known to have bouts of diarrhea vs constipation  - currently with about 2-3 episodes of diarrhea at the facility     DVT ppx  - c/w scd boots b/l  - no chemical AC given anemia and thrombocytopenia     Activity level: Bed rest    Advanced directives: DNR/DNI - MOLST completed by ICU   next of kin: Adonis Sánchez and Carmelina     Dispo: Guarded prognosis, very ill and would benefit from hospice intervention. Daughters want to continue medical management but do understand guarded prognosis and in agreement with speaking to palliative/ hospice team. D/w Princess in depth in regards to the current plan of care, she wants to speak to her sister Carmelina and they will both be coming in to see their mother. She is in agreement to start as needed prn meds for comfort sx, morphine IV, ativan IV prn. D/w her that I highly recommend switching the patient to full comfort, starting routine and prn medications. Prior to doing this which Tiffany is in agreement with she needs to speak to her sister and they will tell the hospitalist in the am in regards to their decision. She wants to pursue full comfort, inpatient hospice but has to speak to her sister who is also a proxy. For now DNR/DNI, initiated IV prn comfort sx control medications, suspended mews.     Advanced care planning took 35 minutes

## 2020-01-05 LAB
ALBUMIN SERPL ELPH-MCNC: 2 G/DL — LOW (ref 3.3–5.2)
ALP SERPL-CCNC: 252 U/L — HIGH (ref 40–120)
ALT FLD-CCNC: 67 U/L — HIGH
ANION GAP SERPL CALC-SCNC: 14 MMOL/L — SIGNIFICANT CHANGE UP (ref 5–17)
ANION GAP SERPL CALC-SCNC: 15 MMOL/L — SIGNIFICANT CHANGE UP (ref 5–17)
ANION GAP SERPL CALC-SCNC: 16 MMOL/L — SIGNIFICANT CHANGE UP (ref 5–17)
ANISOCYTOSIS BLD QL: SIGNIFICANT CHANGE UP
ANISOCYTOSIS BLD QL: SLIGHT — SIGNIFICANT CHANGE UP
AST SERPL-CCNC: 100 U/L — HIGH
BASOPHILS # BLD AUTO: 0 K/UL — SIGNIFICANT CHANGE UP (ref 0–0.2)
BASOPHILS # BLD AUTO: 0 K/UL — SIGNIFICANT CHANGE UP (ref 0–0.2)
BASOPHILS NFR BLD AUTO: 0 % — SIGNIFICANT CHANGE UP (ref 0–2)
BASOPHILS NFR BLD AUTO: 0 % — SIGNIFICANT CHANGE UP (ref 0–2)
BILIRUB SERPL-MCNC: 1.8 MG/DL — SIGNIFICANT CHANGE UP (ref 0.4–2)
BLASTS # FLD: 33 % — HIGH (ref 0–0)
BUN SERPL-MCNC: 82 MG/DL — HIGH (ref 8–20)
BUN SERPL-MCNC: 85 MG/DL — HIGH (ref 8–20)
BUN SERPL-MCNC: 86 MG/DL — HIGH (ref 8–20)
CALCIUM SERPL-MCNC: 6.3 MG/DL — CRITICAL LOW (ref 8.6–10.2)
CALCIUM SERPL-MCNC: 6.3 MG/DL — CRITICAL LOW (ref 8.6–10.2)
CALCIUM SERPL-MCNC: 6.4 MG/DL — CRITICAL LOW (ref 8.6–10.2)
CHLORIDE SERPL-SCNC: 100 MMOL/L — SIGNIFICANT CHANGE UP (ref 98–107)
CHLORIDE SERPL-SCNC: 100 MMOL/L — SIGNIFICANT CHANGE UP (ref 98–107)
CHLORIDE SERPL-SCNC: 101 MMOL/L — SIGNIFICANT CHANGE UP (ref 98–107)
CO2 SERPL-SCNC: 17 MMOL/L — LOW (ref 22–29)
CO2 SERPL-SCNC: 17 MMOL/L — LOW (ref 22–29)
CO2 SERPL-SCNC: 19 MMOL/L — LOW (ref 22–29)
CREAT SERPL-MCNC: 2.5 MG/DL — HIGH (ref 0.5–1.3)
CREAT SERPL-MCNC: 2.68 MG/DL — HIGH (ref 0.5–1.3)
CREAT SERPL-MCNC: 2.87 MG/DL — HIGH (ref 0.5–1.3)
EOSINOPHIL # BLD AUTO: 0 K/UL — SIGNIFICANT CHANGE UP (ref 0–0.5)
EOSINOPHIL # BLD AUTO: 1.41 K/UL — HIGH (ref 0–0.5)
EOSINOPHIL NFR BLD AUTO: 0 % — SIGNIFICANT CHANGE UP (ref 0–6)
EOSINOPHIL NFR BLD AUTO: 1 % — SIGNIFICANT CHANGE UP (ref 0–6)
GLUCOSE SERPL-MCNC: 70 MG/DL — SIGNIFICANT CHANGE UP (ref 70–115)
GLUCOSE SERPL-MCNC: 81 MG/DL — SIGNIFICANT CHANGE UP (ref 70–115)
GLUCOSE SERPL-MCNC: 85 MG/DL — SIGNIFICANT CHANGE UP (ref 70–115)
HCT VFR BLD CALC: 19.7 % — CRITICAL LOW (ref 34.5–45)
HCT VFR BLD CALC: 25.2 % — LOW (ref 34.5–45)
HGB BLD-MCNC: 6.7 G/DL — CRITICAL LOW (ref 11.5–15.5)
HGB BLD-MCNC: 8.3 G/DL — LOW (ref 11.5–15.5)
HYPOCHROMIA BLD QL: SLIGHT — SIGNIFICANT CHANGE UP
HYPOCHROMIA BLD QL: SLIGHT — SIGNIFICANT CHANGE UP
LYMPHOCYTES # BLD AUTO: 6 % — LOW (ref 13–44)
LYMPHOCYTES # BLD AUTO: 6 % — LOW (ref 13–44)
LYMPHOCYTES # BLD AUTO: 7.35 K/UL — HIGH (ref 1–3.3)
LYMPHOCYTES # BLD AUTO: 8.48 K/UL — HIGH (ref 1–3.3)
MACROCYTES BLD QL: SLIGHT — SIGNIFICANT CHANGE UP
MACROCYTES BLD QL: SLIGHT — SIGNIFICANT CHANGE UP
MAGNESIUM SERPL-MCNC: 2.1 MG/DL — SIGNIFICANT CHANGE UP (ref 1.6–2.6)
MANUAL SMEAR VERIFICATION: SIGNIFICANT CHANGE UP
MCHC RBC-ENTMCNC: 30.4 PG — SIGNIFICANT CHANGE UP (ref 27–34)
MCHC RBC-ENTMCNC: 31.9 PG — SIGNIFICANT CHANGE UP (ref 27–34)
MCHC RBC-ENTMCNC: 32.9 GM/DL — SIGNIFICANT CHANGE UP (ref 32–36)
MCHC RBC-ENTMCNC: 34 GM/DL — SIGNIFICANT CHANGE UP (ref 32–36)
MCV RBC AUTO: 92.3 FL — SIGNIFICANT CHANGE UP (ref 80–100)
MCV RBC AUTO: 93.8 FL — SIGNIFICANT CHANGE UP (ref 80–100)
METAMYELOCYTES # FLD: 4 % — HIGH (ref 0–0)
MONOCYTES # BLD AUTO: 31.86 K/UL — HIGH (ref 0–0.9)
MONOCYTES # BLD AUTO: 32.52 K/UL — HIGH (ref 0–0.9)
MONOCYTES NFR BLD AUTO: 23 % — HIGH (ref 2–14)
MONOCYTES NFR BLD AUTO: 26 % — HIGH (ref 2–14)
MYELOCYTES NFR BLD: 3 % — HIGH (ref 0–0)
NEUTROPHILS # BLD AUTO: 26.96 K/UL — HIGH (ref 1.8–7.4)
NEUTROPHILS # BLD AUTO: 31.11 K/UL — HIGH (ref 1.8–7.4)
NEUTROPHILS NFR BLD AUTO: 18 % — LOW (ref 43–77)
NEUTROPHILS NFR BLD AUTO: 20 % — LOW (ref 43–77)
NEUTS BAND # BLD: 4 % — SIGNIFICANT CHANGE UP (ref 0–8)
NRBC # BLD: 1 /100 — HIGH (ref 0–0)
PLAT MORPH BLD: NORMAL — SIGNIFICANT CHANGE UP
PLAT MORPH BLD: NORMAL — SIGNIFICANT CHANGE UP
PLATELET # BLD AUTO: 54 K/UL — LOW (ref 150–400)
PLATELET # BLD AUTO: 72 K/UL — LOW (ref 150–400)
POIKILOCYTOSIS BLD QL AUTO: SLIGHT — SIGNIFICANT CHANGE UP
POIKILOCYTOSIS BLD QL AUTO: SLIGHT — SIGNIFICANT CHANGE UP
POLYCHROMASIA BLD QL SMEAR: SLIGHT — SIGNIFICANT CHANGE UP
POTASSIUM SERPL-MCNC: 3.8 MMOL/L — SIGNIFICANT CHANGE UP (ref 3.5–5.3)
POTASSIUM SERPL-MCNC: 4.2 MMOL/L — SIGNIFICANT CHANGE UP (ref 3.5–5.3)
POTASSIUM SERPL-MCNC: 4.2 MMOL/L — SIGNIFICANT CHANGE UP (ref 3.5–5.3)
POTASSIUM SERPL-SCNC: 3.8 MMOL/L — SIGNIFICANT CHANGE UP (ref 3.5–5.3)
POTASSIUM SERPL-SCNC: 4.2 MMOL/L — SIGNIFICANT CHANGE UP (ref 3.5–5.3)
POTASSIUM SERPL-SCNC: 4.2 MMOL/L — SIGNIFICANT CHANGE UP (ref 3.5–5.3)
PROMYELOCYTES # FLD: 6 % — HIGH (ref 0–0)
PROT SERPL-MCNC: 7 G/DL — SIGNIFICANT CHANGE UP (ref 6.6–8.7)
RBC # BLD: 2.1 M/UL — LOW (ref 3.8–5.2)
RBC # BLD: 2.73 M/UL — LOW (ref 3.8–5.2)
RBC # FLD: 17.9 % — HIGH (ref 10.3–14.5)
RBC # FLD: 19.4 % — HIGH (ref 10.3–14.5)
RBC BLD AUTO: ABNORMAL
RBC BLD AUTO: ABNORMAL
ROULEAUX BLD QL SMEAR: PRESENT
ROULEAUX BLD QL SMEAR: PRESENT
SODIUM SERPL-SCNC: 132 MMOL/L — LOW (ref 135–145)
SODIUM SERPL-SCNC: 133 MMOL/L — LOW (ref 135–145)
SODIUM SERPL-SCNC: 134 MMOL/L — LOW (ref 135–145)
TROPONIN T SERPL-MCNC: 0.01 NG/ML — SIGNIFICANT CHANGE UP (ref 0–0.06)
TROPONIN T SERPL-MCNC: 0.01 NG/ML — SIGNIFICANT CHANGE UP (ref 0–0.06)
WBC # BLD: 122.54 K/UL — CRITICAL HIGH (ref 3.8–10.5)
WBC # BLD: 77.96 K/UL — CRITICAL HIGH (ref 3.8–10.5)
WBC # FLD AUTO: 122.54 K/UL — CRITICAL HIGH (ref 3.8–10.5)
WBC # FLD AUTO: 77.96 K/UL — CRITICAL HIGH (ref 3.8–10.5)

## 2020-01-05 PROCEDURE — 99223 1ST HOSP IP/OBS HIGH 75: CPT

## 2020-01-05 PROCEDURE — 99497 ADVNCD CARE PLAN 30 MIN: CPT | Mod: 25

## 2020-01-05 RX ORDER — FUROSEMIDE 40 MG
20 TABLET ORAL ONCE
Refills: 0 | Status: COMPLETED | OUTPATIENT
Start: 2020-01-05 | End: 2020-01-05

## 2020-01-05 RX ORDER — CALCIUM GLUCONATE 100 MG/ML
1 VIAL (ML) INTRAVENOUS ONCE
Refills: 0 | Status: COMPLETED | OUTPATIENT
Start: 2020-01-05 | End: 2020-01-05

## 2020-01-05 RX ORDER — ROBINUL 0.2 MG/ML
0.5 INJECTION INTRAMUSCULAR; INTRAVENOUS EVERY 8 HOURS
Refills: 0 | Status: DISCONTINUED | OUTPATIENT
Start: 2020-01-05 | End: 2020-01-06

## 2020-01-05 RX ORDER — DIPHENHYDRAMINE HCL 50 MG
25 CAPSULE ORAL ONCE
Refills: 0 | Status: COMPLETED | OUTPATIENT
Start: 2020-01-05 | End: 2020-01-05

## 2020-01-05 RX ORDER — MORPHINE SULFATE 50 MG/1
0.5 CAPSULE, EXTENDED RELEASE ORAL EVERY 4 HOURS
Refills: 0 | Status: DISCONTINUED | OUTPATIENT
Start: 2020-01-05 | End: 2020-01-06

## 2020-01-05 RX ADMIN — Medication 100 GRAM(S): at 16:40

## 2020-01-05 RX ADMIN — MORPHINE SULFATE 0.5 MILLIGRAM(S): 50 CAPSULE, EXTENDED RELEASE ORAL at 09:22

## 2020-01-05 RX ADMIN — Medication 75 MICROGRAM(S): at 05:37

## 2020-01-05 RX ADMIN — ROBINUL 0.5 MILLIGRAM(S): 0.2 INJECTION INTRAMUSCULAR; INTRAVENOUS at 09:22

## 2020-01-05 RX ADMIN — Medication 100 GRAM(S): at 03:20

## 2020-01-05 RX ADMIN — MORPHINE SULFATE 0.5 MILLIGRAM(S): 50 CAPSULE, EXTENDED RELEASE ORAL at 13:28

## 2020-01-05 RX ADMIN — Medication 250 MILLIGRAM(S): at 01:17

## 2020-01-05 RX ADMIN — Medication 20 MILLIGRAM(S): at 00:25

## 2020-01-05 RX ADMIN — Medication 0.5 MILLIGRAM(S): at 09:22

## 2020-01-05 RX ADMIN — Medication 25 MILLIGRAM(S): at 04:17

## 2020-01-05 NOTE — PROGRESS NOTE ADULT - SUBJECTIVE AND OBJECTIVE BOX
Patient: KEVIN BARAJAS [id number] 89y Female                           Internal Medicine Hospitalist Progress Note    Interval History:  Daughter Tiffany at bedside.  Pt on high flow O2.  denies complaints.  No pain / bleeding.      ____________________PHYSICAL EXAM:  Vitals reviewed as indicated below  GENERAL:  NAD Alert and Oriented x 3   HEENT: NCAT  CARDIOVASCULAR:  S1, S2  LUNGS: coarse BS b/l  ABDOMEN:  soft, (-) tenderness, (-) distension, (+) bowel sounds, (-) guarding, (-) rebound (-) rigidity  EXTREMITIES:  no cyanosis / clubbing / edema.   ____________________      BACKGROUND:  HEALTH ISSUES - PROBLEM Dx:  Pneumonia: Pneumonia  MDS (myelodysplastic syndrome): MDS (myelodysplastic syndrome)  Thrombocytopenia: Thrombocytopenia  Anemia: Anemia        Allergies    No Known Allergies    Intolerances      PAST MEDICAL & SURGICAL HISTORY:  Anemia  Acute deep vein thrombosis (DVT) of right upper extremity, unspecified vein  Fall, sequela  Pelvic fracture  Myelofibrosis  Thrombocytosis: ANNMARIE 2 thrombocytosis  Hypothyroidism (acquired)  Myelodysplastic syndrome  H/O total vaginal hysterectomy        VITALS:  Vital Signs Last 24 Hrs  T(C): 36.8 (05 Jan 2020 07:18), Max: 36.8 (05 Jan 2020 04:30)  T(F): 98.2 (05 Jan 2020 07:18), Max: 98.2 (05 Jan 2020 04:30)  HR: 93 (05 Jan 2020 07:18) (90 - 100)  BP: 109/67 (05 Jan 2020 07:18) (94/52 - 136/65)  BP(mean): --  RR: 22 (05 Jan 2020 07:18) (20 - 24)  SpO2: 98% (05 Jan 2020 07:18) (88% - 98%) Daily     Daily   CAPILLARY BLOOD GLUCOSE      POCT Blood Glucose.: 129 mg/dL (04 Jan 2020 23:07)    I&O's Summary        LABS:                        8.3    77.96 )-----------( 54       ( 05 Jan 2020 11:53 )             25.2     01-05    134<L>  |  101  |  82.0<H>  ----------------------------<  85  3.8   |  19.0<L>  |  2.50<H>    Ca    6.3<LL>      05 Jan 2020 11:53  Mg     2.1     01-05    TPro  7.0  /  Alb  2.0<L>  /  TBili  1.8  /  DBili  x   /  AST  100<H>  /  ALT  67<H>  /  AlkPhos  252<H>  01-05    PT/INR - ( 04 Jan 2020 14:26 )   PT: 25.2 sec;   INR: 2.14 ratio         PTT - ( 04 Jan 2020 14:26 )  PTT:32.3 sec  LIVER FUNCTIONS - ( 05 Jan 2020 11:53 )  Alb: 2.0 g/dL / Pro: 7.0 g/dL / ALK PHOS: 252 U/L / ALT: 67 U/L / AST: 100 U/L / GGT: x             CARDIAC MARKERS ( 05 Jan 2020 11:53 )  x     / 0.01 ng/mL / x     / x     / x      CARDIAC MARKERS ( 05 Jan 2020 01:18 )  x     / 0.01 ng/mL / x     / x     / x              MEDICATIONS:  MEDICATIONS  (STANDING):  ascorbic acid 500 milliGRAM(s) Oral daily  calcium gluconate IVPB 1 Gram(s) IV Intermittent once  cholecalciferol 2000 Unit(s) Oral daily  levothyroxine 75 MICROGram(s) Oral daily  mirtazapine 30 milliGRAM(s) Oral daily    MEDICATIONS  (PRN):  acetaminophen   Tablet .. 650 milliGRAM(s) Oral every 6 hours PRN Temp greater or equal to 38.5C (101.3F), Mild Pain (1 - 3)  benzonatate 100 milliGRAM(s) Oral every 8 hours PRN Cough  glycopyrrolate Injectable 0.5 milliGRAM(s) IV Push every 8 hours PRN secretions  guaiFENesin   Syrup  (Sugar-Free) 100 milliGRAM(s) Oral every 6 hours PRN Cough  LORazepam   Injectable 0.5 milliGRAM(s) IV Push every 6 hours PRN agitation/ anxiety  morphine  - Injectable 0.5 milliGRAM(s) IV Push every 4 hours PRN dyspnea/ pain

## 2020-01-05 NOTE — ED ADULT NURSE REASSESSMENT NOTE - NS ED NURSE REASSESS COMMENT FT1
patient sat 88% on high flow, breathing unlabored but patient appears anxious. Dr. zuniga notified and is coming down to see patient. patient continues to be on cardiac monitor will continue to observe.

## 2020-01-05 NOTE — CONSULT NOTE ADULT - SUBJECTIVE AND OBJECTIVE BOX
[facility] DIVISION OF KIDNEY DISEASES AND HYPERTENSION -- INITIAL CONSULT NOTE  --------------------------------------------------------------------------------  HPI:    Pt lethargic, unable to obtain history/ ROS from pt.  History obtained from charts.    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 Difficult to obtain hx from the patient bc of intermittent lethargy. Daughters report that since the pt left the hospital pt continued with a wet cough and some diarrhea. Aside from this continued to decline, decreased po intake, progressive weakness. Pt noted with hypotension, hgb ~5.5 and given 2 u prbc with fluid bolus and improvement in sbp ~100 range. Pt evaluated by ICU and deemed not a candidate, BANDAR completed and daughters wanted DNR/DNI but to continue medical management and if neede dup titration to HFNC or bipap. Are in agreement with speaking to the palliative/ hospice team but also want to continue medical tx'    Nephrology consulted for hyponatremia, ELENI and hyperkalemia.     PAST HISTORY  --------------------------------------------------------------------------------  PAST MEDICAL & SURGICAL HISTORY:  Anemia  Acute deep vein thrombosis (DVT) of right upper extremity, unspecified vein  Fall, sequela  Pelvic fracture  Myelofibrosis  Thrombocytosis: ANNMARIE 2 thrombocytosis  Hypothyroidism (acquired)  Myelodysplastic syndrome  H/O total vaginal hysterectomy    FAMILY HISTORY:  Family history of essential hypertension    PAST SOCIAL HISTORY:    ALLERGIES & MEDICATIONS  --------------------------------------------------------------------------------  Allergies    No Known Allergies    Intolerances      Standing Inpatient Medications  ascorbic acid 500 milliGRAM(s) Oral daily  cholecalciferol 2000 Unit(s) Oral daily  levothyroxine 75 MICROGram(s) Oral daily  mirtazapine 30 milliGRAM(s) Oral daily    PRN Inpatient Medications  acetaminophen   Tablet .. 650 milliGRAM(s) Oral every 6 hours PRN  benzonatate 100 milliGRAM(s) Oral every 8 hours PRN  glycopyrrolate Injectable 0.5 milliGRAM(s) IV Push every 8 hours PRN  guaiFENesin   Syrup  (Sugar-Free) 100 milliGRAM(s) Oral every 6 hours PRN  LORazepam   Injectable 0.5 milliGRAM(s) IV Push every 6 hours PRN  morphine  - Injectable 0.5 milliGRAM(s) IV Push every 4 hours PRN      REVIEW OF SYSTEMS  --------------------------------------------------------------------------------  unable to obtain    VITALS/PHYSICAL EXAM  --------------------------------------------------------------------------------  T(C): 36.8 (01-05-20 @ 07:18), Max: 36.8 (01-05-20 @ 04:30)  HR: 93 (01-05-20 @ 07:18) (90 - 100)  BP: 109/67 (01-05-20 @ 07:18) (94/52 - 136/65)  RR: 22 (01-05-20 @ 07:18) (20 - 24)  SpO2: 98% (01-05-20 @ 07:18) (88% - 98%)  Wt(kg): --  Height (cm): 160.02 (01-04-20 @ 13:01)  Weight (kg): 59 (01-04-20 @ 13:01)  BMI (kg/m2): 23 (01-04-20 @ 13:01)  BSA (m2): 1.61 (01-04-20 @ 13:01)      Physical Exam:  	Gen: elderly woman, NAD, resting  	HEENT: high flow NC  	Pulm: CTA B/L  	CV: RRR, S1S2; no rub  	Back: unable to examine  	Abd: +BS, nontender	            : No suprapubic tenderness  	UE: Warm, no edema;  	LE: Warm, edema  	Neuro: No focal deficits  	Psych: unable to assess  	Skin: Warm  	Vascular access:    LABS/STUDIES  --------------------------------------------------------------------------------              8.3    77.96 >-----------<  54       [01-05-20 @ 11:53]              25.2     134  |  101  |  82.0  ----------------------------<  85      [01-05-20 @ 11:53]  3.8   |  19.0  |  2.50        Ca     6.3     [01-05-20 @ 11:53]      Mg     2.1     [01-05-20 @ 02:00]    TPro  7.0  /  Alb  2.0  /  TBili  1.8  /  DBili  x   /  AST  100  /  ALT  67  /  AlkPhos  252  [01-05-20 @ 11:53]    PT/INR: PT 25.2 , INR 2.14       [01-04-20 @ 14:26]  PTT: 32.3       [01-04-20 @ 14:26]    Troponin 0.01      [01-05-20 @ 11:53]    Creatinine Trend:  SCr 2.50 [01-05 @ 11:53]  SCr 2.87 [01-05 @ 02:00]  SCr 2.68 [01-05 @ 01:18]  SCr 2.76 [01-04 @ 16:05]  SCr 2.64 [01-04 @ 14:26]    Urinalysis - [12-30-18 @ 09:06]      Color Yellow / Appearance Clear / SG 1.015 / pH 6.0      Gluc Negative / Ketone Negative  / Bili Negative / Urobili Negative       Blood Moderate / Protein 30 / Leuk Est Moderate / Nitrite Positive      RBC 3-5 / WBC 11-25 / Hyaline  / Gran  / Sq Epi  / Non Sq Epi Occasional / Bacteria Few        HBsAg Nonreact      [09-11-17 @ 18:41]  HCV 0.15, Nonreact      [09-11-17 @ 18:41]    FLAVIO: titer 1:80, pattern Homogeneous      [09-12-17 @ 18:57]  dsDNA <12      [09-15-17 @ 20:38]  C3 Complement 149      [09-15-17 @ 20:34]  C4 Complement 29      [09-15-17 @ 20:34]  Rheumatoid Factor 18.0      [09-12-17 @ 18:57]

## 2020-01-05 NOTE — PROGRESS NOTE ADULT - ASSESSMENT
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  Hgb 5.5, Plt 22.  Also noted with acute respiratory failure with hypoxia 2/2 RSV positive; requiring up titration of oxygen to hi kwesi. CT chest noted with improving L sided pna but worsening R sided PNA; could be prior infection. Also noted hyperkalemia, noted moderate hemolysis when tested potassium 6.8 and repeat post cocktail 6.9 so another cocktail of insulin/ dextrose/ calcium and kayexlate given with improvement in potassium.  ICU consulted and pt not deemed a candidate for the ICU at this time. DNR/DNI and MOLST completed, but pt to c/w medical management. Guarded prognosis. Daughter Princess is in agreement with initiation of symptom control medications, but needs to speak to her sister who is also a proxy prior to converting to full comfort, DNR/DNI suspended mews, continue medical management for now until both daughters come to the hospital today for final decision.     Failure to thrive/ generalized weakness in the setting of acute on chronic anemia 2/2 end stage MDS   Recent transformation to AML.  Anemia and thrombocytopenia discussed with Hematology Dr. Reese, likely reflects progression of MDS.  Family would like to focus more on pt's comfort, aware of her declining condition, but are awaiting the opportunity to speak with Dr. Montes.  As Hgb is stable, CT A/P to r/o RP bleed is unlikely to alter management at present.  If Hgb drops, would reconsider.  Hospice consulted.  DNR/DNI but to continue on medical management for now until both daughters in agreement to convert to full comfort     Acute respiratory failure with hypoxia 2/2 RSV - CT chest findings with left sided improving pna but worsening R sided pna, likely all RSV related.  Supportive care - antitussives, tessalon, Morphine, Ativan.  Continue with high flow O2.    ELENI on CKD 4 with hyperkalemia  - in the setting of dehydration/ decreased po intake  - noted hyponatremia, hypocalcemia- seems fluid overloaded   - given 2 liters IVF in er will hold off on any further fluids   - c/w monitoring bun/cr and electrolytes  - s/p insulin/ dextrose x 2   - s/p kayexlate and calcium gluconate   - repeat labs ordered and potassium normalized to 4.2   - avoiding nephrotoxic agents     Hypothyroid - c/w synthroid po qd   Hx IBS - known to have bouts of diarrhea vs constipation - currently with about 2-3 episodes of diarrhea at the facility   DVT ppx - c/w scd boots b/l - no chemical AC given anemia and thrombocytopenia   Activity level: Bed rest

## 2020-01-05 NOTE — PROVIDER CONTACT NOTE (CRITICAL VALUE NOTIFICATION) - SITUATION
md contacted regarding elevated wbc ( trending down) and low calcium. calcium to be repleted and continue to monitor wbc

## 2020-01-06 ENCOUNTER — APPOINTMENT (OUTPATIENT)
Dept: VASCULAR SURGERY | Facility: CLINIC | Age: 85
End: 2020-01-06

## 2020-01-06 DIAGNOSIS — R06.00 DYSPNEA, UNSPECIFIED: ICD-10-CM

## 2020-01-06 DIAGNOSIS — Z51.5 ENCOUNTER FOR PALLIATIVE CARE: ICD-10-CM

## 2020-01-06 DIAGNOSIS — R53.2 FUNCTIONAL QUADRIPLEGIA: ICD-10-CM

## 2020-01-06 DIAGNOSIS — J96.90 RESPIRATORY FAILURE, UNSPECIFIED, UNSPECIFIED WHETHER WITH HYPOXIA OR HYPERCAPNIA: ICD-10-CM

## 2020-01-06 LAB
APPEARANCE UR: ABNORMAL
BACTERIA # UR AUTO: ABNORMAL
BILIRUB UR-MCNC: NEGATIVE — SIGNIFICANT CHANGE UP
COLOR SPEC: YELLOW — SIGNIFICANT CHANGE UP
COMMENT - URINE: SIGNIFICANT CHANGE UP
COMMENT - URINE: SIGNIFICANT CHANGE UP
DIFF PNL FLD: ABNORMAL
EPI CELLS # UR: SIGNIFICANT CHANGE UP
GLUCOSE BLDC GLUCOMTR-MCNC: 105 MG/DL — HIGH (ref 70–99)
GLUCOSE UR QL: NEGATIVE MG/DL — SIGNIFICANT CHANGE UP
KETONES UR-MCNC: NEGATIVE — SIGNIFICANT CHANGE UP
LEUKOCYTE ESTERASE UR-ACNC: ABNORMAL
NITRITE UR-MCNC: NEGATIVE — SIGNIFICANT CHANGE UP
PH UR: 5 — SIGNIFICANT CHANGE UP (ref 5–8)
PROT UR-MCNC: 15 MG/DL
RBC CASTS # UR COMP ASSIST: ABNORMAL /HPF (ref 0–4)
SP GR SPEC: 1.02 — SIGNIFICANT CHANGE UP (ref 1.01–1.02)
UROBILINOGEN FLD QL: NEGATIVE MG/DL — SIGNIFICANT CHANGE UP
WBC UR QL: SIGNIFICANT CHANGE UP

## 2020-01-06 PROCEDURE — 93010 ELECTROCARDIOGRAM REPORT: CPT

## 2020-01-06 PROCEDURE — 99497 ADVNCD CARE PLAN 30 MIN: CPT | Mod: 25

## 2020-01-06 PROCEDURE — 99223 1ST HOSP IP/OBS HIGH 75: CPT

## 2020-01-06 PROCEDURE — 99233 SBSQ HOSP IP/OBS HIGH 50: CPT

## 2020-01-06 RX ORDER — SCOPALAMINE 1 MG/3D
1 PATCH, EXTENDED RELEASE TRANSDERMAL ONCE
Refills: 0 | Status: COMPLETED | OUTPATIENT
Start: 2020-01-06 | End: 2020-01-06

## 2020-01-06 RX ORDER — PIPERACILLIN AND TAZOBACTAM 4; .5 G/20ML; G/20ML
3.38 INJECTION, POWDER, LYOPHILIZED, FOR SOLUTION INTRAVENOUS ONCE
Refills: 0 | Status: DISCONTINUED | OUTPATIENT
Start: 2020-01-06 | End: 2020-01-06

## 2020-01-06 RX ORDER — LEVALBUTEROL 1.25 MG/.5ML
0.63 SOLUTION, CONCENTRATE RESPIRATORY (INHALATION) ONCE
Refills: 0 | Status: DISCONTINUED | OUTPATIENT
Start: 2020-01-06 | End: 2020-01-06

## 2020-01-06 RX ORDER — DILTIAZEM HCL 120 MG
20 CAPSULE, EXT RELEASE 24 HR ORAL ONCE
Refills: 0 | Status: DISCONTINUED | OUTPATIENT
Start: 2020-01-06 | End: 2020-01-06

## 2020-01-06 RX ORDER — ROBINUL 0.2 MG/ML
0.2 INJECTION INTRAMUSCULAR; INTRAVENOUS EVERY 6 HOURS
Refills: 0 | Status: DISCONTINUED | OUTPATIENT
Start: 2020-01-06 | End: 2020-01-08

## 2020-01-06 RX ORDER — PIPERACILLIN AND TAZOBACTAM 4; .5 G/20ML; G/20ML
3.38 INJECTION, POWDER, LYOPHILIZED, FOR SOLUTION INTRAVENOUS EVERY 12 HOURS
Refills: 0 | Status: DISCONTINUED | OUTPATIENT
Start: 2020-01-06 | End: 2020-01-06

## 2020-01-06 RX ORDER — MORPHINE SULFATE 50 MG/1
1 CAPSULE, EXTENDED RELEASE ORAL
Qty: 100 | Refills: 0 | Status: DISCONTINUED | OUTPATIENT
Start: 2020-01-06 | End: 2020-01-06

## 2020-01-06 RX ORDER — MORPHINE SULFATE 50 MG/1
1 CAPSULE, EXTENDED RELEASE ORAL
Refills: 0 | Status: DISCONTINUED | OUTPATIENT
Start: 2020-01-06 | End: 2020-01-08

## 2020-01-06 RX ORDER — MORPHINE SULFATE 50 MG/1
1 CAPSULE, EXTENDED RELEASE ORAL
Qty: 100 | Refills: 0 | Status: DISCONTINUED | OUTPATIENT
Start: 2020-01-06 | End: 2020-01-08

## 2020-01-06 RX ADMIN — MORPHINE SULFATE 1 MG/HR: 50 CAPSULE, EXTENDED RELEASE ORAL at 14:06

## 2020-01-06 RX ADMIN — ROBINUL 0.2 MILLIGRAM(S): 0.2 INJECTION INTRAMUSCULAR; INTRAVENOUS at 22:50

## 2020-01-06 RX ADMIN — Medication 75 MICROGRAM(S): at 05:28

## 2020-01-06 RX ADMIN — MORPHINE SULFATE 1 MG/HR: 50 CAPSULE, EXTENDED RELEASE ORAL at 14:45

## 2020-01-06 RX ADMIN — MORPHINE SULFATE 0.5 MILLIGRAM(S): 50 CAPSULE, EXTENDED RELEASE ORAL at 11:15

## 2020-01-06 RX ADMIN — MORPHINE SULFATE 0.5 MILLIGRAM(S): 50 CAPSULE, EXTENDED RELEASE ORAL at 10:49

## 2020-01-06 RX ADMIN — SCOPALAMINE 1 PATCH: 1 PATCH, EXTENDED RELEASE TRANSDERMAL at 22:49

## 2020-01-06 RX ADMIN — ROBINUL 0.5 MILLIGRAM(S): 0.2 INJECTION INTRAMUSCULAR; INTRAVENOUS at 10:49

## 2020-01-06 NOTE — CONSULT NOTE ADULT - SUBJECTIVE AND OBJECTIVE BOX
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 Difficult to obtain hx from the patient bc of intermittent lethargy. Daughters report that since the pt left the hospital pt continued with a wet cough and some diarrhea. Aside from this continued to decline, decreased po intake, progressive weakness. Pt noted with hypotension, hgb ~5.5 and given 2 u prbc with fluid bolus and improvement in sbp ~100 range. Pt evaluated by ICU and deemed not a candidate, MOLST completed and daughters wanted DNR/DNI but to continue medical management and if neede dup titration to HFNC or bipap. Are in agreement with speaking to the palliative/ hospice team but also want to continue medical tx.     Patient with rapid response this am 2/2 tachycardia found to have new onset afib. Patient transitioned to comfort care by primary team.     PERTINENT PMH REVIEWED:  [ ] YES [ ] NO         Acute deep vein thrombosis (DVT) of right upper extremity, unspecified vein     Anemia     Fall, sequela     Hypothyroidism (acquired)     Myelodysplastic syndrome     Myelofibrosis     Pelvic fracture     Thrombocytosis ANNMARIE 2 thrombocytosis.     PAST SURGICAL HISTORY:  H/O total vaginal hysterectomy.     FAMILY HISTORY:  Family history of essential hypertension.      SOCIAL HISTORY:  EtOH [ ] Yes  [x ] No                                    Drugs [ ] Yes [x ] No                                   [ ] smoker [x ] nonsmoker                                    Admitted from: Assisted Living    Surrogate/HCP/Guardian: Daughters-  Princess Gonzalez and steve Holloway     FAMILY HISTORY:  Family history of essential hypertension      Baseline ADLs (prior to admission):  Independent [ ] moderately [ ] fully   Dependent   [ x] moderately [ ]fully    MEDICATIONS  (STANDING):  morphine  Infusion 1 mG/Hr (1 mL/Hr) IV Continuous <Continuous>  scopolamine   Patch 1 Patch Transdermal once    MEDICATIONS  (PRN):  glycopyrrolate Injectable 0.2 milliGRAM(s) IV Push every 6 hours PRN secretions  LORazepam   Injectable 0.5 milliGRAM(s) IV Push every 4 hours PRN Anxiety  morphine  - Injectable 1 milliGRAM(s) IV Push every 2 hours PRN SOB or pain      Allergies    No Known Allergies    Intolerances        REVIEW OF SYSTEMS       [x ] Unable to obtain due to poor mentation     Karnofsky Performance Score/Palliative Performance Status Version 2:  20   %    Vital Signs Last 24 Hrs  T(C): 37 (2020 08:20), Max: 37.5 (2020 23:08)  T(F): 98.6 (2020 08:20), Max: 99.5 (2020 23:08)  HR: 93 (2020 08:20) (88 - 96)  BP: 101/53 (2020 08:20) (92/63 - 116/56)  BP(mean): 69 (2020 04:57) (69 - 696)  RR: 27 (2020 08:20) (18 - 27)  SpO2: 95% (2020 08:20) (95% - 100%)    PHYSICAL EXAM:    General: Elderly woman frail appearing,  Lethargic NAD  HEENT: [x ] normal  [ ] dry mouth  [ ] ET tube/trach    Lungs: [x ] comfortable on HF O2  CV: [x ] normal  [ ] tachycardia    GI: [ x] normal  [ ] distended  [ ] tender  [ ] no BS               [ ] PEG/NG/OG tube    : [ x] normal  [ ] incontinent  [ ] oliguria/anuria  [ ] owen    MSK: [ ] normal  [ x] weakness  [ ] edema             [ ] ambulatory  [x ] bedbound/wheelchair bound    Skin: [ ] normal  [ ] pressure ulcers- Stage_____  [x ] no rash    LABS:                        8.3    77.96 )-----------( 54       ( 2020 11:53 )             25.2     01-05    134<L>  |  101  |  82.0<H>  ----------------------------<  85  3.8   |  19.0<L>  |  2.50<H>    Ca    6.3<LL>      2020 11:53  Mg     2.1     01-05    TPro  7.0  /  Alb  2.0<L>  /  TBili  1.8  /  DBili  x   /  AST  100<H>  /  ALT  67<H>  /  AlkPhos  252<H>      PT/INR - ( 2020 14:26 )   PT: 25.2 sec;   INR: 2.14 ratio         PTT - ( 2020 14:26 )  PTT:32.3 sec  Urinalysis Basic - ( 2020 05:39 )    Color: Yellow / Appearance: Slightly Turbid / S.020 / pH: x  Gluc: x / Ketone: Negative  / Bili: Negative / Urobili: Negative mg/dL   Blood: x / Protein: 15 mg/dL / Nitrite: Negative   Leuk Esterase: Trace / RBC: 11-25 /HPF / WBC 3-5   Sq Epi: x / Non Sq Epi: Few / Bacteria: Moderate      I&O's Summary    2020 07:01  -  2020 07:00  --------------------------------------------------------  IN: 0 mL / OUT: 900 mL / NET: -900 mL        RADIOLOGY & ADDITIONAL STUDIES:  < from: Xray Chest 1 View-PORTABLE IMMEDIATE (20 @ 15:54) >     EXAM:  XR CHEST PORTABLE IMMED 1V                          PROCEDURE DATE:  2020          INTERPRETATION:  EXAM:XR CHEST IMMEDIATE.     CLINICAL INDICATION: Sepsis .     TECHNIQUE: AP view.     PRIOR EXAM: 2019.     FINDINGS:      Persistent right lower lung infiltrate with masslike configuration in the infrahilar region. At least moderate improvement of left lower lung infiltrate. Stable cardiac and mediastinal silhouette. Bilateral rotator cuff arthropathy.      IMPRESSION:     Persistent right lower lung infiltrate.    Improved left lower lung infiltrate.        < end of copied text >      ADVANCE DIRECTIVES:  [ x] YES [ ] NO   DNR [ x] YES [ ] NO  Completed on:                     MOLST  [x ] YES [ ] NO   Completed on:  Living Will  [x ] YES [ ] NO   Completed on:    Thank you for the opportunity to assist with the care of this patient.   [place] [facility] Consult [facility] [phone].

## 2020-01-06 NOTE — PROGRESS NOTE ADULT - ASSESSMENT
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  Hgb 5.5, Plt 22.  Noted RSV+.  Evaluated by MICU, placed in stepdown.  DNR / DNI    Acute respiratory failure with hypoxia 2/2 RSV - I extensively discussed comfort care options vs aggressive measures.  Daughters Princess and ELMER both agreed that pt would not wish to live in distress.  They agree with initiation of morphine gtt, with possible further deterioration and death.  Discussed with palliative care.    Failure to thrive/ generalized weakness in the setting of acute on chronic anemia 2/2 end stage MDS   [facility] care.  No further lab draws.    ELENI on CKD 4 with hyperkalemia  [facility] care.  No further lab draws.      Hypothyroid - [facility] care.    Hx IBS - [facility] care.    DVT ppx - c/w scd boots b/l - no chemical AC given anemia and thrombocytopenia   Activity level: Bed rest

## 2020-01-06 NOTE — CONSULT NOTE ADULT - ASSESSMENT
89yr woman with MDS with transformation to AML, respiratory failure in the setting of RSV. Patient s/p Rapid Response 2/2 for tachycardia found to have new onset afib. Patient is frail and debilitated poor prognosis.

## 2020-01-06 NOTE — GOALS OF CARE CONVERSATION - ADVANCED CARE PLANNING - CONVERSATION DETAILS
Tiffany Gonzalez ([facility]) and Misty Marquez [phone] at patient's bedside.  We discussed patient's diagnosis, possible treatment options and likely prognosis.  We discussed that patient has been suffering from failure to thrive, that her MDS is no longer responding to chemo/immunotherapy and how patient seems to be in decline.  We discussed respiratory failure treatment plans, escalation to HFNC and possible trial of BiPAP prn if patient's mental status allows.  We discussed the high possibility of respiratory decompensation despite all efforts and how patient would likely be difficult to wean from ventilator if she were to be placed on it in the setting of her failure to thrive and sepsis.  We further discussed DNR and how despite all efforts in the setting of an overwhelming infection and the failure of her MDS to respond to therapy that patient is at high risk for deterioration into cardiac arrest.  At this time the patient's family would like conservative measures with HFNC, trial of intubation, abx and ivf.  Patient is a DNR/DNI/ no HD.
Family states mother has been declining and now wish to focus on her comfort.  Reviewed comfort care - agreeable to no further labs, vitals,  monitors. Discussed care plan for symptom management.   Princess requested IV fluids- do not feel would be medically therapeutic and likely will cause more symptom burden at end of life.   Informed Patient is on HiFlow O2-  likely prolonging dying process.  Prognosis likely hours/days.

## 2020-01-06 NOTE — CDI QUERY NOTE - NSCDIOTHERTXTBX_GEN_ALL_CORE_HH
Prior to comfort care measures order, patient was given IVAB for pneumonia. Can you please clarify the suspected causative organism based on antibiotics of choice given to patient?    A.	Pneumonia likely with gram negative and/ or gram positive bacteria  B.	Pneumonia likely with gram negative bacteria  C.	Other, please specify  D.	Not clinically significant      Supporting Documentations:    Antimicrobials/ Anti-Infectives:  Vancomycin 1000mg IV x1 dose. Indication: HCAP. Given 1/4/20  Zosyn 3.375g iv X1 dose given 1/4/20 then ordered every 12 hours on 1/6/19 then dc. Indication: empiric dosing/ pneumonia, other.   Azithromycin 500 mg IV x1 dose. Indication: HCAP. Given 1/4/20  Levofloxacin 500mg iv every 24 hours. Indication: Pna. Ordered and discontinued on 1/6/20    H&P Adult;  Acute respiratory failure with hypoxia 2/2 RSV  - recently tx for pna  - leukocytosis elevated due to MDS   - afebrile   - ct chest findings with left sided improving pna but worsening R sided pna, likely all RSV related  - s/p abx in the ER will hold off on further abx   - c/w supportive care  - c/w robitussin prn    - c/w tessalon pearls   - c/w morphine IV prn for dyspnea/ pain   - c/w ativan IV prn for agitation/ anxiety   - c/w hi kwesi   - aspiration precautions   - As per daughter Princess will c/w prn IV comfort meds, will discuss with her sister and then will make a decision together to convert to full comfort.    1/5/20 Progress Notes Hospitalist;  Acute respiratory failure with hypoxia 2/2 RSV - CT chest findings with left sided improving pna but worsening R sided pna, likely all RSV related.  Supportive care - antitussives, tessalon, Morphine, Ativan.  Continue with high flow O2.

## 2020-01-06 NOTE — CONSULT NOTE ADULT - PROBLEM SELECTOR RECOMMENDATION 7
See Goals of Care note  In summary:  1. Comfort Care - no further vitals, labs   2.  Medications for symptom management   3. Do not feel patient would be stable to transfer to hospice inpatient facility.  Daughters also do not want patient be moved

## 2020-01-06 NOTE — PROGRESS NOTE ADULT - SUBJECTIVE AND OBJECTIVE BOX
Patient: KEVIN BARAJAS [id number] 89y Female                           Internal Medicine Hospitalist Progress Note    Interval History:  Daughter Tiffany and ELMER at bedside.  Seen at approximately 9:30am and again at 12pm as part of rapid response.  She has required high flow O2 with worsening respiratory distress.  No fevers.      ____________________PHYSICAL EXAM:  Vitals reviewed as indicated below  GENERAL:  tachypneic, alert.    HEENT: NCAT  CARDIOVASCULAR:  S1, S2  LUNGS: B/l rhonchi  ABDOMEN:  soft, (-) tenderness, (-) distension, (+) bowel sounds, (-) guarding, (-) rebound (-) rigidity  EXTREMITIES:  no cyanosis / clubbing / edema.   Sacral 1cm2 stage II ulcer  ____________________    VITALS:  Vital Signs Last 24 Hrs  T(C): 37 (2020 08:20), Max: 37.5 (2020 23:08)  T(F): 98.6 (2020 08:20), Max: 99.5 (2020 23:08)  HR: 93 (2020 08:20) (88 - 96)  BP: 101/53 (2020 08:20) (92/63 - 116/56)  BP(mean): 69 (2020 04:57) (69 - 696)  RR: 27 (2020 08:20) (18 - 27)  SpO2: 95% (2020 08:20) (95% - 100%) Daily     Daily   CAPILLARY BLOOD GLUCOSE      POCT Blood Glucose.: 105 mg/dL (2020 11:44)    I&O's Summary    2020 07:01  -  2020 07:00  --------------------------------------------------------  IN: 0 mL / OUT: 900 mL / NET: -900 mL        LABS:                        8.3    77.96 )-----------( 54       ( 2020 11:53 )             25.2     01-05    134<L>  |  101  |  82.0<H>  ----------------------------<  85  3.8   |  19.0<L>  |  2.50<H>    Ca    6.3<LL>      2020 11:53  Mg     2.1         TPro  7.0  /  Alb  2.0<L>  /  TBili  1.8  /  DBili  x   /  AST  100<H>  /  ALT  67<H>  /  AlkPhos  252<H>      PT/INR - ( 2020 14:26 )   PT: 25.2 sec;   INR: 2.14 ratio         PTT - ( 2020 14:26 )  PTT:32.3 sec  LIVER FUNCTIONS - ( 2020 11:53 )  Alb: 2.0 g/dL / Pro: 7.0 g/dL / ALK PHOS: 252 U/L / ALT: 67 U/L / AST: 100 U/L / GGT: x           Urinalysis Basic - ( 2020 05:39 )    Color: Yellow / Appearance: Slightly Turbid / S.020 / pH: x  Gluc: x / Ketone: Negative  / Bili: Negative / Urobili: Negative mg/dL   Blood: x / Protein: 15 mg/dL / Nitrite: Negative   Leuk Esterase: Trace / RBC: 11-25 /HPF / WBC 3-5   Sq Epi: x / Non Sq Epi: Few / Bacteria: Moderate      CARDIAC MARKERS ( 2020 11:53 )  x     / 0.01 ng/mL / x     / x     / x      CARDIAC MARKERS ( 2020 01:18 )  x     / 0.01 ng/mL / x     / x     / x              MEDICATIONS:  glycopyrrolate Injectable 0.2 milliGRAM(s) IV Push every 6 hours PRN  LORazepam   Injectable 0.5 milliGRAM(s) IV Push every 4 hours PRN  morphine  - Injectable 1 milliGRAM(s) IV Push every 2 hours PRN  morphine  Infusion 1 mG/Hr IV Continuous <Continuous>  scopolamine   Patch 1 Patch Transdermal once

## 2020-01-06 NOTE — GOALS OF CARE CONVERSATION - ADVANCED CARE PLANNING - NS PRO AD PATIENT TYPE ON CHART
Health Care Proxy (HCP)/Do Not Resuscitate (DNR)/Medical Orders for Life-Sustaining Treatment (MOLST)
Do Not Resuscitate (DNR)/Health Care Proxy (HCP)/Medical Orders for Life-Sustaining Treatment (MOLST)

## 2020-01-06 NOTE — RAPID RESPONSE TEAM SUMMARY - NSSITUATIONBACKGROUNDRRT_GEN_ALL_CORE
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  Hgb 5.5, Plt 22. Admitted for   Acute respiratory failure with hypoxia secondary to RSV infection.    Rapid response called for Tachycardia (New onset Rapid A fib vs SVT) with HR up to 200's and hypoxia (O2 sat on High flow NC 84%). Patient received Cardizem 20 mg IV by ED attendings indications rate control to the 120's, concomitantly improvement of O2 sat to 92- 96% on High flow NC and work of breathing and BP 97/57 mmHg. IV benadryl given by ED attending indications. EKG done during rapid showed SVT.    At the end of Rapid response patient's vitals  115/56,  NSR per monitor. RR 20 O2 sat 95% on High flow NC. Patient was awake, alert and oriented x 2. Lungs Coarse breath sounds. CV: tachy, 120, Regular rhythm.  Abdomen: soft, non distended. Neuro: No focal deficits. Extremities: +2 pedal edema b/l.     Dr. Hong present during rapid had conversation regarding goals of care with family, who decided for comfort  measures (Please see Comfort measure note by Dr. Hong).

## 2020-01-06 NOTE — CHART NOTE - NSCHARTNOTEFT_GEN_A_CORE
RAPID RESPONSE FOLLOW UP NOTE    Patient seen and examined at bedside. RR called @ 11:40am for Tachycardia (New onset Rapid A fib vs SVT) with HR up to 200's and hypoxia (O2 sat on High flow NC 84%).. Pt reassessed around 2:00pm. Patient is resting comfortably, surrounded by daughters. Pt was not woken at this time as she is now full comfort, no ROS obtained. Daughters requested limited physical exam done to make sure patient not in pain.     Vital Signs Last 24 Hrs  T(C): 37 (2020 08:20), Max: 37.5 (2020 23:08)  T(F): 98.6 (2020 08:20), Max: 99.5 (2020 23:08)  HR: 93 (2020 08:20) (88 - 96)  BP: 101/53 (2020 08:20) (92/63 - 116/56)  BP(mean): 69 (2020 04:57) (69 - 696)  RR: 27 (2020 08:20) (18 - 27)  SpO2: 95% (2020 08:20) (95% - 100%)    PHYSICAL EXAM:  GENERAL: NAD, lying in bed comfortably, on hi flow  HEAD:  Atraumatic, Normocephalic  NECK: Supple, No JVD  CHEST/LUNG: Good air entry b/l, unlabored respirations  HEART: Regular rate and rhythm  ABDOMEN: Bowel sounds present; Soft, Nontender, Nondistended   EXTREMITIES: Soft, 2+pitting edema  SKIN: Warm, dry        LABS:                        8.3    77.96 )-----------( 54       ( 2020 11:53 )             25.2       01-05    134<L>  |  101  |  82.0<H>  ----------------------------<  85  3.8   |  19.0<L>  |  2.50<H>    Ca    6.3<LL>      2020 11:53  Mg     2.1     01-05    TPro  7.0  /  Alb  2.0<L>  /  TBili  1.8  /  DBili  x   /  AST  100<H>  /  ALT  67<H>  /  AlkPhos  252<H>  -05      LIVER FUNCTIONS - ( 2020 11:53 )  Alb: 2.0 g/dL / Pro: 7.0 g/dL / ALK PHOS: 252 U/L / ALT: 67 U/L / AST: 100 U/L / GGT: x             CAPILLARY BLOOD GLUCOSE      POCT Blood Glucose.: 105 mg/dL (2020 11:44)          Urinalysis Basic - ( 2020 05:39 )    Color: Yellow / Appearance: Slightly Turbid / S.020 / pH: x  Gluc: x / Ketone: Negative  / Bili: Negative / Urobili: Negative mg/dL   Blood: x / Protein: 15 mg/dL / Nitrite: Negative   Leuk Esterase: Trace / RBC: 11-25 /HPF / WBC 3-5   Sq Epi: x / Non Sq Epi: Few / Bacteria: Moderate             ABG - ( 2020 19:02 )  pH, Arterial: 7.30  pH, Blood: x     /  pCO2: 38    /  pO2: 58    / HCO3: 18    / Base Excess: -7.0  /  SaO2: 88                  CARDIAC MARKERS ( 2020 11:53 )  x     / 0.01 ng/mL / x     / x     / x      CARDIAC MARKERS ( 2020 01:18 )  x     / 0.01 ng/mL / x     / x     / x            I&O's Summary    2020 07:01  -  2020 07:00  --------------------------------------------------------  IN: 0 mL / OUT: 900 mL / NET: -900 mL        ASSESSMENT/ PLAN:  s/p Rapid Response for ___- .   -    DISPOSITION:  - Maintain current level of care  - Patient is full comfort at this time  - Please call PA if patient condition changes or with any questions

## 2020-01-07 ENCOUNTER — APPOINTMENT (OUTPATIENT)
Dept: COLORECTAL SURGERY | Facility: CLINIC | Age: 85
End: 2020-01-07

## 2020-01-07 VITALS — OXYGEN SATURATION: 95 %

## 2020-01-07 PROCEDURE — 99233 SBSQ HOSP IP/OBS HIGH 50: CPT

## 2020-01-07 RX ORDER — ALBUTEROL 90 UG/1
2.5 AEROSOL, METERED ORAL ONCE
Refills: 0 | Status: COMPLETED | OUTPATIENT
Start: 2020-01-07 | End: 2020-01-07

## 2020-01-07 RX ADMIN — MORPHINE SULFATE 1 MG/HR: 50 CAPSULE, EXTENDED RELEASE ORAL at 17:05

## 2020-01-07 RX ADMIN — ROBINUL 0.2 MILLIGRAM(S): 0.2 INJECTION INTRAMUSCULAR; INTRAVENOUS at 13:19

## 2020-01-07 RX ADMIN — SCOPALAMINE 1 PATCH: 1 PATCH, EXTENDED RELEASE TRANSDERMAL at 10:27

## 2020-01-07 RX ADMIN — ALBUTEROL 2.5 MILLIGRAM(S): 90 AEROSOL, METERED ORAL at 21:43

## 2020-01-07 RX ADMIN — ROBINUL 0.2 MILLIGRAM(S): 0.2 INJECTION INTRAMUSCULAR; INTRAVENOUS at 21:40

## 2020-01-07 RX ADMIN — Medication 0.5 MILLIGRAM(S): at 18:47

## 2020-01-07 RX ADMIN — Medication 0.5 MILLIGRAM(S): at 13:19

## 2020-01-07 RX ADMIN — MORPHINE SULFATE 1 MG/HR: 50 CAPSULE, EXTENDED RELEASE ORAL at 13:19

## 2020-01-07 RX ADMIN — SCOPALAMINE 1 PATCH: 1 PATCH, EXTENDED RELEASE TRANSDERMAL at 17:05

## 2020-01-07 RX ADMIN — MORPHINE SULFATE 1 MG/HR: 50 CAPSULE, EXTENDED RELEASE ORAL at 23:00

## 2020-01-07 RX ADMIN — MORPHINE SULFATE 1 MG/HR: 50 CAPSULE, EXTENDED RELEASE ORAL at 22:43

## 2020-01-07 RX ADMIN — MORPHINE SULFATE 1 MG/HR: 50 CAPSULE, EXTENDED RELEASE ORAL at 16:53

## 2020-01-07 RX ADMIN — Medication 0.5 MILLIGRAM(S): at 21:39

## 2020-01-07 RX ADMIN — MORPHINE SULFATE 1 MG/HR: 50 CAPSULE, EXTENDED RELEASE ORAL at 14:00

## 2020-01-07 NOTE — PROGRESS NOTE ADULT - REASON FOR ADMISSION
Weakness  Failure to thrive

## 2020-01-07 NOTE — PROGRESS NOTE ADULT - SUBJECTIVE AND OBJECTIVE BOX
Patient: KEVIN BARAJAS [id number] 89y Female                           Internal Medicine Hospitalist Progress Note    Interval History:  No complaints, on morphine gtt.      ____________________PHYSICAL EXAM:  Vitals reviewed as indicated below  GENERAL:  NAD, arousable, responsive.   HEENT: NCAT  CARDIOVASCULAR:  S1, S2  LUNGS: B/l rhonchi  ABDOMEN:  soft, (-) tenderness, (-) distension, (+) bowel sounds, (-) guarding, (-) rebound (-) rigidity  EXTREMITIES:  no cyanosis / clubbing / edema.   Sacral 1cm2 stage II ulcer  ____________________    VITALS:  Vital Signs Last 24 Hrs  T(C): --  T(F): --  HR: --  BP: --  BP(mean): --  RR: --  SpO2: 94% (2020 08:37) (93% - 94%) Daily     Daily   CAPILLARY BLOOD GLUCOSE      POCT Blood Glucose.: 105 mg/dL (2020 11:44)    I&O's Summary      LABS:                        8.3    77.96 )-----------( 54       ( 2020 11:53 )             25.2     01-05    134<L>  |  101  |  82.0<H>  ----------------------------<  85  3.8   |  19.0<L>  |  2.50<H>    Ca    6.3<LL>      2020 11:53    TPro  7.0  /  Alb  2.0<L>  /  TBili  1.8  /  DBili  x   /  AST  100<H>  /  ALT  67<H>  /  AlkPhos  252<H>  -      LIVER FUNCTIONS - ( 2020 11:53 )  Alb: 2.0 g/dL / Pro: 7.0 g/dL / ALK PHOS: 252 U/L / ALT: 67 U/L / AST: 100 U/L / GGT: x           Urinalysis Basic - ( 2020 05:39 )    Color: Yellow / Appearance: Slightly Turbid / S.020 / pH: x  Gluc: x / Ketone: Negative  / Bili: Negative / Urobili: Negative mg/dL   Blood: x / Protein: 15 mg/dL / Nitrite: Negative   Leuk Esterase: Trace / RBC: 11-25 /HPF / WBC 3-5   Sq Epi: x / Non Sq Epi: Few / Bacteria: Moderate      CARDIAC MARKERS ( 2020 11:53 )  x     / 0.01 ng/mL / x     / x     / x            Culture - Blood (collected 2020 14:20)  Source: .Blood  Preliminary Report (2020 15:01):    No growth at 48 hours    Culture - Blood (collected 2020 14:20)  Source: .Blood  Preliminary Report (2020 15:01):    No growth at 48 hours        MEDICATIONS:  glycopyrrolate Injectable 0.2 milliGRAM(s) IV Push every 6 hours PRN  LORazepam   Injectable 0.5 milliGRAM(s) IV Push every 4 hours PRN  morphine  - Injectable 1 milliGRAM(s) IV Push every 2 hours PRN  morphine  Infusion 1 mG/Hr IV Continuous <Continuous>

## 2020-01-07 NOTE — PROGRESS NOTE ADULT - ASSESSMENT
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  Hgb 5.5, Plt 22.  Noted RSV+.  CT with ? infiltrate.  Evaluated by MICU, placed in stepdown.  She has required high flow O2.  Respiratory status worsened and after speaking with Oncology, pt and family opted for conservative management and comfort care measures.  She is not a candidate for further hematologic treatment.  Seen by palliative care, placed on morphine gtt.        Failure to thrive/ generalized weakness in the setting of acute on chronic anemia 2/2 end stage MDS - Poor prognosis, no further treatments planned per oncology.    Acute respiratory failure with hypoxia 2/2 RSV, possible Gram negative pneumonia - In view of pt's clinical condition, hematologic issues and comorbid conditions, pt and family are opting for comfort care.  Started on morphine gtt , glycopyrrolate and Lorazepam with improvement in distress.  Consider Inpatient Hospice placement.  Suppotive care.  No further lab draws.    ELENI on CKD 4 with hyperkalemia Supportive care.  No further lab draws.    Hypothyroid - Supportive care.    Hx IBS - Supportive care.    DVT ppx - c/w scd boots b/l - no chemical AC given anemia and thrombocytopenia   Activity level: Bed rest

## 2020-01-07 NOTE — PROGRESS NOTE ADULT - ASSESSMENT
89yr woman with MDS with transformation to AML, respiratory failure in the setting of RSV. Patient s/p Rapid Response 2/2 for tachycardia found to have new onset afib. Patient is frail and debilitated poor prognosis

## 2020-01-07 NOTE — CHART NOTE - NSCHARTNOTEFT_GEN_A_CORE
Upon Nutritional Assessment by the Registered Dietitian your patient was determined to meet criteria / has evidence of the following diagnosis/diagnoses:          [x ] Severe Protein Calorie Malnutrition      Findings as based on:  •  Comprehensive nutrition assessment and consultation  •  Calorie counts (nutrient intake analysis)  •  Food acceptance and intake status from observations by staff  •  Follow up  •  Patient education  •  Intervention secondary to interdisciplinary rounds  •   concerns      Treatment:    The following diet has been recommended:      PROVIDER Section:     By signing this assessment you are acknowledging and agree with the diagnosis/diagnoses assigned by the Registered Dietitian    Comments:    Honor family wishes

## 2020-01-07 NOTE — CHART NOTE - NSCHARTNOTEFT_GEN_A_CORE
Contacted by RN and Respiratory therapist for patient with cough and wheezing on lung exam.  Patient comfort measures only.  Patient seen and examined at bedside.   Patient A&Ox2, appears anxious and with productive cough.  S1S2+, b/l scattered wheezing. Pt on high flow.  Will order 1x dose Ativan and 1x nebulizer treatment for patient's comfort.

## 2020-01-07 NOTE — PROGRESS NOTE ADULT - SUBJECTIVE AND OBJECTIVE BOX
CC:  follow up GOC, symptoms  INTERVAL HPI/OVERNIGHT EVENTS:  Family reports patient much more comfortable today.    PRESENT SYMPTOMS: SOURCE:  Patient/Family/Team    PAIN SCALE:  0 = none  1 = mild   2 = moderate  3 = severe    Pain: 0    Dyspnea:  [ ] YES [ x] NO    Anxiety:  [ x] YES [ ] NO  Fatigue: [ x] YES [ ] NO  Nausea: [ ] YES [x ] NO  Loss of Appetite: [x ] YES [ ] NO  Other symptoms: __________    MEDICATIONS  (STANDING):  morphine  Infusion 1 mG/Hr (1 mL/Hr) IV Continuous <Continuous>    MEDICATIONS  (PRN):  glycopyrrolate Injectable 0.2 milliGRAM(s) IV Push every 6 hours PRN secretions  LORazepam   Injectable 0.5 milliGRAM(s) IV Push every 4 hours PRN Anxiety  morphine  - Injectable 1 milliGRAM(s) IV Push every 2 hours PRN SOB or pain      Allergies    No Known Allergies    Intolerances    Karnofsky Performance Score/Palliative Performance Status Version 2:         %    Vital Signs Last 24 Hrs  T(C): --  T(F): --  HR: --  BP: --  BP(mean): --  RR: --  SpO2: 94% (2020 08:37) (93% - 94%)    PHYSICAL EXAM:    General: Elderly frail woman  lethargic     HEENT: [x ] normal  [ ] dry mouth  [ ] ET tube/trach    Lungs: [x ] comfortable  on HFO2  CV: [x ] normal  [ ] tachycardia    GI: [ x] normal  [ ] distended  [ ] tender  [ ] no BS               [ ] PEG/NG/OG tube    : [ ] normal  [x ] incontinent  [ ] oliguria/anuria  [ ] owen    MSK: [ ] normal  [ x] weakness  [ ] edema             [ ] ambulatory  [x ] bedbound/wheelchair bound    Skin: [ ] normal  [ ] pressure ulcers- Stage_____  [x ] no rash    LABS:                        8.3    77.96 )-----------( 54       ( 2020 11:53 )             25.2     01-05    134<L>  |  101  |  82.0<H>  ----------------------------<  85  3.8   |  19.0<L>  |  2.50<H>    Ca    6.3<LL>      2020 11:53    TPro  7.0  /  Alb  2.0<L>  /  TBili  1.8  /  DBili  x   /  AST  100<H>  /  ALT  67<H>  /  AlkPhos  252<H>        Urinalysis Basic - ( 2020 05:39 )    Color: Yellow / Appearance: Slightly Turbid / S.020 / pH: x  Gluc: x / Ketone: Negative  / Bili: Negative / Urobili: Negative mg/dL   Blood: x / Protein: 15 mg/dL / Nitrite: Negative   Leuk Esterase: Trace / RBC: 11-25 /HPF / WBC 3-5   Sq Epi: x / Non Sq Epi: Few / Bacteria: Moderate      I&O's Summary    Thank you for the opportunity to assist with the care of this patient.   [facility] [phone].

## 2020-01-07 NOTE — DIETITIAN INITIAL EVALUATION ADULT. - MALNUTRITION
Brief NFPE performed. Severe muscle wasting at temples, clavicle, shoulder. Severe fat loss in orbital region, buccal pads. Severe (chronic)

## 2020-01-07 NOTE — PROGRESS NOTE ADULT - PROBLEM SELECTOR PLAN 3
Though could change to Dilaudid infusion given renal functions, patient appears comfortable on current Morphine infusion.    Continue for now

## 2020-01-07 NOTE — PROGRESS NOTE ADULT - PROBLEM SELECTOR PLAN 6
Met with 2 daughters.  They are happy mother appearing more comfortable compared to yesterday.  Family does not want to transition of HiFlow  Oxygen  as they feel she is very comfortable and are satisfied with current care plan.  Though patient could be considered transferring to a hospice inpatient facility, HiFlow Oxygen cannot be given there.    Family understands that patient's overall prognosis is poor with likely hours/days. Met with 2 daughters.  They are happy mother appearing more comfortable compared to yesterday.  Family does not want to transition of HiFlow  Oxygen  as they feel she is very comfortable and are satisfied with current care plan.   Though patient could be considered transferring to a hospice inpatient facility, HiFlow Oxygen cannot be given there.  Family is also reluctant to any transfers from the hospital.   Family understands that patient's overall prognosis is poor with likely hours/days.

## 2020-01-07 NOTE — DIETITIAN INITIAL EVALUATION ADULT. - OTHER INFO
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 Pt daughter present during assessment, reported persistent poor po intake. Pt consumed some jello and Ensure yesterday. Daughter reported continuous decrease in appetite and po intake over the past year but is unaware of weight changes. Pt appears less than 130#. Aware of comfort measures for pt.

## 2020-01-08 PROCEDURE — 82947 ASSAY GLUCOSE BLOOD QUANT: CPT

## 2020-01-08 PROCEDURE — 82435 ASSAY OF BLOOD CHLORIDE: CPT

## 2020-01-08 PROCEDURE — 96375 TX/PRO/DX INJ NEW DRUG ADDON: CPT

## 2020-01-08 PROCEDURE — 36430 TRANSFUSION BLD/BLD COMPNT: CPT

## 2020-01-08 PROCEDURE — 94640 AIRWAY INHALATION TREATMENT: CPT

## 2020-01-08 PROCEDURE — 84295 ASSAY OF SERUM SODIUM: CPT

## 2020-01-08 PROCEDURE — 93005 ELECTROCARDIOGRAM TRACING: CPT

## 2020-01-08 PROCEDURE — P9016: CPT

## 2020-01-08 PROCEDURE — 86901 BLOOD TYPING SEROLOGIC RH(D): CPT

## 2020-01-08 PROCEDURE — 99291 CRITICAL CARE FIRST HOUR: CPT | Mod: 25

## 2020-01-08 PROCEDURE — 85014 HEMATOCRIT: CPT

## 2020-01-08 PROCEDURE — 81001 URINALYSIS AUTO W/SCOPE: CPT

## 2020-01-08 PROCEDURE — 94760 N-INVAS EAR/PLS OXIMETRY 1: CPT

## 2020-01-08 PROCEDURE — 85610 PROTHROMBIN TIME: CPT

## 2020-01-08 PROCEDURE — 83605 ASSAY OF LACTIC ACID: CPT

## 2020-01-08 PROCEDURE — 70450 CT HEAD/BRAIN W/O DYE: CPT

## 2020-01-08 PROCEDURE — 86922 COMPATIBILITY TEST ANTIGLOB: CPT

## 2020-01-08 PROCEDURE — 82330 ASSAY OF CALCIUM: CPT

## 2020-01-08 PROCEDURE — 87040 BLOOD CULTURE FOR BACTERIA: CPT

## 2020-01-08 PROCEDURE — 85730 THROMBOPLASTIN TIME PARTIAL: CPT

## 2020-01-08 PROCEDURE — 80053 COMPREHEN METABOLIC PANEL: CPT

## 2020-01-08 PROCEDURE — P9035: CPT

## 2020-01-08 PROCEDURE — 71045 X-RAY EXAM CHEST 1 VIEW: CPT

## 2020-01-08 PROCEDURE — 82803 BLOOD GASES ANY COMBINATION: CPT

## 2020-01-08 PROCEDURE — 82962 GLUCOSE BLOOD TEST: CPT

## 2020-01-08 PROCEDURE — 72125 CT NECK SPINE W/O DYE: CPT

## 2020-01-08 PROCEDURE — 96361 HYDRATE IV INFUSION ADD-ON: CPT

## 2020-01-08 PROCEDURE — 86850 RBC ANTIBODY SCREEN: CPT

## 2020-01-08 PROCEDURE — 96374 THER/PROPH/DIAG INJ IV PUSH: CPT

## 2020-01-08 PROCEDURE — 84484 ASSAY OF TROPONIN QUANT: CPT

## 2020-01-08 PROCEDURE — P9040: CPT

## 2020-01-08 PROCEDURE — 85027 COMPLETE CBC AUTOMATED: CPT

## 2020-01-08 PROCEDURE — 87631 RESP VIRUS 3-5 TARGETS: CPT

## 2020-01-08 PROCEDURE — 86900 BLOOD TYPING SEROLOGIC ABO: CPT

## 2020-01-08 PROCEDURE — 36415 COLL VENOUS BLD VENIPUNCTURE: CPT

## 2020-01-08 PROCEDURE — 84132 ASSAY OF SERUM POTASSIUM: CPT

## 2020-01-08 PROCEDURE — 71250 CT THORAX DX C-: CPT

## 2020-01-08 PROCEDURE — 83735 ASSAY OF MAGNESIUM: CPT

## 2020-01-08 PROCEDURE — 80048 BASIC METABOLIC PNL TOTAL CA: CPT

## 2020-01-08 RX ADMIN — SCOPALAMINE 1 PATCH: 1 PATCH, EXTENDED RELEASE TRANSDERMAL at 08:33

## 2020-01-08 NOTE — DISCHARGE NOTE FOR THE EXPIRED PATIENT - SECONDARY DIAGNOSIS.
Functional quadriplegia Anemia, unspecified type Thrombocytopenia Acute respiratory failure with hypoxia

## 2020-01-08 NOTE — PATIENT PROFILE ADULT - NSTOBACCONEVERSMOKERY/N_GEN_A
NEW ADULT PATIENT NOTE    Subjective:     Chief Complaint:  Follow-up; Cough; Nasal Congestion; and Headache      History of Present Illness:   Lamar Vizcarra is a 52 y.o. female who presents to establish care in this clinic, though she is well known to me from my previous practice.   Sinus- Pt c/o sinus pressure on L side for several weeks. Pt has h/o allergic rhinitis and has been using her flonase and zyrtec but still having increased nasal congestion and sneezing recently.  No fever.  C/o L sided sore throat and cough due to tickle in her throat as well.       Past Medical History:   Diagnosis Date    Allergy     Depression     Hypertension     Seizures        Family History   Problem Relation Age of Onset    Arthritis Mother     Diabetes Mother     Heart disease Mother     Kidney disease Mother     Hypertension Mother        Social History     Social History    Marital status: Single     Spouse name: N/A    Number of children: N/A    Years of education: N/A     Occupational History    Not on file.     Social History Main Topics    Smoking status: Never Smoker    Smokeless tobacco: Never Used    Alcohol use Yes      Comment: occasionally    Drug use: No    Sexual activity: Yes     Partners: Male     Birth control/ protection: None     Other Topics Concern    Not on file     Social History Narrative    No narrative on file       ROS:  Review of Systems   Constitutional: Negative for fever and unexpected weight change.   HENT: Positive for postnasal drip, rhinorrhea, sinus pressure, sneezing and sore throat. Negative for ear pain, nosebleeds and trouble swallowing.    Eyes: Positive for itching. Negative for discharge and visual disturbance.   Respiratory: Positive for cough. Negative for shortness of breath and wheezing.    Cardiovascular: Negative for chest pain and leg swelling.   Gastrointestinal: Negative for abdominal pain, nausea and  vomiting.   Endocrine: Negative for cold intolerance and heat intolerance.   Genitourinary: Negative for dysuria and urgency.   Musculoskeletal: Negative for back pain and joint swelling.   Allergic/Immunologic: Positive for environmental allergies.   Neurological: Negative for seizures.   Psychiatric/Behavioral: Negative for dysphoric mood, sleep disturbance and suicidal ideas.          Current Outpatient Prescriptions:     carbamazepine (TEGRETOL) 200 mg tablet, Take 200 mg by mouth 3 (three) times daily., Disp: , Rfl:     cetirizine (ZYRTEC) 10 MG tablet, Take 10 mg by mouth once daily., Disp: , Rfl:     citalopram (CELEXA) 20 MG tablet, Take 1 tablet by mouth once daily., Disp: , Rfl:     fluticasone (FLONASE) 50 mcg/actuation nasal spray, 1 spray by Each Nare route once daily., Disp: , Rfl:     levothyroxine (SYNTHROID) 125 MCG tablet, Take 1 tablet (125 mcg total) by mouth once daily., Disp: 30 tablet, Rfl: 5    losartan-hydrochlorothiazide 100-25 mg (HYZAAR) 100-25 mg per tablet, Take 1 tablet by mouth once daily., Disp: , Rfl:     multivitamin capsule, Take 1 capsule by mouth once daily., Disp: , Rfl:     pravastatin (PRAVACHOL) 40 MG tablet, Take 1 tablet by mouth once daily., Disp: , Rfl:     traZODone (DESYREL) 100 MG tablet, Take 1 tablet by mouth once daily., Disp: , Rfl:     amoxicillin-clavulanate 875-125mg (AUGMENTIN) 875-125 mg per tablet, Take 1 tablet by mouth 2 (two) times daily., Disp: 20 tablet, Rfl: 0    benzonatate (TESSALON) 100 MG capsule, Take 1 capsule (100 mg total) by mouth 3 (three) times daily as needed for Cough., Disp: 30 capsule, Rfl: 0        Objective:       Physical Examination:     /78 (BP Location: Left arm, Patient Position: Sitting, BP Method: Medium (Manual))   Pulse (!) 112   Temp 98.4 °F (36.9 °C) (Oral)   Resp 18   Wt 84.2 kg (185 lb 9.6 oz)   SpO2 98%   BMI 29.07 kg/m²     Physical Exam   Constitutional: She appears well-developed and  well-nourished. No distress.   HENT:   Right Ear: Tympanic membrane normal.   Left Ear: Tympanic membrane normal.   Nose: Mucosal edema and rhinorrhea present. Right sinus exhibits no maxillary sinus tenderness and no frontal sinus tenderness. Left sinus exhibits maxillary sinus tenderness. Left sinus exhibits no frontal sinus tenderness.   Mouth/Throat: Mucous membranes are normal. Posterior oropharyngeal erythema (mild on L) present. No oropharyngeal exudate.   Eyes: Pupils are equal, round, and reactive to light.   Cardiovascular: Regular rhythm, normal heart sounds and intact distal pulses.    No murmur heard.  Pulmonary/Chest: Effort normal and breath sounds normal. She has no wheezes. She has no rales.   Musculoskeletal: She exhibits no edema.         Assessment/Plan:   Lamar is a 52 y.o. female here to establish care.     Problem List Items Addressed This Visit        Neuro    Nonintractable epilepsy without status epilepticus    Current Assessment & Plan     H/o unspecified epilepsy. Followed by Dr. Alonzo. No recent seizures on tegretol.             Psychiatric    Moderate episode of recurrent major depressive disorder       ENT    Acute recurrent maxillary sinusitis    Current Assessment & Plan     Rx augmentin. Continue flonase, zyrtec, PRN benzonatate for cough.         Relevant Medications    benzonatate (TESSALON) 100 MG capsule    amoxicillin-clavulanate 875-125mg (AUGMENTIN) 875-125 mg per tablet       Cardiac/Vascular    Benign essential hypertension - Primary    Current Assessment & Plan     Well controlled on losartan/hctz.  Mild hypokalemia noted on recent CMP. Recheck ordered.          Mixed hyperlipidemia    Current Assessment & Plan     Recent , pt admits to non-compliance with pravastatin.            Endocrine    Acquired hypothyroidism    Current Assessment & Plan     Last TSH 5.6. Increase levothyroxine to 125mcg daily, recheck TSH in 8 weeks.         Relevant Medications     levothyroxine (SYNTHROID) 125 MCG tablet    Elevated fasting glucose    Current Assessment & Plan     Glucose 132 on recent fasting labs. Check HbA1c.         Relevant Orders    Hemoglobin A1c       Other    Elevated alkaline phosphatase level    Current Assessment & Plan     Very mildly elevated at 120. Recheck along with vitamin D level.         Relevant Orders    Comprehensive metabolic panel    Vitamin D          There are no preventive care reminders to display for this patient.    Discussion:     Return in about 3 months (around 2/6/2018) for f/u HTN, allergies.    Goals     None          Electronically signed by Sandy Lou         No

## 2020-01-08 NOTE — DISCHARGE NOTE FOR THE EXPIRED PATIENT - HOSPITAL COURSE
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  Hgb 5.5, Plt 22.  Noted RSV+.  CT with ? infiltrate.  Evaluated by MICU, placed in stepdown.  She has required high flow O2.  Respiratory status worsened and after speaking with Oncology, pt and family opted for conservative management and comfort care measures.  She is not a candidate for further hematologic treatment.  Seen by palliative care, placed on morphine gtt.      Pt seen with daughters at bedside with agonal respirations.  They confirmed comfort care measures.  She  at 10:55am.

## 2020-01-09 ENCOUNTER — OTHER (OUTPATIENT)
Age: 85
End: 2020-01-09

## 2020-01-13 ENCOUNTER — APPOINTMENT (OUTPATIENT)
Dept: VASCULAR SURGERY | Facility: CLINIC | Age: 85
End: 2020-01-13

## 2020-01-24 NOTE — ED ADULT TRIAGE NOTE - TEMPERATURE IN FAHRENHEIT (DEGREES F)
Consent: Risks & side effects were explained including redness, blistering, hypopigmentation, hyperpigmentation, scar, infection, & recurrence. Post-procedure instruction hand-out was given to the patient. Render Post-Care Instructions In Note?: no Duration Of Freeze Thaw-Cycle (Seconds): 0 Post-Care Instructions: After care instructions were reviewed with the patient & the handout was given to the patient. Detail Level: Simple 98.5

## 2020-03-11 LAB
ALBUMIN SERPL ELPH-MCNC: 4 G/DL
ALP BLD-CCNC: 83 U/L
ALT SERPL-CCNC: 9 U/L
ANION GAP SERPL CALC-SCNC: 10 MMOL/L
AST SERPL-CCNC: 16 U/L
BILIRUB SERPL-MCNC: 0.6 MG/DL
BUN SERPL-MCNC: 24 MG/DL
CALCIUM SERPL-MCNC: 8.8 MG/DL
CHLORIDE SERPL-SCNC: 104 MMOL/L
CO2 SERPL-SCNC: 23 MMOL/L
CREAT SERPL-MCNC: 0.9 MG/DL
GLUCOSE SERPL-MCNC: 92 MG/DL
POTASSIUM SERPL-SCNC: 4.7 MMOL/L
PROT SERPL-MCNC: 8.1 G/DL
SODIUM SERPL-SCNC: 137 MMOL/L

## 2020-05-28 ENCOUNTER — APPOINTMENT (OUTPATIENT)
Dept: RHEUMATOLOGY | Facility: CLINIC | Age: 85
End: 2020-05-28

## 2020-08-10 NOTE — ED ADULT NURSE NOTE - NSFALLRSKASSISTTYPE_ED_ALL_ED
Chief Complaint   Patient presents with    Carpal Tunnel       right hand-index,middle and ring numbness and tingling          History of Present Illness:                             Hellen Castillo is a 39 y.o. female who presents today with right hand pain, numbness, and tingling. She has dealt with mild issues over the last 7 years with occasional numbness and tingling and pain in her radial 3 digits. She had an EMG performed in  which showed moderate carpal tunnel syndrome on the right upper extremity as well as a low-grade chronic right C7 radiculopathy. Initially she did not have any problems with her left hand but has noticed some mild symptoms over the last few months in her left hand with repetitive activities in a similar location of the radial 3 digits.     Her pain is worse with repetitive activities especially while at work. She has a new job requires her to do heavy gripping and hammering activities which seem to aggravate her symptoms along the median nerve distribution. She has tried rest and anti-inflammatory medications and bracing which have seemed to help in the past but are no longer effective.     She was placed on restrictions at work and is functioning well with her restrictions but they are set to . She is here today to discuss more definitive interventions such as surgical release of her carpal tunnel.     Patient here for a new patient visit for evaluation of right hand CTS. She is having numbness and tingling in her index, middle and ring fingers along with pain and swelling in her hand and wrist. She is rigth hand dominant and states this is a chronic issue but has worsened over the last 7 months. She works at [employer] and has been on restrictions through the company due to her prior EMG but they are now saying her restrictions will end due to this being a pre-existing condition prior to hire. Her EMG was performed on 13. She brings the EMG report with her today. She reports no history of surgeries or injuries to this right hand/wrist. She does have neck pain with a history of cervical radiculopathy.      Medical History  Patient's medications, allergies, past medical, surgical, social and family histories were reviewed and updated as appropriate.     Past Medical History        Past Medical History:   Diagnosis Date    Depression      Insomnia          Family History   No family history on file. Social History   Social History            Socioeconomic History    Marital status:        Spouse name: None    Number of children: None    Years of education: None    Highest education level: None   Occupational History    None   Social Needs    Financial resource strain: None    Food insecurity       Worry: None       Inability: None    Transportation needs       Medical: None       Non-medical: None   Tobacco Use    Smoking status: Former Smoker    Smokeless tobacco: Never Used   Substance and Sexual Activity    Alcohol use:  Yes       Comment: Occasionally    Drug use: No    Sexual activity: Never   Lifestyle    Physical activity       Days per week: None       Minutes per session: None    Stress: None   Relationships    Social connections       Talks on phone: None       Gets together: None       Attends [employer]ist service: None       Active member of club or organization: None       Attends meetings of clubs or organizations: None       Relationship status: None    Intimate partner violence       Fear of current or ex partner: None       Emotionally abused: None       Physically abused: None       Forced sexual activity: None   Other Topics Concern    None   Social History Narrative    None        Current Facility-Administered Medications          Current Outpatient Medications   Medication Sig Dispense Refill    Multiple Vitamins-Minerals (MULTI COMPLETE PO) Take by mouth        Probiotic Product (PROBIOTIC-10 PO) Take by mouth        mild tenderness to palpation of the volar aspect of the wrist at the level of the carpal tunnel. There is decreased sensation to light touch in the median nerve distribution. Sensation is intact to light touch along the ulnar and radial nerves. Positive carpal compression test and Phalen's test.  There is mild relative weakness with 4+ strength out of 5 during  test, pinch test, and flexor pollicis brevis. Range of motion of the wrist and fingers is intact without limitation. Skin is intact without wounds or rash. 2+ radial pulse with brisk cap refill throughout the fingers. No atrophy of the thenar musculature. Strength 5/5 in finger and wrist flexion and extension. Skin:     General: Skin is warm and dry. Neurological:      Mental Status: She is alert and oriented to person, place, and time. Psychiatric:         Mood and Affect: Mood normal.         Behavior: Behavior normal.            Office Procedures:  No orders of the defined types were placed in this encounter.       Assessment and Plan  1. Right carpal tunnel syndrome     The exam and history are consistent with carpal syndrome. We discussed treatment options for this. Conservative measures have not been successful, including activity modification, rest, over-the-counter anti-inflammatory medications, and night bracing. Due to the severity of symptoms I have recommended surgical intervention. This will consist of a carpal tunnel release.     We discussed the potential for repeat EMG nerve conduction velocity testing to characterize the extent of the neurologic dysfunction. I do not feel that this is necessary given the straightforward presentation and exam at this time.       We discussed the risks, benefits, and alternatives to surgery as well as the postoperative course. Risks include persistent pain, numbness, weakness, infection, stiffness, recurrence, and incomplete recovery of the nerve.   The patient understands and would like to proceed with carpal tunnel release.     I have written her a note to continue with her current restrictions at work up until the time of surgery which I have estimated to be less than 6 weeks from now. We discussed return to work issues and I expect that she should be able to return to work without restrictions 6 weeks following surgery.     The patient was counseled at length about the risks of ruth Covid-19 during their perioperative period and any recovery window from their procedure.  The patient was made aware that ruth Covid-19  may worsen their prognosis for recovering from their procedure  and lend to a higher morbidity and/or mortality risk.  All material risks, benefits, and reasonable alternatives including postponing the procedure were discussed. The patient does wish to proceed with the procedure at this time.     History and Physical exam note from the office visit is copied above from epic. I have reviewed the note and examined the patient and find no relevant changes.      I have reviewed with the patient and/or family the risks, benefits, and alternatives to the procedure as well as expected postoperative course    Kevin Clemens MD Standing/Toileting/Walking

## 2020-08-18 NOTE — ED STATDOCS - PRO INTERPRETER NEED 2
PT UNABLE TO VOID. PERICARE DONE PER PT. SMALL AMT OF SEROSANGUINOUS DISCHARGE
NOTED ON CHANGED PERIPAD. NEW PERIPAD IN PLACE. PT AMB BACK TO BED X 1 ASSIST.
SCDS ON BLE. PUMP ON. English

## 2020-08-30 NOTE — ED PROVIDER NOTE - MUSCULOSKELETAL NECK EXAM
Above RN's/Staff's notes reviewed.  Reviewed medications and allergies.    SUBJECTIVE:    Radha Edwards is a 66 year old female who presents with presenting with symptoms of urinary frequency that started last week Weds.  Pt took Azo last week and states symptoms resolved.  However pt states at 1230am this morning she had chills, nausea, and \"not feeling right\".  Pt denies fever, vomting or diarrhea. Pt last UTI was 6/20. Pt prefers paper script for any meds needed today.  Denies known Latex allergy or symptoms of Latex sensitivity.  Medications reviewed and updated.      OBJECTIVE:    Vitals:    08/30/20 1026   BP: 122/72   Pulse: 68   Resp: 16   Temp: 97.5 °F (36.4 °C)       General appearance:  Healthy, alert, in no distress, cooperative   Lungs:  Clear to auscultation.  Cardiac:  Regular rate and rhythm, no rubs, click gallops or murmurs  Abdomen:  Soft, suprapubic but no flank tenderness, bowel sounds normoactive  Extremities:  No pretibial edema      ASSESSMENT:  1. Urethritis    2. Dysuria          PLAN:  Orders Placed This Encounter   • Urinalysis & Reflex Microscopy With Culture If Indicated   • Urinalysis & Reflex Microscopy With Culture If Indicated   • URINALYSIS MICROSCOPIC       · Urinalysis discussed with patient and reveals trace of occult blood specific gravity 1.015..  Negative nitrites negative leukocyte esterase  · Recheck with PCP or WIC, if not improving this week.  · Tylenol for fever prn.  Macrobid for urethra itis and ways to minimize UTIs discussed and handout given  · Call if condition worsens   no deformity, pain or tenderness. no restriction of movement

## 2021-02-17 NOTE — PROGRESS NOTE ADULT - PROBLEM/PLAN-4
Patient: Anita Chun MD      Gender: male  : 1972   Age: 52 y.o. MRN: [id number]    Admitting Physician: Josee Thurston MD  Discharge Physician: Josee Thurston MD     Code Status: Full Code     Admit Date: 2/15/2021   Discharge Date: 21      Disposition:  Home       Condition at Discharge:  stable . Follow-up appointments:  f/u one week with PCP , and with consultants as recommended . Outpatient to do list: f/u     Pt`s preferred phone number :[phone]  [unfilled]  Extended Emergency Contact Information  Primary Emergency Contact: Terrie Green, [address] Phone: [phone]  Mobile Phone: [phone]  Relation: Aunt/Uncle  Secondary Emergency Contact: Yoselin Calle  Mobile Phone: [phone]  Relation: Domestic Partner      Discharge Diagnoses: Active Hospital Problems    Diagnosis    Intractable vomiting [R11.10]   mid abdominal pain   Black loose stool diarrhea   Hypomagnesemia   Mild Anemia   Elevated lactic acid           History of Present Illness:  Steffen Ruiz is a 52 y.o. male with  past medical history of Anxiety, Bipolar disorder , CAD , COPD , Hyperlipidemia, Hypertension who was admitted couple wees ago for colitis with N/V/Diarrhea with hypokalemia and hypomagnesemia and pt left AMA at that time. Pt  presented to ER with same symptoms for significant nausea , vomiting , loose to watery black stool diarrhea associated with mid abdominal pain moderate 7-8/10 , non radiating associated with significant loss of appetitie . Pt stated that his symptoms were going on since he left AMA , and he was supposed to have colonoscopy recently .  Pt denied any fever, chills    In ER lab data revealed low , MG with mild anemia   And Abd  CT revealed no specific finding   History obtained from patient .             Hospital Course:   Tele   IVF , lytes balance , monitor renal function and lactic acid , will avoid adding ABX empirically   CT abdomen unspecified COPD type ([facility].)      calcium carbonate-vitamin D (CALTRATE) 600-400 MG-UNIT TABS per tab TAKE 1 TABLET BY MOUTH EVERY DAY WITH FOOD  Qty: 90 tablet, Refills: 0      sildenafil (VIAGRA) 100 MG tablet Take daily as needed 30 minutes prior to sexual activity  Qty: 30 tablet, Refills: 1    Associated Diagnoses: Erectile dysfunction, unspecified erectile dysfunction type      ATROVENT HFA 17 MCG/ACT inhaler INHALE 2 PUFFS INTO THE LUNGS EVERY 6 HOURS  Qty: 12.9 Inhaler, Refills: 3    Associated Diagnoses: Chronic obstructive pulmonary disease, unspecified COPD type ([facility])      atorvastatin (LIPITOR) 80 MG tablet Take 1 tablet by mouth nightly  Qty: 90 tablet, Refills: 3    Associated Diagnoses: Hyperlipidemia, unspecified hyperlipidemia type      glucose monitoring kit (FREESTYLE) monitoring kit 1 kit by Does not apply route daily  Qty: 1 kit, Refills: 0    Associated Diagnoses: Type 2 diabetes mellitus without complication, without long-term current use of insulin ([facility])      blood glucose monitor strips To check blood sugar twice daily with current Glucometer:   DX: diabetes type .00  Qty: 60 strip, Refills: 11    Associated Diagnoses: Type 2 diabetes mellitus without complication, without long-term current use of insulin ([facility])      amLODIPine (NORVASC) 10 MG tablet Take 1 tablet by mouth every morning  Qty: 90 tablet, Refills: 1    Associated Diagnoses: Essential hypertension      metFORMIN (GLUCOPHAGE) 500 MG tablet Take 1 tablet by mouth daily (with breakfast)  Qty: 90 tablet, Refills: 1    Associated Diagnoses: Type 2 diabetes mellitus with other circulatory complication, without long-term current use of insulin ([facility])      oxyCODONE-acetaminophen (PERCOCET) 7.5-325 MG per tablet Take 1 tablet by mouth every 8 hours as needed for Pain.       albuterol sulfate HFA (PROVENTIL HFA) 108 (90 Base) MCG/ACT inhaler Inhale 2 puffs into the lungs 4 times daily  Qty: 1 Inhaler, Refills: 3           Current Discharge Medication List          Discharge ROS:  A complete review of systems was asked and negative . Discharge Exam:  Estimated body mass index is 33.91 kg/m² as calculated from the following:    Height as of this encounter: 6' (1.829 m). Weight as of this encounter: 250 lb (113.4 kg). BP (!) 164/107   Pulse 110   Temp 98 °F (36.7 °C) (Oral)   Resp 18   Ht 6' (1.829 m)   Wt 250 lb (113.4 kg)   SpO2 95%   BMI 33.91 kg/m²   General appearance:  NAD  Heart[de-identified] Normal s1/s2, RRR, no murmurs, gallops, or rubs. No leg edema  Lungs:  Clear to auscultation, bilaterally without Rales/Wheezes/Rhonchi. Abdomen: Soft, non-tender, non-distended, bowel sounds present  Musculoskeletal:   no cyanosis, no edema  Neurologic:  Cranial nerves: II-XII intact, grossly non-focal.  Psychiatric:  A & O x3      Labs:  For convenience and continuity at follow-up the following most recent labs are provided:    Lab Results   Component Value Date    WBC 5.6 02/17/2021    HGB 11.3 02/17/2021    HCT 36.6 02/17/2021    MCV 88.2 02/17/2021     02/17/2021     02/16/2021    K 4.5 02/16/2021     02/16/2021    CO2 24 02/16/2021    BUN 6 02/16/2021    CREATININE 0.7 02/16/2021    CALCIUM 8.2 02/16/2021    PHOS 4.3 02/16/2021    BNP <2 11/29/2011    ALKPHOS 96 02/15/2021    ALT 20 02/15/2021    AST 23 02/15/2021    BILITOT 0.2 02/15/2021    BILIDIR 0.2 02/15/2021    LABALBU 3.3 02/15/2021    LDLCALC 97 04/23/2019    TRIG 66 01/13/2021     Lab Results   Component Value Date    INR 0.97 11/25/2020    INR 0.88 08/13/2020    INR 0.89 08/12/2020       Results for orders placed or performed during the hospital encounter of 02/15/21   CBC auto diff   Result Value Ref Range    WBC 5.6 4.0 - 10.5 K/CU MM    RBC 4.51 (L) 4.6 - 6.2 M/CU MM    Hemoglobin 12.7 (L) 13.5 - 18.0 GM/DL    Hematocrit 38.1 (L) 42 - 52 %    MCV 84.5 78 - 100 FL    MCH 28.2 27 - 31 PG    MCHC 33.3 32.0 - 36.0 %    RDW 20.4 (H) 11.7 - 14.9 %    Platelets 940 (H) 140 - 440 K/CU MM    MPV 8.3 7.5 - 11.1 FL    Differential Type AUTOMATED DIFFERENTIAL     Segs Relative 44.6 36 - 66 %    Lymphocytes % 42.9 24 - 44 %    Monocytes % 10.0 (H) 0 - 4 %    Eosinophils % 0.7 0 - 3 %    Basophils % 1.3 (H) 0 - 1 %    Segs Absolute 2.5 K/CU MM    Lymphocytes Absolute 2.4 K/CU MM    Monocytes Absolute 0.6 K/CU MM    Eosinophils Absolute 0.0 K/CU MM    Basophils Absolute 0.1 K/CU MM    Nucleated RBC % 0.4 %    Total Nucleated RBC 0.0 K/CU MM    Total Immature Neutrophil 0.03 K/CU MM    Immature Neutrophil % 0.5 (H) 0 - 0.43 %   BMP   Result Value Ref Range    Sodium 134 (L) 135 - 145 MMOL/L    Potassium 3.6 3.5 - 5.1 MMOL/L    Chloride 97 (L) 99 - 110 mMol/L    CO2 24 21 - 32 MMOL/L    Anion Gap 13 4 - 16    BUN 5 (L) 6 - 23 MG/DL    CREATININE 0.6 (L) 0.9 - 1.3 MG/DL    Glucose 134 (H) 70 - 99 MG/DL    Calcium 8.7 8.3 - 10.6 MG/DL    GFR Non-African American >60 >60 mL/min/1.73m2    GFR African American >60 >60 mL/min/1.73m2   Lactic Acid, Plasma   Result Value Ref Range    Lactate 3.9 (HH) 0.4 - 2.0 mMOL/L   Hepatic Function Panel (LFTs)   Result Value Ref Range    Albumin 3.3 (L) 3.4 - 5.0 GM/DL    Total Bilirubin 0.2 0.0 - 1.0 MG/DL    Bilirubin, Direct 0.2 0.0 - 0.3 MG/DL    Bilirubin, Indirect 0.0 0 - 0.7 MG/DL    Alkaline Phosphatase 96 40 - 129 IU/L    AST 23 15 - 37 IU/L    ALT 20 10 - 40 U/L    Total Protein 7.3 6.4 - 8.2 GM/DL   Lipase   Result Value Ref Range    Lipase 12 (L) 13 - 60 IU/L   Magnesium   Result Value Ref Range    Magnesium 1.3 (L) 1.8 - 2.4 mg/dl   CK   Result Value Ref Range    Total  38 - 174 IU/L   Troponin   Result Value Ref Range    Troponin T <0.010 <0.01 NG/ML   Basic Metabolic Panel w/ Reflex to MG   Result Value Ref Range    Sodium 138 135 - 145 MMOL/L    Potassium 4.5 3.5 - 5.1 MMOL/L    Chloride 100 99 - 110 mMol/L    CO2 24 21 - 32 MMOL/L    Anion Gap 14 4 - 16    BUN 6 6 - 23 MG/DL    CREATININE 0.7 (L) 0.9 - 1.3 MG/DL    Glucose 96 70 - 99 MG/DL    Calcium 8.2 (L) 8.3 - 10.6 MG/DL    GFR Non-African American >60 >60 mL/min/1.73m2    GFR African American >60 >60 mL/min/1.73m2   CBC auto differential   Result Value Ref Range    WBC 5.1 4.0 - 10.5 K/CU MM    RBC 4.08 (L) 4.6 - 6.2 M/CU MM    Hemoglobin 11.4 (L) 13.5 - 18.0 GM/DL    Hematocrit 35.5 (L) 42 - 52 %    MCV 87.0 78 - 100 FL    MCH 27.9 27 - 31 PG    MCHC 32.1 32.0 - 36.0 %    RDW 20.2 (H) 11.7 - 14.9 %    Platelets 914 (H) 407 - 440 K/CU MM    MPV 8.1 7.5 - 11.1 FL    Differential Type AUTOMATED DIFFERENTIAL     Segs Relative 37.5 36 - 66 %    Lymphocytes % 48.4 (H) 24 - 44 %    Monocytes % 11.5 (H) 0 - 4 %    Eosinophils % 1.0 0 - 3 %    Basophils % 1.4 (H) 0 - 1 %    Segs Absolute 1.9 K/CU MM    Lymphocytes Absolute 2.5 K/CU MM    Monocytes Absolute 0.6 K/CU MM    Eosinophils Absolute 0.1 K/CU MM    Basophils Absolute 0.1 K/CU MM    Nucleated RBC % 0.4 %    Total Nucleated RBC 0.0 K/CU MM    Total Immature Neutrophil 0.01 K/CU MM    Immature Neutrophil % 0.2 0 - 0.43 %   Hepatitis Panel, Acute   Result Value Ref Range    Hepatitis B Surface Ag NON REACTIVE NON REACTIVE    Hep A IgM NON REACTIVE NON REACTIVE    Hep B Core Ab, IgM NON REACTIVE NON REACTIVE    Hepatitis C Ab NON REACTIVE NON REACTIVE   Phosphorus   Result Value Ref Range    Phosphorus 4.3 2.5 - 4.9 MG/DL   Hemoglobin A1c   Result Value Ref Range    Hemoglobin A1C 5.9 4.2 - 6.3 %    eAG 123 mg/dL   CBC   Result Value Ref Range    WBC 5.6 4.0 - 10.5 K/CU MM    RBC 4.15 (L) 4.6 - 6.2 M/CU MM    Hemoglobin 11.3 (L) 13.5 - 18.0 GM/DL    Hematocrit 36.6 (L) 42 - 52 %    MCV 88.2 78 - 100 FL    MCH 27.2 27 - 31 PG    MCHC 30.9 (L) 32.0 - 36.0 %    RDW 19.9 (H) 11.7 - 14.9 %    Platelets 861 (H) 621 - 440 K/CU MM    MPV 8.0 7.5 - 11.1 FL   Magnesium   Result Value Ref Range    Magnesium 1.9 1.8 - 2.4 mg/dl   POCT Glucose   Result Value Ref Range    POC Glucose 118 (H) 70 - 99 MG/DL   POCT Glucose   Result Value Ref Range    POC Glucose 106 (H) 70 - 99 MG/DL   POCT Glucose   Result Value Ref Range    POC Glucose 93 70 - 99 MG/DL   POCT Glucose   Result Value Ref Range    POC Glucose 106 (H) 70 - 99 MG/DL   POCT Glucose   Result Value Ref Range    POC Glucose 110 (H) 70 - 99 MG/DL   EKG 12 Lead   Result Value Ref Range    Ventricular Rate 114 BPM    Atrial Rate 114 BPM    P-R Interval 140 ms    QRS Duration 74 ms    Q-T Interval 316 ms    QTc Calculation (Bazett) 435 ms    P Axis 63 degrees    R Axis 35 degrees    T Axis 44 degrees    Diagnosis       Sinus tachycardia  Otherwise normal ECG  When compared with ECG of 31-JAN-2021 09:41,  No significant change was found    Confirmed by [facility] Juan Miguel SAWYER ([id number]) on 2/16/2021 10:48:25 AM           Chart review shows recent radiographs:  Ct Abdomen Pelvis W Iv Contrast Additional Contrast? None    Result Date: 2/16/2021  EXAMINATION: CT OF THE ABDOMEN AND PELVIS WITH CONTRAST 2/15/2021 10:59 pm TECHNIQUE: CT of the abdomen and pelvis was performed with the administration of intravenous contrast. Multiplanar reformatted images are provided for review. Dose modulation, iterative reconstruction, and/or weight based adjustment of the mA/kV was utilized to reduce the radiation dose to as low as reasonably achievable. COMPARISON: January 31, 2021 HISTORY: ORDERING SYSTEM PROVIDED HISTORY: Diffuse abdominal pain TECHNOLOGIST PROVIDED HISTORY: Reason for exam:->Diffuse abdominal pain Additional Contrast?->None Decision Support Exception->Emergency Medical Condition (MA) Reason for Exam: abdominal pain FINDINGS: Lower Chest: Clear lung bases. Organs: Liver, gallbladder, spleen, pancreas, adrenal glands, and kidneys demonstrate no acute abnormality. Bilateral ureters are normal in course and caliber. No ureteral calculi. GI/Bowel: Stomach, small bowel, and colon are normal in course and caliber without evidence of wall thickening or obstruction. Normal appendix. Pelvis: Normal bladder.   Prostate and seminal vesicles are unremarkable. Peritoneum/Retroperitoneum: No free fluid or free air. No pathologic lymphadenopathy. Aorta and its branches are normal in course and caliber. Minimal atherosclerosis. Bones/Soft Tissues: No acute or aggressive osseous lesion. Status post right hip arthroplasty. 1. No acute intra-abdominal abnormality. 2. Normal appendix. Ct Abdomen Pelvis W Iv Contrast Additional Contrast? None    Result Date: 2/1/2021  EXAMINATION: CT OF THE ABDOMEN AND PELVIS WITH CONTRAST 1/31/2021 4:51 am TECHNIQUE: CT of the abdomen and pelvis was performed with the administration of intravenous contrast. Multiplanar reformatted images are provided for review. Dose modulation, iterative reconstruction, and/or weight based adjustment of the mA/kV was utilized to reduce the radiation dose to as low as reasonably achievable. COMPARISON: CT abdomen and pelvis dated November 25th 2020 HISTORY: ORDERING SYSTEM PROVIDED HISTORY: abd pain, diarrhea TECHNOLOGIST PROVIDED HISTORY: Reason for exam:->abd pain, diarrhea Additional Contrast?->None Reason for Exam: abd pain, diarrhea Acuity: Acute Type of Exam: Initial FINDINGS: Lower Chest: The visualized lungs are clear. Organs: Diffuse fatty infiltration of the liver is noted. The gallbladder is distended. The spleen, adrenal glands, pancreas, and kidneys are unremarkable. GI/Bowel: There is no evidence of bowel obstruction. The appendix is normal. Diffuse mucosal thickening is seen involving the colon which could be related to infectious or inflammatory colitis. Pelvis: The bladder is unremarkable. There is no free fluid. Peritoneum/Retroperitoneum: There is no free air or lymphadenopathy. Bones/Soft Tissues: No destructive osseous lesions are identified. Right hip arthroplasty is noted. 1. Mild diffuse mucosal thickening is seen involving the colon which could be related to infectious or inflammatory colitis.  2. Diffuse fatty infiltration of the liver. 3. Distended gallbladder. EKG     Rhythm: normal sinus         There is no immunization history on file for this patient. The patient was seen and examined on day of discharge and this discharge summary is in conjunction with any daily progress note from day of discharge. Time Spent on discharge is   >35  min  in the examination, evaluation, counseling and review of medications and discharge plan.             SignedJunito Castellon MD   2/17/2021 DISPLAY PLAN FREE TEXT

## 2021-07-01 NOTE — ED PROVIDER NOTE - EYES, MLM
----- Message from Cristal Wallace sent at 7/1/2021  1:04 PM CDT -----  Regarding: COVID TEST ORDER  Vivien ordered an Echo stress test with treadmill for pt.  Patient will need to have a Covid test done.   Please enter order   LAB [id number].   Thank-you,  Cristal, cardiology    [phone]     Clear bilaterally, pupils equal, round and reactive to light.

## 2021-12-31 NOTE — H&P ADULT - NSHPPHYSICALEXAM_GEN_ALL_CORE
TRANSFER - OUT REPORT:    Verbal report given to Deb El RN on Guevara Nicholas  being transferred to Room 367for routine progression of care       Report consisted of patients Situation, Background, Assessment and   Recommendations(SBAR). Information from the following report(s) SBAR, Procedure Summary, Intake/Output, MAR, Recent Results and Cardiac Rhythm SR was reviewed with the receiving nurse. Lines:   Peripheral IV 12/31/21 Left Hand (Active)   Site Assessment Clean, dry, & intact 12/31/21 1249   Phlebitis Assessment 0 12/31/21 1249   Infiltration Assessment 0 12/31/21 1249   Dressing Status Clean, dry, & intact 12/31/21 1249   Dressing Type Tape;Transparent 12/31/21 1249   Hub Color/Line Status Pink; Infusing;Patent 12/31/21 1249        Opportunity for questions and clarification was provided.       Patient transported with:   room air General: Slim build  female lying in bed not in distress.   HEENT: AT, NC. PERRL. intact EOM. no throat erythema or exudate. mild scleral icterus noted.   Neck: supple. no JVD.   Chest: CTA bilaterally  Heart: normal S1,S2. RRR. no heart murmur.  Abdomen: soft. non-tender. non-distended. + BS.  Rectal : deferred by pt. had one by surgery team which indicates ext. hemorrhoids and rectal prolapse which was reduced by surgical team.   Ext: no calf tenderness on either side. dp 2 + B/L. no pian over spine area, no pain in hip area.   Neuro: AAO x3. non focal, no speech disorder. co-operative.   Skin: no rash noted. mild skin pallor.   Psych : normal affect. no SI/HI.

## 2022-02-22 NOTE — ED PROVIDER NOTE - CROS ED CONS ALL NEG
[Dear  ___] : Dear  [unfilled], [Consult Letter:] : I had the pleasure of evaluating your patient, [unfilled]. [Please see my note below.] : Please see my note below. [Consult Closing:] : Thank you very much for allowing me to participate in the care of this patient.  If you have any questions, please do not hesitate to contact me. [Sincerely,] : Sincerely, [FreeTextEntry3] : Jose Luis Lockett MD, FCCP, D. ABSM\par Pulmonary and Sleep Medicine\par [facility]monary [facility] Sleep [facility] at [place] \par  negative...

## 2022-04-27 NOTE — PHYSICAL THERAPY INITIAL EVALUATION ADULT - NEUROVASCULAR ASSESSMENT RLE
Pt stated that she takes the Vit D when she remembers to take it, she has it on her night stand, she may only miss a couple of days no numbness/no discoloration/no tingling/warm

## 2022-05-31 NOTE — ED PROVIDER NOTE - OBJECTIVE STATEMENT
54
An 88 year old female pt with a hx of low H&H, followed by Dr Montes, also followed by Colorectal surgery Dr. Velarde, presents to the ED c/o rectal bleeding. Yesterday the pt had multiple episodes of rectal bleeding while at the [place]. The pt was on her way to the colorectal surgeon today but en route had multiple episodes bloody diarrhea. Her family subsequently brought her to the ED after the episodes of diarrhea. She was seen by colorectal surgery in the ED and was noted to have rectal prolapse and bleeding hemorrhoids. Pt has not had any fevers, chills, nausea or vomiting. She did require transfusion approx 1 week ago at [facility]. No further complaints at this time.

## 2022-06-08 NOTE — ED ADULT NURSE NOTE - BREATHING, MLM
----- Message from Danielito العراقي MD sent at 6/8/2022  1:30 PM EDT -----  Approved    ----- Message -----  From: Moon Henriquez  Sent: 5/20/2022   1:52 PM EDT  To: Sleep Medicine Arturo Farfan Provider    This sleep study needs approval      If approved please sign and return to clerical pool  If denied please include reasons why  Also provide alternative testing if warranted  Please sign and return to clerical pool 
Spontaneous, unlabored and symmetrical

## 2022-07-28 NOTE — ED CDU PROVIDER INITIAL DAY NOTE - CPE EDP GASTRO NORM
Initial Anesthesia Post-op Note    Patient: Tomi Hudson  Procedure(s) Performed: TRIPLE AORTO-CORONARY ARTERY BYPASS GRAFTING:LEFT INTERNAL MAMMARY ARTERY TO LEFT ANTERIOR DESCENDING, LEFT SAPHENOUS VEIN BYPASS GRAFT TO POSTERIOR DESCENDING & DIAGONAL  Anesthesia type: General    Vitals Value Taken Time   Temp 37 °C (98.6 °F) 07/28/22 1350   Pulse 81 07/28/22 1350   Resp 15 07/28/22 1350   SpO2 98 % 07/28/22 1350    126/81 07/28/22 1351   Vitals shown include unvalidated device data.      Patient Location: ICU  Cardiovascular blood pressure returned to baseline  Hydration: euvolemic  Pain Management: adequately managed  Handoff: Handoff to receiving clinician was performed and questions were answered  Post-op Assessment: patient tolerated procedure well with no complications, no evidence of recall, regional anesthetic in place - able to participate, dentition within defined limits and No Corneal Abrasion      No complications documented.   normal...

## 2022-09-26 NOTE — ED PROVIDER NOTE - HEAD, MLM
INTERVENTIONAL RADIOLOGY INSTRUCTIONS FOR NEIDA PADILLA     You are scheduled for an upcoming procedure in the   Interventional Radiology Department at [facility].     Date: Wednesday October 5      Procedure: Liver Biopsy     Address: [address] [place] [address]         There is  parking for patients with limited mobility located at front entrance of hospitals.    [facility] Patient/Visitor Parking Ramp   is located on the corner of [address]:   [address] [place] [address].                                                                               [facility] [address]                                                                                                         Check into the [facility] Waiting room at: 08:00 am    Do not eat any food after: 01:30 am  You may drink clear liquids until 07:30 am then nothing to drink until after your procedure.  Clear liquids are: water, apple juice, black coffee (no cream, sugar or milk), Gatorade, jello     Two visitors may accompany you to your procedure.    You will need to have someone available to drive you home.    We recommend that you have someone stay with you 1-2 hours after you get home.    MEDICATION CHANGES IN PREPARATION FOR YOUR PROCEDURE    - Please hold all of your insulin medications (LANTUS & LISPRO/NOVOLOG) on the morning of your procedure.    - Please hold your ASPIRIN medication for 5 days before your procedure date.  Hold starting Saturday October 1st.    - Please hold your XARELTO (rivaroxaban) medication for 2 DOSES before your procedure.      COVID TESTING REQUIREMENTS      You will need to take an at-home, rapid antigen COVID test 1 to 2 days before your procedure.                                  -  If the results are negative, you need to bring a photo of the  results to the procedure.                                 -  If your results are positive, do not come to your appointment and please call [phone].      Please direct further questions regarding COVID testing to [phone]        or visit [email or web address].        Your procedure will be delayed/ possibly canceled if you do not have a COVID test        done within the appropriate time frame.        If you have any questions, please call the Interventional Radiology team at [phone].       Thank you!    Jenny CLARK  Interventional Radiology Intake Nurse Coordinator  [phone]               Head is atraumatic. Head shape is symmetrical.

## 2022-09-29 NOTE — ED PROVIDER NOTE - CROS ED NEURO ALL NEG
Misty left message with patient regarding upcoming stress test at 10:00AM on 10/5/22. No caffeine for 24 hrs (coffee, tea, soda, energy drinks like Monster, and decaf.) Eat or drink normally otherwise up to one hour prior to testing.  Please take all other medications as prescribed and bring any inhalers you may have with you to your appointment. Location is [address] in [facility] at [facility]. Informed patient to arrive 30 minutes prior to test. Testing will likely last 1 hours total time. Please call [phone] with questions.  
negative...

## 2023-01-19 NOTE — ED ADULT NURSE NOTE - LOCATION
Patient's representative Candida advised of results and recommendations. Patient will start Levothyroxine 25 mcg and OTC Iron 325 mg daily and repeat labs in one month----- Message from Teofilo Lozoya MD sent at 1/19/2023 11:40 AM CST -----  Iron is low.  Restart daily iron tablet 325 every day to iron deficiency anemia.  Also slightly low T3 hormone however normal T4.  Mild borderline hypothyroid.  Can start levothyroxine 25 mcg daily which should help with energy along with the iron tablet.  Can recheck a TSH, CBC and iron level in 1 month after starting this regimen     leg

## 2023-04-05 NOTE — ED ADULT NURSE NOTE - NS ED NURSE RECORD ANOTHER VITAL SIGN
Per patient's insurance, Saxenda is excluded from coverage, and a prior auth cannot be obtained. The patient has the option to purchase this medication out of pocket. EPA team will close encounter.    Per plan weight loss medications are not covered.   Yes

## 2023-05-04 NOTE — H&P ADULT - NSHPLABSRESULTS_GEN_ALL_CORE
Female LABS:                        9.4    14.5  )-----------( 257      ( 10 Sep 2017 12:21 )             27.4     09-10    133<L>  |  92<L>  |  20.0  ----------------------------<  117<H>  3.4<L>   |  27.0  |  0.45<L>    Ca    8.6      10 Sep 2017 12:21    TPro  7.0  /  Alb  2.8<L>  /  TBili  1.0  /  DBili  x   /  AST  36<H>  /  ALT  52<H>  /  AlkPhos  280<H>  09-10    PT/INR - ( 10 Sep 2017 12:21 )   PT: 15.3 sec;   INR: 1.38 ratio         PTT - ( 10 Sep 2017 12:21 )  PTT:26.6 sec

## 2023-05-28 NOTE — ED ADULT NURSE NOTE - EENT WDL
show
Eyes with no visual disturbances.  Ears clean and dry and no hearing difficulties. Nose with pink mucosa and no drainage.  Mouth mucous membranes moist and pink.  No tenderness or swelling to throat or neck.

## 2023-09-27 NOTE — PATIENT PROFILE ADULT - BRADEN SCORE
The patient is Stable - Low risk of patient condition declining or worsening    Shift Goals  Clinical Goals: Decrease agitation  Patient Goals: comfort  Family Goals: candelaria    Progress made toward(s) clinical / shift goals:    Problem: Knowledge Deficit - Standard  Goal: Patient and family/care givers will demonstrate understanding of plan of care, disease process/condition, diagnostic tests and medications  Outcome: Progressing     Problem: Optimal Care for Alcohol Withdrawal  Goal: Optimal Care for the alcohol withdrawal patient  Outcome: Progressing     Problem: Pain - Standard  Goal: Alleviation of pain or a reduction in pain to the patient’s comfort goal  Outcome: Progressing     Problem: Safety - Medical Restraint  Goal: Remains free of injury from restraints (Restraint for Interference with Medical Device)  Outcome: Progressing     Problem: Skin Integrity  Goal: Skin integrity is maintained or improved  Outcome: Progressing     Problem: Fall Risk  Goal: Patient will remain free from falls  Outcome: Progressing              14

## 2024-05-01 NOTE — ED ADULT NURSE NOTE - CAS TRG GEN SKIN COLOR
Pediatric Discharge Summary Report       Admission Date: 4/29/2024  Discharge Date: 05/01/24  PMD: Belkis Morrison MD     Discharge Diagnosis:Postprandial emesis in pediatric patient  Primary Diagnosis: Postprandial regurgitation in pediatric patient  Problem List:   Active Hospital Problems    Diagnosis     Hypoglycemia     Feeding intolerance     Postprandial regurgitation in pediatric patient        Reason for Admission: subacute postprandial emesis      Pertinent Labs/Imaging:   Recent Results (from the past 72 hour(s))   GLUCOSE, BEDSIDE - POINT OF CARE    Collection Time: 04/29/24 10:31 AM   Result Value Ref Range    GLUCOSE, BEDSIDE - POINT OF CARE 89 70 - 99 mg/dL   Comprehensive Metabolic Panel    Collection Time: 04/29/24  1:05 PM   Result Value Ref Range    Fasting Status      Sodium 138 135 - 145 mmol/L    Potassium 4.0 3.4 - 5.1 mmol/L    Chloride 108 97 - 110 mmol/L    Carbon Dioxide 26 21 - 32 mmol/L    Anion Gap 8 7 - 19 mmol/L    Glucose 67 (L) 70 - 99 mg/dL    BUN 11 5 - 18 mg/dL    Creatinine 0.42 0.21 - 0.65 mg/dL    Glomerular Filtration Rate      BUN/Cr 26 (H) 7 - 25    Calcium 9.7 8.0 - 11.0 mg/dL    Bilirubin, Total 0.3 0.2 - 1.4 mg/dL    GOT/AST 34 10 - 55 Units/L    GPT/ALT 27 10 - 30 Units/L    Alkaline Phosphatase 296 130 - 325 Units/L    Albumin 4.1 3.6 - 5.1 g/dL    Protein, Total 7.5 6.0 - 8.0 g/dL    Globulin 3.4 2.0 - 4.0 g/dL    A/G Ratio 1.2 1.0 - 2.4   Lipase    Collection Time: 04/29/24  1:05 PM   Result Value Ref Range    Lipase 21 15 - 77 Units/L   CBC with Automated Differential (performable only)    Collection Time: 04/29/24  1:05 PM   Result Value Ref Range    WBC 8.6 5.0 - 14.5 K/mcL    RBC 4.68 3.90 - 5.30 mil/mcL    HGB 12.9 11.5 - 15.5 g/dL    HCT 39.8 35.0 - 45.0 %    MCV 85.0 77.0 - 95.0 fl    MCH 27.6 25.0 - 33.0 pg    MCHC 32.4 31.0 - 37.0 g/dL    RDW-CV 12.6 11.0 - 15.0 %    RDW-SD 38.5 35.0 - 47.0 fL     140 - 450 K/mcL    NRBC 0 <=0 /100 WBC    Neutrophil,  Percent 63 %    Lymphocytes, Percent 24 %    Mono, Percent 11 %    Eosinophils, Percent 1 %    Basophils, Percent 0 %    Immature Granulocytes 1 %    Absolute Neutrophils 5.4 1.5 - 8.0 K/mcL    Absolute Lymphocytes 2.0 1.5 - 7.0 K/mcL    Absolute Monocytes 1.0 (H) 0.0 - 0.8 K/mcL    Absolute Eosinophils  0.1 0.0 - 0.5 K/mcL    Absolute Basophils 0.0 0.0 - 0.2 K/mcL    Absolute Immature Granulocytes 0.1 0.0 - 0.2 K/mcL   Rapid Respiratory Pathogen PCR    Collection Time: 04/29/24  1:14 PM    Specimen: Nasopharyngeal Swab   Result Value Ref Range    Adenovirus Not Detected Not Detected    Bordetella Parapertussis Not Detected Not Detected    Bordetella pertussis Not Detected Not Detected    Chlamydophila pneumoniae Not Detected Not Detected    Coronavirus, 229E Not Detected Not Detected    Coronavirus, HKU1 Not Detected Not Detected    Coronavirus, NL63 Not Detected Not Detected    Coronavirus, OC43 Not Detected Not Detected    Influenza A Not Detected Not Detected    Influenza B Not Detected Not Detected    Metapneumovirus Not Detected Not Detected    Mycoplasma pneumoniae Not Detected Not Detected    Parainfluenza 1 Not Detected Not Detected    Parainfluenza 2 Not Detected Not Detected    Parainfluenza 3 Not Detected Not Detected    Parainfluenza 4 Not Detected Not Detected    Respiratory Syncytial virus Not Detected Not Detected    Rhinovirus/Enterovirus Not Detected Not Detected    SARS-CoV-2 by PCR Not Detected Not Detected / Detected / Inhibitors Present   Urinalysis with microscopy without culture    Collection Time: 04/29/24  2:14 PM   Result Value Ref Range    COLOR, URINALYSIS Yellow     APPEARANCE, URINALYSIS Clear     GLUCOSE, URINALYSIS Negative Negative mg/dL    BILIRUBIN, URINALYSIS Negative Negative    KETONES, URINALYSIS Trace (A) Negative mg/dL    SPECIFIC GRAVITY, URINALYSIS 1.021 1.005 - 1.030    OCCULT BLOOD, URINALYSIS Negative Negative    PH, URINALYSIS 5.5 5.0 - 7.0    PROTEIN, URINALYSIS  Negative Negative mg/dL    UROBILINOGEN, URINALYSIS 0.2 0.2, 1.0 mg/dL    NITRITE, URINALYSIS Negative Negative    LEUKOCYTE ESTERASE, URINALYSIS Negative Negative    SQUAMOUS EPITHELIAL, URINALYSIS None Seen None Seen, 1 to 5 /hpf    ERYTHROCYTES, URINALYSIS None Seen None Seen, 1 to 2 /hpf    LEUKOCYTES, URINALYSIS 1 to 5 None Seen, 1 to 5 /hpf    BACTERIA, URINALYSIS None Seen None Seen /hpf    HYALINE CASTS, URINALYSIS None Seen None Seen, 1 to 5 /lpf    MUCUS Present    Urine, Bacterial Culture    Collection Time: 04/29/24  2:14 PM    Specimen: Urine clean catch   Result Value Ref Range    Urine, Bacterial Culture No Growth    GLUCOSE, BEDSIDE - POINT OF CARE    Collection Time: 04/29/24  2:18 PM   Result Value Ref Range    GLUCOSE, BEDSIDE - POINT OF CARE 59 (L) 70 - 99 mg/dL   GLUCOSE, BEDSIDE - POINT OF CARE    Collection Time: 04/29/24  3:44 PM   Result Value Ref Range    GLUCOSE, BEDSIDE - POINT OF CARE 69 (L) 70 - 99 mg/dL   Electrocardiogram Pediatric    Collection Time: 04/30/24  3:43 AM   Result Value Ref Range    Ventricular Rate EKG/Min (BPM) 94     Atrial Rate (BPM) 94     MT-Interval (MSEC) 180     QRS-Interval (MSEC) 76     QT-Interval (MSEC) 348     QTc 435     P Axis (Degrees) 62     R Axis (Degrees) 68     T Axis (Degrees) 45     REPORT TEXT       Sinus rhythm  with 1st degree AV block  No previous ECGs available  Confirmed by WIL ARENAS MD ([id number]) on 4/30/2024 12:16:12 PM     GLUCOSE, BEDSIDE - POINT OF CARE    Collection Time: 04/30/24  2:39 PM   Result Value Ref Range    GLUCOSE, BEDSIDE - POINT OF CARE 130 (H) 70 - 99 mg/dL     EGD   Final Result      US ABDOMEN COMPLETE   Final Result   Normal abdominal ultrasound.      Electronically Signed by: ROLAND JOHNSON M.D.    Signed on: 4/29/2024 4:57 PM    Workstation ID: [id number]      XR ABDOMEN 2 VIEWS   Final Result      Normal bowel gas pattern with not much residual stool in the colons. No   free intra-abdominal air.                      Electronically Signed by: ROLAND JOHNSON M.D.    Signed on: 4/29/2024 4:37 PM    Workstation ID: [id number] Course:  Arlene is a 6-year-old previously healthy female who presented with subacute postprandial emesis. History was concerning for oral intolerance refractory to zofran. While admitted, patient had few episodes of emesis, which had significantly decreased in frequency throughout course of stay. Underwent EGD on 4/30 with grossly normal findings. Patient received carafate during admission with improvement in symptoms. She was successfully weaned off IV fluids, and patient was tolerating full PO at time of discharge. She was started on GI barrier protectant, carafate, at discharge.        PCP will see patient as outpatient. Discharge summary to be faxed to PCP.    Discharge Vitals:  Vitals:    05/01/24 1119   Temp: 97.9 °F (36.6 °C)   Pulse: 98   Resp: 20   SpO2: 97%   BP: 107/72        Physical Exam:  Physical Exam  Constitutional:       Appearance: Normal appearance.   Cardiovascular:      Rate and Rhythm: Normal rate and regular rhythm.      Pulses: Normal pulses.      Heart sounds: Normal heart sounds.   Pulmonary:      Effort: Pulmonary effort is normal.      Breath sounds: Normal breath sounds.   Abdominal:      General: Abdomen is flat. There is no distension.      Palpations: Abdomen is soft.      Tenderness: There is abdominal tenderness (Mild epigastric tenderness). There is no guarding or rebound.   Neurological:      General: No focal deficit present.      Mental Status: She is alert.   Psychiatric:         Mood and Affect: Mood normal.         Behavior: Behavior normal.         Condition on Discharge: Stable    Follow-up:   PMD: Belkis Morrison MD  Consultants:      Labs pending, orders to be obtained, or other action items for outpatient follow up:    None    Activity:   Activity as tolerated, no restrictions    Diet:  general diet    Medications:      Summary of your  Discharge Medications      You have not been prescribed any medications.         Discussed with attending      Sherrell Del Rosario DO  PGY-1  Family Medicine      Thank you for allowing us to participate in the care of this patient.     Normal for race

## 2024-07-02 NOTE — ED PROVIDER NOTE - PSH
Patient Name: Abida Roper    APC Name: Therese Beatty RN Name:Giana Jani    ALLERGIES:  No Known Allergies    Episode Type: Physician at home    Symptoms/Concern: Received incoming call from [facility]  RN, Kimberly.  Kimberly stated she received notification of critical labs yesterday evening by [facility] lab. RN called and spoke to on call provider, Odilia Grider. Provider instructed RN to order Levofloxacin 250 mg daily for 7 days. RN stated she called pharmacy but was unable to order because she did not have on call provider's NPI #.  RN is also concerned that critical glucose of 41 was not addressed. CC did not see notes entered regarding call. RN requested cb from ASHER Beatty.         Call Back # : SHENG Harris [phone]    Care collaboration:   Telephone encounter and Perfect serve sent to [facility] care team due to reported change in condition:  HIGH PRIORITY       Patient notified that Nurse/APC  callback times vary based on call volumes; please be aware the return phone call may come from an unidentified phone number. If patient symptoms worsen or become life threatening while waiting, they should seek immediate assistance by calling 911 or going to the ER for evaluation.     No significant past surgical history

## 2024-07-22 NOTE — PROVIDER CONTACT NOTE (OTHER) - NAME OF MD/NP/PA/DO NOTIFIED:
EMS stated pt complained of dehydration and alcohol use.       On arrival pt lethargic and none cooperative. BG 42 and temp 92.6 rectally. Provider aware. Pt immediately given 2 orange juices to drink. 2 lines placed.    Dr. Deangelo Olson

## 2024-09-26 NOTE — PROGRESS NOTE ADULT - SUBJECTIVE AND OBJECTIVE BOX
HPI:    87 y.o. F w/ PMHx Reji 2 thrombocytosis and Myelofibrosis presented to [facility] on 09/10/17 with diffuse joint pains.  Pt reports pain started in her neck and shoulders the week before admission.  Pt seen by outpt orthopedics, and started on Medrol Dose pack.  Symptoms initially improved, but then pt began to experience pain and swelling in both hands and feet.  Pain worsened to the point that pt was having difficulty with walking.  On admission, Right hand Xray revealed periarticular changes consistent with Osteoarthritis versus CPPD disease; diffuse soft tissue; no fracture. Bilateral wrist Xray showed no fracture or dislocation.  Right ankle Xray showed soft tissue swelling, no fracture or dislocation.  Rheumatology consulted.  Serological studies performed.  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 less likely RA vs. less likely polymyalgia rheumatica. Pt was started on prednisone 40mg daily. As per Rheum will taper steroids by 10mg every 5 days to 10 mg daily, will maintain until follow up as outpatient.  Pt seen by hematology, transfused on 09/06 for symptomatic anemia.  Hospital course complicated by elevated BP, pt started by lisinopril.  Pt also had marked extremities swelling.  Doppler for both UEs and LEs performed, which were negative for DVTs.    INTERVAL SUBJECTIVE & REVIEW OF SYMPTOMS:   Pt presents with a c/c diffuse severe pain and fatigue.  Pt c/o bilateral hand pain and now right groin more so than in other joints and swelling with difficulty performing ADLs.  Pt c/o lack of appetite as well as depression due to the pain.   Pt offered no other complaints       REVIEW OF SYSTEMS  [   ] Constitutional WNL  [ x  ] Cardio WNL  [  x ] Resp WNL  [  x ] GI WNL  [  x ]  WNL  [   ] Heme WNL  [   ] Endo WNL  [ x  ] Skin WNL  [   ] MSK WNL  [  x ] Neuro WNL  [  x ] Cognitive WNL  [   ] Psych WNL    Vital Signs Last 24 Hrs  T(C): 36.2 (20 Sep 2017 13:43), Max: 38.1 (19 Sep 2017 22:07)  T(F): 97.1 (20 Sep 2017 13:43), Max: 100.5 (19 Sep 2017 22:07)  HR: 90 (20 Sep 2017 13:43) (90 - 100)  BP: 117/70 (20 Sep 2017 13:43) (111/60 - 144/78)  BP(mean): --  RR: 16 (20 Sep 2017 13:43) (16 - 18)  SpO2: 98% (20 Sep 2017 13:43) (95% - 99%)    PHYSICAL EXAM  General:  Mild distress due to pain  Cardio: S1S2+  Resp: CTAB  Abdomen: soft, NTND  Extrem: + mild edema digits, no calf tenderness. No erythema  Motor 4/5     Functional Exam:   Bed mobility: Min assist  Transfers Min assist  Gait: Pt ambulated 20' RW min assist  ADLs:  Max assist toileting and LE dressing, Sup grooming and eating    RECENT LABS:                            8.1    12.4  )-----------( 265      ( 19 Sep 2017 22:52 )             23.6                           7.1    11.7  )-----------( 330      ( 19 Sep 2017 08:00 )             22.1   Iron 54                    7.6    11.6  )-----------( 284      ( 17 Sep 2017 07:28 )             22.1                     09-16    133<L>  |  96<L>  |  19.0  ----------------------------<  111  3.9   |  26.0  |  0.47<L>    Ca    8.3<L>      16 Sep 2017 05:54  Mg     1.9     09-15    TPro  6.1<L>  /  Alb  2.5<L>  /  TBili  0.5  /  DBili  x   /  AST  15  /  ALT  29  /  AlkPhos  200<H>  09-16    MEDICATIONS  (STANDING):  heparin  Injectable 5000 Unit(s) SubCutaneous every 12 hours  docusate sodium 100 milliGRAM(s) Oral three times a day  polyethylene glycol 3350 17 Gram(s) Oral at bedtime  pantoprazole    Tablet 40 milliGRAM(s) Oral before breakfast  hydrocortisone 2.5% Rectal Cream 1 Application(s) Rectal daily  lisinopril 5 milliGRAM(s) Oral daily  levothyroxine 75 MICROGram(s) Oral daily  predniSONE   Tablet 30 milliGRAM(s) Oral daily  acetaminophen   Tablet 650 milliGRAM(s) Oral every 6 hours  gabapentin 100 milliGRAM(s) Oral two times a day  gabapentin 100 milliGRAM(s) Oral once    MEDICATIONS  (PRN):  magnesium hydroxide Suspension 30 milliLiter(s) Oral daily PRN Constipation  methyl salicylate 14%/menthol 6% Topical Ointment 1 Application(s) Topical four times a day PRN pain      A/P:  Pt is an 87 year old female with functional deficits from 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  Pt to f/u with rheumatology after d/c for further tapering.  Serologies were ordered.    Continue local analgesic cream.  Lidocaine cream prn   Tylenol changed to q6h for 2 days. Increased neurontin to TID.  Added Tramadol 25-50mg q4hrs prn-changed to 25mg standing bid prior to rehab sessions.   Recalled rheumatology due to persistent significant pain; f/u 9/19 appreciated; Prednisone changed to Solumedrol daily.  Consider bone scan if not improved; ordered for 9/21-d/w radiology.      h/o Myelofibrosis/MDS with thrombocytosis: Hem onc fu as needed. 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 recent bone marrow showed 5% blasts with moderate reticulin fibrosis. Now with myelofibrosis awaiting to begin [facility] outpatient with Dr. Montes.   HB 7.1.  Iron WNL.  Pt transfused 1U PRBCs 9/19/17; developed temp of 100.5  Discussed above with Dr Montes    Tmax 100.5 9/19  Afebrile now  CXR with LLL.  WBC 12.4  Appreciate hospitalist f/u; will follow off abx.  Repeat cbc in a.m  Add Procalcitonin    DVT prophylaxis: on heparin sq    Elevated blood pressure without h/o HTN: Improved on lisinopril    Hypothyroidism: on synthroid    GI prophylaxis: on protonix    Diet: regular with thin    Bowel program: Toilet schedule prn    Bowel program: colace, miralax    Medical fu     Discussed above extensively with patients daughters-pt noted to be depressed and requesting antidepressant  Add Lexapro 10mg daily no

## 2025-01-24 NOTE — ED ADULT TRIAGE NOTE - NSWEIGHTCALCTOOLDRUG_GEN_A_CORE
Caller: Antonella Arellano    Relationship: Self    Best call back number: [phone]     Which medication are you concerned about: nitrofurantoin, macrocrystal-monohydrate, (Macrobid) 100 MG capsule    Who prescribed you this medication: DR ARIZMENDI    When did you start taking this medication: HAS NOT STARTED THIS YET. THE PHARMACY IS NOT GETTING THE PRESCRIPTION. PLEASE RESEND. PATIENT WOULD LIKE TO  A PRESCRIPTION IF NEEDED. THE PHARMACY STATES THE OFFICE CAN CALL THE PHARMACY AND GIVE A VERBAL.    PLEASE CALL THE PATIENT TO LET HER KNOW WHEN THIS HAS BEEN CONFIRMED SENT TO THE PHARMACY.    used

## 2025-03-19 NOTE — PROGRESS NOTE ADULT - SUBJECTIVE AND OBJECTIVE BOX
KEVIN RIVERAJORGITO    [id number]    87y      Female    CC: Pain and swelling in hands and feet    HPI: 87 year old female with PMHx  Reji 2+ thrombocytosis, dating back to 2013. She had been on Hydrea since Olya 10, 2013. Most recently she was found to be pancytopenic and the Hydrea was held as of July 12, 2017. She was in her USOH until about 1 week prior to admission, when she began to experience pain in her neck and shoulders.  She saw ortho, who started her on a Medrol Dose pack, upon which her symptoms resolved.  However, upon completing the pack, she began to experience pain and swelling in her right hand.  She then began to experience similar symptoms in her other hand and in both feet.  The pain continued to worsen, so that she became unable to ambulate on her own.  (+)AM stiffness, lasting several hours. Patient came to the ER. 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 Pt was started on prednisone 40mg daily. Followed  by Rheum and Heme/Onc, Slowly progressing. As per Rheum will taper steroids to 10 mg daily, will maintain until follow up as outpatient.  Seen by PT and recommend rehab. BP was elevated , patient started on Lisinopril. Patient was transferred to acute rehab.    INTERVAL HPI/OVERNIGHT EVENTS: Patient seen and examined lying in bed.  Patient complaining of hip pain in rt leg.  Patient also complaining of fatigue,     ROS:  Patient denies any headache, dizziness, SOB, cough, CP, abdominal pain, nausea, vomiting, dysuria, diarrhea.  Other ROS reviewed and are negative.      Vital Signs Last 24 Hrs  T(C): 36.1 (22 Sep 2017 05:18), Max: 37.2 (21 Sep 2017 20:12)  T(F): 97 (22 Sep 2017 05:18), Max: 99 (21 Sep 2017 20:12)  HR: 97 (22 Sep 2017 05:18) (97 - 100)  BP: 124/68 (22 Sep 2017 05:18) (124/68 - 131/69)  BP(mean): --  RR: 17 (22 Sep 2017 05:18) (17 - 18)  SpO2: 95% (22 Sep 2017 05:18) (95% - 96%)      PHYSICAL EXAM:  GENERAL: NAD, fatigued  NERVOUS SYSTEM:  Alert & Oriented X3, Good concentration; generalized weakness  CHEST/LUNG: Clear to auscultation bilaterally; No rales, rhonchi, wheezing, or rubs  HEART: Regular rate and rhythm; No murmurs, rubs, or gallops  ABDOMEN: Soft, Nontender, Nondistended; Bowel sounds present  EXTREMITIES:  2+ Peripheral Pulses, No clubbing, cyanosis, or edema      I&O's Detail    19 Sep 2017 07:01  -  20 Sep 2017 07:00  --------------------------------------------------------  IN:  Total IN: 0 mL    OUT:    Stool: 1 mL  Total OUT: 1 mL    Total NET: -1 mL            LABS:                        7.6    11.2  )-----------( 267      ( 22 Sep 2017 07:48 )             22.0     09-22    125<L>  |  87<L>  |  18.0  ----------------------------<  114  3.7   |  26.0  |  0.32<L>    Ca    8.1<L>      22 Sep 2017 07:48    TPro  6.2<L>  /  Alb  2.6<L>  /  TBili  0.8  /  DBili  x   /  AST  18  /  ALT  31  /  AlkPhos  294<H>  09-21        MEDICATIONS  (STANDING):  heparin  Injectable 5000 Unit(s) SubCutaneous every 12 hours  docusate sodium 100 milliGRAM(s) Oral three times a day  polyethylene glycol 3350 17 Gram(s) Oral at bedtime  pantoprazole    Tablet 40 milliGRAM(s) Oral before breakfast  lisinopril 5 milliGRAM(s) Oral daily  levothyroxine 75 MICROGram(s) Oral daily  gabapentin 100 milliGRAM(s) Oral three times a day  multivitamin 1 Tablet(s) Oral daily  ascorbic acid 500 milliGRAM(s) Oral daily  traMADol 25 milliGRAM(s) Oral <User Schedule>  methylPREDNISolone sodium succinate Injectable 30 milliGRAM(s) IV Push daily  escitalopram 10 milliGRAM(s) Oral daily  fentaNYL   Patch  12 MICROgram(s)/Hr 1 Patch Transdermal every 72 hours    MEDICATIONS  (PRN):  magnesium hydroxide Suspension 30 milliLiter(s) Oral daily PRN Constipation  methyl salicylate 14%/menthol 6% Topical Ointment 1 Application(s) Topical four times a day PRN pain  hydrocortisone 2.5% Rectal Cream 1 Application(s) Rectal daily PRN pain  traMADol 25 milliGRAM(s) Oral every 4 hours PRN Moderate Pain (4 - 6)  lidocaine/prilocaine Cream 1 Application(s) Topical every 4 hours PRN pain  acetaminophen   Tablet 650 milliGRAM(s) Oral every 6 hours PRN For Temp greater than 38 C (100.4 F)      RADIOLOGY & ADDITIONAL TESTS: [FreeTextEntry1] : US reviewed. No pathology noted.  Discussed perimenopause and irregular bleeding that can happen. Options reviewed with patient- to monitor, medications and surgical options. To monitor at this time. Plan for well woman visit.

## 2025-03-27 NOTE — ED PROVIDER NOTE - PHYSICAL EXAMINATION
Rx Refill Note  Requested Prescriptions     Pending Prescriptions Disp Refills    bisoprolol (ZEBeta) 5 MG tablet [Pharmacy Med Name: BISOPROLOL FUMARATE 5MG TABLETS] 90 tablet 1     Sig: TAKE 1 TABLET BY MOUTH DAILY      Last office visit with prescribing clinician: 12/6/2024   Last telemedicine visit with prescribing clinician: Visit date not found   Next office visit with prescribing clinician: 6/9/2025                         Would you like a call back once the refill request has been completed: [] Yes [] No    If the office needs to give you a call back, can they leave a voicemail: [] Yes [] No    Lenka Joyner MA  03/27/25, 07:46 EDT   Gen: pale, frail, lethargic  HEENT: dry mucus membranes, bruising to frontal forehead, abrasion to nose, no septal hematoma  CV: RRR, nl s1/s2.  Resp: CTAB, slightly labored  GI: Abdomen soft, NT, ND. No rebound, no guarding  : No CVAT  Neuro: A&O x 3, moving all 4 extremities  MSK: No spine or joint tenderness to palpation  Skin: No rashes. intact, bruise to head.

## 2025-04-28 NOTE — ASSESSMENT
[FreeTextEntry1] : Myelodysplasia with excess blasts in an 87 y/o WF, on low dose Azacytididne, Has cough, no fevers, taking Robitussin, with some improvement. Discussed doing a CXR with patient and her daughter , they wish to hold off on CXR. HGB is 6.5, will transfuse 1 unit of packed cells. Her WBC count has doubled to 1115.0, Discussed this with Dr. Montes, he will see her next week, possibly add Hydrea.
Detail Level: Detailed
Detail Level: Generalized